# Patient Record
Sex: FEMALE | Race: BLACK OR AFRICAN AMERICAN | NOT HISPANIC OR LATINO | URBAN - METROPOLITAN AREA
[De-identification: names, ages, dates, MRNs, and addresses within clinical notes are randomized per-mention and may not be internally consistent; named-entity substitution may affect disease eponyms.]

---

## 2016-02-08 LAB — EXTERNAL THINPREP: NORMAL

## 2017-02-15 ENCOUNTER — TELEPHONE (OUTPATIENT)
Dept: OBSTETRICS AND GYNECOLOGY | Facility: CLINIC | Age: 32
End: 2017-02-15

## 2017-02-15 NOTE — TELEPHONE ENCOUNTER
----- Message from Jenni Greene sent at 2/15/2017 12:24 PM EST -----  Pt is having cramping no bleeding.  She is early prenatal.  Best number is [phone].      Called pt left message        Pt things are getting worse. Coming in for u/s

## 2017-02-17 ENCOUNTER — TELEPHONE (OUTPATIENT)
Dept: OBSTETRICS AND GYNECOLOGY | Facility: CLINIC | Age: 32
End: 2017-02-17

## 2017-02-17 NOTE — TELEPHONE ENCOUNTER
----- Message from Jenni Greene sent at 2/15/2017 12:24 PM EST -----  Pt is having cramping no bleeding.  She is early prenatal.  Best number is [phone].    Spoke to pt. Said the cramping is getting worse. Transferred her to make an appt

## 2017-02-21 ENCOUNTER — TELEPHONE (OUTPATIENT)
Dept: OBSTETRICS AND GYNECOLOGY | Facility: CLINIC | Age: 32
End: 2017-02-21

## 2017-02-21 RX ORDER — PRENATAL VIT/IRON FUM/FOLIC AC 27MG-0.8MG
1 TABLET ORAL DAILY
Qty: 30 TABLET | Refills: 12 | Status: SHIPPED | OUTPATIENT
Start: 2017-02-21 | End: 2017-06-20

## 2017-03-05 ENCOUNTER — APPOINTMENT (OUTPATIENT)
Dept: ULTRASOUND IMAGING | Facility: HOSPITAL | Age: 32
End: 2017-03-05

## 2017-03-05 ENCOUNTER — HOSPITAL ENCOUNTER (EMERGENCY)
Facility: HOSPITAL | Age: 32
Discharge: HOME | End: 2017-03-05
Attending: EMERGENCY MEDICINE | Admitting: EMERGENCY MEDICINE

## 2017-03-05 VITALS
TEMPERATURE: 98.4 F | WEIGHT: 293 LBS | HEIGHT: 70 IN | DIASTOLIC BLOOD PRESSURE: 68 MMHG | RESPIRATION RATE: 16 BRPM | SYSTOLIC BLOOD PRESSURE: 123 MMHG | HEART RATE: 87 BPM | BODY MASS INDEX: 41.95 KG/M2 | OXYGEN SATURATION: 98 %

## 2017-03-05 DIAGNOSIS — O30.001 TWIN GESTATION IN FIRST TRIMESTER, UNSPECIFIED MULTIPLE GESTATION TYPE: Primary | ICD-10-CM

## 2017-03-05 DIAGNOSIS — N83.202 LEFT OVARIAN CYST: ICD-10-CM

## 2017-03-05 LAB
ALBUMIN SERPL-MCNC: 3.6 G/DL (ref 3.2–4.8)
ALBUMIN/GLOB SERPL: 1.3 G/DL (ref 1.5–2.5)
ALP SERPL-CCNC: 52 U/L (ref 25–100)
ALT SERPL W P-5'-P-CCNC: 10 U/L (ref 7–40)
ANION GAP SERPL CALCULATED.3IONS-SCNC: 3 MMOL/L (ref 3–11)
AST SERPL-CCNC: 15 U/L (ref 0–33)
BASOPHILS # BLD AUTO: 0.01 10*3/MM3 (ref 0–0.2)
BASOPHILS NFR BLD AUTO: 0.1 % (ref 0–1)
BILIRUB SERPL-MCNC: 0.4 MG/DL (ref 0.3–1.2)
BILIRUB UR QL STRIP: ABNORMAL
BUN BLD-MCNC: 8 MG/DL (ref 9–23)
BUN/CREAT SERPL: 13.3 (ref 7–25)
CALCIUM SPEC-SCNC: 8.8 MG/DL (ref 8.7–10.4)
CHLORIDE SERPL-SCNC: 106 MMOL/L (ref 99–109)
CLARITY UR: CLEAR
CO2 SERPL-SCNC: 26 MMOL/L (ref 20–31)
COLOR UR: ABNORMAL
CREAT BLD-MCNC: 0.6 MG/DL (ref 0.6–1.3)
DEPRECATED RDW RBC AUTO: 41.8 FL (ref 37–54)
EOSINOPHIL # BLD AUTO: 0.14 10*3/MM3 (ref 0.1–0.3)
EOSINOPHIL NFR BLD AUTO: 1.7 % (ref 0–3)
ERYTHROCYTE [DISTWIDTH] IN BLOOD BY AUTOMATED COUNT: 12.4 % (ref 11.3–14.5)
FLUAV AG NPH QL: NEGATIVE
FLUBV AG NPH QL IA: NEGATIVE
GFR SERPL CREATININE-BSD FRML MDRD: 142 ML/MIN/1.73
GLOBULIN UR ELPH-MCNC: 2.7 GM/DL
GLUCOSE BLD-MCNC: 84 MG/DL (ref 70–100)
GLUCOSE UR STRIP-MCNC: NEGATIVE MG/DL
HCG INTACT+B SERPL-ACNC: NORMAL MIU/ML
HCT VFR BLD AUTO: 34.1 % (ref 34.5–44)
HGB BLD-MCNC: 11.2 G/DL (ref 11.5–15.5)
HGB UR QL STRIP.AUTO: NEGATIVE
IMM GRANULOCYTES # BLD: 0.01 10*3/MM3 (ref 0–0.03)
IMM GRANULOCYTES NFR BLD: 0.1 % (ref 0–0.6)
KETONES UR QL STRIP: NEGATIVE
LEUKOCYTE ESTERASE UR QL STRIP.AUTO: NEGATIVE
LIPASE SERPL-CCNC: 21 U/L (ref 6–51)
LYMPHOCYTES # BLD AUTO: 2.32 10*3/MM3 (ref 0.6–4.8)
LYMPHOCYTES NFR BLD AUTO: 28.4 % (ref 24–44)
MCH RBC QN AUTO: 29.9 PG (ref 27–31)
MCHC RBC AUTO-ENTMCNC: 32.8 G/DL (ref 32–36)
MCV RBC AUTO: 91.2 FL (ref 80–99)
MONOCYTES # BLD AUTO: 0.34 10*3/MM3 (ref 0–1)
MONOCYTES NFR BLD AUTO: 4.2 % (ref 0–12)
NEUTROPHILS # BLD AUTO: 5.34 10*3/MM3 (ref 1.5–8.3)
NEUTROPHILS NFR BLD AUTO: 65.5 % (ref 41–71)
NITRITE UR QL STRIP: NEGATIVE
PH UR STRIP.AUTO: 5.5 [PH] (ref 5–8)
PLATELET # BLD AUTO: 249 10*3/MM3 (ref 150–450)
PMV BLD AUTO: 11 FL (ref 6–12)
POTASSIUM BLD-SCNC: 3.9 MMOL/L (ref 3.5–5.5)
PROT SERPL-MCNC: 6.3 G/DL (ref 5.7–8.2)
PROT UR QL STRIP: NEGATIVE
RBC # BLD AUTO: 3.74 10*6/MM3 (ref 3.89–5.14)
SODIUM BLD-SCNC: 135 MMOL/L (ref 132–146)
SP GR UR STRIP: >=1.03 (ref 1–1.03)
UROBILINOGEN UR QL STRIP: ABNORMAL
WBC NRBC COR # BLD: 8.16 10*3/MM3 (ref 3.5–10.8)

## 2017-03-05 PROCEDURE — 85025 COMPLETE CBC W/AUTO DIFF WBC: CPT | Performed by: PHYSICIAN ASSISTANT

## 2017-03-05 PROCEDURE — 99284 EMERGENCY DEPT VISIT MOD MDM: CPT

## 2017-03-05 PROCEDURE — 76817 TRANSVAGINAL US OBSTETRIC: CPT

## 2017-03-05 PROCEDURE — 80053 COMPREHEN METABOLIC PANEL: CPT | Performed by: PHYSICIAN ASSISTANT

## 2017-03-05 PROCEDURE — 96360 HYDRATION IV INFUSION INIT: CPT

## 2017-03-05 PROCEDURE — 87804 INFLUENZA ASSAY W/OPTIC: CPT | Performed by: PHYSICIAN ASSISTANT

## 2017-03-05 PROCEDURE — 81003 URINALYSIS AUTO W/O SCOPE: CPT | Performed by: PHYSICIAN ASSISTANT

## 2017-03-05 PROCEDURE — 83690 ASSAY OF LIPASE: CPT | Performed by: PHYSICIAN ASSISTANT

## 2017-03-05 PROCEDURE — 84702 CHORIONIC GONADOTROPIN TEST: CPT | Performed by: PHYSICIAN ASSISTANT

## 2017-03-05 PROCEDURE — 93976 VASCULAR STUDY: CPT

## 2017-03-05 PROCEDURE — 36415 COLL VENOUS BLD VENIPUNCTURE: CPT

## 2017-03-05 RX ORDER — SODIUM CHLORIDE 0.9 % (FLUSH) 0.9 %
10 SYRINGE (ML) INJECTION AS NEEDED
Status: DISCONTINUED | OUTPATIENT
Start: 2017-03-05 | End: 2017-03-05 | Stop reason: HOSPADM

## 2017-03-05 RX ADMIN — SODIUM CHLORIDE 1000 ML: 9 INJECTION, SOLUTION INTRAVENOUS at 13:27

## 2017-03-05 NOTE — ED PROVIDER NOTES
Subjective   Patient is a 31 y.o. female presenting with pregnancy problem.   History provided by:  Patient  Pregnancy Problem   The current episode started 2 days ago. Gestational age is 8 weeks (LMP 1-8-17. Took home urine pregnancy tests.).   Primary symptoms include abdominal pain (LLQ cramping ). Patient reports no vaginal bleeding and no vaginal discharge. Prenatal care: Pt has first appointment with Dr. Baxter 3-17-17. She has been taking prenatals. She has not had an US to confirm the pregnancy.    Associated symptoms include nausea and weakness (generalized ).   Associated symptoms include no dysuria, no fever, no headaches, no shortness of breath and no vomiting.   Prior pregnancy complications include miscarriage (x1). Prior pregnancy complications: HTN and obesity .   Risk factors do not include diabetes or gestational diabetes.   Pt gave birth vaginally 9-16-16 most recently at 38 weeks.     Review of Systems   Constitutional: Negative for chills and fever.   HENT: Negative for congestion, ear pain, sore throat and trouble swallowing.    Eyes: Negative for pain, redness and visual disturbance.   Respiratory: Negative for cough, chest tightness and shortness of breath.    Cardiovascular: Negative for chest pain and leg swelling.   Gastrointestinal: Positive for abdominal pain (LLQ cramping ) and nausea. Negative for constipation, diarrhea and vomiting.   Genitourinary: Negative for difficulty urinating, dysuria, flank pain, hematuria, vaginal bleeding and vaginal discharge.   Musculoskeletal: Negative for arthralgias, back pain and joint swelling.   Skin: Negative for rash and wound.   Neurological: Positive for weakness (generalized ). Negative for dizziness, syncope, speech difficulty, numbness and headaches.   Psychiatric/Behavioral: Negative for confusion.   All other systems reviewed and are negative.      Past Medical History   Diagnosis Date   • Anemia    • Anxiety    • Chronic back pain    •  "Depression    • Dyspnea on exertion    • Environmental allergies    • Glucose intolerance (impaired glucose tolerance)    • Helicobacter pylori (H. pylori) infection      asx and grossly nl EGD. \"abundant\"on path. HBT still + after PrevPak tx. LSG path neg.   • Hypoalbuminemia      3.2   • Joint pain    • Migraine    • Obesity, morbid    • VIRA (obstructive sleep apnea)      negative test   • PCOS (polycystic ovarian syndrome)        No Known Allergies    Past Surgical History   Procedure Laterality Date   • Gastric sleeve laparoscopic     • Tonsillectomy     • Dilatation and curettage     • Gastric sleeve laparoscopic     • Bariatric surgery         Family History   Problem Relation Age of Onset   • Birth defects Other    • Sleep apnea Mother        Social History     Social History   • Marital status: Single     Spouse name: N/A   • Number of children: N/A   • Years of education: N/A     Social History Main Topics   • Smoking status: Never Smoker   • Smokeless tobacco: None   • Alcohol use No   • Drug use: No   • Sexual activity: Yes     Partners: Male     Other Topics Concern   • None     Social History Narrative   • None           Objective   Physical Exam   Constitutional: She is oriented to person, place, and time. Vital signs are normal. She appears well-developed.   HENT:   Head: Atraumatic.   Nose: Nose normal.   Mouth/Throat: Mucous membranes are normal.   Eyes: Conjunctivae, EOM and lids are normal. Pupils are equal, round, and reactive to light.   Neck: Normal range of motion. Neck supple.   Cardiovascular: Normal rate, regular rhythm and normal heart sounds.    Pulmonary/Chest: Effort normal and breath sounds normal. She has no wheezes.   Abdominal: Soft. She exhibits no distension. There is tenderness in the left lower quadrant. There is no rebound and no guarding.   Musculoskeletal: Normal range of motion. She exhibits no edema or tenderness.   Neurological: She is alert and oriented to person, place, " and time. No sensory deficit.   Skin: Skin is warm and dry. No rash noted. No erythema.   Psychiatric: She has a normal mood and affect. Her speech is normal and behavior is normal.   Nursing note and vitals reviewed.      Procedures         ED Course  ED Course   Discussed results with patient and she is agreeable with plan for close f/u with Dr. Baxter. Offered work excuse and she declined.     Reviewed old records. Noted Rh +       Recent Results (from the past 24 hour(s))   Influenza Antigen    Collection Time: 03/05/17  1:18 PM   Result Value Ref Range    Influenza A Ag, EIA Negative Negative    Influenza B Ag, EIA Negative Negative   Urinalysis With / Culture If Indicated    Collection Time: 03/05/17  1:20 PM   Result Value Ref Range    Color, UA Dark Yellow (A) Yellow, Straw    Appearance, UA Clear Clear    pH, UA 5.5 5.0 - 8.0    Specific Gravity, UA >=1.030 1.001 - 1.030    Glucose, UA Negative Negative    Ketones, UA Negative Negative    Bilirubin, UA Small (1+) (A) Negative    Blood, UA Negative Negative    Protein, UA Negative Negative    Leuk Esterase, UA Negative Negative    Nitrite, UA Negative Negative    Urobilinogen, UA 1.0 E.U./dL 0.2 - 1.0 E.U./dL   Comprehensive Metabolic Panel    Collection Time: 03/05/17  1:58 PM   Result Value Ref Range    Glucose 84 70 - 100 mg/dL    BUN 8 (L) 9 - 23 mg/dL    Creatinine 0.60 0.60 - 1.30 mg/dL    Sodium 135 132 - 146 mmol/L    Potassium 3.9 3.5 - 5.5 mmol/L    Chloride 106 99 - 109 mmol/L    CO2 26.0 20.0 - 31.0 mmol/L    Calcium 8.8 8.7 - 10.4 mg/dL    Total Protein 6.3 5.7 - 8.2 g/dL    Albumin 3.60 3.20 - 4.80 g/dL    ALT (SGPT) 10 7 - 40 U/L    AST (SGOT) 15 0 - 33 U/L    Alkaline Phosphatase 52 25 - 100 U/L    Total Bilirubin 0.4 0.3 - 1.2 mg/dL    eGFR  African Amer 142 >60 mL/min/1.73    Globulin 2.7 gm/dL    A/G Ratio 1.3 (L) 1.5 - 2.5 g/dL    BUN/Creatinine Ratio 13.3 7.0 - 25.0    Anion Gap 3.0 3.0 - 11.0 mmol/L   Lipase    Collection Time: 03/05/17   1:58 PM   Result Value Ref Range    Lipase 21 6 - 51 U/L   hCG, Quantitative, Pregnancy    Collection Time: 03/05/17  1:58 PM   Result Value Ref Range    HCG Quantitative 07844.00 mIU/mL   CBC Auto Differential    Collection Time: 03/05/17  1:58 PM   Result Value Ref Range    WBC 8.16 3.50 - 10.80 10*3/mm3    RBC 3.74 (L) 3.89 - 5.14 10*6/mm3    Hemoglobin 11.2 (L) 11.5 - 15.5 g/dL    Hematocrit 34.1 (L) 34.5 - 44.0 %    MCV 91.2 80.0 - 99.0 fL    MCH 29.9 27.0 - 31.0 pg    MCHC 32.8 32.0 - 36.0 g/dL    RDW 12.4 11.3 - 14.5 %    RDW-SD 41.8 37.0 - 54.0 fl    MPV 11.0 6.0 - 12.0 fL    Platelets 249 150 - 450 10*3/mm3    Neutrophil % 65.5 41.0 - 71.0 %    Lymphocyte % 28.4 24.0 - 44.0 %    Monocyte % 4.2 0.0 - 12.0 %    Eosinophil % 1.7 0.0 - 3.0 %    Basophil % 0.1 0.0 - 1.0 %    Immature Grans % 0.1 0.0 - 0.6 %    Neutrophils, Absolute 5.34 1.50 - 8.30 10*3/mm3    Lymphocytes, Absolute 2.32 0.60 - 4.80 10*3/mm3    Monocytes, Absolute 0.34 0.00 - 1.00 10*3/mm3    Eosinophils, Absolute 0.14 0.10 - 0.30 10*3/mm3    Basophils, Absolute 0.01 0.00 - 0.20 10*3/mm3    Immature Grans, Absolute 0.01 0.00 - 0.03 10*3/mm3     Note: In addition to lab results from this visit, the labs listed above may include labs taken at another facility or during a different encounter within the last 24 hours. Please correlate lab times with ED admission and discharge times for further clarification of the services performed during this visit.    US Testicular or Ovarian Vascular Limited   Preliminary Result   Two living intrauterine pregnancies identified. Crown rump   length for fetal pole A is 7 weeks 5 days and crown-rump length for   fetal pole B is  7 weeks 4 days with fetal heart tones present. There is   a large left ovarian cyst with good blood flow seen in the left ovary.   The right ovary is somewhat limited to visualize.       DICTATED:     03/05/2017   EDITED:         03/05/2017               US Ob Transvaginal   Preliminary  Result   Two living intrauterine pregnancies identified. Crown rump   length for fetal pole A is 7 weeks 5 days and crown-rump length for   fetal pole B is  7 weeks 4 days with fetal heart tones present. There is   a large left ovarian cyst with good blood flow seen in the left ovary.   The right ovary is somewhat limited to visualize.       DICTATED:     03/05/2017   EDITED:         03/05/2017                 Vitals:    03/05/17 1400 03/05/17 1509 03/05/17 1621 03/05/17 1632   BP: 119/71  123/68    BP Location:       Patient Position:       Pulse:   87    Resp:   18 16   Temp:    98.4 °F (36.9 °C)   TempSrc:       SpO2: 100% 100% 98%    Weight:       Height:         Medications   sodium chloride 0.9 % bolus 1,000 mL (0 mL Intravenous Stopped 3/5/17 1455)                         MDM    Final diagnoses:   Twin gestation in first trimester, unspecified multiple gestation type   Left ovarian cyst            YAMILET Salvador  03/05/17 2915

## 2017-03-17 ENCOUNTER — INITIAL PRENATAL (OUTPATIENT)
Dept: OBSTETRICS AND GYNECOLOGY | Facility: CLINIC | Age: 32
End: 2017-03-17

## 2017-03-17 VITALS — WEIGHT: 293 LBS | BODY MASS INDEX: 47.21 KG/M2 | DIASTOLIC BLOOD PRESSURE: 80 MMHG | SYSTOLIC BLOOD PRESSURE: 124 MMHG

## 2017-03-17 DIAGNOSIS — IMO0001 TWINS: Primary | ICD-10-CM

## 2017-03-17 PROCEDURE — 99214 OFFICE O/P EST MOD 30 MIN: CPT | Performed by: OBSTETRICS & GYNECOLOGY

## 2017-03-17 NOTE — PROGRESS NOTES
"Chief Complaint   Patient presents with   • Initial Prenatal Visit     TWINS!!!! Desires BTL       HPI: Geneva is a  currently at 9w4d who today reports the following:  Contractions - {yes/NO:39266::\"No\"}; Leaking - {yes/NO:64313::\"No\"}; Vaginal bleeding -  {yes/NO:69973::\"No\"}; Heartburn - {yes/NO:37480::\"No\"}.    ROS:  GI: Nausea - {yes/NO:61972::\"No\"} ; Constipation - {yes/NO:66225::\"No\"}; Diarrhea - {yes/NO:10384::\"No\"}    Neuro: Headache - {yes/NO:02335::\"No\"} ; Visual change - {yes/NO:37371::\"No\"}      EXAM:  Vitals: See prenatal flowsheet   Abdomen: See prenatal flowsheet   Urine glucose/protein: See prenatal flowsheet   Pelvic: See prenatal flowsheet     Lab Results   Component Value Date    ABO A 2016    RH Positive 2016    ABSCRN Negative 2016       MDM:  Impression: 1. {Pregnancy Imp:29249::\"Supervision of pregnancy\"}   Tests done today: 1. {OB follow-up tests:58500::\"none\"}   Topics discussed: 1. Continue with PNV's  2. Prenatal labs reviewed  3. {Prenatal Checklist:48036::\"none - she had no major complaints,questions or concerns\"}   Tests next visit: {OB tests next visit:08571::\"none\"}   Next visit: See prenatal flowsheet       "

## 2017-03-17 NOTE — PROGRESS NOTES
@SUBJECTIVEBEGIN  Chief Complaint   Patient presents with   • Initial Prenatal Visit     TWINS!!!! Desires BTL       Geneva Cordova is a 31 y.o. year old .  No LMP recorded. Patient is pregnant.  She presents to be seen to initiate prenatal care. She has twins on ultrasound.  dates and scan confirms.  She lost ~125 lbs after gastric sleeve and has gained 27 back. 1800/2000 calorie diet.    Smoking status: Never Smoker                                                                 Smokeless status: Not on file                         The following portions of the patient's history were reviewed and updated as appropriate:vital signs, allergies, current medications, past medical history, past social history, past surgical history and problem list.    Objective   Lab Review   No data reviewed    Imaging   Pelvic ultrasound report    Assessment/Plan   ASSESSMENT  1. IUP at    2. Supervision of pregnancy  3. twins  4. morbid obesity    PLAN  1. The problem list for pregnancy was initiated today  2. Tests ordered today:  No orders of the defined types were placed in this encounter.    3. Testing for GC / Chlamydia / trichomonas will need to be done at her next prenatal visit  4. Genetic testing reviewed: NIPT ([place])  5. Information reviewed: exercise in pregnancy, nutrition in pregnancy, weight gain in pregnancy, work and travel restrictions during pregnancy, list of OTC medications acceptable in pregnancy and call coverage groups    Follow up: 2 week(s) to do hARMONY TESTING       This note was electronically signed.      2017

## 2017-03-31 ENCOUNTER — LAB (OUTPATIENT)
Dept: LAB | Facility: HOSPITAL | Age: 32
End: 2017-03-31

## 2017-03-31 ENCOUNTER — ROUTINE PRENATAL (OUTPATIENT)
Dept: OBSTETRICS AND GYNECOLOGY | Facility: CLINIC | Age: 32
End: 2017-03-31

## 2017-03-31 VITALS — BODY MASS INDEX: 46.92 KG/M2 | WEIGHT: 293 LBS | SYSTOLIC BLOOD PRESSURE: 122 MMHG | DIASTOLIC BLOOD PRESSURE: 80 MMHG

## 2017-03-31 DIAGNOSIS — IMO0001 TWINS: Primary | ICD-10-CM

## 2017-03-31 DIAGNOSIS — IMO0001 TWINS: ICD-10-CM

## 2017-03-31 DIAGNOSIS — E66.01 MORBID OBESITY DUE TO EXCESS CALORIES (HCC): ICD-10-CM

## 2017-03-31 LAB
ABO GROUP BLD: NORMAL
AMPHET+METHAMPHET UR QL: NEGATIVE
AMPHETAMINES UR QL: NEGATIVE
BACTERIA UR QL AUTO: ABNORMAL /HPF
BARBITURATES UR QL SCN: NEGATIVE
BASOPHILS # BLD AUTO: 0.01 10*3/MM3 (ref 0–0.2)
BASOPHILS NFR BLD AUTO: 0.1 % (ref 0–1)
BENZODIAZ UR QL SCN: NEGATIVE
BILIRUB UR QL STRIP: ABNORMAL
BLD GP AB SCN SERPL QL: NEGATIVE
BUPRENORPHINE SERPL-MCNC: NEGATIVE NG/ML
CANNABINOIDS SERPL QL: NEGATIVE
CLARITY UR: ABNORMAL
COCAINE UR QL: NEGATIVE
COLOR UR: YELLOW
DEPRECATED RDW RBC AUTO: 40.9 FL (ref 37–54)
EOSINOPHIL # BLD AUTO: 0.15 10*3/MM3 (ref 0.1–0.3)
EOSINOPHIL NFR BLD AUTO: 1.9 % (ref 0–3)
ERYTHROCYTE [DISTWIDTH] IN BLOOD BY AUTOMATED COUNT: 12.7 % (ref 11.3–14.5)
GLUCOSE UR STRIP-MCNC: NEGATIVE MG/DL
HBV SURFACE AG SERPL QL IA: NORMAL
HCT VFR BLD AUTO: 34.9 % (ref 34.5–44)
HGB BLD-MCNC: 11.6 G/DL (ref 11.5–15.5)
HGB UR QL STRIP.AUTO: NEGATIVE
HIV1+2 AB SER QL: NORMAL
HYALINE CASTS UR QL AUTO: ABNORMAL /LPF
IMM GRANULOCYTES # BLD: 0.02 10*3/MM3 (ref 0–0.03)
IMM GRANULOCYTES NFR BLD: 0.3 % (ref 0–0.6)
KETONES UR QL STRIP: ABNORMAL
LEUKOCYTE ESTERASE UR QL STRIP.AUTO: NEGATIVE
LYMPHOCYTES # BLD AUTO: 2.37 10*3/MM3 (ref 0.6–4.8)
LYMPHOCYTES NFR BLD AUTO: 30.1 % (ref 24–44)
MCH RBC QN AUTO: 29.7 PG (ref 27–31)
MCHC RBC AUTO-ENTMCNC: 33.2 G/DL (ref 32–36)
MCV RBC AUTO: 89.5 FL (ref 80–99)
METHADONE UR QL SCN: NEGATIVE
MONOCYTES # BLD AUTO: 0.26 10*3/MM3 (ref 0–1)
MONOCYTES NFR BLD AUTO: 3.3 % (ref 0–12)
NEUTROPHILS # BLD AUTO: 5.06 10*3/MM3 (ref 1.5–8.3)
NEUTROPHILS NFR BLD AUTO: 64.3 % (ref 41–71)
NITRITE UR QL STRIP: NEGATIVE
OPIATES UR QL: NEGATIVE
OXYCODONE UR QL SCN: NEGATIVE
PCP UR QL SCN: NEGATIVE
PH UR STRIP.AUTO: <=5 [PH] (ref 5–8)
PLATELET # BLD AUTO: 274 10*3/MM3 (ref 150–450)
PMV BLD AUTO: 11.2 FL (ref 6–12)
PROPOXYPH UR QL: NEGATIVE
PROT UR QL STRIP: NEGATIVE
RBC # BLD AUTO: 3.9 10*6/MM3 (ref 3.89–5.14)
RBC # UR: ABNORMAL /HPF
REF LAB TEST METHOD: ABNORMAL
RH BLD: POSITIVE
RUBV IGG SER QL: NORMAL
RUBV IGG SER-ACNC: 323.6 IU/ML
SP GR UR STRIP: >=1.03 (ref 1–1.03)
SQUAMOUS #/AREA URNS HPF: ABNORMAL /HPF
TRICYCLICS UR QL SCN: NEGATIVE
UROBILINOGEN UR QL STRIP: ABNORMAL
WBC NRBC COR # BLD: 7.87 10*3/MM3 (ref 3.5–10.8)
WBC UR QL AUTO: ABNORMAL /HPF

## 2017-03-31 PROCEDURE — G0432 EIA HIV-1/HIV-2 SCREEN: HCPCS | Performed by: OBSTETRICS & GYNECOLOGY

## 2017-03-31 PROCEDURE — 80306 DRUG TEST PRSMV INSTRMNT: CPT | Performed by: OBSTETRICS & GYNECOLOGY

## 2017-03-31 PROCEDURE — 86850 RBC ANTIBODY SCREEN: CPT | Performed by: OBSTETRICS & GYNECOLOGY

## 2017-03-31 PROCEDURE — 87340 HEPATITIS B SURFACE AG IA: CPT | Performed by: OBSTETRICS & GYNECOLOGY

## 2017-03-31 PROCEDURE — 36415 COLL VENOUS BLD VENIPUNCTURE: CPT

## 2017-03-31 PROCEDURE — 80081 OBSTETRIC PANEL INC HIV TSTG: CPT | Performed by: OBSTETRICS & GYNECOLOGY

## 2017-03-31 PROCEDURE — 99213 OFFICE O/P EST LOW 20 MIN: CPT | Performed by: OBSTETRICS & GYNECOLOGY

## 2017-03-31 PROCEDURE — 86901 BLOOD TYPING SEROLOGIC RH(D): CPT | Performed by: OBSTETRICS & GYNECOLOGY

## 2017-03-31 PROCEDURE — 87086 URINE CULTURE/COLONY COUNT: CPT | Performed by: OBSTETRICS & GYNECOLOGY

## 2017-03-31 PROCEDURE — 86900 BLOOD TYPING SEROLOGIC ABO: CPT | Performed by: OBSTETRICS & GYNECOLOGY

## 2017-03-31 PROCEDURE — 81001 URINALYSIS AUTO W/SCOPE: CPT | Performed by: OBSTETRICS & GYNECOLOGY

## 2017-03-31 PROCEDURE — 85025 COMPLETE CBC W/AUTO DIFF WBC: CPT | Performed by: OBSTETRICS & GYNECOLOGY

## 2017-04-02 LAB — BACTERIA SPEC AEROBE CULT: NORMAL

## 2017-04-03 LAB — RPR SER QL: NORMAL

## 2017-04-06 ENCOUNTER — TELEPHONE (OUTPATIENT)
Dept: OBSTETRICS AND GYNECOLOGY | Facility: CLINIC | Age: 32
End: 2017-04-06

## 2017-04-06 NOTE — TELEPHONE ENCOUNTER
----- Message from Tom Greenwood sent at 4/6/2017 10:09 AM EDT -----  [phone]     PT IS CALLING TO SEE IF HARMONY TEST RESULTS ARE BACK

## 2017-04-06 NOTE — TELEPHONE ENCOUNTER
Advised pt [place] results are not back yet. Takes 7-10 days and tomorrow will be 7 days but Dr. Baxter is not back until Monday to sign any labs off. Will be atleast Monday before I get any results. Pt verbalized understanding.

## 2017-04-10 ENCOUNTER — TELEPHONE (OUTPATIENT)
Dept: OBSTETRICS AND GYNECOLOGY | Facility: CLINIC | Age: 32
End: 2017-04-10

## 2017-04-10 NOTE — TELEPHONE ENCOUNTER
Pt called back for results. Advised her the lab did not get enough sample to get her the results. She will have to have it re drawn to get a result.     Pt was upset and hung up the phone.

## 2017-04-10 NOTE — TELEPHONE ENCOUNTER
----- Message from Maricruz Gilman sent at 4/10/2017 10:41 AM EDT -----  Please call her back at above #, wants results of john test

## 2017-04-11 ENCOUNTER — APPOINTMENT (OUTPATIENT)
Dept: LAB | Facility: HOSPITAL | Age: 32
End: 2017-04-11

## 2017-04-18 ENCOUNTER — TELEPHONE (OUTPATIENT)
Dept: OBSTETRICS AND GYNECOLOGY | Facility: CLINIC | Age: 32
End: 2017-04-18

## 2017-04-18 NOTE — TELEPHONE ENCOUNTER
----- Message from Katalina Thornton sent at 4/18/2017  2:50 PM EDT -----  VAGINA HURTS TO WALK -LAYS DOWN ITS OK - JUST CALL AND TALK WITH

## 2017-04-18 NOTE — TELEPHONE ENCOUNTER
----- Message from Gloria Phillip sent at 4/18/2017  8:42 AM EDT -----  Need to talk about pain in vag.         Tried calling pt was unable to leave message

## 2017-04-20 ENCOUNTER — TELEPHONE (OUTPATIENT)
Dept: OBSTETRICS AND GYNECOLOGY | Facility: CLINIC | Age: 32
End: 2017-04-20

## 2017-04-20 NOTE — TELEPHONE ENCOUNTER
----- Message from Karrie Page sent at 4/20/2017  2:08 PM EDT -----  14 wk prenatal with a lot of cramping and  Pain in her vagina, should she go to [facility] or ER?

## 2017-04-24 ENCOUNTER — TELEPHONE (OUTPATIENT)
Dept: OBSTETRICS AND GYNECOLOGY | Facility: CLINIC | Age: 32
End: 2017-04-24

## 2017-04-24 NOTE — TELEPHONE ENCOUNTER
i called an d aleksander Bean the re-draw for [place]/ NIPT was inconclusive; that could be associated with chromosomal abnormalities. I suggested follow up at [facility] and [facility] as options. She has an appt this Friday 4/28 already and I will see her later that day.

## 2017-04-25 ENCOUNTER — OFFICE VISIT (OUTPATIENT)
Dept: RETAIL CLINIC | Facility: CLINIC | Age: 32
End: 2017-04-25

## 2017-04-25 VITALS
HEART RATE: 90 BPM | TEMPERATURE: 98.5 F | OXYGEN SATURATION: 99 % | SYSTOLIC BLOOD PRESSURE: 140 MMHG | DIASTOLIC BLOOD PRESSURE: 80 MMHG

## 2017-04-25 DIAGNOSIS — J06.9 UPPER RESPIRATORY INFECTION, VIRAL: ICD-10-CM

## 2017-04-25 DIAGNOSIS — R68.89 FLU-LIKE SYMPTOMS: Primary | ICD-10-CM

## 2017-04-25 DIAGNOSIS — R51.9 HEADACHE ABOVE THE EYE REGION: ICD-10-CM

## 2017-04-25 LAB
EXPIRATION DATE: NORMAL
FLUAV AG NPH QL: NEGATIVE
FLUBV AG NPH QL: NEGATIVE
INTERNAL CONTROL: NORMAL
Lab: NORMAL

## 2017-04-25 PROCEDURE — 99213 OFFICE O/P EST LOW 20 MIN: CPT | Performed by: NURSE PRACTITIONER

## 2017-04-25 PROCEDURE — 87804 INFLUENZA ASSAY W/OPTIC: CPT | Performed by: NURSE PRACTITIONER

## 2017-04-25 RX ORDER — ECHINACEA PURPUREA EXTRACT 125 MG
2 TABLET ORAL AS NEEDED
Refills: 12
Start: 2017-04-25 | End: 2017-04-28

## 2017-04-25 RX ORDER — ACETAMINOPHEN 325 MG/1
650 TABLET ORAL EVERY 6 HOURS PRN
Refills: 0
Start: 2017-04-25 | End: 2017-10-03

## 2017-04-25 NOTE — PROGRESS NOTES
Cooper Cordova is a 31 y.o. female presents to the clinic at the request of her OBGYN. She is currently 15 weeks pregnant. She was shopping in [employer] while becoming dizzy. She also expresses that she has had a headache for 3 days, however notes that she has chronic headaches. She also reports a sore throat.    Headache    This is a chronic problem. The current episode started in the past 7 days (3 days ago). The problem occurs constantly. The problem has been waxing and waning. The pain is located in the frontal region. The pain does not radiate. The pain quality is not similar to prior headaches (not like prior migraines). The quality of the pain is described as aching. The pain is moderate. Associated symptoms include dizziness and a sore throat. Pertinent negatives include no blurred vision, ear pain, photophobia, sinus pressure or visual change. She has tried nothing for the symptoms. Her past medical history is significant for migraine headaches.   Sore Throat    This is a new problem. The current episode started yesterday. The problem has been gradually worsening. Neither side of throat is experiencing more pain than the other. There has been no fever. The pain is mild. Associated symptoms include congestion and headaches. Pertinent negatives include no ear pain. She has tried nothing for the symptoms.       The following portions of the patient's history were reviewed and updated as appropriate: allergies, current medications, past family history, past medical history, past social history, past surgical history and problem list.    Review of Systems   Constitutional: Negative.    HENT: Positive for congestion, postnasal drip and sore throat. Negative for ear pain, sinus pressure and voice change.    Eyes: Negative.  Negative for blurred vision and photophobia.   Respiratory: Negative.    Cardiovascular: Negative.    Gastrointestinal: Negative.    Musculoskeletal: Negative.    Skin: Negative.     Allergic/Immunologic: Negative.    Neurological: Positive for dizziness and headaches.   Psychiatric/Behavioral: Negative.        Objective   Physical Exam   Constitutional: She is oriented to person, place, and time. She appears well-developed and well-nourished.   HENT:   Head: Normocephalic and atraumatic.   Right Ear: Hearing, external ear and ear canal normal. A middle ear effusion is present.   Left Ear: Hearing, external ear and ear canal normal. A middle ear effusion is present.   Nose: Mucosal edema present.   Mouth/Throat: Uvula is midline and mucous membranes are normal. Posterior oropharyngeal erythema present.   Eyes: Pupils are equal, round, and reactive to light.   Neck: Normal range of motion.   Cardiovascular: Normal rate and regular rhythm.    Pulmonary/Chest: Effort normal. She has decreased breath sounds in the right lower field and the left lower field.   Neurological: She is alert and oriented to person, place, and time.   Skin: Skin is warm and dry.   Vitals reviewed.    Vitals:    04/25/17 1208   BP: 140/80   Pulse: 90   Temp: 98.5 °F (36.9 °C)   SpO2: 99%     Lab Results   Component Value Date    RAPFLUA NEGATIVE 04/25/2017    RAPFLUB NEGATIVE 04/25/2017       Assessment/Plan   Geneva was seen today for headache.    Diagnoses and all orders for this visit:    Flu-like symptoms  -     POCT Influenza A/B    Upper respiratory infection, viral  -     acetaminophen (TYLENOL) 325 MG tablet; Take 2 tablets by mouth Every 6 (Six) Hours As Needed for Mild Pain (1-3).  -     sodium chloride (OCEAN NASAL SPRAY) 0.65 % nasal spray; 2 sprays into each nostril As Needed for Congestion.    Headache above the eye region  -     acetaminophen (TYLENOL) 325 MG tablet; Take 2 tablets by mouth Every 6 (Six) Hours As Needed for Mild Pain (1-3).     Plan of care reviewed with patient &/or caregiver during today's visit. Education was provided in regards to diagnosis, symptom management and any prescribed or  recommended OTC medications.  Advisedt to follow up with PCP, Urgent Treatment Center, or Emergency Room if symptoms worsen. Patient and/or caregiver verbalized understanding.    Fernanda Singh, APRN

## 2017-04-28 ENCOUNTER — OFFICE VISIT (OUTPATIENT)
Dept: OBSTETRICS AND GYNECOLOGY | Facility: HOSPITAL | Age: 32
End: 2017-04-28

## 2017-04-28 ENCOUNTER — ROUTINE PRENATAL (OUTPATIENT)
Dept: OBSTETRICS AND GYNECOLOGY | Facility: CLINIC | Age: 32
End: 2017-04-28

## 2017-04-28 ENCOUNTER — HOSPITAL ENCOUNTER (OUTPATIENT)
Dept: WOMENS IMAGING | Facility: HOSPITAL | Age: 32
Discharge: HOME | End: 2017-04-28
Attending: OBSTETRICS & GYNECOLOGY | Admitting: OBSTETRICS & GYNECOLOGY

## 2017-04-28 VITALS — SYSTOLIC BLOOD PRESSURE: 110 MMHG | WEIGHT: 293 LBS | BODY MASS INDEX: 47.87 KG/M2 | DIASTOLIC BLOOD PRESSURE: 55 MMHG

## 2017-04-28 VITALS — SYSTOLIC BLOOD PRESSURE: 124 MMHG | DIASTOLIC BLOOD PRESSURE: 70 MMHG

## 2017-04-28 DIAGNOSIS — E66.01 MORBID OBESITY DUE TO EXCESS CALORIES (HCC): ICD-10-CM

## 2017-04-28 DIAGNOSIS — G43.919 INTRACTABLE MIGRAINE WITHOUT STATUS MIGRAINOSUS, UNSPECIFIED MIGRAINE TYPE: Primary | ICD-10-CM

## 2017-04-28 DIAGNOSIS — O30.049 DICHORIONIC DIAMNIOTIC TWIN PREGNANCY, ANTEPARTUM: Primary | ICD-10-CM

## 2017-04-28 DIAGNOSIS — IMO0001 TWINS: ICD-10-CM

## 2017-04-28 PROCEDURE — 99241 PR OFFICE CONSULTATION NEW/ESTAB PATIENT 15 MIN: CPT | Performed by: OBSTETRICS & GYNECOLOGY

## 2017-04-28 PROCEDURE — 76815 OB US LIMITED FETUS(S): CPT | Performed by: OBSTETRICS & GYNECOLOGY

## 2017-04-28 PROCEDURE — 99213 OFFICE O/P EST LOW 20 MIN: CPT | Performed by: OBSTETRICS & GYNECOLOGY

## 2017-04-28 PROCEDURE — 76815 OB US LIMITED FETUS(S): CPT

## 2017-04-28 RX ORDER — GUAIFENESIN 600 MG/1
1200 TABLET, EXTENDED RELEASE ORAL 2 TIMES DAILY
Qty: 120 TABLET | Refills: 11 | Status: SHIPPED | OUTPATIENT
Start: 2017-04-28 | End: 2018-02-01

## 2017-04-28 RX ORDER — FLUTICASONE PROPIONATE 50 MCG
1 SPRAY, SUSPENSION (ML) NASAL DAILY
Qty: 1 EACH | Refills: 2 | Status: SHIPPED | OUTPATIENT
Start: 2017-04-28 | End: 2018-02-01

## 2017-04-28 RX ORDER — LORATADINE 10 MG/1
10 TABLET ORAL DAILY
Qty: 30 TABLET | Refills: 2 | Status: SHIPPED | OUTPATIENT
Start: 2017-04-28 | End: 2018-02-01

## 2017-04-28 NOTE — PROGRESS NOTES
Chief Complaint   Patient presents with   • Routine Prenatal Visit     u/s done at [facility] today   • Headache       HPI: Geneva is a  currently at 15w4d who today reports the following:  Contractions - No; Leaking - No; Vaginal bleeding -  No; Heartburn - YES -discussed smaller meals;Zantac;avoid lying down    ROS:  GI: Nausea - No ; Constipation - No; Diarrhea - No    Neuro: Headache - YES - migraine will not go away - frontal; tylenol no help - no NSAID yet or Mucinex; Visual change - No      EXAM:  Vitals: See prenatal flowsheet   Abdomen: See prenatal flowsheet   Urine glucose/protein: See prenatal flowsheet   Pelvic: See prenatal flowsheet     Lab Results   Component Value Date    ABO A 2017    RH Positive 2017    ABSCRN Negative 2017       MDM:  Impression: 1. Supervision of pregnancy  2. Twin pregnancy - DC/DA  3. headaches   Tests done today: 1. U/S for EFW - twins   Topics discussed: 1. Continue with PNV's  2. Prenatal labs reviewed  3. she would rather deliver by  than vaginal and  for 2nd twin.   4. Given 1800/2000 calorie diet sheets  5. i will Rx sinus/allergy meds     Tests next visit: U/S for anatomic screening and check on twins   Next visit: See prenatal flowsheet

## 2017-05-22 ENCOUNTER — HOSPITAL ENCOUNTER (EMERGENCY)
Facility: HOSPITAL | Age: 32
Discharge: HOME | End: 2017-05-22
Attending: EMERGENCY MEDICINE | Admitting: EMERGENCY MEDICINE

## 2017-05-22 ENCOUNTER — APPOINTMENT (OUTPATIENT)
Dept: WOMENS IMAGING | Facility: HOSPITAL | Age: 32
End: 2017-05-22
Attending: OBSTETRICS & GYNECOLOGY

## 2017-05-22 VITALS
DIASTOLIC BLOOD PRESSURE: 84 MMHG | BODY MASS INDEX: 41.95 KG/M2 | HEART RATE: 70 BPM | SYSTOLIC BLOOD PRESSURE: 122 MMHG | OXYGEN SATURATION: 100 % | HEIGHT: 70 IN | WEIGHT: 293 LBS | RESPIRATION RATE: 19 BRPM | TEMPERATURE: 98.7 F

## 2017-05-22 DIAGNOSIS — Z3A.20 20 WEEKS GESTATION OF PREGNANCY: ICD-10-CM

## 2017-05-22 DIAGNOSIS — S20.211A CONTUSION, CHEST WALL, RIGHT, INITIAL ENCOUNTER: Primary | ICD-10-CM

## 2017-05-22 LAB
BACTERIA UR QL AUTO: ABNORMAL /HPF
BILIRUB UR QL STRIP: ABNORMAL
CLARITY UR: ABNORMAL
COLOR UR: YELLOW
GLUCOSE UR STRIP-MCNC: NEGATIVE MG/DL
HGB UR QL STRIP.AUTO: NEGATIVE
HYALINE CASTS UR QL AUTO: ABNORMAL /LPF
KETONES UR QL STRIP: ABNORMAL
LEUKOCYTE ESTERASE UR QL STRIP.AUTO: ABNORMAL
NITRITE UR QL STRIP: NEGATIVE
PH UR STRIP.AUTO: 7.5 [PH] (ref 5–8)
PROT UR QL STRIP: NEGATIVE
RBC # UR: ABNORMAL /HPF
REF LAB TEST METHOD: ABNORMAL
SP GR UR STRIP: 1.03 (ref 1–1.03)
SQUAMOUS #/AREA URNS HPF: ABNORMAL /HPF
UROBILINOGEN UR QL STRIP: ABNORMAL
WBC UR QL AUTO: ABNORMAL /HPF

## 2017-05-22 PROCEDURE — 99283 EMERGENCY DEPT VISIT LOW MDM: CPT

## 2017-05-22 PROCEDURE — 93005 ELECTROCARDIOGRAM TRACING: CPT | Performed by: EMERGENCY MEDICINE

## 2017-05-22 PROCEDURE — 81001 URINALYSIS AUTO W/SCOPE: CPT | Performed by: NURSE PRACTITIONER

## 2017-05-22 PROCEDURE — 87086 URINE CULTURE/COLONY COUNT: CPT | Performed by: NURSE PRACTITIONER

## 2017-05-22 RX ORDER — ACETAMINOPHEN 325 MG/1
650 TABLET ORAL EVERY 6 HOURS PRN
Status: DISCONTINUED | OUTPATIENT
Start: 2017-05-22 | End: 2017-05-22 | Stop reason: HOSPADM

## 2017-05-22 RX ADMIN — ACETAMINOPHEN 650 MG: 325 TABLET, FILM COATED ORAL at 15:00

## 2017-05-23 ENCOUNTER — ROUTINE PRENATAL (OUTPATIENT)
Dept: OBSTETRICS AND GYNECOLOGY | Facility: CLINIC | Age: 32
End: 2017-05-23

## 2017-05-23 ENCOUNTER — HOSPITAL ENCOUNTER (OUTPATIENT)
Dept: WOMENS IMAGING | Facility: HOSPITAL | Age: 32
Discharge: HOME | End: 2017-05-23
Attending: OBSTETRICS & GYNECOLOGY | Admitting: OBSTETRICS & GYNECOLOGY

## 2017-05-23 ENCOUNTER — OFFICE VISIT (OUTPATIENT)
Dept: OBSTETRICS AND GYNECOLOGY | Facility: HOSPITAL | Age: 32
End: 2017-05-23

## 2017-05-23 VITALS — WEIGHT: 293 LBS | DIASTOLIC BLOOD PRESSURE: 57 MMHG | BODY MASS INDEX: 49.04 KG/M2 | SYSTOLIC BLOOD PRESSURE: 111 MMHG

## 2017-05-23 VITALS — BODY MASS INDEX: 48.93 KG/M2 | SYSTOLIC BLOOD PRESSURE: 114 MMHG | DIASTOLIC BLOOD PRESSURE: 70 MMHG | WEIGHT: 293 LBS

## 2017-05-23 DIAGNOSIS — E66.01 MORBID OBESITY DUE TO EXCESS CALORIES (HCC): ICD-10-CM

## 2017-05-23 DIAGNOSIS — Z98.84 STATUS POST BARIATRIC SURGERY: ICD-10-CM

## 2017-05-23 DIAGNOSIS — O30.049 DICHORIONIC DIAMNIOTIC TWIN PREGNANCY, ANTEPARTUM: Primary | ICD-10-CM

## 2017-05-23 DIAGNOSIS — O30.049 DICHORIONIC DIAMNIOTIC TWIN PREGNANCY, ANTEPARTUM: ICD-10-CM

## 2017-05-23 DIAGNOSIS — E66.01 MORBID OBESITY DUE TO EXCESS CALORIES (HCC): Primary | ICD-10-CM

## 2017-05-23 PROBLEM — IMO0001 TWINS: Status: RESOLVED | Noted: 2017-03-17 | Resolved: 2017-05-23

## 2017-05-23 PROCEDURE — 99213 OFFICE O/P EST LOW 20 MIN: CPT | Performed by: OBSTETRICS & GYNECOLOGY

## 2017-05-23 PROCEDURE — 76811 OB US DETAILED SNGL FETUS: CPT | Performed by: OBSTETRICS & GYNECOLOGY

## 2017-05-23 PROCEDURE — 76811 OB US DETAILED SNGL FETUS: CPT

## 2017-05-23 PROCEDURE — 76812 OB US DETAILED ADDL FETUS: CPT

## 2017-05-23 PROCEDURE — 76812 OB US DETAILED ADDL FETUS: CPT | Performed by: OBSTETRICS & GYNECOLOGY

## 2017-05-24 LAB — BACTERIA SPEC AEROBE CULT: NORMAL

## 2017-05-31 ENCOUNTER — TELEPHONE (OUTPATIENT)
Dept: OBSTETRICS AND GYNECOLOGY | Facility: CLINIC | Age: 32
End: 2017-05-31

## 2017-06-01 NOTE — TELEPHONE ENCOUNTER
She sxhould get knee high support stockings and epsom salts okay but uterus is cause of edema; try left side rest for hour with feet elevated above her hips and head

## 2017-06-12 ENCOUNTER — HOSPITAL ENCOUNTER (OUTPATIENT)
Facility: HOSPITAL | Age: 32
Setting detail: OBSERVATION
Discharge: HOME | End: 2017-06-12
Attending: OBSTETRICS & GYNECOLOGY | Admitting: OBSTETRICS & GYNECOLOGY

## 2017-06-12 VITALS
HEIGHT: 70 IN | SYSTOLIC BLOOD PRESSURE: 115 MMHG | RESPIRATION RATE: 18 BRPM | DIASTOLIC BLOOD PRESSURE: 64 MMHG | HEART RATE: 81 BPM | TEMPERATURE: 98.5 F

## 2017-06-12 PROBLEM — O26.899 PREGNANCY RELATED ABDOMINAL PAIN OF LOWER QUADRANT, ANTEPARTUM: Status: ACTIVE | Noted: 2017-06-12

## 2017-06-12 PROBLEM — R10.30 PREGNANCY RELATED ABDOMINAL PAIN OF LOWER QUADRANT, ANTEPARTUM: Status: ACTIVE | Noted: 2017-06-12

## 2017-06-12 LAB
BACTERIA UR QL AUTO: ABNORMAL /HPF
BACTERIA UR QL AUTO: ABNORMAL /HPF
BILIRUB UR QL STRIP: NEGATIVE
BILIRUB UR QL STRIP: NEGATIVE
CLARITY UR: ABNORMAL
CLARITY UR: CLEAR
COLOR UR: YELLOW
COLOR UR: YELLOW
GLUCOSE UR STRIP-MCNC: NEGATIVE MG/DL
GLUCOSE UR STRIP-MCNC: NEGATIVE MG/DL
HGB UR QL STRIP.AUTO: ABNORMAL
HGB UR QL STRIP.AUTO: ABNORMAL
HYALINE CASTS UR QL AUTO: ABNORMAL /LPF
HYALINE CASTS UR QL AUTO: ABNORMAL /LPF
KETONES UR QL STRIP: NEGATIVE
KETONES UR QL STRIP: NEGATIVE
LEUKOCYTE ESTERASE UR QL STRIP.AUTO: ABNORMAL
LEUKOCYTE ESTERASE UR QL STRIP.AUTO: ABNORMAL
NITRITE UR QL STRIP: NEGATIVE
NITRITE UR QL STRIP: NEGATIVE
PH UR STRIP.AUTO: 6 [PH] (ref 5–8)
PH UR STRIP.AUTO: 6 [PH] (ref 5–8)
PROT UR QL STRIP: NEGATIVE
PROT UR QL STRIP: NEGATIVE
RBC # UR: ABNORMAL /HPF
RBC # UR: ABNORMAL /HPF
REF LAB TEST METHOD: ABNORMAL
REF LAB TEST METHOD: ABNORMAL
SP GR UR STRIP: 1.02 (ref 1–1.03)
SP GR UR STRIP: 1.03 (ref 1–1.03)
SQUAMOUS #/AREA URNS HPF: ABNORMAL /HPF
SQUAMOUS #/AREA URNS HPF: ABNORMAL /HPF
UROBILINOGEN UR QL STRIP: ABNORMAL
UROBILINOGEN UR QL STRIP: ABNORMAL
WBC UR QL AUTO: ABNORMAL /HPF
WBC UR QL AUTO: ABNORMAL /HPF

## 2017-06-12 PROCEDURE — 99219 PR INITIAL OBSERVATION CARE/DAY 50 MINUTES: CPT | Performed by: OBSTETRICS & GYNECOLOGY

## 2017-06-12 PROCEDURE — G0378 HOSPITAL OBSERVATION PER HR: HCPCS

## 2017-06-12 PROCEDURE — 96374 THER/PROPH/DIAG INJ IV PUSH: CPT

## 2017-06-12 PROCEDURE — 81001 URINALYSIS AUTO W/SCOPE: CPT | Performed by: OBSTETRICS & GYNECOLOGY

## 2017-06-12 PROCEDURE — 25010000003 CEFTRIAXONE PER 250 MG: Performed by: OBSTETRICS & GYNECOLOGY

## 2017-06-12 PROCEDURE — 96361 HYDRATE IV INFUSION ADD-ON: CPT

## 2017-06-12 PROCEDURE — 25010000002 ONDANSETRON PER 1 MG: Performed by: OBSTETRICS & GYNECOLOGY

## 2017-06-12 PROCEDURE — 87086 URINE CULTURE/COLONY COUNT: CPT | Performed by: OBSTETRICS & GYNECOLOGY

## 2017-06-12 RX ORDER — POLYETHYLENE GLYCOL 3350 17 G/17G
17 POWDER, FOR SOLUTION ORAL AS NEEDED
COMMUNITY
End: 2018-06-06

## 2017-06-12 RX ORDER — ACETAMINOPHEN 500 MG
1000 TABLET ORAL ONCE
Status: COMPLETED | OUTPATIENT
Start: 2017-06-12 | End: 2017-06-12

## 2017-06-12 RX ORDER — ONDANSETRON 2 MG/ML
4 INJECTION INTRAMUSCULAR; INTRAVENOUS EVERY 6 HOURS PRN
Status: DISCONTINUED | OUTPATIENT
Start: 2017-06-12 | End: 2017-06-13 | Stop reason: HOSPADM

## 2017-06-12 RX ORDER — CEFTRIAXONE SODIUM 1 G/50ML
1 INJECTION, SOLUTION INTRAVENOUS EVERY 24 HOURS
Status: DISCONTINUED | OUTPATIENT
Start: 2017-06-12 | End: 2017-06-13 | Stop reason: HOSPADM

## 2017-06-12 RX ADMIN — ONDANSETRON 4 MG: 2 INJECTION INTRAMUSCULAR; INTRAVENOUS at 22:13

## 2017-06-12 RX ADMIN — CEFTRIAXONE SODIUM 1 G: 1 INJECTION, SOLUTION INTRAVENOUS at 22:03

## 2017-06-12 RX ADMIN — ACETAMINOPHEN 1000 MG: 500 TABLET ORAL at 20:56

## 2017-06-12 RX ADMIN — SODIUM CHLORIDE 1000 ML: 9 INJECTION, SOLUTION INTRAVENOUS at 22:03

## 2017-06-13 NOTE — H&P
"Geneva Cordova  1985  [id number]  [id number]    CC: cramping  HPI:  Patient is 31 y.o. black female   currently at 22w0d  Presents with c/o lower abdominal/?uterine cramping.  Onset ~noon, init intermittent, now more constant.  Mainly suprapubic.  Denies assoc bleeding or SROM.  No radiation.  Good FM.  PNC comp by di/di twins and maternal obesity.    PMH:   Current meds PNV, miralax  Illnesses obesity (s/p gastric sleeve)  Surgeries T and A,removal ganglion cyst (rt), gastric sleeve (425 lbs to 301 lbs)  Allergies NKDA    Past OB History:       Obstetric History       T2      TAB0   SAB1   E0   M0   L2       # Outcome Date GA Lbr Pérez/2nd Weight Sex Delivery Anes PTL Lv   4 Current            3 Term 16 38w2d 17:24 / 00:13 6 lb 0.5 oz (2.735 kg) M Vag-Spont EPI N Y      Name: SALINA CORDOVA      Apgar1:  7                Apgar5: 9   2 SAB 2015 14w0d          1 Term 02 41w0d  8 lb 5 oz (3.771 kg) M Vag-Spont  N Y      Name: Bi Lechuga      Complications: Status post induction of labor      Obstetric Comments   0MAB with Suction D&C 3/15   9/8/16 current pregnancy 2 vessel cord   2017 - spontaneous conception of twins            SH: tob neg , EtOH neg, drugs neg  FH: heart dz neg , diabetes pos , cancer pos    General ROS: All systems reviewed and negative except for N, edema      Physical Examination: General appearance - alert, well appearing, and in no distress  Vital signs - /64  Pulse 81  Temp 98.5 °F (36.9 °C) (Oral)   Resp 18  Ht 70\" (177.8 cm)  HEENT: normocephalic, atraumatic, pharynx clear   Neck - supple, no significant adenopathy  Lymphatics - no palpable lymphadenopathy, no hepatosplenomegaly  Chest - clear to auscultation, no wheezes, rales or rhonchi, symmetric air entry  Heart - normal rate, regular rhythm, normal S1, S2, no murmurs, rubs, clicks or gallops, no JVD  Abdomen - soft, mild suprapubic tend, nondistended, no masses or organomegaly  no " rebound tenderness noted, bowel sounds normal, obese  Vaginal Exam: closed/thick, no blood in vault  Extremities - trace pedal edema noted, no calf tend  Skin - normal coloration and turgor, no rashes, no suspicious skin lesions noted          Fetal monitoring: FHT's 150 (A), 155 (B)    Radiology     Assessment 1)IUP 22 0/7 weeks       2)lower abd pain-poss due to UTI- init ccUA contam      Plan 1)observe      2)cath UA    Hardeep Blank MD  6/12/2017  8:58 PM

## 2017-06-13 NOTE — NURSING NOTE
RN reviewed discharge instructions with pt. RN reviewed reasons to return to the hospital or follow up with her MD. Pt denied questions and verbalized understanding. Pt to keep her regularly scheduled prenatal appointments and follow up as needed. Pt ambulated to discharge. Discharged undelivered.

## 2017-06-14 LAB — BACTERIA SPEC AEROBE CULT: NORMAL

## 2017-06-15 LAB — BACTERIA SPEC AEROBE CULT: NORMAL

## 2017-06-20 ENCOUNTER — ROUTINE PRENATAL (OUTPATIENT)
Dept: OBSTETRICS AND GYNECOLOGY | Facility: CLINIC | Age: 32
End: 2017-06-20

## 2017-06-20 ENCOUNTER — HOSPITAL ENCOUNTER (OUTPATIENT)
Dept: WOMENS IMAGING | Facility: HOSPITAL | Age: 32
Discharge: HOME | End: 2017-06-20
Attending: OBSTETRICS & GYNECOLOGY | Admitting: OBSTETRICS & GYNECOLOGY

## 2017-06-20 ENCOUNTER — OFFICE VISIT (OUTPATIENT)
Dept: OBSTETRICS AND GYNECOLOGY | Facility: HOSPITAL | Age: 32
End: 2017-06-20

## 2017-06-20 VITALS — SYSTOLIC BLOOD PRESSURE: 116 MMHG | BODY MASS INDEX: 50.65 KG/M2 | WEIGHT: 293 LBS | DIASTOLIC BLOOD PRESSURE: 70 MMHG

## 2017-06-20 VITALS — DIASTOLIC BLOOD PRESSURE: 51 MMHG | BODY MASS INDEX: 50.36 KG/M2 | WEIGHT: 293 LBS | SYSTOLIC BLOOD PRESSURE: 98 MMHG

## 2017-06-20 DIAGNOSIS — O30.049 DICHORIONIC DIAMNIOTIC TWIN PREGNANCY, ANTEPARTUM: ICD-10-CM

## 2017-06-20 DIAGNOSIS — O30.049 DICHORIONIC DIAMNIOTIC TWIN PREGNANCY, ANTEPARTUM: Primary | ICD-10-CM

## 2017-06-20 DIAGNOSIS — O26.842 UTERINE SIZE DATE DISCREPANCY, SECOND TRIMESTER: ICD-10-CM

## 2017-06-20 DIAGNOSIS — Q21.0 VENTRICULAR SEPTAL DEFECT (VSD): ICD-10-CM

## 2017-06-20 DIAGNOSIS — Z98.84 STATUS POST BARIATRIC SURGERY: ICD-10-CM

## 2017-06-20 DIAGNOSIS — E66.01 MORBID OBESITY DUE TO EXCESS CALORIES (HCC): ICD-10-CM

## 2017-06-20 PROBLEM — O26.849 UTERINE SIZE DATE DISCREPANCY: Status: ACTIVE | Noted: 2017-06-20

## 2017-06-20 LAB
GLUCOSE BLD-MCNC: 72 MG/DL (ref 70–100)
HGB BLD-MCNC: 11.4 G/DL (ref 11.5–15.5)

## 2017-06-20 PROCEDURE — 85018 HEMOGLOBIN: CPT | Performed by: OBSTETRICS & GYNECOLOGY

## 2017-06-20 PROCEDURE — 82947 ASSAY GLUCOSE BLOOD QUANT: CPT | Performed by: OBSTETRICS & GYNECOLOGY

## 2017-06-20 PROCEDURE — 99214 OFFICE O/P EST MOD 30 MIN: CPT | Performed by: OBSTETRICS & GYNECOLOGY

## 2017-06-20 PROCEDURE — 36415 COLL VENOUS BLD VENIPUNCTURE: CPT | Performed by: OBSTETRICS & GYNECOLOGY

## 2017-06-20 PROCEDURE — 76816 OB US FOLLOW-UP PER FETUS: CPT | Performed by: OBSTETRICS & GYNECOLOGY

## 2017-06-20 PROCEDURE — 76816 OB US FOLLOW-UP PER FETUS: CPT

## 2017-06-20 RX ORDER — PRENATAL VIT/IRON FUM/FOLIC AC 28MG-0.8MG
1 TABLET ORAL DAILY
COMMUNITY
Start: 2017-05-02 | End: 2018-02-01

## 2017-06-20 NOTE — PROGRESS NOTES
Chief Complaint   Patient presents with   • Routine Prenatal Visit     u/s done at [facility] today       HPI: Geneva is a  currently at 23w1d who today reports the following:  Contractions - YES - but less than 4/hour AND no associated change in vaginal discharge; Leaking - No; Vaginal bleeding -  No; Heartburn - No.    ROS:  GI: Nausea - No ; Constipation - improved as compared to the prior visit; Diarrhea - No    Neuro: Headache - improved as compared to the prior visit ; Visual change - No      EXAM:  Vitals: See prenatal flowsheet   Abdomen: See prenatal flowsheet   Urine glucose/protein: See prenatal flowsheet   Pelvic: See prenatal flowsheet     Lab Results   Component Value Date    ABO A 2017    RH Positive 2017    ABSCRN Negative 2017       MDM:  Impression: 1. Supervision of pregnancy  2. Twin pregnancy - DC/DA  3. breech -breech today   4. Twin a possible cleft lip and twin B has VSD  5. Morbid obesity    Tests done today: 1. HgB  2. glucose ~ 2 hours post prandial   Topics discussed: 1. Continue with PNV's  2. Prenatal labs reviewed  3. see above   Tests next visit: U/S for EFW   Next visit: See prenatal flowsheet

## 2017-07-10 ENCOUNTER — TELEPHONE (OUTPATIENT)
Dept: OBSTETRICS AND GYNECOLOGY | Facility: CLINIC | Age: 32
End: 2017-07-10

## 2017-07-10 NOTE — TELEPHONE ENCOUNTER
Pt notified. Advised pt she will only get 12 weeks total maternity leave whether it is during her pregnancy or after. She will need to get paper work from job and bring to [facility]i to fill out if she wants to pursue this. Pt states she has appt on 07/18/17 and will see how things go until then and then let Dr. Baxter know what she decides.

## 2017-07-10 NOTE — TELEPHONE ENCOUNTER
----- Message from Maricruz Gilman sent at 7/10/2017  9:20 AM EDT -----  Please call her back at above home #, Dr Baxter's patient, 26 weeks pregnant with twins, wanting to see if she can start her maternity leave now, not able to lift and carry boxes at her work

## 2017-07-18 ENCOUNTER — OFFICE VISIT (OUTPATIENT)
Dept: OBSTETRICS AND GYNECOLOGY | Facility: HOSPITAL | Age: 32
End: 2017-07-18

## 2017-07-18 ENCOUNTER — ROUTINE PRENATAL (OUTPATIENT)
Dept: OBSTETRICS AND GYNECOLOGY | Facility: CLINIC | Age: 32
End: 2017-07-18

## 2017-07-18 ENCOUNTER — HOSPITAL ENCOUNTER (OUTPATIENT)
Dept: WOMENS IMAGING | Facility: HOSPITAL | Age: 32
Discharge: HOME | End: 2017-07-18
Attending: OBSTETRICS & GYNECOLOGY | Admitting: OBSTETRICS & GYNECOLOGY

## 2017-07-18 VITALS — SYSTOLIC BLOOD PRESSURE: 113 MMHG | BODY MASS INDEX: 50.94 KG/M2 | WEIGHT: 293 LBS | DIASTOLIC BLOOD PRESSURE: 59 MMHG

## 2017-07-18 VITALS — SYSTOLIC BLOOD PRESSURE: 112 MMHG | DIASTOLIC BLOOD PRESSURE: 60 MMHG | BODY MASS INDEX: 50.79 KG/M2 | WEIGHT: 293 LBS

## 2017-07-18 DIAGNOSIS — Z98.84 STATUS POST BARIATRIC SURGERY: ICD-10-CM

## 2017-07-18 DIAGNOSIS — E66.01 MORBID OBESITY DUE TO EXCESS CALORIES (HCC): ICD-10-CM

## 2017-07-18 DIAGNOSIS — E66.01 MORBID OBESITY DUE TO EXCESS CALORIES (HCC): Primary | ICD-10-CM

## 2017-07-18 DIAGNOSIS — O30.049 DICHORIONIC DIAMNIOTIC TWIN PREGNANCY, ANTEPARTUM: ICD-10-CM

## 2017-07-18 DIAGNOSIS — O26.842 UTERINE SIZE DATE DISCREPANCY, SECOND TRIMESTER: ICD-10-CM

## 2017-07-18 DIAGNOSIS — O30.049 DICHORIONIC DIAMNIOTIC TWIN PREGNANCY, ANTEPARTUM: Primary | ICD-10-CM

## 2017-07-18 PROCEDURE — 76816 OB US FOLLOW-UP PER FETUS: CPT | Performed by: OBSTETRICS & GYNECOLOGY

## 2017-07-18 PROCEDURE — 76816 OB US FOLLOW-UP PER FETUS: CPT

## 2017-07-18 PROCEDURE — 99213 OFFICE O/P EST LOW 20 MIN: CPT | Performed by: OBSTETRICS & GYNECOLOGY

## 2017-07-18 NOTE — PROGRESS NOTES
"Chief Complaint   Patient presents with   • Routine Prenatal Visit     u/s done at [facility] today       HPI: Geneva is a  currently at 27w1d who today reports the following:  Contractions - No; Leaking - No; Vaginal bleeding -  No; Heartburn - No.    ROS:  GI: Nausea - No ; Constipation - improved as compared to the prior visit; Diarrhea - No    Neuro: Headache - No ; Visual change - No      EXAM:  Vitals: See prenatal flowsheet   Abdomen: See prenatal flowsheet   Urine glucose/protein: See prenatal flowsheet   Pelvic: See prenatal flowsheet     Lab Results   Component Value Date    ABO A 2017    RH Positive 2017    ABSCRN Negative 2017       MDM:  Impression: 1. Supervision of pregnancy  2. Twin pregnancy - DC/DA  3. obesity   Tests done today: 1. none   Topics discussed: 1. Continue with PNV's  2. Prenatal labs reviewed  3. she wonders about a scheduled  - depends on position of twin A and B .   4. Easier recovery etc ; she delivered a 6 & 7 lb baby in the past   Tests next visit: U/S for EFW   Next visit: See prenatal flowsheet                                                She wonders about maternity leave -s he is fatigued but \" cant't give up yet\" cant afford to go on leave yet     "

## 2017-07-31 ENCOUNTER — TELEPHONE (OUTPATIENT)
Dept: OBSTETRICS AND GYNECOLOGY | Facility: CLINIC | Age: 32
End: 2017-07-31

## 2017-07-31 NOTE — TELEPHONE ENCOUNTER
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

## 2017-08-03 ENCOUNTER — ROUTINE PRENATAL (OUTPATIENT)
Dept: OBSTETRICS AND GYNECOLOGY | Facility: CLINIC | Age: 32
End: 2017-08-03

## 2017-08-03 VITALS — SYSTOLIC BLOOD PRESSURE: 112 MMHG | BODY MASS INDEX: 51.51 KG/M2 | DIASTOLIC BLOOD PRESSURE: 70 MMHG | WEIGHT: 293 LBS

## 2017-08-03 DIAGNOSIS — O30.049 DICHORIONIC DIAMNIOTIC TWIN PREGNANCY, ANTEPARTUM: Primary | ICD-10-CM

## 2017-08-03 PROBLEM — F32.A DEPRESSED: Status: ACTIVE | Noted: 2017-08-03

## 2017-08-03 PROCEDURE — 99213 OFFICE O/P EST LOW 20 MIN: CPT | Performed by: OBSTETRICS & GYNECOLOGY

## 2017-08-03 NOTE — PROGRESS NOTES
Chief Complaint   Patient presents with   • Routine Prenatal Visit     pelvic pressure   • Headache     for 5 days, causing blurred vision   • Depression       HPI: Geneva is a  currently at 29w3d who today reports the following: she is crying in the office -had tried to deal with issues at home and work ; weight gain - comfort foods  Contractions - No; Leaking - No; Vaginal bleeding -  No; Heartburn - improved as compared to the prior visit.    ROS:  GI: Nausea - No ; Constipation - YES;Miralax helps somewhat; Diarrhea - No    Neuro: Headache - No ; Visual change - No      EXAM:  Vitals: See prenatal flowsheet   Abdomen: See prenatal flowsheet   Urine glucose/protein: See prenatal flowsheet   Pelvic: See prenatal flowsheet     Lab Results   Component Value Date    HGB 11.4 (L) 2017    HCT 34.9 2017    ABO A 2017    RH Positive 2017    ABSCRN Negative 2017       MDM:  Impression: 1. Supervision of pregnancy  2. Twin pregnancy - DC/DA  3. depreesion with history of Zoloft in the past   Tests done today: 1. none   Topics discussed: 1. Continue with PNV's  2. Prenatal labs reviewed  3.  labor signs and symptoms  4. depression Rx    Tests next visit: U/S for EFW of twins on the    Next visit: See prenatal flowsheet

## 2017-08-14 ENCOUNTER — TELEPHONE (OUTPATIENT)
Dept: OBSTETRICS AND GYNECOLOGY | Facility: CLINIC | Age: 32
End: 2017-08-14

## 2017-08-14 NOTE — TELEPHONE ENCOUNTER
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

## 2017-08-14 NOTE — TELEPHONE ENCOUNTER
OB pt 31 weeks with twins. States she is having increase pressure in between her thighs, back and side pain, and nausea. Pt does not think she is having ctx but not sure. Good fetal movement. Denies vaginal bleeding, leaking, change in d/c. Pt is at work right now. She states she is in a bind with her job currently and is unable to go home and rest.     Advised pt to increase fluids and rest when she gets home if pain and pressure do not go away she will need to go to Labor and delivery and be evaluated. Also encouraged pt if her symptoms get worse before she gets off work she needs to go be evaluated. Pt verbalized understanding.

## 2017-08-16 ENCOUNTER — HOSPITAL ENCOUNTER (OUTPATIENT)
Facility: HOSPITAL | Age: 32
Discharge: STILL ADMITTED OR ADMINISTRATIVE | End: 2017-08-16
Attending: OBSTETRICS & GYNECOLOGY | Admitting: OBSTETRICS & GYNECOLOGY

## 2017-08-16 ENCOUNTER — HOSPITAL ENCOUNTER (EMERGENCY)
Facility: HOSPITAL | Age: 32
Discharge: LEFT BEFORE DISCHARGE | End: 2017-08-16

## 2017-08-16 VITALS
TEMPERATURE: 98.4 F | BODY MASS INDEX: 41.95 KG/M2 | HEART RATE: 77 BPM | DIASTOLIC BLOOD PRESSURE: 75 MMHG | HEIGHT: 70 IN | WEIGHT: 293 LBS | RESPIRATION RATE: 20 BRPM | SYSTOLIC BLOOD PRESSURE: 118 MMHG | OXYGEN SATURATION: 98 %

## 2017-08-16 PROCEDURE — 93005 ELECTROCARDIOGRAM TRACING: CPT

## 2017-08-16 PROCEDURE — 99211 OFF/OP EST MAY X REQ PHY/QHP: CPT

## 2017-08-16 NOTE — ED NOTES
PT. STATED SHE WASN'T WAITING HERE ALL DAY AND SHE COULD GO HOME AND LAY DOWN. ASKED PT IF SHE WAS SURE SHE WANTED TO LEAVE AND SHE STATED SHE WAS LEAVING AND WOULD RETURN IF SHE FELT WORSE.      Hannah Kuhn  08/16/17 [id number]

## 2017-08-17 ENCOUNTER — TELEPHONE (OUTPATIENT)
Dept: OBSTETRICS AND GYNECOLOGY | Facility: CLINIC | Age: 32
End: 2017-08-17

## 2017-08-17 ENCOUNTER — HOSPITAL ENCOUNTER (INPATIENT)
Facility: HOSPITAL | Age: 32
LOS: 5 days | Discharge: HOME | End: 2017-08-22
Attending: OBSTETRICS & GYNECOLOGY | Admitting: OBSTETRICS & GYNECOLOGY

## 2017-08-17 ENCOUNTER — APPOINTMENT (OUTPATIENT)
Dept: WOMENS IMAGING | Facility: HOSPITAL | Age: 32
End: 2017-08-17

## 2017-08-17 DIAGNOSIS — O60.03 PRETERM LABOR IN THIRD TRIMESTER WITHOUT DELIVERY: Primary | ICD-10-CM

## 2017-08-17 DIAGNOSIS — O30.049 DICHORIONIC DIAMNIOTIC TWIN PREGNANCY, ANTEPARTUM: ICD-10-CM

## 2017-08-17 PROBLEM — F32.A DEPRESSED: Status: RESOLVED | Noted: 2017-08-03 | Resolved: 2017-08-17

## 2017-08-17 LAB
ABO GROUP BLD: NORMAL
ALP SERPL-CCNC: 112 U/L (ref 25–100)
ALT SERPL W P-5'-P-CCNC: 17 U/L (ref 7–40)
AST SERPL-CCNC: 15 U/L (ref 0–33)
BACTERIA UR QL AUTO: ABNORMAL /HPF
BILIRUB SERPL-MCNC: 0.4 MG/DL (ref 0.3–1.2)
BILIRUB UR QL STRIP: NEGATIVE
BLD GP AB SCN SERPL QL: NEGATIVE
CLARITY UR: ABNORMAL
COLOR UR: ABNORMAL
CREAT BLD-MCNC: 0.5 MG/DL (ref 0.6–1.3)
DEPRECATED RDW RBC AUTO: 42.5 FL (ref 37–54)
ERYTHROCYTE [DISTWIDTH] IN BLOOD BY AUTOMATED COUNT: 13.1 % (ref 11.3–14.5)
GLUCOSE UR STRIP-MCNC: NEGATIVE MG/DL
HCT VFR BLD AUTO: 33.1 % (ref 34.5–44)
HGB BLD-MCNC: 11 G/DL (ref 11.5–15.5)
HGB UR QL STRIP.AUTO: NEGATIVE
HYALINE CASTS UR QL AUTO: ABNORMAL /LPF
KETONES UR QL STRIP: NEGATIVE
LDH SERPL-CCNC: 209 U/L (ref 120–246)
LEUKOCYTE ESTERASE UR QL STRIP.AUTO: ABNORMAL
MAGNESIUM SERPL-MCNC: 5 MG/DL (ref 1.3–2.7)
MCH RBC QN AUTO: 29.7 PG (ref 27–31)
MCHC RBC AUTO-ENTMCNC: 33.2 G/DL (ref 32–36)
MCV RBC AUTO: 89.5 FL (ref 80–99)
NITRITE UR QL STRIP: NEGATIVE
PH UR STRIP.AUTO: 6 [PH] (ref 5–8)
PLATELET # BLD AUTO: 249 10*3/MM3 (ref 150–450)
PMV BLD AUTO: 11.4 FL (ref 6–12)
PROT UR QL STRIP: NEGATIVE
RBC # BLD AUTO: 3.7 10*6/MM3 (ref 3.89–5.14)
RBC # UR: ABNORMAL /HPF
REF LAB TEST METHOD: ABNORMAL
RH BLD: POSITIVE
SP GR UR STRIP: 1.02 (ref 1–1.03)
SPERM URNS QL MICRO: ABNORMAL /HPF
SQUAMOUS #/AREA URNS HPF: ABNORMAL /HPF
URATE SERPL-MCNC: 5.1 MG/DL (ref 3.1–7.8)
UROBILINOGEN UR QL STRIP: ABNORMAL
WBC NRBC COR # BLD: 8.52 10*3/MM3 (ref 3.5–10.8)
WBC UR QL AUTO: ABNORMAL /HPF

## 2017-08-17 PROCEDURE — 85027 COMPLETE CBC AUTOMATED: CPT | Performed by: OBSTETRICS & GYNECOLOGY

## 2017-08-17 PROCEDURE — 83615 LACTATE (LD) (LDH) ENZYME: CPT | Performed by: OBSTETRICS & GYNECOLOGY

## 2017-08-17 PROCEDURE — 83735 ASSAY OF MAGNESIUM: CPT | Performed by: OBSTETRICS & GYNECOLOGY

## 2017-08-17 PROCEDURE — 25010000002 PENICILLIN G POTASSIUM PER 600000 UNITS: Performed by: OBSTETRICS & GYNECOLOGY

## 2017-08-17 PROCEDURE — 76816 OB US FOLLOW-UP PER FETUS: CPT | Performed by: OBSTETRICS & GYNECOLOGY

## 2017-08-17 PROCEDURE — 25010000002 BETAMETHASONE ACET & SOD PHOS PER 4 MG

## 2017-08-17 PROCEDURE — 25010000002 MAGNESIUM SULFATE-LACT RINGERS 40 GM/580ML SOLUTION

## 2017-08-17 PROCEDURE — 82565 ASSAY OF CREATININE: CPT | Performed by: OBSTETRICS & GYNECOLOGY

## 2017-08-17 PROCEDURE — 86900 BLOOD TYPING SEROLOGIC ABO: CPT | Performed by: OBSTETRICS & GYNECOLOGY

## 2017-08-17 PROCEDURE — 25010000002 MAGNESIUM SULFATE-LACT RINGERS 40 GM/580ML SOLUTION: Performed by: OBSTETRICS & GYNECOLOGY

## 2017-08-17 PROCEDURE — 25010000002 TERBUTALINE PER 1 MG: Performed by: OBSTETRICS & GYNECOLOGY

## 2017-08-17 PROCEDURE — 81003 URINALYSIS AUTO W/O SCOPE: CPT | Performed by: OBSTETRICS & GYNECOLOGY

## 2017-08-17 PROCEDURE — 76816 OB US FOLLOW-UP PER FETUS: CPT

## 2017-08-17 PROCEDURE — 84075 ASSAY ALKALINE PHOSPHATASE: CPT | Performed by: OBSTETRICS & GYNECOLOGY

## 2017-08-17 PROCEDURE — 59025 FETAL NON-STRESS TEST: CPT

## 2017-08-17 PROCEDURE — 86850 RBC ANTIBODY SCREEN: CPT | Performed by: OBSTETRICS & GYNECOLOGY

## 2017-08-17 PROCEDURE — 84550 ASSAY OF BLOOD/URIC ACID: CPT | Performed by: OBSTETRICS & GYNECOLOGY

## 2017-08-17 PROCEDURE — 82247 BILIRUBIN TOTAL: CPT | Performed by: OBSTETRICS & GYNECOLOGY

## 2017-08-17 PROCEDURE — 84460 ALANINE AMINO (ALT) (SGPT): CPT | Performed by: OBSTETRICS & GYNECOLOGY

## 2017-08-17 PROCEDURE — 86901 BLOOD TYPING SEROLOGIC RH(D): CPT | Performed by: OBSTETRICS & GYNECOLOGY

## 2017-08-17 PROCEDURE — S0260 H&P FOR SURGERY: HCPCS | Performed by: OBSTETRICS & GYNECOLOGY

## 2017-08-17 PROCEDURE — 84450 TRANSFERASE (AST) (SGOT): CPT | Performed by: OBSTETRICS & GYNECOLOGY

## 2017-08-17 PROCEDURE — 81001 URINALYSIS AUTO W/SCOPE: CPT | Performed by: OBSTETRICS & GYNECOLOGY

## 2017-08-17 RX ORDER — SODIUM CHLORIDE, SODIUM LACTATE, POTASSIUM CHLORIDE, CALCIUM CHLORIDE 600; 310; 30; 20 MG/100ML; MG/100ML; MG/100ML; MG/100ML
125 INJECTION, SOLUTION INTRAVENOUS CONTINUOUS
Status: DISCONTINUED | OUTPATIENT
Start: 2017-08-17 | End: 2017-08-17

## 2017-08-17 RX ORDER — DEXTROSE AND SODIUM CHLORIDE 5; .2 G/100ML; G/100ML
75 INJECTION, SOLUTION INTRAVENOUS CONTINUOUS
Status: DISCONTINUED | OUTPATIENT
Start: 2017-08-17 | End: 2017-08-18

## 2017-08-17 RX ORDER — ONDANSETRON 4 MG/1
4 TABLET, FILM COATED ORAL EVERY 6 HOURS PRN
Status: DISCONTINUED | OUTPATIENT
Start: 2017-08-17 | End: 2017-08-18

## 2017-08-17 RX ORDER — PROMETHAZINE HYDROCHLORIDE 25 MG/ML
12.5 INJECTION, SOLUTION INTRAMUSCULAR; INTRAVENOUS EVERY 6 HOURS PRN
Status: DISCONTINUED | OUTPATIENT
Start: 2017-08-17 | End: 2017-08-18

## 2017-08-17 RX ORDER — BETAMETHASONE SODIUM PHOSPHATE AND BETAMETHASONE ACETATE 3; 3 MG/ML; MG/ML
INJECTION, SUSPENSION INTRA-ARTICULAR; INTRALESIONAL; INTRAMUSCULAR; SOFT TISSUE
Status: COMPLETED
Start: 2017-08-17 | End: 2017-08-17

## 2017-08-17 RX ORDER — ONDANSETRON 2 MG/ML
4 INJECTION INTRAMUSCULAR; INTRAVENOUS EVERY 6 HOURS PRN
Status: DISCONTINUED | OUTPATIENT
Start: 2017-08-17 | End: 2017-08-18

## 2017-08-17 RX ORDER — MAGNESIUM SULF/RINGERS LACTATE 40 G/500ML
3 PLASTIC BAG, INJECTION (ML) INTRAVENOUS CONTINUOUS
Status: DISCONTINUED | OUTPATIENT
Start: 2017-08-17 | End: 2017-08-18

## 2017-08-17 RX ORDER — TERBUTALINE SULFATE 1 MG/ML
0.25 INJECTION, SOLUTION SUBCUTANEOUS AS NEEDED
Status: DISCONTINUED | OUTPATIENT
Start: 2017-08-17 | End: 2017-08-18 | Stop reason: HOSPADM

## 2017-08-17 RX ORDER — METHYLERGONOVINE MALEATE 0.2 MG/ML
200 INJECTION INTRAVENOUS AS NEEDED
Status: CANCELLED | OUTPATIENT
Start: 2017-08-17

## 2017-08-17 RX ORDER — PROMETHAZINE HYDROCHLORIDE 12.5 MG/1
12.5 TABLET ORAL EVERY 6 HOURS PRN
Status: DISCONTINUED | OUTPATIENT
Start: 2017-08-17 | End: 2017-08-18

## 2017-08-17 RX ORDER — LIDOCAINE HYDROCHLORIDE 10 MG/ML
5 INJECTION, SOLUTION INFILTRATION; PERINEURAL AS NEEDED
Status: DISCONTINUED | OUTPATIENT
Start: 2017-08-17 | End: 2017-08-18

## 2017-08-17 RX ORDER — SODIUM CHLORIDE 0.9 % (FLUSH) 0.9 %
1-10 SYRINGE (ML) INJECTION AS NEEDED
Status: DISCONTINUED | OUTPATIENT
Start: 2017-08-17 | End: 2017-08-18 | Stop reason: HOSPADM

## 2017-08-17 RX ORDER — BETAMETHASONE SODIUM PHOSPHATE AND BETAMETHASONE ACETATE 3; 3 MG/ML; MG/ML
12 INJECTION, SUSPENSION INTRA-ARTICULAR; INTRALESIONAL; INTRAMUSCULAR; SOFT TISSUE EVERY 12 HOURS
Status: COMPLETED | OUTPATIENT
Start: 2017-08-17 | End: 2017-08-18

## 2017-08-17 RX ORDER — ACETAMINOPHEN 325 MG/1
650 TABLET ORAL EVERY 4 HOURS PRN
Status: DISCONTINUED | OUTPATIENT
Start: 2017-08-17 | End: 2017-08-18

## 2017-08-17 RX ORDER — MAGNESIUM SULF/RINGERS LACTATE 40 G/500ML
PLASTIC BAG, INJECTION (ML) INTRAVENOUS
Status: DISCONTINUED
Start: 2017-08-17 | End: 2017-08-17

## 2017-08-17 RX ORDER — CARBOPROST TROMETHAMINE 250 UG/ML
250 INJECTION, SOLUTION INTRAMUSCULAR AS NEEDED
Status: CANCELLED | OUTPATIENT
Start: 2017-08-17

## 2017-08-17 RX ORDER — MAGNESIUM SULF/RINGERS LACTATE 40 G/500ML
6 PLASTIC BAG, INJECTION (ML) INTRAVENOUS ONCE
Status: COMPLETED | OUTPATIENT
Start: 2017-08-17 | End: 2017-08-17

## 2017-08-17 RX ORDER — PROMETHAZINE HYDROCHLORIDE 12.5 MG/1
12.5 SUPPOSITORY RECTAL EVERY 6 HOURS PRN
Status: DISCONTINUED | OUTPATIENT
Start: 2017-08-17 | End: 2017-08-18

## 2017-08-17 RX ADMIN — Medication 3 G/HR: at 11:40

## 2017-08-17 RX ADMIN — PENICILLIN G POTASSIUM 5 MILLION UNITS: 5000000 POWDER, FOR SOLUTION INTRAMUSCULAR; INTRAPLEURAL; INTRATHECAL; INTRAVENOUS at 12:21

## 2017-08-17 RX ADMIN — SODIUM CHLORIDE 2.5 MILLION UNITS: 9 INJECTION, SOLUTION INTRAVENOUS at 20:02

## 2017-08-17 RX ADMIN — SODIUM CHLORIDE, POTASSIUM CHLORIDE, SODIUM LACTATE AND CALCIUM CHLORIDE 75 ML/HR: 600; 310; 30; 20 INJECTION, SOLUTION INTRAVENOUS at 13:35

## 2017-08-17 RX ADMIN — Medication 3 G/HR: at 22:25

## 2017-08-17 RX ADMIN — SODIUM CHLORIDE 2.5 MILLION UNITS: 9 INJECTION, SOLUTION INTRAVENOUS at 17:10

## 2017-08-17 RX ADMIN — TERBUTALINE SULFATE 0.25 MG: 1 INJECTION SUBCUTANEOUS at 11:52

## 2017-08-17 RX ADMIN — Medication 6 G: at 11:20

## 2017-08-17 RX ADMIN — BETAMETHASONE SODIUM PHOSPHATE AND BETAMETHASONE ACETATE 12 MG: 3; 3 INJECTION, SUSPENSION INTRA-ARTICULAR; INTRALESIONAL; INTRAMUSCULAR at 11:51

## 2017-08-17 RX ADMIN — SODIUM CHLORIDE, POTASSIUM CHLORIDE, SODIUM LACTATE AND CALCIUM CHLORIDE 500 ML/HR: 600; 310; 30; 20 INJECTION, SOLUTION INTRAVENOUS at 11:05

## 2017-08-17 NOTE — PROGRESS NOTES
She is still feeling contractions and breathing some with them. No change in her cervix. Will give another 500 ml bolus. She received terbutaline and is on 3 gms/hour of Mag sulfate. It is looking like she may not respond to tocolytics

## 2017-08-17 NOTE — TELEPHONE ENCOUNTER
----- Message from Gloria Phillip sent at 8/17/2017  9:20 AM EDT -----  Patient needs a call from a nurse now. She is having terrible pain with a lot of pressure and when she goes to the bathroom just a little come out.

## 2017-08-17 NOTE — H&P
[facility]  Obstetric History and Physical    Chief Complaint   Patient presents with   • Contractions       Subjective     Patient is a 31 y.o. female  currently at 31w3d, who presents with complaining of a few days of increased pelvic pressure.  She had increased contractions today and was asked to come to the labor and delivery unit by my office staff.  She is morbidly obese and also has dichorionic diamniotic twins.  She's been seen at [facility] as well.  I recently seen her on  and again on August 3 because of some pressure.  No change in her cervix so that that time.  She therefore canceled her appointment on the , next appointment was scheduled to be ..on Monday she called reporting increased pain and pressure while at work.  Apparently was in some sort of binder could leave work.  Our office called and told her to go home increase fluids and rest if that did not work she should go to labor and delivery.  Apparently yesterday or the day before she was seen in the emergency room for pressure but she fell asleep prior to being seen in the left.  She had intercourse last night or today.  At increased contractions and called the office and was asked to come to labor and delivery.  She is noted to be 4-5 cm dilated with questionable vertex being high.  I came and evaluated the patient and felt she was also 4-5/50% with a presenting part high but did not feel like the vertex.an ultrasound was obtained and it appeared that the vertex was down but floating rather high.  With the ultrasound it appeared the second twin was transverse.  [facility] came and did an ultrasound so we can locate the fetal heart location as penetration on the older machine was not sufficient.  That scan confirmed that the twins were transverse transverse.  Good growth about 50% on both.    Her prenatal care is complicated by  diabetes  impaired glucose tolerance and multiple gestation  DC/DA twins.  Her previous  obstetric/gynecological history is noted for is remarkable for vaginal delivery of 8 lbs. 5 oz. Baby.    The following portions of the patients history were reviewed and updated as appropriate: current medications, allergies, past medical history and problem list .       Prenatal Information:  Prenatal Results         1st Trimester Ref. Range Date Time   CBC with auto diff ^ 12.5 / 40.2 / PLT - 269   02/08/16    Rubella IgG       Hepatitis B SAg  Non-Reactive  Non-Reactive 03/31/17 1632   RPR  Non-Reactive  Non-Reactive 03/31/17 1632   ABO  A   03/31/17 1632   Rh  Positive   03/31/17 1632   Anibody Screen  Negative   03/31/17 1632   HIV       Varicella IgG       Urinalysis with microscopy       Urine Culture ^ NEGATIVE   02/08/16    GC/Chlamydia/TV ^ negative / negative   02/08/16    ThinPrep/Pap ^ wnl   02/08/16      ^ wnl   02/08/16    2nd and 3rd Trimester Ref. Range Date Time   Hemoglobin / Hematocrit  33.1 % (L) 34.5 - 44.0 % 08/17/17 1106   Hemoglobin  11.0 g/dL (L) 11.5 - 15.5 g/dL 08/17/17 1106   Group B Strep Culture ^ Negative   08/31/16    Glucose Challenge Test 1 Hr       Glucose Fasting       Glucose 1 Hr       Glucose 2 Hr       Glucose 3 Hr       Pre-eclampsia Panel  Abnormal  (A)  08/17/17 1106   Risk Screening Ref. Range Date Time   Fetal Fibronectin       Amnisure       Hepatitis C Antibody       Hemoglobin electrophoresis       Cystic Fibrosis       Hemoglobin A1C  4.3 % 4.00 - 6.00 % 06/08/16 1251   MSAFP - 4       NIPT ^ normal   05/13/16    AFP       Parvovirus IgG       Parvovirus IgM       POCT - glucose       [facility]       24 Hour urine - Total protein       24 Hour urine - Creatinine clearance       Urinalysis with microscopy       Urine Culture ^ NEGATIVE   02/08/16    Drug Screening Ref. Range Date Time   Amphetamine Screen  Negative  Negative 03/31/17 1632   Barbiturate Screen  Negative  Negative 03/31/17 1632   Benzodiazepine Screen  Negative  Negative 03/31/17 1632   Methadone Screen   Negative  Negative 17 1632   Phencyclidine Screen  Negative  Negative 17 1632   Opiates Screen  Negative  Negative 17 1632   THC Screen  Negative  Negative 17 1632   Cocaine Screen  Negative  Negative 17 1632   Propoxyphene Screen  Negative  Negative 17 1632   Buprenorphine Screen  Negative  Negative 17 1632   Methamphetamine Screen  Negative  Negative 17 1632   Oxycodone Screen  Negative  Negative 17 1632   Tryicyclic Antidepressants Screen  Negative  Negative 17 1632          Legend: ^: Historical            View all results for this pregnancy        External Prenatal Results         Pregnancy Outside Results - these were transcribed from office records.  See scanned records for details. Date Time   Hgb      Hct      ABO ^ A  16    Rh ^ Positive  16    Antibody Screen ^ Normal  16    Glucose Fasting GTT      Glucose Tolerance Test 1 hour      Glucose Tolerance Test 3 hour      Gonorrhea (discrete)      Chlamydia (discrete)      RPR ^ Non-Reactive  16    VDRL      Syphillis Antibody      Rubella ^ Non-Immune  16    HBsAg ^ Negative  16    Herpes Simplex Virus PCR      Herpes Simplex VIrus Culture      HIV ^ Negative  16    Hep C RNA Quant PCR      Hep C Antibody      Urine Drug Screen      AFP      Group B Strep ^ Negative  16    GBS Susceptibility to Clindamycin      GBS Susceptibility to Eythromycin      Fetal Fibronectin      Genetic Testing, Maternal Blood             Legend: ^: Historical           Past OB History:     Obstetric History       T2      TAB0   SAB1   E0   M0   L2       # Outcome Date GA Lbr Pérez/2nd Weight Sex Delivery Anes PTL Lv   4 Current            3 Term 16 38w2d 17:24 / 00:13 6 lb 0.5 oz (2.735 kg) M Vag-Spont EPI N Y      Name: SALINA GARAY      Apgar1:  7                Apgar5: 9   2 SAB 2015 14w0d          1 Term 02 41w0d  8 lb 5 oz (3.771 kg) M  "Vag-Spont  N Y      Name: Bi Lechuga      Complications: Status post induction of labor      Obstetric Comments   0MAB with Suction D&C 3/15   9/8/16 current pregnancy 2 vessel cord   2017 - spontaneous conception of twins       Past Medical History: Past Medical History:   Diagnosis Date   • Anemia    • Anxiety    • Chronic back pain    • Depression    • Dyspnea on exertion    • Environmental allergies    • Glucose intolerance (impaired glucose tolerance)    • Helicobacter pylori (H. pylori) infection     asx and grossly nl EGD. \"abundant\"on path. HBT still + after PrevPak tx. LSG path neg.   • Hypoalbuminemia     3.2   • Joint pain    • Migraine    • Obesity, morbid    • VIRA (obstructive sleep apnea)     negative test   • PCOS (polycystic ovarian syndrome)       Past Surgical History Past Surgical History:   Procedure Laterality Date   • BARIATRIC SURGERY     • DILATATION AND CURETTAGE     • GASTRIC SLEEVE LAPAROSCOPIC     • GASTRIC SLEEVE LAPAROSCOPIC     • TONSILLECTOMY        Family History: Family History   Problem Relation Age of Onset   • Birth defects Other    • Sleep apnea Mother       Social History:  reports that she has never smoked. She has never used smokeless tobacco.   reports that she does not drink alcohol.   reports that she does not use illicit drugs.        General ROS: Pertinent items are noted in HPI    Objective       Vital Signs Range for the last 24 hours  Temperature: Temp:  [98.4 °F (36.9 °C)] 98.4 °F (36.9 °C)   Temp Source: Temp src: Oral   BP: BP: (118)/(75) 118/75   Pulse: Heart Rate:  [77] 77   Respirations: Resp:  [20] 20   SPO2: SpO2:  [98 %] 98 %   O2 Amount (l/min):     O2 Devices O2 Device: room air   Weight: Weight:  [354 lb (161 kg)] 354 lb (161 kg)     Physical Examination: General appearance - alert, well appearing, and in no distress, oriented to person, place, and time and overweight  Mental status - alert, oriented to person, place, and time, normal mood, behavior, " speech, dress, motor activity, and thought processes  Chest - clear to auscultation, no wheezes, rales or rhonchi, symmetric air entry  Heart - normal rate and regular rhythm  Abdomen - soft, nontender, Gravid and tender when russell  Pelvic - normal external genitalia, vulva, vagina, cervix, uterus and adnexa  Neurological - alert, oriented, normal speech, no focal findings or movement disorder noted  Musculoskeletal - no joint tenderness, deformity or swelling  Extremities - peripheral pulses normal, no pedal edema, no clubbing or cyanosis, pedal edema trace  Skin - normal coloration and turgor, no rashes, no suspicious skin lesions noted    Presentation: transverse   Cervix: Exam by:  Vee   Dilation:  4-5   Effacement:  50%   Station:  high       Fetal Heart Rate Assessment   Method:     Beats/min:     Baseline:     Varibility:     Accels:     Decels:     Tracing Category:       Uterine Assessment   Method:     Frequency (min):     Ctx Count in 10 min:     Duration:     Intensity:     Intensity by IUPC:     Resting Tone:     Resting Tone by IUPC:     [place] Units:         Assessment:  1.  Intrauterine pregnancy at 31w3d weeks gestation with reassuring fetal status.    2.   labor  without ROM  3.  Obstetrical history significant for is remarkable for  DC?DA twins.    Plan:  1. tocolysis with magnesium sulfate and IV steroids for fetal benefit  2. Plan of care has been reviewed with patient.  3.  Risks, benefits of treatment plan have been discussed.  4.  All questions have been answered.        Kwadwo Baxter MD  2017  12:06 PM

## 2017-08-17 NOTE — TELEPHONE ENCOUNTER
OB pt 31 weeks with twins. States she is having increased pelvic pressure with pain. States when she tries to go to the bathroom only a small amount of urine comes out. Pt also states she has not felt the babies move at all this morning.     Advised pt Dr. Baxter is not in the office this morning so she will need to go to labor [place] to be evaluated for decreased fetal movement, pain, and pressure. Pt verbalized understanding.

## 2017-08-17 NOTE — PROGRESS NOTES
"She is feeling fewer contractions if in proper position; FHT\"s are good for gestational age  Discussed with Dr Terrell - 4-5 cm/505 high; transverse/transverse lie, on 3 gm Mag Sulfate and PcN ; s/p 1st steroid dose.  "

## 2017-08-18 ENCOUNTER — ANESTHESIA (OUTPATIENT)
Dept: LABOR AND DELIVERY | Facility: HOSPITAL | Age: 32
End: 2017-08-18

## 2017-08-18 ENCOUNTER — ANESTHESIA EVENT (OUTPATIENT)
Dept: LABOR AND DELIVERY | Facility: HOSPITAL | Age: 32
End: 2017-08-18

## 2017-08-18 PROBLEM — Z98.891 S/P CESAREAN SECTION: Status: ACTIVE | Noted: 2017-08-18

## 2017-08-18 LAB
ATMOSPHERIC PRESS: ABNORMAL MMHG
BASE EXCESS BLDCOA CALC-SCNC: -6.5 MMOL/L (ref 0–2)
BASE EXCESS BLDCOA CALC-SCNC: -8 MMOL/L (ref 0–2)
BASE EXCESS BLDCOV CALC-SCNC: -5 MMOL/L (ref 0–2)
BASE EXCESS BLDCOV CALC-SCNC: -5.5 MMOL/L (ref 0–2)
BDY SITE: ABNORMAL
BDY SITE: ABNORMAL
CO2 BLDA-SCNC: 23.4 MMOL/L (ref 22–33)
CO2 BLDA-SCNC: 24.7 MMOL/L (ref 22–33)
CO2 BLDA-SCNC: 25.1 MMOL/L (ref 22–33)
CO2 BLDA-SCNC: 25.7 MMOL/L (ref 22–33)
HCO3 BLDCOA-SCNC: 21.6 MMOL/L (ref 16.9–20.5)
HCO3 BLDCOA-SCNC: 22.9 MMOL/L (ref 16.9–20.5)
HCO3 BLDCOV-SCNC: 23.3 MMOL/L (ref 18.6–21.4)
HCO3 BLDCOV-SCNC: 23.8 MMOL/L (ref 18.6–21.4)
HGB BLDA-MCNC: 14.4 G/DL (ref 14–18)
HGB BLDA-MCNC: 14.7 G/DL (ref 14–18)
HGB BLDA-MCNC: 14.8 G/DL (ref 14–18)
HGB BLDA-MCNC: 14.9 G/DL (ref 14–18)
HOROWITZ INDEX BLD+IHG-RTO: 21 %
MODALITY: ABNORMAL
PCO2 BLDCOA: 60.5 MMHG (ref 43.3–54.9)
PCO2 BLDCOA: 60.8 MMHG (ref 43.3–54.9)
PCO2 BLDCOV: 58.4 MM HG (ref 30–60)
PCO2 BLDCOV: 59.2 MM HG (ref 30–60)
PH BLDCOA: 7.16 [PH] (ref 7.2–7.3)
PH BLDCOA: 7.18 [PH] (ref 7.2–7.3)
PH BLDCOV: 7.21 [PH] (ref 7.19–7.46)
PH BLDCOV: 7.21 [PH] (ref 7.19–7.46)
PO2 BLDCOA: 16.5 MMHG (ref 11.5–43.3)
PO2 BLDCOA: 8.3 MMHG (ref 11.5–43.3)
PO2 BLDCOV: 3.4 MM HG
PO2 BLDCOV: 5 MM HG
SAO2 % BLDCOA: 14.8 %
SAO2 % BLDCOA: 22.6 %
SAO2 % BLDCOA: ABNORMAL % (ref 45–75)
SAO2 % BLDCOA: ABNORMAL % (ref 45–75)
SAO2 % BLDCOV: 3.4 %
SAO2 % BLDCOV: 5 %

## 2017-08-18 PROCEDURE — 25010000002 METOCLOPRAMIDE PER 10 MG: Performed by: OBSTETRICS & GYNECOLOGY

## 2017-08-18 PROCEDURE — 25010000002 MIDAZOLAM PER 1 MG: Performed by: ANESTHESIOLOGY

## 2017-08-18 PROCEDURE — 94799 UNLISTED PULMONARY SVC/PX: CPT

## 2017-08-18 PROCEDURE — 82805 BLOOD GASES W/O2 SATURATION: CPT | Performed by: OBSTETRICS & GYNECOLOGY

## 2017-08-18 PROCEDURE — 58925 REMOVAL OF OVARIAN CYST(S): CPT | Performed by: OBSTETRICS & GYNECOLOGY

## 2017-08-18 PROCEDURE — 25010000002 BETAMETHASONE ACET & SOD PHOS PER 4 MG: Performed by: OBSTETRICS & GYNECOLOGY

## 2017-08-18 PROCEDURE — 59514 CESAREAN DELIVERY ONLY: CPT | Performed by: OBSTETRICS & GYNECOLOGY

## 2017-08-18 PROCEDURE — 25010000002 ONDANSETRON PER 1 MG: Performed by: OBSTETRICS & GYNECOLOGY

## 2017-08-18 PROCEDURE — 25010000002 MAGNESIUM SULFATE-LACT RINGERS 40 GM/580ML SOLUTION: Performed by: OBSTETRICS & GYNECOLOGY

## 2017-08-18 PROCEDURE — 59025 FETAL NON-STRESS TEST: CPT

## 2017-08-18 PROCEDURE — 25010000003 PENICILLIN G POTASSIUM PER 600000 UNITS: Performed by: OBSTETRICS & GYNECOLOGY

## 2017-08-18 PROCEDURE — 94660 CPAP INITIATION&MGMT: CPT

## 2017-08-18 PROCEDURE — 88307 TISSUE EXAM BY PATHOLOGIST: CPT | Performed by: OBSTETRICS & GYNECOLOGY

## 2017-08-18 PROCEDURE — 25010000002 METOCLOPRAMIDE PER 10 MG: Performed by: ANESTHESIOLOGY

## 2017-08-18 PROCEDURE — 0UB10ZZ EXCISION OF LEFT OVARY, OPEN APPROACH: ICD-10-PCS | Performed by: OBSTETRICS & GYNECOLOGY

## 2017-08-18 PROCEDURE — 25010000002 FENTANYL CITRATE (PF) 100 MCG/2ML SOLUTION: Performed by: ANESTHESIOLOGY

## 2017-08-18 PROCEDURE — 25010000002 MORPHINE PER 10 MG: Performed by: ANESTHESIOLOGY

## 2017-08-18 RX ORDER — PROMETHAZINE HYDROCHLORIDE 25 MG/ML
12.5 INJECTION, SOLUTION INTRAMUSCULAR; INTRAVENOUS EVERY 6 HOURS PRN
Status: DISCONTINUED | OUTPATIENT
Start: 2017-08-18 | End: 2017-08-18 | Stop reason: HOSPADM

## 2017-08-18 RX ORDER — MISOPROSTOL 200 UG/1
800 TABLET ORAL AS NEEDED
Status: DISCONTINUED | OUTPATIENT
Start: 2017-08-18 | End: 2017-08-18 | Stop reason: HOSPADM

## 2017-08-18 RX ORDER — LANOLIN 100 %
OINTMENT (GRAM) TOPICAL
Status: DISCONTINUED | OUTPATIENT
Start: 2017-08-18 | End: 2017-08-22 | Stop reason: HOSPADM

## 2017-08-18 RX ORDER — KETOROLAC TROMETHAMINE 30 MG/ML
30 INJECTION, SOLUTION INTRAMUSCULAR; INTRAVENOUS EVERY 6 HOURS PRN
Status: DISPENSED | OUTPATIENT
Start: 2017-08-18 | End: 2017-08-20

## 2017-08-18 RX ORDER — CEFAZOLIN SODIUM IN 0.9 % NACL 3 G/100 ML
3 INTRAVENOUS SOLUTION, PIGGYBACK (ML) INTRAVENOUS ONCE
Status: COMPLETED | OUTPATIENT
Start: 2017-08-18 | End: 2017-08-18

## 2017-08-18 RX ORDER — MORPHINE SULFATE 4 MG/ML
2 INJECTION, SOLUTION INTRAMUSCULAR; INTRAVENOUS
Status: DISCONTINUED | OUTPATIENT
Start: 2017-08-18 | End: 2017-08-18 | Stop reason: HOSPADM

## 2017-08-18 RX ORDER — ACETAMINOPHEN 325 MG/1
650 TABLET ORAL EVERY 4 HOURS PRN
Status: DISCONTINUED | OUTPATIENT
Start: 2017-08-18 | End: 2017-08-22 | Stop reason: HOSPADM

## 2017-08-18 RX ORDER — SODIUM CHLORIDE, SODIUM LACTATE, POTASSIUM CHLORIDE, CALCIUM CHLORIDE 600; 310; 30; 20 MG/100ML; MG/100ML; MG/100ML; MG/100ML
125 INJECTION, SOLUTION INTRAVENOUS CONTINUOUS
Status: DISCONTINUED | OUTPATIENT
Start: 2017-08-18 | End: 2017-08-18

## 2017-08-18 RX ORDER — LIDOCAINE HYDROCHLORIDE 10 MG/ML
5 INJECTION, SOLUTION INFILTRATION; PERINEURAL AS NEEDED
Status: DISCONTINUED | OUTPATIENT
Start: 2017-08-18 | End: 2017-08-18 | Stop reason: HOSPADM

## 2017-08-18 RX ORDER — IBUPROFEN 600 MG/1
600 TABLET ORAL EVERY 6 HOURS PRN
Status: DISCONTINUED | OUTPATIENT
Start: 2017-08-18 | End: 2017-08-18 | Stop reason: HOSPADM

## 2017-08-18 RX ORDER — SODIUM CHLORIDE 0.9 % (FLUSH) 0.9 %
1-10 SYRINGE (ML) INJECTION AS NEEDED
Status: DISCONTINUED | OUTPATIENT
Start: 2017-08-18 | End: 2017-08-18 | Stop reason: HOSPADM

## 2017-08-18 RX ORDER — OXYTOCIN 10 [USP'U]/ML
INJECTION, SOLUTION INTRAMUSCULAR; INTRAVENOUS AS NEEDED
Status: DISCONTINUED | OUTPATIENT
Start: 2017-08-18 | End: 2017-08-18 | Stop reason: SURG

## 2017-08-18 RX ORDER — HYDROCODONE BITARTRATE AND ACETAMINOPHEN 5; 325 MG/1; MG/1
1 TABLET ORAL EVERY 4 HOURS PRN
Status: DISCONTINUED | OUTPATIENT
Start: 2017-08-18 | End: 2017-08-19

## 2017-08-18 RX ORDER — TRISODIUM CITRATE DIHYDRATE AND CITRIC ACID MONOHYDRATE 500; 334 MG/5ML; MG/5ML
30 SOLUTION ORAL ONCE
Status: COMPLETED | OUTPATIENT
Start: 2017-08-18 | End: 2017-08-18

## 2017-08-18 RX ORDER — METHYLERGONOVINE MALEATE 0.2 MG/ML
200 INJECTION INTRAVENOUS ONCE
Status: DISCONTINUED | OUTPATIENT
Start: 2017-08-18 | End: 2017-08-22 | Stop reason: HOSPADM

## 2017-08-18 RX ORDER — GUAIFENESIN 600 MG/1
1200 TABLET, EXTENDED RELEASE ORAL EVERY 12 HOURS SCHEDULED
Status: DISCONTINUED | OUTPATIENT
Start: 2017-08-18 | End: 2017-08-22 | Stop reason: HOSPADM

## 2017-08-18 RX ORDER — HYDROMORPHONE HYDROCHLORIDE 1 MG/ML
0.5 INJECTION, SOLUTION INTRAMUSCULAR; INTRAVENOUS; SUBCUTANEOUS
Status: DISCONTINUED | OUTPATIENT
Start: 2017-08-18 | End: 2017-08-18 | Stop reason: HOSPADM

## 2017-08-18 RX ORDER — METOCLOPRAMIDE HYDROCHLORIDE 5 MG/ML
INJECTION INTRAMUSCULAR; INTRAVENOUS AS NEEDED
Status: DISCONTINUED | OUTPATIENT
Start: 2017-08-18 | End: 2017-08-18 | Stop reason: SURG

## 2017-08-18 RX ORDER — CARBOPROST TROMETHAMINE 250 UG/ML
250 INJECTION, SOLUTION INTRAMUSCULAR AS NEEDED
Status: DISCONTINUED | OUTPATIENT
Start: 2017-08-18 | End: 2017-08-18 | Stop reason: HOSPADM

## 2017-08-18 RX ORDER — MORPHINE SULFATE 1 MG/ML
INJECTION, SOLUTION EPIDURAL; INTRATHECAL; INTRAVENOUS AS NEEDED
Status: DISCONTINUED | OUTPATIENT
Start: 2017-08-18 | End: 2017-08-18 | Stop reason: SURG

## 2017-08-18 RX ORDER — PROMETHAZINE HYDROCHLORIDE 12.5 MG/1
12.5 TABLET ORAL EVERY 6 HOURS PRN
Status: DISCONTINUED | OUTPATIENT
Start: 2017-08-18 | End: 2017-08-18 | Stop reason: HOSPADM

## 2017-08-18 RX ORDER — MIDAZOLAM HYDROCHLORIDE 1 MG/ML
INJECTION INTRAMUSCULAR; INTRAVENOUS AS NEEDED
Status: DISCONTINUED | OUTPATIENT
Start: 2017-08-18 | End: 2017-08-18 | Stop reason: SURG

## 2017-08-18 RX ORDER — HYDROCODONE BITARTRATE AND ACETAMINOPHEN 7.5; 325 MG/1; MG/1
1 TABLET ORAL EVERY 4 HOURS PRN
Status: DISCONTINUED | OUTPATIENT
Start: 2017-08-18 | End: 2017-08-19

## 2017-08-18 RX ORDER — FENTANYL CITRATE 50 UG/ML
INJECTION, SOLUTION INTRAMUSCULAR; INTRAVENOUS AS NEEDED
Status: DISCONTINUED | OUTPATIENT
Start: 2017-08-18 | End: 2017-08-18 | Stop reason: SURG

## 2017-08-18 RX ORDER — DIPHENHYDRAMINE HYDROCHLORIDE 50 MG/ML
25 INJECTION INTRAMUSCULAR; INTRAVENOUS NIGHTLY PRN
Status: DISCONTINUED | OUTPATIENT
Start: 2017-08-18 | End: 2017-08-18 | Stop reason: HOSPADM

## 2017-08-18 RX ORDER — PROMETHAZINE HYDROCHLORIDE 12.5 MG/1
12.5 SUPPOSITORY RECTAL EVERY 6 HOURS PRN
Status: DISCONTINUED | OUTPATIENT
Start: 2017-08-18 | End: 2017-08-22 | Stop reason: HOSPADM

## 2017-08-18 RX ORDER — DIPHENHYDRAMINE HCL 25 MG
25 CAPSULE ORAL EVERY 4 HOURS PRN
Status: DISCONTINUED | OUTPATIENT
Start: 2017-08-18 | End: 2017-08-22 | Stop reason: HOSPADM

## 2017-08-18 RX ORDER — ONDANSETRON 2 MG/ML
4 INJECTION INTRAMUSCULAR; INTRAVENOUS EVERY 6 HOURS PRN
Status: DISCONTINUED | OUTPATIENT
Start: 2017-08-18 | End: 2017-08-18 | Stop reason: HOSPADM

## 2017-08-18 RX ORDER — ZOLPIDEM TARTRATE 5 MG/1
5 TABLET ORAL NIGHTLY PRN
Status: DISCONTINUED | OUTPATIENT
Start: 2017-08-18 | End: 2017-08-18 | Stop reason: HOSPADM

## 2017-08-18 RX ORDER — OXYCODONE HYDROCHLORIDE AND ACETAMINOPHEN 5; 325 MG/1; MG/1
2 TABLET ORAL ONCE AS NEEDED
Status: DISCONTINUED | OUTPATIENT
Start: 2017-08-18 | End: 2017-08-18 | Stop reason: HOSPADM

## 2017-08-18 RX ORDER — FAMOTIDINE 10 MG/ML
INJECTION, SOLUTION INTRAVENOUS AS NEEDED
Status: DISCONTINUED | OUTPATIENT
Start: 2017-08-18 | End: 2017-08-18 | Stop reason: SURG

## 2017-08-18 RX ORDER — ONDANSETRON 4 MG/1
4 TABLET, FILM COATED ORAL EVERY 6 HOURS PRN
Status: DISCONTINUED | OUTPATIENT
Start: 2017-08-18 | End: 2017-08-22 | Stop reason: HOSPADM

## 2017-08-18 RX ORDER — DIPHENHYDRAMINE HYDROCHLORIDE 50 MG/ML
25 INJECTION INTRAMUSCULAR; INTRAVENOUS EVERY 4 HOURS PRN
Status: DISCONTINUED | OUTPATIENT
Start: 2017-08-18 | End: 2017-08-22 | Stop reason: HOSPADM

## 2017-08-18 RX ORDER — PROMETHAZINE HYDROCHLORIDE 25 MG/1
25 TABLET ORAL EVERY 6 HOURS PRN
Status: DISCONTINUED | OUTPATIENT
Start: 2017-08-18 | End: 2017-08-22 | Stop reason: HOSPADM

## 2017-08-18 RX ORDER — CARBOPROST TROMETHAMINE 250 UG/ML
250 INJECTION, SOLUTION INTRAMUSCULAR ONCE
Status: DISCONTINUED | OUTPATIENT
Start: 2017-08-18 | End: 2017-08-22 | Stop reason: HOSPADM

## 2017-08-18 RX ORDER — ONDANSETRON 4 MG/1
4 TABLET, FILM COATED ORAL EVERY 6 HOURS PRN
Status: DISCONTINUED | OUTPATIENT
Start: 2017-08-18 | End: 2017-08-18 | Stop reason: HOSPADM

## 2017-08-18 RX ORDER — METOCLOPRAMIDE HYDROCHLORIDE 5 MG/ML
10 INJECTION INTRAMUSCULAR; INTRAVENOUS ONCE AS NEEDED
Status: DISCONTINUED | OUTPATIENT
Start: 2017-08-18 | End: 2017-08-18 | Stop reason: HOSPADM

## 2017-08-18 RX ORDER — ACETAMINOPHEN 650 MG/1
650 SUPPOSITORY RECTAL EVERY 4 HOURS PRN
Status: DISCONTINUED | OUTPATIENT
Start: 2017-08-18 | End: 2017-08-18 | Stop reason: HOSPADM

## 2017-08-18 RX ORDER — ONDANSETRON 2 MG/ML
4 INJECTION INTRAMUSCULAR; INTRAVENOUS EVERY 6 HOURS PRN
Status: DISCONTINUED | OUTPATIENT
Start: 2017-08-18 | End: 2017-08-18 | Stop reason: SDUPTHER

## 2017-08-18 RX ORDER — HYDROXYZINE HYDROCHLORIDE 25 MG/1
50 TABLET, FILM COATED ORAL EVERY 6 HOURS PRN
Status: DISCONTINUED | OUTPATIENT
Start: 2017-08-18 | End: 2017-08-22 | Stop reason: HOSPADM

## 2017-08-18 RX ORDER — DIPHENHYDRAMINE HCL 25 MG
25 CAPSULE ORAL EVERY 4 HOURS PRN
Status: DISCONTINUED | OUTPATIENT
Start: 2017-08-18 | End: 2017-08-18 | Stop reason: SDUPTHER

## 2017-08-18 RX ORDER — PROMETHAZINE HYDROCHLORIDE 25 MG/ML
12.5 INJECTION, SOLUTION INTRAMUSCULAR; INTRAVENOUS EVERY 6 HOURS PRN
Status: DISCONTINUED | OUTPATIENT
Start: 2017-08-18 | End: 2017-08-22 | Stop reason: HOSPADM

## 2017-08-18 RX ORDER — METOCLOPRAMIDE HYDROCHLORIDE 5 MG/ML
10 INJECTION INTRAMUSCULAR; INTRAVENOUS ONCE
Status: COMPLETED | OUTPATIENT
Start: 2017-08-18 | End: 2017-08-18

## 2017-08-18 RX ORDER — BISACODYL 10 MG
10 SUPPOSITORY, RECTAL RECTAL DAILY PRN
Status: DISCONTINUED | OUTPATIENT
Start: 2017-08-18 | End: 2017-08-22 | Stop reason: HOSPADM

## 2017-08-18 RX ORDER — ONDANSETRON 2 MG/ML
4 INJECTION INTRAMUSCULAR; INTRAVENOUS EVERY 6 HOURS PRN
Status: DISCONTINUED | OUTPATIENT
Start: 2017-08-18 | End: 2017-08-22 | Stop reason: HOSPADM

## 2017-08-18 RX ORDER — CETIRIZINE HYDROCHLORIDE 10 MG/1
10 TABLET ORAL DAILY
Status: DISCONTINUED | OUTPATIENT
Start: 2017-08-19 | End: 2017-08-22 | Stop reason: HOSPADM

## 2017-08-18 RX ORDER — DOCUSATE SODIUM 100 MG/1
100 CAPSULE, LIQUID FILLED ORAL 2 TIMES DAILY PRN
Status: DISCONTINUED | OUTPATIENT
Start: 2017-08-18 | End: 2017-08-22 | Stop reason: HOSPADM

## 2017-08-18 RX ORDER — NALOXONE HCL 0.4 MG/ML
0.1 VIAL (ML) INJECTION
Status: DISCONTINUED | OUTPATIENT
Start: 2017-08-18 | End: 2017-08-22 | Stop reason: HOSPADM

## 2017-08-18 RX ORDER — ACETAMINOPHEN 160 MG/5ML
650 SOLUTION ORAL EVERY 4 HOURS PRN
Status: DISCONTINUED | OUTPATIENT
Start: 2017-08-18 | End: 2017-08-22 | Stop reason: HOSPADM

## 2017-08-18 RX ORDER — CEFAZOLIN SODIUM 2 G/100ML
2 INJECTION, SOLUTION INTRAVENOUS EVERY 8 HOURS
Status: DISCONTINUED | OUTPATIENT
Start: 2017-08-19 | End: 2017-08-19

## 2017-08-18 RX ORDER — PRENATAL VIT/IRON FUM/FOLIC AC 27MG-0.8MG
1 TABLET ORAL DAILY
Status: DISCONTINUED | OUTPATIENT
Start: 2017-08-19 | End: 2017-08-22 | Stop reason: HOSPADM

## 2017-08-18 RX ORDER — FLUTICASONE PROPIONATE 50 MCG
1 SPRAY, SUSPENSION (ML) NASAL DAILY
Status: DISCONTINUED | OUTPATIENT
Start: 2017-08-19 | End: 2017-08-22 | Stop reason: HOSPADM

## 2017-08-18 RX ORDER — ZOLPIDEM TARTRATE 5 MG/1
5 TABLET ORAL NIGHTLY PRN
Status: DISCONTINUED | OUTPATIENT
Start: 2017-08-18 | End: 2017-08-22 | Stop reason: HOSPADM

## 2017-08-18 RX ORDER — HYDROCODONE BITARTRATE AND ACETAMINOPHEN 5; 325 MG/1; MG/1
1 TABLET ORAL EVERY 4 HOURS PRN
Status: DISCONTINUED | OUTPATIENT
Start: 2017-08-18 | End: 2017-08-18 | Stop reason: HOSPADM

## 2017-08-18 RX ORDER — ONDANSETRON 4 MG/1
4 TABLET, FILM COATED ORAL EVERY 8 HOURS PRN
Status: DISCONTINUED | OUTPATIENT
Start: 2017-08-18 | End: 2017-08-18 | Stop reason: SDUPTHER

## 2017-08-18 RX ORDER — MISOPROSTOL 200 UG/1
600 TABLET ORAL ONCE
Status: DISCONTINUED | OUTPATIENT
Start: 2017-08-18 | End: 2017-08-22 | Stop reason: HOSPADM

## 2017-08-18 RX ORDER — ACETAMINOPHEN 650 MG/1
650 SUPPOSITORY RECTAL EVERY 4 HOURS PRN
Status: DISCONTINUED | OUTPATIENT
Start: 2017-08-18 | End: 2017-08-22 | Stop reason: HOSPADM

## 2017-08-18 RX ORDER — ACETAMINOPHEN 325 MG/1
650 TABLET ORAL EVERY 4 HOURS PRN
Status: DISCONTINUED | OUTPATIENT
Start: 2017-08-18 | End: 2017-08-18 | Stop reason: HOSPADM

## 2017-08-18 RX ORDER — SODIUM CHLORIDE 0.9 % (FLUSH) 0.9 %
1-10 SYRINGE (ML) INJECTION AS NEEDED
Status: DISCONTINUED | OUTPATIENT
Start: 2017-08-18 | End: 2017-08-22 | Stop reason: HOSPADM

## 2017-08-18 RX ORDER — SIMETHICONE 80 MG
80 TABLET,CHEWABLE ORAL 4 TIMES DAILY PRN
Status: DISCONTINUED | OUTPATIENT
Start: 2017-08-18 | End: 2017-08-22 | Stop reason: HOSPADM

## 2017-08-18 RX ORDER — FAMOTIDINE 10 MG/ML
20 INJECTION, SOLUTION INTRAVENOUS ONCE
Status: COMPLETED | OUTPATIENT
Start: 2017-08-18 | End: 2017-08-18

## 2017-08-18 RX ORDER — HYDROMORPHONE HYDROCHLORIDE 1 MG/ML
0.5 INJECTION, SOLUTION INTRAMUSCULAR; INTRAVENOUS; SUBCUTANEOUS
Status: DISCONTINUED | OUTPATIENT
Start: 2017-08-18 | End: 2017-08-22 | Stop reason: HOSPADM

## 2017-08-18 RX ORDER — DIPHENHYDRAMINE HCL 25 MG
25 CAPSULE ORAL NIGHTLY PRN
Status: DISCONTINUED | OUTPATIENT
Start: 2017-08-18 | End: 2017-08-18 | Stop reason: HOSPADM

## 2017-08-18 RX ORDER — PROMETHAZINE HYDROCHLORIDE 12.5 MG/1
12.5 SUPPOSITORY RECTAL EVERY 6 HOURS PRN
Status: DISCONTINUED | OUTPATIENT
Start: 2017-08-18 | End: 2017-08-18 | Stop reason: HOSPADM

## 2017-08-18 RX ORDER — SODIUM CHLORIDE, SODIUM LACTATE, POTASSIUM CHLORIDE, CALCIUM CHLORIDE 600; 310; 30; 20 MG/100ML; MG/100ML; MG/100ML; MG/100ML
125 INJECTION, SOLUTION INTRAVENOUS CONTINUOUS
Status: DISCONTINUED | OUTPATIENT
Start: 2017-08-18 | End: 2017-08-22 | Stop reason: HOSPADM

## 2017-08-18 RX ORDER — ONDANSETRON 2 MG/ML
4 INJECTION INTRAMUSCULAR; INTRAVENOUS ONCE
Status: DISCONTINUED | OUTPATIENT
Start: 2017-08-18 | End: 2017-08-18 | Stop reason: HOSPADM

## 2017-08-18 RX ORDER — ACETAMINOPHEN 325 MG/1
650 TABLET ORAL ONCE AS NEEDED
Status: DISCONTINUED | OUTPATIENT
Start: 2017-08-18 | End: 2017-08-18 | Stop reason: HOSPADM

## 2017-08-18 RX ORDER — POLYETHYLENE GLYCOL 3350 17 G/17G
17 POWDER, FOR SOLUTION ORAL ONCE
Status: COMPLETED | OUTPATIENT
Start: 2017-08-18 | End: 2017-08-20

## 2017-08-18 RX ORDER — BUPIVACAINE HYDROCHLORIDE 7.5 MG/ML
INJECTION, SOLUTION INTRASPINAL AS NEEDED
Status: DISCONTINUED | OUTPATIENT
Start: 2017-08-18 | End: 2017-08-18 | Stop reason: SURG

## 2017-08-18 RX ORDER — IBUPROFEN 600 MG/1
600 TABLET ORAL ONCE AS NEEDED
Status: DISCONTINUED | OUTPATIENT
Start: 2017-08-18 | End: 2017-08-18 | Stop reason: HOSPADM

## 2017-08-18 RX ORDER — METHYLERGONOVINE MALEATE 0.2 MG/ML
200 INJECTION INTRAVENOUS AS NEEDED
Status: DISCONTINUED | OUTPATIENT
Start: 2017-08-18 | End: 2017-08-18 | Stop reason: HOSPADM

## 2017-08-18 RX ORDER — NALOXONE HCL 0.4 MG/ML
0.4 VIAL (ML) INJECTION
Status: ACTIVE | OUTPATIENT
Start: 2017-08-18 | End: 2017-08-19

## 2017-08-18 RX ADMIN — SODIUM CHLORIDE 2.5 MILLION UNITS: 9 INJECTION, SOLUTION INTRAVENOUS at 08:46

## 2017-08-18 RX ADMIN — FENTANYL CITRATE 15 MCG: 50 INJECTION, SOLUTION INTRAMUSCULAR; INTRAVENOUS at 15:16

## 2017-08-18 RX ADMIN — SODIUM CITRATE AND CITRIC ACID MONOHYDRATE 30 ML: 500; 334 SOLUTION ORAL at 14:46

## 2017-08-18 RX ADMIN — HYDROCODONE BITARTRATE AND ACETAMINOPHEN 1 TABLET: 5; 325 TABLET ORAL at 23:11

## 2017-08-18 RX ADMIN — OXYTOCIN 40 UNITS: 10 INJECTION, SOLUTION INTRAMUSCULAR; INTRAVENOUS at 15:36

## 2017-08-18 RX ADMIN — SODIUM CHLORIDE 2.5 MILLION UNITS: 9 INJECTION, SOLUTION INTRAVENOUS at 12:37

## 2017-08-18 RX ADMIN — BETAMETHASONE SODIUM PHOSPHATE AND BETAMETHASONE ACETATE 12 MG: 3; 3 INJECTION, SUSPENSION INTRA-ARTICULAR; INTRALESIONAL; INTRAMUSCULAR at 00:09

## 2017-08-18 RX ADMIN — OXYTOCIN 40 UNITS: 10 INJECTION, SOLUTION INTRAMUSCULAR; INTRAVENOUS at 16:10

## 2017-08-18 RX ADMIN — EPHEDRINE SULFATE 15 MG: 50 INJECTION INTRAMUSCULAR; INTRAVENOUS; SUBCUTANEOUS at 15:31

## 2017-08-18 RX ADMIN — SODIUM CHLORIDE, POTASSIUM CHLORIDE, SODIUM LACTATE AND CALCIUM CHLORIDE: 600; 310; 30; 20 INJECTION, SOLUTION INTRAVENOUS at 15:27

## 2017-08-18 RX ADMIN — METOCLOPRAMIDE 10 MG: 5 INJECTION, SOLUTION INTRAMUSCULAR; INTRAVENOUS at 15:20

## 2017-08-18 RX ADMIN — ONDANSETRON 4 MG: 2 INJECTION INTRAMUSCULAR; INTRAVENOUS at 15:20

## 2017-08-18 RX ADMIN — SODIUM CHLORIDE, POTASSIUM CHLORIDE, SODIUM LACTATE AND CALCIUM CHLORIDE 1000 ML: 600; 310; 30; 20 INJECTION, SOLUTION INTRAVENOUS at 14:25

## 2017-08-18 RX ADMIN — FAMOTIDINE 20 MG: 10 INJECTION, SOLUTION INTRAVENOUS at 15:20

## 2017-08-18 RX ADMIN — SODIUM CHLORIDE 2.5 MILLION UNITS: 9 INJECTION, SOLUTION INTRAVENOUS at 04:07

## 2017-08-18 RX ADMIN — Medication 3 G: at 14:45

## 2017-08-18 RX ADMIN — SODIUM CHLORIDE 2.5 MILLION UNITS: 9 INJECTION, SOLUTION INTRAVENOUS at 00:08

## 2017-08-18 RX ADMIN — MORPHINE SULFATE 0.15 MG: 1 INJECTION, SOLUTION EPIDURAL; INTRATHECAL; INTRAVENOUS at 15:16

## 2017-08-18 RX ADMIN — Medication 3 G/HR: at 14:39

## 2017-08-18 RX ADMIN — BUPIVACAINE HYDROCHLORIDE IN DEXTROSE 1.8 ML: 7.5 INJECTION, SOLUTION SUBARACHNOID at 15:16

## 2017-08-18 RX ADMIN — MIDAZOLAM 2 MG: 1 INJECTION INTRAMUSCULAR; INTRAVENOUS at 15:06

## 2017-08-18 RX ADMIN — FAMOTIDINE 20 MG: 10 INJECTION, SOLUTION INTRAVENOUS at 10:26

## 2017-08-18 RX ADMIN — SODIUM CHLORIDE, POTASSIUM CHLORIDE, SODIUM LACTATE AND CALCIUM CHLORIDE 1000 ML: 600; 310; 30; 20 INJECTION, SOLUTION INTRAVENOUS at 14:46

## 2017-08-18 RX ADMIN — METOCLOPRAMIDE 10 MG: 5 INJECTION, SOLUTION INTRAMUSCULAR; INTRAVENOUS at 10:25

## 2017-08-18 RX ADMIN — SODIUM CHLORIDE, POTASSIUM CHLORIDE, SODIUM LACTATE AND CALCIUM CHLORIDE: 600; 310; 30; 20 INJECTION, SOLUTION INTRAVENOUS at 14:45

## 2017-08-18 NOTE — PLAN OF CARE
Problem:  Labor (Adult,Obstetrics,Pediatric)  Goal: Signs and Symptoms of Listed Potential Problems Will be Absent or Manageable ( Labor)  Outcome: Outcome(s) achieved Date Met:  17 1749    Labor   Problems Assessed ( Labor) all   Problems Present ( Labor) none

## 2017-08-18 NOTE — OP NOTE
Operative Note    Patient name: Geneva Cordova  YOB: 1985   MRN: [id number]  Admission Date: 2017    ID: 31 y.o.  at 31w4d    Preoperative Diagnosis:   Patient Active Problem List   Diagnosis   • VIRA (obstructive sleep apnea)   • Glucose intolerance (impaired glucose tolerance)   • Migraine   • Depression   • Helicobacter pylori (H. pylori) infection   • Anemia   • Joint pain   • Chronic back pain   • Anxiety   • Morbid obesity due to excess calories   • Status post gastric sleeve 2015   • Vaginal delivery x 2   • Dichorionic diamniotic twin pregnancy, antepartum   • Pregnancy related abdominal pain of lower quadrant, antepartum   • Uterine size date discrepancy   • Ventricular septal defect (VSD) twin B 17   •  labor in third trimester without delivery     Patient desired permanent sterilization but had not signed permits until today    Postoperative Diagnosis: Same as above.                                               Left ovarian cyst    Procedure(s): primarylow transverse  delivery; left ovarian cystectomy/partial salpingectomy    Procedure(s):   SECTION PRIMARY    Surgeons: Surgeon(s) and Role:     * Kwadwo Baxter MD - Primary     * Farshad Foreman DO - Co-surgeon     * Jaqui Castaneda MD - Resident - Assisting    Assistant: Circulator: Amparo Khan RN  L & D Nurse: Ashley Lara RN  NICU Nurse: María George RN  Baby Nurse: Fatoumata Barahona RN  OB TECH: Bryce Faria  Respiratory Therapist: Shaunna Alexis RRT     Anesthesia: Spinal    Estimated Blood Loss: 1000 mL mL    IV Fluids: [unfilled]    Preoperative antibiotic: cefazolin (Ancef)    Blood products:   Blood Administration Record     None          Pathology:   Order Name Source Comment Collection Info Order Time   TISSUE PATHOLOGY EXAM Placenta  Collected By: Fatoumata Barahona RN 2017  3:45 PM   TISSUE PATHOLOGY EXAM Ovary, Left   Ovarian cyst Collected By: Fatoumata Barahona RN  8/18/2017  4:02 PM       Drains: Arellano catheter to gravity clear urine    Complications: None    Condition: Stable to recovery room                                          Infant:              Best Cordova [[id number]]         Ada Cordova [[id number]]          Gender:    Best oCrdova [[id number]]   female     Ada Cordova [[id number]]   male   infant    Weight:    Best Cordova [[id number]]   No birth weight on file.     Ada Cordova [[id number]]   No birth weight on file.      Apgars:    Bset Cordova [[id number]]   1      Ada Cordova [[id number]]   1    @ 1 minute /        Best Cordova [[id number]]   6      Ada Cordova [[id number]]   6    @ 5 minutes    Cord gases: Venous:  @BABYNOHDR(BRIEFLAB, PHCVEN, BECVEN)@     Arterial:  @BABYNOHDR(BRI[facility]FLAB, [facility])@       Operative Summary:   After obtaining informed consent the patient was taken to the operating room where adequate anesthesia was obtained.  Arellano catheter was placed to straight drainage.  IV antibiotics were given preoperatively.       The abdomen was prepped and draped in the usual sterile fashion.  With an  adequate level of anesthesia, a Pfannenstiel skin incision was made with the scalpel and carried through  the adipose layer to the fascia.  The fascia was incised in the midline  extended laterally with pencil cautery and blunt dissection.       The upper aspect of the fascia was  elevated, and dissected off the underlying rectus muscles bluntly and with the Mcgraw scissors.  This was repeated on the inferior aspect of the fascia.  The peritoneum was entered bluntly.  Bladder blade and Pettit retractors were inserted .       The uterus was incised with the scalpel in a low transverse fashion  and extended manually.  Membranes were ruptured.  Clear fluid was noted. The Twin A was delivered atraumatically from transverse presentation by converting her easily  to vertex.The umbilical cord was milked prior to being clamped and cut.  The nose and mouth bulb suctioned.  The infant was handed off to the delivery team.  Twin B was also transverse and was converted easily to breech. The body was supported with a towel while the arms were splinted across the chest, the vertex was gently flexed for delivery. The mouth and nares were suctioned, the cord was milked prior to being clamed and cut. The male infant was handed to the delivery team.     The placentas were manually removed.  The uterus was exteriorized and  moist lap sponges were used to clean the uterine lining. The uterine incision was repaired with #1 chromic in a running locked fashion. A second imbricating layer was placed. Figure of eight stitches were used for hemostasis.Adequate hemostasis was achieved.  The uterus, tubes and ovaries were noted to be normal with the exception of a 5 x 8 cm left ovarian cyst. A clamp was placed across the tube and ovary and suture ligated. The cyst ruptured during removal. Hemostasis was adequate. The uterus was returned to the abdomen.  The gutters were cleared of all clots and debris. The left adnexa was hemostatic. The uterine incision remained hemostatic.   The fascia was closed with a running PDS stratafix..  The subcutaneous space was reapproximated using 0 plain..  The skin was closed using stapler.  The patient was taken to the recovery room in stable condition.    Kwadwo Baxter MD

## 2017-08-18 NOTE — NURSING NOTE
1030- PT LAYING IN BED NOT CHANGED INTO GOWN, PT C/O LOW ABD PRESSURE, AS RN ASSESSES PT ABOUT PRESSURE PT THEN STATES SHE HAS TWINS,  1040- PT BACK TO BED, RN ATTEMPTING TO PLACE EFM ON TWIN A, RN PALPATING CONTRACTION.  PT OBESE SO VERY DIFFICULT TO FIND FHR.  RN DECIDED TO CHECK SVE AFTER ASKING PATIENT A FEW QUESTIONS.  UPON SVE RN FELT BAG OF WATER AND POSSIBLY 5 CM.  1045- DR FELIPE CALLED ON CELL PHONE TOLD TO COME TO APU NOW DUE TO TWINS, DILATED, GRACE, MD STATES HEADING THIS WAY.    1050- DR FELIPE PERFORMED SVE AND THEN ATTEMPTING TO PERFORM US. US SHOWS TRANSVERSE LIE OF TWIN A AND B.  MD UNABLE TO FIND FHR LOCATIONS DUE TO MATERNAL SIZE.   1100- PDC AT BS TO PERFORM US, CONFIRMING TRANSVERSE LIE ON A & B WITH A'S KNEE AT THE CERVIX.  1110- DR FELIPE REMAINS AT BS MAG 6 GRAM BOLUS/3 GRAMS AN HOUR ORDERED  1123- RN ATTEMPTING TO PLACE TWINS ON EFM  1143- RN ATTEMPTING TO ADJUSTED EFMS   1300- DR FELIPE AT BS FOR REEVALUATION OF SVE, MD STATES NO CHANGE, CONTINUE NPO, MAG AND ANTIBIOTICS  1300- PT BEGGING TO SIT UP IN BED, EFMS UNPLUGGED BC RN UNABLE TO ATTEMPT TO TRACE FHRS IN PATIENT IN HIGH FOWLERS POSITION  1336- RN ATTEMPTING TO ADJUST EFMS  1348- PT TO RIGHT LATERAL ADJUSTING EFMS  1408- RN ATTEMPTING TO ADJUST EFMS , PT STATES DOES NOT FEEL CONTRACTIONS NEARLY AS INTENSE WHEN LAYING ON HER SIDE  1435- RN ATTEMPTING TO ADJUST EFMS  1500- PT TO SEMIFOWLERS FOR RN TO ADJUST EFMS AND TRACE FHRS  1515- PT STATES SHE IS HURTING WITH CONTRACTIONS AGAIN, PT TURNED BACK TO RIGHT LATERAL  1540- RN AT BS ATTEMPTING TO ADJUST EFMS, PT REMAINSRIGHT LATERAL  1700- PT STATES SHE HAS TO MOVE POSITIONS, PT DESIRES TO STAND AT SIDE OF BED,   1712- PT SITTING UP IN RECLINER, TOCO PLACED BACK ON PATIENT ABD  1730- DR FELIPE AT BS NO CHANGE IN POC, PT STATES CONTRACTIONS NOT AS INTENSE BUT MORE THAN WHEN SHE IS LAYING ON HER SIDE.

## 2017-08-18 NOTE — ANESTHESIA PREPROCEDURE EVALUATION
Anesthesia Evaluation            Airway   Mallampati: II  TM distance: <3 FB  Neck ROM: full  no difficulty expected  Dental - normal exam     Pulmonary - normal exam   (+) shortness of breath, sleep apnea,   Cardiovascular - normal exam        Neuro/Psych  (+) CVA, psychiatric history,    GI/Hepatic/Renal/Endo    (+) obesity, morbid obesity,     Musculoskeletal     Abdominal  - normal exam  (+) obese,     Bowel sounds: normal.   Substance History      OB/GYN          Other                                        Anesthesia Plan    ASA 3 - emergent     spinal     Anesthetic plan and risks discussed with patient.  Use of blood products discussed with patient .   Plan discussed with attending.

## 2017-08-18 NOTE — ANESTHESIA PROCEDURE NOTES
Spinal Block    Patient location during procedure: OR  Start Time: 8/18/2017 3:10 PM  Stop Time: 8/18/2017 3:16 PM  Performed By  Anesthesiologist: ALIZE AYOUB  Preanesthetic Checklist  Completed: patient identified, site marked, surgical consent, pre-op evaluation, timeout performed, IV checked, risks and benefits discussed and monitors and equipment checked  Spinal Block Prep:  Patient Position:sitting  Sterile Tech:cap, gloves, sterile barriers and mask  Prep:alcohol swabs  Patient Monitoring:EKG, continuous pulse oximetry and blood pressure monitoring  Spinal Block Procedure  Approach:midline  Guidance:palpation technique  Location:L2-L3  Needle Type:Uriel  Needle Gauge:25 G  Placement of Spinal needle event:cerebrospinal fluid aspirated  Paresthesia: right  Fluid Appearance:clear  Post Assessment  Patient Tolerance:patient tolerated the procedure well with no apparent complications  Complications no

## 2017-08-18 NOTE — NURSING NOTE
Left ovarian cyst removed by Dr. Baxter and sent to pathology  1800)  Patient taken to NICU to view babies via stretcher.  Smiling at babies.  Patient taken with belongings to .  Bedside report to Malka Juarez RN, care assumed.  Perineum cleaned, catheter care completed.

## 2017-08-18 NOTE — PROGRESS NOTES
She is ~ 6cm and vertex with fetal hand, B is transverse. She ate sue and apple juice at 07:45. She needs to be delivered today ideally in controlled situation in view of her body habitus; we could not get twin B delivered quickly in the event of fetal intolerance to labor.  She understands and has no questions.

## 2017-08-18 NOTE — PLAN OF CARE
Problem: Patient Care Overview (Adult)  Goal: Adult Individualization and Mutuality  Outcome: Ongoing (interventions implemented as appropriate)    08/18/17 1636 08/18/17 1750   Individualization   Patient Specific Preferences --  Prefers to be called [facility]   Patient Specific Goals --  Healthy Babies   Patient Specific Interventions --  Tocolysis   Mutuality/Individual Preferences   What Anxieties, Fears or Concerns Do You Have About Your Health or Care? None verbalized --    What Questions Do You Have About Your Health or Care? None verbalized --    What Information Would Help Us Give You More Personalized Care? None requested --

## 2017-08-18 NOTE — ANESTHESIA POSTPROCEDURE EVALUATION
Patient: Geneva Cordova    Procedure Summary     Date Anesthesia Start Anesthesia Stop Room / Location    17 1504 1626 [facility] /  [facility]       Procedure Diagnosis Surgeon Provider     SECTION PRIMARY (N/A Abdomen) No diagnosis on file. MD Kevyn Coreas MD          Anesthesia Type: spinal  Last vitals  /55       Temp     97.7   Pulse 66      Resp 16       SpO2     97     Post Anesthesia Care and Evaluation    Patient location during evaluation: bedside  Patient participation: complete - patient participated  Level of consciousness: awake and alert  Pain score: 0  Pain management: adequate  Airway patency: patent  Anesthetic complications: No anesthetic complications    Cardiovascular status: acceptable  Respiratory status: acceptable  Hydration status: acceptable

## 2017-08-18 NOTE — PLAN OF CARE
Problem: Patient Care Overview (Adult)  Goal: Plan of Care Review  Outcome: Ongoing (interventions implemented as appropriate)    08/18/17 8557   Coping/Psychosocial Response Interventions   Plan Of Care Reviewed With patient   Patient Care Overview   Progress progress toward functional goals as expected

## 2017-08-18 NOTE — PLAN OF CARE
Problem: Patient Care Overview (Adult)  Goal: Plan of Care Review  Outcome: Ongoing (interventions implemented as appropriate)    17   Coping/Psychosocial Response Interventions   Plan Of Care Reviewed With patient;partner   Patient Care Overview   Progress improving   Outcome Evaluation   Outcome Summary/Follow up Plan CONTRACTIONS ARE SPACING OUT WITH 2ND BOLUS OF MAG       Goal: Adult Individualization and Mutuality  Outcome: Ongoing (interventions implemented as appropriate)    17   Individualization   Patient Specific Goals KEEP BABIES IN FOR STEROIDS       Goal: Discharge Needs Assessment  Outcome: Ongoing (interventions implemented as appropriate)    17   Discharge Needs Assessment   Concerns To Be Addressed denies needs/concerns at this time         Problem:  Labor (Adult,Obstetrics,Pediatric)  Goal: Signs and Symptoms of Listed Potential Problems Will be Absent or Manageable ( Labor)  Outcome: Ongoing (interventions implemented as appropriate)    17    Labor   Problems Assessed ( Labor) all   Problems Present ( Labor) none

## 2017-08-18 NOTE — PLAN OF CARE
Problem:  Delivery (Adult,Obstetrics,Pediatric)  Goal: Signs and Symptoms of Listed Potential Problems Will be Absent or Manageable ( Delivery)  Outcome: Outcome(s) achieved Date Met:  17 1744    Delivery   Problems Assessed ( Delivery) all   Problems Present ( Delivery) none

## 2017-08-18 NOTE — PLAN OF CARE
Problem: Patient Care Overview (Adult)  Goal: Discharge Needs Assessment  Outcome: Ongoing (interventions implemented as appropriate)    08/18/17 6180   Discharge Needs Assessment   Concerns To Be Addressed no discharge needs identified;denies needs/concerns at this time   Equipment Needed After Discharge none   Discharge Disposition still a patient   Current Health   Anticipated Changes Related to Illness none   Self-Care   Equipment Currently Used at Home none   Living Environment   Transportation Available car;family or friend will provide

## 2017-08-19 LAB
BASOPHILS # BLD AUTO: 0 10*3/MM3 (ref 0–0.2)
BASOPHILS NFR BLD AUTO: 0 % (ref 0–1)
DEPRECATED RDW RBC AUTO: 42.7 FL (ref 37–54)
EOSINOPHIL # BLD AUTO: 0 10*3/MM3 (ref 0–0.3)
EOSINOPHIL NFR BLD AUTO: 0 % (ref 0–3)
ERYTHROCYTE [DISTWIDTH] IN BLOOD BY AUTOMATED COUNT: 13 % (ref 11.3–14.5)
HCT VFR BLD AUTO: 31.7 % (ref 34.5–44)
HGB BLD-MCNC: 10.5 G/DL (ref 11.5–15.5)
IMM GRANULOCYTES # BLD: 0.07 10*3/MM3 (ref 0–0.03)
IMM GRANULOCYTES NFR BLD: 0.6 % (ref 0–0.6)
LYMPHOCYTES # BLD AUTO: 1.39 10*3/MM3 (ref 0.6–4.8)
LYMPHOCYTES NFR BLD AUTO: 11.2 % (ref 24–44)
MCH RBC QN AUTO: 29.7 PG (ref 27–31)
MCHC RBC AUTO-ENTMCNC: 33.1 G/DL (ref 32–36)
MCV RBC AUTO: 89.5 FL (ref 80–99)
MONOCYTES # BLD AUTO: 0.81 10*3/MM3 (ref 0–1)
MONOCYTES NFR BLD AUTO: 6.5 % (ref 0–12)
NEUTROPHILS # BLD AUTO: 10.16 10*3/MM3 (ref 1.5–8.3)
NEUTROPHILS NFR BLD AUTO: 81.7 % (ref 41–71)
PLATELET # BLD AUTO: 276 10*3/MM3 (ref 150–450)
PMV BLD AUTO: 11.5 FL (ref 6–12)
RBC # BLD AUTO: 3.54 10*6/MM3 (ref 3.89–5.14)
WBC NRBC COR # BLD: 12.43 10*3/MM3 (ref 3.5–10.8)

## 2017-08-19 PROCEDURE — 99231 SBSQ HOSP IP/OBS SF/LOW 25: CPT | Performed by: OBSTETRICS & GYNECOLOGY

## 2017-08-19 PROCEDURE — 25010000003 CEFAZOLIN IN DEXTROSE 2-4 GM/100ML-% SOLUTION: Performed by: OBSTETRICS & GYNECOLOGY

## 2017-08-19 PROCEDURE — 25010000002 KETOROLAC TROMETHAMINE PER 15 MG: Performed by: OBSTETRICS & GYNECOLOGY

## 2017-08-19 PROCEDURE — 25010000002 ONDANSETRON PER 1 MG: Performed by: OBSTETRICS & GYNECOLOGY

## 2017-08-19 PROCEDURE — 85025 COMPLETE CBC W/AUTO DIFF WBC: CPT | Performed by: OBSTETRICS & GYNECOLOGY

## 2017-08-19 RX ORDER — OXYCODONE AND ACETAMINOPHEN 10; 325 MG/1; MG/1
1 TABLET ORAL EVERY 4 HOURS PRN
Status: DISCONTINUED | OUTPATIENT
Start: 2017-08-19 | End: 2017-08-22 | Stop reason: HOSPADM

## 2017-08-19 RX ORDER — OXYCODONE HYDROCHLORIDE AND ACETAMINOPHEN 5; 325 MG/1; MG/1
1 TABLET ORAL EVERY 4 HOURS PRN
Status: DISCONTINUED | OUTPATIENT
Start: 2017-08-19 | End: 2017-08-22 | Stop reason: HOSPADM

## 2017-08-19 RX ADMIN — OXYCODONE HYDROCHLORIDE AND ACETAMINOPHEN 1 TABLET: 10; 325 TABLET ORAL at 23:27

## 2017-08-19 RX ADMIN — SERTRALINE HYDROCHLORIDE 50 MG: 50 TABLET ORAL at 10:05

## 2017-08-19 RX ADMIN — SIMETHICONE 80 MG: 80 TABLET, CHEWABLE ORAL at 09:28

## 2017-08-19 RX ADMIN — KETOROLAC TROMETHAMINE 30 MG: 30 INJECTION, SOLUTION INTRAMUSCULAR at 14:29

## 2017-08-19 RX ADMIN — PRENATAL VIT W/ FE FUMARATE-FA TAB 27-0.8 MG 1 TABLET: 27-0.8 TAB at 09:28

## 2017-08-19 RX ADMIN — ONDANSETRON 4 MG: 4 TABLET, FILM COATED ORAL at 17:12

## 2017-08-19 RX ADMIN — OXYCODONE HYDROCHLORIDE AND ACETAMINOPHEN 1 TABLET: 10; 325 TABLET ORAL at 04:28

## 2017-08-19 RX ADMIN — OXYCODONE HYDROCHLORIDE AND ACETAMINOPHEN 1 TABLET: 10; 325 TABLET ORAL at 18:51

## 2017-08-19 RX ADMIN — FLUTICASONE PROPIONATE 1 SPRAY: 50 SPRAY, METERED NASAL at 10:04

## 2017-08-19 RX ADMIN — ONDANSETRON 4 MG: 2 INJECTION INTRAMUSCULAR; INTRAVENOUS at 11:23

## 2017-08-19 RX ADMIN — CEFAZOLIN SODIUM 2 G: 2 INJECTION, SOLUTION INTRAVENOUS at 10:03

## 2017-08-19 RX ADMIN — OXYCODONE HYDROCHLORIDE AND ACETAMINOPHEN 1 TABLET: 10; 325 TABLET ORAL at 09:28

## 2017-08-19 RX ADMIN — KETOROLAC TROMETHAMINE 30 MG: 30 INJECTION, SOLUTION INTRAMUSCULAR at 01:36

## 2017-08-19 RX ADMIN — DOCUSATE SODIUM 100 MG: 100 CAPSULE, LIQUID FILLED ORAL at 09:28

## 2017-08-19 NOTE — PLAN OF CARE
Problem: Patient Care Overview (Adult)  Goal: Plan of Care Review  Outcome: Ongoing (interventions implemented as appropriate)    17 0439   Coping/Psychosocial Response Interventions   Plan Of Care Reviewed With patient   Patient Care Overview   Progress improving       Goal: Adult Individualization and Mutuality  Outcome: Ongoing (interventions implemented as appropriate)  Goal: Discharge Needs Assessment  Outcome: Ongoing (interventions implemented as appropriate)    Problem: Breastfeeding (Adult,NICU,,Obstetrics,Pediatric)  Goal: Signs and Symptoms of Listed Potential Problems Will be Absent or Manageable (Breastfeeding)  Outcome: Ongoing (interventions implemented as appropriate)    Problem: Postpartum ( Delivery) (Adult)  Goal: Signs and Symptoms of Listed Potential Problems Will be Absent or Manageable (Postpartum)  Outcome: Ongoing (interventions implemented as appropriate)

## 2017-08-19 NOTE — PROGRESS NOTES
[facility] [place]    2017    Name:Geneva Cordova    MR#:[id number]     PROGRESS NOTE:  Post-Op Day1 S/P    HD:2    Subjective   31 y.o. yo Female  s/p CS at 31w4d doing well. Pain well controlled. Tolerating regular diet and having flatus. Lochia normal.     Patient Active Problem List   Diagnosis   • VIRA (obstructive sleep apnea)   • Glucose intolerance (impaired glucose tolerance)   • Migraine   • Depression   • Helicobacter pylori (H. pylori) infection   • Anemia   • Joint pain   • Chronic back pain   • Anxiety   • Morbid obesity due to excess calories   • Status post gastric sleeve 2015   • Vaginal delivery x 2   • Dichorionic diamniotic twin pregnancy, antepartum   • Pregnancy related abdominal pain of lower quadrant, antepartum   • Uterine size date discrepancy   • Ventricular septal defect (VSD) twin B 17   •  labor in third trimester with  delivery   • S/P  section;left ovarian cystectomy 17        Objective    Vitals  Temp:  Temp:  [97.7 °F (36.5 °C)-98.6 °F (37 °C)] 98.2 °F (36.8 °C)  Temp src: Oral  BP:  BP: ()/(50-71) 95/51  Pulse:  Heart Rate:  [57-77] 57  RR:   Resp:  [16-18] 16    General Awake, alert, no distress  Abdomen Soft, non-distended, fundus firm, is below umbilicus, appropriately tender  Incision  Intact, no erythema or exudate  Extremities Calves NT bilaterally     I/O last 3 completed shifts:  In: 1600 [I.V.:1500; IV Piggyback:100]  Out: 6800 [Urine:5800; Blood:1000]    LABS:   Lab Results   Component Value Date    WBC 12.43 (H) 2017    HGB 10.5 (L) 2017    HCT 31.7 (L) 2017    MCV 89.5 2017     2017       Infant:      Best Cordova [[id number]]   female     Ada Cordova [[id number]]   male        Assessment   1.  POD 1    Plan: Doing well.  Routine postoperative care      Active Problems:   None      Too CAMEJO  MD Nelson  8/19/2017 10:13 AM

## 2017-08-19 NOTE — LACTATION NOTE
17 193   Maternal Information   Person Making Referral physician   Maternal Infant Assessment   Size Issue, Bilateral Breasts yes  (large)   Nipple Conditions, Bilateral intact   Maternal Infant Feeding   Previous Breastfeeding History no   Equipment Type/Education   Breast Pump Type double electric, hospital grade   Breast Pump Flange Type soft   Breast Pump Flange Size 24 mm   Breast Pumping (0ml with pumped, hand expressed 2ml and took to nicu )    Breastfeeding   Breast Pumping Interventions early pumping promoted;frequent pumping encouraged  (at care times every 3 hours)

## 2017-08-20 PROCEDURE — 99231 SBSQ HOSP IP/OBS SF/LOW 25: CPT | Performed by: OBSTETRICS & GYNECOLOGY

## 2017-08-20 RX ADMIN — SERTRALINE HYDROCHLORIDE 50 MG: 50 TABLET ORAL at 08:05

## 2017-08-20 RX ADMIN — SIMETHICONE 80 MG: 80 TABLET, CHEWABLE ORAL at 08:06

## 2017-08-20 RX ADMIN — Medication: at 08:07

## 2017-08-20 RX ADMIN — PRENATAL VIT W/ FE FUMARATE-FA TAB 27-0.8 MG 1 TABLET: 27-0.8 TAB at 08:06

## 2017-08-20 RX ADMIN — OXYCODONE HYDROCHLORIDE AND ACETAMINOPHEN 1 TABLET: 10; 325 TABLET ORAL at 15:33

## 2017-08-20 RX ADMIN — SIMETHICONE 80 MG: 80 TABLET, CHEWABLE ORAL at 15:33

## 2017-08-20 RX ADMIN — OXYCODONE HYDROCHLORIDE AND ACETAMINOPHEN 1 TABLET: 10; 325 TABLET ORAL at 03:52

## 2017-08-20 RX ADMIN — DOCUSATE SODIUM 100 MG: 100 CAPSULE, LIQUID FILLED ORAL at 15:33

## 2017-08-20 RX ADMIN — POLYETHYLENE GLYCOL 3350 17 G: 17 POWDER, FOR SOLUTION ORAL at 08:05

## 2017-08-20 RX ADMIN — DOCUSATE SODIUM 100 MG: 100 CAPSULE, LIQUID FILLED ORAL at 08:06

## 2017-08-20 RX ADMIN — OXYCODONE HYDROCHLORIDE AND ACETAMINOPHEN 1 TABLET: 10; 325 TABLET ORAL at 19:42

## 2017-08-20 RX ADMIN — OXYCODONE HYDROCHLORIDE AND ACETAMINOPHEN 1 TABLET: 10; 325 TABLET ORAL at 23:46

## 2017-08-20 RX ADMIN — FLUTICASONE PROPIONATE 1 SPRAY: 50 SPRAY, METERED NASAL at 08:05

## 2017-08-20 RX ADMIN — OXYCODONE HYDROCHLORIDE AND ACETAMINOPHEN 1 TABLET: 10; 325 TABLET ORAL at 08:06

## 2017-08-20 NOTE — PROGRESS NOTES
[facility] [place]    2017    Name:Geneva Cordova    MR#:[id number]     PROGRESS NOTE:  Post-Op Day 2 S/P    HD:3    Subjective   31 y.o. yo Female  s/p CS at 31w4d doing well. Pain well controlled. Tolerating regular diet and having flatus. Lochia normal.     Patient Active Problem List   Diagnosis   • VIRA (obstructive sleep apnea)   • Glucose intolerance (impaired glucose tolerance)   • Migraine   • Depression   • Helicobacter pylori (H. pylori) infection   • Anemia   • Joint pain   • Chronic back pain   • Anxiety   • Morbid obesity due to excess calories   • Status post gastric sleeve 2015   • Vaginal delivery x 2   • Dichorionic diamniotic twin pregnancy, antepartum   • Pregnancy related abdominal pain of lower quadrant, antepartum   • Uterine size date discrepancy   • Ventricular septal defect (VSD) twin B 17   •  labor in third trimester with  delivery   • S/P  section;left ovarian cystectomy 17        Objective    Vitals  Temp:  Temp:  [97.7 °F (36.5 °C)-99.2 °F (37.3 °C)] 98.4 °F (36.9 °C)  Temp src: Oral  BP:  BP: ()/(43-68) 96/62  Pulse:  Heart Rate:  [52-68] 68  RR:   Resp:  [16-18] 18    General Awake, alert, no distress  Abdomen Soft, non-distended, fundus firm, is below umbilicus, appropriately tender  Incision  Intact, no erythema or exudate  Extremities Calves NT bilaterally     I/O last 3 completed shifts:  In: -   Out: 2450 [Urine:2450]    LABS:   Lab Results   Component Value Date    WBC 12.43 (H) 2017    HGB 10.5 (L) 2017    HCT 31.7 (L) 2017    MCV 89.5 2017     2017       Infant:      Best Cordova [[id number]]   female     Ada Cordova [[id number]]   male        Assessment   1.  POD 2    Plan: Doing well.  Routine postoperative care      Active Problems:   None      Too Damon MD  2017 8:43 AM

## 2017-08-20 NOTE — PLAN OF CARE
Problem: Patient Care Overview (Adult)  Goal: Plan of Care Review  Outcome: Ongoing (interventions implemented as appropriate)    17 1220   Outcome Evaluation   Outcome Summary/Follow up Plan Patient is pumping breasts every 3 hours for NICU infant. She will continue this routine and ask for assistance, as needed.       Goal: Adult Individualization and Mutuality  Outcome: Ongoing (interventions implemented as appropriate)  Goal: Discharge Needs Assessment  Outcome: Ongoing (interventions implemented as appropriate)    Problem: Breastfeeding (Adult,NICU,,Obstetrics,Pediatric)  Goal: Signs and Symptoms of Listed Potential Problems Will be Absent or Manageable (Breastfeeding)  Outcome: Ongoing (interventions implemented as appropriate)    Problem: Postpartum ( Delivery) (Adult)  Goal: Signs and Symptoms of Listed Potential Problems Will be Absent or Manageable (Postpartum)  Outcome: Ongoing (interventions implemented as appropriate)

## 2017-08-20 NOTE — NURSING NOTE
Patient stated she was having burning when urination.  Dr. Blank was notified and instructed RN to do a cath UA if the patient is in agreement.  RN explained to patient if she is experiencing burning with urination, a cath UA would help determine if she has a urinary tract infection.  Patient denied stating she ever had burning with urination.

## 2017-08-21 ENCOUNTER — APPOINTMENT (OUTPATIENT)
Dept: WOMENS IMAGING | Facility: HOSPITAL | Age: 32
End: 2017-08-21

## 2017-08-21 PROCEDURE — 99231 SBSQ HOSP IP/OBS SF/LOW 25: CPT | Performed by: OBSTETRICS & GYNECOLOGY

## 2017-08-21 RX ORDER — PROMETHAZINE HYDROCHLORIDE 12.5 MG/1
12.5 SUPPOSITORY RECTAL EVERY 6 HOURS PRN
Status: DISCONTINUED | OUTPATIENT
Start: 2017-08-21 | End: 2017-08-22 | Stop reason: HOSPADM

## 2017-08-21 RX ORDER — PROMETHAZINE HYDROCHLORIDE 12.5 MG/1
12.5 TABLET ORAL EVERY 6 HOURS PRN
Status: DISCONTINUED | OUTPATIENT
Start: 2017-08-21 | End: 2017-08-22 | Stop reason: HOSPADM

## 2017-08-21 RX ORDER — MISOPROSTOL 200 UG/1
800 TABLET ORAL AS NEEDED
Status: DISCONTINUED | OUTPATIENT
Start: 2017-08-21 | End: 2017-08-22 | Stop reason: HOSPADM

## 2017-08-21 RX ORDER — HYDROCODONE BITARTRATE AND ACETAMINOPHEN 10; 325 MG/1; MG/1
2 TABLET ORAL EVERY 4 HOURS PRN
Status: DISCONTINUED | OUTPATIENT
Start: 2017-08-21 | End: 2017-08-22 | Stop reason: HOSPADM

## 2017-08-21 RX ORDER — ACETAMINOPHEN 325 MG/1
650 TABLET ORAL EVERY 4 HOURS PRN
Status: DISCONTINUED | OUTPATIENT
Start: 2017-08-21 | End: 2017-08-22 | Stop reason: HOSPADM

## 2017-08-21 RX ORDER — POLYETHYLENE GLYCOL 3350 17 G/17G
17 POWDER, FOR SOLUTION ORAL DAILY
Status: DISCONTINUED | OUTPATIENT
Start: 2017-08-21 | End: 2017-08-22 | Stop reason: HOSPADM

## 2017-08-21 RX ORDER — PROMETHAZINE HYDROCHLORIDE 25 MG/ML
12.5 INJECTION, SOLUTION INTRAMUSCULAR; INTRAVENOUS EVERY 6 HOURS PRN
Status: DISCONTINUED | OUTPATIENT
Start: 2017-08-21 | End: 2017-08-22 | Stop reason: HOSPADM

## 2017-08-21 RX ORDER — ONDANSETRON 4 MG/1
4 TABLET, FILM COATED ORAL EVERY 6 HOURS PRN
Status: DISCONTINUED | OUTPATIENT
Start: 2017-08-21 | End: 2017-08-22 | Stop reason: HOSPADM

## 2017-08-21 RX ORDER — HYDROCODONE BITARTRATE AND ACETAMINOPHEN 5; 325 MG/1; MG/1
1 TABLET ORAL EVERY 4 HOURS PRN
Status: DISCONTINUED | OUTPATIENT
Start: 2017-08-21 | End: 2017-08-22 | Stop reason: HOSPADM

## 2017-08-21 RX ORDER — ONDANSETRON 2 MG/ML
4 INJECTION INTRAMUSCULAR; INTRAVENOUS EVERY 6 HOURS PRN
Status: DISCONTINUED | OUTPATIENT
Start: 2017-08-21 | End: 2017-08-22 | Stop reason: HOSPADM

## 2017-08-21 RX ADMIN — SERTRALINE HYDROCHLORIDE 50 MG: 50 TABLET ORAL at 07:48

## 2017-08-21 RX ADMIN — POLYETHYLENE GLYCOL 3350 17 G: 17 POWDER, FOR SOLUTION ORAL at 17:15

## 2017-08-21 RX ADMIN — OXYCODONE HYDROCHLORIDE AND ACETAMINOPHEN 1 TABLET: 10; 325 TABLET ORAL at 13:06

## 2017-08-21 RX ADMIN — OXYCODONE HYDROCHLORIDE AND ACETAMINOPHEN 1 TABLET: 10; 325 TABLET ORAL at 17:15

## 2017-08-21 RX ADMIN — HYDROXYZINE HYDROCHLORIDE 50 MG: 25 TABLET, FILM COATED ORAL at 07:48

## 2017-08-21 RX ADMIN — OXYCODONE HYDROCHLORIDE AND ACETAMINOPHEN 1 TABLET: 10; 325 TABLET ORAL at 04:40

## 2017-08-21 RX ADMIN — OXYCODONE HYDROCHLORIDE AND ACETAMINOPHEN 1 TABLET: 10; 325 TABLET ORAL at 20:53

## 2017-08-21 RX ADMIN — SIMETHICONE 80 MG: 80 TABLET, CHEWABLE ORAL at 20:53

## 2017-08-21 NOTE — PLAN OF CARE
Problem: Patient Care Overview (Adult)  Goal: Plan of Care Review  Outcome: Ongoing (interventions implemented as appropriate)    17 1220 17 1930   Coping/Psychosocial Response Interventions   Plan Of Care Reviewed With --  patient   Patient Care Overview   Progress no change --    Outcome Evaluation   Outcome Summary/Follow up Plan Patient is pumping breasts every 3 hours for NICU infant. She will continue this routine and ask for assistance, as needed. --        Goal: Adult Individualization and Mutuality  Outcome: Ongoing (interventions implemented as appropriate)  Goal: Discharge Needs Assessment  Outcome: Ongoing (interventions implemented as appropriate)    Problem: Breastfeeding (Adult,NICU,,Obstetrics,Pediatric)  Goal: Signs and Symptoms of Listed Potential Problems Will be Absent or Manageable (Breastfeeding)  Outcome: Ongoing (interventions implemented as appropriate)    Problem: Postpartum ( Delivery) (Adult)  Goal: Signs and Symptoms of Listed Potential Problems Will be Absent or Manageable (Postpartum)  Outcome: Ongoing (interventions implemented as appropriate)

## 2017-08-21 NOTE — PROGRESS NOTES
2017    Name:Geneva Cordova    MR#:[id number]     PROGRESS NOTE:  Post-Op 3 S/P        Subjective   31 y.o. yo Female  s/p CS at 31w4d doing well. Pain well controlled. Tolerating regular diet and having flatus. Lochia normal.     Patient Active Problem List   Diagnosis   • VIRA (obstructive sleep apnea)   • Glucose intolerance (impaired glucose tolerance)   • Migraine   • Depression   • Helicobacter pylori (H. pylori) infection   • Anemia   • Joint pain   • Chronic back pain   • Anxiety   • Morbid obesity due to excess calories   • Status post gastric sleeve 2015   • Vaginal delivery x 2   • Dichorionic diamniotic twin pregnancy, antepartum   • Pregnancy related abdominal pain of lower quadrant, antepartum   • Uterine size date discrepancy   • Ventricular septal defect (VSD) twin B 17   •  labor in third trimester with  delivery   • S/P  section;left ovarian cystectomy 17        Objective    Vitals  Temp:  Temp:  [98.4 °F (36.9 °C)-99 °F (37.2 °C)] 99 °F (37.2 °C)  Temp src: Oral  BP:  BP: (102-129)/(54-66) 124/58  Pulse:  Heart Rate:  [62-63] 63  RR:   Resp:  [16-18] 16    General Awake, alert, no distress  Abdomen Soft, non-distended, fundus firm, below umbilicus, appropriately tender  Incision  Intact, no erythema or exudate  Extremities Calves NT bilaterally     I/O last 3 completed shifts:  In: -   Out: 1100 [Urine:1100]    LABS:   Lab Results   Component Value Date    WBC 12.43 (H) 2017    HGB 10.5 (L) 2017    HCT 31.7 (L) 2017    MCV 89.5 2017     2017       Infant:      Best Cordova [[id number]]   female     Ada Cordova [[id number]]   male        Assessment   1.  POD 3 from c/s    Plan: Doing well.    Twins in NICU for a few more weeks          Kwadwo Baxter MD  2017 8:36 AM

## 2017-08-21 NOTE — PLAN OF CARE
Problem: Patient Care Overview (Adult)  Goal: Plan of Care Review  Outcome: Ongoing (interventions implemented as appropriate)    17 0830   Coping/Psychosocial Response Interventions   Plan Of Care Reviewed With patient   Patient Care Overview   Progress improving         Problem: Breastfeeding (Adult,NICU,[place],Obstetrics,Pediatric)  Goal: Signs and Symptoms of Listed Potential Problems Will be Absent or Manageable (Breastfeeding)  Outcome: Ongoing (interventions implemented as appropriate)    Problem: Postpartum ( Delivery) (Adult)  Goal: Signs and Symptoms of Listed Potential Problems Will be Absent or Manageable (Postpartum)  Outcome: Ongoing (interventions implemented as appropriate)

## 2017-08-22 VITALS
HEART RATE: 82 BPM | SYSTOLIC BLOOD PRESSURE: 141 MMHG | OXYGEN SATURATION: 100 % | BODY MASS INDEX: 41.95 KG/M2 | TEMPERATURE: 99 F | DIASTOLIC BLOOD PRESSURE: 75 MMHG | WEIGHT: 293 LBS | RESPIRATION RATE: 20 BRPM | HEIGHT: 70 IN

## 2017-08-22 PROBLEM — IMO0001 TWINS: Status: ACTIVE | Noted: 2017-08-22

## 2017-08-22 PROCEDURE — 99238 HOSP IP/OBS DSCHRG MGMT 30/<: CPT | Performed by: OBSTETRICS & GYNECOLOGY

## 2017-08-22 RX ORDER — HYDROCODONE BITARTRATE AND ACETAMINOPHEN 5; 325 MG/1; MG/1
1 TABLET ORAL EVERY 4 HOURS PRN
Qty: 30 TABLET | Refills: 0 | Status: SHIPPED | OUTPATIENT
Start: 2017-08-22 | End: 2017-08-31

## 2017-08-22 RX ORDER — IBUPROFEN 400 MG/1
400 TABLET ORAL EVERY 8 HOURS PRN
Qty: 30 TABLET | Refills: 0 | Status: SHIPPED | OUTPATIENT
Start: 2017-08-22 | End: 2018-02-01

## 2017-08-22 RX ADMIN — POLYETHYLENE GLYCOL 3350 17 G: 17 POWDER, FOR SOLUTION ORAL at 08:47

## 2017-08-22 RX ADMIN — SERTRALINE HYDROCHLORIDE 50 MG: 50 TABLET ORAL at 08:47

## 2017-08-22 RX ADMIN — OXYCODONE HYDROCHLORIDE AND ACETAMINOPHEN 1 TABLET: 10; 325 TABLET ORAL at 06:17

## 2017-08-22 RX ADMIN — PRENATAL VIT W/ FE FUMARATE-FA TAB 27-0.8 MG 1 TABLET: 27-0.8 TAB at 08:47

## 2017-08-22 RX ADMIN — DOCUSATE SODIUM 100 MG: 100 CAPSULE, LIQUID FILLED ORAL at 08:47

## 2017-08-22 RX ADMIN — CETIRIZINE HYDROCHLORIDE 10 MG: 10 TABLET, FILM COATED ORAL at 08:47

## 2017-08-22 RX ADMIN — OXYCODONE HYDROCHLORIDE AND ACETAMINOPHEN 1 TABLET: 10; 325 TABLET ORAL at 02:02

## 2017-08-22 NOTE — LACTATION NOTE
Mom has P4P and home pump.  Stressed the importance of pumping q 2-3 hours and supply and demand.

## 2017-08-22 NOTE — DISCHARGE SUMMARY
Discharge Summary    Date of Admission: 2017  Date of Discharge:  2017      Patient: Geneva Cordova      MR#:[id number]    Primary Surgeon/OB: Kwadwo Baxter MD    Discharge Surgeon/OB:Vee  Presenting Problem/History of Present Illness   labor in third trimester without delivery [O60.03]  Twins [Z38.5]     Patient Active Problem List   Diagnosis   • VIRA (obstructive sleep apnea)   • Glucose intolerance (impaired glucose tolerance)   • Migraine   • Depression   • Helicobacter pylori (H. pylori) infection   • Anemia   • Joint pain   • Chronic back pain   • Anxiety   • Morbid obesity due to excess calories   • Status post gastric sleeve 2015   • Vaginal delivery x 2   • Dichorionic diamniotic twin pregnancy, antepartum   • Pregnancy related abdominal pain of lower quadrant, antepartum   • Uterine size date discrepancy   • Ventricular septal defect (VSD) twin B 17   •  labor in third trimester with  delivery   • S/P  section;left ovarian cystectomy 17   • Twins         Discharge Diagnosis:  section at 31w4d    Procedures:     Best Cordova A [[id number]]   , Low Transverse     Ada Cordova B [[id number]]   , Low Transverse         Best Cordova A [[id number]]   2017     Ada Cordova B [[id number]]   2017        Best Cordova [[id number]]   3:33 PM     Ada Cordova [[id number]]   3:35 PM           Discharge Date: 2017; Discharge Time: 8:49 AM    Early Discharge:  YES-I AUTHORIZE ONE MATERNAL- HOME VISIT    Hospital Course  Patient is a 31 y.o. female  at 31w4d status post  section with uneventful postoperative recovery.  Patient was advanced to regular diet on postoperative day#1.  On discharge, ambulating, tolerating a regular diet without any difficulties and her incision is dry, clean and intact.     Infant:        Best Cordova [[id number]]   female      Ada Cordova [[id number]]   male   fetus      Best Cordova [[id number]]   3 lb 10.9 oz (1.67 kg)     Ada Cordova [[id number]]   3 lb 14.1 oz (1.76 kg)   with Apgar scores of      Best Cordova [[id number]]   1      Ada Cordova [[id number]]   1   ,      Best Cordova [[id number]]   6      Ada Cordova [[id number]]   6    at five minutes.    Condition on Discharge:  Stable    Vital Signs  Temp:  [98.6 °F (37 °C)-100.1 °F (37.8 °C)] 99.2 °F (37.3 °C)  Heart Rate:  [73-76] 76  Resp:  [18] 18  BP: (115-133)/(57-73) 115/57    Lab Results   Component Value Date    WBC 12.43 (H) 2017    HGB 10.5 (L) 2017    HCT 31.7 (L) 2017    MCV 89.5 2017     2017       Discharge Disposition  Home or Self Care    Discharge Medications   Geneva Cordova   [place] Medication Instructions NILTON:[id number]    Printed on:17 0848   Medication Information                      acetaminophen (TYLENOL) 325 MG tablet  Take 2 tablets by mouth Every 6 (Six) Hours As Needed for Mild Pain (1-3).             fluticasone (FLONASE) 50 MCG/ACT nasal spray  1 spray into each nostril Daily.             guaiFENesin (MUCINEX) 600 MG 12 hr tablet  Take 2 tablets by mouth 2 (Two) Times a Day.             HYDROcodone-acetaminophen (NORCO) 5-325 MG per tablet  Take 1 tablet by mouth Every 4 (Four) Hours As Needed for Moderate Pain  or Severe Pain  for up to 9 days.             ibuprofen (ADVIL,MOTRIN) 400 MG tablet  Take 1 tablet by mouth Every 8 (Eight) Hours As Needed for Mild Pain  or Moderate Pain .             loratadine (CLARITIN) 10 MG tablet  Take 1 tablet by mouth Daily.             polyethylene glycol (MIRALAX) packet  Take 17 g by mouth As Needed.             Prenatal Vit-Fe Fumarate-FA (RA PRENATAL) 28-0.8 MG tablet  Take 1 tablet by mouth Daily.             sertraline (ZOLOFT) 50 MG tablet  Take 1 tablet by mouth Daily.                 Discharge Diet:  regular    Activity at Discharge:   Activity Instructions     Pelvic Rest                     Follow-up Appointments  No future appointments.  Additional Instructions for the Follow-ups that You Need to Schedule     Discharge Follow-Up With Specified Provider    As directed    To:  Vee [phone]   Follow Up:  2 Weeks   Follow Up Details:  post op                 Kwadwo Baxter MD  08/22/17  8:48 AM  Csd

## 2017-08-23 LAB
CYTO UR: NORMAL
LAB AP CASE REPORT: NORMAL
Lab: NORMAL
PATH REPORT.FINAL DX SPEC: NORMAL
PATH REPORT.GROSS SPEC: NORMAL

## 2017-08-24 PROBLEM — O34.83: Status: ACTIVE | Noted: 2017-08-24

## 2017-08-24 PROBLEM — D27.9: Status: ACTIVE | Noted: 2017-08-24

## 2017-08-24 LAB
CYTO UR: NORMAL
LAB AP CASE REPORT: NORMAL
LAB AP CLINICAL INFORMATION: NORMAL
Lab: NORMAL
PATH REPORT.FINAL DX SPEC: NORMAL
PATH REPORT.GROSS SPEC: NORMAL

## 2017-09-05 ENCOUNTER — POSTPARTUM VISIT (OUTPATIENT)
Dept: OBSTETRICS AND GYNECOLOGY | Facility: CLINIC | Age: 32
End: 2017-09-05

## 2017-09-05 VITALS — SYSTOLIC BLOOD PRESSURE: 138 MMHG | DIASTOLIC BLOOD PRESSURE: 76 MMHG | BODY MASS INDEX: 46.92 KG/M2 | WEIGHT: 293 LBS

## 2017-09-05 DIAGNOSIS — Z09 SURGERY FOLLOW-UP: Primary | ICD-10-CM

## 2017-09-05 PROCEDURE — 99024 POSTOP FOLLOW-UP VISIT: CPT | Performed by: OBSTETRICS & GYNECOLOGY

## 2017-09-05 RX ORDER — ACETAMINOPHEN AND CODEINE PHOSPHATE 120; 12 MG/5ML; MG/5ML
1 SOLUTION ORAL DAILY
Qty: 28 TABLET | Refills: 12 | Status: SHIPPED | OUTPATIENT
Start: 2017-09-05 | End: 2017-10-03 | Stop reason: SDUPTHER

## 2017-09-05 NOTE — PROGRESS NOTES
Subjective   Chief Complaint   Patient presents with   • Postpartum Care     Geneva Cordova is a 31 y.o. year old  presenting to be seen for her post-operative visit.  She had a .  Currently she reports no problems with eating, bowel movements, voiding, or wound drainage and pain is well controlled.  The twins are still in the nursery.  They would be about 33-1/2 gestational weeks.  Her daughter is doing better than her son.  She asked if I would be doing the circumcision prior to him going home.  I told her once they call us with the okay we'll get that taken care of.    The following portions of the patient's history were reviewed and updated as appropriate:problem list, current medications and allergies    Review of Systems normal bladder and bowels.     Objective   /76  Wt (!) 327 lb (148 kg)  BMI 46.92 kg/m2    General:  well developed; well nourished  no acute distress  appears stated age  obese - Body mass index is 46.92 kg/(m^2).   Abdomen: soft, non-tender; no masses  no umbilical or inginual hernias are present  no hepato-splenomegaly  incision is healing   Pelvis: Not performed.          Assessment   1. Doing well approximately 2 weeks postop  section  2. Reviewed pathology from left ovarian cyst which was benign serous cystadenoma a portion of the fallopian tube was removed moved as well  3. Desires permanent surgical sterilization.  Patient signed hospital permits but not medicated permits.  Discussed an Essure on the right fallopian tube.  4. Morbid obesity     Plan   1. Sign Medicaid tubal/Essure permit  2.  see back in 4 weeks for postpartum checkup  3. Schedule Essure      Medications Rx this encounter:  No orders of the defined types were placed in this encounter.         This note was electronically signed.    Kwadwo Baxter MD  2017

## 2017-10-02 ENCOUNTER — TELEPHONE (OUTPATIENT)
Dept: OBSTETRICS AND GYNECOLOGY | Facility: CLINIC | Age: 32
End: 2017-10-02

## 2017-10-02 NOTE — TELEPHONE ENCOUNTER
DR FELIPE'S PATIENT    HEAVY BLEEDING WITH CLOTS, CHANGING PAD EVERY HOUR    6 WEEKS POST PARTUM, HAS APPOINTMENT WITH DR FELIPE TOMORROW    PLEASE CALL HER  [phone]

## 2017-10-02 NOTE — TELEPHONE ENCOUNTER
Started bleeding 2 days ago with cramping. Advised patient most likely her menses and to keep appt.

## 2017-10-03 ENCOUNTER — POSTPARTUM VISIT (OUTPATIENT)
Dept: OBSTETRICS AND GYNECOLOGY | Facility: CLINIC | Age: 32
End: 2017-10-03

## 2017-10-03 VITALS
BODY MASS INDEX: 49.07 KG/M2 | RESPIRATION RATE: 16 BRPM | WEIGHT: 293 LBS | SYSTOLIC BLOOD PRESSURE: 124 MMHG | DIASTOLIC BLOOD PRESSURE: 80 MMHG

## 2017-10-03 DIAGNOSIS — Z30.2 ENCOUNTER FOR STERILIZATION: ICD-10-CM

## 2017-10-03 PROBLEM — IMO0001 TWINS: Status: RESOLVED | Noted: 2017-08-22 | Resolved: 2017-10-03

## 2017-10-03 PROBLEM — O26.849 UTERINE SIZE DATE DISCREPANCY: Status: RESOLVED | Noted: 2017-06-20 | Resolved: 2017-10-03

## 2017-10-03 PROBLEM — O30.049 DICHORIONIC DIAMNIOTIC TWIN PREGNANCY, ANTEPARTUM: Status: RESOLVED | Noted: 2017-04-28 | Resolved: 2017-10-03

## 2017-10-03 PROBLEM — R10.30 PREGNANCY RELATED ABDOMINAL PAIN OF LOWER QUADRANT, ANTEPARTUM: Status: RESOLVED | Noted: 2017-06-12 | Resolved: 2017-10-03

## 2017-10-03 PROBLEM — Q21.0 VENTRICULAR SEPTAL DEFECT (VSD): Status: RESOLVED | Noted: 2017-06-20 | Resolved: 2017-10-03

## 2017-10-03 PROBLEM — O26.899 PREGNANCY RELATED ABDOMINAL PAIN OF LOWER QUADRANT, ANTEPARTUM: Status: RESOLVED | Noted: 2017-06-12 | Resolved: 2017-10-03

## 2017-10-03 RX ORDER — ERGOCALCIFEROL 1.25 MG/1
CAPSULE ORAL
Refills: 0 | COMMUNITY
Start: 2017-09-11 | End: 2018-06-06

## 2017-10-03 RX ORDER — ACETAMINOPHEN AND CODEINE PHOSPHATE 120; 12 MG/5ML; MG/5ML
1 SOLUTION ORAL DAILY
Qty: 28 TABLET | Refills: 5 | Status: SHIPPED | OUTPATIENT
Start: 2017-10-03 | End: 2018-02-01

## 2017-10-03 NOTE — PROGRESS NOTES
Subjective   Chief Complaint   Patient presents with   • Postpartum Care     s/p c/s 17     Geneva Cordova is a 31 y.o. year old  presenting to be seen for her postpartum visit.  She had a Repeat  (LTCS).     Since delivery she has not been sexually active.  She does not have concerns about post-partum blues/depression.  She is breast feeding and plans to continues for 6 month(s).  For ongoing contraception, she is considering Essure for birth control.      The following portions of the patient's history were reviewed and updated as appropriate:problem list, current medications and allergies    Review of Systems   normal bladder and bowels     Objective   /80  Resp 16  Wt (!) 342 lb (155 kg)  LMP 2017  Breastfeeding? Yes  BMI 49.07 kg/m2    General:  well developed; well nourished  no acute distress  appears stated age  appears older than stated age   Breasts:  Not performed.  Patient is lactating.  No areas of erythema or tenderness noted and the nipples are normal   Abdomen: soft, non-tender; no masses  no umbilical or inginual hernias are present  no hepato-splenomegaly  incision is healed   Pelvis: Clinical staff was present for exam  External genitalia:  normal appearance of the external genitalia including Bartholin's and [place]'s glands.  :  urethral meatus normal;  Vaginal:  normal pink mucosa without prolapse or lesions. blood present -  bright red and moderate amount;  Uterus:  normal size, shape and consistency.  Adnexa:  normal bimanual exam of the adnexa.  Rectal:  digital rectal exam not performed; anus visually normal appearing.          Assessment   1. Normal postpartum examination.  She appears to be on her menses.  Currently taking progesterone only birth control pills.  This may be her third bleeding episode since delivery male August by  section.  2. Plans for Essure this , permit signed 2017     Plan   1. Continue mini  pill until Friday and beyond for 3 months.  2. Standard Essure premedications will be called in by Jenni Yoo    Medications Rx this encounter:  New Medications Ordered This Visit   Medications   • vitamin D (ERGOCALCIFEROL) 72739 units capsule capsule     Sig: take 1 capsule by mouth every week     Refill:  0   • norethindrone (MICRONOR) 0.35 MG tablet     Sig: Take 1 tablet by mouth Daily.     Dispense:  28 tablet     Refill:  5          This note was electronically signed.    Kwadwo Baxter MD  October 3, 2017

## 2017-10-05 ENCOUNTER — TELEPHONE (OUTPATIENT)
Dept: OBSTETRICS AND GYNECOLOGY | Facility: CLINIC | Age: 32
End: 2017-10-05

## 2017-10-05 RX ORDER — KETOROLAC TROMETHAMINE 10 MG/1
10 TABLET, FILM COATED ORAL EVERY 6 HOURS PRN
Qty: 10 TABLET | Refills: 0 | OUTPATIENT
Start: 2017-10-05 | End: 2018-02-01

## 2017-10-05 RX ORDER — ALPRAZOLAM 0.5 MG/1
0.5 TABLET ORAL 2 TIMES DAILY PRN
Qty: 1 TABLET | Refills: 0 | Status: SHIPPED | OUTPATIENT
Start: 2017-10-05 | End: 2018-02-01

## 2017-10-05 RX ORDER — PYRAZINAMIDE 500 MG/1
1 TABLET ORAL EVERY 4 HOURS PRN
Qty: 10 TABLET | Refills: 0 | OUTPATIENT
Start: 2017-10-05 | End: 2018-02-01

## 2017-10-05 NOTE — TELEPHONE ENCOUNTER
Called to confirm patient's ESSURE tubal for 10/6/17. Called in pre-op meds. Told patient to be here at 10:30am, to make sure that she eats breakfast and brings a  with her.

## 2017-10-06 ENCOUNTER — PROCEDURE VISIT (OUTPATIENT)
Dept: OBSTETRICS AND GYNECOLOGY | Facility: CLINIC | Age: 32
End: 2017-10-06

## 2017-10-06 VITALS
BODY MASS INDEX: 48.93 KG/M2 | RESPIRATION RATE: 16 BRPM | DIASTOLIC BLOOD PRESSURE: 80 MMHG | WEIGHT: 293 LBS | SYSTOLIC BLOOD PRESSURE: 126 MMHG

## 2017-10-06 DIAGNOSIS — Z30.2 ENCOUNTER FOR STERILIZATION: ICD-10-CM

## 2017-10-06 PROCEDURE — 58565 HYSTEROSCOPY STERILIZATION: CPT | Performed by: OBSTETRICS & GYNECOLOGY

## 2017-10-06 NOTE — PROGRESS NOTES
OPERATIVE REPORT    Date of procedure:  10/6/2017    Risks and benefits discussed? yes  All questions answered? yes  Consents given by the patient  Written consent obtained? yes    Pre-operative diagnosis: Desires Elective Permanent Sterilization                                                         Status post left partial salpingectomy  Post-operative diagnosis: Same                                                        Normal uterine cavity                                                  Procedure Performed: Hysteroscopy with Right Essure Tubal Sterilization    EBL: minimal    Identification: Patient is a 31 y.o. year old  who expressed the desire for permanent sterilization.  The risks, complications, failure rates and alternative methods of contraception have been reviewed with the patient and the appropriate consents were signed and available on her chart.    Description of procedure: The patient was taken to the surgical suite and placed in the semi-lithotomy position.  1% lidocaine was used for a para-cervical block.  The hysterospcope was introduced and saline was used as a distending medium.  Both tubal ostia were clearly visible and identified.    The Essure device was threaded and the right tubal ostium was approached.  The coil device was visualized through the scope and entered atraumatically into the ostium.  It was advanced to the appropriate markings.  The wheel was rolled until the first hard stop.  The device was repositioned to the appropriate markings and the trigger button was depressed.  The wheel was again rolled, releasing the coils.  The cornual region was observed.  There were no coils visible protruding thru the right tubal ostium.  At this point, the procedure was complete.  Instruments were all removed.   Bleeding was negligible.  The patient tolerated the procedure well and left the room and stable, satisfactory condition.    Follow up for annual exam and HSG 3 month    This  note has been electronically signed.    Kwadwo Baxter M.D.

## 2017-11-07 ENCOUNTER — DOCUMENTATION (OUTPATIENT)
Dept: BARIATRICS/WEIGHT MGMT | Facility: CLINIC | Age: 32
End: 2017-11-07

## 2017-11-07 DIAGNOSIS — R06.00 DYSPNEA, UNSPECIFIED TYPE: Primary | ICD-10-CM

## 2017-11-07 DIAGNOSIS — R10.13 DYSPEPSIA: ICD-10-CM

## 2017-11-07 DIAGNOSIS — R53.83 FATIGUE, UNSPECIFIED TYPE: ICD-10-CM

## 2018-01-12 ENCOUNTER — HOSPITAL ENCOUNTER (OUTPATIENT)
Dept: GENERAL RADIOLOGY | Facility: HOSPITAL | Age: 33
Discharge: HOME | End: 2018-01-12
Attending: OBSTETRICS & GYNECOLOGY | Admitting: OBSTETRICS & GYNECOLOGY

## 2018-01-12 ENCOUNTER — APPOINTMENT (OUTPATIENT)
Dept: GENERAL RADIOLOGY | Facility: HOSPITAL | Age: 33
End: 2018-01-12
Attending: OBSTETRICS & GYNECOLOGY

## 2018-01-12 DIAGNOSIS — Z30.2 ENCOUNTER FOR STERILIZATION: ICD-10-CM

## 2018-01-12 PROCEDURE — 74740 X-RAY FEMALE GENITAL TRACT: CPT

## 2018-01-12 PROCEDURE — 0 IOPAMIDOL 61 % SOLUTION: Performed by: OBSTETRICS & GYNECOLOGY

## 2018-01-12 RX ADMIN — IOPAMIDOL 30 ML: 612 INJECTION, SOLUTION INTRAVENOUS at 08:39

## 2018-01-17 ENCOUNTER — TELEPHONE (OUTPATIENT)
Dept: OBSTETRICS AND GYNECOLOGY | Facility: CLINIC | Age: 33
End: 2018-01-17

## 2018-01-17 NOTE — TELEPHONE ENCOUNTER
Dr. Baxter Pt    Pt just missed a call.  She is on her 15 minute break and can talk during that time [phone]

## 2018-01-18 NOTE — TELEPHONE ENCOUNTER
That may be the contrast dye which is a little bit sticky.  Be more of an allergic situation not yeast.  I will have her dish use any over-the-counter douche once.

## 2018-02-01 ENCOUNTER — OFFICE VISIT (OUTPATIENT)
Dept: BARIATRICS/WEIGHT MGMT | Facility: CLINIC | Age: 33
End: 2018-02-01

## 2018-02-01 ENCOUNTER — DOCUMENTATION (OUTPATIENT)
Dept: BARIATRICS/WEIGHT MGMT | Facility: CLINIC | Age: 33
End: 2018-02-01

## 2018-02-01 VITALS
WEIGHT: 293 LBS | HEART RATE: 82 BPM | RESPIRATION RATE: 18 BRPM | OXYGEN SATURATION: 99 % | DIASTOLIC BLOOD PRESSURE: 86 MMHG | BODY MASS INDEX: 41.95 KG/M2 | HEIGHT: 70 IN | TEMPERATURE: 98.1 F | SYSTOLIC BLOOD PRESSURE: 141 MMHG

## 2018-02-01 DIAGNOSIS — D50.8 OTHER IRON DEFICIENCY ANEMIA: ICD-10-CM

## 2018-02-01 DIAGNOSIS — K21.9 GASTROESOPHAGEAL REFLUX DISEASE, ESOPHAGITIS PRESENCE NOT SPECIFIED: ICD-10-CM

## 2018-02-01 DIAGNOSIS — M54.9 CHRONIC BACK PAIN, UNSPECIFIED BACK LOCATION, UNSPECIFIED BACK PAIN LATERALITY: ICD-10-CM

## 2018-02-01 DIAGNOSIS — R60.0 BILATERAL LOWER EXTREMITY EDEMA: ICD-10-CM

## 2018-02-01 DIAGNOSIS — R11.0 NAUSEA: ICD-10-CM

## 2018-02-01 DIAGNOSIS — Z91.09 ENVIRONMENTAL ALLERGIES: ICD-10-CM

## 2018-02-01 DIAGNOSIS — R10.9 ABDOMINAL PAIN, UNSPECIFIED ABDOMINAL LOCATION: ICD-10-CM

## 2018-02-01 DIAGNOSIS — E66.01 OBESITY, MORBID (HCC): ICD-10-CM

## 2018-02-01 DIAGNOSIS — E28.2 PCOS (POLYCYSTIC OVARIAN SYNDROME): ICD-10-CM

## 2018-02-01 DIAGNOSIS — E88.09 HYPOALBUMINEMIA: ICD-10-CM

## 2018-02-01 DIAGNOSIS — G47.33 OSA (OBSTRUCTIVE SLEEP APNEA): Primary | ICD-10-CM

## 2018-02-01 DIAGNOSIS — J30.2 SEASONAL ALLERGIC RHINITIS, UNSPECIFIED CHRONICITY, UNSPECIFIED TRIGGER: ICD-10-CM

## 2018-02-01 DIAGNOSIS — G89.29 CHRONIC BACK PAIN, UNSPECIFIED BACK LOCATION, UNSPECIFIED BACK PAIN LATERALITY: ICD-10-CM

## 2018-02-01 DIAGNOSIS — R73.02 GLUCOSE INTOLERANCE (IMPAIRED GLUCOSE TOLERANCE): ICD-10-CM

## 2018-02-01 PROCEDURE — 99215 OFFICE O/P EST HI 40 MIN: CPT | Performed by: PHYSICIAN ASSISTANT

## 2018-02-01 NOTE — PROGRESS NOTES
"[facility] BARIATRIC SURGERY  [address]  [facility] [id number] [phone]      Patient  Name:  Geneva Cordova  :  1985      Date of Visit: 2018      Chief Complaint:  weight gain; unable to maintain weight loss    History of Present Illness:  Geneva Cordova is a 32 y.o. female  s/p LSG by Dr. Hughes  on 7/17/15 who presents today for evaluation, education and consultation regarding weight loss surgery. The patient is interested in revision to SIPS.     Geneva has been overweight for at least 10 years, has been 35 pounds or more overweight for at least 10 years, has been 100 pounds or more overweight for 10 or more years and started dieting at age 22.      As above, patient has been overweight for many years, with numerous failed dietary/weight loss attempts.   The most weight Geneva lost was 124 pounds with the gastric sleeve, then became pregnant and has gained 25lb back.  Her maximum lifetime weight is 425 pounds. Lowest weight was 301. Goal weight  is 220lb.     She is concerned hiatal hernia has returned, does c/o reflux noted every couple days feels. Takes rolaids PRN, has not tried other meds. She has c/o RUQ pain twice a week wrapping around her back with associated nausea post prandially. Symptoms at least twice a week.  Denies vomiting, dysphagia.     Past Medical History:   Diagnosis Date   • Anemia    • Anxiety    • Bilateral lower extremity edema    • Chronic back pain    • Depression    • Dyspepsia    • Dyspnea on exertion    • Fatigue    • Glucose intolerance (impaired glucose tolerance)     no problems since weight loss   • Helicobacter pylori (H. pylori) infection     asx and grossly nl EGD. \"abundant\"on path. HBT still + after PrevPak tx. LSG path neg.   • Hypoalbuminemia     3.2   • Migraine    • Obesity, morbid    • VIRA (obstructive sleep apnea)      improved since WLS, does not use CPAP   • PCOS (polycystic ovarian syndrome)    • Seasonal " allergies      Past Surgical History:   Procedure Laterality Date   •  SECTION N/A 2017    horizontal. Procedure:  SECTION PRIMARY;  Surgeon: Kwadwo Baxter MD;  Location: [facility] LABOR DELIVERY;  Service:    • DILATATION AND CURETTAGE     • ESSURE TUBAL LIGATION     • GASTRIC SLEEVE LAPAROSCOPIC  2015    With Dr. Hughes   • TONSILLECTOMY     • WISDOM TOOTH EXTRACTION         No Known Allergies    Current Outpatient Prescriptions:   •  polyethylene glycol (MIRALAX) packet, Take 17 g by mouth As Needed., Disp: , Rfl:   •  vitamin D (ERGOCALCIFEROL) 35705 units capsule capsule, take 1 capsule by mouth every week, Disp: , Rfl: 0    Social History     Social History   • Marital status: Single     Spouse name: N/A   • Number of children: 4   • Years of education: College      Occupational History   •        Social History Main Topics   • Smoking status: Never Smoker   • Smokeless tobacco: Never Used   • Alcohol use No   • Drug use: No   • Sexual activity: Yes     Partners: Male      Comment: no OCP     Other Topics Concern   • Not on file     Social History Narrative    Lives in [place]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  Works at [employer].      Family History   Problem Relation Age of Onset   • Sleep apnea Mother        Review of Systems:  Constitutional:  The patient reports fatigue, weight gain and denies fevers and chills.  Cardiovascular:  The patient reports edema and denies HTN, HLD, CP, MI, heart disease and DVT.  Respiratory:  The patient reports apnea and denies asthma and PE.  Gastrointestinal:  The patient reports heartburn, RUQ pain and nausea and denies pancreatitis, liver disease and IBS.  Genitourinary:  The patient denies renal insufficiency.    Musculoskeletal:  The patient reports back pain and denies fibromyalgia, arthritis and autoimmune disease.  Neurological:  The patient reports none and denies seizure and stroke.  Psychiatric:  The patient reports none and denies  anxiety, depression and bipolar disorder.  Endocrine:  The patient reports glucose intolerance and denies thyroid disease and gout.  Hematologic:  The patient reports anemia and denies bleeding disorder.  Skin:  The patient denies MRSA.    Physical Exam:  Vital Signs:  Weight: (!) 148 kg (326 lb)   Body mass index is 46.78 kg/(m^2).  Temp: 98.1 °F (36.7 °C)   Heart Rate: 82   BP: 141/86     Physical Exam   Constitutional: She is oriented to person, place, and time. She appears well-developed and well-nourished.   HENT:   Head: Normocephalic and atraumatic.   Mouth/Throat: Oropharynx is clear and moist.   Tattoo upper left chest   Eyes: EOM are normal.   Neck: Normal range of motion. Neck supple. No thyromegaly present.   Cardiovascular: Normal rate, regular rhythm and normal heart sounds.    Pulmonary/Chest: Effort normal and breath sounds normal. No respiratory distress. She has no wheezes.   Abdominal: Soft. Bowel sounds are normal. She exhibits no distension. There is no tenderness.   Lap scars. Pfannenstiel.    Musculoskeletal: Normal range of motion.   Neurological: She is alert and oriented to person, place, and time.   Skin: Skin is warm and dry.   Psychiatric: She has a normal mood and affect. Her behavior is normal.   Vitals reviewed.      Patient Active Problem List   Diagnosis   • VIRA (obstructive sleep apnea)   • Glucose intolerance (impaired glucose tolerance)   • Migraine   • Depression   • Helicobacter pylori (H. pylori) infection   • Anemia   • Joint pain   • Chronic back pain   • Anxiety   • Status post gastric sleeve 2015   • Vaginal delivery x 2   • S/P  section;left ovarian cystectomy and partial salpingectomy 17   • Left Ovarian serous cystadenoma -removed at  section 2017   • Right Essure implantation 2017   • Dyspnea on exertion   • Environmental allergies   • Obesity, morbid   • PCOS (polycystic ovarian syndrome)   • Dyspepsia   • Fatigue   •  Seasonal allergies   • Bilateral lower extremity edema   • Hypoalbuminemia     Assessment:    Geneva Cordova is a 32 y.o. year old female with medically complicated obesity pursuing revision to SIPS.    Weight loss surgery is deemed medically necessary given the following obesity related comorbidities including sleep apnea, back pain, GERD and edema with current Weight: (!) 148 kg (326 lb) and Body mass index is 46.78 kg/(m^2)..    Plan:  The consultation plan and program requirements were reviewed with the patient.  The patient has been advised that a letter of medical support must be obtained from her primary care physician or referring provider. A psychological evaluation will be arranged.  A nutritional evaluation will be performed.  The patient was advised to start a high protein and low carbohydrate diet.  Necessary lifestyle modifications were discussed.  Instructions on how to access ROSITA was given to the patient.  ROSITA is an internet based educational video that explains the surgical procedure chosen and answers basic questions regarding that procedure.     Preoperative testing will include: CBC, CMP, Fasting Lipids, TSH, HgA1C, H.Pylori, CXR, EKG, UGI  and EGD. Also obtain GB workup.   Additional preop clearances required prior to surgery: None.      Patient understands that bariatric surgery is not cosmetic surgery but rather a tool to help make a lifelong commitment to lifestyle changes including diet, exercise, behavior modifications, and healthy habits.      Zenobia Leahy PA-C      Addendum: GBUS 2/7/18 IMPRESSION: Stones and sludge seen within the gallbladder with no evidence of wall thickening or biliary ductal dilatation.   Will discuss concomitant lap joe with SIPS at MD consult.

## 2018-02-01 NOTE — PROGRESS NOTES
"Weight Loss Surgery  Presurgical Nutrition Assessment     Geneva Cordova  2018  [id number]  [id number]  1985  female    Surgery desired: Sleeve Gastrectomy - pt is s/p sleeve gastrectomy in . Initial wt 425, lost to 259, regained to ~350# /p 2 pregnancies & births of 3 children. Desires revision.     Ht 177.8 cm (70\"); Wt 148 kg (326#); BMI 46.8  Past Medical History:   Diagnosis Date   • Anemia    • Anxiety    • Bilateral lower extremity edema    • Chronic back pain    • Depression    • Dyspepsia    • Dyspnea on exertion    • Fatigue    • Glucose intolerance (impaired glucose tolerance)     no problems since weight loss   • Helicobacter pylori (H. pylori) infection     asx and grossly nl EGD. \"abundant\"on path. HBT still + after PrevPak tx. LSG path neg.   • Hypoalbuminemia     3.2   • Migraine    • Obesity, morbid    • VIRA (obstructive sleep apnea)      improved since WLS, does not use CPAP   • PCOS (polycystic ovarian syndrome)    • Seasonal allergies      Past Surgical History:   Procedure Laterality Date   •  SECTION N/A 2017    horizontal. Procedure:  SECTION PRIMARY;  Surgeon: Kwadwo Baxter MD;  Location: [facility] LABOR DELIVERY;  Service:    • DILATATION AND CURETTAGE     • ESSURE TUBAL LIGATION     • GASTRIC SLEEVE LAPAROSCOPIC  2015    With Dr. Hughes   • TONSILLECTOMY     • WISDOM TOOTH EXTRACTION       No Known Allergies    Current Outpatient Prescriptions:   •  polyethylene glycol (MIRALAX) packet, Take 17 g by mouth As Needed., Disp: , Rfl:   •  vitamin D (ERGOCALCIFEROL) 07665 units capsule capsule, take 1 capsule by mouth every week, Disp: , Rfl: 0      Nutrition Assessment    Estimated energy needs:  2275 kcal    Estimated calories for weight loss:  1600 kcal    IBW (Pounds):  175 #        Excess body weight (Pounds):  150 #       Nutrition Recall  24 Hour recall: (B) (L) (D) -  Reviewed and discussed with patient.  Drinks Premier protein drink " "tid @ meals for total of 90 g prot & 480 kcal. Additionally eats variable portions of meals:  Hashbrowns @ brkfast, portions of 2 [employer] burgers /c \"a few\" fries & water @ lunch, and portions of a steak & mac & cheese for dinner.  No soda. No vegetables or fruit. C/o constipation  (Advised increase in vegetables & fruits /c skins to increase fiber & help relieve constipation).       Exercise  three times a week      Education    Provided information packet re:  Sleeve gastrectomy diet manual /c eating behavior change goals and support information; also provided SIPS  Handbook.  1. Reviewed guidelines for higher protein, limited carbohydrate diet to promote weight loss.  Encouraged patient to incorporate these principles of healthy eating from now until approximately 2 weeks prior to bariatric surgery date, when an even lower carbohydrate “liver-shrinking” regimen will be followed. (Information sheet re pre-op diet given).  Explained that after recovery from surgery this diet will again be followed to ensure further loss and for weight maintenance.    2. Encouraged patient to choose an acceptable protein supplement powder or shake for post-surgery liquid diet.  Provided product guidelines and examples.    3. Explained importance of goal setting to help in changing eating behaviors that are not conducive to weight loss.  Targeted several on a worksheet which also included spaces for patient to work on issues specific to them.  4. Provided follow-up options for support, including contact information for dietitians here, if desired.  Web-based support information and apps for smart phones and computers given.  Noted that monthly support group is offered at this clinic, and that support is associated with successful weight loss.    Recommend that team proceed with surgery and follow per protocol.      Nutrition Goals   Dietary Guidelines per information packet as described above  Protein goal:  grams per day "   Carbohydrate goal:  100-140 grams per day  Eliminate soda, sweet tea, etc.     Exercise Goals  Continue current exercise routine   Add 15-30 minutes of activity per day as tolerated      Rhiannon Fisher RD  02/01/2018  3:49 PM

## 2018-02-02 DIAGNOSIS — R60.0 BILATERAL LOWER EXTREMITY EDEMA: ICD-10-CM

## 2018-02-02 DIAGNOSIS — E66.01 OBESITY, CLASS III, BMI 40-49.9 (MORBID OBESITY) (HCC): ICD-10-CM

## 2018-02-02 DIAGNOSIS — G47.33 OSA (OBSTRUCTIVE SLEEP APNEA): Primary | ICD-10-CM

## 2018-02-02 DIAGNOSIS — R06.09 DYSPNEA ON EXERTION: ICD-10-CM

## 2018-02-07 ENCOUNTER — HOSPITAL ENCOUNTER (OUTPATIENT)
Dept: ULTRASOUND IMAGING | Facility: HOSPITAL | Age: 33
Discharge: HOME | End: 2018-02-07

## 2018-02-07 ENCOUNTER — HOSPITAL ENCOUNTER (OUTPATIENT)
Dept: GENERAL RADIOLOGY | Facility: HOSPITAL | Age: 33
Discharge: HOME | End: 2018-02-07
Admitting: PHYSICIAN ASSISTANT

## 2018-02-07 DIAGNOSIS — K21.9 GASTROESOPHAGEAL REFLUX DISEASE, ESOPHAGITIS PRESENCE NOT SPECIFIED: ICD-10-CM

## 2018-02-07 DIAGNOSIS — R10.9 ABDOMINAL PAIN, UNSPECIFIED ABDOMINAL LOCATION: ICD-10-CM

## 2018-02-07 DIAGNOSIS — R11.0 NAUSEA: ICD-10-CM

## 2018-02-07 PROCEDURE — 74241: CPT

## 2018-02-07 PROCEDURE — 76705 ECHO EXAM OF ABDOMEN: CPT

## 2018-02-07 RX ADMIN — BARIUM SULFATE 183 ML: 960 POWDER, FOR SUSPENSION ORAL at 09:24

## 2018-02-12 ENCOUNTER — TELEPHONE (OUTPATIENT)
Dept: BARIATRICS/WEIGHT MGMT | Facility: CLINIC | Age: 33
End: 2018-02-12

## 2018-02-12 DIAGNOSIS — K80.10 CALCULUS OF GALLBLADDER WITH CHOLECYSTITIS WITHOUT BILIARY OBSTRUCTION, UNSPECIFIED CHOLECYSTITIS ACUITY: Primary | ICD-10-CM

## 2018-02-12 DIAGNOSIS — R10.13 DYSPEPSIA: ICD-10-CM

## 2018-02-12 NOTE — TELEPHONE ENCOUNTER
Pt called in wanting to know the results of her UGI and was wanting to know if we have an update as to when she can have her GB removed.  See result note from her GBUS on 2/7/2018.  Please advise, thank you.

## 2018-02-18 ENCOUNTER — APPOINTMENT (OUTPATIENT)
Dept: PREADMISSION TESTING | Facility: HOSPITAL | Age: 33
End: 2018-02-18

## 2018-02-18 LAB — HCT VFR BLD AUTO: 34.6 % (ref 34.5–44)

## 2018-02-18 PROCEDURE — 36415 COLL VENOUS BLD VENIPUNCTURE: CPT

## 2018-02-18 PROCEDURE — 85014 HEMATOCRIT: CPT | Performed by: SURGERY

## 2018-02-19 ENCOUNTER — PREP FOR SURGERY (OUTPATIENT)
Dept: OTHER | Facility: HOSPITAL | Age: 33
End: 2018-02-19

## 2018-02-19 ENCOUNTER — OUTSIDE FACILITY SERVICE (OUTPATIENT)
Dept: BARIATRICS/WEIGHT MGMT | Facility: CLINIC | Age: 33
End: 2018-02-19

## 2018-02-19 ENCOUNTER — LAB REQUISITION (OUTPATIENT)
Dept: LAB | Facility: HOSPITAL | Age: 33
End: 2018-02-19

## 2018-02-19 DIAGNOSIS — R10.13 EPIGASTRIC PAIN: ICD-10-CM

## 2018-02-19 PROCEDURE — 88305 TISSUE EXAM BY PATHOLOGIST: CPT | Performed by: SURGERY

## 2018-02-19 PROCEDURE — 88304 TISSUE EXAM BY PATHOLOGIST: CPT | Performed by: SURGERY

## 2018-02-19 PROCEDURE — 43239 EGD BIOPSY SINGLE/MULTIPLE: CPT | Performed by: SURGERY

## 2018-02-19 PROCEDURE — 47562 LAPAROSCOPIC CHOLECYSTECTOMY: CPT | Performed by: SURGERY

## 2018-02-19 NOTE — H&P
"[facility] BARIATRIC SURGERY  [address]  [facility] [id number] [phone]        Patient  Name:  Geneva Cordova  :  1985          Chief Complaint:  GERD, nausea    History of Present Illness:  Geneva Cordova is a 32 y.o. female  s/p LSG by Dr. Hughes  on 7/17/15 who presents today for evaluation, education and consultation regarding weight loss surgery. The patient is interested in revision to SIPS.                  Geneva has been overweight for at least 10 years, has been 35 pounds or more overweight for at least 10 years, has been 100 pounds or more overweight for 10 or more years and started dieting at age 22.       As above, patient has been overweight for many years, with numerous failed dietary/weight loss attempts.   The most weight Geneva lost was 124 pounds with the gastric sleeve, then became pregnant and has gained 25lb back.  Her maximum lifetime weight is 425 pounds. Lowest weight was 301. Goal weight  is 220lb.      She is concerned hiatal hernia has returned, does c/o reflux noted every couple days feels. Takes rolaids PRN, has not tried other meds. She has c/o RUQ pain twice a week wrapping around her back with associated nausea post prandially. Symptoms at least twice a week.  Denies vomiting, dysphagia.      Past Medical History:   Diagnosis Date   • Anemia    • Anxiety    • Bilateral lower extremity edema    • Chronic back pain    • Depression    • Dyspepsia    • Dyspnea on exertion    • Fatigue    • Glucose intolerance (impaired glucose tolerance)     no problems since weight loss   • Helicobacter pylori (H. pylori) infection     asx and grossly nl EGD. \"abundant\"on path. HBT still + after PrevPak tx. LSG path neg.   • Hypoalbuminemia     3.2   • Migraine    • Obesity, morbid    • VIRA (obstructive sleep apnea)      improved since WLS, does not use CPAP   • PCOS (polycystic ovarian syndrome)    • Seasonal allergies                 Past Surgical " History:   Procedure Laterality Date   •  SECTION N/A 2017    horizontal. Procedure:  SECTION PRIMARY;  Surgeon: Kwadwo Baxter MD;  Location: [facility] LABOR DELIVERY;  Service:    • DILATATION AND CURETTAGE     • ESSURE TUBAL LIGATION     • GASTRIC SLEEVE LAPAROSCOPIC  2015    With Dr. Hughes   • TONSILLECTOMY     • WISDOM TOOTH EXTRACTION                     No Known Allergies     Current Outpatient Prescriptions:   •  polyethylene glycol (MIRALAX) packet, Take 17 g by mouth As Needed., Disp: , Rfl:   •  vitamin D (ERGOCALCIFEROL) 39138 units capsule capsule, take 1 capsule by mouth every week, Disp: , Rfl: 0  Social History     Social History   • Marital status: Single     Spouse name: N/A   • Number of children: 4   • Years of education: College      Occupational History   •        Social History Main Topics   • Smoking status: Never Smoker   • Smokeless tobacco: Never Used   • Alcohol use No   • Drug use: No   • Sexual activity: Yes     Partners: Male      Comment: no OCP     Other Topics Concern   • Not on file     Social History Narrative    Lives in [place]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  Works at [employer].                 Family History   Problem Relation Age of Onset   • Sleep apnea Mother           Review of Systems:  Constitutional:  The patient reports fatigue, weight gain and denies fevers and chills.  Cardiovascular:  The patient reports edema and denies HTN, HLD, CP, MI, heart disease and DVT.  Respiratory:  The patient reports apnea and denies asthma and PE.  Gastrointestinal:  The patient reports heartburn, RUQ pain and nausea and denies pancreatitis, liver disease and IBS.  Genitourinary:  The patient denies renal insufficiency.    Musculoskeletal:  The patient reports back pain and denies fibromyalgia, arthritis and autoimmune disease.  Neurological:  The patient reports none and denies seizure and stroke.  Psychiatric:  The patient reports none and denies anxiety,  depression and bipolar disorder.  Endocrine:  The patient reports glucose intolerance and denies thyroid disease and gout.  Hematologic:  The patient reports anemia and denies bleeding disorder.  Skin:  The patient denies MRSA.     Physical Exam:  Vital Signs:  Weight: (!) 148 kg (326 lb)   Body mass index is 46.78 kg/(m^2).  Temp: 98.1 °F (36.7 °C)   Heart Rate: 82   BP: 141/86      Physical Exam   Constitutional: She is oriented to person, place, and time. She appears well-developed and well-nourished.   HENT:   Head: Normocephalic and atraumatic.   Mouth/Throat: Oropharynx is clear and moist.   Tattoo upper left chest   Eyes: EOM are normal.   Neck: Normal range of motion. Neck supple. No thyromegaly present.   Cardiovascular: Normal rate, regular rhythm and normal heart sounds.    Pulmonary/Chest: Effort normal and breath sounds normal. No respiratory distress. She has no wheezes.   Abdominal: Soft. Bowel sounds are normal. She exhibits no distension. There is no tenderness.   Lap scars. Pfannenstiel.    Musculoskeletal: Normal range of motion.   Neurological: She is alert and oriented to person, place, and time.   Skin: Skin is warm and dry.   Psychiatric: She has a normal mood and affect. Her behavior is normal.   Vitals reviewed.            Patient Active Problem List   Diagnosis   • VIRA (obstructive sleep apnea)   • Glucose intolerance (impaired glucose tolerance)   • Migraine   • Depression   • Helicobacter pylori (H. pylori) infection   • Anemia   • Joint pain   • Chronic back pain   • Anxiety   • Status post gastric sleeve 2015   • Vaginal delivery x 2   • S/P  section;left ovarian cystectomy and partial salpingectomy 17   • Left Ovarian serous cystadenoma -removed at  section 2017   • Right Essure implantation 2017   • Dyspnea on exertion   • Environmental allergies   • Obesity, morbid   • PCOS (polycystic ovarian syndrome)   • Dyspepsia   • Fatigue   •  Seasonal allergies   • Bilateral lower extremity edema   • Hypoalbuminemia      Assessment:     Geneva Cordova is a 32 y.o. year old female with medically complicated obesity pursuing revision to SIPS.     Weight loss surgery is deemed medically necessary given the following obesity related comorbidities including sleep apnea, back pain, GERD, nausea and edema with current Weight: (!) 148 kg (326 lb) and Body mass index is 46.78 kg/(m^2)..     Plan:  Proceed with lap joe with possible HHR and EGD.

## 2018-03-05 ENCOUNTER — OFFICE VISIT (OUTPATIENT)
Dept: BARIATRICS/WEIGHT MGMT | Facility: CLINIC | Age: 33
End: 2018-03-05

## 2018-03-05 VITALS
SYSTOLIC BLOOD PRESSURE: 136 MMHG | TEMPERATURE: 98.9 F | OXYGEN SATURATION: 99 % | WEIGHT: 293 LBS | HEIGHT: 70 IN | HEART RATE: 91 BPM | RESPIRATION RATE: 18 BRPM | DIASTOLIC BLOOD PRESSURE: 71 MMHG | BODY MASS INDEX: 41.95 KG/M2

## 2018-03-05 DIAGNOSIS — K81.9 ACALCULOUS CHOLECYSTITIS: Primary | ICD-10-CM

## 2018-03-05 PROCEDURE — 99024 POSTOP FOLLOW-UP VISIT: CPT | Performed by: SURGERY

## 2018-03-05 NOTE — PROGRESS NOTES
"[facility] Bariatric Surgery  [address]  [facility] [id number] [phone]        Patient Name: Geneva Cordova.  YOB: 1985      Date of Visit: 3/5/2018      Reason for Visit:      HPI:  Geneva Cordova is a 32 y.o. female s/p LSG by Dr. Hughes  on 7/17/15, in process for possible SIPS revision.  S/p lap joe with Dr. Hughes 18 with EGD/biopsy x 2.  EGD findings, no HH, GE junction at 41 cm.      The abdominal pain she had before cholecystectomy is gone, some persistent nausea.  No fevers. No issues with incisions.  +BM since surgery.  No diarrhea.  Pending cardiology and psych clearance before SIPS.     Path:   Final Diagnosis    1. GALLBLADDER, CHOLECYSTECTOMY:  Acalculous chronic cholecystitis.   2. GASTRIC ANTRUM BIOPSY:  Reactive gastropathic changes and slight chronic gastritis.     3. DISTAL ESOPHAGEAL BIOPSY:  Detached fragments of squamous epithelium without significant histopathologic abnormality.           Past Medical History:   Diagnosis Date   • Anemia    • Anxiety    • Bilateral lower extremity edema    • Chronic back pain    • Depression    • Dyspepsia    • Dyspnea on exertion    • Fatigue    • Glucose intolerance (impaired glucose tolerance)     no problems since weight loss   • Helicobacter pylori (H. pylori) infection     asx and grossly nl EGD. \"abundant\"on path. HBT still + after PrevPak tx. LSG path neg.   • Hypoalbuminemia     3.2   • Migraine    • Obesity, morbid    • VIRA (obstructive sleep apnea)      improved since WLS, does not use CPAP   • PCOS (polycystic ovarian syndrome)    • Seasonal allergies        Past Surgical History:   Procedure Laterality Date   •  SECTION N/A 2017    horizontal. Procedure:  SECTION PRIMARY;  Surgeon: Kwadwo Baxter MD;  Location: [facility] LABOR DELIVERY;  Service:    • DILATATION AND CURETTAGE     • ESSURE TUBAL LIGATION     • GASTRIC SLEEVE LAPAROSCOPIC  2015    With Dr. Hughes "   • TONSILLECTOMY  2014   • WISDOM TOOTH EXTRACTION  2014     Outpatient Prescriptions Marked as Taking for the 3/5/18 encounter (Office Visit) with Natalia Mariscal MD   Medication Sig Dispense Refill   • polyethylene glycol (MIRALAX) packet Take 17 g by mouth As Needed.     • vitamin D (ERGOCALCIFEROL) 04963 units capsule capsule take 1 capsule by mouth every week  0     No Known Allergies    Social History     Social History   • Marital status: Single     Spouse name: N/A   • Number of children: 4   • Years of education: College      Occupational History   •        Social History Main Topics   • Smoking status: Never Smoker   • Smokeless tobacco: Never Used   • Alcohol use No   • Drug use: No   • Sexual activity: Yes     Partners: Male      Comment: no OCP     Other Topics Concern   • Not on file     Social History Narrative    Lives in [place]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  Works at [employer].        Vitals:    03/05/18 1449   BP: 136/71   Pulse: 91   Resp: 18   Temp: 98.9 °F (37.2 °C)   SpO2: 99%     Weight (!) 150 kg (331 lb 0.2 oz)  Body mass index is 47.49 kg/(m^2).    Physical Exam   Constitutional: She is oriented to person, place, and time. She appears well-developed and well-nourished. No distress.   HENT:   Head: Normocephalic and atraumatic.   Mouth/Throat: No oropharyngeal exudate.   Eyes: Conjunctivae and EOM are normal. Pupils are equal, round, and reactive to light.   Pulmonary/Chest: Effort normal. No respiratory distress.   Abdominal: Soft. She exhibits no distension.   Incisions well-healed   Neurological: She is alert and oriented to person, place, and time. No cranial nerve deficit.   Skin: Skin is warm and dry. No rash noted. She is not diaphoretic. No erythema.   Psychiatric: She has a normal mood and affect. Her behavior is normal. Judgment and thought content normal.         Assessment:      ICD-10-CM ICD-9-CM   1. Acalculous cholecystitis K81.9 575.10       Plan:    Recovering well  after cholecystectomy.    Continue w/ good food choices and healthy habits.  Encouraged to focus on high protein, low carb.  Continue routine exercise.  Routine labs ordered.  Continue current vitamin regimen w/ adjustments pending lab results.  Call w/ issues/concerns.  RTC sooner w/ problems.       The patient was instructed to follow up PRN or when needed for visits pertaining to possible SIPS.

## 2018-05-18 DIAGNOSIS — R73.02 GLUCOSE INTOLERANCE (IMPAIRED GLUCOSE TOLERANCE): ICD-10-CM

## 2018-05-18 DIAGNOSIS — R53.83 FATIGUE, UNSPECIFIED TYPE: Primary | ICD-10-CM

## 2018-05-18 DIAGNOSIS — A04.8 HELICOBACTER PYLORI (H. PYLORI) INFECTION: ICD-10-CM

## 2018-05-18 DIAGNOSIS — R10.13 DYSPEPSIA: ICD-10-CM

## 2018-06-06 PROCEDURE — 87591 N.GONORRHOEAE DNA AMP PROB: CPT | Performed by: NURSE PRACTITIONER

## 2018-06-06 PROCEDURE — 87491 CHLMYD TRACH DNA AMP PROBE: CPT | Performed by: NURSE PRACTITIONER

## 2018-06-06 PROCEDURE — 87661 TRICHOMONAS VAGINALIS AMPLIF: CPT | Performed by: NURSE PRACTITIONER

## 2018-06-11 ENCOUNTER — TELEPHONE (OUTPATIENT)
Dept: URGENT CARE | Facility: CLINIC | Age: 33
End: 2018-06-11

## 2018-07-05 PROCEDURE — 93000 ELECTROCARDIOGRAM COMPLETE: CPT | Performed by: INTERNAL MEDICINE

## 2018-07-05 NOTE — PROGRESS NOTES
Cooper Cordova is a 32 y.o. female.  RAGHAV Rand PA-C  [address]  [address]  [place] [address]      Chief Complaint   Patient presents with   • Surgical Clearance       Patient Active Problem List    Diagnosis   • Obesity, morbid (CMS/HCC) [E66.01]     Priority: High   • Bilateral lower extremity edema [R60.0]     Priority: High   • VIRA (obstructive sleep apnea) [G47.33]     Priority: High     Overview Note:     negative test     • Dyspnea on exertion [R06.09]     Priority: Medium   • Glucose intolerance (impaired glucose tolerance) [R73.02]     Priority: Medium   • Environmental allergies [Z91.09]   • PCOS (polycystic ovarian syndrome) [E28.2]   • Dyspepsia [R10.13]   • Fatigue [R53.83]   • Seasonal allergies [J30.2]   • Hypoalbuminemia [E88.09]     Overview Note:     3.2     • Right Essure implantation 2017 [Z30.2]   • Left Ovarian serous cystadenoma -removed at  section 2017 [O34.83]   • S/P  section;left ovarian cystectomy and partial salpingectomy 17 [Z98.891]   • Vaginal delivery x 2 [O80]   • Status post gastric sleeve 2015 [Z98.84]   • Migraine [G43.909]   • Depression [F32.9]   • Helicobacter pylori (H. pylori) infection [A04.8]   • Anemia [D64.9]   • Joint pain [M25.50]   • Chronic back pain [M54.9, G89.29]   • Anxiety [F41.9]        History of Present Illness   This is a 32 year old morbidly obese female who presents for preoperative clearance prior to bariatric surgery.  She has no history of coronary artery disease, she is not diabetic, she is normotensive, does not smoke and has no family history of precocious coronary artery disease.  She exercises regularly with walking, aerobics at the gym, weight lifting an elliptical exercise.  She exercises without limitations.  She has no complaint of exertional chest pain or dyspnea, and orthopnea, no PND no claudication.  She has no awareness of tachycardia  arrhythmias, no dizziness or syncope.  She has a pending lipid panel later today but is been reported to be normal in the past.  She cardiac clearance prior to her initial cardiac surgery 3 years ago which included an EKG and an echocardiogram will which are not available for review but are reported to be normal.    Past Surgical History:   Procedure Laterality Date   •  SECTION N/A 2017    horizontal. Procedure:  SECTION PRIMARY;  Surgeon: Kwadwo Baxter MD;  Location: [facility] LABOR DELIVERY;  Service:    • DILATATION AND CURETTAGE     • ESSURE TUBAL LIGATION     • GASTRIC SLEEVE LAPAROSCOPIC  2015    With Dr. Hughes   • TONSILLECTOMY     • WISDOM TOOTH EXTRACTION         The following portions of the patient's history were reviewed and updated as appropriate: allergies, current medications, past family history, past medical history, past social history, past surgical history and problem list.    No Known Allergies      Current Outpatient Prescriptions:   •  Multiple Vitamins-Minerals (MULTIPLE VITAMINS/WOMENS) tablet, Take 1 tablet by mouth Daily., Disp: , Rfl:   •  phentermine (ADIPEX-P) 37.5 MG tablet, Every Morning Before Breakfast., Disp: , Rfl: 0    Review of Systems   Constitution: Negative.   HENT: Negative.    Eyes: Negative.    Cardiovascular: Positive for leg swelling.   Respiratory: Positive for shortness of breath.    Endocrine: Negative.    Hematologic/Lymphatic: Negative.    Skin: Negative.    Musculoskeletal: Negative.    Gastrointestinal: Negative.    Genitourinary: Negative.    Neurological: Negative.    Psychiatric/Behavioral: Negative.    Allergic/Immunologic: Negative.    All other systems reviewed and are negative.      Social History     Social History   • Marital status: Single     Spouse name: N/A   • Number of children: 4   • Years of education: College      Occupational History   •        Social History Main Topics   • Smoking status: Never  "Smoker   • Smokeless tobacco: Never Used   • Alcohol use No   • Drug use: No   • Sexual activity: Yes     Partners: Male      Comment: no OCP     Other Topics Concern   • Not on file     Social History Narrative    Lives in [place]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  Works at [employer].        Family History   Problem Relation Age of Onset   • Sleep apnea Mother        Objective      Blood pressure 134/70, pulse 70, height 177.8 cm (70\"), weight (!) 143 kg (315 lb),     Physical Exam   Constitutional: She is oriented to person, place, and time. She appears well-developed and well-nourished. No distress.   HENT:   Head: Normocephalic and atraumatic.   Mouth/Throat: Oropharynx is clear and moist.   Eyes: Pupils are equal, round, and reactive to light. No scleral icterus.   Neck: Neck supple. No JVD present. No tracheal deviation present. No thyromegaly present.   Cardiovascular: Normal rate, regular rhythm and normal heart sounds.  Exam reveals no gallop and no friction rub.    No murmur heard.  Pulmonary/Chest: Effort normal and breath sounds normal. No respiratory distress. She has no wheezes. She has no rales.   Abdominal: Soft. Bowel sounds are normal. She exhibits no distension and no mass. There is no tenderness. There is no rebound and no guarding.   Musculoskeletal: Normal range of motion. She exhibits no edema or deformity.   Lymphadenopathy:     She has no cervical adenopathy.   Neurological: She is alert and oriented to person, place, and time. No cranial nerve deficit.   Skin: Skin is warm and dry. No rash noted. She is not diaphoretic.   Psychiatric: She has a normal mood and affect.   Nursing note and vitals reviewed.        ECG 12 Lead  Date/Time: 7/5/2018 6:13 PM  Performed by: BELLA SIFUENTES  Authorized by: BELLA SIFUENTES   Previous ECG: no previous ECG available  Rhythm: sinus rhythm  Rate: normal  Conduction: conduction normal  ST Segments: ST segments normal  T Waves: T waves normal  QRS axis: normal  Clinical " impression: normal ECG  Comments: Rate 68            Lab Review:   Lab Results   Component Value Date    GLUCOSE 72 06/20/2017    BUN 8 (L) 03/05/2017    CREATININE 0.50 (L) 08/17/2017    EGFRIFAFRI 142 03/05/2017    BCR 13.3 03/05/2017    CO2 26.0 03/05/2017    CALCIUM 8.8 03/05/2017    ALBUMIN 3.60 03/05/2017    LABIL2 1.3 (L) 03/05/2017    AST 15 08/17/2017    ALT 17 08/17/2017       Lab Results   Component Value Date    WBC 12.43 (H) 08/19/2017    HGB 10.5 (L) 08/19/2017    HCT 34.6 02/18/2018    MCV 89.5 08/19/2017     08/19/2017       Lab Results   Component Value Date    HGBA1C 4.3 06/08/2016               Assessment:     Diagnosis Plan   1. Encounter for pre-operative cardiovascular clearance  Cleared for surgery from a cardiac standpoint    2. Localized edema  ECG 12 Lead   3. Obesity, morbid (CMS/HCC)     Plan:  1. Young patient with no cardiac symptoms, negative exam, normal EKG and unremarkable previous echocardiogram.  2. She is cleared for surgery from cardiology standpoint, there is no need for further testing.  3. Continue current medications.   4. F/U as needed.  5. Thank you for allowing us to participate in the care of your patient.     Scribed for Faith Hamm MD by Kevyn Rosario PA-C. 7/6/2018  9:19 AM    I, Faith Hamm MD, personally performed the services described in this documentation as scribed by the above named individual in my presence, and it is both accurate and complete.  7/6/2018  9:44 AM

## 2018-07-06 ENCOUNTER — OFFICE VISIT (OUTPATIENT)
Dept: CARDIOLOGY | Facility: CLINIC | Age: 33
End: 2018-07-06

## 2018-07-06 ENCOUNTER — LAB (OUTPATIENT)
Dept: LAB | Facility: HOSPITAL | Age: 33
End: 2018-07-06

## 2018-07-06 VITALS
HEART RATE: 70 BPM | BODY MASS INDEX: 41.95 KG/M2 | HEIGHT: 70 IN | DIASTOLIC BLOOD PRESSURE: 70 MMHG | SYSTOLIC BLOOD PRESSURE: 134 MMHG | WEIGHT: 293 LBS

## 2018-07-06 DIAGNOSIS — E66.01 OBESITY, MORBID (HCC): ICD-10-CM

## 2018-07-06 DIAGNOSIS — R73.02 GLUCOSE INTOLERANCE (IMPAIRED GLUCOSE TOLERANCE): ICD-10-CM

## 2018-07-06 DIAGNOSIS — R10.13 DYSPEPSIA: ICD-10-CM

## 2018-07-06 DIAGNOSIS — A04.8 HELICOBACTER PYLORI (H. PYLORI) INFECTION: ICD-10-CM

## 2018-07-06 DIAGNOSIS — R53.83 FATIGUE, UNSPECIFIED TYPE: ICD-10-CM

## 2018-07-06 DIAGNOSIS — Z01.810 ENCOUNTER FOR PRE-OPERATIVE CARDIOVASCULAR CLEARANCE: Primary | ICD-10-CM

## 2018-07-06 DIAGNOSIS — R60.0 LOCALIZED EDEMA: ICD-10-CM

## 2018-07-06 LAB
ALBUMIN SERPL-MCNC: 4.27 G/DL (ref 3.2–4.8)
ALBUMIN/GLOB SERPL: 1.5 G/DL (ref 1.5–2.5)
ALP SERPL-CCNC: 81 U/L (ref 25–100)
ALT SERPL W P-5'-P-CCNC: 12 U/L (ref 7–40)
ANION GAP SERPL CALCULATED.3IONS-SCNC: 6 MMOL/L (ref 3–11)
ARTICHOKE IGE QN: 86 MG/DL (ref 0–130)
AST SERPL-CCNC: 16 U/L (ref 0–33)
BASOPHILS # BLD AUTO: 0.01 10*3/MM3 (ref 0–0.2)
BASOPHILS NFR BLD AUTO: 0.2 % (ref 0–1)
BILIRUB SERPL-MCNC: 0.6 MG/DL (ref 0.3–1.2)
BUN BLD-MCNC: 10 MG/DL (ref 9–23)
BUN/CREAT SERPL: 11.8 (ref 7–25)
CALCIUM SPEC-SCNC: 9.2 MG/DL (ref 8.7–10.4)
CHLORIDE SERPL-SCNC: 106 MMOL/L (ref 99–109)
CHOLEST SERPL-MCNC: 139 MG/DL (ref 0–200)
CO2 SERPL-SCNC: 27 MMOL/L (ref 20–31)
CREAT BLD-MCNC: 0.85 MG/DL (ref 0.6–1.3)
DEPRECATED RDW RBC AUTO: 44.7 FL (ref 37–54)
EOSINOPHIL # BLD AUTO: 0.11 10*3/MM3 (ref 0–0.3)
EOSINOPHIL NFR BLD AUTO: 2 % (ref 0–3)
ERYTHROCYTE [DISTWIDTH] IN BLOOD BY AUTOMATED COUNT: 13.9 % (ref 11.3–14.5)
GFR SERPL CREATININE-BSD FRML MDRD: 95 ML/MIN/1.73
GLOBULIN UR ELPH-MCNC: 2.8 GM/DL
GLUCOSE BLD-MCNC: 74 MG/DL (ref 70–100)
HBA1C MFR BLD: 5.4 % (ref 4.8–5.6)
HCT VFR BLD AUTO: 38.6 % (ref 34.5–44)
HDLC SERPL-MCNC: 50 MG/DL (ref 40–60)
HGB BLD-MCNC: 12.2 G/DL (ref 11.5–15.5)
IMM GRANULOCYTES # BLD: 0.01 10*3/MM3 (ref 0–0.03)
IMM GRANULOCYTES NFR BLD: 0.2 % (ref 0–0.6)
LYMPHOCYTES # BLD AUTO: 1.76 10*3/MM3 (ref 0.6–4.8)
LYMPHOCYTES NFR BLD AUTO: 31.2 % (ref 24–44)
MCH RBC QN AUTO: 27.9 PG (ref 27–31)
MCHC RBC AUTO-ENTMCNC: 31.6 G/DL (ref 32–36)
MCV RBC AUTO: 88.1 FL (ref 80–99)
MONOCYTES # BLD AUTO: 0.29 10*3/MM3 (ref 0–1)
MONOCYTES NFR BLD AUTO: 5.1 % (ref 0–12)
NEUTROPHILS # BLD AUTO: 3.47 10*3/MM3 (ref 1.5–8.3)
NEUTROPHILS NFR BLD AUTO: 61.5 % (ref 41–71)
PLATELET # BLD AUTO: 337 10*3/MM3 (ref 150–450)
PMV BLD AUTO: 12.3 FL (ref 6–12)
POTASSIUM BLD-SCNC: 4.2 MMOL/L (ref 3.5–5.5)
PROT SERPL-MCNC: 7.1 G/DL (ref 5.7–8.2)
RBC # BLD AUTO: 4.38 10*6/MM3 (ref 3.89–5.14)
SODIUM BLD-SCNC: 139 MMOL/L (ref 132–146)
TRIGL SERPL-MCNC: 47 MG/DL (ref 0–150)
TSH SERPL DL<=0.05 MIU/L-ACNC: 1.19 MIU/ML (ref 0.35–5.35)
WBC NRBC COR # BLD: 5.64 10*3/MM3 (ref 3.5–10.8)

## 2018-07-06 PROCEDURE — 84443 ASSAY THYROID STIM HORMONE: CPT

## 2018-07-06 PROCEDURE — 99243 OFF/OP CNSLTJ NEW/EST LOW 30: CPT | Performed by: INTERNAL MEDICINE

## 2018-07-06 PROCEDURE — 36415 COLL VENOUS BLD VENIPUNCTURE: CPT

## 2018-07-06 PROCEDURE — 85025 COMPLETE CBC W/AUTO DIFF WBC: CPT

## 2018-07-06 PROCEDURE — 83013 H PYLORI (C-13) BREATH: CPT

## 2018-07-06 PROCEDURE — 83036 HEMOGLOBIN GLYCOSYLATED A1C: CPT

## 2018-07-06 PROCEDURE — 80053 COMPREHEN METABOLIC PANEL: CPT

## 2018-07-06 PROCEDURE — 80061 LIPID PANEL: CPT

## 2018-07-06 RX ORDER — PHENTERMINE HYDROCHLORIDE 37.5 MG/1
TABLET ORAL
Refills: 0 | COMMUNITY
Start: 2018-06-27 | End: 2018-08-10

## 2018-07-06 RX ORDER — PHENTERMINE HYDROCHLORIDE 30 MG/1
30 CAPSULE ORAL EVERY MORNING
COMMUNITY
End: 2018-07-06

## 2018-07-06 RX ORDER — MULTIVIT WITH CALCIUM,IRON,MIN
1 TABLET ORAL DAILY
COMMUNITY

## 2018-07-09 LAB — UREA BREATH TEST QL: NEGATIVE

## 2018-07-13 ENCOUNTER — OFFICE VISIT (OUTPATIENT)
Dept: PSYCHIATRY | Facility: CLINIC | Age: 33
End: 2018-07-13

## 2018-07-13 DIAGNOSIS — F33.1 MODERATE EPISODE OF RECURRENT MAJOR DEPRESSIVE DISORDER (HCC): Primary | ICD-10-CM

## 2018-07-13 DIAGNOSIS — R45.81 LOW SELF ESTEEM: ICD-10-CM

## 2018-07-13 DIAGNOSIS — E66.01 MORBID OBESITY (HCC): ICD-10-CM

## 2018-07-13 PROCEDURE — 90791 PSYCH DIAGNOSTIC EVALUATION: CPT | Performed by: PSYCHOLOGIST

## 2018-07-16 NOTE — PROGRESS NOTES
PROGRESS NOTE    Data:  Geneva Cordova is a 32 y.o. female who met with the undersigned for a scheduled individual outpatient therapy session from 2:00 - 2:42pm.      Clinical Maneuvering/Intervention:      Pt talked about struggling with being overweight for many years. She talked about how she had weight loss surgery in the past, losing weight, but gaining weight again after having kids (she has four, two of which are twins). A psychological evaluation was conducted in order to assess past and current level of functioning. Areas assessed included, but were not limited to: perception of social support, perception of ability to face and deal with challenges in life (positive functioning), anxiety symptoms, depressive symptoms, perspective on beliefs/belief system, coping skills for stress, intelligence level, etc. Therapeutic rapport was established. Interventions conducted today were geared towards assessing the pt's readiness for weight loss surgery and identifying and psychological contraindications for undergoing such a major life change. Current stressors included: obesity, losing juan manuel in [employer], and having unresolved emotional issues after losing custody of her children (but getting them back several years ago). Self-esteem was identified as a problem area for her and she was set up with a therapy treatment in order to start addressing these issues in therapy.  Education was also provided as to the nature of counseling and what to expect in subsequent sessions. Issues demonstrating readiness for corrective weight loss surgery were identified: changing her diet, identifying supportive people in her life, and expressions of how she wants/needs to improve her self-care starting with controlling her weight. She expressed frustrations with losing her juan manuel in [employer] but how she recently is starting to work on improving that relationship. The pt was encouraged to return for additional sessions for counseling and agreed to  do so.     Mental Status Exam  Hygiene:  good  Dress: normal  Attitude:  Cooperative and proactive  Motor Activity: normal  Speech: normal  Mood: discouraged  Affect:  congruent  Thought Processes: normal  Thought Content:  normal  Suicidal Thoughts:  not endorsed  Homicidal Thoughts:  not endorsed  Crisis Safety Plan: not needed   Hallucinations:  none      Patient's Support Network Includes:  family, friends      Progress toward goal: there is evidence to suggest that she is taking measures to improve the quality of her life including seeking weight loss surgery, therapy, and improving in habits of self-care.      Functional Status: moderate      Prognosis: good    Assessment      The pt presented as depressed, likely due to perceptions of being less-than because of her weight and engaging in maladaptive thought patterns. She is a good candidate for counseling as therapeutic rapport was established, she responded positively to interventions today, and she seems motivated to continue with counseling.    From a psychological standpoint, the pt presents as a good candidate for corrective bariatric surgery (i.e., dual switch).  She is motivated for the surgery, has showed readiness for the lifestyle change in terms of adjusting her eating habits, and seems to have appropriate expectations of how to prepare and how to live after surgery in order to lose weight successfully.       Plan      In order to diminish symptoms of depression, the pt will: 1. Follow up with her bariatric surgeon in order to proceed with getting weight loss surgery (as soon as feasible/appropriate), 2. Start going back to [employer] as the pt identified this as an area of need (this week), and 3. Continue with regular therapy sessions in order to address core issues described above (ongoing).    Mahsa Garrett, PhD, LP

## 2018-08-10 ENCOUNTER — OFFICE VISIT (OUTPATIENT)
Dept: PULMONOLOGY | Facility: CLINIC | Age: 33
End: 2018-08-10

## 2018-08-10 VITALS
OXYGEN SATURATION: 98 % | WEIGHT: 293 LBS | HEART RATE: 65 BPM | DIASTOLIC BLOOD PRESSURE: 64 MMHG | SYSTOLIC BLOOD PRESSURE: 120 MMHG | HEIGHT: 70 IN | TEMPERATURE: 97.3 F | BODY MASS INDEX: 41.95 KG/M2 | RESPIRATION RATE: 14 BRPM

## 2018-08-10 DIAGNOSIS — E66.01 OBESITY, MORBID (HCC): ICD-10-CM

## 2018-08-10 DIAGNOSIS — G47.33 OSA (OBSTRUCTIVE SLEEP APNEA): ICD-10-CM

## 2018-08-10 DIAGNOSIS — Z01.818 PREOPERATIVE CLEARANCE: Primary | ICD-10-CM

## 2018-08-10 PROCEDURE — 94726 PLETHYSMOGRAPHY LUNG VOLUMES: CPT | Performed by: NURSE PRACTITIONER

## 2018-08-10 PROCEDURE — 99213 OFFICE O/P EST LOW 20 MIN: CPT | Performed by: NURSE PRACTITIONER

## 2018-08-10 PROCEDURE — 94729 DIFFUSING CAPACITY: CPT | Performed by: NURSE PRACTITIONER

## 2018-08-10 PROCEDURE — 94375 RESPIRATORY FLOW VOLUME LOOP: CPT | Performed by: NURSE PRACTITIONER

## 2018-08-10 NOTE — PROGRESS NOTES
[facility] Pulmonary Evaluation    CHIEF COMPLAINT    Preoperative pulmonary evaluation    HISTORY OF PRESENT ILLNESS    Geneva Cordova is a 32 y.o.female here today for preoperative pulmonary evaluation.    She is planning to undergo revision of bariatric surgery.  She underwent sleeve gastrectomy with Dr. Hughes in 2015.  She had pulmonary clearance prior to that surgery and indicates that all of her testing was normal.  She denies any history of asthma, wheezing, or shortness of breath.  She does have a history of VIRA.  She lost over 120 pounds after her gastric sleeve and her sleep apnea improved.  She gained some weight back with pregnancy 2 years ago.  However she denies snoring, witnessed apneas, or excessive daytime fatigue.  She does not use his CPAP at home.      Patient Active Problem List   Diagnosis   • VIRA (obstructive sleep apnea)   • Glucose intolerance (impaired glucose tolerance)   • Migraine   • Depression   • Helicobacter pylori (H. pylori) infection   • Anemia   • Joint pain   • Chronic back pain   • Anxiety   • Status post gastric sleeve 2015   • Vaginal delivery x 2   • S/P  section;left ovarian cystectomy and partial salpingectomy 17   • Left Ovarian serous cystadenoma -removed at  section 2017   • Right Essure implantation 2017   • Environmental allergies   • Obesity, morbid (CMS/HCC)   • PCOS (polycystic ovarian syndrome)   • Dyspepsia   • Fatigue   • Seasonal allergies   • Bilateral lower extremity edema   • Hypoalbuminemia       No Known Allergies    Current Outpatient Prescriptions:   •  Multiple Vitamins-Minerals (MULTIPLE VITAMINS/WOMENS) tablet, Take 1 tablet by mouth Daily., Disp: , Rfl:     MEDICATION LIST AND ALLERGIES REVIEWED.    Social History   Substance Use Topics   • Smoking status: Never Smoker   • Smokeless tobacco: Never Used   • Alcohol use No       FAMILY AND SOCIAL HISTORY REVIEWED AND UPDATED IN EPIC    Review of Systems  "  Constitutional: Negative.    HENT: Negative.    Eyes: Negative.    Respiratory: Negative for cough, chest tightness, shortness of breath and wheezing.    Cardiovascular: Positive for leg swelling. Negative for chest pain.   Gastrointestinal: Negative.    Genitourinary: Negative.    Musculoskeletal: Negative.    Skin: Negative.    Allergic/Immunologic: Negative.    Neurological: Negative.    Psychiatric/Behavioral: Negative.    .    /64   Pulse 65   Temp 97.3 °F (36.3 °C)   Resp 14   Ht 177.8 cm (70\")   Wt (!) 144 kg (317 lb)   LMP 07/11/2018 (Within Days)   SpO2 98% Comment: RA  BMI 45.48 kg/m²   Physical Exam   Constitutional: She is oriented to person, place, and time. She appears well-developed. No distress.   HENT:   Head: Normocephalic and atraumatic.   Neck: Neck supple.   Cardiovascular: Normal rate and regular rhythm.    No murmur heard.  Pulmonary/Chest: Effort normal and breath sounds normal. No stridor. No respiratory distress. She has no wheezes. She has no rales.   Musculoskeletal: Normal range of motion.   Neurological: She is alert and oriented to person, place, and time.   Skin: Skin is warm and dry.   Psychiatric: She has a normal mood and affect. Her behavior is normal.   Vitals reviewed.      RESULTS    Lab Results   Component Value Date    WBC 5.64 07/06/2018    HGB 12.2 07/06/2018    HCT 38.6 07/06/2018    MCV 88.1 07/06/2018     07/06/2018     Lab Results   Component Value Date    GLUCOSE 74 07/06/2018    CALCIUM 9.2 07/06/2018     07/06/2018    K 4.2 07/06/2018    CO2 27.0 07/06/2018     07/06/2018    BUN 10 07/06/2018    CREATININE 0.85 07/06/2018    EGFRIFAFRI 95 07/06/2018    BCR 11.8 07/06/2018    ANIONGAP 6.0 07/06/2018       PFTs done in the office today: No obstruction, no restriction, DLCO mildly reduced but corrects for alveolar volume.      PA/LAT CXR done in the office today: No acute disease.      PROBLEM LIST    Problem List Items Addressed This " Visit        Respiratory    VIRA (obstructive sleep apnea)       Digestive    Obesity, morbid (CMS/HCC)      Other Visit Diagnoses     Preoperative clearance    -  Primary    Relevant Orders    Pulmonary Function Test (Completed)    XR Chest PA & Lateral            DISCUSSION    ARISCAT Preoperative Pulmonary Risk Index.    Age [x]   <= 50 years old (0 points)    []   51-80 years old (3 points)    []   >80 years old (16 points)       Preoperative Oxygen Saturation [x]   >= 96% (0 points)    []   91-95% (8 points)    []   <= 90% (24 points)       Other Clinical Risk Factors []   Respiratory Infection in the last month (17 points)    []   Pre-operative anemia: Hb < 10 g/dL (11 points)    []   Emergency Surgery (8 points)       Surgical Incision [x]   Upper abdominal (15 points)    []   Intrathoracic (24 points)       Duration of Surgery [x]   < 2 hours (0 points)    []   2-3 hours (16 points)    []   >3 hours (23 points)       Total Criteria Point Count: 15     ARISCAT risk index interpretation:      0-25 points: Low risk  1.6 % pulmonary complication rate.   26-44 points: Intermediate risk 13.3% pulmonary complication rate.    points: High risk 42.1 % pulmonary complication rate.     Postoperative Pulmonary Complications include but are not limited to: Respiratory Failure, Pulmonary Infection, Pleural Effusion, Atelectasis, Pneumothorax Bronchospasm, Aspiration Pneumonia.    She represents a low risk for postoperative pulmonary complications.  We discussed possible complications as well as preventative strategies.  We discussed the importance of early mobilization, coughing and deep breathing, and pulmonary toileting with incentive spirometer.  She indicates that her sleep apnea improved after significant weight loss with her first sleep gastrectomy.  However would recommend having CPAP available in the postoperative period.    Return PRN    RAGHAV Costa  08/10/54750:30 AM  Electronically signed      Please note that portions of this note were completed with a voice recognition program. Efforts were made to edit the dictations, but occasionally words are mistranscribed.      CC: Rhiannon Maher, RAGHAV

## 2018-08-24 ENCOUNTER — OFFICE VISIT (OUTPATIENT)
Dept: INTERNAL MEDICINE | Facility: CLINIC | Age: 33
End: 2018-08-24

## 2018-08-24 VITALS
DIASTOLIC BLOOD PRESSURE: 80 MMHG | HEIGHT: 70 IN | SYSTOLIC BLOOD PRESSURE: 136 MMHG | WEIGHT: 293 LBS | OXYGEN SATURATION: 100 % | HEART RATE: 79 BPM | BODY MASS INDEX: 41.95 KG/M2

## 2018-08-24 DIAGNOSIS — R06.83 SNORES: Primary | ICD-10-CM

## 2018-08-24 DIAGNOSIS — IMO0001 CLASS 3 OBESITY WITH BODY MASS INDEX (BMI) OF 45.0 TO 49.9 IN ADULT, UNSPECIFIED OBESITY TYPE, UNSPECIFIED WHETHER SERIOUS COMORBIDITY PRESENT: ICD-10-CM

## 2018-08-24 PROCEDURE — 99203 OFFICE O/P NEW LOW 30 MIN: CPT | Performed by: NURSE PRACTITIONER

## 2018-08-24 RX ORDER — PHENTERMINE HYDROCHLORIDE 30 MG/1
30 CAPSULE ORAL EVERY MORNING
Qty: 30 CAPSULE | Refills: 0 | Status: SHIPPED | OUTPATIENT
Start: 2018-08-24 | End: 2018-09-24 | Stop reason: DRUGHIGH

## 2018-08-24 RX ORDER — PHENTERMINE HYDROCHLORIDE 30 MG/1
30 CAPSULE ORAL EVERY MORNING
COMMUNITY
End: 2018-08-24 | Stop reason: SDUPTHER

## 2018-08-24 NOTE — PATIENT INSTRUCTIONS
KENNETH And CSC on the chart.  Phentermine as discussed.  Check with D Hughes office to see what other tasks need to be completed for you to have your surgery.  Referral to sleep med

## 2018-08-24 NOTE — PROGRESS NOTES
"Subjective   Geneva Cordova is a 32 y.o. female.   Chief Complaint   Patient presents with   • Establish Care     patient done the 6months of the appts to have weight loss surgery but that  has dropped her as a patient.    • Med Refill     phentermine.    • Obesity      History of Present Illness Has had a gastric sleeve (lost 120 ) but then got pregnant with a single birth.  She then a twin birth   in 2017.   She now has tubes tied.  Needs to have duel switch.  She is on phentermine. Patient denies fever chills, headache, ear pain, sore throat, shortness of air, cough, chest pain, abdominal pain, nausea vomiting diarrhea, dysuria, blood in stool or urine. Mood is good.  Eating and drinking as usual. She snores, no recent sleep study.  Not on C-Pap.  Reports she has seen a dietician, exercise therapist, cardiologist in [facility] for surgery.      The following portions of the patient's history were reviewed and updated as appropriate: allergies, current medications, past family history, past medical history, past social history, past surgical history and problem list.    Current Outpatient Prescriptions:   •  Multiple Vitamins-Minerals (MULTIPLE VITAMINS/WOMENS) tablet, Take 1 tablet by mouth Daily., Disp: , Rfl:   •  phentermine 30 MG capsule, Take 1 capsule by mouth Every Morning., Disp: 30 capsule, Rfl: 0    Review of Systems Consitutional, HEENT, Respiratory, CV, GI, , Skin, Musculoskeletal, Neuro-mental, Endocrinological, Hematological were reviewed.  Positives were discussed in the HPI, otherwise ROS was negative   /80   Pulse 79   Ht 177.8 cm (70\")   Wt (!) 146 kg (322 lb)   SpO2 100%   BMI 46.20 kg/m²     Objective   No Known Allergies    Physical Exam   Constitutional: She is oriented to person, place, and time. She appears well-developed and well-nourished. No distress.   Obese    HENT:   Head: Normocephalic.   Right Ear: External ear normal.   Left Ear: External ear normal.   Nose: Nose normal. "   Mouth/Throat: Oropharynx is clear and moist.   Eyes: Pupils are equal, round, and reactive to light. Right eye exhibits no discharge. Left eye exhibits no discharge. No scleral icterus.   Neck: Neck supple. No thyromegaly present.   Cardiovascular: Normal rate, regular rhythm, normal heart sounds and intact distal pulses.  Exam reveals no gallop and no friction rub.    No murmur heard.  Pulmonary/Chest: Effort normal and breath sounds normal. No respiratory distress.   Abdominal: Soft. Bowel sounds are normal. There is no tenderness.   Musculoskeletal:   SANDERS well    Lymphadenopathy:     She has no cervical adenopathy.   Neurological: She is alert and oriented to person, place, and time.   Skin: Skin is warm and dry.   SANDERS well    Psychiatric: She has a normal mood and affect. Her behavior is normal. Judgment and thought content normal.   Nursing note and vitals reviewed.      Procedures    Assessment/Plan   Geneva was seen today for establish care, med refill and obesity.    Diagnoses and all orders for this visit:    Snores  -     Ambulatory Referral to Sleep Medicine    Class 3 obesity with body mass index (BMI) of 45.0 to 49.9 in adult, unspecified obesity type, unspecified whether serious comorbidity present (CMS/[facility])  -     phentermine 30 MG capsule; Take 1 capsule by mouth Every Morning.        Patient Instructions   KENNETH And RICK on the chart.  Phentermine as discussed.  Check with PAMELLA [facility] office to see what other tasks need to be completed for you to have your surgery.  Referral to sleep med         RAGHAV Zavala

## 2018-09-12 ENCOUNTER — OFFICE VISIT (OUTPATIENT)
Dept: INTERNAL MEDICINE | Facility: CLINIC | Age: 33
End: 2018-09-12

## 2018-09-12 VITALS
SYSTOLIC BLOOD PRESSURE: 132 MMHG | HEIGHT: 70 IN | WEIGHT: 293 LBS | BODY MASS INDEX: 41.95 KG/M2 | DIASTOLIC BLOOD PRESSURE: 80 MMHG | OXYGEN SATURATION: 100 % | HEART RATE: 75 BPM

## 2018-09-12 DIAGNOSIS — IMO0001 CLASS 3 OBESITY WITH BODY MASS INDEX (BMI) OF 45.0 TO 49.9 IN ADULT, UNSPECIFIED OBESITY TYPE, UNSPECIFIED WHETHER SERIOUS COMORBIDITY PRESENT: Primary | ICD-10-CM

## 2018-09-12 PROCEDURE — 99213 OFFICE O/P EST LOW 20 MIN: CPT | Performed by: NURSE PRACTITIONER

## 2018-09-12 NOTE — PROGRESS NOTES
"Subjective   Geneva Cordova is a 32 y.o. female.   Chief Complaint   Patient presents with   • Weight Loss   • Med Refill     phentermine needs 37mg      History of Present Illness patient was given phentermine 30 mg at last visit.  She has lost 8 pounds.  I am very pleased with these results.  She is scheduled to have her gastric sleeve corrected pending insurance approval.  Patient denies fever chills, headache, ear pain, sore throat, shortness of air, cough, chest pain, abdominal pain, nausea vomiting diarrhea, dysuria, blood in stool or urine. Mood is good.  Eating and drinking as usual.    The following portions of the patient's history were reviewed and updated as appropriate: allergies, current medications, past family history, past medical history, past social history, past surgical history and problem list.    Current Outpatient Prescriptions:   •  Multiple Vitamins-Minerals (MULTIPLE VITAMINS/WOMENS) tablet, Take 1 tablet by mouth Daily., Disp: , Rfl:   •  phentermine 30 MG capsule, Take 1 capsule by mouth Every Morning., Disp: 30 capsule, Rfl: 0    Review of Systems Consitutional, HEENT, Respiratory, CV, GI, , Skin, Musculoskeletal, Neuro-mental, Endocrinological, Hematological were reviewed.  Positives were discussed in the HPI, otherwise ROS was negative   /80   Pulse 75   Ht 177.8 cm (70\")   Wt (!) 144 kg (317 lb)   SpO2 100%   BMI 45.48 kg/m²     Objective   No Known Allergies    Physical Exam   Constitutional: She is oriented to person, place, and time. She appears well-developed and well-nourished. No distress.   Overweight   Neck: Neck supple. No thyromegaly present.   Cardiovascular: Normal rate, regular rhythm, normal heart sounds and intact distal pulses.  Exam reveals no gallop and no friction rub.    No murmur heard.  Pulmonary/Chest: Effort normal and breath sounds normal. No respiratory distress. She has no wheezes. She has no rales.   Musculoskeletal:   SANDERS well  "   Lymphadenopathy:     She has no cervical adenopathy.   Neurological: She is alert and oriented to person, place, and time.   Skin: Skin is warm and dry.   Is pink, no rash    Nursing note and vitals reviewed.      Procedures    Assessment/Plan   Geneva was seen today for weight loss and med refill.    Diagnoses and all orders for this visit:    Class 3 obesity with body mass index (BMI) of 45.0 to 49.9 in adult, unspecified obesity type, unspecified whether serious comorbidity present (CMS/HCC)          Patient Instructions   Continued to do caloric reduction and daily exercise of at least 30 minutes.  Weight recheck in 1 month      Daphney Guy, APRN

## 2018-09-12 NOTE — PATIENT INSTRUCTIONS
Continued to do caloric reduction and daily exercise of at least 30 minutes.  Weight recheck in 1 month

## 2018-09-24 ENCOUNTER — OFFICE VISIT (OUTPATIENT)
Dept: INTERNAL MEDICINE | Facility: CLINIC | Age: 33
End: 2018-09-24

## 2018-09-24 VITALS
BODY MASS INDEX: 41.95 KG/M2 | HEIGHT: 70 IN | OXYGEN SATURATION: 100 % | HEART RATE: 72 BPM | DIASTOLIC BLOOD PRESSURE: 76 MMHG | WEIGHT: 293 LBS | SYSTOLIC BLOOD PRESSURE: 128 MMHG

## 2018-09-24 DIAGNOSIS — N30.90 CYSTITIS: Primary | ICD-10-CM

## 2018-09-24 DIAGNOSIS — IMO0001 CLASS 3 OBESITY WITHOUT SERIOUS COMORBIDITY WITH BODY MASS INDEX (BMI) OF 40.0 TO 44.9 IN ADULT, UNSPECIFIED OBESITY TYPE: ICD-10-CM

## 2018-09-24 LAB
BILIRUB BLD-MCNC: NEGATIVE MG/DL
CLARITY, POC: ABNORMAL
COLOR UR: YELLOW
GLUCOSE UR STRIP-MCNC: NEGATIVE MG/DL
KETONES UR QL: NEGATIVE
LEUKOCYTE EST, POC: ABNORMAL
NITRITE UR-MCNC: NEGATIVE MG/ML
PH UR: 8 [PH] (ref 5–8)
PROT UR STRIP-MCNC: NEGATIVE MG/DL
RBC # UR STRIP: NEGATIVE /UL
SP GR UR: 1 (ref 1–1.03)
UROBILINOGEN UR QL: ABNORMAL

## 2018-09-24 PROCEDURE — 87086 URINE CULTURE/COLONY COUNT: CPT | Performed by: NURSE PRACTITIONER

## 2018-09-24 PROCEDURE — 99213 OFFICE O/P EST LOW 20 MIN: CPT | Performed by: NURSE PRACTITIONER

## 2018-09-24 RX ORDER — SULFAMETHOXAZOLE AND TRIMETHOPRIM 800; 160 MG/1; MG/1
1 TABLET ORAL 2 TIMES DAILY
Qty: 14 TABLET | Refills: 0 | Status: SHIPPED | OUTPATIENT
Start: 2018-09-24 | End: 2018-11-14

## 2018-09-24 RX ORDER — PHENTERMINE HYDROCHLORIDE 37.5 MG/1
37.5 CAPSULE ORAL EVERY MORNING
Qty: 30 CAPSULE | Refills: 0 | Status: SHIPPED | OUTPATIENT
Start: 2018-09-24 | End: 2018-11-14

## 2018-09-24 NOTE — PROGRESS NOTES
"Cooper Cordova is a 32 y.o. female.   Chief Complaint   Patient presents with   • Follow-up     1 month   • Obesity   • Urinary Tract Infection     itching and burning       History of Present Illness Patient denies fever chills, headache, ear pain, sore throat, shortness of air, cough, chest pain, abdominal pain, nausea vomiting diarrhea, blood in stool or urine.  Itching and burning with urination.  Urine is strong smelling.  Onset 2 days ago.  Mood is stressed  She reports she has not lost weight related to stress.     The following portions of the patient's history were reviewed and updated as appropriate: allergies, current medications, past family history, past medical history, past social history, past surgical history and problem list.    Current Outpatient Prescriptions:   •  Multiple Vitamins-Minerals (MULTIPLE VITAMINS/WOMENS) tablet, Take 1 tablet by mouth Daily., Disp: , Rfl:   •  phentermine 37.5 MG capsule, Take 1 capsule by mouth Every Morning., Disp: 30 capsule, Rfl: 0  •  sulfamethoxazole-trimethoprim (BACTRIM DS) 800-160 MG per tablet, Take 1 tablet by mouth 2 (Two) Times a Day., Disp: 14 tablet, Rfl: 0    Review of Systems Consitutional, HEENT, Respiratory, CV, GI, , Skin, Musculoskeletal, Neuro-mental, Endocrinological, Hematological were reviewed.  Positives were discussed in the HPI, otherwise ROS was negative   /76   Pulse 72   Ht 177.8 cm (70\")   Wt (!) 147 kg (323 lb)   SpO2 100%   Breastfeeding? No   BMI 46.35 kg/m²     Objective   No Known Allergies    Physical Exam   Constitutional: She is oriented to person, place, and time. She appears well-developed and well-nourished. No distress.   Obese   Cardiovascular: Normal rate, regular rhythm, normal heart sounds and intact distal pulses.  Exam reveals no gallop and no friction rub.    No murmur heard.  Pulmonary/Chest: Effort normal and breath sounds normal. No respiratory distress. She has no wheezes. She has no " rales. She exhibits no tenderness.   Abdominal: Soft. Bowel sounds are normal. There is no tenderness.   Tender to lower back.  No flank tenderness   Neurological: She is alert and oriented to person, place, and time.   Skin: Skin is warm and dry. Capillary refill takes less than 2 seconds.   Psychiatric: She has a normal mood and affect. Her behavior is normal. Judgment and thought content normal.   Vitals reviewed.      Procedures    Assessment/Plan   Geneva was seen today for follow-up, obesity and urinary tract infection.    Diagnoses and all orders for this visit:    Cystitis  -     Urine Culture - Urine, Urine, Clean Catch; Future  -     sulfamethoxazole-trimethoprim (BACTRIM DS) 800-160 MG per tablet; Take 1 tablet by mouth 2 (Two) Times a Day.  -     Urine Culture - Urine, Urine, Clean Catch  -     POCT urinalysis dipstick, automated    Class 3 obesity without serious comorbidity with body mass index (BMI) of 40.0 to 44.9 in adult, unspecified obesity type (CMS/HCC)  -     phentermine 37.5 MG capsule; Take 1 capsule by mouth Every Morning.        Patient Instructions   Adam is appropriate and on chart.  Recommend calorie reduction diet.  Consider dietary referral and exercise counseling.  Urine recheck in 1 week..  Please started on Bactrim DS pending urine culture result.  Increased phentermine to 37.5 mg daily.  Recheck weight in 4 weeks.  If no weight loss to equals at least 2 pounds per week we will stop        RAGHAV Zavala

## 2018-09-26 LAB — BACTERIA SPEC AEROBE CULT: NORMAL

## 2018-10-13 PROCEDURE — 87798 DETECT AGENT NOS DNA AMP: CPT | Performed by: FAMILY MEDICINE

## 2018-10-13 PROCEDURE — 87801 DETECT AGNT MULT DNA AMPLI: CPT | Performed by: FAMILY MEDICINE

## 2018-10-13 PROCEDURE — 87661 TRICHOMONAS VAGINALIS AMPLIF: CPT | Performed by: FAMILY MEDICINE

## 2018-10-13 PROCEDURE — 87591 N.GONORRHOEAE DNA AMP PROB: CPT | Performed by: FAMILY MEDICINE

## 2018-10-13 PROCEDURE — 87491 CHLMYD TRACH DNA AMP PROBE: CPT | Performed by: FAMILY MEDICINE

## 2018-10-16 ENCOUNTER — TELEPHONE (OUTPATIENT)
Dept: URGENT CARE | Facility: CLINIC | Age: 33
End: 2018-10-16

## 2018-10-16 NOTE — TELEPHONE ENCOUNTER
Pt called stated she was seen on 10/13 had a NuSwab done wanted to know if the results were back. Informed Pt it is still in process but we will call as soon as we get the final results. Pt verbalized understanding no further questions.

## 2018-10-19 ENCOUNTER — TELEPHONE (OUTPATIENT)
Dept: URGENT CARE | Facility: CLINIC | Age: 33
End: 2018-10-19

## 2018-10-19 NOTE — TELEPHONE ENCOUNTER
Pt called for lab results. Pt was notified positive for BV and Trich, but was treated for it with Flagyl. Pt was told to have no unprotected sex  X 1 week and to notify all partners. F/U with PCP.

## 2018-11-14 ENCOUNTER — OFFICE VISIT (OUTPATIENT)
Dept: INTERNAL MEDICINE | Facility: CLINIC | Age: 33
End: 2018-11-14

## 2018-11-14 VITALS
HEART RATE: 98 BPM | HEIGHT: 70 IN | WEIGHT: 293 LBS | OXYGEN SATURATION: 100 % | DIASTOLIC BLOOD PRESSURE: 82 MMHG | SYSTOLIC BLOOD PRESSURE: 128 MMHG | BODY MASS INDEX: 41.95 KG/M2

## 2018-11-14 DIAGNOSIS — Z00.00 ANNUAL PHYSICAL EXAM: Primary | ICD-10-CM

## 2018-11-14 DIAGNOSIS — E66.01 CLASS 3 SEVERE OBESITY WITH BODY MASS INDEX (BMI) OF 45.0 TO 49.9 IN ADULT, UNSPECIFIED OBESITY TYPE, UNSPECIFIED WHETHER SERIOUS COMORBIDITY PRESENT (HCC): ICD-10-CM

## 2018-11-14 DIAGNOSIS — N89.8 VAGINAL DISCHARGE: ICD-10-CM

## 2018-11-14 PROCEDURE — 90674 CCIIV4 VAC NO PRSV 0.5 ML IM: CPT | Performed by: NURSE PRACTITIONER

## 2018-11-14 PROCEDURE — 99395 PREV VISIT EST AGE 18-39: CPT | Performed by: NURSE PRACTITIONER

## 2018-11-14 PROCEDURE — 90471 IMMUNIZATION ADMIN: CPT | Performed by: NURSE PRACTITIONER

## 2018-11-14 RX ORDER — METRONIDAZOLE 500 MG/1
TABLET ORAL
Refills: 0 | COMMUNITY
Start: 2018-10-26 | End: 2018-11-14

## 2018-11-14 RX ORDER — METRONIDAZOLE 7.5 MG/G
GEL VAGINAL
Qty: 70 G | Refills: 0 | Status: SHIPPED | OUTPATIENT
Start: 2018-11-14 | End: 2019-01-07

## 2018-11-14 RX ORDER — FLUCONAZOLE 150 MG/1
150 TABLET ORAL DAILY
Qty: 2 TABLET | Refills: 0 | Status: SHIPPED | OUTPATIENT
Start: 2018-11-14 | End: 2019-01-07

## 2018-11-14 NOTE — PROGRESS NOTES
"Cooper Cordova is a 33 y.o. female.   Chief Complaint   Patient presents with   • Annual Exam     with pap      History of Present Illness Pt is concerned she could have BV or A yeast infection as she has a thcik, white vaginal discharge.  Her new swab tested positive for bacterial vaginosis and trichomoniasis October 13, 2018.  She says she took the Flagyl as prescribed.  She wants a recheck and she does not believe it has resolved.  She reports she has not been sexually active since she was treated.  Patient denies fever chills, headache, ear pain, sore throat, shortness of air, cough, chest pain, abdominal pain, nausea, vomiting, diarrhea, dysuria, blood in stool or urine. Mood is good.  Eating and drinking as usual.  She is also wanting her phentermine to be refilled even though she has gained weight while taking it.    The following portions of the patient's history were reviewed and updated as appropriate: allergies, current medications, past family history, past medical history, past social history, past surgical history and problem list.    Current Outpatient Medications:   •  fluconazole (DIFLUCAN) 150 MG tablet, Take 1 tablet by mouth Daily., Disp: 2 tablet, Rfl: 0  •  Multiple Vitamins-Minerals (MULTIPLE VITAMINS/WOMENS) tablet, Take 1 tablet by mouth Daily., Disp: , Rfl:   •  metroNIDAZOLE (METROGEL VAGINAL) 0.75 % vaginal gel, Insert  into the vagina every night at bedtime., Disp: 70 g, Rfl: 0    Review of Systems Consitutional, HEENT, Respiratory, CV, GI, , Skin, Musculoskeletal, Neuro-mental, Endocrinological, Hematological were reviewed.  Positives were discussed in the HPI, otherwise ROS was negative   /82   Pulse 98   Ht 177 cm (69.69\")   Wt (!) 145 kg (319 lb)   SpO2 100%   BMI 46.18 kg/m²     Objective   No Known Allergies    Physical Exam   Constitutional: She is oriented to person, place, and time. She appears well-developed and well-nourished. No distress.   HENT: "   Head: Normocephalic.   Eyes: Right eye exhibits no discharge. Left eye exhibits no discharge.   Neck: Neck supple. No thyromegaly present.   Cardiovascular: Normal rate, regular rhythm, normal heart sounds and intact distal pulses. Exam reveals no gallop and no friction rub.   No murmur heard.  Pulmonary/Chest: Effort normal and breath sounds normal. No respiratory distress.   Large pendulous breasts without mass, nodes,  dimpling, nipple discharge   Abdominal: Soft. Bowel sounds are normal. There is no tenderness.   Genitourinary:   Genitourinary Comments: Has white vaginal discharge in vault.  Uterus and adnexa ovaries nontender nonenlarged   Musculoskeletal:   SANDERS well.  Gait upright and steady    Lymphadenopathy:     She has no cervical adenopathy.   Neurological: She is alert and oriented to person, place, and time.   Skin: Skin is warm and dry. Capillary refill takes less than 2 seconds.   Is pink, no rash    Psychiatric: She has a normal mood and affect. Judgment and thought content normal.   Nursing note and vitals reviewed.      Procedures    LABS  Results for orders placed or performed during the hospital encounter of 10/13/18   NuSwab VG+ - Swab, Vagina   Result Value Ref Range    Atopobium Vaginae High - 2 (A) Score    BVAB 2 High - 2 (A) Score    Megasphaera 1 High - 2 (A) Score    Calli Albicans, DOMINGA Negative Negative    Candida Glabrata, DOMINGA Negative Negative    Trichomonas vaginosis Positive (A) Negative    Chlamydia trachomatis, DOMINGA Negative Negative    Neisseria gonorrhoeae, DOMINGA Negative Negative   POCT Pregnancy, urine   Result Value Ref Range    HCG, Urine, QL Negative Negative    Lot Number [id number]     Internal Positive Control Positive     Internal Negative Control Negative        Assessment/Plan   Geneva was seen today for annual exam.    Diagnoses and all orders for this visit:    Annual physical exam    Vaginal discharge  -     fluconazole (DIFLUCAN) 150 MG tablet; Take 1 tablet by  "mouth Daily.  -     metroNIDAZOLE (METROGEL VAGINAL) 0.75 % vaginal gel; Insert  into the vagina every night at bedtime.  -     Liquid-based Pap Smear, Screening; Future    Class 3 severe obesity with body mass index (BMI) of 45.0 to 49.9 in adult, unspecified obesity type, unspecified whether serious comorbidity present (CMS/[facility])    Other orders  -     Flucelvax Quad=>4Years ([id number])        Patient Instructions   We will DC the phentermine as patient has not lost weight.  We will use Diflucan and MetroGel.  GC chlamydia and bacterial vaginosis and trichomoniasis cultures pending.  No sexual intercourse for at least 2 weeks after treatment.  Always use condoms for STD protection .  Health Education:  Heart healthy diet to include fresh fruits and vegetables.  Limit intake of red meats.  Increase chicken and fish in diet.  Avoid fried greasy foods.  Daily activity working up 30 minutes of brisk exercise daily.  Adequate sleep and \"down time\".  Pt verbalizes understanding and agreement with plan of care.       EMR Dragon/transcription disclaimer:  Please note that portions of this note were completed with a voice recognition program.  Electronic transcription of the voice recognition program may permit erroneous words or phrases to be inadvertently transcribed.  Although I have reviewed the note for such errors, some may still exist in this documentation   RAGHAV Zavala   "

## 2018-11-15 NOTE — PATIENT INSTRUCTIONS
"We will DC the phentermine as patient has not lost weight.  We will use Diflucan and MetroGel.  GC chlamydia and bacterial vaginosis and trichomoniasis cultures pending.  No sexual intercourse for at least 2 weeks after treatment.  Always use condoms for STD protection .  Health Education:  Heart healthy diet to include fresh fruits and vegetables.  Limit intake of red meats.  Increase chicken and fish in diet.  Avoid fried greasy foods.  Daily activity working up 30 minutes of brisk exercise daily.  Adequate sleep and \"down time\".  Pt verbalizes understanding and agreement with plan of care.   "

## 2018-11-16 ENCOUNTER — TELEPHONE (OUTPATIENT)
Dept: INTERNAL MEDICINE | Facility: CLINIC | Age: 33
End: 2018-11-16

## 2018-11-16 NOTE — TELEPHONE ENCOUNTER
Tried to call pt twice and states that's shes  not accepting calls and the other home number is a non working number, Received call from lab, need to recollect the [facility] patient will need to stop by office when Daphney Guy is available to recollect. Patient does NOT need to be put on the schedule. She will not be charged for this visit.

## 2018-11-19 ENCOUNTER — TELEPHONE (OUTPATIENT)
Dept: INTERNAL MEDICINE | Facility: CLINIC | Age: 33
End: 2018-11-19

## 2018-11-19 DIAGNOSIS — N76.0 ACUTE VAGINITIS: Primary | ICD-10-CM

## 2018-11-19 RX ORDER — METRONIDAZOLE 500 MG/1
2000 TABLET ORAL ONCE
Qty: 4 TABLET | Refills: 0 | Status: SHIPPED | OUTPATIENT
Start: 2018-11-19 | End: 2018-11-19

## 2018-11-21 NOTE — TELEPHONE ENCOUNTER
Called and informed pt, pt would like to be advised on her STD test results today before the weekend, so that she can get proper treatment if needed. pls advise.

## 2018-11-21 NOTE — TELEPHONE ENCOUNTER
Please call patient your Pap smear shows benign cellular changes associated with inflammation.  I would like to repeat your Pap smear in 3 months to verify resolution.

## 2019-01-07 ENCOUNTER — APPOINTMENT (OUTPATIENT)
Dept: LAB | Facility: HOSPITAL | Age: 34
End: 2019-01-07

## 2019-01-07 ENCOUNTER — OFFICE VISIT (OUTPATIENT)
Dept: FAMILY MEDICINE CLINIC | Facility: CLINIC | Age: 34
End: 2019-01-07

## 2019-01-07 VITALS
HEIGHT: 70 IN | SYSTOLIC BLOOD PRESSURE: 122 MMHG | OXYGEN SATURATION: 100 % | BODY MASS INDEX: 41.95 KG/M2 | WEIGHT: 293 LBS | HEART RATE: 60 BPM | DIASTOLIC BLOOD PRESSURE: 78 MMHG

## 2019-01-07 DIAGNOSIS — Z13.1 SCREENING FOR DIABETES MELLITUS: ICD-10-CM

## 2019-01-07 DIAGNOSIS — F33.1 MODERATE EPISODE OF RECURRENT MAJOR DEPRESSIVE DISORDER (HCC): ICD-10-CM

## 2019-01-07 DIAGNOSIS — M54.6 CHRONIC BILATERAL THORACIC BACK PAIN: ICD-10-CM

## 2019-01-07 DIAGNOSIS — G89.29 CHRONIC BILATERAL THORACIC BACK PAIN: ICD-10-CM

## 2019-01-07 DIAGNOSIS — E55.9 VITAMIN D DEFICIENCY: ICD-10-CM

## 2019-01-07 DIAGNOSIS — R53.82 CHRONIC FATIGUE: ICD-10-CM

## 2019-01-07 DIAGNOSIS — E53.8 B12 DEFICIENCY: ICD-10-CM

## 2019-01-07 LAB
25(OH)D3 SERPL-MCNC: 9.5 NG/ML
ALBUMIN SERPL-MCNC: 4.18 G/DL (ref 3.2–4.8)
ALBUMIN/GLOB SERPL: 1.7 G/DL (ref 1.5–2.5)
ALP SERPL-CCNC: 74 U/L (ref 25–100)
ALT SERPL W P-5'-P-CCNC: 17 U/L (ref 7–40)
ANION GAP SERPL CALCULATED.3IONS-SCNC: 2 MMOL/L (ref 3–11)
AST SERPL-CCNC: 22 U/L (ref 0–33)
BASOPHILS # BLD AUTO: 0.02 10*3/MM3 (ref 0–0.2)
BASOPHILS NFR BLD AUTO: 0.2 % (ref 0–1)
BILIRUB SERPL-MCNC: 0.5 MG/DL (ref 0.3–1.2)
BUN BLD-MCNC: 10 MG/DL (ref 9–23)
BUN/CREAT SERPL: 13.3 (ref 7–25)
CALCIUM SPEC-SCNC: 9 MG/DL (ref 8.7–10.4)
CHLORIDE SERPL-SCNC: 107 MMOL/L (ref 99–109)
CO2 SERPL-SCNC: 29 MMOL/L (ref 20–31)
CREAT BLD-MCNC: 0.75 MG/DL (ref 0.6–1.3)
DEPRECATED RDW RBC AUTO: 43.9 FL (ref 37–54)
EOSINOPHIL # BLD AUTO: 0.15 10*3/MM3 (ref 0–0.3)
EOSINOPHIL NFR BLD AUTO: 1.4 % (ref 0–3)
ERYTHROCYTE [DISTWIDTH] IN BLOOD BY AUTOMATED COUNT: 13.3 % (ref 11.3–14.5)
GFR SERPL CREATININE-BSD FRML MDRD: 109 ML/MIN/1.73
GLOBULIN UR ELPH-MCNC: 2.5 GM/DL
GLUCOSE BLD-MCNC: 77 MG/DL (ref 70–100)
HBA1C MFR BLD: 5.3 % (ref 4.8–5.6)
HCT VFR BLD AUTO: 37.6 % (ref 34.5–44)
HGB BLD-MCNC: 11.8 G/DL (ref 11.5–15.5)
IMM GRANULOCYTES # BLD AUTO: 0.02 10*3/MM3 (ref 0–0.03)
IMM GRANULOCYTES NFR BLD AUTO: 0.2 % (ref 0–0.6)
LYMPHOCYTES # BLD AUTO: 2.9 10*3/MM3 (ref 0.6–4.8)
LYMPHOCYTES NFR BLD AUTO: 27.6 % (ref 24–44)
MCH RBC QN AUTO: 28.5 PG (ref 27–31)
MCHC RBC AUTO-ENTMCNC: 31.4 G/DL (ref 32–36)
MCV RBC AUTO: 90.8 FL (ref 80–99)
MONOCYTES # BLD AUTO: 0.5 10*3/MM3 (ref 0–1)
MONOCYTES NFR BLD AUTO: 4.8 % (ref 0–12)
NEUTROPHILS # BLD AUTO: 6.95 10*3/MM3 (ref 1.5–8.3)
NEUTROPHILS NFR BLD AUTO: 66 % (ref 41–71)
PLATELET # BLD AUTO: 335 10*3/MM3 (ref 150–450)
PMV BLD AUTO: 12.3 FL (ref 6–12)
POTASSIUM BLD-SCNC: 4.1 MMOL/L (ref 3.5–5.5)
PROT SERPL-MCNC: 6.7 G/DL (ref 5.7–8.2)
RBC # BLD AUTO: 4.14 10*6/MM3 (ref 3.89–5.14)
SODIUM BLD-SCNC: 138 MMOL/L (ref 132–146)
TSH SERPL DL<=0.05 MIU/L-ACNC: 1.27 MIU/ML (ref 0.35–5.35)
VIT B12 BLD-MCNC: 583 PG/ML (ref 211–911)
WBC NRBC COR # BLD: 10.52 10*3/MM3 (ref 3.5–10.8)

## 2019-01-07 PROCEDURE — 83036 HEMOGLOBIN GLYCOSYLATED A1C: CPT | Performed by: FAMILY MEDICINE

## 2019-01-07 PROCEDURE — 82306 VITAMIN D 25 HYDROXY: CPT | Performed by: FAMILY MEDICINE

## 2019-01-07 PROCEDURE — 84443 ASSAY THYROID STIM HORMONE: CPT | Performed by: FAMILY MEDICINE

## 2019-01-07 PROCEDURE — 85025 COMPLETE CBC W/AUTO DIFF WBC: CPT | Performed by: FAMILY MEDICINE

## 2019-01-07 PROCEDURE — 80053 COMPREHEN METABOLIC PANEL: CPT | Performed by: FAMILY MEDICINE

## 2019-01-07 PROCEDURE — 99214 OFFICE O/P EST MOD 30 MIN: CPT | Performed by: FAMILY MEDICINE

## 2019-01-07 PROCEDURE — 82607 VITAMIN B-12: CPT | Performed by: FAMILY MEDICINE

## 2019-01-07 RX ORDER — PEN NEEDLE, DIABETIC 30 GX3/16"
1 NEEDLE, DISPOSABLE MISCELLANEOUS DAILY
Qty: 30 EACH | Refills: 11 | Status: SHIPPED | OUTPATIENT
Start: 2019-01-07 | End: 2019-01-10

## 2019-01-07 RX ORDER — SERTRALINE HYDROCHLORIDE 25 MG/1
25 TABLET, FILM COATED ORAL DAILY
Qty: 30 TABLET | Refills: 5 | Status: SHIPPED | OUTPATIENT
Start: 2019-01-07 | End: 2019-05-16

## 2019-01-07 NOTE — PROGRESS NOTES
Geneva Cordova presents today to establish care.    Chief Complaint   Patient presents with   • Obesity     discuss weight loss   • Back Pain   • Fatigue     wondering about vitamin D and B12        Back Pain   This is a new problem. The pain is present in the thoracic spine. The quality of the pain is described as aching. Exacerbated by: large breast. Associated symptoms include weakness.   Fatigue   This is a new problem. Associated symptoms include fatigue and weakness. Treatments tried: multivitamin. The treatment provided no relief.   Obesity   This is a chronic problem. Associated symptoms include fatigue and weakness.        Since she had surgery had problems with vitamin D and B12. Used to be on weekly vit D, last took in Sept 2018. She hasn't been back to bariatric surgeon in some time not approved for SIPS. Used to take phentermine but stopped taking it in November. Maximum weight presurgery 435. Scared to get back to that weight. Stuck at 317 lbs. Started keto-diet today. Joined a gym.     Breasts cause upper back pain. Bra leaves indentation. Tries to lay down. Worse working as . Denies spasms. Not taking any OTC treatments for pain.     Left ear hearing loss. Sounds muffled.     Prior medications for depression include celexa and zoloft, but didn't take it as she was supposed to take it.     Review of Systems   Constitutional: Positive for fatigue and unexpected weight gain.   HENT: Positive for hearing loss.    Eyes: Negative.    Respiratory: Negative.    Cardiovascular: Negative.    Gastrointestinal: Negative.    Endocrine: Negative.    Genitourinary: Negative.    Musculoskeletal: Positive for back pain.   Neurological: Positive for weakness.   Hematological: Negative.    Psychiatric/Behavioral: Positive for depressed mood.        Past Medical History:   Diagnosis Date   • Anemia    • Anxiety    • Bilateral lower extremity edema    • Chronic back pain    • Depression    • Dyspepsia    •  "Dyspnea on exertion    • Fatigue    • Glucose intolerance (impaired glucose tolerance)     no problems since weight loss   • Helicobacter pylori (H. pylori) infection     asx and grossly nl EGD. \"abundant\"on path. HBT still + after PrevPak tx. LSG path neg.   • Hypoalbuminemia     3.2   • Migraine    • Obesity, morbid (CMS/HCC)    • VIRA (obstructive sleep apnea)      improved since WLS, does not use CPAP   • PCOS (polycystic ovarian syndrome)    • Seasonal allergies         Past Surgical History:   Procedure Laterality Date   •  SECTION N/A 2017    horizontal. Procedure:  SECTION PRIMARY;  Surgeon: Kwadwo Baxter MD;  Location: [facility] LABOR DELIVERY;  Service:    • DILATATION AND CURETTAGE     • ESSURE TUBAL LIGATION     • GASTRIC SLEEVE LAPAROSCOPIC  2015    With Dr. Hughes   • TONSILLECTOMY     • WISDOM TOOTH EXTRACTION          Family History   Problem Relation Age of Onset   • Sleep apnea Mother    • Diabetes Mother    • Pancreatic cancer Maternal Grandmother         Social History     Socioeconomic History   • Marital status: Single     Spouse name: Not on file   • Number of children: 4   • Years of education: College    • Highest education level: Not on file   Social Needs   • Financial resource strain: Not on file   • Food insecurity - worry: Not on file   • Food insecurity - inability: Not on file   • Transportation needs - medical: Not on file   • Transportation needs - non-medical: Not on file   Occupational History   • Occupation:    Tobacco Use   • Smoking status: Never Smoker   • Smokeless tobacco: Never Used   Substance and Sexual Activity   • Alcohol use: No   • Drug use: No   • Sexual activity: Yes     Partners: Male     Comment: no OCP   Other Topics Concern   • Not on file   Social History Narrative    Lives in [place]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  Works at [employer].         Current Outpatient Medications on File Prior to Visit   Medication Sig Dispense Refill " "  • Multiple Vitamins-Minerals (MULTIPLE VITAMINS/WOMENS) tablet Take 1 tablet by mouth Daily.     • [DISCONTINUED] fluconazole (DIFLUCAN) 150 MG tablet Take 1 tablet by mouth Daily. 2 tablet 0   • [DISCONTINUED] metroNIDAZOLE (METROGEL VAGINAL) 0.75 % vaginal gel Insert  into the vagina every night at bedtime. 70 g 0     No current facility-administered medications on file prior to visit.        No Known Allergies     Visit Vitals  /78 (BP Location: Left arm, Patient Position: Sitting, Cuff Size: Adult)   Pulse 60   Ht 177 cm (69.69\")   Wt (!) 146 kg (322 lb)   SpO2 100%   BMI 46.62 kg/m²        Physical Exam   Constitutional: She is oriented to person, place, and time. No distress. She is morbidly obese.  HENT:   Nose: Nose normal.   Mouth/Throat: Oropharynx is clear and moist.   Eyes: Conjunctivae are normal. Pupils are equal, round, and reactive to light.   Neck: Neck supple. No thyromegaly present.   Cardiovascular: Normal rate and regular rhythm.   No murmur heard.  Pulses:       Posterior tibial pulses are 2+ on the right side, and 2+ on the left side.   Pulmonary/Chest: Effort normal and breath sounds normal.   Abdominal: Soft. There is no hepatosplenomegaly. There is no tenderness.   Surgical scars   Musculoskeletal: She exhibits no edema.        Thoracic back: She exhibits no bony tenderness and no spasm.        Back:    Lymphadenopathy:     She has no cervical adenopathy.   Neurological: She is alert and oriented to person, place, and time.   Skin: Skin is warm and dry. No rash noted.   Psychiatric: Her speech is normal and behavior is normal. Judgment and thought content normal. Cognition and memory are normal. She exhibits a depressed mood ( Crying).   Vitals reviewed.            Geneva was seen today for obesity, back pain and fatigue.    Diagnoses and all orders for this visit:    BMI 45.0-49.9, adult (CMS/HCC)  -     TSH Rfx On Abnormal To Free T4; Future  -     Comprehensive Metabolic Panel; " Future  -     Hemoglobin A1c; Future  -     Liraglutide -Weight Management (SAXENDA) 18 MG/3ML solution pen-injector; Inject 0.6 mg under the skin into the appropriate area as directed Daily. Increase by 0.6 mg each week to a max dose of 3 mg daily  -     Insulin Pen Needle (PEN NEEDLES) 32G X 4 MM misc; 1 each Daily.  -     TSH Rfx On Abnormal To Free T4  -     Comprehensive Metabolic Panel  -     Hemoglobin A1c  Worse.  Reviewed previous PCP records showing phentermine which was stopped due to lack of efficacy and gaining weight.  Discussed with patient Saxenda and Contrave for long-term weight loss.  Start PA for Saxenda due to morbid obesity and comorbidities including glucose intolerance, PCO S, and sleep apnea. Reviewed bariatric surgery note from 2018 recommending follow-up as needed.  Agree with use of heated diet and increasing physical activity.  Patient's Body mass index is 46.62 kg/m². BMI is above normal parameters. Recommendations include: educational material, exercise counseling, nutrition counseling and pharmacological intervention.  Chronic fatigue  -     TSH Rfx On Abnormal To Free T4; Future  -     Comprehensive Metabolic Panel; Future  -     TSH Rfx On Abnormal To Free T4  -     Comprehensive Metabolic Panel  Check labs today.  She is taking a multivitamin but no other supplements at this time.  Vitamin D deficiency  -     Vitamin D 25 Hydroxy; Future  -     Vitamin D 25 Hydroxy  Check labs today.  She is taking a multivitamin but no other supplements at this time.  B12 deficiency  -     CBC & Differential; Future  -     Vitamin B12; Future  -     CBC & Differential  -     Vitamin B12  -     CBC Auto Differential  Check labs today.  She is taking a multivitamin but no other supplements at this time.  Moderate episode of recurrent major depressive disorder (CMS/HCC)  -     sertraline (ZOLOFT) 25 MG tablet; Take 1 tablet by mouth Daily.  New. Start SSRI.  Advised may take 4-6 weeks to notice an  effect.  Discussed potential side effects including headache, dizziness, GI symptoms, worsening mood or suicidal ideations.  Patient advised to call the office if develops any side effects or has questions. Reassess in one month.  Start Zoloft and reassess in 4 weeks.     Chronic bilateral thoracic back pain  New.  Blood pressure on exam.  Likely worsened by morbid obesity and large breasts.  Screening for diabetes mellitus  -     Hemoglobin A1c; Future  -     Hemoglobin A1c        Return in about 4 weeks (around 2/4/2019) for Follow-up.

## 2019-01-08 RX ORDER — ERGOCALCIFEROL 1.25 MG/1
50000 CAPSULE ORAL WEEKLY
Qty: 12 CAPSULE | Refills: 1 | Status: SHIPPED | OUTPATIENT
Start: 2019-01-08 | End: 2019-11-25

## 2019-01-10 ENCOUNTER — TELEPHONE (OUTPATIENT)
Dept: FAMILY MEDICINE CLINIC | Facility: CLINIC | Age: 34
End: 2019-01-10

## 2019-01-10 NOTE — TELEPHONE ENCOUNTER
Received letter from [employer] stating that drugs used for the treatment of weight management are not covered by [employer].  Unfortunately I do not have other options to help with weight loss aside from diet and exercise.

## 2019-01-11 NOTE — TELEPHONE ENCOUNTER
Called pt and advised that [facility] will not prescribe phentermine. Pt stated that she would pay out of pocket for it, I stated to her that I would seen the message back but as of right now [facility] will not prescribe the phentermine.     Please advise

## 2019-05-16 ENCOUNTER — OFFICE VISIT (OUTPATIENT)
Dept: FAMILY MEDICINE CLINIC | Facility: CLINIC | Age: 34
End: 2019-05-16

## 2019-05-16 VITALS
OXYGEN SATURATION: 99 % | HEIGHT: 70 IN | SYSTOLIC BLOOD PRESSURE: 118 MMHG | WEIGHT: 293 LBS | BODY MASS INDEX: 41.95 KG/M2 | DIASTOLIC BLOOD PRESSURE: 70 MMHG | HEART RATE: 63 BPM

## 2019-05-16 DIAGNOSIS — R53.82 CHRONIC FATIGUE: ICD-10-CM

## 2019-05-16 DIAGNOSIS — Z80.3 FH: BREAST CANCER: ICD-10-CM

## 2019-05-16 DIAGNOSIS — N64.4 BREAST PAIN: Primary | ICD-10-CM

## 2019-05-16 PROCEDURE — 99214 OFFICE O/P EST MOD 30 MIN: CPT | Performed by: FAMILY MEDICINE

## 2019-05-16 NOTE — PROGRESS NOTES
"Chief Complaint   Patient presents with   • Follow-up     weight loss        HPI     Obesity:  H/o bariatric surgery. 2 weeks ago started Jsoe exercise and eating better.     Maternal great Aunt had breast cancer. Breast pain from large breasts. No lumps. She gets rashes under breast, treated with baby powder.     Tired and sleepy. No improvement with vitamin D. Sleeping good at night, 9pm-6am but still exhausted. No snoring. Had 2 sleep studies which were normal.       Review of Systems   Constitutional: Positive for fatigue.   Respiratory: Negative for apnea.    Genitourinary: Positive for breast pain. Negative for breast lump.        Current Outpatient Medications on File Prior to Visit   Medication Sig Dispense Refill   • Multiple Vitamins-Minerals (MULTIPLE VITAMINS/WOMENS) tablet Take 1 tablet by mouth Daily.     • vitamin D (ERGOCALCIFEROL) 26157 units capsule capsule Take 1 capsule by mouth 1 (One) Time Per Week. 12 capsule 1   • [DISCONTINUED] sertraline (ZOLOFT) 25 MG tablet Take 1 tablet by mouth Daily. 30 tablet 5     No current facility-administered medications on file prior to visit.        No Known Allergies    Past Medical History:   Diagnosis Date   • Anemia    • Anxiety    • Bilateral lower extremity edema    • Chronic back pain    • Depression    • Dyspepsia    • Dyspnea on exertion    • Fatigue    • Glucose intolerance (impaired glucose tolerance)     no problems since weight loss   • Helicobacter pylori (H. pylori) infection     asx and grossly nl EGD. \"abundant\"on path. HBT still + after PrevPak tx. LSG path neg.   • Hypoalbuminemia     3.2   • Migraine    • Obesity, morbid (CMS/HCC)    • VIRA (obstructive sleep apnea)      improved since WLS, does not use CPAP   • PCOS (polycystic ovarian syndrome)    • Seasonal allergies         Past Surgical History:   Procedure Laterality Date   •  SECTION N/A 2017    horizontal. Procedure:  SECTION PRIMARY;  Surgeon: Kwadwo Baxter MD;  " "Location: [facility] LABOR DELIVERY;  Service:    • DILATATION AND CURETTAGE  2015   • ESSURE TUBAL LIGATION  2018   • GASTRIC SLEEVE LAPAROSCOPIC  07/2015    With Dr. Hughes   • TONSILLECTOMY  2014   • WISDOM TOOTH EXTRACTION  2014        Family History   Problem Relation Age of Onset   • Sleep apnea Mother    • Diabetes Mother    • Pancreatic cancer Maternal Grandmother    • Breast cancer Other         Social History     Socioeconomic History   • Marital status: Single     Spouse name: Not on file   • Number of children: 4   • Years of education: College    • Highest education level: Not on file   Occupational History   • Occupation:    Tobacco Use   • Smoking status: Never Smoker   • Smokeless tobacco: Never Used   Substance and Sexual Activity   • Alcohol use: No   • Drug use: No   • Sexual activity: Yes     Partners: Male     Comment: no OCP   Social History Narrative    Lives in [place]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  Works at [employer].         Visit Vitals  /70 (BP Location: Left arm, Patient Position: Sitting, Cuff Size: Large Adult)   Pulse 63   Ht 177.8 cm (70\")   Wt (!) 152 kg (335 lb)   SpO2 99%   BMI 48.07 kg/m²        Physical Exam    Results for orders placed or performed in visit on 01/07/19   Vitamin B12   Result Value Ref Range    Vitamin B-12 583 211 - 911 pg/mL   Vitamin D 25 Hydroxy   Result Value Ref Range    25 Hydroxy, Vitamin D 9.5 ng/ml   TSH Rfx On Abnormal To Free T4   Result Value Ref Range    TSH 1.273 0.350 - 5.350 mIU/mL   Comprehensive Metabolic Panel   Result Value Ref Range    Glucose 77 70 - 100 mg/dL    BUN 10 9 - 23 mg/dL    Creatinine 0.75 0.60 - 1.30 mg/dL    Sodium 138 132 - 146 mmol/L    Potassium 4.1 3.5 - 5.5 mmol/L    Chloride 107 99 - 109 mmol/L    CO2 29.0 20.0 - 31.0 mmol/L    Calcium 9.0 8.7 - 10.4 mg/dL    Total Protein 6.7 5.7 - 8.2 g/dL    Albumin 4.18 3.20 - 4.80 g/dL    ALT (SGPT) 17 7 - 40 U/L    AST (SGOT) 22 0 - 33 U/L    Alkaline Phosphatase 74 25 - 100 U/L    " Total Bilirubin 0.5 0.3 - 1.2 mg/dL    eGFR  African Amer 109 >60 mL/min/1.73    Globulin 2.5 gm/dL    A/G Ratio 1.7 1.5 - 2.5 g/dL    BUN/Creatinine Ratio 13.3 7.0 - 25.0    Anion Gap 2.0 (L) 3.0 - 11.0 mmol/L   Hemoglobin A1c   Result Value Ref Range    Hemoglobin A1C 5.30 4.80 - 5.60 %   CBC Auto Differential   Result Value Ref Range    WBC 10.52 3.50 - 10.80 10*3/mm3    RBC 4.14 3.89 - 5.14 10*6/mm3    Hemoglobin 11.8 11.5 - 15.5 g/dL    Hematocrit 37.6 34.5 - 44.0 %    MCV 90.8 80.0 - 99.0 fL    MCH 28.5 27.0 - 31.0 pg    MCHC 31.4 (L) 32.0 - 36.0 g/dL    RDW 13.3 11.3 - 14.5 %    RDW-SD 43.9 37.0 - 54.0 fl    MPV 12.3 (H) 6.0 - 12.0 fL    Platelets 335 150 - 450 10*3/mm3    Neutrophil % 66.0 41.0 - 71.0 %    Lymphocyte % 27.6 24.0 - 44.0 %    Monocyte % 4.8 0.0 - 12.0 %    Eosinophil % 1.4 0.0 - 3.0 %    Basophil % 0.2 0.0 - 1.0 %    Immature Grans % 0.2 0.0 - 0.6 %    Neutrophils, Absolute 6.95 1.50 - 8.30 10*3/mm3    Lymphocytes, Absolute 2.90 0.60 - 4.80 10*3/mm3    Monocytes, Absolute 0.50 0.00 - 1.00 10*3/mm3    Eosinophils, Absolute 0.15 0.00 - 0.30 10*3/mm3    Basophils, Absolute 0.02 0.00 - 0.20 10*3/mm3    Immature Grans, Absolute 0.02 0.00 - 0.03 10*3/mm3        Geneva was seen today for follow-up.    Diagnoses and all orders for this visit:    Breast pain  -     Mammo Diagnostic Digital Tomosynthesis Bilateral With CAD; Future  -     US Breast Bilateral Complete; Future  Patient is concerned and would like mammogram.  Discussed her age would likely need a breast ultrasound as well.  FH: breast cancer  -     Mammo Diagnostic Digital Tomosynthesis Bilateral With CAD; Future  -     US Breast Bilateral Complete; Future  Patient is concerned and would like mammogram.  Discussed her age would likely need a breast ultrasound as well.  Chronic fatigue  Reviewed lab results from January, offered with patient to repeat labs.  She states that she has been taking the vitamin D but no improvement.  Additionally  patient has been noncompliant with her antidepressant medication.  Other factors to consider include sleep apnea which was listed in her chart but patient states that she is not on CPAP and her results were fine.  Requested sleep study results from [facility].  BMI 45.0-49.9, adult (CMS/[facility])  At last visit prescribed Saxenda but insurance plan excludes weight loss medications.  Patient's Body mass index is 48.07 kg/m². BMI is above normal parameters. Recommendations include: exercise counseling.        No Follow-up on file.

## 2019-05-20 ENCOUNTER — TELEPHONE (OUTPATIENT)
Dept: FAMILY MEDICINE CLINIC | Facility: CLINIC | Age: 34
End: 2019-05-20

## 2019-05-20 DIAGNOSIS — G89.29 CHRONIC BILATERAL THORACIC BACK PAIN: Primary | ICD-10-CM

## 2019-05-20 DIAGNOSIS — M54.6 CHRONIC BILATERAL THORACIC BACK PAIN: Primary | ICD-10-CM

## 2019-05-20 NOTE — TELEPHONE ENCOUNTER
Pt needs a referral for physical therapy, for back pain. Pt has an appt with them on thursday.   [facility]  Phone [phone]

## 2019-05-21 NOTE — TELEPHONE ENCOUNTER
PATIENT HAS AN APPT TOMORROW AT [facility] PHYSICAL THERAPY. SHE IS CHECKING ON THE STATUS OF THE REFERRAL.     PLEASE CALL PATIENT WHEN COMPLETED.

## 2019-06-07 ENCOUNTER — APPOINTMENT (OUTPATIENT)
Dept: MAMMOGRAPHY | Facility: HOSPITAL | Age: 34
End: 2019-06-07

## 2019-06-26 ENCOUNTER — HOSPITAL ENCOUNTER (OUTPATIENT)
Dept: MAMMOGRAPHY | Facility: HOSPITAL | Age: 34
Discharge: HOME | End: 2019-06-26
Admitting: FAMILY MEDICINE

## 2019-06-26 DIAGNOSIS — Z80.3 FH: BREAST CANCER: ICD-10-CM

## 2019-06-26 DIAGNOSIS — N64.4 BREAST PAIN: ICD-10-CM

## 2019-06-26 PROCEDURE — 77066 DX MAMMO INCL CAD BI: CPT | Performed by: RADIOLOGY

## 2019-06-26 PROCEDURE — 77062 BREAST TOMOSYNTHESIS BI: CPT | Performed by: RADIOLOGY

## 2019-06-26 PROCEDURE — 77066 DX MAMMO INCL CAD BI: CPT

## 2019-06-26 PROCEDURE — G0279 TOMOSYNTHESIS, MAMMO: HCPCS

## 2019-07-30 ENCOUNTER — OFFICE VISIT (OUTPATIENT)
Dept: FAMILY MEDICINE CLINIC | Facility: CLINIC | Age: 34
End: 2019-07-30

## 2019-07-30 ENCOUNTER — LAB (OUTPATIENT)
Dept: LAB | Facility: HOSPITAL | Age: 34
End: 2019-07-30

## 2019-07-30 VITALS
HEART RATE: 70 BPM | SYSTOLIC BLOOD PRESSURE: 118 MMHG | OXYGEN SATURATION: 98 % | WEIGHT: 293 LBS | BODY MASS INDEX: 41.95 KG/M2 | HEIGHT: 70 IN | DIASTOLIC BLOOD PRESSURE: 70 MMHG

## 2019-07-30 DIAGNOSIS — R53.82 CHRONIC FATIGUE: ICD-10-CM

## 2019-07-30 DIAGNOSIS — N89.8 VAGINAL DISCHARGE: ICD-10-CM

## 2019-07-30 DIAGNOSIS — N30.01 ACUTE CYSTITIS WITH HEMATURIA: ICD-10-CM

## 2019-07-30 DIAGNOSIS — G47.9 SLEEP DISORDER: ICD-10-CM

## 2019-07-30 DIAGNOSIS — R30.0 DYSURIA: ICD-10-CM

## 2019-07-30 DIAGNOSIS — N30.01 ACUTE CYSTITIS WITH HEMATURIA: Primary | ICD-10-CM

## 2019-07-30 LAB
ALBUMIN SERPL-MCNC: 4.1 G/DL (ref 3.5–5.2)
ALBUMIN/GLOB SERPL: 1.1 G/DL
ALP SERPL-CCNC: 79 U/L (ref 39–117)
ALT SERPL W P-5'-P-CCNC: 14 U/L (ref 1–33)
ANION GAP SERPL CALCULATED.3IONS-SCNC: 10.8 MMOL/L (ref 5–15)
AST SERPL-CCNC: 17 U/L (ref 1–32)
BASOPHILS # BLD AUTO: 0.04 10*3/MM3 (ref 0–0.2)
BASOPHILS NFR BLD AUTO: 0.6 % (ref 0–1.5)
BILIRUB BLD-MCNC: NEGATIVE MG/DL
BILIRUB SERPL-MCNC: 0.6 MG/DL (ref 0.2–1.2)
BUN BLD-MCNC: 7 MG/DL (ref 6–20)
BUN/CREAT SERPL: 9 (ref 7–25)
CALCIUM SPEC-SCNC: 9.3 MG/DL (ref 8.6–10.5)
CHLORIDE SERPL-SCNC: 104 MMOL/L (ref 98–107)
CLARITY, POC: ABNORMAL
CO2 SERPL-SCNC: 25.2 MMOL/L (ref 22–29)
COLOR UR: ABNORMAL
CREAT BLD-MCNC: 0.78 MG/DL (ref 0.57–1)
DEPRECATED RDW RBC AUTO: 44.5 FL (ref 37–54)
EOSINOPHIL # BLD AUTO: 0.15 10*3/MM3 (ref 0–0.4)
EOSINOPHIL NFR BLD AUTO: 2.1 % (ref 0.3–6.2)
ERYTHROCYTE [DISTWIDTH] IN BLOOD BY AUTOMATED COUNT: 13.2 % (ref 12.3–15.4)
GFR SERPL CREATININE-BSD FRML MDRD: 104 ML/MIN/1.73
GLOBULIN UR ELPH-MCNC: 3.6 GM/DL
GLUCOSE BLD-MCNC: 91 MG/DL (ref 65–99)
GLUCOSE UR STRIP-MCNC: NEGATIVE MG/DL
HCT VFR BLD AUTO: 41.6 % (ref 34–46.6)
HGB BLD-MCNC: 12.4 G/DL (ref 12–15.9)
IMM GRANULOCYTES # BLD AUTO: 0.02 10*3/MM3 (ref 0–0.05)
IMM GRANULOCYTES NFR BLD AUTO: 0.3 % (ref 0–0.5)
KETONES UR QL: NEGATIVE
LEUKOCYTE EST, POC: ABNORMAL
LYMPHOCYTES # BLD AUTO: 2.11 10*3/MM3 (ref 0.7–3.1)
LYMPHOCYTES NFR BLD AUTO: 29.1 % (ref 19.6–45.3)
MCH RBC QN AUTO: 27.6 PG (ref 26.6–33)
MCHC RBC AUTO-ENTMCNC: 29.8 G/DL (ref 31.5–35.7)
MCV RBC AUTO: 92.7 FL (ref 79–97)
MONOCYTES # BLD AUTO: 0.33 10*3/MM3 (ref 0.1–0.9)
MONOCYTES NFR BLD AUTO: 4.5 % (ref 5–12)
NEUTROPHILS # BLD AUTO: 4.61 10*3/MM3 (ref 1.7–7)
NEUTROPHILS NFR BLD AUTO: 63.4 % (ref 42.7–76)
NITRITE UR-MCNC: POSITIVE MG/ML
NRBC BLD AUTO-RTO: 0 /100 WBC (ref 0–0.2)
PH UR: 5.5 [PH] (ref 5–8)
PLATELET # BLD AUTO: 335 10*3/MM3 (ref 140–450)
PMV BLD AUTO: 12.5 FL (ref 6–12)
POTASSIUM BLD-SCNC: 4 MMOL/L (ref 3.5–5.2)
PROT SERPL-MCNC: 7.7 G/DL (ref 6–8.5)
PROT UR STRIP-MCNC: ABNORMAL MG/DL
RBC # BLD AUTO: 4.49 10*6/MM3 (ref 3.77–5.28)
RBC # UR STRIP: ABNORMAL /UL
SODIUM BLD-SCNC: 140 MMOL/L (ref 136–145)
SP GR UR: 1.03 (ref 1–1.03)
TSH SERPL DL<=0.05 MIU/L-ACNC: 0.93 MIU/ML (ref 0.27–4.2)
UROBILINOGEN UR QL: ABNORMAL
VIT B12 BLD-MCNC: 496 PG/ML (ref 211–946)
WBC NRBC COR # BLD: 7.26 10*3/MM3 (ref 3.4–10.8)

## 2019-07-30 PROCEDURE — 87798 DETECT AGENT NOS DNA AMP: CPT | Performed by: FAMILY MEDICINE

## 2019-07-30 PROCEDURE — 87186 SC STD MICRODIL/AGAR DIL: CPT | Performed by: FAMILY MEDICINE

## 2019-07-30 PROCEDURE — 87077 CULTURE AEROBIC IDENTIFY: CPT | Performed by: FAMILY MEDICINE

## 2019-07-30 PROCEDURE — 87491 CHLMYD TRACH DNA AMP PROBE: CPT | Performed by: FAMILY MEDICINE

## 2019-07-30 PROCEDURE — 87086 URINE CULTURE/COLONY COUNT: CPT | Performed by: FAMILY MEDICINE

## 2019-07-30 PROCEDURE — 87591 N.GONORRHOEAE DNA AMP PROB: CPT | Performed by: FAMILY MEDICINE

## 2019-07-30 PROCEDURE — 87801 DETECT AGNT MULT DNA AMPLI: CPT | Performed by: FAMILY MEDICINE

## 2019-07-30 PROCEDURE — 85025 COMPLETE CBC W/AUTO DIFF WBC: CPT | Performed by: FAMILY MEDICINE

## 2019-07-30 PROCEDURE — 80053 COMPREHEN METABOLIC PANEL: CPT | Performed by: FAMILY MEDICINE

## 2019-07-30 PROCEDURE — 84443 ASSAY THYROID STIM HORMONE: CPT | Performed by: FAMILY MEDICINE

## 2019-07-30 PROCEDURE — 99214 OFFICE O/P EST MOD 30 MIN: CPT | Performed by: FAMILY MEDICINE

## 2019-07-30 PROCEDURE — 87661 TRICHOMONAS VAGINALIS AMPLIF: CPT | Performed by: FAMILY MEDICINE

## 2019-07-30 PROCEDURE — 82607 VITAMIN B-12: CPT | Performed by: FAMILY MEDICINE

## 2019-07-30 RX ORDER — FLUCONAZOLE 150 MG/1
150 TABLET ORAL ONCE
Qty: 2 TABLET | Refills: 0 | Status: SHIPPED | OUTPATIENT
Start: 2019-07-30 | End: 2019-07-30

## 2019-07-30 RX ORDER — SULFAMETHOXAZOLE AND TRIMETHOPRIM 800; 160 MG/1; MG/1
1 TABLET ORAL 2 TIMES DAILY
Qty: 6 TABLET | Refills: 0 | Status: SHIPPED | OUTPATIENT
Start: 2019-07-30 | End: 2019-08-02

## 2019-07-30 NOTE — PROGRESS NOTES
Chief Complaint   Patient presents with   • Vaginal Discharge     itching and burning, symptoms have been going on for 2 days. Just recently had sex, states that her and boyfriend hadn't done anything in a while.        Vaginal Discharge   The patient's primary symptoms include genital itching and vaginal discharge. This is a new problem. The current episode started in the past 7 days (2 days). The problem occurs constantly. The problem has been unchanged. Associated symptoms include abdominal pain and dysuria. The vaginal discharge was thick and white. She is sexually active. It is unknown whether or not her partner has an STD. She uses tubal ligation (Essure ) for contraception. Her past medical history is significant for ovarian cysts.      White, creamy discharge. Last sex 3 days ago, prior to that 2-3 months ago. No condoms used.     Body feels off like something is missing. Episodes she's wide awake and then blacks out, driving or while at work. She pulls over on the street when it happens, wakes up all of a sudden. Always tired.     Review of Systems   Constitutional: Positive for fatigue.   Gastrointestinal: Positive for abdominal pain.   Genitourinary: Positive for dysuria, vaginal discharge and vaginal pain.   Neurological:        Black-outs   Psychiatric/Behavioral: Positive for sleep disturbance.        Current Outpatient Medications on File Prior to Visit   Medication Sig Dispense Refill   • Multiple Vitamins-Minerals (MULTIPLE VITAMINS/WOMENS) tablet Take 1 tablet by mouth Daily.     • vitamin D (ERGOCALCIFEROL) 09771 units capsule capsule Take 1 capsule by mouth 1 (One) Time Per Week. 12 capsule 1     No current facility-administered medications on file prior to visit.        No Known Allergies    Past Medical History:   Diagnosis Date   • Anemia    • Anxiety    • Bilateral lower extremity edema    • Chronic back pain    • Depression    • Dyspepsia    • Dyspnea on exertion    • Fatigue    • Glucose  "intolerance (impaired glucose tolerance)     no problems since weight loss   • Helicobacter pylori (H. pylori) infection     asx and grossly nl EGD. \"abundant\"on path. HBT still + after PrevPak tx. LSG path neg.   • Hypoalbuminemia     3.2   • Migraine    • Obesity, morbid (CMS/HCC)    • VIRA (obstructive sleep apnea)      improved since WLS, does not use CPAP   • PCOS (polycystic ovarian syndrome)    • Seasonal allergies         Past Surgical History:   Procedure Laterality Date   •  SECTION N/A 2017    horizontal. Procedure:  SECTION PRIMARY;  Surgeon: Kwadwo Baxter MD;  Location: [facility] LABOR DELIVERY;  Service:    • DILATATION AND CURETTAGE     • ESSURE TUBAL LIGATION     • GASTRIC SLEEVE LAPAROSCOPIC  2015    With Dr. Hughes   • TONSILLECTOMY     • WISDOM TOOTH EXTRACTION          Family History   Problem Relation Age of Onset   • No Known Problems Other    • Sleep apnea Mother    • Diabetes Mother    • No Known Problems Father    • Pancreatic cancer Maternal Grandmother    • Breast cancer Other    • No Known Problems Sister    • No Known Problems Brother    • No Known Problems Daughter    • No Known Problems Son    • No Known Problems Paternal Grandmother    • No Known Problems Maternal Aunt    • No Known Problems Paternal Aunt    • BRCA 1/2 Neg Hx    • Colon cancer Neg Hx    • Endometrial cancer Neg Hx    • Ovarian cancer Neg Hx         Social History     Socioeconomic History   • Marital status: Single     Spouse name: Not on file   • Number of children: 4   • Years of education: College    • Highest education level: Not on file   Occupational History   • Occupation:    Tobacco Use   • Smoking status: Never Smoker   • Smokeless tobacco: Never Used   Substance and Sexual Activity   • Alcohol use: No   • Drug use: No   • Sexual activity: Yes     Partners: Male     Comment: no OCP   Social History Narrative    Lives in [place]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  Works at [employer].  " "       Visit Vitals  /70 (BP Location: Right arm, Patient Position: Sitting, Cuff Size: Adult)   Pulse 70   Ht 177.8 cm (70\")   Wt (!) 151 kg (332 lb 12.8 oz)   SpO2 98%   BMI 47.75 kg/m²        Physical Exam   Constitutional: No distress. She is obese.  Neck: Neck supple. No thyromegaly present.   Cardiovascular: Normal rate and regular rhythm.   No murmur heard.  Pulmonary/Chest: Effort normal and breath sounds normal.   Genitourinary: Uterus normal and cervix normal. Cervix does not exhibit motion tenderness. Right adnexum displays no tenderness. Left adnexum displays no tenderness. No erythema or bleeding in the vagina. Vaginal discharge ( white) found.   Genitourinary Comments: Lisa Wan MA     Psychiatric: She has a normal mood and affect.   Vitals reviewed.      Results for orders placed or performed in visit on 07/30/19   POCT urinalysis dipstick, automated   Result Value Ref Range    Color Orange (A) Yellow, Straw, Dark Yellow, Fatoumata    Clarity, UA Cloudy (A) Clear    Specific Gravity  1.030 1.005 - 1.030    pH, Urine 5.5 5.0 - 8.0    Leukocytes Large (3+) (A) Negative    Nitrite, UA Positive (A) Negative    Protein, POC Trace (A) Negative mg/dL    Glucose, UA Negative Negative, 1000 mg/dL (3+) mg/dL    Ketones, UA Negative Negative    Urobilinogen, UA 1 E.U./dL  (A) Normal    Bilirubin Negative Negative    Blood, UA Trace (A) Negative        Geneva was seen today for vaginal discharge.    Diagnoses and all orders for this visit:    Acute cystitis with hematuria  -     sulfamethoxazole-trimethoprim (BACTRIM DS,SEPTRA DS) 800-160 MG per tablet; Take 1 tablet by mouth 2 (Two) Times a Day for 3 days.  -     Urine Culture - Urine, Urine, Clean Catch; Future  New.  Bactrim for 3 days.  Vaginal discharge  -     fluconazole (DIFLUCAN) 150 MG tablet; Take 1 tablet by mouth 1 (One) Time for 1 dose. Repeat in 3 days if needed  -     NuSwab VG+ - Swab, Vagina; Future  Concern for yeast infection. "  Will send swab for further testing.  Treat empirically with Diflucan and repeat in 3 days if needed.  Dysuria  -     POCT urinalysis dipstick, automated    Chronic fatigue  -     Ambulatory Referral to Sleep Medicine  -     CBC & Differential; Future  -     Comprehensive Metabolic Panel; Future  -     TSH Rfx On Abnormal To Free T4; Future  -     Vitamin B12; Future  Recheck labs today.  Concern with her morbid obesity if sleep apnea is related.  Patient states it has been many years since she last had a sleep test done.  Sleep disorder  -     Ambulatory Referral to Sleep Medicine  Concern with her morbid obesity if sleep apnea is related.  Patient states it has been many years since she last had a sleep test done.  Additionally it is unclear if the spells that she has been happening is related to daytime sleepiness or other problems.        Return if symptoms worsen or fail to improve.

## 2019-08-01 LAB — BACTERIA SPEC AEROBE CULT: ABNORMAL

## 2019-08-02 LAB
A VAGINAE DNA VAG QL NAA+PROBE: ABNORMAL SCORE
BVAB2 DNA VAG QL NAA+PROBE: ABNORMAL SCORE
C ALBICANS DNA VAG QL NAA+PROBE: NEGATIVE
C GLABRATA DNA VAG QL NAA+PROBE: NEGATIVE
C TRACH RRNA SPEC DONR QL NAA+PROBE: NEGATIVE
MEGASPHAERA 1: ABNORMAL SCORE
N GONORRHOEA DNA SPEC QL NAA+PROBE: NEGATIVE
T VAGINALIS RRNA GENITAL QL PROBE: POSITIVE

## 2019-08-02 RX ORDER — METRONIDAZOLE 500 MG/1
500 TABLET ORAL 2 TIMES DAILY
Qty: 14 TABLET | Refills: 0 | Status: SHIPPED | OUTPATIENT
Start: 2019-08-02 | End: 2019-08-09

## 2019-08-26 ENCOUNTER — OFFICE VISIT (OUTPATIENT)
Dept: BARIATRICS/WEIGHT MGMT | Facility: CLINIC | Age: 34
End: 2019-08-26

## 2019-08-26 VITALS
DIASTOLIC BLOOD PRESSURE: 82 MMHG | HEIGHT: 70 IN | HEART RATE: 75 BPM | SYSTOLIC BLOOD PRESSURE: 132 MMHG | OXYGEN SATURATION: 99 % | RESPIRATION RATE: 18 BRPM | TEMPERATURE: 96.2 F | BODY MASS INDEX: 41.95 KG/M2 | WEIGHT: 293 LBS

## 2019-08-26 DIAGNOSIS — E66.01 MORBID OBESITY (HCC): Primary | ICD-10-CM

## 2019-08-26 PROCEDURE — 99213 OFFICE O/P EST LOW 20 MIN: CPT | Performed by: PHYSICIAN ASSISTANT

## 2019-08-26 NOTE — PROGRESS NOTES
"[facility] Bariatric Surgery  [address]  [facility] [id number] [phone]    Patient Name:  Geneva Cordova.  :  1985      Date of Visit:  2019      Reason for Visit:  Monthly Diet Visit      HPI:  33 y.o. female s/p LSG by Dr. Hughes on 7/17/15.  Pursued SIPS revision 2018 but insurance denied.  Now wishes to pursue potential sleeve revision.      Presurgery weight:  425 lbs.  Lowest weight achieved:  301 lbs.  Current weight:  333 lbs.    Presents for monthly supervised diet visit.  Has more recently been following w/ PCP.  Says just can't seem to get past 330 lbs.  Even tried Phentermine w/out success.  Is so fearful of regaining to 340+lbs.  Has even had moments of binging and purging.  Feels she is really struggling.  Now trying to follow a KETO diet, but still is not seeing the number on the scale change.  Admits she probably needs to be more mindful of her food choices.  Just recently started reading food labels again.  Has not been paying attention to HFCS.  Avoids sodas.  Drinking more water, but also admits to drinking lemonade and sweet teas.  Walking outside for exercise, at least 3x/week, 3 miles at a time.         Past Medical History:   Diagnosis Date   • Anemia    • Anxiety    • Bilateral lower extremity edema    • Chronic back pain    • Depression    • Dyspepsia    • Dyspnea on exertion    • Fatigue    • Glucose intolerance (impaired glucose tolerance)     no problems since weight loss   • Helicobacter pylori (H. pylori) infection     asx and grossly nl EGD. \"abundant\"on path. HBT still + after PrevPak tx. LSG path neg.   • Hypoalbuminemia     3.2   • Migraine    • Obesity, morbid (CMS/HCC)    • VIRA (obstructive sleep apnea)      improved since WLS, does not use CPAP   • PCOS (polycystic ovarian syndrome)    • Seasonal allergies      Past Surgical History:   Procedure Laterality Date   •  SECTION N/A 2017    horizontal. Procedure: " " SECTION PRIMARY;  Surgeon: Kwadwo Baxter MD;  Location: [facility] LABOR DELIVERY;  Service:    • DILATATION AND CURETTAGE     • ESSURE TUBAL LIGATION     • GASTRIC SLEEVE LAPAROSCOPIC  2015    With Dr. Hughes   • TONSILLECTOMY     • WISDOM TOOTH EXTRACTION         No Known Allergies      Current Outpatient Medications:   •  vitamin D (ERGOCALCIFEROL) 85419 units capsule capsule, Take 1 capsule by mouth 1 (One) Time Per Week., Disp: 12 capsule, Rfl: 1  •  Multiple Vitamins-Minerals (MULTIPLE VITAMINS/WOMENS) tablet, Take 1 tablet by mouth Daily., Disp: , Rfl:       /82 (BP Location: Left arm, Patient Position: Sitting, Cuff Size: Large Adult)   Pulse 75   Temp 96.2 °F (35.7 °C) (Temporal)   Resp 18   Ht 177.8 cm (70\")   Wt (!) 151 kg (333 lb 8 oz)   SpO2 99%   BMI 47.85 kg/m²     Physical Exam   Constitutional: She appears well-developed and well-nourished.   Cardiovascular: Normal rate.   Pulmonary/Chest: Effort normal.   Musculoskeletal: Normal range of motion.   Neurological: She is alert.   Psychiatric: She has a normal mood and affect. Judgment and thought content normal.         Assessment:   Pursuing Revision WLS.    ICD-10-CM ICD-9-CM   1. Morbid obesity (CMS/[facility]) E66.01 278.01       Discussion/Plan:  During diet appointments the patient is educated on high quality nutrition and habits to facilitate good health and possibly some weight loss. Necessary lifestyle changes and behavior modifications were discussed. Please note, the patient remains compliant in completing her diet requirements.     Goals:   1. Start reading labels.  2. Make mindful healthy food choices, avoid HFCS.   3. Increase daily exercise/activity as able.      Follow up in 1 month.  Will refer to dietitian for further eval/assistance.  Call w/ issues/concerns.       "

## 2019-10-28 ENCOUNTER — OFFICE VISIT (OUTPATIENT)
Dept: BARIATRICS/WEIGHT MGMT | Facility: CLINIC | Age: 34
End: 2019-10-28

## 2019-10-28 VITALS
TEMPERATURE: 97.8 F | RESPIRATION RATE: 18 BRPM | OXYGEN SATURATION: 100 % | SYSTOLIC BLOOD PRESSURE: 134 MMHG | HEART RATE: 66 BPM | BODY MASS INDEX: 41.95 KG/M2 | HEIGHT: 70 IN | WEIGHT: 293 LBS | DIASTOLIC BLOOD PRESSURE: 82 MMHG

## 2019-10-28 DIAGNOSIS — E66.01 MORBID OBESITY (HCC): Primary | ICD-10-CM

## 2019-10-28 PROCEDURE — 99213 OFFICE O/P EST LOW 20 MIN: CPT | Performed by: PHYSICIAN ASSISTANT

## 2019-10-28 NOTE — PROGRESS NOTES
"[facility] Bariatric Surgery  [address]  [facility] [address] [phone]    Patient Name:  Geneva Cordova.  :  1985      Date of Visit:  10/28/2019      Reason for Visit:  Monthly Diet Visit      HPI:  33 y.o. female s/p LSG by Dr. Hughes on 7/17/15.  Pursued SIPS revision 2018 but insurance denied.  Now wishes to pursue potential sleeve revision.      Presurgery weight:  425 lbs.  Lowest weight achieved:  301 lbs.  Current weight:  342 lbs.    Discouraged b/c she feels that despite her best efforts she just keeps gaining weight.  Reading labels.  Working to avoid HFCS.  Avoiding breads.  Drinking protein shakes.  And drinking mostly water otherwise, but also still getting at least 2 [employer] sweet teas/week.  Suspects she is probably snacking too much in the evenings as well, sometimes skips dinner.  Planning to actually start Jose 2x/week today.  Also plans to start walking daily for exercise as well.      Past Medical History:   Diagnosis Date   • Anemia    • Anxiety    • Bilateral lower extremity edema    • Chronic back pain    • Depression    • Dyspepsia    • Dyspnea on exertion    • Fatigue    • Glucose intolerance (impaired glucose tolerance)     no problems since weight loss   • Helicobacter pylori (H. pylori) infection     asx and grossly nl EGD. \"abundant\"on path. HBT still + after PrevPak tx. LSG path neg.   • Hypoalbuminemia     3.2   • Migraine    • Obesity, morbid (CMS/HCC)    • VIRA (obstructive sleep apnea)      improved since WLS, does not use CPAP   • PCOS (polycystic ovarian syndrome)    • Seasonal allergies      Past Surgical History:   Procedure Laterality Date   •  SECTION N/A 2017    horizontal. Procedure:  SECTION PRIMARY;  Surgeon: Kwadwo Baxter MD;  Location: [facility] LABOR DELIVERY;  Service:    • DILATATION AND CURETTAGE     • ESSURE TUBAL LIGATION     • GASTRIC SLEEVE LAPAROSCOPIC  2015    With Dr. Hughes   • " "TONSILLECTOMY  2014   • WISDOM TOOTH EXTRACTION  2014       No Known Allergies      Current Outpatient Medications:   •  Multiple Vitamins-Minerals (MULTIPLE VITAMINS/WOMENS) tablet, Take 1 tablet by mouth Daily., Disp: , Rfl:   •  vitamin D (ERGOCALCIFEROL) 90614 units capsule capsule, Take 1 capsule by mouth 1 (One) Time Per Week., Disp: 12 capsule, Rfl: 1      /82 (BP Location: Left arm, Patient Position: Sitting, Cuff Size: Large Adult)   Pulse 66   Temp 97.8 °F (36.6 °C) (Temporal)   Resp 18   Ht 177.8 cm (70\")   Wt (!) 155 kg (342 lb)   SpO2 100%   BMI 49.07 kg/m²     Physical Exam   Constitutional: She appears well-developed and well-nourished.   Cardiovascular: Normal rate.   Pulmonary/Chest: Effort normal.   Musculoskeletal: Normal range of motion.   Neurological: She is alert.   Psychiatric: She has a normal mood and affect. Judgment and thought content normal.         Assessment:   Pursuing Revision WLS.    ICD-10-CM ICD-9-CM   1. Morbid obesity (CMS/[facility]) E66.01 278.01       Discussion/Plan:  During diet appointments the patient is educated on high quality nutrition and habits to facilitate good health and possibly some weight loss. Necessary lifestyle changes and behavior modifications were discussed. Please note, the patient remains compliant in completing her diet requirements.     Goals:   1. Continue w/ healthy food choices.  2. Eliminate after dinner snacking.   3. Increase daily exercise/activity as planned.      Follow up in 1 month.  Call w/ issues/concerns.       "

## 2019-10-30 PROCEDURE — 87591 N.GONORRHOEAE DNA AMP PROB: CPT | Performed by: NURSE PRACTITIONER

## 2019-10-30 PROCEDURE — 87661 TRICHOMONAS VAGINALIS AMPLIF: CPT | Performed by: NURSE PRACTITIONER

## 2019-10-30 PROCEDURE — 87491 CHLMYD TRACH DNA AMP PROBE: CPT | Performed by: NURSE PRACTITIONER

## 2019-11-03 ENCOUNTER — TELEPHONE (OUTPATIENT)
Dept: URGENT CARE | Facility: CLINIC | Age: 34
End: 2019-11-03

## 2019-11-19 ENCOUNTER — OFFICE VISIT (OUTPATIENT)
Dept: BARIATRICS/WEIGHT MGMT | Facility: CLINIC | Age: 34
End: 2019-11-19

## 2019-11-19 ENCOUNTER — DOCUMENTATION (OUTPATIENT)
Dept: BARIATRICS/WEIGHT MGMT | Facility: CLINIC | Age: 34
End: 2019-11-19

## 2019-11-19 ENCOUNTER — OFFICE VISIT (OUTPATIENT)
Dept: PSYCHIATRY | Facility: CLINIC | Age: 34
End: 2019-11-19

## 2019-11-19 VITALS
WEIGHT: 293 LBS | HEART RATE: 72 BPM | BODY MASS INDEX: 41.95 KG/M2 | RESPIRATION RATE: 18 BRPM | OXYGEN SATURATION: 98 % | DIASTOLIC BLOOD PRESSURE: 72 MMHG | SYSTOLIC BLOOD PRESSURE: 128 MMHG | HEIGHT: 70 IN | TEMPERATURE: 96.5 F

## 2019-11-19 DIAGNOSIS — G89.29 CHRONIC BACK PAIN, UNSPECIFIED BACK LOCATION, UNSPECIFIED BACK PAIN LATERALITY: ICD-10-CM

## 2019-11-19 DIAGNOSIS — F32.A DEPRESSION, UNSPECIFIED DEPRESSION TYPE: ICD-10-CM

## 2019-11-19 DIAGNOSIS — R60.0 BILATERAL LOWER EXTREMITY EDEMA: ICD-10-CM

## 2019-11-19 DIAGNOSIS — R53.82 CHRONIC FATIGUE: ICD-10-CM

## 2019-11-19 DIAGNOSIS — F41.9 ANXIETY: ICD-10-CM

## 2019-11-19 DIAGNOSIS — E66.01 MORBID OBESITY (HCC): Primary | ICD-10-CM

## 2019-11-19 DIAGNOSIS — E28.2 PCOS (POLYCYSTIC OVARIAN SYNDROME): ICD-10-CM

## 2019-11-19 DIAGNOSIS — F32.A MINIMAL DEPRESSION: ICD-10-CM

## 2019-11-19 DIAGNOSIS — D50.8 OTHER IRON DEFICIENCY ANEMIA: ICD-10-CM

## 2019-11-19 DIAGNOSIS — M54.9 CHRONIC BACK PAIN, UNSPECIFIED BACK LOCATION, UNSPECIFIED BACK PAIN LATERALITY: ICD-10-CM

## 2019-11-19 DIAGNOSIS — J30.2 SEASONAL ALLERGIES: ICD-10-CM

## 2019-11-19 DIAGNOSIS — G47.33 OSA (OBSTRUCTIVE SLEEP APNEA): Primary | ICD-10-CM

## 2019-11-19 PROCEDURE — 99215 OFFICE O/P EST HI 40 MIN: CPT | Performed by: PHYSICIAN ASSISTANT

## 2019-11-19 PROCEDURE — 90834 PSYTX W PT 45 MINUTES: CPT | Performed by: PSYCHOLOGIST

## 2019-11-19 NOTE — PROGRESS NOTES
PROGRESS NOTE    Data:  Geneva Cordova is a 34 y.o. female who met with the undersigned for a scheduled individual outpatient therapy session from 10:00 - 10:40am.      Clinical Maneuvering/Intervention:      The pt talked about struggling with obesity for several years. Despite trying different weight loss plans and diets, the pt reported being unsuccessful in losing weight until having the gastric sleeve surgery a few years ago. She lost over 100 lbs, but then gained about 33 lbs back and started struggling again to lose weight. She is seeking to have a second weight loss surgery and she was seen for this evaluation last year, but then needed to start this process over and so this represents her second evaluation with this provider. A psychological evaluation was conducted in order to assess past and current level of functioning. Areas assessed included, but were not limited to: perception of social support, perception of ability to face and deal with challenges in life (positive functioning), anxiety symptoms, depressive symptoms, perspective on beliefs/belief system, coping skills for stress, intelligence level, addiction issues, etc. Therapeutic rapport was established. Interventions conducted today were geared towards assessing the pt's readiness for weight loss surgery and identifying and psychological contraindications for undergoing such a major life change. Social support was deemed strong (specific to weight loss surgery/weight loss in this manner and in a general sense): many friends (a few who have had weight loss surgery), many family members, and her online support group called [employer] ([employer]). Current psychological struggles were deemed low, but included minimal depression. Coping skills for distress and related to undergoing a major life change such as weight loss surgery/weight loss were deemed strong and included relying on her strong juan manuel in God, relying on her social support  system, seeing her life as purposeful and meaningful, and believing in herself that she will be successful with weight loss surgery (her second surgery). The pt endorsed having characteristics of readiness to improve quality of life through the major life changes inherent in the journey of weight loss surgery as she has already lost weight.     Mental Status Exam  Hygiene:  good  Dress: normal  Attitude:  cooperative and proactive  Motor Activity: normal  Speech: normal  Mood:  level  Affect:  congruent  Thought Processes: normal  Thought Content:  normal  Suicidal Thoughts:  not endorsed  Homicidal Thoughts:  not endorsed  Crisis Safety Plan: not needed   Hallucinations:  none      Patient's Support Network Includes:  family, friends      Progress toward goal: there is evidence to suggest that she is taking measures to improve the quality of her life including seeking weight loss surgery.      Functional Status: moderate to high      Prognosis: good    Assessment      The pt presented to be struggling with minimal depression. She otherwise presents as a highly functioning individual with several strong coping skills for stress. 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     From a psychological standpoint, the pt presents as a good candidate for a second bariatric surgery. She is motivated for the surgery, has showed readiness for the lifestyle change in terms of adjusting her eating habits, and seems to have appropriate expectations of how to prepare and how to live after surgery in order to lose weight successfully.      Plan      In order to diminish symptoms of depression, the pt is to follow up with her bariatric surgeon in order to receive weight loss surgery as soon as feasible/appropriate and based on success with compliance to adhering to the proper diet.      Mahsa Garrett, PhD, LP

## 2019-11-19 NOTE — PROGRESS NOTES
"Weight Loss Surgery  Presurgical Nutrition Assessment     Geneva Cordova  2019  [id number]  [id number]  1985  female    Surgery desired: Sleeve Gastrectomy (Note: LSG on 07/15/2015 after which she lost ~124#; now has regained ~35# & desires further surgery).  Ht 177.8 cm (70\"); Wt 151 kg (333.5 #); BMI 47.9  Past Medical History:   Diagnosis Date   • Anemia    • Anxiety    • Bilateral lower extremity edema     R>L   • Boil, axilla     recurrent   • Chronic back pain     no meds or injections, feels related to breasts   • Depression    • Dyspepsia    • Dyspnea on exertion    • Fatigue    • Glucose intolerance (impaired glucose tolerance)     \"hasn't been tested for that\"   • Helicobacter pylori (H. pylori) infection     asx and grossly nl EGD. \"abundant\"on path. HBT still + after PrevPak tx. LSG path neg. neg testing 2018   • Hypoalbuminemia     3.2 in past, 4.1 most recent   • Migraine     resolved with weightloss   • Obesity, morbid (CMS/HCC)    • VIRA (obstructive sleep apnea)      improved since WLS, pending f/u sleep study, not using CPAP   • PCOS (polycystic ovarian syndrome)    • Seasonal allergies      Past Surgical History:   Procedure Laterality Date   •  SECTION N/A 2017    horizontal. Procedure:  SECTION PRIMARY;  Surgeon: Kwadwo Baxter MD;  Location: [facility] LABOR DELIVERY;  Service:    • DILATATION AND CURETTAGE     • ESSURE TUBAL LIGATION     • GASTRIC SLEEVE LAPAROSCOPIC  2015    With Dr. Hughes   • LAPAROSCOPIC CHOLECYSTECTOMY  2018    Dr. Hughes   • TONSILLECTOMY     • WISDOM TOOTH EXTRACTION       No Known Allergies    Current Outpatient Medications:   •  Multiple Vitamins-Minerals (MULTIPLE VITAMINS/WOMENS) tablet, Take 1 tablet by mouth Daily., Disp: , Rfl:   •  vitamin D (ERGOCALCIFEROL) 74291 units capsule capsule, Take 1 capsule by mouth 1 (One) Time Per Week., Disp: 12 capsule, Rfl: 1      Nutrition Assessment    Estimated energy needs:  2295 " kcal    Estimated calories for weight loss:  1700 kcal    IBW (Pounds):  175 #        Excess body weight (Pounds):  160 #       Nutrition Recall  24 Hour recall: (B) (L) (D) -  Reviewed and discussed with patient.  Has been following the Keto diet.  Eating very little throughout the day with a reasonable amt of protein food in evening.  Never eats breakfast, though she rises @ 7 am.  First eating episode = a mid-morning snack - on food record date was 4 Zesta crackers.  Later she drinks a can of Mt Dew (but states she usually has no soda).  Afternoon snack = Lays plain potato chips.  Dinner in evening = 5 oz chicken breast /c no sides.  Diet deficient in protein & total kcal.  States that education is similar to that from 2015 surgery, & that she knows what to do.  Will focus on ingesting more protein in 3 reg meals & 2 snks qd, & on giving up soda.         Exercise  daily - for the past 1 week has been walking & working out at gym for 30' qd      Education    Provided information packet re:  Sleeve Gastrectomy  1. Reviewed guidelines for higher protein, limited carbohydrate diet to promote weight loss.  Encouraged patient to incorporate these principles of healthy eating from now until approximately 2 weeks prior to bariatric surgery date, when an even lower carbohydrate “liver-shrinking” regimen will be followed. (Information sheet re pre-op diet given).  Explained that after recovery from surgery this diet will again be followed to ensure further loss and for weight maintenance.    2. Encouraged patient to choose an acceptable protein supplement powder or shake for post-surgery liquid diet.  Provided product guidelines and examples.    3. Explained importance of goal setting to help in changing eating behaviors that are not conducive to weight loss.  Targeted several on a worksheet which also included spaces for patient to work on issues specific to them.  4. Provided follow-up options for support, including  contact information for dietitians here, if desired.  Web-based support information and apps for smart phones and computers given.  Noted that monthly support group is offered at this clinic, and that support is associated with successful weight loss.    Recommend that team proceed with surgery and follow per protocol.      Nutrition Goals   Dietary Guidelines per information packet as described above  Protein goal:  grams per day   Carbohydrate goal:  100-140 grams per day  Eliminate soda, sweet tea, etc.     Exercise Goals  Continue current exercise routine   Add 15-30 minutes of activity per day as tolerated      Rhiannon Fisher, FELICE  11/19/2019  11:46 AM

## 2019-11-19 NOTE — PROGRESS NOTES
"[facility] BARIATRIC SURGERY  [address]  [facility] [id number] [phone]      Patient  Name:  Geneva Cordova  :  1985        Chief Complaint:  weight gain; unable to maintain weight loss    History of Present Illness:  Geneva Cordova is a 34 y.o. female s/p LSG by Dr. Hughes on 7/17/15,  s/p lap joe with Dr. Hughes 18 with EGD/biopsy x 2.  EGD findings, no HH, GE junction at 41 cm.    who presents today for evaluation, education and consultation regarding weight loss surgery. The patient is interested in sleeve revision.     Geneva has been overweight for at least 20 years, has been 35 pounds or more overweight for at least 20 years, has been 100 pounds or more overweight for 20 or more years.      Previous diet attempts include: Fasting and keto; None; Amphetamines.  The most weight Geneva lost was 124 pounds on LSG but was unable to maintain that weightloss.   Her maximum lifetime weight is 425 pounds.    As above, patient has been overweight for many years, with numerous failed dietary/weight loss attempts.  She now has obesity related comorbidities and as such has decided to pursue weight loss surgery. Pre-LSG weight 425lb, lowest weight achieved 301lb, current weight 320lb.  Patient is very frustrated with weight regain despite her best efforts. She is currently working with PCP on healthy diet, low carb, high protein for weightloss. [facility].  She was initially pursuing SIPS procedure, however insurance denied due to investigational status despite peer to peer with Dr. Hughes. She is now interested in re-sleeve LSG revision.     Currently struggles with chronic constipation, otherwise no GI issues, denies nausea, vomiting, abd pain, diarrhea, dysphagia.  S/p joe with Dr. Hughes 2018. H/o h pylori- asx and grossly nl EGD. \"abundant\"on path. HBT still + after PrevPak tx. LSG path neg. neg testing " "2018    EGD/ joe 18 findings: normal appearing gallbladder, no adhesions, no palpable or visible stones. Mildly dilated uniformly, o/w normal post sleeve upper endoscopy, no visible hiatal hernia or esophagitis, zline at 41cm.     H/o PCOS, VIRA- improved with weightloss, not using CPAP, due for sleep study followup. Migraines- also resolved with weightloss. Chronic back pain- no meds or injections, feels related to breasts and weight. Gets boils of axilla, no treatment. Dx with iron def anemia and vit D def, not on supplements. Anxiety/ depression- largely r/t weight regain reportedly. BLE edema R>L. Seasonal allergies.     All other past medical, surgical, social and family history have been obtained and discussed as pertinent to bariatric surgery as below.     Past Medical History:   Diagnosis Date   • Anemia    • Anxiety    • Bilateral lower extremity edema     R>L   • Boil, axilla     recurrent   • Chronic back pain     no meds or injections, feels related to breasts   • Depression    • Dyspepsia    • Dyspnea on exertion    • Fatigue    • Glucose intolerance (impaired glucose tolerance)     \"hasn't been tested for that\"   • Helicobacter pylori (H. pylori) infection     asx and grossly nl EGD. \"abundant\"on path. HBT still + after PrevPak tx. LSG path neg. neg testing 2018   • Hypoalbuminemia     3.2 in past, 4.1 most recent   • Migraine     resolved with weightloss   • Obesity, morbid (CMS/HCC)    • VIRA (obstructive sleep apnea)      improved since WLS, pending f/u sleep study, not using CPAP   • PCOS (polycystic ovarian syndrome)    • Seasonal allergies      Past Surgical History:   Procedure Laterality Date   •  SECTION N/A 2017    horizontal. Procedure:  SECTION PRIMARY;  Surgeon: Kwadwo Baxter MD;  Location: [facility] LABOR DELIVERY;  Service:    • DILATATION AND CURETTAGE     • ESSURE TUBAL LIGATION     • GASTRIC SLEEVE LAPAROSCOPIC  2015    With Dr. Hughes   • " LAPAROSCOPIC CHOLECYSTECTOMY  02/2018    Dr. Hughes   • TONSILLECTOMY  2014   • WISDOM TOOTH EXTRACTION  2014       No Known Allergies    Current Outpatient Medications:   •  Multiple Vitamins-Minerals (MULTIPLE VITAMINS/WOMENS) tablet, Take 1 tablet by mouth Daily., Disp: , Rfl:   •  vitamin D (ERGOCALCIFEROL) 89762 units capsule capsule, Take 1 capsule by mouth 1 (One) Time Per Week., Disp: 12 capsule, Rfl: 1    Social History     Socioeconomic History   • Marital status: Single     Spouse name: Not on file   • Number of children: 4   • Years of education: College    • Highest education level: Not on file   Occupational History   • Occupation:    Tobacco Use   • Smoking status: Never Smoker   • Smokeless tobacco: Never Used   Substance and Sexual Activity   • Alcohol use: Yes     Frequency: Monthly or less     Drinks per session: 1 or 2     Binge frequency: Never   • Drug use: No   • Sexual activity: Yes     Partners: Male     Comment: no OCP   Social History Narrative    Lives in [place]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      Family History   Problem Relation Age of Onset   • No Known Problems Other    • Sleep apnea Mother    • Diabetes Mother    • No Known Problems Father    • Pancreatic cancer Maternal Grandmother    • No Known Problems Sister    • No Known Problems Brother    • No Known Problems Daughter    • No Known Problems Son    • No Known Problems Paternal Grandmother    • No Known Problems Maternal Aunt    • No Known Problems Paternal Aunt    • BRCA 1/2 Neg Hx    • Colon cancer Neg Hx    • Endometrial cancer Neg Hx    • Ovarian cancer Neg Hx        Review of Systems:  Constitutional:  Reports fatigue, weight gain and denies fevers, chills.  HEENT:  denies headache, ear pain or loss of hearing, blurred or double vision, nasal discharge or sore throat.  Cardiovascular:  Reports edema and denies HTN, HLD, CAD, Atrial Fib, hx heart disease, heart murmur, hx MI, chest pain, palpitations, hx  DVT.  Respiratory:  Reports sleep apnea and denies dyspnea on exertion, shortness of breath , cough , wheezing, asthma, hx PE.  Gastrointestinal:  Reports constipation and denies dysphagia, heartburn, nausea, vomiting, abdominal pain, IBS, diarrhea, melena, blood in stool, gallbladder issues, liver disease, hx pancreatitis.  Genitourinary:  Reports none and denies history of  frequent UTI, incontinence, hematuria, dysuria, polyuria, polydipsia, renal insufficiency, renal failure.    Musculoskeletal:  Reports back pain and denies fibromyalgia, arthritis and autoimmune disease.  Neurological:  Reports migraines and denies headaches, numbness /tingling, dizziness, confusion, seizure, stroke.  Psychiatric:  Reports anxiety, depression and denies bipolar disorder, hx suicide attempt, hx self injury, eating disorder.  Endocrine:  Reports none and denies glucose intolerance, diabetes, thyroid disease, gout.  Hematologic:  Reports anemia and denies bleeding disorder, hx blood transfusion.  Skin:  Reports axillary boils and denies rashes, hx MRSA.      Physical Exam:  Vital Signs:  Weight: (!) 151 kg (333 lb 8 oz)   Body mass index is 47.85 kg/m².  Temp: 96.5 °F (35.8 °C)   Heart Rate: 72   BP: 128/72     Physical Exam   Constitutional: She is oriented to person, place, and time. She appears well-developed and well-nourished.   HENT:   Head: Normocephalic and atraumatic.   Mouth/Throat: Oropharynx is clear and moist.   Eyes: EOM are normal.   Neck: Normal range of motion. Neck supple. No thyromegaly present.   Cardiovascular: Normal rate, regular rhythm and normal heart sounds.   Pulmonary/Chest: Effort normal and breath sounds normal. No respiratory distress. She has no wheezes.   Abdominal: Soft. Bowel sounds are normal. She exhibits no distension. There is no tenderness.   Lap scars  pfannenstiel  tattoos   Musculoskeletal: Normal range of motion. She exhibits edema.   Neurological: She is alert and oriented to person,  place, and time.   Skin: Skin is warm and dry.   Psychiatric: She has a normal mood and affect. Her behavior is normal. Judgment and thought content normal.   Vitals reviewed.      Patient Active Problem List   Diagnosis   • VIRA (obstructive sleep apnea)   • Glucose intolerance (impaired glucose tolerance)   • Migraine   • Depression   • Helicobacter pylori (H. pylori) infection   • Anemia   • Joint pain   • Chronic back pain   • Anxiety   • Status post gastric sleeve 2015   • Vaginal delivery x 2   • S/P  section;left ovarian cystectomy and partial salpingectomy 17   • Left Ovarian serous cystadenoma -removed at  section 2017   • Right Essure implantation 2017   • Environmental allergies   • Obesity, morbid (CMS/HCC)   • PCOS (polycystic ovarian syndrome)   • Dyspepsia   • Fatigue   • Seasonal allergies   • Bilateral lower extremity edema   • Hypoalbuminemia   • BMI 45.0-49.9, adult (CMS/HCC)   • Moderate episode of recurrent major depressive disorder (CMS/HCC)   • FH: breast cancer   • Dyspnea on exertion   • Boil, axilla       Assessment:    Geneva Cordova is a 34 y.o. year old female with medically complicated obesity pursuing sleeve revision.    Weight loss surgery is deemed medically necessary given the following obesity related comorbidities including sleep apnea, back pain, edema and depression with current Weight: (!) 151 kg (333 lb 8 oz) and Body mass index is 47.85 kg/m²..    Plan:  Discussed plan of re-sleeve, LSG revision. Patient understands there is no gaurentee for significant weightloss. The consultation plan and program requirements were reviewed with the patient.  The patient has been advised that a letter of medical support must be obtained from her primary care physician or referring provider. A psychological evaluation will be arranged.  A nutritional evaluation will be performed.  The patient was advised to start a high protein and low carbohydrate  diet.  Necessary lifestyle modifications were discussed.  Instructions on how to access ROSITA was given to the patient.  ROSITA is an internet based educational video that explains the surgical procedure chosen and answers basic questions regarding that procedure.     Preoperative testing will include: CBC, CMP, Lipids, TSH, HgA1C, H.Pylori, CXR and EKG. EGD completed 2/2018 with Dr. Hughes.     Additional preop clearances required prior to surgery: Cardiac.  Insurance will require pulm clearance.     The patient has been educated on expected postoperative lifestyle changes, including commitment to high protein diet, vitamin regimen, and exercise program.  They are aware that support groups are encouraged for optimal weight loss results. Patient understands that bariatric surgery is not cosmetic surgery but rather a tool to help make a lifelong commitment to lifestyle changes including diet, exercise, behavior modifications, and healthy habits. The procedure was discussed with the patient and all questions were answered. The importance of avoiding ASA/ NSAIDS/ steroids/ tobacco/ hormones/ immunomodulators perioperatively was discussed.       Zenobia Leahy PA-C    Time spent was 40 minutes with greater than 50% spent on counseling regarding expectations surrounding bariatric surgery.

## 2019-11-20 ENCOUNTER — OFFICE VISIT (OUTPATIENT)
Dept: PULMONOLOGY | Facility: CLINIC | Age: 34
End: 2019-11-20

## 2019-11-20 VITALS
TEMPERATURE: 98.1 F | HEART RATE: 65 BPM | BODY MASS INDEX: 41.95 KG/M2 | WEIGHT: 293 LBS | HEIGHT: 70 IN | DIASTOLIC BLOOD PRESSURE: 78 MMHG | OXYGEN SATURATION: 98 % | SYSTOLIC BLOOD PRESSURE: 120 MMHG

## 2019-11-20 DIAGNOSIS — E66.01 OBESITY, MORBID (HCC): ICD-10-CM

## 2019-11-20 DIAGNOSIS — Z01.818 PREOPERATIVE CLEARANCE: Primary | ICD-10-CM

## 2019-11-20 DIAGNOSIS — G47.33 OSA (OBSTRUCTIVE SLEEP APNEA): ICD-10-CM

## 2019-11-20 DIAGNOSIS — R06.09 DYSPNEA ON EXERTION: ICD-10-CM

## 2019-11-20 LAB
ALBUMIN SERPL-MCNC: 4.4 G/DL (ref 3.5–5.2)
ALBUMIN/GLOB SERPL: 1.5 G/DL
ALP SERPL-CCNC: 67 U/L (ref 39–117)
ALT SERPL-CCNC: 16 U/L (ref 1–33)
AST SERPL-CCNC: 16 U/L (ref 1–32)
BASOPHILS # BLD AUTO: 0.02 10*3/MM3 (ref 0–0.2)
BASOPHILS NFR BLD AUTO: 0.3 % (ref 0–1.5)
BILIRUB SERPL-MCNC: 0.6 MG/DL (ref 0.2–1.2)
BUN SERPL-MCNC: 12 MG/DL (ref 6–20)
BUN/CREAT SERPL: 14.6 (ref 7–25)
CALCIUM SERPL-MCNC: 9.5 MG/DL (ref 8.6–10.5)
CHLORIDE SERPL-SCNC: 102 MMOL/L (ref 98–107)
CHOLEST SERPL-MCNC: 146 MG/DL (ref 0–200)
CO2 SERPL-SCNC: 26.6 MMOL/L (ref 22–29)
CREAT SERPL-MCNC: 0.82 MG/DL (ref 0.57–1)
EOSINOPHIL # BLD AUTO: 0.18 10*3/MM3 (ref 0–0.4)
EOSINOPHIL NFR BLD AUTO: 2.8 % (ref 0.3–6.2)
ERYTHROCYTE [DISTWIDTH] IN BLOOD BY AUTOMATED COUNT: 12.4 % (ref 12.3–15.4)
GLOBULIN SER CALC-MCNC: 2.9 GM/DL
GLUCOSE SERPL-MCNC: 84 MG/DL (ref 65–99)
HBA1C MFR BLD: 5.2 % (ref 4.8–5.6)
HCT VFR BLD AUTO: 40.3 % (ref 34–46.6)
HDLC SERPL-MCNC: 53 MG/DL (ref 40–60)
HGB BLD-MCNC: 12.9 G/DL (ref 12–15.9)
IMM GRANULOCYTES # BLD AUTO: 0.01 10*3/MM3 (ref 0–0.05)
IMM GRANULOCYTES NFR BLD AUTO: 0.2 % (ref 0–0.5)
LDLC SERPL CALC-MCNC: 84 MG/DL (ref 0–100)
LYMPHOCYTES # BLD AUTO: 2.33 10*3/MM3 (ref 0.7–3.1)
LYMPHOCYTES NFR BLD AUTO: 35.7 % (ref 19.6–45.3)
MCH RBC QN AUTO: 29 PG (ref 26.6–33)
MCHC RBC AUTO-ENTMCNC: 32 G/DL (ref 31.5–35.7)
MCV RBC AUTO: 90.6 FL (ref 79–97)
MONOCYTES # BLD AUTO: 0.36 10*3/MM3 (ref 0.1–0.9)
MONOCYTES NFR BLD AUTO: 5.5 % (ref 5–12)
NEUTROPHILS # BLD AUTO: 3.63 10*3/MM3 (ref 1.7–7)
NEUTROPHILS NFR BLD AUTO: 55.5 % (ref 42.7–76)
NRBC BLD AUTO-RTO: 0 /100 WBC (ref 0–0.2)
PLATELET # BLD AUTO: 319 10*3/MM3 (ref 140–450)
POTASSIUM SERPL-SCNC: 4.5 MMOL/L (ref 3.5–5.2)
PROT SERPL-MCNC: 7.3 G/DL (ref 6–8.5)
RBC # BLD AUTO: 4.45 10*6/MM3 (ref 3.77–5.28)
SODIUM SERPL-SCNC: 140 MMOL/L (ref 136–145)
TRIGL SERPL-MCNC: 46 MG/DL (ref 0–150)
TSH SERPL DL<=0.005 MIU/L-ACNC: 1.26 UIU/ML (ref 0.27–4.2)
UREA BREATH TEST QL: NEGATIVE
VLDLC SERPL CALC-MCNC: 9.2 MG/DL
WBC # BLD AUTO: 6.53 10*3/MM3 (ref 3.4–10.8)

## 2019-11-20 PROCEDURE — 99214 OFFICE O/P EST MOD 30 MIN: CPT | Performed by: NURSE PRACTITIONER

## 2019-11-20 PROCEDURE — 94729 DIFFUSING CAPACITY: CPT | Performed by: NURSE PRACTITIONER

## 2019-11-20 PROCEDURE — 94375 RESPIRATORY FLOW VOLUME LOOP: CPT | Performed by: NURSE PRACTITIONER

## 2019-11-20 PROCEDURE — 94726 PLETHYSMOGRAPHY LUNG VOLUMES: CPT | Performed by: NURSE PRACTITIONER

## 2019-11-20 NOTE — PROGRESS NOTES
"Pre-operative Clearance    Chief Complaint   Patient presents with   • surgery clearence       HPI     Geneva Cordova is a pleasant 34 y.o. female who has been referred for preoperative exam.  She is planning on having a gastric sleeve revision per Dr. Hughes in the near future.  She has to complete this office visit and have a cardiac clearance before scheduling her surgery.  She was last seen in our office in August 2018 for preoperative clearance however her surgery was not approved by her insurance.  She denies any respiratory illnesses or exacerbations since her last appointment.    She does have some mild shortness of breath with exertion but recovers quickly at rest.  She attributes this to her weight gain.  She does not use any rescue inhalers or inhaler therapy currently.  She denies any breathing difficulties in the past.    She does have a history of obstructive sleep apnea.  She states she has not had a sleep study performed in quite some time.  She does have daytime somnolence and fatigue.  She is hopeful that with the weight loss her sleep apnea will improve.    She denies fever, chills, sputum production, hemoptysis, night sweats, weight loss, chest pain or palpitations.  She denies any lower extremity edema or calf tenderness.  She denies any sinus or allergy symptoms.  She denies any reflux symptoms.    She refuses to receive the influenza vaccination.    She is a lifetime non-smoker.    Past Medical History:   Diagnosis Date   • Anemia    • Anxiety    • Bilateral lower extremity edema     R>L   • Boil, axilla     recurrent   • Chronic back pain     no meds or injections, feels related to breasts   • Depression    • Dyspepsia    • Dyspnea on exertion    • Fatigue    • Glucose intolerance (impaired glucose tolerance)     \"hasn't been tested for that\"   • Helicobacter pylori (H. pylori) infection     asx and grossly nl EGD. \"abundant\"on path. HBT still + after PrevPak tx. LSG path neg. neg testing " 2018   • Hypoalbuminemia     3.2 in past, 4.1 most recent   • Migraine     resolved with weightloss   • Obesity, morbid (CMS/HCC)    • VIRA (obstructive sleep apnea)      improved since WLS, pending f/u sleep study, not using CPAP   • PCOS (polycystic ovarian syndrome)    • Seasonal allergies        Past Surgical History:   Procedure Laterality Date   •  SECTION N/A 2017    horizontal. Procedure:  SECTION PRIMARY;  Surgeon: Kwadwo Baxter MD;  Location: [facility] LABOR DELIVERY;  Service:    • DILATATION AND CURETTAGE     • ESSURE TUBAL LIGATION     • GASTRIC SLEEVE LAPAROSCOPIC  2015    With Dr. Hughes   • LAPAROSCOPIC CHOLECYSTECTOMY  2018    Dr. Hughes   • TONSILLECTOMY     • WISDOM TOOTH EXTRACTION         Family History   Problem Relation Age of Onset   • No Known Problems Other    • Sleep apnea Mother    • Diabetes Mother    • No Known Problems Father    • Pancreatic cancer Maternal Grandmother    • No Known Problems Sister    • No Known Problems Brother    • No Known Problems Daughter    • No Known Problems Son    • No Known Problems Paternal Grandmother    • No Known Problems Maternal Aunt    • No Known Problems Paternal Aunt    • BRCA 1/2 Neg Hx    • Colon cancer Neg Hx    • Endometrial cancer Neg Hx    • Ovarian cancer Neg Hx        Social History     Socioeconomic History   • Marital status: Single     Spouse name: Not on file   • Number of children: 4   • Years of education: College    • Highest education level: Not on file   Occupational History   • Occupation:    Tobacco Use   • Smoking status: Never Smoker   • Smokeless tobacco: Never Used   Substance and Sexual Activity   • Alcohol use: Yes     Frequency: Monthly or less     Drinks per session: 1 or 2     Binge frequency: Never   • Drug use: No   • Sexual activity: Yes     Partners: Male     Comment: no OCP   Social History Narrative    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        No Known  Allergies    ROS    Review of Systems   Constitutional: Negative for activity change, chills, fever and unexpected weight loss.   HENT: Negative for congestion, hearing loss, postnasal drip, rhinorrhea, sinus pressure, sore throat and voice change.    Eyes: Negative for blurred vision and visual disturbance.   Respiratory: Negative for apnea, cough, shortness of breath and wheezing.    Cardiovascular: Negative for chest pain and palpitations.   Gastrointestinal: Negative for abdominal pain, nausea, vomiting and GERD.   Endocrine: Negative for cold intolerance.   Genitourinary: Negative for difficulty urinating and urinary incontinence.   Musculoskeletal: Negative for back pain, joint swelling and myalgias.   Skin: Negative for color change and pallor.   Allergic/Immunologic: Negative for food allergies.   Neurological: Negative for weakness, numbness, headache and confusion.   Hematological: Negative for adenopathy.   Psychiatric/Behavioral: Negative for agitation, behavioral problems and depressed mood.       Vitals:    11/20/19 0859   BP: 120/78   Pulse: 65   Temp: 98.1 °F (36.7 °C)   SpO2: 98%         Current Outpatient Medications:   •  Multiple Vitamins-Minerals (MULTIPLE VITAMINS/WOMENS) tablet, Take 1 tablet by mouth Daily., Disp: , Rfl:   •  vitamin D (ERGOCALCIFEROL) 28333 units capsule capsule, Take 1 capsule by mouth 1 (One) Time Per Week., Disp: 12 capsule, Rfl: 1    PE    Physical Exam   Constitutional: She is oriented to person, place, and time. She appears well-developed and well-nourished.   HENT:   Head: Normocephalic and atraumatic.   Eyes: EOM are normal. Pupils are equal, round, and reactive to light.   Neck: Normal range of motion. Neck supple. No JVD present. No tracheal deviation present. No thyromegaly present.   Cardiovascular: Normal rate, regular rhythm, normal heart sounds and intact distal pulses. Exam reveals no gallop and no friction rub.   No murmur heard.  Pulmonary/Chest: Effort  normal and breath sounds normal. No stridor. No respiratory distress. She has no wheezes. She has no rales. She exhibits no tenderness.   Abdominal: Soft. Bowel sounds are normal.   Musculoskeletal: Normal range of motion. She exhibits no edema.   Lymphadenopathy:     She has no cervical adenopathy.   Neurological: She is alert and oriented to person, place, and time.   Skin: Skin is warm and dry. Capillary refill takes less than 2 seconds.   Psychiatric: She has a normal mood and affect. Her behavior is normal.   Nursing note and vitals reviewed.       ARISCAT Preoperative Pulmonary Risk Index.    Age [x]   <= 50 years old (0 points)    []   51-80 years old (3 points)    []   >80 years old (16 points)       Preoperative Oxygen Saturation [x]   >= 96% (0 points)    []   91-95% (8 points)    []   <= 90% (24 points)       Other Clinical Risk Factors []   Respiratory Infection in the last month (17 points)    []   Pre-operative anemia: Hb < 10 g/dL (11 points)    []   Emergency Surgery (8 points)       Surgical Incision [x]   Upper abdominal (15 points)    []   Intrathoracic (24 points)       Duration of Surgery []   < 2 hours (0 points)    [x]   2-3 hours (16 points)    []   >3 hours (23 points)       Total Criteria Point Count: 15     ARISCAT risk index interpretation:      0-25 points: Low risk  1.6 % pulmonary complication rate.   26-44 points: Intermediate risk 13.3% pulmonary complication rate.    points: High risk 42.1 % pulmonary complication rate.     Postoperative Pulmonary Complications include but are not limited to: Respiratory Failure, Pulmonary Infection, Pleural Effusion, Atelectasis, Pneumothorax Bronchospasm, Aspiration Pneumonia.    No images are attached to the encounter or orders placed in the encounter.    PFTs performed in the office today and reviewed by me: FVC 4.28 110% predicted, FEV1 3.11 96% predicted, FEV1/FVC 73% predicted, TLC 6.02 95% predicted, DLCO 81% predicted, no obstruction,  no restriction and reduced DLCO.    Xr Chest Pa & Lateral    Result Date: 11/25/2019  No acute cardiopulmonary disease.  D:  11/21/2019 E:  11/21/2019  This report was finalized on 11/25/2019 10:37 AM by Dr. Meagan Holliday MD.        A/P    Problem List Items Addressed This Visit        Respiratory    VIRA (obstructive sleep apnea)    Dyspnea on exertion       Digestive    Obesity, morbid (CMS/HCC)      Other Visit Diagnoses     Preoperative clearance    -  Primary    Relevant Orders    XR Chest PA & Lateral    Pulmonary Function Test (Completed)        Ms. Cordova was here for a preoperative clearance for a gastric sleeve revision.  She does not have a set date planned for surgery at this time.  She is hopeful that she will have it completed in the next couple of months.    She denies any respiratory illnesses or exacerbations since her last appointment in 2018.  She seems to be doing well from a pulmonary standpoint.  We reviewed her PFTs in detail today and she has no obstruction, no restriction and reduced DLCO.  We also reviewed her chest x-ray in the office today and she appears to have no acute pulmonary process.    Based on the ARISCAT preoperative pulmonary risk index she is an at an intermediate risk for pulmonary comp occasions related to the length of surgery and location of the surgery.  If her surgery were to last less than 2 hours she would be at a low risk for pulmonary complications.  We discussed possible pulmonary complications in the office today such as pulmonary infection, pleural effusion, atelectasis, pneumothorax bronchospasm, aspiration pneumonia and respiratory failure.  We also discussed good pulmonary toileting techniques such as cough/deep breathing, using an incentive spirometry before and after surgery, and early mobilization.  She denies any difficulties with her previous surgery in 2015.  After discussing the possible pulmonary complications she is willing to proceed with surgery.   I did advise her to avoid sick contacts prior to her scheduled surgery date.    She would like to follow-up in 1 year for reevaluation of her obstructive sleep apnea.  She is going to see if weight loss helps her daytime somnolence and fatigue.  She may need a repeat sleep study at this time as well.     I spent 25 minutes with the patient. I spent > 50% percent of this time counseling and discussing diagnosis, prognosis, diagnostic testing, evaluation, current status, treatment options and management.    RAGHAV Thompson  11/20/20199:06 AM  Electronically signed     Please note that portions of this note were completed with a voice recognition program. Efforts were made to edit the dictations, but occasionally words are mistranscribed.      CC: Geetha Pfeiffer MD Marlana L Keeton, RAGHAV

## 2019-11-25 ENCOUNTER — OFFICE VISIT (OUTPATIENT)
Dept: BARIATRICS/WEIGHT MGMT | Facility: CLINIC | Age: 34
End: 2019-11-25

## 2019-11-25 VITALS
SYSTOLIC BLOOD PRESSURE: 130 MMHG | BODY MASS INDEX: 41.95 KG/M2 | WEIGHT: 293 LBS | TEMPERATURE: 97.9 F | OXYGEN SATURATION: 98 % | DIASTOLIC BLOOD PRESSURE: 76 MMHG | HEART RATE: 74 BPM | RESPIRATION RATE: 18 BRPM | HEIGHT: 70 IN

## 2019-11-25 DIAGNOSIS — E66.01 MORBID OBESITY (HCC): Primary | ICD-10-CM

## 2019-11-25 PROCEDURE — 99213 OFFICE O/P EST LOW 20 MIN: CPT | Performed by: PHYSICIAN ASSISTANT

## 2019-11-25 NOTE — PROGRESS NOTES
"[facility] Bariatric Surgery  [address]  [facility] [id number] [phone]    Patient Name:  Geneva Cordova.  :  1985      Date of Visit:  2019      Reason for Visit:  Monthly Diet Visit       HPI:  34 y.o. female s/p LSG by Dr. Hughes on 7/17/15.  Pursued SIPS revision 2018 but insurance denied.  Now wishes to pursue potential sleeve revision.      Presurgery weight:  425 lbs.  Lowest weight achieved:  301 lbs.  Current weight:  340 lbs    Down 2 lbs in the last month.  Says \"eating better\".  Focusing on eating 3 meals/day.  Not tracking.  Drinking mostly water.  Avoiding sweet tea.  Planning to start Phentermine today, prescribed by OBGYN.  Walking on the treadmill 3-4x/week.  Has not been able to start [employer] classes, needing childcare.   No other issues/concerns.       Past Medical History:   Diagnosis Date   • Anemia    • Anxiety    • Bilateral lower extremity edema     R>L   • Boil, axilla     recurrent   • Chronic back pain     no meds or injections, feels related to breasts   • Depression    • Dyspepsia    • Dyspnea on exertion    • Fatigue    • Glucose intolerance (impaired glucose tolerance)     \"hasn't been tested for that\"   • Helicobacter pylori (H. pylori) infection     asx and grossly nl EGD. \"abundant\"on path. HBT still + after PrevPak tx. LSG path neg. neg testing 2018   • Hypoalbuminemia     3.2 in past, 4.1 most recent   • Migraine     resolved with weightloss   • Obesity, morbid (CMS/HCC)    • VIRA (obstructive sleep apnea)      improved since WLS, pending f/u sleep study, not using CPAP   • PCOS (polycystic ovarian syndrome)    • Seasonal allergies      Past Surgical History:   Procedure Laterality Date   •  SECTION N/A 2017    horizontal. Procedure:  SECTION PRIMARY;  Surgeon: Kwadwo Baxter MD;  Location: [facility] LABOR DELIVERY;  Service:    • DILATATION AND CURETTAGE     • ESSURE TUBAL LIGATION     • GASTRIC SLEEVE " "LAPAROSCOPIC  07/2015    With Dr. Hughes   • LAPAROSCOPIC CHOLECYSTECTOMY  02/2018    Dr. Hughes   • TONSILLECTOMY  2014   • WISDOM TOOTH EXTRACTION  2014       No Known Allergies      Current Outpatient Medications:   •  Multiple Vitamins-Minerals (MULTIPLE VITAMINS/WOMENS) tablet, Take 1 tablet by mouth Daily., Disp: , Rfl:       /76 (BP Location: Left arm, Patient Position: Sitting, Cuff Size: Large Adult)   Pulse 74   Temp 97.9 °F (36.6 °C) (Temporal)   Resp 18   Ht 177.8 cm (70\")   Wt (!) 154 kg (340 lb 8 oz)   SpO2 98%   BMI 48.86 kg/m²     Physical Exam   Constitutional: She appears well-developed and well-nourished.   Cardiovascular: Normal rate.   Pulmonary/Chest: Effort normal.   Musculoskeletal: Normal range of motion.   Neurological: She is alert.   Psychiatric: She has a normal mood and affect. Judgment and thought content normal.         Assessment:   Pursuing Revision WLS.    ICD-10-CM ICD-9-CM   1. Morbid obesity (CMS/[facility]) E66.01 278.01       Discussion/Plan:  During diet appointments the patient is educated on high quality nutrition and habits to facilitate good health and possibly some weight loss. Necessary lifestyle changes and behavior modifications were discussed. Please note, the patient remains compliant in completing her diet requirements.     Goals:   1. Continue w/ healthy food choices.  2. Start tracking intake.  3. Increase daily exercise/activity as planned.      Call w/ issues/concerns.       "

## 2019-12-27 ENCOUNTER — OFFICE VISIT (OUTPATIENT)
Dept: BARIATRICS/WEIGHT MGMT | Facility: CLINIC | Age: 34
End: 2019-12-27

## 2019-12-27 VITALS
RESPIRATION RATE: 18 BRPM | SYSTOLIC BLOOD PRESSURE: 138 MMHG | BODY MASS INDEX: 41.95 KG/M2 | HEART RATE: 69 BPM | HEIGHT: 70 IN | OXYGEN SATURATION: 100 % | TEMPERATURE: 96.9 F | DIASTOLIC BLOOD PRESSURE: 78 MMHG | WEIGHT: 293 LBS

## 2019-12-27 DIAGNOSIS — E66.01 OBESITY, CLASS III, BMI 40-49.9 (MORBID OBESITY) (HCC): Primary | ICD-10-CM

## 2019-12-27 PROCEDURE — 99213 OFFICE O/P EST LOW 20 MIN: CPT | Performed by: PHYSICIAN ASSISTANT

## 2019-12-27 RX ORDER — PHENTERMINE HYDROCHLORIDE 37.5 MG/1
37.5 TABLET ORAL DAILY
Refills: 0 | COMMUNITY
Start: 2019-11-29 | End: 2020-02-11

## 2019-12-27 NOTE — PROGRESS NOTES
"[facility] Bariatric Surgery  [address]  [address]  [facility] [id number] [phone]    Patient Name:  Geneva Cordova.  :  1985      Date of Visit:  2019      Reason for Visit:  Monthly Diet Visit       HPI:  34 y.o. female s/p LSG by Dr. Hughes on 7/17/15.  Pursued SIPS revision 2018 but insurance denied.  Now wishes to pursue potential sleeve revision.      Presurgery weight:  425 lbs.  Lowest weight achieved:  301 lbs.  Current weight:  340 lbs    Down 1 lb since LOV.  Still has not started Phentermine RX.  Not tracking intake.  Continues to focus on making healthier food choices.  Striving for 3 meals/day, but continues to struggle w/ eating breakfast, prefers to just \"get up and go.\"  Drinking only water.  Continues to avoid sweet tea.  Walking x30 minutes daily outside.  No other issues/concerns.       Past Medical History:   Diagnosis Date   • Anemia    • Anxiety    • Bilateral lower extremity edema     R>L   • Boil, axilla     recurrent   • Chronic back pain     no meds or injections, feels related to breasts   • Depression    • Dyspepsia    • Dyspnea on exertion    • Fatigue    • Glucose intolerance (impaired glucose tolerance)     \"hasn't been tested for that\"   • Helicobacter pylori (H. pylori) infection     asx and grossly nl EGD. \"abundant\"on path. HBT still + after PrevPak tx. LSG path neg. neg testing 2018   • Hypoalbuminemia     3.2 in past, 4.1 most recent   • Migraine     resolved with weightloss   • Obesity, morbid (CMS/HCC)    • VIRA (obstructive sleep apnea)      improved since WLS, pending f/u sleep study, not using CPAP   • PCOS (polycystic ovarian syndrome)    • Seasonal allergies      Past Surgical History:   Procedure Laterality Date   •  SECTION N/A 2017    horizontal. Procedure:  SECTION PRIMARY;  Surgeon: Kwadwo Baxter MD;  Location: [facility] LABOR DELIVERY;  Service:    • DILATATION AND CURETTAGE     • ESSURE TUBAL " "LIGATION  2018   • GASTRIC SLEEVE LAPAROSCOPIC  07/2015    With Dr. Hughes   • LAPAROSCOPIC CHOLECYSTECTOMY  02/2018    Dr. Hughes   • TONSILLECTOMY  2014   • WISDOM TOOTH EXTRACTION  2014       No Known Allergies      Current Outpatient Medications:   •  Multiple Vitamins-Minerals (MULTIPLE VITAMINS/WOMENS) tablet, Take 1 tablet by mouth Daily., Disp: , Rfl:       /78 (BP Location: Right arm, Patient Position: Sitting, Cuff Size: Large Adult)   Pulse 69   Temp 96.9 °F (36.1 °C) (Temporal)   Resp 18   Ht 177.8 cm (70\")   Wt (!) 154 kg (339 lb 8 oz)   SpO2 100%   BMI 48.71 kg/m²     Physical Exam   Constitutional: She appears well-developed and well-nourished.   Cardiovascular: Normal rate.   Pulmonary/Chest: Effort normal.   Musculoskeletal: Normal range of motion.   Neurological: She is alert.   Psychiatric: She has a normal mood and affect. Judgment and thought content normal.         Assessment:   Pursuing Revision WLS.    ICD-10-CM ICD-9-CM   1. Obesity, Class III, BMI 40-49.9 (morbid obesity) (CMS/HCC) E66.01 278.01       Discussion/Plan:  During diet appointments the patient is educated on high quality nutrition and habits to facilitate good health and possibly some weight loss. Necessary lifestyle changes and behavior modifications were discussed. Please note, the patient remains compliant in completing her diet requirements.     Goals:   1. Continue w/ healthy food choices.  2. Start tracking intake w/ goal of 100g prot/day.  3. Continue routine exercise.                "

## 2020-01-21 ENCOUNTER — OFFICE VISIT (OUTPATIENT)
Dept: BARIATRICS/WEIGHT MGMT | Facility: CLINIC | Age: 35
End: 2020-01-21

## 2020-01-21 VITALS
TEMPERATURE: 96.2 F | DIASTOLIC BLOOD PRESSURE: 82 MMHG | HEIGHT: 70 IN | OXYGEN SATURATION: 100 % | WEIGHT: 293 LBS | BODY MASS INDEX: 41.95 KG/M2 | SYSTOLIC BLOOD PRESSURE: 130 MMHG | RESPIRATION RATE: 18 BRPM | HEART RATE: 69 BPM

## 2020-01-21 DIAGNOSIS — Z98.84 STATUS POST BARIATRIC SURGERY: Primary | ICD-10-CM

## 2020-01-21 DIAGNOSIS — K21.9 GASTROESOPHAGEAL REFLUX DISEASE, ESOPHAGITIS PRESENCE NOT SPECIFIED: ICD-10-CM

## 2020-01-21 DIAGNOSIS — E66.01 OBESITY, CLASS III, BMI 40-49.9 (MORBID OBESITY) (HCC): ICD-10-CM

## 2020-01-21 PROCEDURE — 99214 OFFICE O/P EST MOD 30 MIN: CPT | Performed by: PHYSICIAN ASSISTANT

## 2020-01-21 RX ORDER — OMEPRAZOLE 40 MG/1
40 CAPSULE, DELAYED RELEASE ORAL DAILY
Qty: 30 CAPSULE | Refills: 3 | Status: SHIPPED | OUTPATIENT
Start: 2020-01-21 | End: 2020-01-24

## 2020-01-21 NOTE — PROGRESS NOTES
"[facility] Bariatric Surgery  [address]  [address]  [facility] [id number] [phone]    Patient Name:  Geneva Cordova.  :  1985        Reason for Visit:  Monthly Diet Visit     HPI:  Presents for monthly supervised diet visit. 34 y.o. female s/p LSG by Dr. Hughes on 7/17/15.  Pursued SIPS revision 2018 but insurance denied.  Now wishes to pursue potential sleeve revision.     Presurgery weight:  425 lbs.  Lowest weight achieved:  301 lbs.  Current weight:  338 lbs, down 2lb from LOV.       Has some burning in chest after eating, heartburn, not taking any antacids. Occ nausea as well. Denies vomiting, dysphagia, abd pain.    Denies any other medical issues since last visit.  Discouraged she isn't noticing a change in her weightloss.  Focusing on high protein diet.  Eating 3 times a day + snacks. Eating eggs, protein shakes for breakfast.  Cheese for lunch. Recently has had boneless chicken from [employer]. Pretzals, cheese, crackers, nava aura for snacks.  Has been drinking a lot of lemonade and cran-apple juice- unsure if this is HFCS,  trying to drink more water.  Walking for exercise, occ goes to gym when she can get . Started phentermine 3 weeks ago, with OB gyn.         Past Medical History:   Diagnosis Date   • Anemia    • Anxiety    • Bilateral lower extremity edema     R>L   • Boil, axilla     recurrent   • Chronic back pain     no meds or injections, feels related to breasts   • Depression    • Dyspepsia    • Dyspnea on exertion    • Fatigue    • Glucose intolerance (impaired glucose tolerance)     \"hasn't been tested for that\"   • Helicobacter pylori (H. pylori) infection     asx and grossly nl EGD. \"abundant\"on path. HBT still + after PrevPak tx. LSG path neg. neg testing 2018   • Hypoalbuminemia     3.2 in past, 4.1 most recent   • Migraine     resolved with weightloss   • Obesity, morbid (CMS/HCC)    • VIRA (obstructive sleep apnea)      improved since WLS, " "pending f/u sleep study, not using CPAP   • PCOS (polycystic ovarian syndrome)    • Seasonal allergies      Past Surgical History:   Procedure Laterality Date   •  SECTION N/A 2017    horizontal. Procedure:  SECTION PRIMARY;  Surgeon: Kwadwo Baxter MD;  Location: [facility]placed [facility] LABOR DELIVERY;  Service:    • DILATATION AND CURETTAGE     • ESSURE TUBAL LIGATION     • GASTRIC SLEEVE LAPAROSCOPIC  2015    With Dr. Hughes   • LAPAROSCOPIC CHOLECYSTECTOMY  2018    Dr. Hughes   • TONSILLECTOMY     • WISDOM TOOTH EXTRACTION         No Known Allergies      Current Outpatient Medications:   •  Multiple Vitamins-Minerals (MULTIPLE VITAMINS/WOMENS) tablet, Take 1 tablet by mouth Daily., Disp: , Rfl:   •  phentermine (ADIPEX-P) 37.5 MG tablet, Take 37.5 mg by mouth Daily., Disp: , Rfl: 0  •  omeprazole (priLOSEC) 40 MG capsule, Take 1 capsule by mouth Daily., Disp: 30 capsule, Rfl: 3      /82 (BP Location: Left arm, Patient Position: Sitting, Cuff Size: Large Adult)   Pulse 69   Temp 96.2 °F (35.7 °C) (Temporal)   Resp 18   Ht 177.8 cm (70\")   Wt (!) 154 kg (338 lb 8 oz)   SpO2 100%   BMI 48.57 kg/m²   Physical Exam   Constitutional: She is oriented to person, place, and time. She appears well-developed and well-nourished.   HENT:   Head: Normocephalic and atraumatic.   Cardiovascular: Normal rate.   Pulmonary/Chest: Effort normal.   Neurological: She is alert and oriented to person, place, and time.   Psychiatric: She has a normal mood and affect. Her behavior is normal. Judgment and thought content normal.         Assessment:   Pursuing Weight Loss Surgery.    ICD-10-CM ICD-9-CM   1. Status post bariatric surgery Z98.84 V45.86   2. Gastroesophageal reflux disease, esophagitis presence not specified K21.9 530.81   3. Obesity, Class III, BMI 40-49.9 (morbid obesity) (CMS/[facility]) E66.01 278.01       Discussion/Plan:  During diet appointments the patient is educated on high quality " nutrition and habits to facilitate good health and possibly some weight loss. Necessary lifestyle changes and behavior modifications were discussed. Please note, the patient remains compliant in completing her diet requirements.     Goals:   1. Continue to be mindful of healthy food choices and portion control.   2. Increase daily protein intake and reduce carbs.  3. Increase daily exercise/activity as able.   4. Avoid HFCS and sugar rich beverages    GERD: will start omeprazole  40mg daily.      Will let referral coordinator know diet visits have been completed.  Call w/ issues/concerns.    Patient's Body mass index is 48.57 kg/m². BMI is above normal parameters. Recommendations include: exercise counseling and nutrition counseling

## 2020-01-22 NOTE — PROGRESS NOTES
[facility] Cardiology    Encounter Date:2020        Patient ID: Geneva Cordova is a 34 y.o. female.  : 1985     PCP: Geetha Pfeiffer MD     Chief Complaint: Encounter for pre-operative cardiovascular clearance      PROBLEM LIST:  1. Morbid besity, BMI > 40  2. Dyspnea on exertion.  3. Glucose intolerance.  4. PCOS.  5. Seasonal allergies.    History of Present Illness  Patient presents today for preoperative cardiac clearance. She is planning on having a repeat gastric sleeve per Dr. Hughes in the near future. She was last seen in our office in 2018 for preoperative clearance for gastric sleeve revision, which was not approved by her insurance at the time as it was considered experimental. Since last visit, she has been feeling well overall from a cardiovascular standpoint. Patient denies chest pain, palpitations, shortness of breath, edema, dizziness, and syncope.  Her blood pressure is usually normal.  She exercises regularly, goes to [employer] where she does aerobics and weightlifting without any limitations.    No Known Allergies      Current Outpatient Medications:   •  Multiple Vitamins-Minerals (MULTIPLE VITAMINS/WOMENS) tablet, Take 1 tablet by mouth Daily., Disp: , Rfl:   •  phentermine (ADIPEX-P) 37.5 MG tablet, Take 37.5 mg by mouth Daily., Disp: , Rfl: 0    The following portions of the patient's history were reviewed and updated as appropriate: allergies, current medications, past family history, past medical history, past social history, past surgical history and problem list.    ROS  Review of Systems   Constitution: Negative for chills, fatigue, fever, generalized weakness.   Cardiovascular: Negative for chest pain, claudication, dyspnea on exertion, leg swelling, orthopnea, palpitations, paroxysmal nocturnal dyspnea and syncope.   Respiratory: Negative for cough, shortness of breath, and wheezing.  HENT: Negative for ear pain,  "nosebleeds, and tinnitus.  Gastrointestinal: Negative for abdominal pain, constipation, diarrhea, nausea and vomiting.   Genitourinary: No urinary symptoms.  Musculoskeletal: Negative for muscle cramps.  Neurological: Negative for dizziness, headaches, loss of balance, numbness, and symptoms of stroke.  Psychiatric: Normal mental status.     All other systems reviewed and are negative.    Objective:     /78 (BP Location: Right arm, Patient Position: Sitting)   Pulse 56   Ht 177.8 cm (70\")   Wt (!) 157 kg (346 lb)   BMI 49.65 kg/m²          Physical Exam  Constitutional: Patient appears well-developed and well-nourished.   HENT: HEENT exam unremarkable.   Neck: Neck supple. No JVD present. No carotid bruits.   Cardiovascular: Normal rate, regular rhythm and normal heart sounds. No murmur heard.   2+ symmetric pulses.   Pulmonary/Chest: Breath sounds normal. Does not exhibit tenderness.   Abdominal: Abdomen benign.   Musculoskeletal: Does not exhibit edema.   Neurological: Neurological exam unremarkable.   Vitals reviewed.    Lab Review:   Lab Results   Component Value Date    GLUCOSE 91 07/30/2019    BUN 12 11/19/2019    CREATININE 0.82 11/19/2019    EGFRIFNONA 80 11/19/2019    EGFRIFAFRI 97 11/19/2019    BCR 14.6 11/19/2019    K 4.5 11/19/2019    CO2 26.6 11/19/2019    CALCIUM 9.5 11/19/2019    PROTENTOTREF 7.3 11/19/2019    ALBUMIN 4.40 11/19/2019    LABIL2 1.5 11/19/2019    AST 16 11/19/2019    ALT 16 11/19/2019     Lab Results   Component Value Date    CHOL 139 07/06/2018    CHLPL 146 11/19/2019    TRIG 46 11/19/2019    HDL 53 11/19/2019    LDL 84 11/19/2019      Lab Results   Component Value Date    WBC 6.53 11/19/2019    HGB 12.9 11/19/2019    HCT 40.3 11/19/2019    MCV 90.6 11/19/2019     11/19/2019     Lab Results   Component Value Date    TSH 1.260 11/19/2019     Lab Results   Component Value Date    HGBA1C 5.20 11/19/2019          ECG 12 Lead  Date/Time: 1/24/2020 9:22 AM  Performed by: " Faith Hamm MD  Authorized by: Faith Hamm MD   Comparison: compared with previous ECG from 7/5/2018  Similar to previous ECG  Rhythm: sinus bradycardia and sinus arrhythmia  BPM: 56    Clinical impression: normal ECG               Assessment:      Diagnosis Plan   1. Encounter for pre-operative cardiovascular clearance  Pt is low risk for gastric sleeve procedure from a cardiac standpoint.  She has no history of CAD, no current cardiac symptoms, good exercise capacity, no significant risk factors and her ECG is unremarkable.   2. Morbid obesity with BMI of 45.0-49.9, adult (CMS/HCC)  Continue with gastric sleeve surgery. routine exercise and intermittent fasting also recommended.     Plan:   Pt is low risk for gastric sleeve procedure from a cardiac standpoint.  Sinew diet and exercise, intermittent fasting recommended.  Stable cardiac status.  Continue current medications.   FU as needed.  Thank you for allowing us to participate in the care of your patient.     Scribed for Faith Hamm MD by Promise Kendall. 1/24/2020  9:23 AM      I, Faith Hamm MD, personally performed the services described in this documentation as scribed by the above named individual in my presence, and it is both accurate and complete.  1/24/2020  9:25 AM        Please note that portions of this note may have been completed with a voice recognition program. Efforts were made to edit the dictations, but occasionally words are mistranscribed.

## 2020-01-24 ENCOUNTER — OFFICE VISIT (OUTPATIENT)
Dept: CARDIOLOGY | Facility: CLINIC | Age: 35
End: 2020-01-24

## 2020-01-24 VITALS
DIASTOLIC BLOOD PRESSURE: 78 MMHG | HEIGHT: 70 IN | BODY MASS INDEX: 41.95 KG/M2 | SYSTOLIC BLOOD PRESSURE: 124 MMHG | WEIGHT: 293 LBS | HEART RATE: 56 BPM

## 2020-01-24 DIAGNOSIS — E66.01 MORBID OBESITY WITH BMI OF 45.0-49.9, ADULT (HCC): ICD-10-CM

## 2020-01-24 DIAGNOSIS — Z01.810 ENCOUNTER FOR PRE-OPERATIVE CARDIOVASCULAR CLEARANCE: Primary | ICD-10-CM

## 2020-01-24 PROCEDURE — 93000 ELECTROCARDIOGRAM COMPLETE: CPT | Performed by: INTERNAL MEDICINE

## 2020-01-24 PROCEDURE — 99214 OFFICE O/P EST MOD 30 MIN: CPT | Performed by: INTERNAL MEDICINE

## 2020-02-03 DIAGNOSIS — R53.83 FATIGUE, UNSPECIFIED TYPE: ICD-10-CM

## 2020-02-03 DIAGNOSIS — R06.00 DYSPNEA, UNSPECIFIED TYPE: Primary | ICD-10-CM

## 2020-02-05 ENCOUNTER — LAB (OUTPATIENT)
Dept: LAB | Facility: HOSPITAL | Age: 35
End: 2020-02-05

## 2020-02-05 DIAGNOSIS — R53.83 FATIGUE, UNSPECIFIED TYPE: ICD-10-CM

## 2020-02-05 DIAGNOSIS — R06.00 DYSPNEA, UNSPECIFIED TYPE: ICD-10-CM

## 2020-02-05 LAB
ALBUMIN SERPL-MCNC: 4.1 G/DL (ref 3.5–5.2)
ALBUMIN/GLOB SERPL: 1.3 G/DL
ALP SERPL-CCNC: 66 U/L (ref 39–117)
ALT SERPL W P-5'-P-CCNC: 11 U/L (ref 1–33)
ANION GAP SERPL CALCULATED.3IONS-SCNC: 11.3 MMOL/L (ref 5–15)
AST SERPL-CCNC: 15 U/L (ref 1–32)
BILIRUB SERPL-MCNC: 0.5 MG/DL (ref 0.2–1.2)
BUN BLD-MCNC: 14 MG/DL (ref 6–20)
BUN/CREAT SERPL: 15.4 (ref 7–25)
CALCIUM SPEC-SCNC: 9.2 MG/DL (ref 8.6–10.5)
CHLORIDE SERPL-SCNC: 105 MMOL/L (ref 98–107)
CO2 SERPL-SCNC: 25.7 MMOL/L (ref 22–29)
CREAT BLD-MCNC: 0.91 MG/DL (ref 0.57–1)
DEPRECATED RDW RBC AUTO: 41.9 FL (ref 37–54)
ERYTHROCYTE [DISTWIDTH] IN BLOOD BY AUTOMATED COUNT: 12.4 % (ref 12.3–15.4)
GFR SERPL CREATININE-BSD FRML MDRD: 86 ML/MIN/1.73
GLOBULIN UR ELPH-MCNC: 3.1 GM/DL
GLUCOSE BLD-MCNC: 81 MG/DL (ref 65–99)
HCT VFR BLD AUTO: 39.5 % (ref 34–46.6)
HGB BLD-MCNC: 12.5 G/DL (ref 12–15.9)
MCH RBC QN AUTO: 29.1 PG (ref 26.6–33)
MCHC RBC AUTO-ENTMCNC: 31.6 G/DL (ref 31.5–35.7)
MCV RBC AUTO: 91.9 FL (ref 79–97)
PLATELET # BLD AUTO: 287 10*3/MM3 (ref 140–450)
PMV BLD AUTO: 11.6 FL (ref 6–12)
POTASSIUM BLD-SCNC: 4.3 MMOL/L (ref 3.5–5.2)
PROT SERPL-MCNC: 7.2 G/DL (ref 6–8.5)
RBC # BLD AUTO: 4.3 10*6/MM3 (ref 3.77–5.28)
SODIUM BLD-SCNC: 142 MMOL/L (ref 136–145)
WBC NRBC COR # BLD: 6.71 10*3/MM3 (ref 3.4–10.8)

## 2020-02-05 PROCEDURE — 85027 COMPLETE CBC AUTOMATED: CPT

## 2020-02-05 PROCEDURE — 36415 COLL VENOUS BLD VENIPUNCTURE: CPT

## 2020-02-05 PROCEDURE — 80053 COMPREHEN METABOLIC PANEL: CPT

## 2020-02-11 ENCOUNTER — CONSULT (OUTPATIENT)
Dept: BARIATRICS/WEIGHT MGMT | Facility: CLINIC | Age: 35
End: 2020-02-11

## 2020-02-11 VITALS
WEIGHT: 293 LBS | TEMPERATURE: 97.7 F | SYSTOLIC BLOOD PRESSURE: 122 MMHG | HEART RATE: 74 BPM | BODY MASS INDEX: 41.95 KG/M2 | OXYGEN SATURATION: 99 % | DIASTOLIC BLOOD PRESSURE: 68 MMHG | RESPIRATION RATE: 18 BRPM | HEIGHT: 70 IN

## 2020-02-11 DIAGNOSIS — E28.2 PCOS (POLYCYSTIC OVARIAN SYNDROME): ICD-10-CM

## 2020-02-11 DIAGNOSIS — E66.01 OBESITY, CLASS III, BMI 40-49.9 (MORBID OBESITY) (HCC): ICD-10-CM

## 2020-02-11 DIAGNOSIS — G47.33 OSA (OBSTRUCTIVE SLEEP APNEA): Primary | ICD-10-CM

## 2020-02-11 DIAGNOSIS — F33.1 MODERATE EPISODE OF RECURRENT MAJOR DEPRESSIVE DISORDER (HCC): ICD-10-CM

## 2020-02-11 PROCEDURE — 99214 OFFICE O/P EST MOD 30 MIN: CPT | Performed by: SURGERY

## 2020-02-11 NOTE — PROGRESS NOTES
"[facility] BARIATRIC SURGERY  [address]  [address]  [facility] [id number] [phone]      Patient  Name:  Geneva Cordova  :  1985      Date of Visit: 20    Chief Complaint:  weight gain; unable to maintain weight loss s/p LSG 7/15.  Evaluate for possible revisional weight loss surgery    History of Present Illness:  Geneva Cordova is a 34 y.o. female who presents today for evaluation, education and consultation regarding revisional weight loss surgery.  Since last seen 2020 she has lost 8-1/2 pounds.   Most recent intake evaluation Zenobia Leahy PA-C dated 20 reviewed.  From her evaluation that day:    \"Presurgery weight:  425 lbs.  Lowest weight achieved:  301 lbs.  Current weight:  338 lbs, down 2lb from LOV.        Has some burning in chest after eating, heartburn, not taking any antacids. Occ nausea as well. Denies vomiting, dysphagia, abd pain.    Denies any other medical issues since last visit.  Discouraged she isn't noticing a change in her weightloss.  Focusing on high protein diet.  Eating 3 times a day + snacks. Eating eggs, protein shakes for breakfast.  Cheese for lunch. Recently has had boneless chicken from [employer]. Pretzals, cheese, crackers, nava aura for snacks.  Has been drinking a lot of lemonade and cran-apple juice- unsure if this is HFCS,  trying to drink more water.  Walking for exercise, occ goes to gym when she can get . Started phentermine 3 weeks ago, with OB gyn.\"    I also reviewed Zenobia Leahy PA-C's note dated 2019.  From her evaluation that day:    \"Geneva Cordova is a 34 y.o. female s/p LSG by Dr. Hughes on 7/17/15,  s/p lap joe with Dr. Hughes 18 with EGD/biopsy x 2.  EGD findings, no HH, GE junction at 41 cm.    who presents today for evaluation, education and consultation regarding weight loss surgery. The patient is interested in sleeve revision.         Geneva has been overweight for at least 20 years, " "has been 35 pounds or more overweight for at least 20 years, has been 100 pounds or more overweight for 20 or more years.       Previous diet attempts include: Fasting and keto; None; Amphetamines.  The most weight Geneva lost was 124 pounds on LSG but was unable to maintain that weightloss.   Her maximum lifetime weight is 425 pounds.     As above, patient has been overweight for many years, with numerous failed dietary/weight loss attempts.  She now has obesity related comorbidities and as such has decided to pursue weight loss surgery. Pre-LSG weight 425lb, lowest weight achieved 301lb, current weight 320lb.  Patient is very frustrated with weight regain despite her best efforts. She is currently working with PCP on healthy diet, low carb, high protein for weightloss. She plans to see [facility] to start phentermine soon.  She was initially pursuing SIPS procedure, however insurance denied due to investigational status despite peer to peer with Dr. Hughes. She is now interested in re-sleeve LSG revision.      Currently struggles with chronic constipation, otherwise no GI issues, denies nausea, vomiting, abd pain, diarrhea, dysphagia.  S/p joe with Dr. Hughes 2/2018. H/o h pylori- asx and grossly nl EGD. \"abundant\"on path. HBT still + after PrevPak tx. LSG path neg. neg testing 7/2018     EGD/ joe 2/19/18 findings: normal appearing gallbladder, no adhesions, no palpable or visible stones. Mildly dilated uniformly, o/w normal post sleeve upper endoscopy, no visible hiatal hernia or esophagitis, zline at 41cm.      H/o PCOS, VIRA- improved with weightloss, not using CPAP, due for sleep study followup. Migraines- also resolved with weightloss. Chronic back pain- no meds or injections, feels related to breasts and weight. Gets boils of axilla, no treatment. Dx with iron def anemia and vit D def, not on supplements. Anxiety/ depression- largely r/t weight regain reportedly. BLE edema R>L. Seasonal " "allergies.\"    54-year-old morbidly obese black female from [place].  She was super morbidly obese at the time of her sleeve gastrectomy and has kept off about 80 to 90 pounds.  Her sister is Nadia Alaniz - a bariatric patient well known to me (actually had recent revisional surgery with me for persistent YUSEF from a partial GOO). Mrs. Alaniz'  is also a LSG pt of mine.  She says she does not recall me discussing the dangers of high fructose corn syrup at the time of her surgery.  She is extremely afraid of gaining her weight back and has avoided eating thinking that would help.  She is also been given phentermine by her OB/GYN.  She says she cannot eat much at all and has excellent portion control.  She says she absolutely cannot overeat and gets full on small portions.  Dietitian evaluation dated 11/19/2019 with Rhiannon Fisher reviewed with the patient.  She notes she eats very little throughout the day.  Never eats breakfast.  Midmorning snack is four crackers.  She later drank a can of Mountain Dew.  The afternoon she had some plain potato chips.  Essentially no protein all day.  That evening she had a chicken breast with no sides.  She did sit through the group today and the principles of long-term weight loss read discussed with emphasis on avoiding high fructose corn syrup and getting an adequate protein a day preferably 100+ grams.  She appeared awake and alert throughout.  Sitting down with her one-to-one she does not seem to grasp any of the concepts.  She still feels that she must be eating too much.  She says the last couple of weeks she has been eating as recommended with adequate protein at meals 4 times a day and she has lost almost 10 pounds.  She describes eating a 6 ounce steak but could not finish all of her broccoli because she was full.  She is having a hard time grasping that although she admittedly has good portion control, that making her sleeve even smaller is unlikely to help.  She " "voiced understanding of my discussion wtih her and the group regarding my reservations and concern with Alejandro-en-Y gastric bypass in general as both a primary and revisional weight loss surgery.  I also discussed how I prefer SIPS/loop DS as a second stage procedure.  I showed her diagrams in group and in in person regarding the difference in the two procedures.  She still thinks that they're the same because the intestines are re-routed.  She is very afraid of RNY bypass because she says everyone she knows who has had it \"is dead\".  She admits to being afraid of SIPS because \"it's the same thing\".  She is in tears because she is afraid of re-gaining the weight. I spent quite a bit of time with her privately trying to get the concepts across but she is very ingrained in the standard way of losing weight by starving, low calories, diet pills, etc.  She denies any YUSEF sx's.  I explained to her that I could make her sleeve smaller but portion control does not seem to be her issue and she most likely would not lose much weight.  She says she appreciates me being \"real\" and being honest with her and not just performing surgery.  I told her that I would not offer her Alejandro-en-Y gastric bypass as a revision but that is something several other surgeons offer and she is certainly welcome to seek second opinion(s).  She said this is unlikely as she is afraid of gastric bypass as above.  Currently her Medicaid provider [facility] is denying SIPS citing that it is investigational.  I told her at some point there may be an official code for the SIPS (no longer investigational) and then her insurance provider might reconsider.  She currently denies any significant GE reflux symptoms.    The patient has had issues with morbid obesity for years and only temporary success with surgical and non-surgical methods of weight loss.  The patient is seeking LSG to help with the morbid obesity related conditions of anemia, anxiety, " "peripheral edema, chronic back pain, depression, dyspnea exertion, fatigue, glucose intolerance, previous history of H. pylori gastritis, migraine headaches, obstructive sleep apnea, PCOS.      Past Medical History:   Diagnosis Date   • Anemia    • Anxiety    • Bilateral lower extremity edema     R>L   • Boil, axilla     recurrent   • Chronic back pain     no meds or injections, feels related to breasts   • Depression    • Dyspepsia    • Dyspnea on exertion    • Fatigue    • Glucose intolerance (impaired glucose tolerance)     \"hasn't been tested for that\"   • Helicobacter pylori (H. pylori) infection     asx and grossly nl EGD. \"abundant\"on path. HBT still + after PrevPak tx. LSG path neg. neg testing 2018   • Hypoalbuminemia     3.2 in past, 4.1 most recent   • Migraine     resolved with weightloss   • Obesity, morbid (CMS/HCC)    • VIRA (obstructive sleep apnea)      improved since WLS, pending f/u sleep study, not using CPAP   • PCOS (polycystic ovarian syndrome)    • Seasonal allergies      Past Surgical History:   Procedure Laterality Date   •  SECTION N/A 2017    horizontal. Procedure:  SECTION PRIMARY;  Surgeon: Kwadwo Baxter MD;  Location: [facility] LABOR DELIVERY;  Service:    • DILATATION AND CURETTAGE     • ESSURE TUBAL LIGATION     • GASTRIC SLEEVE LAPAROSCOPIC  2015    With Dr. Hughes   • LAPAROSCOPIC CHOLECYSTECTOMY  2018    Dr. Hughes   • TONSILLECTOMY     • WISDOM TOOTH EXTRACTION         No Known Allergies    Current Outpatient Medications:   •  Multiple Vitamins-Minerals (MULTIPLE VITAMINS/WOMENS) tablet, Take 1 tablet by mouth Daily., Disp: , Rfl:     Social History     Socioeconomic History   • Marital status: Single     Spouse name: Not on file   • Number of children: 4   • Years of education: College    • Highest education level: Not on file   Occupational History   • Occupation:    Tobacco Use   • Smoking status: Never Smoker   • Smokeless " tobacco: Never Used   Substance and Sexual Activity   • Alcohol use: Yes     Frequency: Monthly or less     Drinks per session: 1 or 2     Binge frequency: Never   • Drug use: No   • Sexual activity: Yes     Partners: Male     Comment: no OCP   Social History Narrative    Lives in [place]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      Family History   Problem Relation Age of Onset   • No Known Problems Other    • Sleep apnea Mother    • Diabetes Mother    • No Known Problems Father    • Pancreatic cancer Maternal Grandmother    • No Known Problems Sister    • No Known Problems Brother    • No Known Problems Daughter    • No Known Problems Son    • No Known Problems Paternal Grandmother    • No Known Problems Maternal Aunt    • No Known Problems Paternal Aunt    • BRCA 1/2 Neg Hx    • Colon cancer Neg Hx    • Endometrial cancer Neg Hx    • Ovarian cancer Neg Hx        Review of Systems   Constitutional: Positive for fatigue. Negative for chills, diaphoresis, fever and unexpected weight loss.   HENT: Negative for congestion and facial swelling.    Eyes: Negative for blurred vision, double vision and discharge.   Respiratory: Negative for chest tightness, shortness of breath and stridor.    Cardiovascular: Negative for chest pain, palpitations and leg swelling.   Gastrointestinal: Negative for blood in stool.   Endocrine: Negative for polydipsia.   Genitourinary: Negative for hematuria.   Musculoskeletal: Positive for arthralgias.   Skin: Negative for color change.   Allergic/Immunologic: Negative for immunocompromised state.   Neurological: Negative for confusion.   Psychiatric/Behavioral: Negative for self-injury.       I have reviewed the ROS and confirm that it's accurate today.    Physical Exam:  Vital Signs:  Weight: (!) 153 kg (337 lb 8 oz)   Body mass index is 48.43 kg/m².  Temp: 97.7 °F (36.5 °C)   Heart Rate: 74   BP: 122/68     Physical Exam   Constitutional: She is oriented to person, place, and time. She appears  well-developed and well-nourished.   HENT:   Head: Normocephalic and atraumatic.   Nose: Nose normal.   Eyes: Pupils are equal, round, and reactive to light. Conjunctivae and EOM are normal.   Neck: Normal range of motion. Neck supple. Carotid bruit is not present. No tracheal deviation present. No thyromegaly present.   Cardiovascular: Normal rate, regular rhythm and normal heart sounds.   Pulmonary/Chest: Effort normal and breath sounds normal. No respiratory distress.   Abdominal: Soft. She exhibits no distension. There is no hepatosplenomegaly. There is no tenderness.   Laparoscopy scars low transverse scar no hernias appreciated   Musculoskeletal: Normal range of motion. She exhibits no edema or deformity.   Neurological: She is alert and oriented to person, place, and time. No cranial nerve deficit. Coordination normal.   Skin: Skin is warm and dry. No rash noted.   Psychiatric: She has a normal mood and affect. Her behavior is normal. Judgment and thought content normal.   Vitals reviewed.      Patient Active Problem List   Diagnosis   • VIRA (obstructive sleep apnea)   • Glucose intolerance (impaired glucose tolerance)   • Migraine   • Depression   • Helicobacter pylori (H. pylori) infection   • Anemia   • Joint pain   • Chronic back pain   • Anxiety   • Status post gastric sleeve 2015   • Vaginal delivery x 2   • S/P  section;left ovarian cystectomy and partial salpingectomy 17   • Left Ovarian serous cystadenoma -removed at  section 2017   • Right Essure implantation 2017   • Environmental allergies   • Obesity, Class III, BMI 40-49.9 (morbid obesity) (CMS/HCC)   • PCOS (polycystic ovarian syndrome)   • Dyspepsia   • Fatigue   • Seasonal allergies   • Bilateral lower extremity edema   • Hypoalbuminemia   • Moderate episode of recurrent major depressive disorder (CMS/HCC)   • FH: breast cancer   • Dyspnea on exertion   • Boil, axilla       Assessment:    Geneva  Tasha is a 34 y.o. year old female with medically complicated obesity.    Weight loss surgery is deemed medically necessary given the following obesity related comorbidities including anemia, anxiety, peripheral edema, chronic back pain, depression, dyspnea exertion, fatigue, glucose intolerance, previous history of H. pylori gastritis, migraine headaches, obstructive sleep apnea, PCOS with current Weight: (!) 153 kg (337 lb 8 oz) and Body mass index is 48.43 kg/m²..      Patient is aware that surgery is a tool, and that weight loss is not guaranteed but only seen in the context of appropriate use, follow up and exercise.    The patient was present for an approximately a 2.5 hour discussion of the purpose of weight loss surgery, how WLS is a tool to assist in achieving weight loss goals, the most common complications and how best to avoid them, and the strategies for short and long term weight loss.  Ample opportunity to discuss questions was available both in group and during the time of individual examination.    I reviewed [facility] report showing phentermine x3 from 2 different physicians Citlaly Cuevas (1)and Chelsey Lees (2).  Chest x-ray dated 11/25/2019 no active process with degenerative changes in the spine noted.  EKG dated 1/24/2020 sinus bradycardia with sinus arrhythmia CBC and CMP dated 2/5/2020 normal psychological evaluation dated 11/19/2019 Mahsa Garrett, PhD good candidate.  Dietitian evaluation dated 11/19/2019 Rhiannon purvis with findings as above.  Negative H. pylori breath test dated 11/19/2019.  Labs dated 11/19/2019 showing a normal CMP normal hemoglobin A1c at 5.2 normal lipid panel normal TSH.  Pulmonary clearance dated 11/20/2019 RAGHAV Harrell low risk with 15 points for upper abdominal incision.  Pulmonary function tests dated of in 2019 unremarkable  FEV1 96.  Cardiology clearance dated 1/24/2020 Dr. Hamm.  I reviewed my sleeve and paraesophageal hiatal hernia repair report  "dated 7/17/2015.  There was not a replaced hepatic vessel repair was done with 2 stitches posteriorly.  The stomach was a larger than average specimen.  She was super morbidly obese at the time of surgery.  Pathology of the stomach showed rare bacterial organisms but was negative for H. pylori.  Laparoscopic cholecystectomy and EGD operative report dated 2/19/2018.  It was noted at that time that she was pursuing the sips procedure.  EGD findings at the time of the sleeve were unremarkable.  Other than mild dilatation uniformly.  Pathology showed chronic acalculous cholecystitis.  Antrum biopsies reactive gastropathy with slight chronic gastritis and distal esophageal biopsies showed no significant histopathologic abnormality.  Upper GI dated 2/7/2018 unremarkable post sleeve gastrectomy.  Please see scanned records that I have reviewed and signed off on today.  All of this in addition to the patient's unique history and exam has been taken into consideration in determining their appropriate candidacy for weight loss surgery.    Complications  of laparoscopic/possible robotic gastric sleeve revision were discussed. The patient is well aware of the potential complications of surgery that include but not limited to bleeding, infections, deep venous thrombosis, pulmonary embolism, pulmonary complications such as pneumonia, cardiac events, hernias, small bowel obstruction, damage to the spleen or other organs, bowel injury, disfiguring scars, failure to lose weight, need for additional surgery, conversion to an open procedure, and death. Patient is also aware of complications which apply in this particular procedure that can include but are not limited to a \"leak\" at the staple line which in some instances may require conversion to gastric bypass.        Plan: After extensive evaluation and discussion today as above we have decided not to proceed with laparoscopic revision sleeve gastrectomy as it likely will provide " little additional weight loss benefit.  She is not interested in Alejandro-en-Y gastric bypass.  She is strongly encouraged to seek second opinions.  She does have excellent portion control and significant misconceptions about weight loss as above.  She has lost 10 pounds eating appropriately in the last couple of weeks.  She voiced understanding to continue this type of diet and avoid high fructose corn syrup in all products.  Once again the most appropriate next step in my opinion would be the SIPS procedure which she has been pursuing for over a year.  Once again currently this procedure is considered investigational and although multiple insurance providers do cover it her Medicaid provider [employer] does not, even after I had a direct peer to peer discussion with them.  The patient is aware they would cover revision to a Alejandro-en-Y gastric bypass.  She is encouraged to seek second opinion(s) if she wants to pursue this route which currently she is not interested in for other reasons as above.  There is some hope that the widely performed and accepted SIPS procedure will be covered by insurance in the future.  Continue post sleeve follow-up.        Erik Hughes MD

## 2020-03-31 ENCOUNTER — HOSPITAL ENCOUNTER (EMERGENCY)
Facility: HOSPITAL | Age: 35
Discharge: HOME | End: 2020-04-01
Attending: EMERGENCY MEDICINE

## 2020-03-31 DIAGNOSIS — T81.41XA INFECTION OF SUPERFICIAL INCISIONAL SURGICAL SITE AFTER PROCEDURE, INITIAL ENCOUNTER: Primary | ICD-10-CM

## 2020-03-31 DIAGNOSIS — L03.311 CELLULITIS OF ABDOMINAL WALL: ICD-10-CM

## 2020-03-31 DIAGNOSIS — L02.211 ABSCESS OF SKIN OF ABDOMEN: ICD-10-CM

## 2020-03-31 PROCEDURE — 81025 URINE PREGNANCY TEST: CPT | Performed by: EMERGENCY MEDICINE

## 2020-03-31 PROCEDURE — 87070 CULTURE OTHR SPECIMN AEROBIC: CPT | Performed by: EMERGENCY MEDICINE

## 2020-03-31 PROCEDURE — 87186 SC STD MICRODIL/AGAR DIL: CPT | Performed by: EMERGENCY MEDICINE

## 2020-03-31 PROCEDURE — 87077 CULTURE AEROBIC IDENTIFY: CPT | Performed by: EMERGENCY MEDICINE

## 2020-03-31 PROCEDURE — 87205 SMEAR GRAM STAIN: CPT | Performed by: EMERGENCY MEDICINE

## 2020-03-31 PROCEDURE — 99284 EMERGENCY DEPT VISIT MOD MDM: CPT

## 2020-03-31 RX ORDER — SODIUM CHLORIDE 0.9 % (FLUSH) 0.9 %
10 SYRINGE (ML) INJECTION AS NEEDED
Status: DISCONTINUED | OUTPATIENT
Start: 2020-03-31 | End: 2020-04-01 | Stop reason: HOSPADM

## 2020-03-31 RX ORDER — ONDANSETRON 2 MG/ML
4 INJECTION INTRAMUSCULAR; INTRAVENOUS
Status: DISCONTINUED | OUTPATIENT
Start: 2020-03-31 | End: 2020-04-01 | Stop reason: HOSPADM

## 2020-03-31 RX ORDER — ACETAMINOPHEN 500 MG
1000 TABLET ORAL EVERY 6 HOURS PRN
COMMUNITY
End: 2020-09-03

## 2020-03-31 RX ORDER — MORPHINE SULFATE 4 MG/ML
4 INJECTION, SOLUTION INTRAMUSCULAR; INTRAVENOUS
Status: DISCONTINUED | OUTPATIENT
Start: 2020-03-31 | End: 2020-04-01 | Stop reason: HOSPADM

## 2020-04-01 ENCOUNTER — APPOINTMENT (OUTPATIENT)
Dept: CT IMAGING | Facility: HOSPITAL | Age: 35
End: 2020-04-01

## 2020-04-01 VITALS
SYSTOLIC BLOOD PRESSURE: 124 MMHG | RESPIRATION RATE: 16 BRPM | BODY MASS INDEX: 41.02 KG/M2 | HEIGHT: 71 IN | OXYGEN SATURATION: 94 % | HEART RATE: 90 BPM | WEIGHT: 293 LBS | TEMPERATURE: 99.9 F | DIASTOLIC BLOOD PRESSURE: 69 MMHG

## 2020-04-01 LAB
ALBUMIN SERPL-MCNC: 3.7 G/DL (ref 3.5–5.2)
ALBUMIN/GLOB SERPL: 0.9 G/DL
ALP SERPL-CCNC: 99 U/L (ref 39–117)
ALT SERPL W P-5'-P-CCNC: 22 U/L (ref 1–33)
ANION GAP SERPL CALCULATED.3IONS-SCNC: 14 MMOL/L (ref 5–15)
AST SERPL-CCNC: 16 U/L (ref 1–32)
B-HCG UR QL: NEGATIVE
BACTERIA UR QL AUTO: ABNORMAL /HPF
BASOPHILS # BLD AUTO: 0.03 10*3/MM3 (ref 0–0.2)
BASOPHILS NFR BLD AUTO: 0.2 % (ref 0–1.5)
BILIRUB SERPL-MCNC: 0.4 MG/DL (ref 0.2–1.2)
BILIRUB UR QL STRIP: NEGATIVE
BUN BLD-MCNC: 8 MG/DL (ref 6–20)
BUN/CREAT SERPL: 9.5 (ref 7–25)
CALCIUM SPEC-SCNC: 8.7 MG/DL (ref 8.6–10.5)
CHLORIDE SERPL-SCNC: 103 MMOL/L (ref 98–107)
CLARITY UR: ABNORMAL
CO2 SERPL-SCNC: 26 MMOL/L (ref 22–29)
COLOR UR: YELLOW
CREAT BLD-MCNC: 0.84 MG/DL (ref 0.57–1)
D-LACTATE SERPL-SCNC: 0.6 MMOL/L (ref 0.5–2)
DEPRECATED RDW RBC AUTO: 41.4 FL (ref 37–54)
EOSINOPHIL # BLD AUTO: 0.26 10*3/MM3 (ref 0–0.4)
EOSINOPHIL NFR BLD AUTO: 1.8 % (ref 0.3–6.2)
ERYTHROCYTE [DISTWIDTH] IN BLOOD BY AUTOMATED COUNT: 12.3 % (ref 12.3–15.4)
GFR SERPL CREATININE-BSD FRML MDRD: 95 ML/MIN/1.73
GLOBULIN UR ELPH-MCNC: 4.2 GM/DL
GLUCOSE BLD-MCNC: 109 MG/DL (ref 65–99)
GLUCOSE UR STRIP-MCNC: NEGATIVE MG/DL
HCT VFR BLD AUTO: 33.4 % (ref 34–46.6)
HGB BLD-MCNC: 10.5 G/DL (ref 12–15.9)
HGB UR QL STRIP.AUTO: NEGATIVE
HYALINE CASTS UR QL AUTO: ABNORMAL /LPF
IMM GRANULOCYTES # BLD AUTO: 0.06 10*3/MM3 (ref 0–0.05)
IMM GRANULOCYTES NFR BLD AUTO: 0.4 % (ref 0–0.5)
INTERNAL NEGATIVE CONTROL: NEGATIVE
INTERNAL POSITIVE CONTROL: POSITIVE
KETONES UR QL STRIP: ABNORMAL
LEUKOCYTE ESTERASE UR QL STRIP.AUTO: ABNORMAL
LYMPHOCYTES # BLD AUTO: 2.54 10*3/MM3 (ref 0.7–3.1)
LYMPHOCYTES NFR BLD AUTO: 18 % (ref 19.6–45.3)
Lab: NORMAL
MCH RBC QN AUTO: 28.7 PG (ref 26.6–33)
MCHC RBC AUTO-ENTMCNC: 31.4 G/DL (ref 31.5–35.7)
MCV RBC AUTO: 91.3 FL (ref 79–97)
MONOCYTES # BLD AUTO: 0.67 10*3/MM3 (ref 0.1–0.9)
MONOCYTES NFR BLD AUTO: 4.7 % (ref 5–12)
NEUTROPHILS # BLD AUTO: 10.56 10*3/MM3 (ref 1.7–7)
NEUTROPHILS NFR BLD AUTO: 74.9 % (ref 42.7–76)
NITRITE UR QL STRIP: NEGATIVE
NRBC BLD AUTO-RTO: 0 /100 WBC (ref 0–0.2)
PH UR STRIP.AUTO: <=5 [PH] (ref 5–8)
PLATELET # BLD AUTO: 444 10*3/MM3 (ref 140–450)
PMV BLD AUTO: 10.4 FL (ref 6–12)
POTASSIUM BLD-SCNC: 4.2 MMOL/L (ref 3.5–5.2)
PROCALCITONIN SERPL-MCNC: 0.09 NG/ML (ref 0.1–0.25)
PROT SERPL-MCNC: 7.9 G/DL (ref 6–8.5)
PROT UR QL STRIP: NEGATIVE
RBC # BLD AUTO: 3.66 10*6/MM3 (ref 3.77–5.28)
RBC # UR: ABNORMAL /HPF
REF LAB TEST METHOD: ABNORMAL
SODIUM BLD-SCNC: 143 MMOL/L (ref 136–145)
SP GR UR STRIP: 1.03 (ref 1–1.03)
SQUAMOUS #/AREA URNS HPF: ABNORMAL /HPF
UROBILINOGEN UR QL STRIP: ABNORMAL
WBC NRBC COR # BLD: 14.12 10*3/MM3 (ref 3.4–10.8)
WBC UR QL AUTO: ABNORMAL /HPF

## 2020-04-01 PROCEDURE — 96365 THER/PROPH/DIAG IV INF INIT: CPT

## 2020-04-01 PROCEDURE — 25010000002 IOPAMIDOL 61 % SOLUTION: Performed by: EMERGENCY MEDICINE

## 2020-04-01 PROCEDURE — 81001 URINALYSIS AUTO W/SCOPE: CPT | Performed by: EMERGENCY MEDICINE

## 2020-04-01 PROCEDURE — 25010000002 ONDANSETRON PER 1 MG: Performed by: EMERGENCY MEDICINE

## 2020-04-01 PROCEDURE — 25010000002 MORPHINE PER 10 MG: Performed by: EMERGENCY MEDICINE

## 2020-04-01 PROCEDURE — 87040 BLOOD CULTURE FOR BACTERIA: CPT | Performed by: EMERGENCY MEDICINE

## 2020-04-01 PROCEDURE — 96375 TX/PRO/DX INJ NEW DRUG ADDON: CPT

## 2020-04-01 PROCEDURE — 80053 COMPREHEN METABOLIC PANEL: CPT | Performed by: EMERGENCY MEDICINE

## 2020-04-01 PROCEDURE — 74177 CT ABD & PELVIS W/CONTRAST: CPT

## 2020-04-01 PROCEDURE — 85025 COMPLETE CBC W/AUTO DIFF WBC: CPT | Performed by: EMERGENCY MEDICINE

## 2020-04-01 PROCEDURE — 25010000002 VANCOMYCIN 10 G RECONSTITUTED SOLUTION: Performed by: EMERGENCY MEDICINE

## 2020-04-01 PROCEDURE — 84145 PROCALCITONIN (PCT): CPT | Performed by: EMERGENCY MEDICINE

## 2020-04-01 PROCEDURE — 96366 THER/PROPH/DIAG IV INF ADDON: CPT

## 2020-04-01 PROCEDURE — 83605 ASSAY OF LACTIC ACID: CPT | Performed by: EMERGENCY MEDICINE

## 2020-04-01 RX ORDER — CEPHALEXIN 500 MG/1
500 CAPSULE ORAL 4 TIMES DAILY
Qty: 40 CAPSULE | Refills: 0 | Status: SHIPPED | OUTPATIENT
Start: 2020-04-01 | End: 2020-04-11

## 2020-04-01 RX ORDER — HYDROCODONE BITARTRATE AND ACETAMINOPHEN 5; 325 MG/1; MG/1
1 TABLET ORAL EVERY 4 HOURS PRN
Qty: 10 TABLET | Refills: 0 | Status: SHIPPED | OUTPATIENT
Start: 2020-04-01 | End: 2020-09-03

## 2020-04-01 RX ORDER — SULFAMETHOXAZOLE AND TRIMETHOPRIM 800; 160 MG/1; MG/1
2 TABLET ORAL 2 TIMES DAILY
Qty: 28 TABLET | Refills: 0 | Status: SHIPPED | OUTPATIENT
Start: 2020-04-01 | End: 2020-04-08

## 2020-04-01 RX ADMIN — MORPHINE SULFATE 4 MG: 4 INJECTION, SOLUTION INTRAMUSCULAR; INTRAVENOUS at 00:24

## 2020-04-01 RX ADMIN — ONDANSETRON 4 MG: 2 INJECTION INTRAMUSCULAR; INTRAVENOUS at 00:22

## 2020-04-01 RX ADMIN — VANCOMYCIN HYDROCHLORIDE 3000 MG: 10 INJECTION, POWDER, LYOPHILIZED, FOR SOLUTION INTRAVENOUS at 00:35

## 2020-04-01 RX ADMIN — SODIUM CHLORIDE 1000 ML: 9 INJECTION, SOLUTION INTRAVENOUS at 00:20

## 2020-04-01 RX ADMIN — IOPAMIDOL 85 ML: 612 INJECTION, SOLUTION INTRAVENOUS at 00:51

## 2020-04-01 NOTE — DISCHARGE INSTRUCTIONS
Please keep your follow-up appointment with your OB/GYN surgeon as planned for wound check, reevaluation, wound packing.

## 2020-04-01 NOTE — ED PROVIDER NOTES
EMERGENCY DEPARTMENT ENCOUNTER      Pt Name: Geneva Cordova  MRN: [id number]  YOB: 1985  Date of evaluation: 3/31/2020  Provider: Nic Hollingsworth DO    CHIEF COMPLAINT       Chief Complaint   Patient presents with   • Post-op Problem         HISTORY OF PRESENT ILLNESS  (Location/Symptom, Timing/Onset, Context/Setting, Quality, Duration, Modifying Factors, Severity.)   Geneva Cordova is a 34 y.o. female who presents to the emergency department with complaints of a post-operation problem. The patient states that she had a hysteroscopy on 02/28/20 by Dr. Citlaly Cuevas at [facility]. She notes that her lower abdominal incision site became painful, warm, and red 2 weeks ago, leading her to call Dr. Cuevas. Dr. Cuevas initially prescribed the patient antibiotics until the patient felt that her symptoms were worsening. She then saw Dr. Cuevas afterward and received an antibiotic injection. She adds that she has difficulty bending over and walking. She denies any fever, chills, or vomiting. She denies any known allergies. Her PCP is Geetha Potts. There are no other acute complaints at this time.      Nursing notes were reviewed.    REVIEW OF SYSTEMS    (2-9 systems for level 4, 10 or more for level 5)   ROS:  General:  No fevers, no chills, no weakness  Cardiovascular:  No chest pain, no palpitations  Respiratory:  No shortness of breath, no cough, no wheezing  Gastrointestinal:  No pain, no nausea, no vomiting, no diarrhea, + lower abdominal pain, warmth, and redness  Musculoskeletal:  No muscle pain, no joint pain  Skin:  No rash, no easy bruising  Neurologic:  No speech problems, no headache, no extremity numbness, no extremity tingling, no extremity weakness  Psychiatric:  No anxiety  Genitourinary:  No dysuria, no hematuria    Except as noted above the remainder of the review of systems was reviewed and negative.       PAST MEDICAL HISTORY     Past Medical History:   Diagnosis  "Date   • Anemia    • Anxiety    • Bilateral lower extremity edema     R>L   • Boil, axilla     recurrent   • Chronic back pain     no meds or injections, feels related to breasts   • Depression    • Dyspepsia    • Dyspnea on exertion    • Fatigue    • Glucose intolerance (impaired glucose tolerance)     \"hasn't been tested for that\"   • Helicobacter pylori (H. pylori) infection     asx and grossly nl EGD. \"abundant\"on path. HBT still + after PrevPak tx. LSG path neg. neg testing 2018   • Hypoalbuminemia     3.2 in past, 4.1 most recent   • Migraine     resolved with weightloss   • Obesity, morbid (CMS/HCC)    • VIRA (obstructive sleep apnea)      improved since WLS, pending f/u sleep study, not using CPAP   • PCOS (polycystic ovarian syndrome)    • Seasonal allergies          SURGICAL HISTORY       Past Surgical History:   Procedure Laterality Date   •  SECTION N/A 2017    horizontal. Procedure:  SECTION PRIMARY;  Surgeon: Kwadwo Baxter MD;  Location: [facility] LABOR DELIVERY;  Service:    • DILATATION AND CURETTAGE     • ESSURE TUBAL LIGATION     • GASTRIC SLEEVE LAPAROSCOPIC  2015    With Dr. Hughes   • LAPAROSCOPIC CHOLECYSTECTOMY  2018    Dr. Hughes   • OTHER SURGICAL HISTORY  2020    had hysteroscopy, ablation, essure removal, tubal    • TONSILLECTOMY     • WISDOM TOOTH EXTRACTION           CURRENT MEDICATIONS       Current Facility-Administered Medications:   •  Morphine sulfate (PF) injection 4 mg, 4 mg, Intravenous, Q30 Min PRN, Nic Hollingsworth DO, 4 mg at 20 0024  •  ondansetron (ZOFRAN) injection 4 mg, 4 mg, Intravenous, Q30 Min PRN, Nic Hollingsworth DO, 4 mg at 20 0022  •  [COMPLETED] Insert peripheral IV, , , Once **AND** sodium chloride 0.9 % flush 10 mL, 10 mL, Intravenous, PRN, Nic Hollingsworth DO  •  vancomycin 3000 mg/500 mL 0.9% NS IVPB ([facility]), 20 mg/kg, Intravenous, Once, Nic Hollingsworth DO    Current Outpatient " Medications:   •  acetaminophen (TYLENOL) 500 MG tablet, Take 1,000 mg by mouth Every 6 (Six) Hours As Needed for Mild Pain ., Disp: , Rfl:   •  Multiple Vitamins-Minerals (MULTIPLE VITAMINS/WOMENS) tablet, Take 1 tablet by mouth Daily., Disp: , Rfl:   •  cephalexin (KEFLEX) 500 MG capsule, Take 1 capsule by mouth 4 (Four) Times a Day for 10 days., Disp: 40 capsule, Rfl: 0  •  HYDROcodone-acetaminophen (NORCO) 5-325 MG per tablet, Take 1 tablet by mouth Every 4 (Four) Hours As Needed for Moderate Pain ., Disp: 10 tablet, Rfl: 0  •  sulfamethoxazole-trimethoprim (BACTRIM DS,SEPTRA DS) 800-160 MG per tablet, Take 2 tablets by mouth 2 (Two) Times a Day for 7 days., Disp: 28 tablet, Rfl: 0    ALLERGIES     Patient has no known allergies.    FAMILY HISTORY       Family History   Problem Relation Age of Onset   • No Known Problems Other    • Sleep apnea Mother    • Diabetes Mother    • No Known Problems Father    • Pancreatic cancer Maternal Grandmother    • No Known Problems Sister    • No Known Problems Brother    • No Known Problems Daughter    • No Known Problems Son    • No Known Problems Paternal Grandmother    • No Known Problems Maternal Aunt    • No Known Problems Paternal Aunt    • BRCA 1/2 Neg Hx    • Colon cancer Neg Hx    • Endometrial cancer Neg Hx    • Ovarian cancer Neg Hx           SOCIAL HISTORY       Social History     Socioeconomic History   • Marital status: Single     Spouse name: Not on file   • Number of children: 4   • Years of education: College    • Highest education level: Not on file   Occupational History   • Occupation:    Tobacco Use   • Smoking status: Never Smoker   • Smokeless tobacco: Never Used   Substance and Sexual Activity   • Alcohol use: Yes     Frequency: Monthly or less     Drinks per session: 1 or 2     Binge frequency: Never   • Drug use: No   • Sexual activity: Yes     Partners: Male     Comment: no OCP   Social History Narrative    Lives in [place] with 4  children.  Hairstylist.          PHYSICAL EXAM    (up to 7 for level 4, 8 or more for level 5)     Vitals:    04/01/20 0000 04/01/20 0130 04/01/20 0200 04/01/20 0230   BP: 127/66 127/73 127/64 119/69   Pulse: 89 87 87 87   Resp:       Temp:       TempSrc:       SpO2: 97% 99% 94% 98%   Weight:       Height:           Physical Exam  General :Patient is awake, alert, oriented, in no acute distress, nontoxic appearing  HEENT: Pupils are equally round and reactive to light, EOMI, conjunctivae clear, sclerae white, there is no injection no icterus.  Oral mucosa is moist, no exudate. Uvula is midline  Neck: Neck is supple, full range of motion, trachea midline  Cardiac: Heart regular rhythm, no murmurs, rubs, or gallops. Tachycardic rate.  Lungs: Lungs are clear to auscultation, there is no wheezing, rhonchi, or rales. There is no use of accessory muscles.  Chest wall: There is no tenderness to palpation over the chest wall or over ribs  Abdomen: Abdomen is soft and nondistended. There is no firm or pulsatile masses, no rebound rigidity or guarding. Umbilical prior incision site draining purulent, pungent puss. There’s extensive cellulitic changes around the entire lower abdomen. Lower abdomen is mildly tender to palpation. Abdomen is otherwise soft.  Musculoskeletal: 5 out of 5 strength in all 4 extremities.  No focal muscle deficits are appreciated  Neuro: Motor intact, sensory intact, level of consciousness is normal  Dermatology: Skin is warm and dry  Psych: Mentation is grossly normal, cognition is grossly normal. Affect is appropriate.      DIAGNOSTIC RESULTS     EKG: All EKG's are interpreted by the Emergency Department Physician who either signs or Co-signs this chart in the absence of a cardiologist.    No orders to display       RADIOLOGY:   Non-plain film images such as CT, Ultrasound and MRI are read by the radiologist. Plain radiographic images are visualized and preliminarily interpreted by the emergency  physician with the below findings:      [] Radiologist's Report Reviewed:  CT Abdomen Pelvis With Contrast   Final Result      1. Skin thickening with adjacent fat stranding around the umbilicus extending into the lower ventral abdominal wall compatible with cellulitis.   2. Fluid and gas with hyperdense material as above at the level of the umbilicus concerning for an abscess. There may be some packing material present as well, possibly even some suture. Correlate clinically. This appears superficial to the abdominal   wall fascia.   3. No acute intra-abdominal process is identified. There is no intra-abdominal abscess or free fluid or free air.   4. Enlarged bilateral inguinal nodes, likely reactive.   5. Cholecystectomy and postoperative change involving the stomach.               Signer Name: Joshua oYo MD    Signed: 4/1/2020 1:36 AM    Workstation Name: [email or web address]     [facility]            ED BEDSIDE ULTRASOUND:   Performed by ED Physician - none    LABS:    I have reviewed and interpreted all of the currently available lab results from this visit (if applicable):  Results for orders placed or performed during the hospital encounter of 03/31/20   Comprehensive Metabolic Panel   Result Value Ref Range    Glucose 109 (H) 65 - 99 mg/dL    BUN 8 6 - 20 mg/dL    Creatinine 0.84 0.57 - 1.00 mg/dL    Sodium 143 136 - 145 mmol/L    Potassium 4.2 3.5 - 5.2 mmol/L    Chloride 103 98 - 107 mmol/L    CO2 26.0 22.0 - 29.0 mmol/L    Calcium 8.7 8.6 - 10.5 mg/dL    Total Protein 7.9 6.0 - 8.5 g/dL    Albumin 3.70 3.50 - 5.20 g/dL    ALT (SGPT) 22 1 - 33 U/L    AST (SGOT) 16 1 - 32 U/L    Alkaline Phosphatase 99 39 - 117 U/L    Total Bilirubin 0.4 0.2 - 1.2 mg/dL    eGFR  African Amer 95 >60 mL/min/1.73    Globulin 4.2 gm/dL    A/G Ratio 0.9 g/dL    BUN/Creatinine Ratio 9.5 7.0 - 25.0    Anion Gap 14.0 5.0 - 15.0 mmol/L   Lactic Acid, Plasma   Result Value Ref Range    Lactate 0.6 0.5 - 2.0 mmol/L    Procalcitonin   Result Value Ref Range    Procalcitonin 0.09 (L) 0.10 - 0.25 ng/mL   Urinalysis With Microscopic If Indicated (No Culture) - Urine, Clean Catch   Result Value Ref Range    Color, UA Yellow Yellow, Straw    Appearance, UA Cloudy (A) Clear    pH, UA <=5.0 5.0 - 8.0    Specific Gravity, UA 1.032 (H) 1.001 - 1.030    Glucose, UA Negative Negative    Ketones, UA Trace (A) Negative    Bilirubin, UA Negative Negative    Blood, UA Negative Negative    Protein, UA Negative Negative    Leuk Esterase, UA Moderate (2+) (A) Negative    Nitrite, UA Negative Negative    Urobilinogen, UA 1.0 E.U./dL 0.2 - 1.0 E.U./dL   CBC Auto Differential   Result Value Ref Range    WBC 14.12 (H) 3.40 - 10.80 10*3/mm3    RBC 3.66 (L) 3.77 - 5.28 10*6/mm3    Hemoglobin 10.5 (L) 12.0 - 15.9 g/dL    Hematocrit 33.4 (L) 34.0 - 46.6 %    MCV 91.3 79.0 - 97.0 fL    MCH 28.7 26.6 - 33.0 pg    MCHC 31.4 (L) 31.5 - 35.7 g/dL    RDW 12.3 12.3 - 15.4 %    RDW-SD 41.4 37.0 - 54.0 fl    MPV 10.4 6.0 - 12.0 fL    Platelets 444 140 - 450 10*3/mm3    Neutrophil % 74.9 42.7 - 76.0 %    Lymphocyte % 18.0 (L) 19.6 - 45.3 %    Monocyte % 4.7 (L) 5.0 - 12.0 %    Eosinophil % 1.8 0.3 - 6.2 %    Basophil % 0.2 0.0 - 1.5 %    Immature Grans % 0.4 0.0 - 0.5 %    Neutrophils, Absolute 10.56 (H) 1.70 - 7.00 10*3/mm3    Lymphocytes, Absolute 2.54 0.70 - 3.10 10*3/mm3    Monocytes, Absolute 0.67 0.10 - 0.90 10*3/mm3    Eosinophils, Absolute 0.26 0.00 - 0.40 10*3/mm3    Basophils, Absolute 0.03 0.00 - 0.20 10*3/mm3    Immature Grans, Absolute 0.06 (H) 0.00 - 0.05 10*3/mm3    nRBC 0.0 0.0 - 0.2 /100 WBC   Urinalysis, Microscopic Only - Urine, Clean Catch   Result Value Ref Range    RBC, UA 3-6 (A) None Seen, 0-2 /HPF    WBC, UA Too Numerous to Count (A) None Seen, 0-2 /HPF    Bacteria, UA None Seen None Seen, Trace /HPF    Squamous Epithelial Cells, UA 3-6 (A) None Seen, 0-2 /HPF    Hyaline Casts, UA 13-20 0 - 6 /LPF    Methodology Automated Microscopy     POCT Pregnancy, Urine   Result Value Ref Range    HCG, Urine, QL Negative Negative    Lot Number [id number]     Internal Positive Control Positive     Internal Negative Control Negative         All other labs were within normal range or not returned as of this dictation.      EMERGENCY DEPARTMENT COURSE and DIFFERENTIAL DIAGNOSIS/MDM:   Vitals:    Vitals:    04/01/20 0000 04/01/20 0130 04/01/20 0200 04/01/20 0230   BP: 127/66 127/73 127/64 119/69   Pulse: 89 87 87 87   Resp:       Temp:       TempSrc:       SpO2: 97% 99% 94% 98%   Weight:       Height:           ED Course as of Apr 01 0256   Tue Mar 31, 2020   2317 Patient is reevaluation as she is over a month status post abdominal hysteroscopy, revision surgery which was [facility] at [facility] with .  Patient has been following up for postoperative complications including wound infection, sounds like she has been on multiple rounds of oral antibiotics, intramuscular antibiotics but her symptoms continue to progress.  She notes a low-grade fever, otherwise no nausea or vomiting, she has tenderness diffusely throughout her lower abdomen.  On exam she is awake and alert, nontoxic appearing, her lower abdomen does have diffuse cellulitic changes, the umbilical incision site was packed, with removing of the packing and a decent amount of purulent pungent pus was drained from the sites.  We did pain IV, labs, cultures including wound culture for further evaluation.    [AP]   Wed Apr 01, 2020   0204 KENNETH query complete. Treatment plan to include limited course of prescribed  controlled substance. Risks including addiction, benefits, and alternatives presented to patient.     #[id number]    [KP]      ED Course User Index  [AP] Nic Hollingsworth,   [KP] Ananya Randall    COVID-19 RISK SCREEN     1. Has the patient had close contact without PPE with a lab confirmed COVID-19 (+) person or a person under investigation (PUI) for COVID-19  infection?  -- No     2. Has the patient provided care or had close contact with COVID-19(+) patient in a healthcare facility? --  No       Results reviewed as above, CT scan with underlying cellulitis, small umbilical abscess is again noted.  White count 14.  I did discuss the case with the patient's OB/GYN surgeon, Dr. Cuevas, who is well aware the patient, has been following her, states she is aware of her umbilical abscess as well as lower abdominal cellulitis, has been on multiple rounds of antibiotics.  She recommends continuing with the IV vancomycin here, we will start her on Bactrim and Keflex, she had a follow-up appointment in 24 hours with her surgeon for reevaluation.  Patient agreeable to continuing with the outpatient work-up as discussed.  Understands return precautions. I had a discussion with the patient/family regarding diagnosis, diagnostic results, treatment plan, and medications.  The patient/family indicated understanding of these instructions.  I spent adequate time at the bedside proceeding discharge necessary to personally discuss the aftercare instructions, giving patient education, providing explanations of the results of our evaluations/findings, and my decision making to assure that the patient/family understand the plan of care.  Time was allotted to answer questions at that time and throughout the ED course.  Emphasis was placed on timely follow-up after discharge.  I also discussed the potential for the development of an acute emergent condition requiring further evaluation, admission, or even surgical intervention. I discussed that we found nothing during the visit today indicating the need for further workup, admission, or the presence of an unstable medical condition.  I encouraged the patient to return to the emergency department immediately for ANY concerns, worsening, new complaints, or if symptoms persist and unable to seek follow-up in a timely fashion.  The patient/family  expressed understanding and agreement with this plan.  The patient will follow-up with their PCP in 1-2 days for reevaluation.       MEDICATIONS ADMINISTERED IN ED:  Medications   sodium chloride 0.9 % flush 10 mL (has no administration in time range)   ondansetron (ZOFRAN) injection 4 mg (4 mg Intravenous Given 4/1/20 0022)   Morphine sulfate (PF) injection 4 mg (4 mg Intravenous Given 4/1/20 0024)   vancomycin 3000 mg/500 mL 0.9% NS IVPB ([facility]) ( Intravenous Currently Infusing 4/1/20 0254)   sodium chloride 0.9 % bolus 1,000 mL ( Intravenous Currently Infusing 4/1/20 0254)   iopamidol (ISOVUE-300) 61 % injection 100 mL (85 mL Intravenous Given 4/1/20 0051)       PROCEDURES:  Procedures    CRITICAL CARE TIME    Total Critical Care time was 0 minutes, excluding separately reportable procedures.   There was a high probability of clinically significant/life threatening deterioration in the patient's condition which required my urgent intervention.      FINAL IMPRESSION      1. Infection of superficial incisional surgical site after procedure, initial encounter    2. Cellulitis of abdominal wall    3. Abscess of skin of abdomen          DISPOSITION/PLAN     ED Disposition     ED Disposition Condition Comment    Discharge Stable           PATIENT REFERRED TO:  Citlaly Cuevas MD  [address]  [place] [address]  [phone]    Schedule an appointment as soon as possible for a visit in 1 day  For wound re-check    Geetha Pfeiffer MD  [address] [address]  [phone]    In 2 days      [facility] Emergency Department  [address] [id number] [phone]    If symptoms worsen      DISCHARGE MEDICATIONS:     Medication List      START taking these medications    cephalexin 500 MG capsule  Commonly known as:  KEFLEX  Take 1 capsule by mouth 4 (Four) Times a Day for 10 days.     sulfamethoxazole-trimethoprim 800-160 MG per  tablet  Commonly known as:  BACTRIM DS,SEPTRA DS  Take 2 tablets by mouth 2 (Two) Times a Day for 7 days.        CHANGE how you take these medications    HYDROcodone-acetaminophen 5-325 MG per tablet  Commonly known as:  NORCO  Take 1 tablet by mouth Every 4 (Four) Hours As Needed for Moderate Pain .  What changed:    medication strength  how much to take  when to take this  reasons to take this        CONTINUE taking these medications    acetaminophen 500 MG tablet  Commonly known as:  TYLENOL     Multiple Vitamins/Womens tablet            Documentation assistance provided by Ananya Randall acting as scribe for Dr. Nic Hollingsworth.     The scribe's documentation has been prepared under my direction and personally reviewed by me in its entirety.  I confirm that the note above accurately reflects all work, treatment, procedures, and medical decision making performed by me.      Comment: Please note this report has been produced using speech recognition software.      iNc Hollingsworth DO  Attending Emergency Physician                 Ananya Randall  03/31/20 [id number]       Nic Hollingsworth DO  04/01/20 025

## 2020-04-05 ENCOUNTER — TELEPHONE (OUTPATIENT)
Dept: EMERGENCY DEPT | Facility: HOSPITAL | Age: 35
End: 2020-04-05

## 2020-04-06 LAB
BACTERIA SPEC AEROBE CULT: ABNORMAL
BACTERIA SPEC AEROBE CULT: ABNORMAL
BACTERIA SPEC AEROBE CULT: NORMAL
BACTERIA SPEC AEROBE CULT: NORMAL
GRAM STN SPEC: ABNORMAL

## 2020-08-31 ENCOUNTER — LAB (OUTPATIENT)
Dept: PULMONOLOGY | Facility: CLINIC | Age: 35
End: 2020-08-31

## 2020-08-31 DIAGNOSIS — Z01.812 BLOOD TESTS PRIOR TO TREATMENT OR PROCEDURE: Primary | ICD-10-CM

## 2020-08-31 PROCEDURE — 99000 SPECIMEN HANDLING OFFICE-LAB: CPT | Performed by: NURSE PRACTITIONER

## 2020-08-31 PROCEDURE — U0004 COV-19 TEST NON-CDC HGH THRU: HCPCS | Performed by: NURSE PRACTITIONER

## 2020-08-31 PROCEDURE — U0002 COVID-19 LAB TEST NON-CDC: HCPCS | Performed by: NURSE PRACTITIONER

## 2020-09-01 LAB
REF LAB TEST METHOD: NORMAL
SARS-COV-2 RNA RESP QL NAA+PROBE: NOT DETECTED

## 2020-09-03 ENCOUNTER — OFFICE VISIT (OUTPATIENT)
Dept: PULMONOLOGY | Facility: CLINIC | Age: 35
End: 2020-09-03

## 2020-09-03 ENCOUNTER — CONSULT (OUTPATIENT)
Dept: BARIATRICS/WEIGHT MGMT | Facility: CLINIC | Age: 35
End: 2020-09-03

## 2020-09-03 VITALS
TEMPERATURE: 97.1 F | DIASTOLIC BLOOD PRESSURE: 80 MMHG | HEIGHT: 68 IN | WEIGHT: 293 LBS | SYSTOLIC BLOOD PRESSURE: 132 MMHG | BODY MASS INDEX: 44.41 KG/M2 | HEART RATE: 68 BPM | OXYGEN SATURATION: 95 %

## 2020-09-03 VITALS
RESPIRATION RATE: 18 BRPM | OXYGEN SATURATION: 96 % | HEIGHT: 71 IN | BODY MASS INDEX: 41.02 KG/M2 | SYSTOLIC BLOOD PRESSURE: 126 MMHG | TEMPERATURE: 97.1 F | WEIGHT: 293 LBS | HEART RATE: 93 BPM | DIASTOLIC BLOOD PRESSURE: 76 MMHG

## 2020-09-03 DIAGNOSIS — E66.01 OBESITY, CLASS III, BMI 40-49.9 (MORBID OBESITY) (HCC): Primary | ICD-10-CM

## 2020-09-03 DIAGNOSIS — J30.2 SEASONAL ALLERGIES: ICD-10-CM

## 2020-09-03 DIAGNOSIS — E28.2 PCOS (POLYCYSTIC OVARIAN SYNDROME): ICD-10-CM

## 2020-09-03 DIAGNOSIS — G47.33 OSA (OBSTRUCTIVE SLEEP APNEA): ICD-10-CM

## 2020-09-03 DIAGNOSIS — M54.9 CHRONIC BACK PAIN, UNSPECIFIED BACK LOCATION, UNSPECIFIED BACK PAIN LATERALITY: ICD-10-CM

## 2020-09-03 DIAGNOSIS — K21.9 GASTROESOPHAGEAL REFLUX DISEASE, ESOPHAGITIS PRESENCE NOT SPECIFIED: ICD-10-CM

## 2020-09-03 DIAGNOSIS — R53.83 FATIGUE, UNSPECIFIED TYPE: ICD-10-CM

## 2020-09-03 DIAGNOSIS — Z01.818 PREOPERATIVE CLEARANCE: Primary | ICD-10-CM

## 2020-09-03 DIAGNOSIS — R60.0 BILATERAL LOWER EXTREMITY EDEMA: ICD-10-CM

## 2020-09-03 DIAGNOSIS — G89.29 CHRONIC BACK PAIN, UNSPECIFIED BACK LOCATION, UNSPECIFIED BACK PAIN LATERALITY: ICD-10-CM

## 2020-09-03 DIAGNOSIS — R06.09 DYSPNEA ON EXERTION: ICD-10-CM

## 2020-09-03 DIAGNOSIS — F33.1 MODERATE EPISODE OF RECURRENT MAJOR DEPRESSIVE DISORDER (HCC): ICD-10-CM

## 2020-09-03 DIAGNOSIS — E66.01 OBESITY, CLASS III, BMI 40-49.9 (MORBID OBESITY) (HCC): ICD-10-CM

## 2020-09-03 PROCEDURE — 99214 OFFICE O/P EST MOD 30 MIN: CPT | Performed by: NURSE PRACTITIONER

## 2020-09-03 PROCEDURE — 94010 BREATHING CAPACITY TEST: CPT | Performed by: NURSE PRACTITIONER

## 2020-09-03 PROCEDURE — 99214 OFFICE O/P EST MOD 30 MIN: CPT | Performed by: SURGERY

## 2020-09-03 PROCEDURE — 94729 DIFFUSING CAPACITY: CPT | Performed by: NURSE PRACTITIONER

## 2020-09-03 PROCEDURE — 94726 PLETHYSMOGRAPHY LUNG VOLUMES: CPT | Performed by: NURSE PRACTITIONER

## 2020-09-03 NOTE — PROGRESS NOTES
"Pre-operative Clearance    Chief Complaint   Patient presents with   • Medical Clearance       HPI     Geneva Cordova is a pleasant 34 y.o. female who has been referred for preoperative exam.  She is hopeful to have a sips procedure in the near future by Dr. Hughes.  She does follow-up with them later today.  She was last seen in the office in November for preoperative clearance for the same procedure however insurance would not approve and she did not have her surgery.    She denies any changes to her medical history since her last appointment.  She denies any respiratory illnesses or exacerbations since her last appointment.    She continues to have some mild shortness of breath with exertion but does recover quickly at rest.  She does not use any type of inhalers currently for her breathing.    She has a history of obstructive sleep apnea.  She has not had a sleep study performed in quite some time.  She is not interested in a sleep study at this time.    She denies fever, chills, sputum production, hemoptysis, night sweats, weight loss, chest pain or palpitations.  She denies any lower extremity edema or calf tenderness.  She denies any sinus or allergy symptoms.  She denies any reflux symptoms.     She refuses to receive the influenza vaccination.     She is a lifetime non-smoker.  Past Medical History:   Diagnosis Date   • Anemia    • Anxiety    • Bilateral lower extremity edema     R>L   • Boil, axilla     recurrent   • Chronic back pain     no meds or injections, feels related to breasts   • Depression    • Dyspepsia    • Dyspnea on exertion    • Fatigue    • Glucose intolerance (impaired glucose tolerance)     \"hasn't been tested for that\"   • Helicobacter pylori (H. pylori) infection     asx and grossly nl EGD. \"abundant\"on path. HBT still + after PrevPak tx. LSG path neg. neg testing 7/2018   • Hypoalbuminemia     3.2 in past, 4.1 most recent   • Migraine     resolved with weightloss   • Obesity, morbid " (CMS/HCC)    • VIRA (obstructive sleep apnea)      improved since WLS, pending f/u sleep study, not using CPAP   • PCOS (polycystic ovarian syndrome)    • Seasonal allergies        Past Surgical History:   Procedure Laterality Date   •  SECTION N/A 2017    horizontal. Procedure:  SECTION PRIMARY;  Surgeon: Kwadwo Baxter MD;  Location: [facility] LABOR DELIVERY;  Service:    • DILATATION AND CURETTAGE     • ESSURE TUBAL LIGATION     • GASTRIC SLEEVE LAPAROSCOPIC  2015    With Dr. Hughes   • LAPAROSCOPIC CHOLECYSTECTOMY  2018    Dr. Hughes   • OTHER SURGICAL HISTORY  2020    had hysteroscopy, ablation, essure removal, tubal    • TONSILLECTOMY     • WISDOM TOOTH EXTRACTION         Family History   Problem Relation Age of Onset   • No Known Problems Other    • Sleep apnea Mother    • Diabetes Mother    • No Known Problems Father    • Pancreatic cancer Maternal Grandmother    • No Known Problems Sister    • No Known Problems Brother    • No Known Problems Daughter    • No Known Problems Son    • No Known Problems Paternal Grandmother    • No Known Problems Maternal Aunt    • No Known Problems Paternal Aunt    • BRCA 1/2 Neg Hx    • Colon cancer Neg Hx    • Endometrial cancer Neg Hx    • Ovarian cancer Neg Hx        Social History     Socioeconomic History   • Marital status: Single     Spouse name: Not on file   • Number of children: 4   • Years of education: College    • Highest education level: Not on file   Occupational History   • Occupation:    Tobacco Use   • Smoking status: Never Smoker   • Smokeless tobacco: Never Used   Substance and Sexual Activity   • Alcohol use: Yes     Frequency: Monthly or less     Drinks per session: 1 or 2     Binge frequency: Never     Comment: Occ   • Drug use: No   • Sexual activity: Yes     Partners: Male     Comment: no OCP   Social History Narrative    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        No Known  Allergies    ROS    Review of Systems   Constitutional: Negative for activity change, chills, fever and unexpected weight loss.   HENT: Negative for congestion, hearing loss, postnasal drip, rhinorrhea, sinus pressure, sore throat and voice change.    Eyes: Negative for blurred vision and visual disturbance.   Respiratory: Positive for shortness of breath. Negative for apnea, cough and wheezing.    Cardiovascular: Negative for chest pain and palpitations.   Gastrointestinal: Negative for abdominal pain, nausea, vomiting and GERD.   Endocrine: Negative for cold intolerance.   Genitourinary: Negative for difficulty urinating and urinary incontinence.   Musculoskeletal: Negative for back pain, joint swelling and myalgias.   Skin: Negative for color change and pallor.   Allergic/Immunologic: Negative for food allergies.   Neurological: Negative for weakness, numbness, headache and confusion.   Hematological: Negative for adenopathy.   Psychiatric/Behavioral: Negative for agitation, behavioral problems and depressed mood.       Vitals:    09/03/20 1126   BP: 132/80   Pulse: 68   Temp: 97.1 °F (36.2 °C)   SpO2: 95%         Current Outpatient Medications:   •  Multiple Vitamins-Minerals (MULTIPLE VITAMINS/WOMENS) tablet, Take 1 tablet by mouth Daily., Disp: , Rfl:     PE    Physical Exam   Constitutional: She is oriented to person, place, and time. She appears well-developed and well-nourished.   HENT:   Head: Normocephalic and atraumatic.   Eyes: Pupils are equal, round, and reactive to light. EOM are normal.   Neck: Normal range of motion. Neck supple.   Cardiovascular: Normal rate, regular rhythm, normal heart sounds and intact distal pulses. Exam reveals no gallop and no friction rub.   No murmur heard.  Pulmonary/Chest: Effort normal and breath sounds normal. No stridor. No respiratory distress. She has no wheezes. She has no rales. She exhibits no tenderness.   Abdominal: Soft.   Musculoskeletal: Normal range of  motion. She exhibits no edema.   Neurological: She is alert and oriented to person, place, and time.   Skin: Skin is warm and dry. Capillary refill takes less than 2 seconds.   Psychiatric: She has a normal mood and affect. Her behavior is normal.   Nursing note and vitals reviewed.       ARISCAT Preoperative Pulmonary Risk Index.    Age [x]   <= 50 years old (0 points)    []   51-80 years old (3 points)    []   >80 years old (16 points)       Preoperative Oxygen Saturation [x]   >= 96% (0 points)    []   91-95% (8 points)    []   <= 90% (24 points)       Other Clinical Risk Factors []   Respiratory Infection in the last month (17 points)    []   Pre-operative anemia: Hb < 10 g/dL (11 points)    []   Emergency Surgery (8 points)       Surgical Incision [x]   Upper abdominal (15 points)    []   Intrathoracic (24 points)       Duration of Surgery []   < 2 hours (0 points)    [x]   2-3 hours (16 points)    []   >3 hours (23 points)       Total Criteria Point Count: 31     ARISCAT risk index interpretation:      0-25 points: Low risk  1.6 % pulmonary complication rate.   26-44 points: Intermediate risk 13.3% pulmonary complication rate.    points: High risk 42.1 % pulmonary complication rate.     Postoperative Pulmonary Complications include but are not limited to: Respiratory Failure, Pulmonary Infection, Pleural Effusion, Atelectasis, Pneumothorax Bronchospasm, Aspiration Pneumonia.    No images are attached to the encounter or orders placed in the encounter.    Chest PA/lateral: Official report pending    PFTs performed in the office today and performed by me: FVC 4.42 113% predicted, FEV1 3.52 108% predicted, FEV1/FVC 80% predicted, TLC 4.62 73% predicted, DLCO 73% predicted, no obstruction, mild restriction and reduced DLCO.    A/P    Problem List Items Addressed This Visit     None      Visit Diagnoses     Preoperative clearance    -  Primary    Relevant Orders    XR Chest PA & Lateral    Pulmonary Function  Test (Completed)        Ms. Potter was here today for preoperative clearance for a sips procedure per Dr. Hughes.    She seems to be doing well from a pulmonary standpoint.  We did review her chest x-ray in the office today and it appears to have no acute pulmonary process however the official report is pending.  I will notify the patient if there is any abnormalities.  We did review her PFTs in detail today she has a mild restrictive airway pattern that could be due to her obesity.  I would like to repeat PFTs in 1 year after her surgery.    Based on the ARISCAT preoperative pulmonary risk index she is an at an intermediate risk for pulmonary comp occasions related to the length of surgery and location of the surgery.  If her surgery were to last less than 2 hours she would be at a low risk for pulmonary complications.  We discussed possible pulmonary complications in the office today such as pulmonary infection, pleural effusion, atelectasis, pneumothorax bronchospasm, aspiration pneumonia and respiratory failure.  We also discussed good pulmonary toileting techniques such as cough/deep breathing, using an incentive spirometry before and after surgery, and early mobilization.  She denies any difficulties with her previous surgery in 2015.  After discussing the possible pulmonary complications she is willing to proceed with surgery.  I did advise her to avoid sick contacts prior to her scheduled surgery date.     She would like to follow-up in 1 year for reevaluation of her obstructive sleep apnea.  She is going to see if weight loss helps her daytime somnolence and fatigue.  She may need a repeat sleep study at this time as well.    She will follow-up in 1 year or sooner if her symptoms worsen.  She will call with any additional concerns or questions.   I spent 25 minutes with the patient. I spent > 50% percent of this time counseling and discussing diagnosis, prognosis, diagnostic testing, evaluation, current  status, treatment options and management.    RAGHAV Thompson  09/03/202011:34 AM  Electronically signed     Please note that portions of this note were completed with a voice recognition program. Efforts were made to edit the dictations, but occasionally words are mistranscribed.      CC: Geetha Pfeiffer MD Marlana L Keeton, APRN

## 2020-09-03 NOTE — PROGRESS NOTES
"[facility] Bariatric Surgery  [address]  [address]  [facility] [id number] [phone]        Patient Name: Geneva Cordova.  YOB: 1985      Date of Visit: 09/03/2020      Reason for Visit:  Discuss options    HPI:  Geneva Cordova is a 34 y.o. female s/p LSG by Dr. Hughes on 7/17/15,  s/p lap joe with Dr. Hughes 2/19/18 with EGD/biopsy x 2.  EGD findings, no HH, GE junction at 41 cm.    She saw Dr. Hughes in 2/2020 to discuss revision. At that time, her insurance would only cover re-sleeve, not SIPS.  She reported at the beginning of our office visit that she was interested in re-sleeve vs. RYGB.      Highest weight 425 pounds.  [address], pregnant with twins, and dx with PCOS.  333 current weight.  Struggling to lose despite , eliminating HFCS.  Protein intake: 3 Premier proteins, eggs, chicken.  She feels like she is less restricted than in the beginning and can eat a whole plate of food.      Reports chronic constipation.  Eats popcorn so it will make her have a BM.  Miralax sometimes.      See Dr. Hughes' discussion of options note 2/2020 for more details.           Past Medical History:   Diagnosis Date   • Anemia    • Anxiety    • Bilateral lower extremity edema     R>L   • Boil, axilla     recurrent   • Chronic back pain     no meds or injections, feels related to breasts   • Depression    • Dyspepsia    • Dyspnea on exertion    • Fatigue    • Glucose intolerance (impaired glucose tolerance)     \"hasn't been tested for that\"   • Helicobacter pylori (H. pylori) infection     asx and grossly nl EGD. \"abundant\"on path. HBT still + after PrevPak tx. LSG path neg. neg testing 7/2018   • Hypoalbuminemia     3.2 in past, 4.1 most recent   • Migraine     resolved with weightloss   • Obesity, morbid (CMS/HCC)    • VIRA (obstructive sleep apnea)      improved since WLS, pending f/u sleep study, not using CPAP   • PCOS (polycystic ovarian syndrome)    • Seasonal " allergies      Past Surgical History:   Procedure Laterality Date   •  SECTION N/A 2017    horizontal. Procedure:  SECTION PRIMARY;  Surgeon: Kwadwo Baxter MD;  Location: [facility] LABOR DELIVERY;  Service:    • DILATATION AND CURETTAGE     • ESSURE TUBAL LIGATION     • GASTRIC SLEEVE LAPAROSCOPIC  2015    With Dr. Hughes   • LAPAROSCOPIC CHOLECYSTECTOMY  2018    Dr. Hughes   • OTHER SURGICAL HISTORY  2020    had hysteroscopy, ablation, essure removal, tubal    • TONSILLECTOMY     • WISDOM TOOTH EXTRACTION       Outpatient Medications Marked as Taking for the 9/3/20 encounter (Consult) with Natalia Mariscal MD   Medication Sig Dispense Refill   • Multiple Vitamins-Minerals (MULTIPLE VITAMINS/WOMENS) tablet Take 1 tablet by mouth Daily.       No Known Allergies    Social History     Socioeconomic History   • Marital status: Single     Spouse name: Not on file   • Number of children: 4   • Years of education: College    • Highest education level: Not on file   Occupational History   • Occupation:    Tobacco Use   • Smoking status: Never Smoker   • Smokeless tobacco: Never Used   Substance and Sexual Activity   • Alcohol use: Yes     Frequency: Monthly or less     Drinks per session: 1 or 2     Binge frequency: Never     Comment: Occ   • Drug use: No   • Sexual activity: Yes     Partners: Male     Comment: no OCP   Social History Narrative    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        Vitals:    20 0835   BP: 126/76   Pulse: 93   Resp: 18   Temp: 97.1 °F (36.2 °C)   SpO2: 96%     Weight (!) 151 kg (333 lb 8 oz)  Body mass index is 46.51 kg/m².    Physical Exam   Constitutional: She is oriented to person, place, and time. She appears well-developed and well-nourished. No distress.   HENT:   Head: Normocephalic and atraumatic.   Mouth/Throat: No oropharyngeal exudate.   Eyes: Pupils are equal, round, and reactive to light. Conjunctivae and  EOM are normal.   Pulmonary/Chest: Effort normal. No respiratory distress.   Abdominal: Soft. She exhibits no distension.   Neurological: She is alert and oriented to person, place, and time. No cranial nerve deficit.   Skin: Skin is warm and dry. She is not diaphoretic. No pallor.   Psychiatric: She has a normal mood and affect. Her behavior is normal. Thought content normal.         Assessment:      ICD-10-CM ICD-9-CM   1. Obesity, Class III, BMI 40-49.9 (morbid obesity) (CMS/[facility]) E66.01 278.01   2. PCOS (polycystic ovarian syndrome) E28.2 256.4   3. IVRA (obstructive sleep apnea) G47.33 327.23   4. Fatigue, unspecified type R53.83 780.79   5. Gastroesophageal reflux disease, esophagitis presence not specified K21.9 530.81   6. Chronic back pain, unspecified back location, unspecified back pain laterality M54.9 724.5    G89.29 338.29   7. Bilateral lower extremity edema R60.0 782.3       Plan:    I discussed at length the different revisional options using diagrams.    Re-sleeve would help with restriction, and weight loss potential is not known, probably less than adding a malabsorption component, and there is no guarantee she would lose more weight.  Same risks as initial sleeve: leak, VTE, etc.  Risk of getting sleeve too narrow at incisura.  Must avoid ulcerogenics 6-8 weeks post op.      I would not recommend Alejandro-en-Y gastric bypass to lose more weight after initial sleeve, and I reserve that operation for patients with intractable reflux or other technical issue after sleeve, which she does not have.  Weight loss has not been impressive in the literature, and she would have to avoid ulcerogenics for life.      SIPS would likely provide better weight loss than re-sleeve due to its malabsorption.  We discussed the need for vitamin compliance after surgery, and the possibility of smelly diarrhea.  It is unknown if her insurance would cover this procedure.  Her starting BMI of >50 suggests that she may not lose  the weight she wants to lose with only a restrictive procedure.      She is aware that I am happy to perform re-sleeve on her, but that I do not perform SIPS.  To my knowledge, Dr. Hughes is the only surgeon in [place] that does SIPS.      I encouraged her to seek second opinions.    She would like to think about her options, and will let us know what she decides.

## 2020-09-22 ENCOUNTER — TELEPHONE (OUTPATIENT)
Dept: OBSTETRICS AND GYNECOLOGY | Facility: CLINIC | Age: 35
End: 2020-09-22

## 2020-09-22 PROCEDURE — 87591 N.GONORRHOEAE DNA AMP PROB: CPT | Performed by: NURSE PRACTITIONER

## 2020-09-22 PROCEDURE — 87798 DETECT AGENT NOS DNA AMP: CPT | Performed by: NURSE PRACTITIONER

## 2020-09-22 PROCEDURE — 87491 CHLMYD TRACH DNA AMP PROBE: CPT | Performed by: NURSE PRACTITIONER

## 2020-09-22 PROCEDURE — 87529 HSV DNA AMP PROBE: CPT | Performed by: NURSE PRACTITIONER

## 2020-09-22 PROCEDURE — 87801 DETECT AGNT MULT DNA AMPLI: CPT | Performed by: NURSE PRACTITIONER

## 2020-09-22 PROCEDURE — 87661 TRICHOMONAS VAGINALIS AMPLIF: CPT | Performed by: NURSE PRACTITIONER

## 2020-09-22 NOTE — TELEPHONE ENCOUNTER
PATIENT CALLED AND NEEDED A FOLLOW UP... I SCHEDULED HER AN ANNUAL FOR ABOUT A WEEK FROM NOW BUT SHE COMPLAINED OF HAVING SOME DISCHARGE AND WANTED TO KNOW IF SHE COULD SPEAK WITH YOU ABOUT HER SYMPTOMS AND TO SEE IF SHE COULD BE CALLED IN SOMETHING TO HELP HER BEFORE SHE HAS HER APPOINTMENT.

## 2020-09-29 DIAGNOSIS — R53.83 FATIGUE, UNSPECIFIED TYPE: ICD-10-CM

## 2020-09-29 DIAGNOSIS — R06.00 DYSPNEA, UNSPECIFIED TYPE: Primary | ICD-10-CM

## 2020-09-30 ENCOUNTER — OFFICE VISIT (OUTPATIENT)
Dept: OBSTETRICS AND GYNECOLOGY | Facility: CLINIC | Age: 35
End: 2020-09-30

## 2020-09-30 VITALS
HEIGHT: 69 IN | WEIGHT: 293 LBS | BODY MASS INDEX: 43.4 KG/M2 | DIASTOLIC BLOOD PRESSURE: 80 MMHG | SYSTOLIC BLOOD PRESSURE: 122 MMHG

## 2020-09-30 DIAGNOSIS — Z20.2 EXPOSURE TO TRICHOMONAS: ICD-10-CM

## 2020-09-30 DIAGNOSIS — Z01.411 ENCOUNTER FOR GYNECOLOGICAL EXAMINATION WITH ABNORMAL FINDING: Primary | ICD-10-CM

## 2020-09-30 DIAGNOSIS — N89.8 VAGINAL DISCHARGE: ICD-10-CM

## 2020-09-30 PROBLEM — O34.83: Status: RESOLVED | Noted: 2017-08-24 | Resolved: 2020-09-30

## 2020-09-30 PROBLEM — Z98.891 S/P CESAREAN SECTION: Status: RESOLVED | Noted: 2017-08-18 | Resolved: 2020-09-30

## 2020-09-30 PROBLEM — Z30.2 ENCOUNTER FOR ESSURE IMPLANTATION: Status: RESOLVED | Noted: 2017-10-06 | Resolved: 2020-09-30

## 2020-09-30 PROBLEM — D27.9: Status: RESOLVED | Noted: 2017-08-24 | Resolved: 2020-09-30

## 2020-09-30 PROCEDURE — 99385 PREV VISIT NEW AGE 18-39: CPT | Performed by: OBSTETRICS & GYNECOLOGY

## 2020-09-30 RX ORDER — METRONIDAZOLE 500 MG/1
500 TABLET ORAL 2 TIMES DAILY
Qty: 14 TABLET | Refills: 1 | Status: SHIPPED | OUTPATIENT
Start: 2020-09-30 | End: 2020-10-07

## 2020-09-30 NOTE — PROGRESS NOTES
Subjective   Chief Complaint   Patient presents with   • Annual Exam     Geneva Cordova is a 34 y.o. year old  presenting to be seen for her annual exam.    Current birth control method: Tubal / salpingectomy.  She feels hr pH balance is off ; no odor after treatment [facility] in February she saw Dr. Dr Godwin Cuevas because she thought I had retired after I  stopped doing obstetrics.  I had seen her in the past and done of Essure on the right side.  She reports that Dr. Robins did an ablation for heavy periods also removed her tubes because of pain.  Said something about 1 of the Essure coils had perforated the bowel and there was feces on it.  She reports that then a few weeks later she had to go back in for surgery again because she had a infection.  I have reviewed the HSG report and only an Essure was placed on the right side because she had previously had a left partial salpingectomy with removal of a large ovarian cyst at repeat  section.  That was done for twins and I think she was interested in sterilization anyway so part of the tubes removed at that time of surgery.  I would like to get the operative notes to see exactly what was going on because apparently Dr. Dr Godwin Cuevas did not request or obtain operative notes or HSG prior to operating on [facility].    No LMP recorded (lmp unknown). Patient has had an ablation.    She is sexually active.   Condoms are not typically used.    [place] is painful or she is having problems :yes given trich so no longer with that partner.  She has concerns about domestic violence: no.    Cycle Frequency: absent                          She exercises regularly: no.  Self breast awareness:yes    Calcium intake: is not adequate.1  Caffeine intake: no or mild caffeine use plus 2  Bang energy !  Social History    Tobacco Use      Smoking status: Never Smoker      Smokeless tobacco: Never Used    Social History     Substance  and Sexual Activity   Alcohol Use Yes   • Frequency: Monthly or less   • Drinks per session: 1 or 2   • Binge frequency: Never    Comment: Occ        The following portions of the patient's history were reviewed and updated as is  appropriate:She  has a past medical history of Anemia, Anxiety, Bilateral lower extremity edema, Boil, axilla, Chronic back pain, Depression, Dyspepsia, Dyspnea on exertion, Fatigue, Glucose intolerance (impaired glucose tolerance), Helicobacter pylori (H. pylori) infection, Hypoalbuminemia, Left Ovarian serous cystadenoma -removed at  section 2017 (2017), Migraine, Obesity, morbid (CMS/HCC), VIRA (obstructive sleep apnea), PCOS (polycystic ovarian syndrome), and Seasonal allergies.  She does not have any pertinent problems on file.  She  has a past surgical history that includes Dilation and curettage of uterus (); Gastric Sleeve (2015); [place] tooth extraction ();  section (N/A, 2017); Tonsillectomy (); Essure tubal ligation (); Laparoscopic cholecystectomy (2018); Endometrial ablation (2020); and Salpingectomy (Bilateral, 2020).  Her family history includes Diabetes in her mother; No Known Problems in her brother, daughter, father, maternal aunt, paternal aunt, paternal grandmother, sister, son, and another family member; Pancreatic cancer in her maternal grandmother; Sleep apnea in her mother.  She  reports that she has never smoked. She has never used smokeless tobacco. She reports current alcohol use. She reports that she does not use drugs.  She has No Known Allergies..    Current Outpatient Medications:   •  metroNIDAZOLE (FLAGYL) 500 MG tablet, Take 1 tablet by mouth 2 (Two) Times a Day for 10 days., Disp: 20 tablet, Rfl: 0  •  Multiple Vitamins-Minerals (MULTIPLE VITAMINS/WOMENS) tablet, Take 1 tablet by mouth Daily., Disp: , Rfl:   •  metroNIDAZOLE (Flagyl) 500 MG tablet, Take 1 tablet by mouth 2 (Two) Times a Day for 7  "days., Disp: 14 tablet, Rfl: 1    Review of systems  Constitutional    POS nothing reported                            NEG anorexia, fatigue, fevers or night sweats  Breast                POS nothing reported                            NEG persistent breast lump, skin dimpling, breast tenderness or nipple discharge  GI                      POS diarrhea and on Metformin                            NEG bloating, change in bowel habits, melena or reflux symptoms                       POS nothing reported                            NEG dysuria or hematuria         Objective   /80   Ht 175.9 cm (69.25\")   Wt (!) 156 kg (343 lb)   LMP  (LMP Unknown) Comment: B&S 2/20  Breastfeeding No   BMI 50.29 kg/m²       General:  well developed; well nourished  no acute distress  appears stated age   Skin:  No suspicious lesions seen   Thyroid:    Breasts:  Examined in supine position  Symmetric without masses or skin dimpling  Nipples normal without inversion, lesions or discharge  Large no masses or tenderness noted   Abdomen: soft, non-tender; no masses  no umbilical or inguinal hernias are present  no hepato-splenomegaly   Pelvis: Clinical staff was present for exam  External genitalia:  normal appearance of the external genitalia including Bartholin's and [place]'s glands.  :  urethral meatus normal;  Vaginal:  normal pink mucosa without prolapse or lesions. discharge present -  white and thick; pH = 6.5 wet prep done: clue cells are present and trichomonads are absent; Difficult to visualize due to body habitus  Cervix:  Difficult to visualize due to body habitus  Uterus:  Nontender but difficult to adequately examine due to body habitus  Adnexa:  non palpable bilaterally.  Rectal:  digital rectal exam not performed; anus visually normal appearing.       Lab Review   CBC, CMP and UA    Imaging  HSG reviewed and images reviewed in front of the patient.       Assessment     1. Normal GYN examination with questionable " surgery done at [facility].  I would like to get those notes to see exactly what happened  2. Bacterial vaginosis which may be recurrent.  Strongly urged her to have her partners wear a condom to prevent increasing the vaginal pH and allowing the BV to become symptomatic.             Plan     1. Annual examination or sooner as needed  2. 1000 mg calcium in divided doses ideally in diet; regular exercise  3. Self breast awareness discussed  4.    Request of information-addendum some of this is reviewed and will be scanned into the media section.  5 weeks after her hysteroscopy salpingectomy she had umbilical skin infection that was debrided by Dr. Robins.  5.  Take Flagyl 1 pill twice daily for 7 days.  Thereafter I would like her to take 1 tablet every Wednesday for 14 weeks to see if we can prevent recurrence she would need to have her partner wear a condom as well.            New Medications Ordered This Visit   Medications   • metroNIDAZOLE (Flagyl) 500 MG tablet     Sig: Take 1 tablet by mouth 2 (Two) Times a Day for 7 days.     Dispense:  14 tablet     Refill:  1     No orders of the defined types were placed in this encounter.          This note was electronically signed.    Kwadwo Baxter MD  9/30/2020

## 2020-10-02 ENCOUNTER — TELEPHONE (OUTPATIENT)
Dept: OBSTETRICS AND GYNECOLOGY | Facility: CLINIC | Age: 35
End: 2020-10-02

## 2020-10-02 ENCOUNTER — TELEPHONE (OUTPATIENT)
Dept: URGENT CARE | Facility: CLINIC | Age: 35
End: 2020-10-02

## 2020-10-02 NOTE — TELEPHONE ENCOUNTER
PT IS CALLING STATES DR FELIPE WAS GOING TO CALL HER IN TWO PRESCRIPTIONS AND SHE ONLY RECEIVED ONE - IS HE SUPPOSE TO HAVE 2 --PLEASE CALL HER

## 2020-11-11 ENCOUNTER — LAB (OUTPATIENT)
Dept: LAB | Facility: HOSPITAL | Age: 35
End: 2020-11-11

## 2020-11-11 DIAGNOSIS — R53.83 FATIGUE, UNSPECIFIED TYPE: ICD-10-CM

## 2020-11-11 DIAGNOSIS — R06.00 DYSPNEA, UNSPECIFIED TYPE: ICD-10-CM

## 2020-11-11 LAB
DEPRECATED RDW RBC AUTO: 38.8 FL (ref 37–54)
ERYTHROCYTE [DISTWIDTH] IN BLOOD BY AUTOMATED COUNT: 11.9 % (ref 12.3–15.4)
HCT VFR BLD AUTO: 36.8 % (ref 34–46.6)
HGB BLD-MCNC: 11.9 G/DL (ref 12–15.9)
MCH RBC QN AUTO: 29 PG (ref 26.6–33)
MCHC RBC AUTO-ENTMCNC: 32.3 G/DL (ref 31.5–35.7)
MCV RBC AUTO: 89.8 FL (ref 79–97)
PLATELET # BLD AUTO: 224 10*3/MM3 (ref 140–450)
PMV BLD AUTO: 12.3 FL (ref 6–12)
QT INTERVAL: 418 MS
QTC INTERVAL: 418 MS
RBC # BLD AUTO: 4.1 10*6/MM3 (ref 3.77–5.28)
WBC # BLD AUTO: 6.85 10*3/MM3 (ref 3.4–10.8)

## 2020-11-11 PROCEDURE — 93005 ELECTROCARDIOGRAM TRACING: CPT

## 2020-11-11 PROCEDURE — 85027 COMPLETE CBC AUTOMATED: CPT

## 2020-11-11 PROCEDURE — 93010 ELECTROCARDIOGRAM REPORT: CPT | Performed by: INTERNAL MEDICINE

## 2020-11-11 PROCEDURE — 36415 COLL VENOUS BLD VENIPUNCTURE: CPT

## 2020-11-11 PROCEDURE — 80053 COMPREHEN METABOLIC PANEL: CPT

## 2020-11-12 LAB
ALBUMIN SERPL-MCNC: 4.1 G/DL (ref 3.5–5.2)
ALBUMIN/GLOB SERPL: 1.5 G/DL
ALP SERPL-CCNC: 63 U/L (ref 39–117)
ALT SERPL W P-5'-P-CCNC: 20 U/L (ref 1–33)
ANION GAP SERPL CALCULATED.3IONS-SCNC: 8.7 MMOL/L (ref 5–15)
AST SERPL-CCNC: 27 U/L (ref 1–32)
BILIRUB SERPL-MCNC: 0.4 MG/DL (ref 0–1.2)
BUN SERPL-MCNC: 8 MG/DL (ref 6–20)
BUN/CREAT SERPL: 11.3 (ref 7–25)
CALCIUM SPEC-SCNC: 8.8 MG/DL (ref 8.6–10.5)
CHLORIDE SERPL-SCNC: 104 MMOL/L (ref 98–107)
CO2 SERPL-SCNC: 27.3 MMOL/L (ref 22–29)
CREAT SERPL-MCNC: 0.71 MG/DL (ref 0.57–1)
GFR SERPL CREATININE-BSD FRML MDRD: 114 ML/MIN/1.73
GLOBULIN UR ELPH-MCNC: 2.8 GM/DL
GLUCOSE SERPL-MCNC: 73 MG/DL (ref 65–99)
POTASSIUM SERPL-SCNC: 4 MMOL/L (ref 3.5–5.2)
PROT SERPL-MCNC: 6.9 G/DL (ref 6–8.5)
SODIUM SERPL-SCNC: 140 MMOL/L (ref 136–145)

## 2020-11-16 ENCOUNTER — PREP FOR SURGERY (OUTPATIENT)
Dept: OTHER | Facility: HOSPITAL | Age: 35
End: 2020-11-16

## 2020-11-16 ENCOUNTER — CONSULT (OUTPATIENT)
Dept: BARIATRICS/WEIGHT MGMT | Facility: CLINIC | Age: 35
End: 2020-11-16

## 2020-11-16 VITALS
OXYGEN SATURATION: 99 % | HEIGHT: 69 IN | TEMPERATURE: 97.3 F | RESPIRATION RATE: 18 BRPM | HEART RATE: 87 BPM | WEIGHT: 293 LBS | SYSTOLIC BLOOD PRESSURE: 126 MMHG | DIASTOLIC BLOOD PRESSURE: 85 MMHG | BODY MASS INDEX: 43.4 KG/M2

## 2020-11-16 DIAGNOSIS — E28.2 PCOS (POLYCYSTIC OVARIAN SYNDROME): ICD-10-CM

## 2020-11-16 DIAGNOSIS — F32.A DEPRESSION, UNSPECIFIED DEPRESSION TYPE: ICD-10-CM

## 2020-11-16 DIAGNOSIS — M54.9 CHRONIC BACK PAIN, UNSPECIFIED BACK LOCATION, UNSPECIFIED BACK PAIN LATERALITY: ICD-10-CM

## 2020-11-16 DIAGNOSIS — G89.29 CHRONIC BACK PAIN, UNSPECIFIED BACK LOCATION, UNSPECIFIED BACK PAIN LATERALITY: ICD-10-CM

## 2020-11-16 DIAGNOSIS — R60.0 BILATERAL LOWER EXTREMITY EDEMA: ICD-10-CM

## 2020-11-16 DIAGNOSIS — F41.9 ANXIETY: ICD-10-CM

## 2020-11-16 DIAGNOSIS — R53.83 FATIGUE, UNSPECIFIED TYPE: ICD-10-CM

## 2020-11-16 DIAGNOSIS — D50.8 OTHER IRON DEFICIENCY ANEMIA: ICD-10-CM

## 2020-11-16 DIAGNOSIS — E66.01 OBESITY, CLASS III, BMI 40-49.9 (MORBID OBESITY) (HCC): Primary | ICD-10-CM

## 2020-11-16 PROCEDURE — 99214 OFFICE O/P EST MOD 30 MIN: CPT | Performed by: SURGERY

## 2020-11-16 RX ORDER — SODIUM CHLORIDE 9 MG/ML
150 INJECTION, SOLUTION INTRAVENOUS CONTINUOUS
Status: CANCELLED | OUTPATIENT
Start: 2020-11-16

## 2020-11-16 RX ORDER — SCOLOPAMINE TRANSDERMAL SYSTEM 1 MG/1
1 PATCH, EXTENDED RELEASE TRANSDERMAL ONCE
Status: CANCELLED | OUTPATIENT
Start: 2020-11-16 | End: 2020-11-16

## 2020-11-16 RX ORDER — CHLORHEXIDINE GLUCONATE 0.12 MG/ML
15 RINSE ORAL
Status: CANCELLED | OUTPATIENT
Start: 2020-11-16 | End: 2020-11-16

## 2020-11-16 RX ORDER — GABAPENTIN 100 MG/1
600 CAPSULE ORAL ONCE
Status: CANCELLED | OUTPATIENT
Start: 2020-11-16 | End: 2020-11-16

## 2020-11-16 RX ORDER — PANTOPRAZOLE SODIUM 40 MG/10ML
40 INJECTION, POWDER, LYOPHILIZED, FOR SOLUTION INTRAVENOUS ONCE
Status: CANCELLED | OUTPATIENT
Start: 2020-11-16 | End: 2020-11-16

## 2020-11-16 RX ORDER — ACETAMINOPHEN 500 MG
1000 TABLET ORAL ONCE
Status: CANCELLED | OUTPATIENT
Start: 2020-11-16 | End: 2020-11-16

## 2020-11-16 NOTE — H&P
"[facility] BARIATRIC SURGERY  [address]  [address]  [facility] [id number] [phone]      Patient  Name:  Geneva Cordova  :  1985      Date of Visit: 2020      Chief Complaint:  weight gain; unable to maintain weight loss    History of Present Illness:  Geneva Cordova is a 35 y.o. female who presents today for evaluation, education and consultation regarding weight loss surgery.     She is s/p LSG by Dr. Hughes on 7/15/15.  Preop weight 430, lowest weight 309, currently weighs 343.  Since sleeve, she has had a twin pregnancy.    She has lost the sense of restriction.  She was scheduled some time ago for re-sleeve vs. bypass, but wasn't ready mentally.  She has prayed and talked with her , and feels ready now.  She had a long discussion of options with Dr. Hughes, and he had offered her SIPS, but she did not have insurance coverage and also did not want any type of bypass procedure at this time.      Patient has been overweight for many years, with numerous failed dietary/weight loss attempts.  She has obesity related comorbidities of migraines, VIRA, PCOS, back pain, OA, depression, fatigue, peripheral edema and as such has decided to pursue weight loss surgery.    No personal or family hx of VTE or clotting d/o.  No liver, lung, heart, or renal disease    She has some GERD, takes Rolaids.  Sometimes a pack per day.  It has been better lately.  Her sister is Nadia Alaniz.    She is s/p lap joe with Dr. Hughes 18 with EGD + biopsy x 2.  No HH, GE jxn at 41 cm.   Sleeve was noted to be \"mildly dilated.\"  20 UGI:  Status post vertical sleeve gastrectomy x2 years. There was  no evidence of extraluminal contrast. There was no delay in gastric  empty. No postoperative strictures were seen.     She saw Dr. Hughes in 2020 to discuss revision. At that time, her insurance would only cover re-sleeve, not SIPS.  She was adamant that she did not want a gastric or intestinal " "bypass.       Review of data:    KENNETH: norco, phentermine, nothing since 2020  CBC: nl  CMP: nl  EGD: 18: done concomitant with lap joe with [facility].  \"mildly dilated uniformly\" sleeve, no HH.  HP negative  Cardiac clearance:Dr. Hamm, low risk    PFTs: ok    EKG: nl  CXR: nl      Last tobacco: never  Last NSAIDs: >1 month  Last ASA: n/a  Last steroids: n/a  Last hormones: n/a      Past Medical History:   Diagnosis Date   • Anemia    • Anxiety    • Bilateral lower extremity edema     R>L   • Boil, axilla     recurrent   • Chronic back pain     no meds or injections, feels related to breasts   • Depression    • Dyspepsia    • Dyspnea on exertion    • Fatigue    • Glucose intolerance (impaired glucose tolerance)     \"hasn't been tested for that\"   • Helicobacter pylori (H. pylori) infection     asx and grossly nl EGD. \"abundant\"on path. HBT still + after PrevPak tx. LSG path neg. neg testing 2018   • Hypoalbuminemia     3.2 in past, 4.1 most recent   • Left Ovarian serous cystadenoma -removed at  section 2017   • Migraine     resolved with weightloss   • Obesity, morbid (CMS/HCC)    • VIRA (obstructive sleep apnea)      improved since WLS, pending f/u sleep study, not using CPAP   • PCOS (polycystic ovarian syndrome)    • Right Essure implantation 2017 10/6/2017   • S/P  section;left ovarian cystectomy and partial salpingectomy 17   • Seasonal allergies      Past Surgical History:   Procedure Laterality Date   •  SECTION N/A 2017      SECTION PRIMARY;  CYSTECTOMY; Kwadwo Baxter MD; : [facility] LABOR DELIVERY;  Service:    • DILATATION AND CURETTAGE     • ENDOMETRIAL ABLATION  2020    Dr Cuevas   • ESSURE TUBAL LIGATION     • GASTRIC SLEEVE LAPAROSCOPIC  2015    With Dr. Hughes   • LAPAROSCOPIC CHOLECYSTECTOMY  2018    Dr. Hughes   • SALPINGECTOMY Bilateral 2020    Dr Cuevas   • TONSILLECTOMY     • TUBAL ABDOMINAL " LIGATION     • WISDOM TOOTH EXTRACTION  2014       No Known Allergies    Current Outpatient Medications:   •  apixaban (Eliquis) 2.5 MG tablet tablet, Take 1 tablet by mouth 2 (Two) Times a Day., Disp: 42 tablet, Rfl: 0  •  Multiple Vitamins-Minerals (MULTIPLE VITAMINS/WOMENS) tablet, Take 1 tablet by mouth Daily., Disp: , Rfl:     Social History     Socioeconomic History   • Marital status: Single     Spouse name: Not on file   • Number of children: 4   • Years of education: College    • Highest education level: Not on file   Occupational History   • Occupation:    Tobacco Use   • Smoking status: Never Smoker   • Smokeless tobacco: Never Used   Substance and Sexual Activity   • Alcohol use: Yes     Frequency: Monthly or less     Drinks per session: 1 or 2     Binge frequency: Never     Comment: Occ   • Drug use: No   • Sexual activity: Yes     Partners: Male     Birth control/protection: Surgical     Comment: no OCP   Social History Narrative    Lives in [place]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  [employer].      Family History   Problem Relation Age of Onset   • No Known Problems Other    • Sleep apnea Mother    • Diabetes Mother    • No Known Problems Father    • Pancreatic cancer Maternal Grandmother    • No Known Problems Sister    • No Known Problems Brother    • No Known Problems Daughter    • No Known Problems Son    • No Known Problems Paternal Grandmother    • No Known Problems Maternal Aunt    • No Known Problems Paternal Aunt    • BRCA 1/2 Neg Hx    • Colon cancer Neg Hx    • Endometrial cancer Neg Hx    • Ovarian cancer Neg Hx        Review of Systems   Constitutional: Positive for fatigue and unexpected weight gain. Negative for chills, diaphoresis, fever and unexpected weight loss.   HENT: Negative for congestion and facial swelling.    Eyes: Negative for blurred vision, double vision and discharge.   Respiratory: Negative for chest tightness, shortness of breath and stridor.    Cardiovascular: Negative  for chest pain, palpitations and leg swelling.   Gastrointestinal: Negative for blood in stool.   Endocrine: Negative for polydipsia.   Genitourinary: Negative for hematuria.   Musculoskeletal: Positive for arthralgias.   Skin: Negative for color change.   Allergic/Immunologic: Negative for immunocompromised state.   Neurological: Negative for confusion.   Psychiatric/Behavioral: Negative for self-injury.       Physical Exam:  Vital Signs:  Weight: (!) 156 kg (343 lb)   Body mass index is 50.21 kg/m².  Temp: 97.3 °F (36.3 °C)   Heart Rate: 87   BP: 126/85     Physical Exam  Constitutional:       General: She is not in acute distress.     Appearance: She is well-developed. She is not diaphoretic.   HENT:      Head: Normocephalic and atraumatic.      Mouth/Throat:      Pharynx: No oropharyngeal exudate.   Eyes:      Conjunctiva/sclera: Conjunctivae normal.      Pupils: Pupils are equal, round, and reactive to light.   Cardiovascular:      Rate and Rhythm: Normal rate and regular rhythm.   Pulmonary:      Effort: Pulmonary effort is normal. No respiratory distress.      Breath sounds: Normal breath sounds.   Abdominal:      General: There is no distension.      Palpations: Abdomen is soft.      Comments: Lap scars, loose skin, large pannus   Skin:     General: Skin is warm and dry.      Coloration: Skin is not pale.   Neurological:      Mental Status: She is alert and oriented to person, place, and time.      Cranial Nerves: No cranial nerve deficit.   Psychiatric:         Behavior: Behavior normal.         Thought Content: Thought content normal.         Patient Active Problem List   Diagnosis   • VIRA (obstructive sleep apnea)   • Glucose intolerance (impaired glucose tolerance)   • Migraine   • Depression   • Helicobacter pylori (H. pylori) infection   • Anemia   • Joint pain   • Chronic back pain   • Anxiety   • Environmental allergies   • Obesity, Class III, BMI 40-49.9 (morbid obesity) (CMS/[facility])   • PCOS  (polycystic ovarian syndrome)   • Dyspepsia   • Fatigue   • Seasonal allergies   • Bilateral lower extremity edema   • Hypoalbuminemia   • Moderate episode of recurrent major depressive disorder (CMS/HCC)   • FH: breast cancer   • Dyspnea on exertion   • Morbid obesity (CMS/HCC)       Assessment:    Geneva Cordova is a 35 y.o. year old female with medically complicated obesity.    Weight loss surgery is deemed medically necessary given the following obesity related comorbidities including migraines, VIRA, PCOS, back pain, OA, depression, fatigue, peripheral edema with current Weight: (!) 156 kg (343 lb) and Body mass index is 50.21 kg/m²..    Patient is aware that surgery is a tool, and that weight loss is not guaranteed but only seen in the context of appropriate use, follow up and exercise.    The patient was present for an approximately a 2.5 hour discussion of the purpose of weight loss surgery, how WLS is a tool to assist in achieving weight loss goals, the most common complications and how best to avoid them, and the strategies for short and long term weight loss.  Ample opportunity to discuss questions was available both in group and during the time of individual examination.    I reviewed all available preop labs, Xrays, tests, clearances, etc and signed off on these in the record.  All of this in addition to the patient's unique history and exam has been taken into consideration in determining their appropriate candidacy for weight loss surgery.    Complications  of laparoscopic/possible robotic gastric sleeve were discussed. The patient is well aware of the potential complications of surgery that include but not limited to bleeding, infections, deep venous thrombosis, pulmonary embolism, pulmonary complications such as pneumonia, cardiac events, hernias, small bowel obstruction, damage to the spleen or other organs, bowel injury, disfiguring scars, failure to lose weight, need for additional surgery,  "conversion to an open procedure, and death. Patient is also aware of complications which apply in this particular procedure that can include but are not limited to a \"leak\" at the staple line which in some instances may require conversion to gastric bypass.    The patient is aware if a hiatal hernia is encountered, it likely will be repaired.  R/B/A Rx to hiatal hernia repair were discussed as outlined in our long consent form.  Briefly risks in addition to those for LSG include recurrent hernia, YUSEF, dysphagia, esophageal injury, pneumothorax, injury to the vagus nerves, injury to the thoracic duct, aorta or vena cava.    Greater than 3 minutes was spent with the patient discussing avoiding all tobacco products and second hand smoke at least 2 weeks pre-operatively and 6 weeks post-operatively to minimize the risk of sleeve leak.  This included discussing the importance of avoiding even secondhand smoke as the risk of leak is increased.  Examples discussed:  I made it very clear that the patient understands they should avoid even riding in a car where someone has previously smoked in the last 2 weeks, living in a house where someone smokes (even if it's in a separate room/patio/attached garage, etc.) we discussed that they should not have a conversation with a group of people who are smoking even if it's outside.  They can be around wood burning fires and barbecue.  I told them I do not know if marijuana has a same effects but my overall recommendation is to avoid it for 2 weeks prior in 6 weeks after surgery.  They also are aware that nicotine may also increase the risk of leak and I strongly encouraged him to avoid that as well for 2 weeks prior in 6 weeks after surgery.    Today, we again discussed the:    Risks, benefits and alternative therapies at great length as outlined in our extensive consent forms, consent videos, and educational teaching process under the direction of the center's program " coordinator.    Re-sleeve would help with restriction, and weight loss potential is not known, probably less than adding a malabsorption component, and there is no guarantee she would lose more weight.  Same risks as initial sleeve: leak, VTE, etc.  Risk of getting sleeve too narrow at incisura.  Must avoid ulcerogenics 6-8 weeks post op.      I would not recommend Alejandro-en-Y gastric bypass to lose more weight after initial sleeve, and I reserve that operation for patients with intractable reflux or other technical issue after sleeve, which she does not have.  Weight loss has not been impressive in the literature, and she would have to avoid ulcerogenics for life.      SIPS would likely provide better weight loss than re-sleeve due to its malabsorption.  We discussed the need for vitamin compliance after surgery, and the possibility of smelly diarrhea.  It is unknown if her insurance would cover this procedure.  Her starting BMI of >50 suggests that she may not lose the weight she wants to lose with only a restrictive procedure.      She is aware that I am happy to perform re-sleeve on her, but that I do not perform SIPS.  To my knowledge, Dr. Hughes is the only surgeon in [place] that does SIPS.      I encouraged her to seek second opinions.      A copy of the patient's signed informed consent is on file.    Plan:  Laparoscopic revision sleeve gastrectomy at [facility].      R/B/A Rx discussed to postop anticoagulation incl but not limited to bleeding, drug reaction, venothromboembolic events, etc. and she is agreeable to taking 3 weeks of Eliquis.    MDM high:  Elective procedure with the following risk factors: morbid obesity, VIRA, revision bariatric surgery.  4+ chronic medical problems reviewed.      Natalia Mariscal MD

## 2020-11-16 NOTE — PROGRESS NOTES
"[facility] BARIATRIC SURGERY  [address]  [address]  [facility] [id number] [phone]      Patient  Name:  Geneva Cordova  :  1985      Date of Visit: 2020      Chief Complaint:  weight gain; unable to maintain weight loss    History of Present Illness:  Geneva Cordova is a 35 y.o. female who presents today for evaluation, education and consultation regarding weight loss surgery.     She is s/p LSG by Dr. Hughes on 7/15/15.  Preop weight 430, lowest weight 309, currently weighs 343.  Since sleeve, she has had a twin pregnancy.    She has lost the sense of restriction.  She was scheduled some time ago for re-sleeve vs. bypass, but wasn't ready mentally.  She has prayed and talked with her , and feels ready now.  She had a long discussion of options with Dr. Hughes, and he had offered her SIPS, but she did not have insurance coverage and also did not want any type of bypass procedure at this time.      Patient has been overweight for many years, with numerous failed dietary/weight loss attempts.  She has obesity related comorbidities of migraines, VIRA, PCOS, back pain, OA, depression, fatigue, peripheral edema and as such has decided to pursue weight loss surgery.    No personal or family hx of VTE or clotting d/o.  No liver, lung, heart, or renal disease    She has some GERD, takes Rolaids.  Sometimes a pack per day.  It has been better lately.  Her sister is Nadia Alaniz.    She is s/p lap joe with Dr. Hughes 18 with EGD + biopsy x 2.  No HH, GE jxn at 41 cm.   Sleeve was noted to be \"mildly dilated.\"  20 UGI:  Status post vertical sleeve gastrectomy x2 years. There was  no evidence of extraluminal contrast. There was no delay in gastric  empty. No postoperative strictures were seen.     She saw Dr. Hughes in 2020 to discuss revision. At that time, her insurance would only cover re-sleeve, not SIPS.  She was adamant that she did not want a gastric or intestinal " "bypass.       Review of data:    KENNETH: norco, phentermine, nothing since 2020  CBC: nl  CMP: nl  EGD: 18: done concomitant with lap joe with GDW.  \"mildly dilated uniformly\" sleeve, no HH.  HP negative  Cardiac clearance:Dr. Hamm, low risk    PFTs: ok    EKG: nl  CXR: nl      Last tobacco: never  Last NSAIDs: >1 month  Last ASA: n/a  Last steroids: n/a  Last hormones: n/a      Past Medical History:   Diagnosis Date   • Anemia    • Anxiety    • Bilateral lower extremity edema     R>L   • Boil, axilla     recurrent   • Chronic back pain     no meds or injections, feels related to breasts   • Depression    • Dyspepsia    • Dyspnea on exertion    • Fatigue    • Glucose intolerance (impaired glucose tolerance)     \"hasn't been tested for that\"   • Helicobacter pylori (H. pylori) infection     asx and grossly nl EGD. \"abundant\"on path. HBT still + after PrevPak tx. LSG path neg. neg testing 2018   • Hypoalbuminemia     3.2 in past, 4.1 most recent   • Left Ovarian serous cystadenoma -removed at  section 2017   • Migraine     resolved with weightloss   • Obesity, morbid (CMS/HCC)    • VIRA (obstructive sleep apnea)      improved since WLS, pending f/u sleep study, not using CPAP   • PCOS (polycystic ovarian syndrome)    • Right Essure implantation 2017 10/6/2017   • S/P  section;left ovarian cystectomy and partial salpingectomy 17   • Seasonal allergies      Past Surgical History:   Procedure Laterality Date   •  SECTION N/A 2017      SECTION PRIMARY;  CYSTECTOMY; Kwadwo Baxter MD; : [facility] LABOR DELIVERY;  Service:    • DILATATION AND CURETTAGE     • ENDOMETRIAL ABLATION  2020    Dr Cuevas   • ESSURE TUBAL LIGATION     • GASTRIC SLEEVE LAPAROSCOPIC  2015    With Dr. Hughes   • LAPAROSCOPIC CHOLECYSTECTOMY  2018    Dr. Hughes   • SALPINGECTOMY Bilateral 2020    Dr Cuevas   • TONSILLECTOMY     • TUBAL ABDOMINAL " LIGATION     • WISDOM TOOTH EXTRACTION  2014       No Known Allergies    Current Outpatient Medications:   •  apixaban (Eliquis) 2.5 MG tablet tablet, Take 1 tablet by mouth 2 (Two) Times a Day., Disp: 42 tablet, Rfl: 0  •  Multiple Vitamins-Minerals (MULTIPLE VITAMINS/WOMENS) tablet, Take 1 tablet by mouth Daily., Disp: , Rfl:     Social History     Socioeconomic History   • Marital status: Single     Spouse name: Not on file   • Number of children: 4   • Years of education: College    • Highest education level: Not on file   Occupational History   • Occupation:    Tobacco Use   • Smoking status: Never Smoker   • Smokeless tobacco: Never Used   Substance and Sexual Activity   • Alcohol use: Yes     Frequency: Monthly or less     Drinks per session: 1 or 2     Binge frequency: Never     Comment: Occ   • Drug use: No   • Sexual activity: Yes     Partners: Male     Birth control/protection: Surgical     Comment: no OCP   Social History Narrative    Lives in [place]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  [employer].      Family History   Problem Relation Age of Onset   • No Known Problems Other    • Sleep apnea Mother    • Diabetes Mother    • No Known Problems Father    • Pancreatic cancer Maternal Grandmother    • No Known Problems Sister    • No Known Problems Brother    • No Known Problems Daughter    • No Known Problems Son    • No Known Problems Paternal Grandmother    • No Known Problems Maternal Aunt    • No Known Problems Paternal Aunt    • BRCA 1/2 Neg Hx    • Colon cancer Neg Hx    • Endometrial cancer Neg Hx    • Ovarian cancer Neg Hx        Review of Systems   Constitutional: Positive for fatigue and unexpected weight gain. Negative for chills, diaphoresis, fever and unexpected weight loss.   HENT: Negative for congestion and facial swelling.    Eyes: Negative for blurred vision, double vision and discharge.   Respiratory: Negative for chest tightness, shortness of breath and stridor.    Cardiovascular: Negative  for chest pain, palpitations and leg swelling.   Gastrointestinal: Negative for blood in stool.   Endocrine: Negative for polydipsia.   Genitourinary: Negative for hematuria.   Musculoskeletal: Positive for arthralgias.   Skin: Negative for color change.   Allergic/Immunologic: Negative for immunocompromised state.   Neurological: Negative for confusion.   Psychiatric/Behavioral: Negative for self-injury.       Physical Exam:  Vital Signs:  Weight: (!) 156 kg (343 lb)   Body mass index is 50.21 kg/m².  Temp: 97.3 °F (36.3 °C)   Heart Rate: 87   BP: 126/85     Physical Exam  Constitutional:       General: She is not in acute distress.     Appearance: She is well-developed. She is not diaphoretic.   HENT:      Head: Normocephalic and atraumatic.      Mouth/Throat:      Pharynx: No oropharyngeal exudate.   Eyes:      Conjunctiva/sclera: Conjunctivae normal.      Pupils: Pupils are equal, round, and reactive to light.   Cardiovascular:      Rate and Rhythm: Normal rate and regular rhythm.   Pulmonary:      Effort: Pulmonary effort is normal. No respiratory distress.      Breath sounds: Normal breath sounds.   Abdominal:      General: There is no distension.      Palpations: Abdomen is soft.      Comments: Lap scars, loose skin, large pannus   Skin:     General: Skin is warm and dry.      Coloration: Skin is not pale.   Neurological:      Mental Status: She is alert and oriented to person, place, and time.      Cranial Nerves: No cranial nerve deficit.   Psychiatric:         Behavior: Behavior normal.         Thought Content: Thought content normal.         Patient Active Problem List   Diagnosis   • VIRA (obstructive sleep apnea)   • Glucose intolerance (impaired glucose tolerance)   • Migraine   • Depression   • Helicobacter pylori (H. pylori) infection   • Anemia   • Joint pain   • Chronic back pain   • Anxiety   • Environmental allergies   • Obesity, Class III, BMI 40-49.9 (morbid obesity) (CMS/[facility])   • PCOS  (polycystic ovarian syndrome)   • Dyspepsia   • Fatigue   • Seasonal allergies   • Bilateral lower extremity edema   • Hypoalbuminemia   • Moderate episode of recurrent major depressive disorder (CMS/HCC)   • FH: breast cancer   • Dyspnea on exertion   • Morbid obesity (CMS/HCC)       Assessment:    Geneva Cordova is a 35 y.o. year old female with medically complicated obesity.    Weight loss surgery is deemed medically necessary given the following obesity related comorbidities including migraines, VIRA, PCOS, back pain, OA, depression, fatigue, peripheral edema with current Weight: (!) 156 kg (343 lb) and Body mass index is 50.21 kg/m²..    Patient is aware that surgery is a tool, and that weight loss is not guaranteed but only seen in the context of appropriate use, follow up and exercise.    The patient was present for an approximately a 2.5 hour discussion of the purpose of weight loss surgery, how WLS is a tool to assist in achieving weight loss goals, the most common complications and how best to avoid them, and the strategies for short and long term weight loss.  Ample opportunity to discuss questions was available both in group and during the time of individual examination.    I reviewed all available preop labs, Xrays, tests, clearances, etc and signed off on these in the record.  All of this in addition to the patient's unique history and exam has been taken into consideration in determining their appropriate candidacy for weight loss surgery.    Complications  of laparoscopic/possible robotic gastric sleeve were discussed. The patient is well aware of the potential complications of surgery that include but not limited to bleeding, infections, deep venous thrombosis, pulmonary embolism, pulmonary complications such as pneumonia, cardiac events, hernias, small bowel obstruction, damage to the spleen or other organs, bowel injury, disfiguring scars, failure to lose weight, need for additional surgery,  "conversion to an open procedure, and death. Patient is also aware of complications which apply in this particular procedure that can include but are not limited to a \"leak\" at the staple line which in some instances may require conversion to gastric bypass.    The patient is aware if a hiatal hernia is encountered, it likely will be repaired.  R/B/A Rx to hiatal hernia repair were discussed as outlined in our long consent form.  Briefly risks in addition to those for LSG include recurrent hernia, YUSEF, dysphagia, esophageal injury, pneumothorax, injury to the vagus nerves, injury to the thoracic duct, aorta or vena cava.    Greater than 3 minutes was spent with the patient discussing avoiding all tobacco products and second hand smoke at least 2 weeks pre-operatively and 6 weeks post-operatively to minimize the risk of sleeve leak.  This included discussing the importance of avoiding even secondhand smoke as the risk of leak is increased.  Examples discussed:  I made it very clear that the patient understands they should avoid even riding in a car where someone has previously smoked in the last 2 weeks, living in a house where someone smokes (even if it's in a separate room/patio/attached garage, etc.) we discussed that they should not have a conversation with a group of people who are smoking even if it's outside.  They can be around wood burning fires and barbecue.  I told them I do not know if marijuana has a same effects but my overall recommendation is to avoid it for 2 weeks prior in 6 weeks after surgery.  They also are aware that nicotine may also increase the risk of leak and I strongly encouraged him to avoid that as well for 2 weeks prior in 6 weeks after surgery.    Today, we again discussed the:    Risks, benefits and alternative therapies at great length as outlined in our extensive consent forms, consent videos, and educational teaching process under the direction of the center's program " coordinator.    Re-sleeve would help with restriction, and weight loss potential is not known, probably less than adding a malabsorption component, and there is no guarantee she would lose more weight.  Same risks as initial sleeve: leak, VTE, etc.  Risk of getting sleeve too narrow at incisura.  Must avoid ulcerogenics 6-8 weeks post op.      I would not recommend Alejandro-en-Y gastric bypass to lose more weight after initial sleeve, and I reserve that operation for patients with intractable reflux or other technical issue after sleeve, which she does not have.  Weight loss has not been impressive in the literature, and she would have to avoid ulcerogenics for life.      SIPS would likely provide better weight loss than re-sleeve due to its malabsorption.  We discussed the need for vitamin compliance after surgery, and the possibility of smelly diarrhea.  It is unknown if her insurance would cover this procedure.  Her starting BMI of >50 suggests that she may not lose the weight she wants to lose with only a restrictive procedure.      She is aware that I am happy to perform re-sleeve on her, but that I do not perform SIPS.  To my knowledge, Dr. Hughes is the only surgeon in [place] that does SIPS.      I encouraged her to seek second opinions.      A copy of the patient's signed informed consent is on file.    Plan:  Laparoscopic revision sleeve gastrectomy at [facility].      R/B/A Rx discussed to postop anticoagulation incl but not limited to bleeding, drug reaction, venothromboembolic events, etc. and she is agreeable to taking 3 weeks of Eliquis.    MDM high:  Elective procedure with the following risk factors: morbid obesity, VIRA, revision bariatric surgery.  4+ chronic medical problems reviewed.      Natalia Mariscal MD

## 2020-11-18 ENCOUNTER — TELEPHONE (OUTPATIENT)
Dept: BARIATRICS/WEIGHT MGMT | Facility: CLINIC | Age: 35
End: 2020-11-18

## 2020-11-20 ENCOUNTER — APPOINTMENT (OUTPATIENT)
Dept: PREADMISSION TESTING | Facility: HOSPITAL | Age: 35
End: 2020-11-20

## 2020-11-25 ENCOUNTER — APPOINTMENT (OUTPATIENT)
Dept: PREADMISSION TESTING | Facility: HOSPITAL | Age: 35
End: 2020-11-25

## 2020-11-25 LAB
DEPRECATED RDW RBC AUTO: 40.5 FL (ref 37–54)
ERYTHROCYTE [DISTWIDTH] IN BLOOD BY AUTOMATED COUNT: 12.1 % (ref 12.3–15.4)
HBA1C MFR BLD: 5.1 % (ref 4.8–5.6)
HCT VFR BLD AUTO: 39.9 % (ref 34–46.6)
HGB BLD-MCNC: 13 G/DL (ref 12–15.9)
MCH RBC QN AUTO: 29.7 PG (ref 26.6–33)
MCHC RBC AUTO-ENTMCNC: 32.6 G/DL (ref 31.5–35.7)
MCV RBC AUTO: 91.3 FL (ref 79–97)
PLATELET # BLD AUTO: 259 10*3/MM3 (ref 140–450)
PMV BLD AUTO: 11.5 FL (ref 6–12)
RBC # BLD AUTO: 4.37 10*6/MM3 (ref 3.77–5.28)
WBC # BLD AUTO: 5.88 10*3/MM3 (ref 3.4–10.8)

## 2020-11-25 PROCEDURE — 85027 COMPLETE CBC AUTOMATED: CPT

## 2020-11-25 PROCEDURE — 36415 COLL VENOUS BLD VENIPUNCTURE: CPT

## 2020-11-25 PROCEDURE — 83036 HEMOGLOBIN GLYCOSYLATED A1C: CPT

## 2020-11-29 ENCOUNTER — APPOINTMENT (OUTPATIENT)
Dept: PREADMISSION TESTING | Facility: HOSPITAL | Age: 35
End: 2020-11-29

## 2020-11-29 LAB — SARS-COV-2 RNA RESP QL NAA+PROBE: NOT DETECTED

## 2020-11-29 PROCEDURE — C9803 HOPD COVID-19 SPEC COLLECT: HCPCS

## 2020-11-29 PROCEDURE — U0004 COV-19 TEST NON-CDC HGH THRU: HCPCS

## 2020-11-30 ENCOUNTER — ANESTHESIA EVENT (OUTPATIENT)
Dept: PERIOP | Facility: HOSPITAL | Age: 35
End: 2020-11-30

## 2020-11-30 RX ORDER — SODIUM CHLORIDE, SODIUM LACTATE, POTASSIUM CHLORIDE, CALCIUM CHLORIDE 600; 310; 30; 20 MG/100ML; MG/100ML; MG/100ML; MG/100ML
9 INJECTION, SOLUTION INTRAVENOUS CONTINUOUS
Status: CANCELLED | OUTPATIENT
Start: 2020-11-30

## 2020-11-30 RX ORDER — FAMOTIDINE 10 MG/ML
20 INJECTION, SOLUTION INTRAVENOUS ONCE
Status: CANCELLED | OUTPATIENT
Start: 2020-11-30 | End: 2020-11-30

## 2020-11-30 RX ORDER — FAMOTIDINE 20 MG/1
20 TABLET, FILM COATED ORAL ONCE
Status: CANCELLED | OUTPATIENT
Start: 2020-11-30 | End: 2020-11-30

## 2020-12-01 ENCOUNTER — HOSPITAL ENCOUNTER (INPATIENT)
Facility: HOSPITAL | Age: 35
LOS: 2 days | Discharge: HOME | End: 2020-12-03
Attending: SURGERY | Admitting: SURGERY

## 2020-12-01 ENCOUNTER — ANESTHESIA (OUTPATIENT)
Dept: PERIOP | Facility: HOSPITAL | Age: 35
End: 2020-12-01

## 2020-12-01 DIAGNOSIS — E66.01 OBESITY, CLASS III, BMI 40-49.9 (MORBID OBESITY) (HCC): ICD-10-CM

## 2020-12-01 LAB
B-HCG UR QL: NEGATIVE
INTERNAL NEGATIVE CONTROL: NEGATIVE
INTERNAL POSITIVE CONTROL: POSITIVE
Lab: NORMAL

## 2020-12-01 PROCEDURE — 0DB64Z3 EXCISION OF STOMACH, PERCUTANEOUS ENDOSCOPIC APPROACH, VERTICAL: ICD-10-PCS | Performed by: SURGERY

## 2020-12-01 PROCEDURE — 25010000002 ONDANSETRON PER 1 MG: Performed by: ANESTHESIOLOGY

## 2020-12-01 PROCEDURE — 88307 TISSUE EXAM BY PATHOLOGIST: CPT | Performed by: SURGERY

## 2020-12-01 PROCEDURE — 25010000002 ENOXAPARIN PER 10 MG: Performed by: SURGERY

## 2020-12-01 PROCEDURE — 25010000002 HYDRALAZINE PER 20 MG: Performed by: ANESTHESIOLOGY

## 2020-12-01 PROCEDURE — 25010000002 DEXAMETHASONE SODIUM PHOSPHATE 10 MG/ML SOLUTION: Performed by: NURSE ANESTHETIST, CERTIFIED REGISTERED

## 2020-12-01 PROCEDURE — 25010000002 FENTANYL CITRATE (PF) 100 MCG/2ML SOLUTION: Performed by: NURSE ANESTHETIST, CERTIFIED REGISTERED

## 2020-12-01 PROCEDURE — 25010000002 PROPOFOL 10 MG/ML EMULSION: Performed by: NURSE ANESTHETIST, CERTIFIED REGISTERED

## 2020-12-01 PROCEDURE — 25010000002 SUCCINYLCHOLINE PER 20 MG: Performed by: NURSE ANESTHETIST, CERTIFIED REGISTERED

## 2020-12-01 PROCEDURE — 25010000002 HYDROMORPHONE PER 4 MG: Performed by: NURSE ANESTHETIST, CERTIFIED REGISTERED

## 2020-12-01 PROCEDURE — 0DJ08ZZ INSPECTION OF UPPER INTESTINAL TRACT, VIA NATURAL OR ARTIFICIAL OPENING ENDOSCOPIC: ICD-10-PCS | Performed by: SURGERY

## 2020-12-01 PROCEDURE — 43775 LAP SLEEVE GASTRECTOMY: CPT | Performed by: SURGERY

## 2020-12-01 PROCEDURE — 25010000002 DEXAMETHASONE PER 1 MG: Performed by: NURSE ANESTHETIST, CERTIFIED REGISTERED

## 2020-12-01 PROCEDURE — 25010000002 ONDANSETRON PER 1 MG: Performed by: SURGERY

## 2020-12-01 PROCEDURE — 25010000002 ONDANSETRON PER 1 MG: Performed by: NURSE ANESTHETIST, CERTIFIED REGISTERED

## 2020-12-01 PROCEDURE — 81025 URINE PREGNANCY TEST: CPT | Performed by: ANESTHESIOLOGY

## 2020-12-01 PROCEDURE — 25010000003 CEFAZOLIN IN DEXTROSE 2-4 GM/100ML-% SOLUTION: Performed by: SURGERY

## 2020-12-01 DEVICE — REINFORCED INTELLIGENT RELOAD, FOR USE WITH SIGNIA STAPLING SYSTEM
Type: IMPLANTABLE DEVICE | Site: ABDOMEN | Status: FUNCTIONAL
Brand: TRI-STAPLE 2.0

## 2020-12-01 DEVICE — BLACK REINFORCED INTELLIGENT RELOAD, FOR USE WITH SIGNIA STAPLING SYSTEM
Type: IMPLANTABLE DEVICE | Site: ABDOMEN | Status: FUNCTIONAL
Brand: TRI-STAPLE 2.0

## 2020-12-01 DEVICE — SEALANT FIBRIN TISSEEL FZ 4ML: Type: IMPLANTABLE DEVICE | Site: ABDOMEN | Status: FUNCTIONAL

## 2020-12-01 RX ORDER — DEXAMETHASONE SODIUM PHOSPHATE 4 MG/ML
INJECTION, SOLUTION INTRA-ARTICULAR; INTRALESIONAL; INTRAMUSCULAR; INTRAVENOUS; SOFT TISSUE AS NEEDED
Status: DISCONTINUED | OUTPATIENT
Start: 2020-12-01 | End: 2020-12-01 | Stop reason: SURG

## 2020-12-01 RX ORDER — SODIUM CHLORIDE AND POTASSIUM CHLORIDE 150; 450 MG/100ML; MG/100ML
100 INJECTION, SOLUTION INTRAVENOUS CONTINUOUS
Status: DISCONTINUED | OUTPATIENT
Start: 2020-12-02 | End: 2020-12-03 | Stop reason: HOSPADM

## 2020-12-01 RX ORDER — CEFAZOLIN SODIUM 2 G/100ML
2 INJECTION, SOLUTION INTRAVENOUS EVERY 8 HOURS
Status: COMPLETED | OUTPATIENT
Start: 2020-12-02 | End: 2020-12-02

## 2020-12-01 RX ORDER — HYDROMORPHONE HYDROCHLORIDE 1 MG/ML
0.5 INJECTION, SOLUTION INTRAMUSCULAR; INTRAVENOUS; SUBCUTANEOUS
Status: DISCONTINUED | OUTPATIENT
Start: 2020-12-01 | End: 2020-12-03 | Stop reason: HOSPADM

## 2020-12-01 RX ORDER — SODIUM CHLORIDE 9 MG/ML
150 INJECTION, SOLUTION INTRAVENOUS CONTINUOUS
Status: DISCONTINUED | OUTPATIENT
Start: 2020-12-01 | End: 2020-12-03 | Stop reason: HOSPADM

## 2020-12-01 RX ORDER — DEXAMETHASONE SODIUM PHOSPHATE 10 MG/ML
INJECTION, SOLUTION INTRAMUSCULAR; INTRAVENOUS
Status: COMPLETED | OUTPATIENT
Start: 2020-12-01 | End: 2020-12-01

## 2020-12-01 RX ORDER — ACETAMINOPHEN 500 MG
1000 TABLET ORAL EVERY 8 HOURS SCHEDULED
Status: DISCONTINUED | OUTPATIENT
Start: 2020-12-01 | End: 2020-12-03 | Stop reason: HOSPADM

## 2020-12-01 RX ORDER — HYDRALAZINE HYDROCHLORIDE 20 MG/ML
10 INJECTION INTRAMUSCULAR; INTRAVENOUS ONCE AS NEEDED
Status: COMPLETED | OUTPATIENT
Start: 2020-12-01 | End: 2020-12-01

## 2020-12-01 RX ORDER — PROMETHAZINE HYDROCHLORIDE 12.5 MG/1
12.5 TABLET ORAL EVERY 6 HOURS PRN
Status: DISCONTINUED | OUTPATIENT
Start: 2020-12-01 | End: 2020-12-03 | Stop reason: HOSPADM

## 2020-12-01 RX ORDER — PROMETHAZINE HYDROCHLORIDE 25 MG/1
25 TABLET ORAL ONCE AS NEEDED
Status: DISCONTINUED | OUTPATIENT
Start: 2020-12-01 | End: 2020-12-01 | Stop reason: HOSPADM

## 2020-12-01 RX ORDER — SODIUM CHLORIDE 0.9 % (FLUSH) 0.9 %
10 SYRINGE (ML) INJECTION AS NEEDED
Status: DISCONTINUED | OUTPATIENT
Start: 2020-12-01 | End: 2020-12-01 | Stop reason: HOSPADM

## 2020-12-01 RX ORDER — ESMOLOL HYDROCHLORIDE 10 MG/ML
INJECTION INTRAVENOUS AS NEEDED
Status: DISCONTINUED | OUTPATIENT
Start: 2020-12-01 | End: 2020-12-01 | Stop reason: SURG

## 2020-12-01 RX ORDER — LABETALOL HYDROCHLORIDE 5 MG/ML
10 INJECTION, SOLUTION INTRAVENOUS
Status: COMPLETED | OUTPATIENT
Start: 2020-12-01 | End: 2020-12-02

## 2020-12-01 RX ORDER — SCOLOPAMINE TRANSDERMAL SYSTEM 1 MG/1
1 PATCH, EXTENDED RELEASE TRANSDERMAL ONCE
Status: DISCONTINUED | OUTPATIENT
Start: 2020-12-01 | End: 2020-12-01 | Stop reason: SDUPTHER

## 2020-12-01 RX ORDER — SODIUM CHLORIDE, SODIUM LACTATE, POTASSIUM CHLORIDE, CALCIUM CHLORIDE 600; 310; 30; 20 MG/100ML; MG/100ML; MG/100ML; MG/100ML
150 INJECTION, SOLUTION INTRAVENOUS CONTINUOUS
Status: ACTIVE | OUTPATIENT
Start: 2020-12-01 | End: 2020-12-02

## 2020-12-01 RX ORDER — LIDOCAINE HYDROCHLORIDE 10 MG/ML
INJECTION, SOLUTION EPIDURAL; INFILTRATION; INTRACAUDAL; PERINEURAL AS NEEDED
Status: DISCONTINUED | OUTPATIENT
Start: 2020-12-01 | End: 2020-12-01 | Stop reason: SURG

## 2020-12-01 RX ORDER — SODIUM CHLORIDE 9 MG/ML
INJECTION, SOLUTION INTRAVENOUS AS NEEDED
Status: DISCONTINUED | OUTPATIENT
Start: 2020-12-01 | End: 2020-12-01 | Stop reason: HOSPADM

## 2020-12-01 RX ORDER — PANTOPRAZOLE SODIUM 40 MG/10ML
40 INJECTION, POWDER, LYOPHILIZED, FOR SOLUTION INTRAVENOUS ONCE
Status: COMPLETED | OUTPATIENT
Start: 2020-12-01 | End: 2020-12-01

## 2020-12-01 RX ORDER — ONDANSETRON 4 MG/1
4 TABLET, FILM COATED ORAL EVERY 6 HOURS PRN
Status: DISCONTINUED | OUTPATIENT
Start: 2020-12-02 | End: 2020-12-03 | Stop reason: HOSPADM

## 2020-12-01 RX ORDER — ACETAMINOPHEN 500 MG
1000 TABLET ORAL ONCE
Status: COMPLETED | OUTPATIENT
Start: 2020-12-01 | End: 2020-12-01

## 2020-12-01 RX ORDER — ESMOLOL HYDROCHLORIDE 10 MG/ML
INJECTION INTRAVENOUS AS NEEDED
Status: DISCONTINUED | OUTPATIENT
Start: 2020-12-01 | End: 2020-12-01

## 2020-12-01 RX ORDER — ROCURONIUM BROMIDE 10 MG/ML
INJECTION, SOLUTION INTRAVENOUS AS NEEDED
Status: DISCONTINUED | OUTPATIENT
Start: 2020-12-01 | End: 2020-12-01 | Stop reason: SURG

## 2020-12-01 RX ORDER — GABAPENTIN 300 MG/1
600 CAPSULE ORAL ONCE
Status: COMPLETED | OUTPATIENT
Start: 2020-12-01 | End: 2020-12-01

## 2020-12-01 RX ORDER — PROMETHAZINE HYDROCHLORIDE 25 MG/1
25 SUPPOSITORY RECTAL ONCE AS NEEDED
Status: DISCONTINUED | OUTPATIENT
Start: 2020-12-01 | End: 2020-12-01 | Stop reason: HOSPADM

## 2020-12-01 RX ORDER — SODIUM CHLORIDE 0.9 % (FLUSH) 0.9 %
10 SYRINGE (ML) INJECTION EVERY 12 HOURS SCHEDULED
Status: DISCONTINUED | OUTPATIENT
Start: 2020-12-01 | End: 2020-12-01 | Stop reason: HOSPADM

## 2020-12-01 RX ORDER — MEPERIDINE HYDROCHLORIDE 25 MG/ML
12.5 INJECTION INTRAMUSCULAR; INTRAVENOUS; SUBCUTANEOUS
Status: DISCONTINUED | OUTPATIENT
Start: 2020-12-01 | End: 2020-12-01 | Stop reason: HOSPADM

## 2020-12-01 RX ORDER — NALOXONE HCL 0.4 MG/ML
0.1 VIAL (ML) INJECTION
Status: DISCONTINUED | OUTPATIENT
Start: 2020-12-01 | End: 2020-12-03 | Stop reason: HOSPADM

## 2020-12-01 RX ORDER — PROCHLORPERAZINE MALEATE 10 MG
10 TABLET ORAL EVERY 6 HOURS PRN
Status: DISCONTINUED | OUTPATIENT
Start: 2020-12-01 | End: 2020-12-03 | Stop reason: HOSPADM

## 2020-12-01 RX ORDER — CHLORHEXIDINE GLUCONATE 0.12 MG/ML
15 RINSE ORAL
Status: COMPLETED | OUTPATIENT
Start: 2020-12-01 | End: 2020-12-01

## 2020-12-01 RX ORDER — ONDANSETRON 2 MG/ML
4 INJECTION INTRAMUSCULAR; INTRAVENOUS ONCE AS NEEDED
Status: COMPLETED | OUTPATIENT
Start: 2020-12-01 | End: 2020-12-01

## 2020-12-01 RX ORDER — MAGNESIUM HYDROXIDE 1200 MG/15ML
LIQUID ORAL AS NEEDED
Status: DISCONTINUED | OUTPATIENT
Start: 2020-12-01 | End: 2020-12-01 | Stop reason: HOSPADM

## 2020-12-01 RX ORDER — LIDOCAINE HYDROCHLORIDE 10 MG/ML
0.5 INJECTION, SOLUTION EPIDURAL; INFILTRATION; INTRACAUDAL; PERINEURAL ONCE AS NEEDED
Status: COMPLETED | OUTPATIENT
Start: 2020-12-01 | End: 2020-12-01

## 2020-12-01 RX ORDER — BUPIVACAINE HYDROCHLORIDE 2.5 MG/ML
INJECTION, SOLUTION EPIDURAL; INFILTRATION; INTRACAUDAL
Status: COMPLETED | OUTPATIENT
Start: 2020-12-01 | End: 2020-12-01

## 2020-12-01 RX ORDER — ALPRAZOLAM 0.25 MG/1
0.25 TABLET ORAL 2 TIMES DAILY PRN
Status: DISCONTINUED | OUTPATIENT
Start: 2020-12-01 | End: 2020-12-03 | Stop reason: HOSPADM

## 2020-12-01 RX ORDER — HYDROMORPHONE HYDROCHLORIDE 1 MG/ML
0.5 INJECTION, SOLUTION INTRAMUSCULAR; INTRAVENOUS; SUBCUTANEOUS
Status: DISCONTINUED | OUTPATIENT
Start: 2020-12-01 | End: 2020-12-01 | Stop reason: HOSPADM

## 2020-12-01 RX ORDER — SIMETHICONE 80 MG
80 TABLET,CHEWABLE ORAL 4 TIMES DAILY PRN
Status: DISCONTINUED | OUTPATIENT
Start: 2020-12-01 | End: 2020-12-03 | Stop reason: HOSPADM

## 2020-12-01 RX ORDER — PANTOPRAZOLE SODIUM 40 MG/1
40 TABLET, DELAYED RELEASE ORAL EVERY MORNING
Status: DISCONTINUED | OUTPATIENT
Start: 2020-12-02 | End: 2020-12-03 | Stop reason: HOSPADM

## 2020-12-01 RX ORDER — FENTANYL CITRATE 50 UG/ML
INJECTION, SOLUTION INTRAMUSCULAR; INTRAVENOUS AS NEEDED
Status: DISCONTINUED | OUTPATIENT
Start: 2020-12-01 | End: 2020-12-01 | Stop reason: SURG

## 2020-12-01 RX ORDER — SODIUM CHLORIDE, SODIUM LACTATE, POTASSIUM CHLORIDE, CALCIUM CHLORIDE 600; 310; 30; 20 MG/100ML; MG/100ML; MG/100ML; MG/100ML
INJECTION, SOLUTION INTRAVENOUS CONTINUOUS PRN
Status: DISCONTINUED | OUTPATIENT
Start: 2020-12-01 | End: 2020-12-01 | Stop reason: SURG

## 2020-12-01 RX ORDER — DIPHENHYDRAMINE HYDROCHLORIDE 50 MG/ML
25 INJECTION INTRAMUSCULAR; INTRAVENOUS EVERY 4 HOURS PRN
Status: DISCONTINUED | OUTPATIENT
Start: 2020-12-01 | End: 2020-12-03 | Stop reason: HOSPADM

## 2020-12-01 RX ORDER — FENTANYL CITRATE 50 UG/ML
50 INJECTION, SOLUTION INTRAMUSCULAR; INTRAVENOUS
Status: DISCONTINUED | OUTPATIENT
Start: 2020-12-01 | End: 2020-12-01 | Stop reason: HOSPADM

## 2020-12-01 RX ORDER — SODIUM CHLORIDE 0.9 % (FLUSH) 0.9 %
1-10 SYRINGE (ML) INJECTION AS NEEDED
Status: DISCONTINUED | OUTPATIENT
Start: 2020-12-01 | End: 2020-12-03 | Stop reason: HOSPADM

## 2020-12-01 RX ORDER — SUCCINYLCHOLINE CHLORIDE 20 MG/ML
INJECTION INTRAMUSCULAR; INTRAVENOUS AS NEEDED
Status: DISCONTINUED | OUTPATIENT
Start: 2020-12-01 | End: 2020-12-01 | Stop reason: SURG

## 2020-12-01 RX ORDER — HYDROMORPHONE HYDROCHLORIDE 2 MG/1
2 TABLET ORAL
Status: DISCONTINUED | OUTPATIENT
Start: 2020-12-01 | End: 2020-12-03 | Stop reason: HOSPADM

## 2020-12-01 RX ORDER — CEFAZOLIN SODIUM 2 G/100ML
2 INJECTION, SOLUTION INTRAVENOUS ONCE
Status: COMPLETED | OUTPATIENT
Start: 2020-12-01 | End: 2020-12-01

## 2020-12-01 RX ORDER — PROPOFOL 10 MG/ML
VIAL (ML) INTRAVENOUS AS NEEDED
Status: DISCONTINUED | OUTPATIENT
Start: 2020-12-01 | End: 2020-12-01 | Stop reason: SURG

## 2020-12-01 RX ORDER — ONDANSETRON 2 MG/ML
INJECTION INTRAMUSCULAR; INTRAVENOUS AS NEEDED
Status: DISCONTINUED | OUTPATIENT
Start: 2020-12-01 | End: 2020-12-01 | Stop reason: SURG

## 2020-12-01 RX ORDER — GABAPENTIN 100 MG/1
100 CAPSULE ORAL 3 TIMES DAILY
Status: DISCONTINUED | OUTPATIENT
Start: 2020-12-01 | End: 2020-12-03 | Stop reason: HOSPADM

## 2020-12-01 RX ORDER — SODIUM CHLORIDE 0.9 % (FLUSH) 0.9 %
3 SYRINGE (ML) INJECTION EVERY 12 HOURS SCHEDULED
Status: DISCONTINUED | OUTPATIENT
Start: 2020-12-01 | End: 2020-12-03 | Stop reason: HOSPADM

## 2020-12-01 RX ORDER — SCOLOPAMINE TRANSDERMAL SYSTEM 1 MG/1
1 PATCH, EXTENDED RELEASE TRANSDERMAL ONCE
Status: DISCONTINUED | OUTPATIENT
Start: 2020-12-01 | End: 2020-12-03 | Stop reason: HOSPADM

## 2020-12-01 RX ORDER — METOCLOPRAMIDE HYDROCHLORIDE 5 MG/ML
10 INJECTION INTRAMUSCULAR; INTRAVENOUS EVERY 6 HOURS PRN
Status: DISCONTINUED | OUTPATIENT
Start: 2020-12-01 | End: 2020-12-03 | Stop reason: HOSPADM

## 2020-12-01 RX ORDER — CYANOCOBALAMIN 1000 UG/ML
1000 INJECTION, SOLUTION INTRAMUSCULAR; SUBCUTANEOUS ONCE
Status: COMPLETED | OUTPATIENT
Start: 2020-12-02 | End: 2020-12-02

## 2020-12-01 RX ORDER — ONDANSETRON 2 MG/ML
4 INJECTION INTRAMUSCULAR; INTRAVENOUS EVERY 6 HOURS
Status: COMPLETED | OUTPATIENT
Start: 2020-12-01 | End: 2020-12-02

## 2020-12-01 RX ORDER — OXYCODONE HYDROCHLORIDE 5 MG/1
5 TABLET ORAL EVERY 6 HOURS PRN
Status: DISCONTINUED | OUTPATIENT
Start: 2020-12-01 | End: 2020-12-03 | Stop reason: HOSPADM

## 2020-12-01 RX ADMIN — SUGAMMADEX 200 MG: 100 INJECTION, SOLUTION INTRAVENOUS at 16:37

## 2020-12-01 RX ADMIN — BUPIVACAINE HYDROCHLORIDE 60 ML: 2.5 INJECTION, SOLUTION EPIDURAL; INFILTRATION; INTRACAUDAL; PERINEURAL at 15:23

## 2020-12-01 RX ADMIN — FENTANYL CITRATE 50 MCG: 50 INJECTION, SOLUTION INTRAMUSCULAR; INTRAVENOUS at 15:41

## 2020-12-01 RX ADMIN — GABAPENTIN 100 MG: 100 CAPSULE ORAL at 21:18

## 2020-12-01 RX ADMIN — FENTANYL CITRATE 50 MCG: 0.05 INJECTION, SOLUTION INTRAMUSCULAR; INTRAVENOUS at 17:15

## 2020-12-01 RX ADMIN — ESMOLOL HYDROCHLORIDE 40 MG: 10 INJECTION, SOLUTION INTRAVENOUS at 16:12

## 2020-12-01 RX ADMIN — LIDOCAINE HYDROCHLORIDE 50 MG: 10 INJECTION, SOLUTION EPIDURAL; INFILTRATION; INTRACAUDAL; PERINEURAL at 15:20

## 2020-12-01 RX ADMIN — LABETALOL 20 MG/4 ML (5 MG/ML) INTRAVENOUS SYRINGE 10 MG: at 17:56

## 2020-12-01 RX ADMIN — SUCCINYLCHOLINE CHLORIDE 200 MG: 20 INJECTION, SOLUTION INTRAMUSCULAR; INTRAVENOUS; PARENTERAL at 15:20

## 2020-12-01 RX ADMIN — PANTOPRAZOLE SODIUM 40 MG: 40 INJECTION, POWDER, FOR SOLUTION INTRAVENOUS at 21:19

## 2020-12-01 RX ADMIN — ESMOLOL HYDROCHLORIDE 20 MG: 10 INJECTION, SOLUTION INTRAVENOUS at 15:54

## 2020-12-01 RX ADMIN — LIDOCAINE HYDROCHLORIDE 0.5 ML: 10 INJECTION, SOLUTION EPIDURAL; INFILTRATION; INTRACAUDAL; PERINEURAL at 13:05

## 2020-12-01 RX ADMIN — ROCURONIUM BROMIDE 30 MG: 10 INJECTION INTRAVENOUS at 15:30

## 2020-12-01 RX ADMIN — GABAPENTIN 600 MG: 300 CAPSULE ORAL at 12:56

## 2020-12-01 RX ADMIN — FENTANYL CITRATE 50 MCG: 0.05 INJECTION, SOLUTION INTRAMUSCULAR; INTRAVENOUS at 17:07

## 2020-12-01 RX ADMIN — CHLORHEXIDINE GLUCONATE 0.12% ORAL RINSE 15 ML: 1.2 LIQUID ORAL at 12:55

## 2020-12-01 RX ADMIN — ONDANSETRON 4 MG: 2 INJECTION INTRAMUSCULAR; INTRAVENOUS at 17:12

## 2020-12-01 RX ADMIN — ONDANSETRON 4 MG: 2 INJECTION INTRAMUSCULAR; INTRAVENOUS at 16:34

## 2020-12-01 RX ADMIN — HYDRALAZINE HYDROCHLORIDE 10 MG: 20 INJECTION, SOLUTION INTRAMUSCULAR; INTRAVENOUS at 18:20

## 2020-12-01 RX ADMIN — ROCURONIUM BROMIDE 10 MG: 10 INJECTION INTRAVENOUS at 15:20

## 2020-12-01 RX ADMIN — ACETAMINOPHEN 1000 MG: 500 TABLET, FILM COATED ORAL at 21:18

## 2020-12-01 RX ADMIN — HYDROMORPHONE HYDROCHLORIDE 0.5 MG: 1 INJECTION, SOLUTION INTRAMUSCULAR; INTRAVENOUS; SUBCUTANEOUS at 17:35

## 2020-12-01 RX ADMIN — DEXAMETHASONE SODIUM PHOSPHATE 4 MG: 4 INJECTION, SOLUTION INTRAMUSCULAR; INTRAVENOUS at 16:08

## 2020-12-01 RX ADMIN — SODIUM CHLORIDE, POTASSIUM CHLORIDE, SODIUM LACTATE AND CALCIUM CHLORIDE: 600; 310; 30; 20 INJECTION, SOLUTION INTRAVENOUS at 15:16

## 2020-12-01 RX ADMIN — SODIUM CHLORIDE, PRESERVATIVE FREE 10 ML: 5 INJECTION INTRAVENOUS at 13:08

## 2020-12-01 RX ADMIN — CHLORHEXIDINE GLUCONATE 0.12% ORAL RINSE 15 ML: 1.2 LIQUID ORAL at 13:03

## 2020-12-01 RX ADMIN — SODIUM CHLORIDE, POTASSIUM CHLORIDE, SODIUM LACTATE AND CALCIUM CHLORIDE 150 ML/HR: 600; 310; 30; 20 INJECTION, SOLUTION INTRAVENOUS at 21:55

## 2020-12-01 RX ADMIN — OXYCODONE HYDROCHLORIDE 5 MG: 5 TABLET ORAL at 20:26

## 2020-12-01 RX ADMIN — SODIUM CHLORIDE 1000 ML: 9 INJECTION, SOLUTION INTRAVENOUS at 13:05

## 2020-12-01 RX ADMIN — ONDANSETRON 4 MG: 2 INJECTION INTRAMUSCULAR; INTRAVENOUS at 21:19

## 2020-12-01 RX ADMIN — PROPOFOL 200 MG: 10 INJECTION, EMULSION INTRAVENOUS at 15:20

## 2020-12-01 RX ADMIN — ROCURONIUM BROMIDE 20 MG: 10 INJECTION INTRAVENOUS at 16:12

## 2020-12-01 RX ADMIN — SCOPALAMINE 1 PATCH: 1 PATCH, EXTENDED RELEASE TRANSDERMAL at 12:56

## 2020-12-01 RX ADMIN — ACETAMINOPHEN 1000 MG: 500 TABLET ORAL at 12:56

## 2020-12-01 RX ADMIN — DEXAMETHASONE SODIUM PHOSPHATE 4 MG: 10 INJECTION INTRAMUSCULAR; INTRAVENOUS at 15:23

## 2020-12-01 RX ADMIN — PANTOPRAZOLE SODIUM 40 MG: 40 INJECTION, POWDER, FOR SOLUTION INTRAVENOUS at 13:08

## 2020-12-01 RX ADMIN — CEFAZOLIN SODIUM 2 G: 2 INJECTION, SOLUTION INTRAVENOUS at 15:25

## 2020-12-01 RX ADMIN — FENTANYL CITRATE 50 MCG: 50 INJECTION, SOLUTION INTRAMUSCULAR; INTRAVENOUS at 15:20

## 2020-12-01 RX ADMIN — HYDROMORPHONE HYDROCHLORIDE 0.5 MG: 1 INJECTION, SOLUTION INTRAMUSCULAR; INTRAVENOUS; SUBCUTANEOUS at 18:06

## 2020-12-01 RX ADMIN — SODIUM CHLORIDE 150 ML/HR: 9 INJECTION, SOLUTION INTRAVENOUS at 14:01

## 2020-12-01 RX ADMIN — HYDROMORPHONE HYDROCHLORIDE 0.5 MG: 1 INJECTION, SOLUTION INTRAMUSCULAR; INTRAVENOUS; SUBCUTANEOUS at 17:30

## 2020-12-01 NOTE — ANESTHESIA PROCEDURE NOTES
B TAP      Patient reassessed immediately prior to procedure    Patient location during procedure: OR  Reason for block: at surgeon's request and post-op pain management  Performed by  Anesthesiologist: Kenji Marcano MD  Preanesthetic Checklist  Completed: patient identified, site marked, surgical consent, pre-op evaluation, timeout performed, IV checked, risks and benefits discussed and monitors and equipment checked  Prep:  Pt Position: supine  Sterile barriers:cap, gloves, sterile barriers and mask  Prep: ChloraPrep  Patient monitoring: blood pressure monitoring, continuous pulse oximetry and EKG  Procedure  Sedation:yes  Performed under: general  Guidance:ultrasound guided  Images:still images obtained, printed/placed on chart    Laterality:Bilateral  Block Type:TAP  Injection Technique:single-shot  Needle Type:short-bevel and echogenic  Needle Gauge:20 G  Resistance on Injection: none    Medications Used: dexamethasone sodium phosphate injection, 4 mg  bupivacaine PF (MARCAINE) 0.25 % injection, 60 mL      Medications  Comment:Block Injection:  LA dose divided between Right and Left block        Post Assessment  Injection Assessment: negative aspiration for heme, incremental injection and no paresthesia on injection  Patient Tolerance:comfortable throughout block  Complications:no  Additional Notes      Under Ultrasound guidance, a BBraun 4inch 360 degree needle was advanced with Normal Saline hydro dissection of tissue.  The Internal Oblique and Transversus Abdominus muscles where visualized.  At or before the aponeurosis of Internal Oblique, local anesthetic spread was visualized in the Transversus Abdominus Plane. Injection was made incrementally with aspiration every 5 mls.  There was no  intravascular injection,  injection pressure was normal, there was no neural injection, and the procedure was completed without difficulty.  Thank You.

## 2020-12-01 NOTE — ANESTHESIA PREPROCEDURE EVALUATION
Anesthesia Evaluation     Patient summary reviewed and Nursing notes reviewed   no history of anesthetic complications:  NPO Solid Status: > 8 hours  NPO Liquid Status: > 2 hours           Airway   Mallampati: II  Neck ROM: full  No difficulty expected  Dental - normal exam     Pulmonary - negative pulmonary ROS and normal exam    breath sounds clear to auscultation  Cardiovascular - negative cardio ROS and normal exam    ECG reviewed  Rhythm: regular  Rate: normal        Neuro/Psych- negative ROS  GI/Hepatic/Renal/Endo    (+) morbid obesity,      Musculoskeletal     Abdominal    Substance History      OB/GYN      Comment: PCOS      Other - negative ROS                       Anesthesia Plan    ASA 3     general with block   (Bilateral subcostal TAP blocks post-induction for post-operative analgesia per request of Dr. Mariscal)  intravenous induction     Anesthetic plan, all risks, benefits, and alternatives have been provided, discussed and informed consent has been obtained with: patient.    Plan discussed with CRNA.

## 2020-12-01 NOTE — BRIEF OP NOTE
GASTRIC SLEEVE LAPAROSCOPIC, ESOPHAGOGASTRODUODENOSCOPY  Progress Note    Geneva Cordova  12/1/2020    Pre-op Diagnosis:   Obesity, Class III, BMI 40-49.9 (morbid obesity) (CMS/[facility]) [E66.01]       Post-Op Diagnosis Codes:     * Obesity, Class III, BMI 40-49.9 (morbid obesity) (CMS/[facility]) [E66.01]    Procedure/CPT® Codes:  UT LAP, JANA RESTRICT PROC, LONGITUDINAL GASTRECTOMY [57775]  UT ESOPHAGOGASTRODUODENOSCOPY TRANSORAL DIAGNOSTIC [82095]      Procedure(s):  GASTRIC SLEEVE LAPAROSCOPIC  ESOPHAGOGASTRODUODENOSCOPY    Surgeon(s):  Natalia Mariscal MD    Anesthesia: General with Block    Staff:   Circulator: Shana Ferguson RN  Scrub Person: Geetha Fam  Nursing Assistant: Ltitle Moreau         Estimated Blood Loss: 25 mL    Urine Voided: * No values recorded between 12/1/2020  3:16 PM and 12/1/2020  4:51 PM *    Specimens:                Specimens     ID Source Type Tests Collected By Collected At Frozen?      A Stomach Tissue · TISSUE PATHOLOGY EXAM   Natalia Mariscal MD 12/1/20 1630 No     Description: SUB-TOTAL GASTRECTOMY    This specimen was not marked as sent.                Drains: * No LDAs found *    Findings: No hiatal hernia, retained fundus    Complications: none immediate          Natalia Mariscal MD     Date: 12/1/2020  Time: 17:12 EST

## 2020-12-01 NOTE — OP NOTE
OPERATIVE REPORT    DATE: 12/01/20    PATIENT: Geneva Cordova    PREOPERATIVE DIAGNOSIS:    1. Morbid obesity with comorbidities    POSTOPERATIVE DIAGNOSIS:    1. Morbid obesity with multiple comorbidities.    PROCEDURES PERFORMED:  1. Revision laparoscopic sleeve gastrectomy  2.  Esophagogastroduodenoscopy    SURGEON:  Natalia Mariscal M.D.    ANESTHESIA:  General endotracheal with MARYURI block    ESTIMATED BLOOD LOSS:   25 mL    FLUIDS:  Crystalloids.    SPECIMENS:  Subtotal gastrectomy.    DRAINS:  None.    COUNTS:  Correct.    COMPLICATIONS:  None.    FINDINGS: No hiatal hernia, retained fundus.    INDICATIONS:   Geneva Cordova is a 35 y.o. year old female with morbid obesity and associated comorbidities who presents for elective revision laparoscopic sleeve gastrectomy.  She is status post laparoscopic sleeve gastrectomy by Dr. Hughes on July 15, 2015.  She had lost a net total of 87 pounds, but BMI remains 50.  After long discussion of options, she has chosen to proceed with revision sleeve gastrectomy, as did not have insurance coverage for SIPS and did not desire a gastric bypass.    The patient has has undergone our extensive preoperative education, teaching, and consent process. Everything is in order and they wish to proceed.         DESCRIPTION OF PROCEDURE:   The patient was brought to the operating room and placed supine upon the operating room table. SCDs were placed. The patient underwent uneventful general endotracheal anesthesia and bilateral MARYURI blocks per the anesthesiology staff.  She received subcutaneous Lovenox and was given Ancef. The abdomen was prepped with ChloraPrep and draped in the usual sterile fashion. An Ioban was used as well.  Timeout was performed.     A small transverse incision was made a few centimeters above and to the left of the umbilicus and the peritoneal cavity entered under direct camera visualization using an 11 mm 0° laparoscope and an Optiview trocar.  The abdomen  was then insufflated to a pressure of 16 mmHg with CO2 gas. Exploratory laparoscopy revealed no evidence of injury from the entrance technique. The liver had a uniform capsule and was normal in size without evidence of gross hepatomegaly or fibrosis.  A 5 mm port was placed in the right mid abdomen laterally, a 12 mm port was placed just right of the midline about 15 cm below the costal margin, and a 15 mm port was placed just to the left of the 12 mm port, with attempt to use previous incisions where possible.  A Slick liver retractor was placed through a small subxiphoid stab incision and the the left lobe of the liver was elevated.     The stomach was decompressed with an 18 Turkmen orogastric tube, which was then positioned in the antrum.  The sleeve was a uniform tube without twist, but there seemed to be more fundus than is usually seen.  I lysed some minor adhesions from the left lobe of the liver with Ligasure, then took down omental adhesions the length of the previous gastric staple line. The left facundo was completely exposed and there was no sign of hiatal hernia here or anteriorly. This was photodocumented. The GE junction fat pad was elevated to make a clear landing zone for the stapler later. The greater curvature vessels were taken down with the Ligasure extending distally within 3 cm of the pylorus. Filmy adhesions to the stomach were taken down posteriorly.      The standard clamp and 18 Turkmen orogastric tube were used to size the gastric sleeve. The stomach was marked in the 3 positions using an ink-stained laparoscopic Kittner.  The 3 markings were at the angle of His, the incisura and antrum using 1cm, 3cm and 5cm respectively.      Using the clamp as template, a redo sleeve gastrectomy was performed with an articulating Signia stapler bolstered by double tissue reinforcement. The first load was a 60mm black on the Signia stapler, the next three loads were purple 60 mm, and the last load was a  45 mm purple.   The specimen was mostly fundus, although I was able to remove a small amount of distal stomach was well.  The staple line was examined and was hemostatic. The small gastrectomy specimen was removed through the 15 mm port site. The specimen was later examined, and the staples were well-formed. Palpation of the specimen revealed no masses. The staple line of the sleeve gastrectomy revealed staples that were well-formed. The upper abdomen was flooded with saline, and endoscopy was performed.     The flexible endoscope was passed transorally down to the pylorus easily. The sleeve extended to within 6 cm proximal to the pylorus and was hemostatic. The sleeve was not narrow or twisted. There was no hiatal hernia or distal esophagitis. The rest of the esophagus was normal. The scope was removed. The leak test was normal.        I rescrubbed and suctioned the irrigation fluid from the upper abdomen, making sure it was dry. The staple line on the stomach was hemostatic.  The staple line was treated with 4 ml of aerosolized Tisseel fibrin glue. The liver retractor was removed easily. The 15 mm port was removed under direct visualization and there was no bleeding. This incision was closed with an 0-vicryl transfascial suture using a suture passer under direct visualization in a horizontal mattress fashion. All other ports were removed and then replaced under direct visualization and all of these were hemostatic. The instruments were removed and the abdomen was desufflated. The ports were removed. 3-0 monocryl plus was used to close skin incisions with interrupted sutures. Skin Affix was placed. The patient was awakened and taken to recovery in good condition, having tolerated the procedure well. All sponge, needle, and instrument counts were correct.

## 2020-12-01 NOTE — ANESTHESIA PROCEDURE NOTES
Airway  Urgency: elective    Date/Time: 12/1/2020 3:22 PM    General Information and Staff    Patient location during procedure: OR  CRNA: Dakota Kim CRNA    Indications and Patient Condition  Indications for airway management: airway protection    Preoxygenated: yes  MILS not maintained throughout  Mask difficulty assessment: 0 - not attempted    Final Airway Details  Final airway type: endotracheal airway      Successful airway: ETT  Cuffed: yes   Successful intubation technique: video laryngoscopy  Endotracheal tube insertion site: oral  Blade: Schmitt  Blade size: 3  ETT size (mm): 7.0  Cormack-Lehane Classification: grade I - full view of glottis  Placement verified by: chest auscultation and capnometry   Measured from: lips  Number of attempts at approach: 1  Assessment: lips, teeth, and gum same as pre-op and atraumatic intubation

## 2020-12-01 NOTE — ANESTHESIA POSTPROCEDURE EVALUATION
Patient: Geneva Codrova    Procedure Summary     Date: 12/01/20 Room / Location:  [facility]    Anesthesia Start: 1516 Anesthesia Stop: 1658    Procedures:       GASTRIC SLEEVE LAPAROSCOPIC (N/A Abdomen)      ESOPHAGOGASTRODUODENOSCOPY (N/A Esophagus) Diagnosis:       Obesity, Class III, BMI 40-49.9 (morbid obesity) (CMS/Formerly [facility])      (Obesity, Class III, BMI 40-49.9 (morbid obesity) (CMS/Formerly [facility]) [E66.01])    Surgeon: Natalia Mariscal MD Provider: Kenji Marcano MD    Anesthesia Type: general with block ASA Status: 3          Anesthesia Type: general with block    Vitals  No vitals data found for the desired time range.          Post Anesthesia Care and Evaluation    Patient location during evaluation: PACU  Patient participation: complete - patient participated  Level of consciousness: awake and alert  Pain score: 3  Pain management: adequate  Airway patency: patent  Anesthetic complications: No anesthetic complications  PONV Status: none  Cardiovascular status: hemodynamically stable and acceptable  Respiratory status: nonlabored ventilation, acceptable and nasal cannula  Hydration status: acceptable

## 2020-12-01 NOTE — PLAN OF CARE
Problem: Adult Inpatient Plan of Care  Goal: Plan of Care Review  Outcome: Ongoing, Progressing  Flowsheets (Taken 12/1/2020 1842)  Progress: no change  Plan of Care Reviewed With: patient  Outcome Summary: Very sleepy, resting at this time comfortably, Bp elevated to 172/99     Problem: Adult Inpatient Plan of Care  Goal: Patient-Specific Goal (Individualized)  Outcome: Ongoing, Progressing  Flowsheets (Taken 12/1/2020 1842)  Patient-Specific Goals (Include Timeframe): monitor pain q2h and prn     Problem: Adult Inpatient Plan of Care  Goal: Absence of Hospital-Acquired Illness or Injury  Outcome: Ongoing, Progressing     Problem: Adult Inpatient Plan of Care  Goal: Absence of Hospital-Acquired Illness or Injury  Intervention: Identify and Manage Fall Risk  Recent Flowsheet Documentation  Taken 12/1/2020 1839 by Jennie Horn RN  Safety Promotion/Fall Prevention:   toileting scheduled   safety round/check completed   room organization consistent     Problem: Adult Inpatient Plan of Care  Goal: Absence of Hospital-Acquired Illness or Injury  Intervention: Prevent Skin Injury  Recent Flowsheet Documentation  Taken 12/1/2020 1839 by Jennie Horn RN  Body Position:   position changed independently   supine     Problem: Adult Inpatient Plan of Care  Goal: Absence of Hospital-Acquired Illness or Injury  Intervention: Prevent and Manage VTE (venous thromboembolism) Risk  Recent Flowsheet Documentation  Taken 12/1/2020 1839 by Jennie Horn RN  VTE Prevention/Management:   bilateral   sequential compression devices on   bleeding risk factor(s) identified     Problem: Adult Inpatient Plan of Care  Goal: Optimal Comfort and Wellbeing  Outcome: Ongoing, Progressing     Problem: Adult Inpatient Plan of Care  Goal: Optimal Comfort and Wellbeing  Intervention: Provide Person-Centered Care  Recent Flowsheet Documentation  Taken 12/1/2020 1839 by Jennie Horn RN  Trust Relationship/Rapport:   care explained   choices  provided     Problem: Adult Inpatient Plan of Care  Goal: Readiness for Transition of Care  Outcome: Ongoing, Progressing     Problem: Bariatric Care Environmental Safety (Bariatric Surgery)  Goal: Safety Maintained with Care  Outcome: Ongoing, Progressing     Problem: Bleeding (Bariatric Surgery)  Goal: Absence of Bleeding  Outcome: Ongoing, Progressing     Problem: Bowel Motility Impaired (Bariatric Surgery)  Goal: Effective Bowel Motility and Elimination  Outcome: Ongoing, Progressing     Problem: Glycemic Control Impaired (Bariatric Surgery)  Goal: Blood Glucose Level Within Desired Range  Outcome: Ongoing, Progressing     Problem: Infection (Bariatric Surgery)  Goal: Absence of Infection Signs and Symptoms  Outcome: Ongoing, Progressing     Problem: Ongoing Anesthesia Effects (Bariatric Surgery)  Goal: Anesthesia/Sedation Recovery  Outcome: Ongoing, Progressing     Problem: Ongoing Anesthesia Effects (Bariatric Surgery)  Goal: Anesthesia/Sedation Recovery  Intervention: Optimize Anesthesia Recovery  Recent Flowsheet Documentation  Taken 12/1/2020 1839 by Jennie Horn RN  Safety Promotion/Fall Prevention:   toileting scheduled   safety round/check completed   room organization consistent     Problem: Pain (Bariatric Surgery)  Goal: Acceptable Pain Control  Outcome: Ongoing, Progressing     Problem: Pain (Bariatric Surgery)  Goal: Acceptable Pain Control  Intervention: Prevent or Manage Pain  Recent Flowsheet Documentation  Taken 12/1/2020 1839 by Jennie Horn RN  Diversional Activities: television     Problem: Postoperative Nausea and Vomiting (Bariatric Surgery)  Goal: Nausea and Vomiting Relief  Outcome: Ongoing, Progressing     Problem: Postoperative Urinary Retention (Bariatric Surgery)  Goal: Effective Urinary Elimination  Outcome: Ongoing, Progressing   Goal Outcome Evaluation:  Plan of Care Reviewed With: patient  Progress: no change  Outcome Summary: Very sleepy, resting at this time comfortably,  Bp elevated to 172/99

## 2020-12-02 ENCOUNTER — APPOINTMENT (OUTPATIENT)
Dept: GENERAL RADIOLOGY | Facility: HOSPITAL | Age: 35
End: 2020-12-02

## 2020-12-02 PROBLEM — R03.0 ELEVATED BLOOD PRESSURE READING: Status: ACTIVE | Noted: 2020-12-02

## 2020-12-02 LAB
ALBUMIN SERPL-MCNC: 3.9 G/DL (ref 3.5–5.2)
ALBUMIN/GLOB SERPL: 1.1 G/DL
ALP SERPL-CCNC: 66 U/L (ref 39–117)
ALT SERPL W P-5'-P-CCNC: 32 U/L (ref 1–33)
ANION GAP SERPL CALCULATED.3IONS-SCNC: 11 MMOL/L (ref 5–15)
AST SERPL-CCNC: 34 U/L (ref 1–32)
BASOPHILS # BLD AUTO: 0.01 10*3/MM3 (ref 0–0.2)
BASOPHILS NFR BLD AUTO: 0.1 % (ref 0–1.5)
BILIRUB SERPL-MCNC: 0.4 MG/DL (ref 0–1.2)
BUN SERPL-MCNC: 7 MG/DL (ref 6–20)
BUN/CREAT SERPL: 9.6 (ref 7–25)
CALCIUM SPEC-SCNC: 9.1 MG/DL (ref 8.6–10.5)
CHLORIDE SERPL-SCNC: 102 MMOL/L (ref 98–107)
CO2 SERPL-SCNC: 23 MMOL/L (ref 22–29)
CREAT SERPL-MCNC: 0.73 MG/DL (ref 0.57–1)
DEPRECATED RDW RBC AUTO: 41.3 FL (ref 37–54)
EOSINOPHIL # BLD AUTO: 0 10*3/MM3 (ref 0–0.4)
EOSINOPHIL NFR BLD AUTO: 0 % (ref 0.3–6.2)
ERYTHROCYTE [DISTWIDTH] IN BLOOD BY AUTOMATED COUNT: 12.4 % (ref 12.3–15.4)
GFR SERPL CREATININE-BSD FRML MDRD: 111 ML/MIN/1.73
GLOBULIN UR ELPH-MCNC: 3.6 GM/DL
GLUCOSE SERPL-MCNC: 110 MG/DL (ref 65–99)
HCT VFR BLD AUTO: 41.5 % (ref 34–46.6)
HGB BLD-MCNC: 12.9 G/DL (ref 12–15.9)
IMM GRANULOCYTES # BLD AUTO: 0.04 10*3/MM3 (ref 0–0.05)
IMM GRANULOCYTES NFR BLD AUTO: 0.5 % (ref 0–0.5)
IRON 24H UR-MRATE: 30 MCG/DL (ref 37–145)
LYMPHOCYTES # BLD AUTO: 0.99 10*3/MM3 (ref 0.7–3.1)
LYMPHOCYTES NFR BLD AUTO: 11.6 % (ref 19.6–45.3)
MAGNESIUM SERPL-MCNC: 1.7 MG/DL (ref 1.6–2.6)
MCH RBC QN AUTO: 28.4 PG (ref 26.6–33)
MCHC RBC AUTO-ENTMCNC: 31.1 G/DL (ref 31.5–35.7)
MCV RBC AUTO: 91.2 FL (ref 79–97)
MONOCYTES # BLD AUTO: 0.37 10*3/MM3 (ref 0.1–0.9)
MONOCYTES NFR BLD AUTO: 4.3 % (ref 5–12)
NEUTROPHILS NFR BLD AUTO: 7.16 10*3/MM3 (ref 1.7–7)
NEUTROPHILS NFR BLD AUTO: 83.5 % (ref 42.7–76)
NRBC BLD AUTO-RTO: 0 /100 WBC (ref 0–0.2)
PLATELET # BLD AUTO: 289 10*3/MM3 (ref 140–450)
PMV BLD AUTO: 12.1 FL (ref 6–12)
POTASSIUM SERPL-SCNC: 4 MMOL/L (ref 3.5–5.2)
PROT SERPL-MCNC: 7.5 G/DL (ref 6–8.5)
RBC # BLD AUTO: 4.55 10*6/MM3 (ref 3.77–5.28)
SODIUM SERPL-SCNC: 136 MMOL/L (ref 136–145)
WBC # BLD AUTO: 8.57 10*3/MM3 (ref 3.4–10.8)

## 2020-12-02 PROCEDURE — 83540 ASSAY OF IRON: CPT | Performed by: SURGERY

## 2020-12-02 PROCEDURE — 0 DIATRIZOATE MEGLUMINE & SODIUM PER 1 ML: Performed by: SURGERY

## 2020-12-02 PROCEDURE — 25010000002 NA FERRIC GLUC CPLX PER 12.5 MG: Performed by: SURGERY

## 2020-12-02 PROCEDURE — 80053 COMPREHEN METABOLIC PANEL: CPT | Performed by: SURGERY

## 2020-12-02 PROCEDURE — 25010000002 CYANOCOBALAMIN PER 1000 MCG: Performed by: SURGERY

## 2020-12-02 PROCEDURE — 83735 ASSAY OF MAGNESIUM: CPT | Performed by: NURSE PRACTITIONER

## 2020-12-02 PROCEDURE — 99252 IP/OBS CONSLTJ NEW/EST SF 35: CPT | Performed by: NURSE PRACTITIONER

## 2020-12-02 PROCEDURE — 63710000001 PROMETHAZINE PER 12.5 MG: Performed by: SURGERY

## 2020-12-02 PROCEDURE — 25010000002 CEFAZOLIN PER 500 MG: Performed by: SURGERY

## 2020-12-02 PROCEDURE — 25010000002 ONDANSETRON PER 1 MG: Performed by: SURGERY

## 2020-12-02 PROCEDURE — 25010000002 METOCLOPRAMIDE PER 10 MG: Performed by: SURGERY

## 2020-12-02 PROCEDURE — 74240 X-RAY XM UPR GI TRC 1CNTRST: CPT

## 2020-12-02 PROCEDURE — 25010000003 POTASSIUM CHLORIDE PER 2 MEQ: Performed by: SURGERY

## 2020-12-02 PROCEDURE — 99024 POSTOP FOLLOW-UP VISIT: CPT | Performed by: SURGERY

## 2020-12-02 PROCEDURE — 25010000002 HYDROMORPHONE PER 4 MG: Performed by: SURGERY

## 2020-12-02 PROCEDURE — 85025 COMPLETE CBC W/AUTO DIFF WBC: CPT | Performed by: SURGERY

## 2020-12-02 PROCEDURE — 25010000002 THIAMINE PER 100 MG: Performed by: SURGERY

## 2020-12-02 PROCEDURE — 63710000001 ONDANSETRON PER 8 MG: Performed by: SURGERY

## 2020-12-02 PROCEDURE — 25010000002 ENOXAPARIN PER 10 MG: Performed by: SURGERY

## 2020-12-02 RX ORDER — AMLODIPINE BESYLATE 5 MG/1
5 TABLET ORAL
Status: DISCONTINUED | OUTPATIENT
Start: 2020-12-02 | End: 2020-12-03 | Stop reason: HOSPADM

## 2020-12-02 RX ADMIN — FOLIC ACID 250 ML/HR: 5 INJECTION, SOLUTION INTRAMUSCULAR; INTRAVENOUS; SUBCUTANEOUS at 07:51

## 2020-12-02 RX ADMIN — CEFAZOLIN SODIUM 2 G: 10 INJECTION, POWDER, FOR SOLUTION INTRAVENOUS at 07:35

## 2020-12-02 RX ADMIN — ONDANSETRON 4 MG: 2 INJECTION INTRAMUSCULAR; INTRAVENOUS at 07:40

## 2020-12-02 RX ADMIN — PANTOPRAZOLE SODIUM 40 MG: 40 TABLET, DELAYED RELEASE ORAL at 07:39

## 2020-12-02 RX ADMIN — SIMETHICONE 80 MG: 80 TABLET, CHEWABLE ORAL at 11:31

## 2020-12-02 RX ADMIN — HYDROMORPHONE HYDROCHLORIDE 2 MG: 2 TABLET ORAL at 11:31

## 2020-12-02 RX ADMIN — OXYCODONE HYDROCHLORIDE 5 MG: 5 TABLET ORAL at 17:58

## 2020-12-02 RX ADMIN — HYDROMORPHONE HYDROCHLORIDE 0.5 MG: 1 INJECTION, SOLUTION INTRAMUSCULAR; INTRAVENOUS; SUBCUTANEOUS at 05:21

## 2020-12-02 RX ADMIN — CEFAZOLIN SODIUM 2 G: 10 INJECTION, POWDER, FOR SOLUTION INTRAVENOUS at 00:10

## 2020-12-02 RX ADMIN — ONDANSETRON HYDROCHLORIDE 4 MG: 4 TABLET, FILM COATED ORAL at 20:38

## 2020-12-02 RX ADMIN — HYDROMORPHONE HYDROCHLORIDE 2 MG: 2 TABLET ORAL at 00:18

## 2020-12-02 RX ADMIN — GABAPENTIN 100 MG: 100 CAPSULE ORAL at 20:38

## 2020-12-02 RX ADMIN — GABAPENTIN 100 MG: 100 CAPSULE ORAL at 15:29

## 2020-12-02 RX ADMIN — METOCLOPRAMIDE 10 MG: 5 INJECTION, SOLUTION INTRAMUSCULAR; INTRAVENOUS at 11:40

## 2020-12-02 RX ADMIN — Medication 30 ML: at 10:44

## 2020-12-02 RX ADMIN — ACETAMINOPHEN 1000 MG: 500 TABLET, FILM COATED ORAL at 13:14

## 2020-12-02 RX ADMIN — CYANOCOBALAMIN 1000 MCG: 1000 INJECTION, SOLUTION INTRAMUSCULAR; SUBCUTANEOUS at 07:40

## 2020-12-02 RX ADMIN — PROMETHAZINE HYDROCHLORIDE 12.5 MG: 12.5 TABLET ORAL at 03:34

## 2020-12-02 RX ADMIN — POTASSIUM CHLORIDE AND SODIUM CHLORIDE 100 ML/HR: 450; 150 INJECTION, SOLUTION INTRAVENOUS at 16:56

## 2020-12-02 RX ADMIN — AMLODIPINE BESYLATE 5 MG: 5 TABLET ORAL at 07:39

## 2020-12-02 RX ADMIN — SODIUM CHLORIDE 125 MG: 9 INJECTION, SOLUTION INTRAVENOUS at 15:29

## 2020-12-02 RX ADMIN — SODIUM CHLORIDE, POTASSIUM CHLORIDE, SODIUM LACTATE AND CALCIUM CHLORIDE 150 ML/HR: 600; 310; 30; 20 INJECTION, SOLUTION INTRAVENOUS at 05:21

## 2020-12-02 RX ADMIN — ENOXAPARIN SODIUM 40 MG: 40 INJECTION SUBCUTANEOUS at 07:40

## 2020-12-02 RX ADMIN — ACETAMINOPHEN 1000 MG: 500 TABLET, FILM COATED ORAL at 05:37

## 2020-12-02 RX ADMIN — ONDANSETRON 4 MG: 2 INJECTION INTRAMUSCULAR; INTRAVENOUS at 00:19

## 2020-12-02 RX ADMIN — LABETALOL 20 MG/4 ML (5 MG/ML) INTRAVENOUS SYRINGE 10 MG: at 03:43

## 2020-12-02 RX ADMIN — ONDANSETRON 4 MG: 2 INJECTION INTRAMUSCULAR; INTRAVENOUS at 13:14

## 2020-12-02 RX ADMIN — ALPRAZOLAM 0.25 MG: 0.25 TABLET ORAL at 07:39

## 2020-12-02 RX ADMIN — GABAPENTIN 100 MG: 100 CAPSULE ORAL at 07:40

## 2020-12-02 RX ADMIN — ACETAMINOPHEN 1000 MG: 500 TABLET, FILM COATED ORAL at 22:17

## 2020-12-02 RX ADMIN — SIMETHICONE 80 MG: 80 TABLET, CHEWABLE ORAL at 00:09

## 2020-12-02 NOTE — PLAN OF CARE
Goal Outcome Evaluation:  Plan of Care Reviewed With: patient  Progress: no change  Outcome Summary: VSS patient remains on room air. She continues to have intermitten pain and nausea PRN medications given. Poor protein intake so far. Will continue to monitor.

## 2020-12-02 NOTE — PLAN OF CARE
Problem: Adult Inpatient Plan of Care  Goal: Plan of Care Review  Outcome: Ongoing, Progressing  Flowsheets (Taken 12/2/2020 0252)  Outcome Summary: BP remains elevated, labetalol PRN given, continuing to monitor per hospitalist recommendation. Room air. Pt has been ambulating and voiding well, pain is controlled with PRNs. Pt using incentive spirometer without diffculty, reaching about 1700. Will continue to monitor     Problem: Adult Inpatient Plan of Care  Goal: Patient-Specific Goal (Individualized)  Outcome: Ongoing, Progressing  Flowsheets (Taken 12/1/2020 1842 by Jennie Horn, RN)  Patient-Specific Goals (Include Timeframe): monitor pain q2h and prn     Problem: Adult Inpatient Plan of Care  Goal: Absence of Hospital-Acquired Illness or Injury  Outcome: Ongoing, Progressing     Problem: Adult Inpatient Plan of Care  Goal: Absence of Hospital-Acquired Illness or Injury  Intervention: Identify and Manage Fall Risk  Recent Flowsheet Documentation  Taken 12/2/2020 0018 by Francine Jung RN  Safety Promotion/Fall Prevention: activity supervised  Taken 12/1/2020 2200 by Francine Jung RN  Safety Promotion/Fall Prevention: activity supervised  Taken 12/1/2020 2000 by Francine Jung RN  Safety Promotion/Fall Prevention: activity supervised     Problem: Adult Inpatient Plan of Care  Goal: Absence of Hospital-Acquired Illness or Injury  Intervention: Prevent Skin Injury  Recent Flowsheet Documentation  Taken 12/2/2020 0018 by Francine Jung RN  Body Position: position changed independently  Taken 12/1/2020 2200 by Francine Jung RN  Body Position: position changed independently  Taken 12/1/2020 2000 by Francine Jung RN  Body Position: position changed independently     Problem: Adult Inpatient Plan of Care  Goal: Absence of Hospital-Acquired Illness or Injury  Intervention: Prevent and Manage VTE (venous thromboembolism) Risk  Recent Flowsheet Documentation  Taken 12/1/2020 2000 by Erich  BRYAN Escamilla  VTE Prevention/Management: sequential compression devices on     Problem: Adult Inpatient Plan of Care  Goal: Optimal Comfort and Wellbeing  Outcome: Ongoing, Progressing     Problem: Adult Inpatient Plan of Care  Goal: Optimal Comfort and Wellbeing  Intervention: Provide Person-Centered Care  Recent Flowsheet Documentation  Taken 12/1/2020 2000 by Francine Jung RN  Trust Relationship/Rapport:  • care explained  • choices provided     Problem: Adult Inpatient Plan of Care  Goal: Readiness for Transition of Care  Outcome: Ongoing, Progressing     Problem: Bariatric Care Environmental Safety (Bariatric Surgery)  Goal: Safety Maintained with Care  Outcome: Ongoing, Progressing     Problem: Bleeding (Bariatric Surgery)  Goal: Absence of Bleeding  Outcome: Ongoing, Progressing     Problem: Bowel Motility Impaired (Bariatric Surgery)  Goal: Effective Bowel Motility and Elimination  Outcome: Ongoing, Progressing     Problem: Glycemic Control Impaired (Bariatric Surgery)  Goal: Blood Glucose Level Within Desired Range  Outcome: Ongoing, Progressing     Problem: Infection (Bariatric Surgery)  Goal: Absence of Infection Signs and Symptoms  Outcome: Ongoing, Progressing     Problem: Ongoing Anesthesia Effects (Bariatric Surgery)  Goal: Anesthesia/Sedation Recovery  Outcome: Ongoing, Progressing     Problem: Ongoing Anesthesia Effects (Bariatric Surgery)  Goal: Anesthesia/Sedation Recovery  Intervention: Optimize Anesthesia Recovery  Recent Flowsheet Documentation  Taken 12/2/2020 0018 by Francine Jung RN  Safety Promotion/Fall Prevention: activity supervised  Taken 12/1/2020 2200 by Francine Jung RN  Safety Promotion/Fall Prevention: activity supervised  Taken 12/1/2020 2039 by Francine Jung RN  Patient Tolerance (IS): good  Administration (IS):  • instruction provided, initial  • self-administered  Level Incentive Spirometer (mL): 1700  Incentive Spirometer Predicted Level (mL): 2000  Number  of Repetitions (IS): 5  Taken 12/1/2020 2000 by Francine Jung, RN  Safety Promotion/Fall Prevention: activity supervised     Problem: Pain (Bariatric Surgery)  Goal: Acceptable Pain Control  Outcome: Ongoing, Progressing     Problem: Pain (Bariatric Surgery)  Goal: Acceptable Pain Control  Intervention: Prevent or Manage Pain  Recent Flowsheet Documentation  Taken 12/2/2020 0018 by Francine Jung, RN  Pain Management Interventions: see MAR  Taken 12/1/2020 2000 by Francine Jung RN  Pain Management Interventions: see MAR  Diversional Activities: smartphone     Problem: Postoperative Nausea and Vomiting (Bariatric Surgery)  Goal: Nausea and Vomiting Relief  Outcome: Ongoing, Progressing     Problem: Postoperative Urinary Retention (Bariatric Surgery)  Goal: Effective Urinary Elimination  Outcome: Ongoing, Progressing   Goal Outcome Evaluation:  Plan of Care Reviewed With: patient  Progress: no change  Outcome Summary: VSS, room air. Pt has been ambulating and voiding well, pain is controlled with PRNs. Pt using incentive spirometer without diffculty, reaching about 1700. Will continue to monitor

## 2020-12-02 NOTE — PROGRESS NOTES
Discharge Planning Assessment  [facility]     Patient Name: Geneva Cordova  MRN: [id number]  Today's Date: 12/2/2020    Admit Date: 12/1/2020    Discharge Needs Assessment     Row Name 12/02/20 1031       Living Environment    Lives With  child(osiel), dependent pt resides in [facility]    Current Living Arrangements  home/apartment/condo    Primary Care Provided by  self    Provides Primary Care For  child(osiel)    Family Caregiver if Needed  sibling(s);friend(s)    Family Caregiver Names  Rena Riley [phone]    Quality of Family Relationships  helpful;involved;supportive    Able to Return to Prior Arrangements  yes       Resource/Environmental Concerns    Resource/Environmental Concerns  none       Transition Planning    Patient/Family Anticipates Transition to  home with family    Patient/Family Anticipated Services at Transition  none    Transportation Anticipated  family or friend will provide       Discharge Needs Assessment    Readmission Within the Last 30 Days  no previous admission in last 30 days    Equipment Currently Used at Home  none    Concerns to be Addressed  denies needs/concerns at this time;discharge planning    Anticipated Changes Related to Illness  none    Equipment Needed After Discharge  none    Provided Post Acute Provider List?  N/A    Provided Post Acute Provider Quality & Resource List?  N/A        Discharge Plan     Row Name 12/02/20 1032       Plan    Plan  home    Patient/Family in Agreement with Plan  yes    Plan Comments  CM spoke with pt at bedside. Pt resides in [facility] with her dependent children. Pt reports she is independent of adls and will have assistance from her sister and friends as needed. Pt denies use of DME and is not current with home health or outpatient medical services. Pt plans to return home and denies needs at this time. Pt reports she will have private transportation home. CM will continue to follow.    Final Discharge Disposition Code   01 - home or self-care        Continued Care and Services - Admitted Since 12/1/2020    Coordination has not been started for this encounter.         Demographic Summary     Row Name 12/02/20 1029       General Information    Referral Source  admission list    Reason for Consult  discharge planning    Preferred Language  English     Used During This Interaction  no    General Information Comments  PCP- Candi Potts       Contact Information    Permission Granted to Share Info With          Functional Status     Row Name 12/02/20 1030       Functional Status    Usual Activity Tolerance  good    Current Activity Tolerance  moderate       Functional Status, IADL    Medications  independent    Meal Preparation  independent    Housekeeping  independent    Laundry  independent    Shopping  independent       Employment/    Employment/ Comments  Pt confirms she has [employer], denies concerns or disruption in coverage. Pt has prescription drug coverage and denies issues obtaining or affording current medications.        Psychosocial    No documentation.       Abuse/Neglect    No documentation.       Legal    No documentation.       Substance Abuse    No documentation.       Patient Forms    No documentation.           Zenobia Cash RN

## 2020-12-02 NOTE — PROGRESS NOTES
"Bariatric Surgery Progress Note:      Chief Complaint:  POD #1    Subjective     Interval History:  Doing ok. Pain controlled.  Complains of nausea and vomiting, none since 10:30 though.  Just starting first protein shake  No fevers.  Ambulating.  Voiding.  IS 2000 reported.    Objective     Vital Signs  Blood pressure 150/83, pulse 61, temperature 98.3 °F (36.8 °C), temperature source Oral, resp. rate 16, height 176 cm (69.3\"), weight (!) 156 kg (343 lb 14.7 oz), SpO2 100 %, not currently breastfeeding.      Intake/Output Summary (Last 24 hours) at 12/2/2020 1443  Last data filed at 12/2/2020 1200  Gross per 24 hour   Intake 1500 ml   Output 1300 ml   Net 200 ml       Physical Exam:  General: Alert, NAD  Lungs: Clear  Heart: RRR  Abdomen: soft, appropriate, incisions okay  Extremities: warm, (+) SCDs       Labs:  Lab Results (last 24 hours)     Procedure Component Value Units Date/Time    Iron [[id number]]  (Abnormal) Collected: 12/02/20 0556    Specimen: Blood Updated: 12/02/20 1142     Iron 30 mcg/dL     Comprehensive Metabolic Panel [[id number]]  (Abnormal) Collected: 12/02/20 0556    Specimen: Blood Updated: 12/02/20 0729     Glucose 110 mg/dL      BUN 7 mg/dL      Creatinine 0.73 mg/dL      Sodium 136 mmol/L      Potassium 4.0 mmol/L      Chloride 102 mmol/L      CO2 23.0 mmol/L      Calcium 9.1 mg/dL      Total Protein 7.5 g/dL      Albumin 3.90 g/dL      ALT (SGPT) 32 U/L      AST (SGOT) 34 U/L      Alkaline Phosphatase 66 U/L      Total Bilirubin 0.4 mg/dL      eGFR  African Amer 111 mL/min/1.73      Globulin 3.6 gm/dL      A/G Ratio 1.1 g/dL      BUN/Creatinine Ratio 9.6     Anion Gap 11.0 mmol/L     Narrative:      GFR Normal >60  Chronic Kidney Disease <60  Kidney Failure <15      Magnesium [[id number]]  (Normal) Collected: 12/02/20 0556    Specimen: Blood Updated: 12/02/20 0729     Magnesium 1.7 mg/dL     Tissue Pathology Exam [[id number]] Collected: 12/01/20 1630    Specimen: Tissue from Stomach Updated: " 12/02/20 0727    CBC & Differential [[id number]]  (Abnormal) Collected: 12/02/20 0556    Specimen: Blood Updated: 12/02/20 0643    Narrative:      The following orders were created for panel order CBC & Differential.  Procedure                               Abnormality         Status                     ---------                               -----------         ------                     CBC Auto Differential[[id number]]        Abnormal            Final result                 Please view results for these tests on the individual orders.    CBC Auto Differential [[id number]]  (Abnormal) Collected: 12/02/20 0556    Specimen: Blood Updated: 12/02/20 0643     WBC 8.57 10*3/mm3      RBC 4.55 10*6/mm3      Hemoglobin 12.9 g/dL      Hematocrit 41.5 %      MCV 91.2 fL      MCH 28.4 pg      MCHC 31.1 g/dL      RDW 12.4 %      RDW-SD 41.3 fl      MPV 12.1 fL      Platelets 289 10*3/mm3      Neutrophil % 83.5 %      Lymphocyte % 11.6 %      Monocyte % 4.3 %      Eosinophil % 0.0 %      Basophil % 0.1 %      Immature Grans % 0.5 %      Neutrophils, Absolute 7.16 10*3/mm3      Lymphocytes, Absolute 0.99 10*3/mm3      Monocytes, Absolute 0.37 10*3/mm3      Eosinophils, Absolute 0.00 10*3/mm3      Basophils, Absolute 0.01 10*3/mm3      Immature Grans, Absolute 0.04 10*3/mm3      nRBC 0.0 /100 WBC             Assessment/Plan     POD # 1 s/p revision LSG.    Doing okay, but poor oral intake.    Continue OOB/ PT/ DVT prophx.

## 2020-12-02 NOTE — PAYOR COMM NOTE
"Geneva Garay (35 y.o. Female)     Date of Birth Social Security Number Address Home Phone MRN    1985  [id number]  [facility] [phone] [id number]    Sabianist Marital Status          None Single       Admission Date Admission Type Admitting Provider Attending Provider Department, Room/Bed    12/1/20 Elective Natalia Mariscal MD Quintero, Paige Nealy, MD [facility] 2F, S213/1    Discharge Date Discharge Disposition Discharge Destination                       Attending Provider: Natalia Mariscal MD    Allergies: No Known Allergies    Isolation: None   Infection: None   Code Status: CPR    Ht: 176 cm (69.3\")   Wt: 156 kg (343 lb 14.7 oz)    Admission Cmt: None   Principal Problem: Obesity, Class III, BMI 40-49.9 (morbid obesity) (CMS/[facility]) [E66.01]                 Active Insurance as of 12/1/2020     Primary Coverage     Payor Plan Insurance Group Employer/Plan Group    [employer] [place] [employer] [employer] [place]      Payor Plan Address Payor Plan Phone Number Payor Plan Fax Number Effective Dates    [address] [address]   11/1/2017 - None Entered    [place] [address]       Subscriber Name Subscriber Birth Date Member ID       GENEVA GARAY 1985 [id number]                 Emergency Contacts      (Rel.) Home Phone Work Phone Mobile Phone    Edilson Riley (Sister) -- -- [phone]    skylar valdovinos (Friend) -- -- [phone]            Insurance Information                [employer] [employer] [place] [place] Phone:     Subscriber: Geneva Garay Subscriber#: [id number]    Group#:  Precert#:              History & Physical      Natalia Mariscal MD at 12/01/20 1438          H&P reviewed. The patient was examined and there are no changes to the H&P.          Electronically signed by Natalia Mariscal MD at 12/01/20 1438   Source Note          [facility] BARIATRIC SURGERY  [address]  [place]  [facility] [address] [phone]      Patient  Name:  Geneva Cordova  :  1985      Date of Visit: 2020      Chief Complaint:  weight gain; unable to maintain weight loss    History of Present Illness:  Geneva Cordova is a 35 y.o. female who presents today for evaluation, education and consultation regarding weight loss surgery.     She is s/p LSG by Dr. Hughes on 7/15/15.  Preop weight 430, lowest weight 309, currently weighs 343.  Since sleeve, she has had a twin pregnancy.    She has lost the sense of restriction.  She was scheduled some time ago for re-sleeve vs. bypass, but wasn't ready mentally.  She has prayed and talked with her , and feels ready now.  She had a long discussion of options with Dr. Hughes, and he had offered her SIPS, but she did not have insurance coverage and also did not want any type of bypass procedure at this time.      Patient has been overweight for many years, with numerous failed dietary/weight loss attempts.  She has obesity related comorbidities of migraines, VIRA, PCOS, back pain, OA, depression, fatigue, peripheral edema and as such has decided to pursue weight loss surgery.    No personal or family hx of VTE or clotting d/o.  No liver, lung, heart, or renal disease    She has some GERD, takes Rolaids.  Sometimes a pack per day.  It has been better lately.  Her sister is Nadia Alaniz.    She is s/p lap joe with Dr. Hughes 18 with EGD + biopsy x 2.  No HH, GE jxn at 41 cm.   Sleeve was noted to be \"mildly dilated.\"  20 UGI:  Status post vertical sleeve gastrectomy x2 years. There was  no evidence of extraluminal contrast. There was no delay in gastric  empty. No postoperative strictures were seen.     She saw Dr. Hughes in 2020 to discuss revision. At that time, her insurance would only cover re-sleeve, not SIPS.  She was adamant that she did not want a gastric or intestinal bypass.       Review of data:    KENNETH: norco, phentermine, nothing " "since 2020  CBC: nl  CMP: nl  EGD: 18: done concomitant with lap joe with GDW.  \"mildly dilated uniformly\" sleeve, no HH.  HP negative  Cardiac clearance:Dr. Hamm, low risk    PFTs: ok    EKG: nl  CXR: nl      Last tobacco: never  Last NSAIDs: >1 month  Last ASA: n/a  Last steroids: n/a  Last hormones: n/a      Past Medical History:   Diagnosis Date   • Anemia    • Anxiety    • Bilateral lower extremity edema     R>L   • Boil, axilla     recurrent   • Chronic back pain     no meds or injections, feels related to breasts   • Depression    • Dyspepsia    • Dyspnea on exertion    • Fatigue    • Glucose intolerance (impaired glucose tolerance)     \"hasn't been tested for that\"   • Helicobacter pylori (H. pylori) infection     asx and grossly nl EGD. \"abundant\"on path. HBT still + after PrevPak tx. LSG path neg. neg testing 2018   • Hypoalbuminemia     3.2 in past, 4.1 most recent   • Left Ovarian serous cystadenoma -removed at  section 2017   • Migraine     resolved with weightloss   • Obesity, morbid (CMS/HCC)    • VIRA (obstructive sleep apnea)      improved since WLS, pending f/u sleep study, not using CPAP   • PCOS (polycystic ovarian syndrome)    • Right Essure implantation 2017 10/6/2017   • S/P  section;left ovarian cystectomy and partial salpingectomy 17   • Seasonal allergies      Past Surgical History:   Procedure Laterality Date   •  SECTION N/A 2017      SECTION PRIMARY;  CYSTECTOMY; Kwadwo Baxter MD; : [facility] LABOR DELIVERY;  Service:    • DILATATION AND CURETTAGE     • ENDOMETRIAL ABLATION  2020    Dr Cuevas   • ESSURE TUBAL LIGATION     • GASTRIC SLEEVE LAPAROSCOPIC  2015    With Dr. Hughes   • LAPAROSCOPIC CHOLECYSTECTOMY  2018    Dr. Hughes   • SALPINGECTOMY Bilateral 2020    Dr Cuevas   • TONSILLECTOMY     • TUBAL ABDOMINAL LIGATION     • WISDOM TOOTH EXTRACTION         No Known " Allergies    Current Outpatient Medications:   •  apixaban (Eliquis) 2.5 MG tablet tablet, Take 1 tablet by mouth 2 (Two) Times a Day., Disp: 42 tablet, Rfl: 0  •  Multiple Vitamins-Minerals (MULTIPLE VITAMINS/WOMENS) tablet, Take 1 tablet by mouth Daily., Disp: , Rfl:     Social History     Socioeconomic History   • Marital status: Single     Spouse name: Not on file   • Number of children: 4   • Years of education: College    • Highest education level: Not on file   Occupational History   • Occupation:    Tobacco Use   • Smoking status: Never Smoker   • Smokeless tobacco: Never Used   Substance and Sexual Activity   • Alcohol use: Yes     Frequency: Monthly or less     Drinks per session: 1 or 2     Binge frequency: Never     Comment: Occ   • Drug use: No   • Sexual activity: Yes     Partners: Male     Birth control/protection: Surgical     Comment: no OCP   Social History Narrative    Lives in [place]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  [employer]      Family History   Problem Relation Age of Onset   • No Known Problems Other    • Sleep apnea Mother    • Diabetes Mother    • No Known Problems Father    • Pancreatic cancer Maternal Grandmother    • No Known Problems Sister    • No Known Problems Brother    • No Known Problems Daughter    • No Known Problems Son    • No Known Problems Paternal Grandmother    • No Known Problems Maternal Aunt    • No Known Problems Paternal Aunt    • BRCA 1/2 Neg Hx    • Colon cancer Neg Hx    • Endometrial cancer Neg Hx    • Ovarian cancer Neg Hx        Review of Systems   Constitutional: Positive for fatigue and unexpected weight gain. Negative for chills, diaphoresis, fever and unexpected weight loss.   HENT: Negative for congestion and facial swelling.    Eyes: Negative for blurred vision, double vision and discharge.   Respiratory: Negative for chest tightness, shortness of breath and stridor.    Cardiovascular: Negative for chest pain, palpitations and leg swelling.    Gastrointestinal: Negative for blood in stool.   Endocrine: Negative for polydipsia.   Genitourinary: Negative for hematuria.   Musculoskeletal: Positive for arthralgias.   Skin: Negative for color change.   Allergic/Immunologic: Negative for immunocompromised state.   Neurological: Negative for confusion.   Psychiatric/Behavioral: Negative for self-injury.       Physical Exam:  Vital Signs:  Weight: (!) 156 kg (343 lb)   Body mass index is 50.21 kg/m².  Temp: 97.3 °F (36.3 °C)   Heart Rate: 87   BP: 126/85     Physical Exam  Constitutional:       General: She is not in acute distress.     Appearance: She is well-developed. She is not diaphoretic.   HENT:      Head: Normocephalic and atraumatic.      Mouth/Throat:      Pharynx: No oropharyngeal exudate.   Eyes:      Conjunctiva/sclera: Conjunctivae normal.      Pupils: Pupils are equal, round, and reactive to light.   Cardiovascular:      Rate and Rhythm: Normal rate and regular rhythm.   Pulmonary:      Effort: Pulmonary effort is normal. No respiratory distress.      Breath sounds: Normal breath sounds.   Abdominal:      General: There is no distension.      Palpations: Abdomen is soft.      Comments: Lap scars, loose skin, large pannus   Skin:     General: Skin is warm and dry.      Coloration: Skin is not pale.   Neurological:      Mental Status: She is alert and oriented to person, place, and time.      Cranial Nerves: No cranial nerve deficit.   Psychiatric:         Behavior: Behavior normal.         Thought Content: Thought content normal.         Patient Active Problem List   Diagnosis   • VIAR (obstructive sleep apnea)   • Glucose intolerance (impaired glucose tolerance)   • Migraine   • Depression   • Helicobacter pylori (H. pylori) infection   • Anemia   • Joint pain   • Chronic back pain   • Anxiety   • Environmental allergies   • Obesity, Class III, BMI 40-49.9 (morbid obesity) (CMS/[facility])   • PCOS (polycystic ovarian syndrome)   • Dyspepsia   • Fatigue    • Seasonal allergies   • Bilateral lower extremity edema   • Hypoalbuminemia   • Moderate episode of recurrent major depressive disorder (CMS/HCC)   • FH: breast cancer   • Dyspnea on exertion   • Morbid obesity (CMS/HCC)       Assessment:    Geneva Cordova is a 35 y.o. year old female with medically complicated obesity.    Weight loss surgery is deemed medically necessary given the following obesity related comorbidities including migraines, VIRA, PCOS, back pain, OA, depression, fatigue, peripheral edema with current Weight: (!) 156 kg (343 lb) and Body mass index is 50.21 kg/m²..    Patient is aware that surgery is a tool, and that weight loss is not guaranteed but only seen in the context of appropriate use, follow up and exercise.    The patient was present for an approximately a 2.5 hour discussion of the purpose of weight loss surgery, how WLS is a tool to assist in achieving weight loss goals, the most common complications and how best to avoid them, and the strategies for short and long term weight loss.  Ample opportunity to discuss questions was available both in group and during the time of individual examination.    I reviewed all available preop labs, Xrays, tests, clearances, etc and signed off on these in the record.  All of this in addition to the patient's unique history and exam has been taken into consideration in determining their appropriate candidacy for weight loss surgery.    Complications  of laparoscopic/possible robotic gastric sleeve were discussed. The patient is well aware of the potential complications of surgery that include but not limited to bleeding, infections, deep venous thrombosis, pulmonary embolism, pulmonary complications such as pneumonia, cardiac events, hernias, small bowel obstruction, damage to the spleen or other organs, bowel injury, disfiguring scars, failure to lose weight, need for additional surgery, conversion to an open procedure, and death. Patient is also  "aware of complications which apply in this particular procedure that can include but are not limited to a \"leak\" at the staple line which in some instances may require conversion to gastric bypass.    The patient is aware if a hiatal hernia is encountered, it likely will be repaired.  R/B/A Rx to hiatal hernia repair were discussed as outlined in our long consent form.  Briefly risks in addition to those for LSG include recurrent hernia, YUSEF, dysphagia, esophageal injury, pneumothorax, injury to the vagus nerves, injury to the thoracic duct, aorta or vena cava.    Greater than 3 minutes was spent with the patient discussing avoiding all tobacco products and second hand smoke at least 2 weeks pre-operatively and 6 weeks post-operatively to minimize the risk of sleeve leak.  This included discussing the importance of avoiding even secondhand smoke as the risk of leak is increased.  Examples discussed:  I made it very clear that the patient understands they should avoid even riding in a car where someone has previously smoked in the last 2 weeks, living in a house where someone smokes (even if it's in a separate room/patio/attached garage, etc.) we discussed that they should not have a conversation with a group of people who are smoking even if it's outside.  They can be around wood burning fires and barbecue.  I told them I do not know if marijuana has a same effects but my overall recommendation is to avoid it for 2 weeks prior in 6 weeks after surgery.  They also are aware that nicotine may also increase the risk of leak and I strongly encouraged him to avoid that as well for 2 weeks prior in 6 weeks after surgery.    Today, we again discussed the:    Risks, benefits and alternative therapies at great length as outlined in our extensive consent forms, consent videos, and educational teaching process under the direction of the center's .    Re-sleeve would help with restriction, and weight loss " potential is not known, probably less than adding a malabsorption component, and there is no guarantee she would lose more weight.  Same risks as initial sleeve: leak, VTE, etc.  Risk of getting sleeve too narrow at incisura.  Must avoid ulcerogenics 6-8 weeks post op.      I would not recommend Alejandro-en-Y gastric bypass to lose more weight after initial sleeve, and I reserve that operation for patients with intractable reflux or other technical issue after sleeve, which she does not have.  Weight loss has not been impressive in the literature, and she would have to avoid ulcerogenics for life.      SIPS would likely provide better weight loss than re-sleeve due to its malabsorption.  We discussed the need for vitamin compliance after surgery, and the possibility of smelly diarrhea.  It is unknown if her insurance would cover this procedure.  Her starting BMI of >50 suggests that she may not lose the weight she wants to lose with only a restrictive procedure.      She is aware that I am happy to perform re-sleeve on her, but that I do not perform SIPS.  To my knowledge, Dr. Hughes is the only surgeon in [place] that does SIPS.      I encouraged her to seek second opinions.      A copy of the patient's signed informed consent is on file.    Plan:  Laparoscopic revision sleeve gastrectomy at [facility].      R/B/A Rx discussed to postop anticoagulation incl but not limited to bleeding, drug reaction, venothromboembolic events, etc. and she is agreeable to taking 3 weeks of Eliquis.    MDM high:  Elective procedure with the following risk factors: morbid obesity, VIRA, revision bariatric surgery.  4+ chronic medical problems reviewed.      Natalia Mariscal MD                                               Electronically signed by Natalia Mariscal MD at 11/16/20 8987             Natalia Mariscal MD at 11/16/20 1200          [facility] BARIATRIC SURGERY  [address]  [address]  [facility] [address] [phone]      Patient  Name:  Geneva Cordova  :  1985      Date of Visit: 2020      Chief Complaint:  weight gain; unable to maintain weight loss    History of Present Illness:  Geneva Cordova is a 35 y.o. female who presents today for evaluation, education and consultation regarding weight loss surgery.     She is s/p LSG by Dr. Hughes on 7/15/15.  Preop weight 430, lowest weight 309, currently weighs 343.  Since sleeve, she has had a twin pregnancy.    She has lost the sense of restriction.  She was scheduled some time ago for re-sleeve vs. bypass, but wasn't ready mentally.  She has prayed and talked with her , and feels ready now.  She had a long discussion of options with Dr. Hughes, and he had offered her SIPS, but she did not have insurance coverage and also did not want any type of bypass procedure at this time.      Patient has been overweight for many years, with numerous failed dietary/weight loss attempts.  She has obesity related comorbidities of migraines, VIRA, PCOS, back pain, OA, depression, fatigue, peripheral edema and as such has decided to pursue weight loss surgery.    No personal or family hx of VTE or clotting d/o.  No liver, lung, heart, or renal disease    She has some GERD, takes Rolaids.  Sometimes a pack per day.  It has been better lately.  Her sister is Nadia Alaniz.    She is s/p lap joe with Dr. Hughes 18 with EGD + biopsy x 2.  No HH, GE jxn at 41 cm.   Sleeve was noted to be \"mildly dilated.\"  20 UGI:  Status post vertical sleeve gastrectomy x2 years. There was  no evidence of extraluminal contrast. There was no delay in gastric  empty. No postoperative strictures were seen.     She saw Dr. Hughes in 2020 to discuss revision. At that time, her insurance would only cover re-sleeve, not SIPS.  She was adamant that she did not want a gastric or intestinal bypass.       Review of data:    KENNETH: norco, phentermine, nothing " "since 2020  CBC: nl  CMP: nl  EGD: 18: done concomitant with lap joe with GDW.  \"mildly dilated uniformly\" sleeve, no HH.  HP negative  Cardiac clearance:Dr. Hamm, low risk    PFTs: ok    EKG: nl  CXR: nl      Last tobacco: never  Last NSAIDs: >1 month  Last ASA: n/a  Last steroids: n/a  Last hormones: n/a      Past Medical History:   Diagnosis Date   • Anemia    • Anxiety    • Bilateral lower extremity edema     R>L   • Boil, axilla     recurrent   • Chronic back pain     no meds or injections, feels related to breasts   • Depression    • Dyspepsia    • Dyspnea on exertion    • Fatigue    • Glucose intolerance (impaired glucose tolerance)     \"hasn't been tested for that\"   • Helicobacter pylori (H. pylori) infection     asx and grossly nl EGD. \"abundant\"on path. HBT still + after PrevPak tx. LSG path neg. neg testing 2018   • Hypoalbuminemia     3.2 in past, 4.1 most recent   • Left Ovarian serous cystadenoma -removed at  section 2017   • Migraine     resolved with weightloss   • Obesity, morbid (CMS/HCC)    • VIRA (obstructive sleep apnea)      improved since WLS, pending f/u sleep study, not using CPAP   • PCOS (polycystic ovarian syndrome)    • Right Essure implantation 2017 10/6/2017   • S/P  section;left ovarian cystectomy and partial salpingectomy 17   • Seasonal allergies      Past Surgical History:   Procedure Laterality Date   •  SECTION N/A 2017      SECTION PRIMARY;  CYSTECTOMY; Kwadwo Baxter MD; : [facility] LABOR DELIVERY;  Service:    • DILATATION AND CURETTAGE     • ENDOMETRIAL ABLATION  2020    Dr Cuevas   • ESSURE TUBAL LIGATION     • GASTRIC SLEEVE LAPAROSCOPIC  2015    With Dr. Hughes   • LAPAROSCOPIC CHOLECYSTECTOMY  2018    Dr. Hughes   • SALPINGECTOMY Bilateral 2020    Dr Cuevas   • TONSILLECTOMY     • TUBAL ABDOMINAL LIGATION     • WISDOM TOOTH EXTRACTION         No Known " Allergies    Current Outpatient Medications:   •  apixaban (Eliquis) 2.5 MG tablet tablet, Take 1 tablet by mouth 2 (Two) Times a Day., Disp: 42 tablet, Rfl: 0  •  Multiple Vitamins-Minerals (MULTIPLE VITAMINS/WOMENS) tablet, Take 1 tablet by mouth Daily., Disp: , Rfl:     Social History     Socioeconomic History   • Marital status: Single     Spouse name: Not on file   • Number of children: 4   • Years of education: College    • Highest education level: Not on file   Occupational History   • Occupation:    Tobacco Use   • Smoking status: Never Smoker   • Smokeless tobacco: Never Used   Substance and Sexual Activity   • Alcohol use: Yes     Frequency: Monthly or less     Drinks per session: 1 or 2     Binge frequency: Never     Comment: Occ   • Drug use: No   • Sexual activity: Yes     Partners: Male     Birth control/protection: Surgical     Comment: no OCP   Social History Narrative    Lives in [place]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  [email or web address]      Family History   Problem Relation Age of Onset   • No Known Problems Other    • Sleep apnea Mother    • Diabetes Mother    • No Known Problems Father    • Pancreatic cancer Maternal Grandmother    • No Known Problems Sister    • No Known Problems Brother    • No Known Problems Daughter    • No Known Problems Son    • No Known Problems Paternal Grandmother    • No Known Problems Maternal Aunt    • No Known Problems Paternal Aunt    • BRCA 1/2 Neg Hx    • Colon cancer Neg Hx    • Endometrial cancer Neg Hx    • Ovarian cancer Neg Hx        Review of Systems   Constitutional: Positive for fatigue and unexpected weight gain. Negative for chills, diaphoresis, fever and unexpected weight loss.   HENT: Negative for congestion and facial swelling.    Eyes: Negative for blurred vision, double vision and discharge.   Respiratory: Negative for chest tightness, shortness of breath and stridor.    Cardiovascular: Negative for chest pain, palpitations and leg swelling.    Gastrointestinal: Negative for blood in stool.   Endocrine: Negative for polydipsia.   Genitourinary: Negative for hematuria.   Musculoskeletal: Positive for arthralgias.   Skin: Negative for color change.   Allergic/Immunologic: Negative for immunocompromised state.   Neurological: Negative for confusion.   Psychiatric/Behavioral: Negative for self-injury.       Physical Exam:  Vital Signs:  Weight: (!) 156 kg (343 lb)   Body mass index is 50.21 kg/m².  Temp: 97.3 °F (36.3 °C)   Heart Rate: 87   BP: 126/85     Physical Exam  Constitutional:       General: She is not in acute distress.     Appearance: She is well-developed. She is not diaphoretic.   HENT:      Head: Normocephalic and atraumatic.      Mouth/Throat:      Pharynx: No oropharyngeal exudate.   Eyes:      Conjunctiva/sclera: Conjunctivae normal.      Pupils: Pupils are equal, round, and reactive to light.   Cardiovascular:      Rate and Rhythm: Normal rate and regular rhythm.   Pulmonary:      Effort: Pulmonary effort is normal. No respiratory distress.      Breath sounds: Normal breath sounds.   Abdominal:      General: There is no distension.      Palpations: Abdomen is soft.      Comments: Lap scars, loose skin, large pannus   Skin:     General: Skin is warm and dry.      Coloration: Skin is not pale.   Neurological:      Mental Status: She is alert and oriented to person, place, and time.      Cranial Nerves: No cranial nerve deficit.   Psychiatric:         Behavior: Behavior normal.         Thought Content: Thought content normal.         Patient Active Problem List   Diagnosis   • VIRA (obstructive sleep apnea)   • Glucose intolerance (impaired glucose tolerance)   • Migraine   • Depression   • Helicobacter pylori (H. pylori) infection   • Anemia   • Joint pain   • Chronic back pain   • Anxiety   • Environmental allergies   • Obesity, Class III, BMI 40-49.9 (morbid obesity) (CMS/[facility])   • PCOS (polycystic ovarian syndrome)   • Dyspepsia   • Fatigue  potential is not known, probably less than adding a malabsorption component, and there is no guarantee she would lose more weight.  Same risks as initial sleeve: leak, VTE, etc.  Risk of getting sleeve too narrow at incisura.  Must avoid ulcerogenics 6-8 weeks post op.      I would not recommend Alejandro-en-Y gastric bypass to lose more weight after initial sleeve, and I reserve that operation for patients with intractable reflux or other technical issue after sleeve, which she does not have.  Weight loss has not been impressive in the literature, and she would have to avoid ulcerogenics for life.      SIPS would likely provide better weight loss than re-sleeve due to its malabsorption.  We discussed the need for vitamin compliance after surgery, and the possibility of smelly diarrhea.  It is unknown if her insurance would cover this procedure.  Her starting BMI of >50 suggests that she may not lose the weight she wants to lose with only a restrictive procedure.      She is aware that I am happy to perform re-sleeve on her, but that I do not perform SIPS.  To my knowledge, Dr. Hughes is the only surgeon in [place] that does SIPS.      I encouraged her to seek second opinions.      A copy of the patient's signed informed consent is on file.    Plan:  Laparoscopic revision sleeve gastrectomy at [facility].      R/B/A Rx discussed to postop anticoagulation incl but not limited to bleeding, drug reaction, venothromboembolic events, etc. and she is agreeable to taking 3 weeks of Eliquis.    MDM high:  Elective procedure with the following risk factors: morbid obesity, VIRA, revision bariatric surgery.  4+ chronic medical problems reviewed.      Natalia Mariscal MD                                               Electronically signed by Natalia Mariscal MD at 11/16/20 0257       Lab Results (most recent)     Procedure Component Value Units Date/Time    Comprehensive Metabolic Panel [[id number]]  (Abnormal)  Collected: 12/02/20 0556    Specimen: Blood Updated: 12/02/20 0729     Glucose 110 mg/dL      BUN 7 mg/dL      Creatinine 0.73 mg/dL      Sodium 136 mmol/L      Potassium 4.0 mmol/L      Chloride 102 mmol/L      CO2 23.0 mmol/L      Calcium 9.1 mg/dL      Total Protein 7.5 g/dL      Albumin 3.90 g/dL      ALT (SGPT) 32 U/L      AST (SGOT) 34 U/L      Alkaline Phosphatase 66 U/L      Total Bilirubin 0.4 mg/dL      eGFR  African Amer 111 mL/min/1.73      Globulin 3.6 gm/dL      A/G Ratio 1.1 g/dL      BUN/Creatinine Ratio 9.6     Anion Gap 11.0 mmol/L     Narrative:      GFR Normal >60  Chronic Kidney Disease <60  Kidney Failure <15      Magnesium [[id number]]  (Normal) Collected: 12/02/20 0556    Specimen: Blood Updated: 12/02/20 0729     Magnesium 1.7 mg/dL     Tissue Pathology Exam [[id number]] Collected: 12/01/20 1630    Specimen: Tissue from Stomach Updated: 12/02/20 0727    CBC & Differential [[id number]]  (Abnormal) Collected: 12/02/20 0556    Specimen: Blood Updated: 12/02/20 0643    Narrative:      The following orders were created for panel order CBC & Differential.  Procedure                               Abnormality         Status                     ---------                               -----------         ------                     CBC Auto Differential[[id number]]        Abnormal            Final result                 Please view results for these tests on the individual orders.    CBC Auto Differential [[id number]]  (Abnormal) Collected: 12/02/20 0556    Specimen: Blood Updated: 12/02/20 0643     WBC 8.57 10*3/mm3      RBC 4.55 10*6/mm3      Hemoglobin 12.9 g/dL      Hematocrit 41.5 %      MCV 91.2 fL      MCH 28.4 pg      MCHC 31.1 g/dL      RDW 12.4 %      RDW-SD 41.3 fl      MPV 12.1 fL      Platelets 289 10*3/mm3      Neutrophil % 83.5 %      Lymphocyte % 11.6 %      Monocyte % 4.3 %      Eosinophil % 0.0 %      Basophil % 0.1 %      Immature Grans % 0.5 %      Neutrophils, Absolute 7.16 10*3/mm3      " Lymphocytes, Absolute 0.99 10*3/mm3      Monocytes, Absolute 0.37 10*3/mm3      Eosinophils, Absolute 0.00 10*3/mm3      Basophils, Absolute 0.01 10*3/mm3      Immature Grans, Absolute 0.04 10*3/mm3      nRBC 0.0 /100 WBC     Iron [[id number]] Collected: 20 0556    Specimen: Blood Updated: 20    POC Urine Pregnancy Test [[id number]] Collected: 20    Specimen: Urine Updated: 20     HCG, Urine, QL Negative     Lot Number [id number]      Internal Positive Control Positive     Internal Negative Control Negative          Imaging Results (Last 24 Hours)     ** No results found for the last 24 hours. **        Physician Progress Notes (last 24 hours) (Notes from 20 through 20)    No notes of this type exist for this encounter.            Consult Notes (last 24 hours) (Notes from 20 through 20)      Rosemary Castro APRN at 20 0503               [facility] Medicine Services  CONSULT NOTE      Patient Name: Geneva Cordova  : 1985  MRN: [id number]    Primary Care Physician: Geetha Pfeiffer MD  Provider requesting consultation: Natalia Mariscal MD    Subjective   Subjective     Reason for Consultation:  Elevated blood pressure     HPI:  Geneva Cordova is a 35 y.o. female w/ a hx of anxiety/depression, anemia, GERD and obesity who underwent a gastric sleeve yesterday by Dr. Mariscal. Post-op pt w/ nausea, vomiting and post-op pain. Overnight, pt w/ elevated blood pressure readings of 168/98 and 151/97 despite 2 separate doses of IV Labetalol 10mg. Hospital medicine consulted for HTN.   Pt denies known hx of HTN or prior elevated blood pressure readings. Pt contributes her elevated blood pressure readings overnight to \"being exhausted and post-op discomfort\". Pt has previously never required antihypertensives.   Pt denies chest pain, palpitations, dyspnea, cough, headache, visual changes. Pt " "did c/o brief lightheadedness and dizziness w/ standing this morning. Noted to have N/V after receiving pain mediation this am.   Pt updated on POC.     Review of Systems   Constitutional: Negative.  Negative for chills and fever.   HENT: Negative.  Negative for congestion, rhinorrhea, sinus pressure, sinus pain, sneezing, sore throat and trouble swallowing.    Eyes: Negative.  Negative for visual disturbance.   Respiratory: Negative.  Negative for cough, shortness of breath, wheezing and stridor.    Cardiovascular: Negative.  Negative for chest pain and palpitations.   Gastrointestinal: Positive for abdominal pain, nausea and vomiting.        Post-op pain. N/V.    Genitourinary: Negative.  Negative for decreased urine volume, difficulty urinating, dysuria, flank pain, frequency, hematuria and urgency.   Musculoskeletal: Negative.  Negative for arthralgias, myalgias, neck pain and neck stiffness.   Allergic/Immunologic: Negative.  Negative for immunocompromised state.   Neurological: Positive for dizziness and light-headedness. Negative for tremors, seizures, syncope, facial asymmetry, speech difficulty, weakness, numbness and headaches.        Intermittent dizziness and lightheadedness w/ standing this morning.    Hematological: Negative.  Does not bruise/bleed easily.   Psychiatric/Behavioral: Negative.  Negative for confusion.   All other systems reviewed and are negative.      Personal History     Past Medical History:   Diagnosis Date   • Anemia    • Anxiety    • Bilateral lower extremity edema     R>L   • Boil, axilla     recurrent   • Chronic back pain     no meds or injections, feels related to breasts   • Depression    • Dyspepsia    • Dyspnea on exertion    • Fatigue    • Glucose intolerance (impaired glucose tolerance)     \"hasn't been tested for that\"   • Helicobacter pylori (H. pylori) infection     asx and grossly nl EGD. \"abundant\"on path. HBT still + after PrevPak tx. LSG path neg. neg testing 7/2018 "   • Hypoalbuminemia     3.2 in past, 4.1 most recent   • Left Ovarian serous cystadenoma -removed at  section 2017   • Migraine     resolved with weightloss   • Obesity, morbid (CMS/HCC)    • VIRA (obstructive sleep apnea)      improved since WLS, pending f/u sleep study, not using CPAP   • PCOS (polycystic ovarian syndrome)    • Right Essure implantation 2017 10/6/2017   • S/P  section;left ovarian cystectomy and partial salpingectomy 17   • Seasonal allergies        Past Surgical History:   Procedure Laterality Date   •  SECTION N/A 2017      SECTION PRIMARY;  CYSTECTOMY; Kwadwo Baxter MD; : [facility] LABOR DELIVERY;  Service:    • DILATATION AND CURETTAGE     • ENDOMETRIAL ABLATION  2020    Dr Cuevas   • ESSURE TUBAL LIGATION     • GASTRIC SLEEVE LAPAROSCOPIC  2015    With Dr. Hughes   • LAPAROSCOPIC CHOLECYSTECTOMY  2018    Dr. Hughes   • SALPINGECTOMY Bilateral 2020    Dr Cuevas   • TONSILLECTOMY     • TUBAL ABDOMINAL LIGATION     • WISDOM TOOTH EXTRACTION         Family History: family history includes Diabetes in her mother; No Known Problems in her brother, daughter, father, maternal aunt, paternal aunt, paternal grandmother, sister, son, and another family member; Pancreatic cancer in her maternal grandmother; Sleep apnea in her mother. Otherwise pertinent FHx was reviewed and unremarkable.     Social History:  reports that she has never smoked. She has never used smokeless tobacco. She reports current alcohol use. She reports that she does not use drugs.    Medications:  Medications Prior to Admission   Medication Sig Dispense Refill Last Dose   • Multiple Vitamins-Minerals (MULTIPLE VITAMINS/WOMENS) tablet Take 1 tablet by mouth Daily.   Past Month at Unknown time   • apixaban (Eliquis) 2.5 MG tablet tablet Take 1 tablet by mouth 2 (Two) Times a Day. 42 tablet 0        Scheduled Meds:acetaminophen, 1,000 mg,  Oral, Q8H  ceFAZolin, 2 g, Intravenous, Q8H  cyanocobalamin, 1,000 mcg, Intramuscular, Once  enoxaparin, 40 mg, Subcutaneous, Daily  gabapentin, 100 mg, Oral, TID  IV Fluids 1000 mL + additives, 250 mL/hr, Intravenous, Once  ondansetron, 4 mg, Intravenous, Q6H  pantoprazole, 40 mg, Oral, QAM  Scopolamine, 1 patch, Transdermal, Once  sodium chloride, 3 mL, Intravenous, Q12H      Continuous Infusions:lactated ringers, 150 mL/hr, Last Rate: 150 mL/hr (12/02/20 0521)  sodium chloride 0.45 % with KCl 20 mEq, 100 mL/hr  sodium chloride, 150 mL/hr, Last Rate: Stopped (12/01/20 1847)      PRN Meds:.ALPRAZolam  •  diphenhydrAMINE  •  ferric gluconate  •  HYDROmorphone **AND** naloxone  •  HYDROmorphone  •  metoclopramide  •  ondansetron **FOLLOWED BY** ondansetron  •  oxyCODONE  •  phenol  •  prochlorperazine  •  promethazine  •  simethicone  •  sodium chloride    No Known Allergies    Objective   Objective     Vital Signs:   Temp:  [97.1 °F (36.2 °C)-99 °F (37.2 °C)] 99 °F (37.2 °C)  Heart Rate:  [] 63  Resp:  [16-18] 16  BP: (121-172)/() 151/97     Physical Exam    Constitutional: Awake, alert; non-toxic appearing   Eyes: PERRLA, sclerae anicteric, no conjunctival injection  HENT: NCAT, mucous membranes moist  Neck: Supple, no thyromegaly, no lymphadenopathy, trachea midline  Respiratory: Clear to auscultation bilaterally, nonlabored respirations   Cardiovascular: RRR, no murmurs, rubs, or gallops, palpable pedal pulses bilaterally  Gastrointestinal: hypoactive BS, soft, generalized TTP, nondistended  Musculoskeletal: No bilateral ankle edema, no clubbing or cyanosis to extremities  Psychiatric: Appropriate affect, cooperative  Neurologic: Oriented x 3, strength symmetric in all extremities, Cranial Nerves grossly intact to confrontation, speech clear  Skin: No rashes, lesions or wounds     Results Reviewed:  I have personally reviewed current lab, radiology, and data and agree.    Results from last 7 days    Lab Units 11/25/20  0916   WBC 10*3/mm3 5.88   HEMOGLOBIN g/dL 13.0   HEMATOCRIT % 39.9   PLATELETS 10*3/mm3 259           Invalid input(s):  ALKPHOS, TROPONININT  Estimated Creatinine Clearance: 181.5 mL/min (by C-G formula based on SCr of 0.71 mg/dL).  Brief Urine Lab Results  (Last result in the past 365 days)      Color   Clarity   Blood   Leuk Est   Nitrite   Protein   CREAT   Urine HCG        12/01/20 1357               Negative         No results found for: BNP    Microbiology Results Abnormal     None          Imaging Results (Last 24 Hours)     ** No results found for the last 24 hours. **             Assessment/Plan   Assessment & Plan     Active Hospital Problems    Diagnosis  POA   • **Obesity, Class III, BMI 40-49.9 (morbid obesity) (CMS/[facility]) [E66.01]  Yes   • Elevated blood pressure reading [R03.0]  Yes   • Morbid obesity (CMS/[facility]) [E66.01]  Yes      Resolved Hospital Problems   No resolved problems to display.     Geneva Cordova is a 35 y.o. female w/ a hx of anxiety/depression, anemia, GERD and obesity who underwent a gastric sleeve yesterday by Dr. Mariscal. Pt w/ elevated blood pressure readings overnight. Hospital medicine service consulted for HTN mgt.     Assessment & Plan     **Elevated blood pressure reading   -pt denies known hx of HTN, elevated reading or being on prior routine antihypertensives  -noted BP prior to surgery 126/85 (126/76 in September, 122/80 on 9/30/2020)  -s/p Labetalol 10mg IV x 2 doses overnight for SBP >160 (brief improvement but remained elevated @ 168/98)  -@ time of exam, /92    -baseline labs pending- cbc, bmp, mag  -if blood pressure remains elevated will consider adding oral medication today       Thank you for allowing [facility] Medicine Service to provide consultative care for your patient, we will continue to follow while clinically appropriate.    RAGHAV Kunz  12/02/20            Electronically signed by Rosemary Castro APRN at 12/02/20  3032

## 2020-12-02 NOTE — CONSULTS
"     [facility] Medicine Services  CONSULT NOTE      Patient Name: Geneva Cordova  : 1985  MRN: [id number]    Primary Care Physician: Geetha Pfeiffer MD  Provider requesting consultation: Natalia Mariscal MD    Subjective   Subjective     Reason for Consultation:  Elevated blood pressure     HPI:  Geneva Cordova is a 35 y.o. female w/ a hx of anxiety/depression, anemia, GERD and obesity who underwent a gastric sleeve yesterday by Dr. Mariscal. Post-op pt w/ nausea, vomiting and post-op pain. Overnight, pt w/ elevated blood pressure readings of 168/98 and 151/97 despite 2 separate doses of IV Labetalol 10mg. Hospital medicine consulted for HTN.   Pt denies known hx of HTN or prior elevated blood pressure readings. Pt contributes her elevated blood pressure readings overnight to \"being exhausted and post-op discomfort\". Pt has previously never required antihypertensives.   Pt denies chest pain, palpitations, dyspnea, cough, headache, visual changes. Pt did c/o brief lightheadedness and dizziness w/ standing this morning. Noted to have N/V after receiving pain mediation this am.   Pt updated on POC.     Review of Systems   Constitutional: Negative.  Negative for chills and fever.   HENT: Negative.  Negative for congestion, rhinorrhea, sinus pressure, sinus pain, sneezing, sore throat and trouble swallowing.    Eyes: Negative.  Negative for visual disturbance.   Respiratory: Negative.  Negative for cough, shortness of breath, wheezing and stridor.    Cardiovascular: Negative.  Negative for chest pain and palpitations.   Gastrointestinal: Positive for abdominal pain, nausea and vomiting.        Post-op pain. N/V.    Genitourinary: Negative.  Negative for decreased urine volume, difficulty urinating, dysuria, flank pain, frequency, hematuria and urgency.   Musculoskeletal: Negative.  Negative for arthralgias, myalgias, neck pain and neck stiffness.   Allergic/Immunologic: " "Negative.  Negative for immunocompromised state.   Neurological: Positive for dizziness and light-headedness. Negative for tremors, seizures, syncope, facial asymmetry, speech difficulty, weakness, numbness and headaches.        Intermittent dizziness and lightheadedness w/ standing this morning.    Hematological: Negative.  Does not bruise/bleed easily.   Psychiatric/Behavioral: Negative.  Negative for confusion.   All other systems reviewed and are negative.      Personal History     Past Medical History:   Diagnosis Date   • Anemia    • Anxiety    • Bilateral lower extremity edema     R>L   • Boil, axilla     recurrent   • Chronic back pain     no meds or injections, feels related to breasts   • Depression    • Dyspepsia    • Dyspnea on exertion    • Fatigue    • Glucose intolerance (impaired glucose tolerance)     \"hasn't been tested for that\"   • Helicobacter pylori (H. pylori) infection     asx and grossly nl EGD. \"abundant\"on path. HBT still + after PrevPak tx. LSG path neg. neg testing 2018   • Hypoalbuminemia     3.2 in past, 4.1 most recent   • Left Ovarian serous cystadenoma -removed at  section 2017   • Migraine     resolved with weightloss   • Obesity, morbid (CMS/HCC)    • VIRA (obstructive sleep apnea)      improved since WLS, pending f/u sleep study, not using CPAP   • PCOS (polycystic ovarian syndrome)    • Right Essure implantation 2017 10/6/2017   • S/P  section;left ovarian cystectomy and partial salpingectomy 17   • Seasonal allergies        Past Surgical History:   Procedure Laterality Date   •  SECTION N/A 2017      SECTION PRIMARY;  CYSTECTOMY; Kwadwo Baxter MD; : [facility] LABOR DELIVERY;  Service:    • DILATATION AND CURETTAGE     • ENDOMETRIAL ABLATION  2020    Dr Cuevas   • ESSURE TUBAL LIGATION     • GASTRIC SLEEVE LAPAROSCOPIC  2015    With Dr. Hughes   • LAPAROSCOPIC CHOLECYSTECTOMY  2018    Dr." Ellen   • SALPINGECTOMY Bilateral 02/2020    Dr Cuevas   • TONSILLECTOMY  2014   • TUBAL ABDOMINAL LIGATION     • WISDOM TOOTH EXTRACTION  2014       Family History: family history includes Diabetes in her mother; No Known Problems in her brother, daughter, father, maternal aunt, paternal aunt, paternal grandmother, sister, son, and another family member; Pancreatic cancer in her maternal grandmother; Sleep apnea in her mother. Otherwise pertinent FHx was reviewed and unremarkable.     Social History:  reports that she has never smoked. She has never used smokeless tobacco. She reports current alcohol use. She reports that she does not use drugs.    Medications:  Medications Prior to Admission   Medication Sig Dispense Refill Last Dose   • Multiple Vitamins-Minerals (MULTIPLE VITAMINS/WOMENS) tablet Take 1 tablet by mouth Daily.   Past Month at Unknown time   • apixaban (Eliquis) 2.5 MG tablet tablet Take 1 tablet by mouth 2 (Two) Times a Day. 42 tablet 0        Scheduled Meds:acetaminophen, 1,000 mg, Oral, Q8H  ceFAZolin, 2 g, Intravenous, Q8H  cyanocobalamin, 1,000 mcg, Intramuscular, Once  enoxaparin, 40 mg, Subcutaneous, Daily  gabapentin, 100 mg, Oral, TID  IV Fluids 1000 mL + additives, 250 mL/hr, Intravenous, Once  ondansetron, 4 mg, Intravenous, Q6H  pantoprazole, 40 mg, Oral, QAM  Scopolamine, 1 patch, Transdermal, Once  sodium chloride, 3 mL, Intravenous, Q12H      Continuous Infusions:lactated ringers, 150 mL/hr, Last Rate: 150 mL/hr (12/02/20 0521)  sodium chloride 0.45 % with KCl 20 mEq, 100 mL/hr  sodium chloride, 150 mL/hr, Last Rate: Stopped (12/01/20 1427)      PRN Meds:.ALPRAZolam  •  diphenhydrAMINE  •  ferric gluconate  •  HYDROmorphone **AND** naloxone  •  HYDROmorphone  •  metoclopramide  •  ondansetron **FOLLOWED BY** ondansetron  •  oxyCODONE  •  phenol  •  prochlorperazine  •  promethazine  •  simethicone  •  sodium chloride    No Known Allergies    Objective   Objective     Vital Signs:    Temp:  [97.1 °F (36.2 °C)-99 °F (37.2 °C)] 99 °F (37.2 °C)  Heart Rate:  [] 63  Resp:  [16-18] 16  BP: (121-172)/() 151/97     Physical Exam    Constitutional: Awake, alert; non-toxic appearing   Eyes: PERRLA, sclerae anicteric, no conjunctival injection  HENT: NCAT, mucous membranes moist  Neck: Supple, no thyromegaly, no lymphadenopathy, trachea midline  Respiratory: Clear to auscultation bilaterally, nonlabored respirations   Cardiovascular: RRR, no murmurs, rubs, or gallops, palpable pedal pulses bilaterally  Gastrointestinal: hypoactive BS, soft, generalized TTP, nondistended  Musculoskeletal: No bilateral ankle edema, no clubbing or cyanosis to extremities  Psychiatric: Appropriate affect, cooperative  Neurologic: Oriented x 3, strength symmetric in all extremities, Cranial Nerves grossly intact to confrontation, speech clear  Skin: No rashes, lesions or wounds     Results Reviewed:  I have personally reviewed current lab, radiology, and data and agree.    Results from last 7 days   Lab Units 11/25/20  0916   WBC 10*3/mm3 5.88   HEMOGLOBIN g/dL 13.0   HEMATOCRIT % 39.9   PLATELETS 10*3/mm3 259           Invalid input(s):  ALKPHOS, TROPONININT  Estimated Creatinine Clearance: 181.5 mL/min (by C-G formula based on SCr of 0.71 mg/dL).  Brief Urine Lab Results  (Last result in the past 365 days)      Color   Clarity   Blood   Leuk Est   Nitrite   Protein   CREAT   Urine HCG        12/01/20 1357               Negative         No results found for: BNP    Microbiology Results Abnormal     None          Imaging Results (Last 24 Hours)     ** No results found for the last 24 hours. **             Assessment/Plan   Assessment & Plan     Active Hospital Problems    Diagnosis  POA   • **Obesity, Class III, BMI 40-49.9 (morbid obesity) (CMS/[facility]) [E66.01]  Yes   • Elevated blood pressure reading [R03.0]  Yes   • Morbid obesity (CMS/[facility]) [E66.01]  Yes      Resolved Hospital Problems   No resolved problems to  display.     Geneva Cordova is a 35 y.o. female w/ a hx of anxiety/depression, anemia, GERD and obesity who underwent a gastric sleeve yesterday by Dr. Mariscal. Pt w/ elevated blood pressure readings overnight. Hospital medicine service consulted for HTN mgt.     Assessment & Plan     **Elevated blood pressure reading   -pt denies known hx of HTN, elevated reading or being on prior routine antihypertensives  -noted BP prior to surgery 126/85 (126/76 in September, 122/80 on 9/30/2020)  -s/p Labetalol 10mg IV x 2 doses overnight for SBP >160 (brief improvement but remained elevated @ 168/98)  -@ time of exam, /92    -baseline labs pending- cbc, bmp, mag  -if blood pressure remains elevated will consider adding oral medication today       Thank you for allowing [facility] Medicine Service to provide consultative care for your patient, we will continue to follow while clinically appropriate.    Rosemary Castro, RAGHAV  12/02/20

## 2020-12-03 ENCOUNTER — READMISSION MANAGEMENT (OUTPATIENT)
Dept: CALL CENTER | Facility: HOSPITAL | Age: 35
End: 2020-12-03

## 2020-12-03 VITALS
SYSTOLIC BLOOD PRESSURE: 131 MMHG | TEMPERATURE: 98.2 F | RESPIRATION RATE: 16 BRPM | BODY MASS INDEX: 43.4 KG/M2 | DIASTOLIC BLOOD PRESSURE: 72 MMHG | HEART RATE: 59 BPM | OXYGEN SATURATION: 96 % | WEIGHT: 293 LBS | HEIGHT: 69 IN

## 2020-12-03 LAB
ALBUMIN SERPL-MCNC: 3.5 G/DL (ref 3.5–5.2)
ALBUMIN/GLOB SERPL: 1 G/DL
ALP SERPL-CCNC: 59 U/L (ref 39–117)
ALT SERPL W P-5'-P-CCNC: 22 U/L (ref 1–33)
ANION GAP SERPL CALCULATED.3IONS-SCNC: 14 MMOL/L (ref 5–15)
AST SERPL-CCNC: 20 U/L (ref 1–32)
BASOPHILS # BLD AUTO: 0.03 10*3/MM3 (ref 0–0.2)
BASOPHILS NFR BLD AUTO: 0.3 % (ref 0–1.5)
BILIRUB SERPL-MCNC: 0.5 MG/DL (ref 0–1.2)
BUN SERPL-MCNC: 6 MG/DL (ref 6–20)
BUN/CREAT SERPL: 9.8 (ref 7–25)
CALCIUM SPEC-SCNC: 9 MG/DL (ref 8.6–10.5)
CHLORIDE SERPL-SCNC: 104 MMOL/L (ref 98–107)
CO2 SERPL-SCNC: 19 MMOL/L (ref 22–29)
CREAT SERPL-MCNC: 0.61 MG/DL (ref 0.57–1)
CYTO UR: NORMAL
DEPRECATED RDW RBC AUTO: 43.2 FL (ref 37–54)
EOSINOPHIL # BLD AUTO: 0.04 10*3/MM3 (ref 0–0.4)
EOSINOPHIL NFR BLD AUTO: 0.4 % (ref 0.3–6.2)
ERYTHROCYTE [DISTWIDTH] IN BLOOD BY AUTOMATED COUNT: 12.6 % (ref 12.3–15.4)
GFR SERPL CREATININE-BSD FRML MDRD: 136 ML/MIN/1.73
GLOBULIN UR ELPH-MCNC: 3.5 GM/DL
GLUCOSE SERPL-MCNC: 85 MG/DL (ref 65–99)
HCT VFR BLD AUTO: 39.4 % (ref 34–46.6)
HGB BLD-MCNC: 12.4 G/DL (ref 12–15.9)
IMM GRANULOCYTES # BLD AUTO: 0.03 10*3/MM3 (ref 0–0.05)
IMM GRANULOCYTES NFR BLD AUTO: 0.3 % (ref 0–0.5)
LAB AP CASE REPORT: NORMAL
LAB AP CLINICAL INFORMATION: NORMAL
LYMPHOCYTES # BLD AUTO: 2.1 10*3/MM3 (ref 0.7–3.1)
LYMPHOCYTES NFR BLD AUTO: 22.7 % (ref 19.6–45.3)
MCH RBC QN AUTO: 29.5 PG (ref 26.6–33)
MCHC RBC AUTO-ENTMCNC: 31.5 G/DL (ref 31.5–35.7)
MCV RBC AUTO: 93.8 FL (ref 79–97)
MONOCYTES # BLD AUTO: 0.52 10*3/MM3 (ref 0.1–0.9)
MONOCYTES NFR BLD AUTO: 5.6 % (ref 5–12)
NEUTROPHILS NFR BLD AUTO: 6.52 10*3/MM3 (ref 1.7–7)
NEUTROPHILS NFR BLD AUTO: 70.7 % (ref 42.7–76)
NRBC BLD AUTO-RTO: 0 /100 WBC (ref 0–0.2)
PATH REPORT.FINAL DX SPEC: NORMAL
PATH REPORT.GROSS SPEC: NORMAL
PLATELET # BLD AUTO: 258 10*3/MM3 (ref 140–450)
PMV BLD AUTO: 12.7 FL (ref 6–12)
POTASSIUM SERPL-SCNC: 4 MMOL/L (ref 3.5–5.2)
PROT SERPL-MCNC: 7 G/DL (ref 6–8.5)
RBC # BLD AUTO: 4.2 10*6/MM3 (ref 3.77–5.28)
SODIUM SERPL-SCNC: 137 MMOL/L (ref 136–145)
WBC # BLD AUTO: 9.24 10*3/MM3 (ref 3.4–10.8)

## 2020-12-03 PROCEDURE — 99232 SBSQ HOSP IP/OBS MODERATE 35: CPT | Performed by: INTERNAL MEDICINE

## 2020-12-03 PROCEDURE — 25010000002 ENOXAPARIN PER 10 MG: Performed by: SURGERY

## 2020-12-03 PROCEDURE — 85025 COMPLETE CBC W/AUTO DIFF WBC: CPT | Performed by: SURGERY

## 2020-12-03 PROCEDURE — 80053 COMPREHEN METABOLIC PANEL: CPT | Performed by: SURGERY

## 2020-12-03 PROCEDURE — 25010000003 POTASSIUM CHLORIDE PER 2 MEQ: Performed by: SURGERY

## 2020-12-03 PROCEDURE — 99024 POSTOP FOLLOW-UP VISIT: CPT | Performed by: SURGERY

## 2020-12-03 RX ORDER — OMEPRAZOLE 40 MG/1
40 CAPSULE, DELAYED RELEASE ORAL DAILY
Qty: 60 CAPSULE | Refills: 0 | Status: SHIPPED | OUTPATIENT
Start: 2020-12-03 | End: 2021-02-01

## 2020-12-03 RX ORDER — ONDANSETRON 4 MG/1
4 TABLET, ORALLY DISINTEGRATING ORAL EVERY 8 HOURS PRN
Qty: 10 TABLET | Refills: 0 | Status: SHIPPED | OUTPATIENT
Start: 2020-12-03 | End: 2021-05-20

## 2020-12-03 RX ORDER — OXYCODONE HYDROCHLORIDE 5 MG/1
5 TABLET ORAL EVERY 6 HOURS PRN
Qty: 10 TABLET | Refills: 0 | Status: SHIPPED | OUTPATIENT
Start: 2020-12-03 | End: 2021-01-06

## 2020-12-03 RX ADMIN — PANTOPRAZOLE SODIUM 40 MG: 40 TABLET, DELAYED RELEASE ORAL at 07:50

## 2020-12-03 RX ADMIN — OXYCODONE HYDROCHLORIDE 5 MG: 5 TABLET ORAL at 09:01

## 2020-12-03 RX ADMIN — POTASSIUM CHLORIDE AND SODIUM CHLORIDE 100 ML/HR: 450; 150 INJECTION, SOLUTION INTRAVENOUS at 04:23

## 2020-12-03 RX ADMIN — ACETAMINOPHEN 1000 MG: 500 TABLET, FILM COATED ORAL at 05:31

## 2020-12-03 RX ADMIN — OXYCODONE HYDROCHLORIDE 5 MG: 5 TABLET ORAL at 00:56

## 2020-12-03 RX ADMIN — SODIUM CHLORIDE, PRESERVATIVE FREE 3 ML: 5 INJECTION INTRAVENOUS at 08:18

## 2020-12-03 RX ADMIN — ENOXAPARIN SODIUM 40 MG: 40 INJECTION SUBCUTANEOUS at 08:17

## 2020-12-03 RX ADMIN — AMLODIPINE BESYLATE 5 MG: 5 TABLET ORAL at 08:16

## 2020-12-03 RX ADMIN — GABAPENTIN 100 MG: 100 CAPSULE ORAL at 08:16

## 2020-12-03 NOTE — PLAN OF CARE
Problem: Adult Inpatient Plan of Care  Goal: Patient-Specific Goal (Individualized)  Outcome: Ongoing, Not Progressing  Flowsheets (Taken 12/1/2020 1842 by Jennie Horn, RN)  Patient-Specific Goals (Include Timeframe): monitor pain q2h and prn     Problem: Adult Inpatient Plan of Care  Goal: Plan of Care Review  Outcome: Ongoing, Progressing  Flowsheets (Taken 12/3/2020 0313)  Outcome Summary: VSS, room air. Pt c/o pain in her left abdomen, controlled with PRNs. Pt continues low intake of protein due to intermittent nausea. Pt continues ambmulating halls and using incentive spirometer, tolerating well. Will continue to monitor     Problem: Adult Inpatient Plan of Care  Goal: Absence of Hospital-Acquired Illness or Injury  Outcome: Ongoing, Progressing     Problem: Adult Inpatient Plan of Care  Goal: Absence of Hospital-Acquired Illness or Injury  Intervention: Identify and Manage Fall Risk  Recent Flowsheet Documentation  Taken 12/3/2020 0000 by Francine Jung RN  Safety Promotion/Fall Prevention: activity supervised  Taken 12/2/2020 2000 by Francine Jung RN  Safety Promotion/Fall Prevention: activity supervised     Problem: Adult Inpatient Plan of Care  Goal: Absence of Hospital-Acquired Illness or Injury  Intervention: Prevent Skin Injury  Recent Flowsheet Documentation  Taken 12/3/2020 0200 by Francine Jung RN  Body Position: supine, legs elevated  Taken 12/3/2020 0000 by Francine Jung RN  Body Position: position changed independently  Taken 12/2/2020 2200 by Francine Jung RN  Body Position: position changed independently  Taken 12/2/2020 2000 by Francine Jung RN  Body Position: position changed independently     Problem: Adult Inpatient Plan of Care  Goal: Absence of Hospital-Acquired Illness or Injury  Intervention: Prevent and Manage VTE (venous thromboembolism) Risk  Recent Flowsheet Documentation  Taken 12/2/2020 2000 by Francine Jung RN  VTE Prevention/Management:    bilateral   sequential compression devices on     Problem: Adult Inpatient Plan of Care  Goal: Optimal Comfort and Wellbeing  Outcome: Ongoing, Progressing     Problem: Adult Inpatient Plan of Care  Goal: Optimal Comfort and Wellbeing  Intervention: Provide Person-Centered Care  Recent Flowsheet Documentation  Taken 12/2/2020 2000 by Francine Jung RN  Trust Relationship/Rapport:   care explained   choices provided     Problem: Adult Inpatient Plan of Care  Goal: Readiness for Transition of Care  Outcome: Ongoing, Progressing     Problem: Bariatric Care Environmental Safety (Bariatric Surgery)  Goal: Safety Maintained with Care  Outcome: Ongoing, Progressing     Problem: Bleeding (Bariatric Surgery)  Goal: Absence of Bleeding  Outcome: Ongoing, Progressing     Problem: Bowel Motility Impaired (Bariatric Surgery)  Goal: Effective Bowel Motility and Elimination  Outcome: Ongoing, Progressing     Problem: Glycemic Control Impaired (Bariatric Surgery)  Goal: Blood Glucose Level Within Desired Range  Outcome: Ongoing, Progressing     Problem: Infection (Bariatric Surgery)  Goal: Absence of Infection Signs and Symptoms  Outcome: Ongoing, Progressing     Problem: Ongoing Anesthesia Effects (Bariatric Surgery)  Goal: Anesthesia/Sedation Recovery  Outcome: Ongoing, Progressing     Problem: Ongoing Anesthesia Effects (Bariatric Surgery)  Goal: Anesthesia/Sedation Recovery  Intervention: Optimize Anesthesia Recovery  Recent Flowsheet Documentation  Taken 12/3/2020 0000 by Francine Jung, RN  Safety Promotion/Fall Prevention: activity supervised  Taken 12/2/2020 2000 by Francine Jung RN  Patient Tolerance (IS): good  Safety Promotion/Fall Prevention: activity supervised  Administration (IS): self-administered  Level Incentive Spirometer (mL): 2000  Incentive Spirometer Predicted Level (mL): 2000  Number of Repetitions (IS): 10     Problem: Pain (Bariatric Surgery)  Goal: Acceptable Pain Control  Outcome: Ongoing,  Progressing     Problem: Pain (Bariatric Surgery)  Goal: Acceptable Pain Control  Intervention: Prevent or Manage Pain  Recent Flowsheet Documentation  Taken 12/2/2020 2000 by Francine Jung RN  Diversional Activities: smartphone     Problem: Postoperative Nausea and Vomiting (Bariatric Surgery)  Goal: Nausea and Vomiting Relief  Outcome: Ongoing, Progressing     Problem: Postoperative Urinary Retention (Bariatric Surgery)  Goal: Effective Urinary Elimination  Outcome: Ongoing, Progressing   Goal Outcome Evaluation:  Plan of Care Reviewed With: patient  Progress: no change  Outcome Summary: VSS, room air. Pt c/o pain in her left abdomen, controlled with PRNs. Pt continues low intake of protein due to intermittent nausea. Pt continues ambmulating halls and using incentive spirometer, tolerating well. Will continue to monitor

## 2020-12-03 NOTE — OUTREACH NOTE
Prep Survey      Responses   [facility] patient discharged from?  [place]   Is LACE score < 7 ?  Yes   Eligibility  [facility]   Date of Admission  12/01/20   Date of Discharge  12/03/20   Discharge Disposition  Home or Self Care   Discharge diagnosis  GASTRIC SLEEVE LAPAROSCOPIC   Does the patient have one of the following disease processes/diagnoses(primary or secondary)?  General Surgery   Does the patient have Home health ordered?  No   Is there a DME ordered?  No   Prep survey completed?  Yes          Jessika Pro RN

## 2020-12-03 NOTE — PLAN OF CARE
Goal Outcome Evaluation:  Plan of Care Reviewed With: patient  Progress: improving    Patient has met hospital discharge criteria. Orders have been placed. Will continue to monitor and reassess.     Problem: Adult Inpatient Plan of Care  Goal: Plan of Care Review  Outcome: Ongoing, Progressing  Flowsheets (Taken 12/3/2020 1004)  Progress: improving  Plan of Care Reviewed With: patient  Goal: Patient-Specific Goal (Individualized)  Outcome: Ongoing, Progressing  Goal: Absence of Hospital-Acquired Illness or Injury  Outcome: Ongoing, Progressing  Intervention: Identify and Manage Fall Risk  Recent Flowsheet Documentation  Taken 12/3/2020 1000 by Evangelina Nix RN  Safety Promotion/Fall Prevention: safety round/check completed  Taken 12/3/2020 0800 by Evangelina Nix RN  Safety Promotion/Fall Prevention:   activity supervised   assistive device/personal items within reach   clutter free environment maintained   fall prevention program maintained   room organization consistent   safety round/check completed  Intervention: Prevent Skin Injury  Recent Flowsheet Documentation  Taken 12/3/2020 0800 by Evangelina Nix RN  Body Position: position changed independently  Intervention: Prevent and Manage VTE (venous thromboembolism) Risk  Recent Flowsheet Documentation  Taken 12/3/2020 0800 by Evangelina Nix RN  VTE Prevention/Management:   bilateral   sequential compression devices on   dorsiflexion/plantar flexion performed   bleeding risk factor(s) identified  Intervention: Prevent Infection  Recent Flowsheet Documentation  Taken 12/3/2020 0800 by Evangelina Nix RN  Infection Prevention: single patient room provided  Goal: Optimal Comfort and Wellbeing  Outcome: Ongoing, Progressing  Intervention: Provide Person-Centered Care  Recent Flowsheet Documentation  Taken 12/3/2020 0800 by Evangelina Nix RN  Trust Relationship/Rapport:   care explained   choices provided   emotional support  provided   empathic listening provided   questions answered   questions encouraged   reassurance provided   thoughts/feelings acknowledged  Goal: Readiness for Transition of Care  Outcome: Ongoing, Progressing     Problem: Bariatric Care Environmental Safety (Bariatric Surgery)  Goal: Safety Maintained with Care  Outcome: Ongoing, Progressing     Problem: Bleeding (Bariatric Surgery)  Goal: Absence of Bleeding  Outcome: Ongoing, Progressing  Intervention: Monitor and Manage Bleeding  Recent Flowsheet Documentation  Taken 12/3/2020 0800 by Evangelina Nix RN  Bleeding Management: dressing monitored     Problem: Bowel Motility Impaired (Bariatric Surgery)  Goal: Effective Bowel Motility and Elimination  Outcome: Ongoing, Progressing  Intervention: Enhance Bowel Motility and Elimination  Recent Flowsheet Documentation  Taken 12/3/2020 0800 by Evangelina Nix RN  Bowel Motility Enhancement:   ambulation promoted   fluid intake encouraged   gum chewing facilitated   oral intake encouraged     Problem: Glycemic Control Impaired (Bariatric Surgery)  Goal: Blood Glucose Level Within Desired Range  Outcome: Ongoing, Progressing     Problem: Infection (Bariatric Surgery)  Goal: Absence of Infection Signs and Symptoms  Outcome: Ongoing, Progressing     Problem: Ongoing Anesthesia Effects (Bariatric Surgery)  Goal: Anesthesia/Sedation Recovery  Outcome: Ongoing, Progressing  Intervention: Optimize Anesthesia Recovery  Recent Flowsheet Documentation  Taken 12/3/2020 1000 by Evangelina Nix RN  Safety Promotion/Fall Prevention: safety round/check completed  Taken 12/3/2020 0800 by Evangelina Nix RN  Patient Tolerance (IS): good  Safety Promotion/Fall Prevention:   activity supervised   assistive device/personal items within reach   clutter free environment maintained   fall prevention program maintained   room organization consistent   safety round/check completed  Administration (IS):  self-administered  Level Incentive Spirometer (mL): 2000  Incentive Spirometer Predicted Level (mL): 2000  Number of Repetitions (IS): 10     Problem: Pain (Bariatric Surgery)  Goal: Acceptable Pain Control  Outcome: Ongoing, Progressing  Intervention: Prevent or Manage Pain  Recent Flowsheet Documentation  Taken 12/3/2020 0901 by Evangelina Nix RN  Pain Management Interventions:   care clustered   position adjusted   see MAR  Taken 12/3/2020 0800 by Evangelina Nix RN  Diversional Activities:   smartphone   television     Problem: Postoperative Nausea and Vomiting (Bariatric Surgery)  Goal: Nausea and Vomiting Relief  Outcome: Ongoing, Progressing  Intervention: Prevent or Manage Postoperative Nausea and Vomiting  Recent Flowsheet Documentation  Taken 12/3/2020 0800 by Evangelina Nix RN  Nausea/Vomiting Interventions: see MAR     Problem: Postoperative Urinary Retention (Bariatric Surgery)  Goal: Effective Urinary Elimination  Outcome: Ongoing, Progressing

## 2020-12-03 NOTE — PROGRESS NOTES
"Bariatric Surgery     LOS: 2 days   Patient Care Team:  Geetha Pfeiffer MD as PCP - General (Family Medicine)  Erik Hughes MD as Consulting Physician (General Surgery)    Chief Complaint:  POD #2    Subjective     Interval History:  Doing well.  No complaints.  Tolerating PO. Denies N/V.  No fevers.  Pain controlled.  Ambulating.  Voiding.  IS 2000.  Feels ready to go home.    Objective     Vital Signs  Blood pressure 131/72, pulse 59, temperature 98.2 °F (36.8 °C), temperature source Oral, resp. rate 16, height 176 cm (69.3\"), weight (!) 156 kg (343 lb 14.7 oz), SpO2 96 %, not currently breastfeeding.    Physical Exam:  General: Alert, NAD  Lungs: Clear  Heart: RRR  Abdomen: soft, appropriate, incisions okay  Extremities: warm, (+) SCDs     Results Review:     I reviewed the patient's new clinical results.    Labs:  Lab Results (last 24 hours)     Procedure Component Value Units Date/Time    Comprehensive Metabolic Panel [[id number]]  (Abnormal) Collected: 12/03/20 0419    Specimen: Blood Updated: 12/03/20 0523     Glucose 85 mg/dL      BUN 6 mg/dL      Creatinine 0.61 mg/dL      Sodium 137 mmol/L      Potassium 4.0 mmol/L      Comment: Slight hemolysis detected by analyzer. Results may be affected.        Chloride 104 mmol/L      CO2 19.0 mmol/L      Calcium 9.0 mg/dL      Total Protein 7.0 g/dL      Albumin 3.50 g/dL      ALT (SGPT) 22 U/L      AST (SGOT) 20 U/L      Alkaline Phosphatase 59 U/L      Total Bilirubin 0.5 mg/dL      eGFR  African Amer 136 mL/min/1.73      Globulin 3.5 gm/dL      A/G Ratio 1.0 g/dL      BUN/Creatinine Ratio 9.8     Anion Gap 14.0 mmol/L     Narrative:      GFR Normal >60  Chronic Kidney Disease <60  Kidney Failure <15      CBC & Differential [[id number]]  (Abnormal) Collected: 12/03/20 0419    Specimen: Blood Updated: 12/03/20 0459    Narrative:      The following orders were created for panel order CBC & Differential.  Procedure                               " Abnormality         Status                     ---------                               -----------         ------                     CBC Auto Differential[[id number]]        Abnormal            Final result                 Please view results for these tests on the individual orders.    CBC Auto Differential [[id number]]  (Abnormal) Collected: 12/03/20 0419    Specimen: Blood Updated: 12/03/20 0459     WBC 9.24 10*3/mm3      RBC 4.20 10*6/mm3      Hemoglobin 12.4 g/dL      Hematocrit 39.4 %      MCV 93.8 fL      MCH 29.5 pg      MCHC 31.5 g/dL      RDW 12.6 %      RDW-SD 43.2 fl      MPV 12.7 fL      Platelets 258 10*3/mm3      Neutrophil % 70.7 %      Lymphocyte % 22.7 %      Monocyte % 5.6 %      Eosinophil % 0.4 %      Basophil % 0.3 %      Immature Grans % 0.3 %      Neutrophils, Absolute 6.52 10*3/mm3      Lymphocytes, Absolute 2.10 10*3/mm3      Monocytes, Absolute 0.52 10*3/mm3      Eosinophils, Absolute 0.04 10*3/mm3      Basophils, Absolute 0.03 10*3/mm3      Immature Grans, Absolute 0.03 10*3/mm3      nRBC 0.0 /100 WBC     Iron [[id number]]  (Abnormal) Collected: 12/02/20 0556    Specimen: Blood Updated: 12/02/20 1142     Iron 30 mcg/dL           Imaging:  Imaging Results (Last 24 Hours)     Procedure Component Value Units Date/Time    FL Upper GI Water Soluble [[id number]] Collected: 12/02/20 1133     Updated: 12/02/20 1137    Narrative:      EXAMINATION: FL UPPER GI WATER SOLUBLE-      INDICATION: post sleeve, rule out leak; E66.01-Morbid (severe) obesity  due to excess calories      COMPARISON: NONE     TECHNIQUE: 84 seconds of fluoroscopic time was used for this exam. 4  associated fluoroscopic series were saved.  imaging reveals a  gaseous abdomen. There is a suture line visible in the left upper  quadrant.     COMPARISON: NONE     FINDINGS: Under fluoroscopic observation, patient ingested water-soluble  contrast. The oral phase of deglutition appeared normal. The esophageal  mucosa appeared grossly  normal. There was no evidence of a focal  esophageal stricture. There is some focal dilatation of the distal  esophagus, with appearance of probable small hiatal hernia. Examination  of the stomach demonstrated postoperative changes that are consistent  with a vertical sleeve gastrectomy. No extravasation of contrast was  seen. The gastric folds and gastric mucosa appeared grossly normal.  There was no evidence of a gastric or duodenal ulcer. There was no delay  in gastric emptying. The duodenal bulb and duodenal C-loop appeared  grossly normal.          Impression:      Status post vertical sleeve gastrectomy. There was no  evidence of extraluminal contrast. There was no delay in gastric  emptying.     Questionable small hiatal hernia.     This report was finalized on 12/2/2020 11:34 AM by Kevyn Anaya.               Assessment/Plan     POD # 2 s/p revision LSG.    Doing well.  Tolerating diet well today.  Patient feels well and has met discharge criteria.  Will discharge home with follow up in 1 week with PA.  Rx given for oxycodone, Zofran, PPI.  Start Eliquis tonight.   Discharge instructions reviewed with patient and all questions answered.            Natalia Mariscal MD  12/03/20  09:52 EST

## 2020-12-03 NOTE — PROGRESS NOTES
[facility] Medicine Services  PROGRESS NOTE    Patient Name: Geneva Cordova  : 1985  MRN: [id number]    Date of Admission: 2020  Primary Care Physician: Geetha Pfeiffer MD    Subjective   Subjective     CC:  F/u revision LSG     HPI:  Patient sitting up in bed. Wants to go home today. Pain better controlled.    Review of Systems  Gen- No fevers, chills  CV- No chest pain, palpitations  Resp- No cough, dyspnea  GI- No N/V/D, +abd pain     Objective   Objective     Vital Signs:   Temp:  [98.2 °F (36.8 °C)-99.1 °F (37.3 °C)] 98.2 °F (36.8 °C)  Heart Rate:  [55-92] 59  Resp:  [16] 16  BP: (128-150)/(71-98) 131/72        Physical Exam:  Constitutional: No acute distress, awake, alert, obese  HENT: NCAT, mucous membranes moist  Respiratory: Clear to auscultation bilaterally, respiratory effort normal   Cardiovascular: RRR, no murmurs, rubs, or gallops, palpable pedal pulses bilaterally  Gastrointestinal: Positive bowel sounds, soft, nontender, nondistended  Musculoskeletal: No bilateral ankle edema  Psychiatric: Appropriate affect, cooperative  Neurologic: Oriented x 3, strength symmetric in all extremities, Cranial Nerves grossly intact to confrontation, speech clear  Skin: No rashes      Results Reviewed:  Results from last 7 days   Lab Units 20  0419 20  0556   WBC 10*3/mm3 9.24 8.57   HEMOGLOBIN g/dL 12.4 12.9   HEMATOCRIT % 39.4 41.5   PLATELETS 10*3/mm3 258 289     Results from last 7 days   Lab Units 20  0419 20  0556   SODIUM mmol/L 137 136   POTASSIUM mmol/L 4.0 4.0   CHLORIDE mmol/L 104 102   CO2 mmol/L 19.0* 23.0   BUN mg/dL 6 7   CREATININE mg/dL 0.61 0.73   GLUCOSE mg/dL 85 110*   CALCIUM mg/dL 9.0 9.1   ALT (SGPT) U/L 22 32   AST (SGOT) U/L 20 34*     Estimated Creatinine Clearance: 211.3 mL/min (by C-G formula based on SCr of 0.61 mg/dL).    Microbiology Results Abnormal     None          Imaging Results (Last 24 Hours)     Procedure  Component Value Units Date/Time    FL Upper GI Water Soluble [[id number]] Collected: 12/02/20 1133     Updated: 12/02/20 1137    Narrative:      EXAMINATION: FL UPPER GI WATER SOLUBLE-      INDICATION: post sleeve, rule out leak; E66.01-Morbid (severe) obesity  due to excess calories      COMPARISON: NONE     TECHNIQUE: 84 seconds of fluoroscopic time was used for this exam. 4  associated fluoroscopic series were saved.  imaging reveals a  gaseous abdomen. There is a suture line visible in the left upper  quadrant.     COMPARISON: NONE     FINDINGS: Under fluoroscopic observation, patient ingested water-soluble  contrast. The oral phase of deglutition appeared normal. The esophageal  mucosa appeared grossly normal. There was no evidence of a focal  esophageal stricture. There is some focal dilatation of the distal  esophagus, with appearance of probable small hiatal hernia. Examination  of the stomach demonstrated postoperative changes that are consistent  with a vertical sleeve gastrectomy. No extravasation of contrast was  seen. The gastric folds and gastric mucosa appeared grossly normal.  There was no evidence of a gastric or duodenal ulcer. There was no delay  in gastric emptying. The duodenal bulb and duodenal C-loop appeared  grossly normal.          Impression:      Status post vertical sleeve gastrectomy. There was no  evidence of extraluminal contrast. There was no delay in gastric  emptying.     Questionable small hiatal hernia.     This report was finalized on 12/2/2020 11:34 AM by Kevyn Anaya.                  I have reviewed the medications:  Scheduled Meds:acetaminophen, 1,000 mg, Oral, Q8H  amLODIPine, 5 mg, Oral, Q24H  enoxaparin, 40 mg, Subcutaneous, Daily  gabapentin, 100 mg, Oral, TID  pantoprazole, 40 mg, Oral, QAM  Scopolamine, 1 patch, Transdermal, Once  sodium chloride, 3 mL, Intravenous, Q12H      Continuous Infusions:sodium chloride 0.45 % with KCl 20 mEq, 100 mL/hr, Last Rate: 100  mL/hr (12/03/20 0423)  sodium chloride, 150 mL/hr, Last Rate: Stopped (12/01/20 1847)      PRN Meds:.ALPRAZolam  •  diphenhydrAMINE  •  HYDROmorphone **AND** naloxone  •  HYDROmorphone  •  metoclopramide  •  [COMPLETED] ondansetron **FOLLOWED BY** ondansetron  •  oxyCODONE  •  phenol  •  prochlorperazine  •  promethazine  •  simethicone  •  sodium chloride    Assessment/Plan   Assessment & Plan     Active Hospital Problems    Diagnosis  POA   • **Obesity, Class III, BMI 40-49.9 (morbid obesity) (CMS/[facility]) [E66.01]  Yes   • Elevated blood pressure reading [R03.0]  Yes   • Morbid obesity (CMS/[facility]) [E66.01]  Yes      Resolved Hospital Problems   No resolved problems to display.        Brief Hospital Course to date:  Geneva Cordova is a 35 y.o. female w/ a hx of anxiety/depression, anemia, GERD and obesity who underwent a gastric sleeve yesterday by Dr. Mariscal. Pt w/ elevated blood pressure readings overnight. Hospital medicine service consulted for HTN mgt.      Assessment & Plan     Elevated blood pressure  -pt denies known hx of HTN, elevated reading or being on prior routine antihypertensives  -s/p Labetalol 10mg IV x 2 doses overnight for SBP >160   -doing better on low dose amlodipine, would continue at discharge, patient can f/u with PCP for further BP monitoring and adjustment of oral medications.        Thank you for allowing [facility] Medicine Service to provide consultative care for your patient, we will continue to follow while clinically appropriate.     CODE STATUS:   Code Status and Medical Interventions:   Ordered at: 12/01/20 4489     Level Of Support Discussed With:    Patient     Code Status:    CPR     Medical Interventions (Level of Support Prior to Arrest):    Full       Jamia Couch, DO  12/03/20

## 2020-12-04 ENCOUNTER — TRANSITIONAL CARE MANAGEMENT TELEPHONE ENCOUNTER (OUTPATIENT)
Dept: CALL CENTER | Facility: HOSPITAL | Age: 35
End: 2020-12-04

## 2020-12-04 NOTE — OUTREACH NOTE
Call Center TCM Note      Responses   [facility] patient discharged from?  [place]   Does the patient have one of the following disease processes/diagnoses(primary or secondary)?  General Surgery   TCM attempt successful?  Yes   Call start time  1134   Call end time  1138   Discharge diagnosis  GASTRIC SLEEVE LAPAROSCOPIC   Meds reviewed with patient/caregiver?  Yes   Is the patient having any side effects they believe may be caused by any medication additions or changes?  No   Does the patient have all medications related to this admission filled (includes all antibiotics, pain medications, etc.)  Yes   Is the patient taking all medications as directed (includes completed medication regime)?  Yes   Does the patient have a follow up appointment scheduled with their surgeon?  Yes [12/8/20 and 1/6/21]   Has the patient kept scheduled appointments due by today?  N/A   Comments  Pt was dismissed from [facility] [address] on 9/16/19 for frequent no shows. RN provided patient with HUB number.    Psychosocial issues?  No   Did the patient receive a copy of their discharge instructions?  Yes   Nursing interventions  Reviewed instructions with patient   What is the patient's perception of their health status since discharge?  Improving   Nursing interventions  Nurse provided patient education   Is the patient /caregiver able to teach back basic post-op care?  Lifting as instructed by MD in discharge instructions, Drive as instructed by MD in discharge instructions   Is the patient/caregiver able to teach back signs and symptoms of incisional infection?  Fever   Is the patient/caregiver able to teach back steps to recovery at home?  Rest and rebuild strength, gradually increase activity, Eat a well-balance diet   If the patient is a current smoker, are they able to teach back resources for cessation?  Not a smoker   Is the patient/caregiver able to teach back the hierarchy of who to call/visit for  symptoms/problems? PCP, Specialist, Home health nurse, Urgent Care, ED, 911  Yes   TCM call completed?  Yes          Marychuy Osei RN    12/4/2020, 11:42 EST

## 2020-12-04 NOTE — OUTREACH NOTE
Call Center TCM Note      Responses   [facility] patient discharged from?  [place]   Does the patient have one of the following disease processes/diagnoses(primary or secondary)?  General Surgery   TCM attempt successful?  No [Pt and sister]   Unsuccessful attempts  Attempt 1          Marychuy Osei RN    12/4/2020, 11:04 EST

## 2020-12-08 ENCOUNTER — OFFICE VISIT (OUTPATIENT)
Dept: BARIATRICS/WEIGHT MGMT | Facility: CLINIC | Age: 35
End: 2020-12-08

## 2020-12-08 VITALS
HEART RATE: 88 BPM | SYSTOLIC BLOOD PRESSURE: 130 MMHG | DIASTOLIC BLOOD PRESSURE: 80 MMHG | HEIGHT: 69 IN | WEIGHT: 293 LBS | BODY MASS INDEX: 43.4 KG/M2 | RESPIRATION RATE: 18 BRPM | TEMPERATURE: 97.9 F | OXYGEN SATURATION: 99 %

## 2020-12-08 DIAGNOSIS — Z98.84 S/P BARIATRIC SURGERY: Primary | ICD-10-CM

## 2020-12-08 PROCEDURE — 99024 POSTOP FOLLOW-UP VISIT: CPT | Performed by: PHYSICIAN ASSISTANT

## 2020-12-08 NOTE — PROGRESS NOTES
"[facility]  [address]  [address]  [facility] [id number] [phone]      Patient Name:  Geneva Cordova  :  1985      Date of Visit: 2020      Reason for Visit:  POD #7    HPI:  Geneva Cordova is a 35 y.o. female s/p LSG revision 20 w/ Dr. Mariscal (hx LSG 2015 [facility]DW)    Discharged on POD#2.  c/o fatigue, but o/w doing fine.  Denies dysphagia, reflux, nausea, vomiting, abdominal pain, pulmonary issues and fevers.  Tolerating diet progression - on stage 1.  Getting only 60g prot/day.  Drinking 64 fluid oz/day.  Voiding often - \"clear\" - says prone to UTIs, but denies dysuria/urgency.  Taking vitamins.  On Omeprazole  and Eliquis .  Ambulating frequently.     Presurgery weight: 343 pounds.  Today's weight is (!) 147 kg (324 lb 8 oz) pounds, today's  Body mass index is 47.92 kg/m²., and weight loss since surgery is 19 pounds.       Past Medical History:   Diagnosis Date   • Anemia    • Anxiety    • Bilateral lower extremity edema     R>L   • Boil, axilla     recurrent   • Chronic back pain     no meds or injections, feels related to breasts   • Depression    • Dyspepsia    • Dyspnea on exertion    • Fatigue    • Glucose intolerance (impaired glucose tolerance)     \"hasn't been tested for that\"   • Helicobacter pylori (H. pylori) infection     asx and grossly nl EGD. \"abundant\"on path. HBT still + after PrevPak tx. LSG path neg. neg testing 2018   • Hypoalbuminemia     3.2 in past, 4.1 most recent   • Left Ovarian serous cystadenoma -removed at  section 2017   • Migraine     resolved with weightloss   • Obesity, morbid (CMS/HCC)    • VIRA (obstructive sleep apnea)      improved since WLS, pending f/u sleep study, not using CPAP   • PCOS (polycystic ovarian syndrome)    • Right Essure implantation 2017 10/6/2017   • S/P  section;left ovarian cystectomy and partial salpingectomy 17   • Seasonal " allergies      Past Surgical History:   Procedure Laterality Date   •  SECTION N/A 2017      SECTION PRIMARY;  CYSTECTOMY; Kwadwo Baxter MD; :  [facility];  Service:    • DILATATION AND CURETTAGE     • ENDOMETRIAL ABLATION  2020    Dr Cuevas   • ENDOSCOPY N/A 2020    Procedure: ESOPHAGOGASTRODUODENOSCOPY;  Surgeon: Natalia Mariscal MD;  Location:  [facility];  Service: Bariatric;  Laterality: N/A;   • ESSURE TUBAL LIGATION     • GASTRIC SLEEVE LAPAROSCOPIC  2015    With Dr. Hughes   • GASTRIC SLEEVE LAPAROSCOPIC N/A 2020    Procedure: GASTRIC SLEEVE LAPAROSCOPIC;  Surgeon: Natalia Mariscal MD;  Location:  [facility];  Service: Bariatric;  Laterality: N/A;   • LAPAROSCOPIC CHOLECYSTECTOMY  2018    Dr. Hughes   • SALPINGECTOMY Bilateral 2020    Dr Cuevas   • TONSILLECTOMY     • TUBAL ABDOMINAL LIGATION     • WISDOM TOOTH EXTRACTION       Outpatient Medications Marked as Taking for the 20 encounter (Office Visit) with Carmelita Bell PA   Medication Sig Dispense Refill   • apixaban (Eliquis) 2.5 MG tablet tablet Take 1 tablet by mouth 2 (Two) Times a Day. 42 tablet 0   • Multiple Vitamins-Minerals (MULTIPLE VITAMINS/WOMENS) tablet Take 1 tablet by mouth Daily.     • omeprazole (PrilOSEC) 40 MG capsule Take 1 capsule by mouth Daily for 60 days. 60 capsule 0   • ondansetron ODT (Zofran ODT) 4 MG disintegrating tablet Place 1 tablet on the tongue Every 8 (Eight) Hours As Needed for Nausea or Vomiting. 10 tablet 0     No Known Allergies    Social History     Socioeconomic History   • Marital status: Single     Spouse name: Not on file   • Number of children: 4   • Years of education: College    • Highest education level: Not on file   Occupational History   • Occupation:    Tobacco Use   • Smoking status: Never Smoker   • Smokeless tobacco: Never Used   Substance and Sexual Activity   • Alcohol use: Yes     Frequency: Monthly or  "less     Drinks per session: 1 or 2     Binge frequency: Never     Comment: Occassionally   • Drug use: No   • Sexual activity: Yes     Partners: Male     Birth control/protection: Surgical     Comment: no OCP   Social History Narrative    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        /80 (BP Location: Left arm, Patient Position: Sitting, Cuff Size: Large Adult)   Pulse 88   Temp 97.9 °F (36.6 °C) (Temporal)   Resp 18   Ht 175.3 cm (69\")   Wt (!) 147 kg (324 lb 8 oz)   SpO2 99%   BMI 47.92 kg/m²   Physical Exam  Constitutional:       Appearance: She is well-developed.      Comments: Wearing a mask   Eyes:      General: No scleral icterus.     Conjunctiva/sclera: Conjunctivae normal.   Cardiovascular:      Rate and Rhythm: Normal rate.   Pulmonary:      Effort: Pulmonary effort is normal.   Abdominal:      General: Bowel sounds are normal.      Palpations: Abdomen is soft.      Tenderness: There is no abdominal tenderness.      Comments: Incisions healing well   Musculoskeletal: Normal range of motion.   Skin:     General: Skin is warm and dry.      Findings: No rash.   Neurological:      Mental Status: She is alert.   Psychiatric:         Behavior: Behavior is cooperative.         Judgment: Judgment normal.           Assessment:   POD #7 s/p LSG revision 12/1/20 w/ Dr. Mariscal (hx LSG 2015 [facility])    Patient's Body mass index is 47.92 kg/m². BMI is above normal parameters. Recommendations include: exercise counseling and nutrition counseling.      Plan:  Doing well.  Continue to advance diet per manual.  Increase protein to 100g/day - very important!  Increase exercise/activity as tolerated.  Reviewed lifting restrictions, nothing >25 lbs x 2 more weeks.  Continue vitamins.  Continue PPI.  Continue Eliquis.  Continue to avoid ASA/NSAIDs/tobacco x 6 weeks postop, steroids x 8 weeks postop.  Call w/ problems/concerns.    The patient was instructed to follow up in 3 weeks, sooner if needed.   "

## 2020-12-15 ENCOUNTER — APPOINTMENT (OUTPATIENT)
Dept: CT IMAGING | Facility: HOSPITAL | Age: 35
End: 2020-12-15

## 2020-12-15 ENCOUNTER — HOSPITAL ENCOUNTER (EMERGENCY)
Facility: HOSPITAL | Age: 35
Discharge: HOME | End: 2020-12-15
Attending: EMERGENCY MEDICINE | Admitting: EMERGENCY MEDICINE

## 2020-12-15 VITALS
BODY MASS INDEX: 43.4 KG/M2 | HEART RATE: 71 BPM | SYSTOLIC BLOOD PRESSURE: 109 MMHG | OXYGEN SATURATION: 95 % | DIASTOLIC BLOOD PRESSURE: 78 MMHG | WEIGHT: 293 LBS | HEIGHT: 69 IN | RESPIRATION RATE: 18 BRPM | TEMPERATURE: 97.3 F

## 2020-12-15 DIAGNOSIS — R10.9 ACUTE ABDOMINAL PAIN: Primary | ICD-10-CM

## 2020-12-15 DIAGNOSIS — Z20.822 SUSPECTED COVID-19 VIRUS INFECTION: ICD-10-CM

## 2020-12-15 LAB
ALBUMIN SERPL-MCNC: 4.1 G/DL (ref 3.5–5.2)
ALBUMIN/GLOB SERPL: 1.1 G/DL
ALP SERPL-CCNC: 67 U/L (ref 39–117)
ALT SERPL W P-5'-P-CCNC: 15 U/L (ref 1–33)
ANION GAP SERPL CALCULATED.3IONS-SCNC: 11 MMOL/L (ref 5–15)
AST SERPL-CCNC: 18 U/L (ref 1–32)
BACTERIA UR QL AUTO: ABNORMAL /HPF
BASOPHILS # BLD AUTO: 0.01 10*3/MM3 (ref 0–0.2)
BASOPHILS NFR BLD AUTO: 0.2 % (ref 0–1.5)
BILIRUB SERPL-MCNC: 0.6 MG/DL (ref 0–1.2)
BILIRUB UR QL STRIP: NEGATIVE
BUN SERPL-MCNC: 8 MG/DL (ref 6–20)
BUN/CREAT SERPL: 11.1 (ref 7–25)
CALCIUM SPEC-SCNC: 9.4 MG/DL (ref 8.6–10.5)
CHLORIDE SERPL-SCNC: 107 MMOL/L (ref 98–107)
CLARITY UR: ABNORMAL
CO2 SERPL-SCNC: 25 MMOL/L (ref 22–29)
COLOR UR: YELLOW
CREAT SERPL-MCNC: 0.72 MG/DL (ref 0.57–1)
D-LACTATE SERPL-SCNC: 0.9 MMOL/L (ref 0.5–2)
DEPRECATED RDW RBC AUTO: 42.2 FL (ref 37–54)
EOSINOPHIL # BLD AUTO: 0.08 10*3/MM3 (ref 0–0.4)
EOSINOPHIL NFR BLD AUTO: 1.7 % (ref 0.3–6.2)
ERYTHROCYTE [DISTWIDTH] IN BLOOD BY AUTOMATED COUNT: 12.7 % (ref 12.3–15.4)
GFR SERPL CREATININE-BSD FRML MDRD: 112 ML/MIN/1.73
GLOBULIN UR ELPH-MCNC: 3.6 GM/DL
GLUCOSE SERPL-MCNC: 75 MG/DL (ref 65–99)
GLUCOSE UR STRIP-MCNC: NEGATIVE MG/DL
HCT VFR BLD AUTO: 41.2 % (ref 34–46.6)
HGB BLD-MCNC: 12.9 G/DL (ref 12–15.9)
HGB UR QL STRIP.AUTO: NEGATIVE
HOLD SPECIMEN: NORMAL
HOLD SPECIMEN: NORMAL
HYALINE CASTS UR QL AUTO: ABNORMAL /LPF
IMM GRANULOCYTES # BLD AUTO: 0.01 10*3/MM3 (ref 0–0.05)
IMM GRANULOCYTES NFR BLD AUTO: 0.2 % (ref 0–0.5)
KETONES UR QL STRIP: ABNORMAL
LEUKOCYTE ESTERASE UR QL STRIP.AUTO: ABNORMAL
LIPASE SERPL-CCNC: 31 U/L (ref 13–60)
LYMPHOCYTES # BLD AUTO: 1.68 10*3/MM3 (ref 0.7–3.1)
LYMPHOCYTES NFR BLD AUTO: 36.3 % (ref 19.6–45.3)
MCH RBC QN AUTO: 28.5 PG (ref 26.6–33)
MCHC RBC AUTO-ENTMCNC: 31.3 G/DL (ref 31.5–35.7)
MCV RBC AUTO: 90.9 FL (ref 79–97)
MONOCYTES # BLD AUTO: 0.26 10*3/MM3 (ref 0.1–0.9)
MONOCYTES NFR BLD AUTO: 5.6 % (ref 5–12)
NEUTROPHILS NFR BLD AUTO: 2.59 10*3/MM3 (ref 1.7–7)
NEUTROPHILS NFR BLD AUTO: 56 % (ref 42.7–76)
NITRITE UR QL STRIP: NEGATIVE
NRBC BLD AUTO-RTO: 0 /100 WBC (ref 0–0.2)
PH UR STRIP.AUTO: 5.5 [PH] (ref 5–8)
PLATELET # BLD AUTO: 236 10*3/MM3 (ref 140–450)
PMV BLD AUTO: 12.1 FL (ref 6–12)
POTASSIUM SERPL-SCNC: 4 MMOL/L (ref 3.5–5.2)
PROT SERPL-MCNC: 7.7 G/DL (ref 6–8.5)
PROT UR QL STRIP: NEGATIVE
RBC # BLD AUTO: 4.53 10*6/MM3 (ref 3.77–5.28)
RBC # UR: ABNORMAL /HPF
REF LAB TEST METHOD: ABNORMAL
SODIUM SERPL-SCNC: 143 MMOL/L (ref 136–145)
SP GR UR STRIP: 1.03 (ref 1–1.03)
SQUAMOUS #/AREA URNS HPF: ABNORMAL /HPF
UROBILINOGEN UR QL STRIP: ABNORMAL
WBC # BLD AUTO: 4.63 10*3/MM3 (ref 3.4–10.8)
WBC UR QL AUTO: ABNORMAL /HPF
WHOLE BLOOD HOLD SPECIMEN: NORMAL
WHOLE BLOOD HOLD SPECIMEN: NORMAL

## 2020-12-15 PROCEDURE — 74176 CT ABD & PELVIS W/O CONTRAST: CPT

## 2020-12-15 PROCEDURE — 99283 EMERGENCY DEPT VISIT LOW MDM: CPT

## 2020-12-15 PROCEDURE — U0004 COV-19 TEST NON-CDC HGH THRU: HCPCS | Performed by: EMERGENCY MEDICINE

## 2020-12-15 PROCEDURE — 85025 COMPLETE CBC W/AUTO DIFF WBC: CPT | Performed by: EMERGENCY MEDICINE

## 2020-12-15 PROCEDURE — 81001 URINALYSIS AUTO W/SCOPE: CPT | Performed by: EMERGENCY MEDICINE

## 2020-12-15 PROCEDURE — 80053 COMPREHEN METABOLIC PANEL: CPT | Performed by: EMERGENCY MEDICINE

## 2020-12-15 PROCEDURE — 83690 ASSAY OF LIPASE: CPT | Performed by: EMERGENCY MEDICINE

## 2020-12-15 PROCEDURE — C9803 HOPD COVID-19 SPEC COLLECT: HCPCS | Performed by: EMERGENCY MEDICINE

## 2020-12-15 PROCEDURE — 83605 ASSAY OF LACTIC ACID: CPT | Performed by: EMERGENCY MEDICINE

## 2020-12-15 RX ORDER — SODIUM CHLORIDE 9 MG/ML
10 INJECTION INTRAVENOUS AS NEEDED
Status: DISCONTINUED | OUTPATIENT
Start: 2020-12-15 | End: 2020-12-15 | Stop reason: HOSPADM

## 2020-12-15 NOTE — ED PROVIDER NOTES
Subjective   35-year-old female presents with a complaint of abdominal pain. She reports pain in the mid to lower portion of the abdomen that is developed within the last day. She had gastric bypass surgery performed by Dr. Mariscal on the first of this month. She has been doing well up until the day. She admits that her overall oral intake may be slightly decreased, but she is trying to drink fluids and consume enough protein. She also admits that she has been prescribed Percocet for pain, but has not been taking it, and does not desire to do so. She denies any current fever, bodies, or chills. She denies any respiratory symptoms including no upper respiratory congestion, sore throat, cough, chest pain, change in sense of taste or smell. She does report the intermittent burning sensation of burning pain from the epigastric region of the abdomen up into the center of the chest. She denies any upper abdominal or chest pain at this given time. No reported dysuria, frequency, urgency. No reported blood in the stool or diarrhea. No other reported aggravating, alleviating, or associated symptoms.          Review of Systems   Constitutional: Negative for chills, fatigue and fever.   HENT: Negative for congestion, ear pain, postnasal drip, sinus pressure and sore throat.    Eyes: Negative for pain, redness and visual disturbance.   Respiratory: Negative for cough, chest tightness and shortness of breath.    Cardiovascular: Negative for chest pain, palpitations and leg swelling.   Gastrointestinal: Positive for abdominal pain. Negative for anal bleeding, blood in stool, diarrhea, nausea and vomiting.   Endocrine: Negative for polydipsia and polyuria.   Genitourinary: Negative for difficulty urinating, dysuria, frequency and urgency.   Musculoskeletal: Negative for arthralgias, back pain and neck pain.   Skin: Negative for pallor and rash.   Allergic/Immunologic: Negative for environmental allergies and immunocompromised  "state.   Neurological: Negative for dizziness, weakness and headaches.   Hematological: Negative for adenopathy.   Psychiatric/Behavioral: Negative for confusion, self-injury and suicidal ideas. The patient is not nervous/anxious.    All other systems reviewed and are negative.      Past Medical History:   Diagnosis Date   • Anemia    • Anxiety    • Bilateral lower extremity edema     R>L   • Boil, axilla     recurrent   • Chronic back pain     no meds or injections, feels related to breasts   • Depression    • Dyspepsia    • Dyspnea on exertion    • Fatigue    • Glucose intolerance (impaired glucose tolerance)     \"hasn't been tested for that\"   • Helicobacter pylori (H. pylori) infection     asx and grossly nl EGD. \"abundant\"on path. HBT still + after PrevPak tx. LSG path neg. neg testing 2018   • Hypoalbuminemia     3.2 in past, 4.1 most recent   • Left Ovarian serous cystadenoma -removed at  section 2017   • Migraine     resolved with weightloss   • Obesity, morbid (CMS/HCC)    • VIRA (obstructive sleep apnea)      improved since WLS, pending f/u sleep study, not using CPAP   • PCOS (polycystic ovarian syndrome)    • Right Essure implantation 2017 10/6/2017   • S/P  section;left ovarian cystectomy and partial salpingectomy 17   • Seasonal allergies        No Known Allergies    Past Surgical History:   Procedure Laterality Date   •  SECTION N/A 2017      SECTION PRIMARY;  CYSTECTOMY; Kwadwo Baxter MD; :  [facility];  Service:    • DILATATION AND CURETTAGE     • ENDOMETRIAL ABLATION  2020    Dr Cuevas   • ENDOSCOPY N/A 2020    Procedure: ESOPHAGOGASTRODUODENOSCOPY;  Surgeon: Natalia Mariscal MD;  Location: [facility];  Service: Bariatric;  Laterality: N/A;   • ESSURE TUBAL LIGATION     • GASTRIC SLEEVE LAPAROSCOPIC  2015    With Dr. Hughes   • GASTRIC SLEEVE LAPAROSCOPIC N/A 2020    Procedure: " GASTRIC SLEEVE LAPAROSCOPIC;  Surgeon: Natalia Mariscal MD;  Location: [facility];  Service: Bariatric;  Laterality: N/A;   • LAPAROSCOPIC CHOLECYSTECTOMY  02/2018    Dr. Hughes   • SALPINGECTOMY Bilateral 02/2020    Dr Cuevas   • TONSILLECTOMY  2014   • TUBAL ABDOMINAL LIGATION     • WISDOM TOOTH EXTRACTION  2014       Family History   Problem Relation Age of Onset   • No Known Problems Other    • Sleep apnea Mother    • Diabetes Mother    • No Known Problems Father    • Pancreatic cancer Maternal Grandmother    • No Known Problems Sister    • No Known Problems Brother    • No Known Problems Daughter    • No Known Problems Son    • No Known Problems Paternal Grandmother    • No Known Problems Maternal Aunt    • No Known Problems Paternal Aunt    • BRCA 1/2 Neg Hx    • Colon cancer Neg Hx    • Endometrial cancer Neg Hx    • Ovarian cancer Neg Hx        Social History     Socioeconomic History   • Marital status: Single     Spouse name: Not on file   • Number of children: 4   • Years of education: College    • Highest education level: Not on file   Occupational History   • Occupation:    Tobacco Use   • Smoking status: Never Smoker   • Smokeless tobacco: Never Used   Substance and Sexual Activity   • Alcohol use: Not Currently     Frequency: Monthly or less     Drinks per session: 1 or 2     Binge frequency: Never   • Drug use: No   • Sexual activity: Yes     Partners: Male     Birth control/protection: Surgical     Comment: no OCP   Social History Narrative    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   Constitutional:       General: She is not in acute distress.     Appearance: Normal appearance. She is well-developed. She is not toxic-appearing or diaphoretic.   HENT:      Head: Normocephalic and atraumatic.      Right Ear: External ear normal.      Left Ear: External ear normal.      Nose: Nose normal.   Eyes:       General: Lids are normal.      Pupils: Pupils are equal, round, and reactive to light.   Neck:      Musculoskeletal: Normal range of motion and neck supple.      Trachea: No tracheal deviation.   Cardiovascular:      Rate and Rhythm: Normal rate and regular rhythm.      Pulses: No decreased pulses.      Heart sounds: Normal heart sounds. No murmur. No friction rub. No gallop.    Pulmonary:      Effort: Pulmonary effort is normal. No respiratory distress.      Breath sounds: Normal breath sounds. No decreased breath sounds, wheezing, rhonchi or rales.   Abdominal:      General: Bowel sounds are normal.      Palpations: Abdomen is soft.      Tenderness: There is abdominal tenderness in the periumbilical area and suprapubic area. There is no right CVA tenderness, left CVA tenderness, guarding or rebound. Negative signs include Fuentes's sign and McBurney's sign.   Musculoskeletal: Normal range of motion.         General: No deformity.   Lymphadenopathy:      Cervical: No cervical adenopathy.   Skin:     General: Skin is warm and dry.      Findings: No rash.   Neurological:      Mental Status: She is alert and oriented to person, place, and time.      Cranial Nerves: No cranial nerve deficit.      Sensory: No sensory deficit.   Psychiatric:         Speech: Speech normal.         Behavior: Behavior normal.         Thought Content: Thought content normal.         Judgment: Judgment normal.         Procedures           ED Course                                           MDM  Number of Diagnoses or Management Options  Acute abdominal pain: new and requires workup  Suspected COVID-19 virus infection: new and requires workup  Diagnosis management comments: CT scan abdomen pelvis reports no acute intra-abdominal abnormality. The patient's vital signs have remained stable.    CT scan of the abdomen pelvis does report possible concern for COVID-19 on imaging of the lower lungs. COVID-19 swab has been drawn and sent for analysis.  The patient will be advised to maintain quarantine until results have been obtained. It should be noted her oxygen saturations have been 98 to 100% on room air. She denies any respiratory symptoms, appears nontoxic and in no acute distress.    I discussed giving IV fluids here in the ER due to concern for possible dehydration. She states she does not desire the IV fluids. Feels like she can drink and desires to go home.    Discharged home appearing well.         Amount and/or Complexity of Data Reviewed  Clinical lab tests: ordered and reviewed  Tests in the radiology section of CPT®: reviewed and ordered  Review and summarize past medical records: yes  Independent visualization of images, tracings, or specimens: yes        Final diagnoses:   Acute abdominal pain   Suspected COVID-19 virus infection            Jahaira Schmidt MD  12/15/20 9524

## 2020-12-15 NOTE — DISCHARGE INSTRUCTIONS
Maintain quarantine until results of COVID-19 testing have been obtained.    Continue to drink plenty of fluids and monitor abdominal pain.    Follow-up with Dr. Mariscla within the next week.    Return to the ER with any further concern or worsening of symptoms.

## 2020-12-15 NOTE — DISCHARGE SUMMARY
Discharge Summary    Patient name: Geneva Cordova    Medical record number: [id number]    Admission date: 12/1/2020  Discharge date:  12/3/2020    Attending physician: Natalia Mariscal MD    Primary care physician: Provider, No Known    Condition on discharge: Stable    Primary Diagnoses:  Morbid obesity with co-morbidities    Operative Procedure:  LSG revision 12/1/20     Hospital Course: The patient is a very pleasant 35 y.o. female that was admitted to the hospital after elective laparoscopic sleeve gastrectomy revision.  She is status post previous laparoscopic sleeve gastrectomy by Dr. Hughes in 2015 and had suffered weight regain.  On POD#1, UGI was without obstruction or leak. The patient had some had some nausea, but by POD#2, was ambulating well, tolerating stage 1 diet, and felt ready to be discharged.  Please see daily progress notes for full details of hospital stay.  Geneva Cordova was discharged home in good condition with instructions to follow up in the office in 1 week.      Discharge medications:      Discharge Medications      New Medications      Instructions Start Date   omeprazole 40 MG capsule  Commonly known as: PrilOSEC   40 mg, Oral, Daily      ondansetron ODT 4 MG disintegrating tablet  Commonly known as: Zofran ODT   4 mg, Translingual, Every 8 Hours PRN      oxyCODONE 5 MG immediate release tablet  Commonly known as: Roxicodone   5 mg, Oral, Every 6 Hours PRN         Continue These Medications      Instructions Start Date   apixaban 2.5 MG tablet tablet  Commonly known as: Eliquis   2.5 mg, Oral, 2 Times Daily      Multiple Vitamins/Womens tablet tablet  Generic drug: multivitamin with minerals   1 tablet, Oral, Daily             Discharge instructions:  Per Bariatric manual      Follow-up appointment: Follow up with Bariatric PA in the office in 1 week.

## 2020-12-16 ENCOUNTER — PATIENT OUTREACH (OUTPATIENT)
Dept: CASE MANAGEMENT | Facility: OTHER | Age: 35
End: 2020-12-16

## 2020-12-16 LAB — SARS-COV-2 RNA RESP QL NAA+PROBE: DETECTED

## 2020-12-16 NOTE — OUTREACH NOTE
"ED Potential Covid Discharge Follow-up    Pt contacted regarding ED visit 12/15/2020 with chief c/o abd pain. Covid 19 testing was done and has resulted as Detected.  Pt has not been notified of results yet.  States \"I'm doing a lot good, a lot better.\"  Is up and about and does have f/u visit with Dr. Mariscal next week. Able to drink to stay hydrated and states was actually drinking her protein drink at time of call.  She is quarantining from her 3 and 4 year olds.  States her mother has the children. Symptoms that would warrant a return to ED discussed. She denies any transportation issues or food insecurities. Role of  explained and contact number given.  [facility] 24/7 Nurse line explained and contact number provided.  Covid 19 hotline number given. \"I am just waiting to hear from my results.\" Voiced her appreciation for the call.       Jordyn Castro RN  Ambulatory     12/16/2020, 14:50 EST      "

## 2020-12-19 ENCOUNTER — TELEPHONE (OUTPATIENT)
Dept: EMERGENCY DEPT | Facility: HOSPITAL | Age: 35
End: 2020-12-19

## 2021-01-06 ENCOUNTER — OFFICE VISIT (OUTPATIENT)
Dept: BARIATRICS/WEIGHT MGMT | Facility: CLINIC | Age: 36
End: 2021-01-06

## 2021-01-06 VITALS
OXYGEN SATURATION: 98 % | DIASTOLIC BLOOD PRESSURE: 76 MMHG | TEMPERATURE: 98 F | RESPIRATION RATE: 18 BRPM | WEIGHT: 293 LBS | BODY MASS INDEX: 43.4 KG/M2 | SYSTOLIC BLOOD PRESSURE: 126 MMHG | HEIGHT: 69 IN | HEART RATE: 89 BPM

## 2021-01-06 DIAGNOSIS — M54.9 CHRONIC BACK PAIN, UNSPECIFIED BACK LOCATION, UNSPECIFIED BACK PAIN LATERALITY: ICD-10-CM

## 2021-01-06 DIAGNOSIS — G89.29 CHRONIC BACK PAIN, UNSPECIFIED BACK LOCATION, UNSPECIFIED BACK PAIN LATERALITY: ICD-10-CM

## 2021-01-06 DIAGNOSIS — K21.9 GASTROESOPHAGEAL REFLUX DISEASE, UNSPECIFIED WHETHER ESOPHAGITIS PRESENT: ICD-10-CM

## 2021-01-06 DIAGNOSIS — R13.10 DYSPHAGIA, UNSPECIFIED TYPE: ICD-10-CM

## 2021-01-06 DIAGNOSIS — E66.01 OBESITY, CLASS III, BMI 40-49.9 (MORBID OBESITY) (HCC): ICD-10-CM

## 2021-01-06 DIAGNOSIS — Z13.21 MALNUTRITION SCREEN: ICD-10-CM

## 2021-01-06 DIAGNOSIS — Z90.3 POSTGASTRECTOMY MALABSORPTION: ICD-10-CM

## 2021-01-06 DIAGNOSIS — F32.A DEPRESSION, UNSPECIFIED DEPRESSION TYPE: ICD-10-CM

## 2021-01-06 DIAGNOSIS — E28.2 PCOS (POLYCYSTIC OVARIAN SYNDROME): ICD-10-CM

## 2021-01-06 DIAGNOSIS — R60.0 BILATERAL LOWER EXTREMITY EDEMA: ICD-10-CM

## 2021-01-06 DIAGNOSIS — Z13.0 SCREENING, IRON DEFICIENCY ANEMIA: ICD-10-CM

## 2021-01-06 DIAGNOSIS — G47.33 OSA (OBSTRUCTIVE SLEEP APNEA): ICD-10-CM

## 2021-01-06 DIAGNOSIS — K91.2 POSTGASTRECTOMY MALABSORPTION: ICD-10-CM

## 2021-01-06 DIAGNOSIS — R53.83 FATIGUE, UNSPECIFIED TYPE: Primary | ICD-10-CM

## 2021-01-06 DIAGNOSIS — E55.9 HYPOVITAMINOSIS D: ICD-10-CM

## 2021-01-06 PROCEDURE — 99024 POSTOP FOLLOW-UP VISIT: CPT | Performed by: SURGERY

## 2021-01-06 NOTE — PROGRESS NOTES
"ADDENDUM:  21 UGI  1. Status post vertical sleeve gastrectomy x1 month. There was no  evidence of extraluminal contrast. No postoperative strictures were  seen.  2. Moderate gastroesophageal reflux to the level of the thoracic inlet  3. Moderate sized sliding-type hiatal hernia    Proceed with EGD.    [facility]  [address]  [address]  [facility] [id number] [phone]      Patient Name:  Geneva Cordova.  :  1985      Date of Visit: 2021      Reason for Visit:  1 month postop    HPI:  Geneva Cordova is a 35 y.o. female s/p LSG revision 20 w/ Dr. Mariscal (hx LSG  [facility])    Struggling with vomiting after cheese, eggs.  Tolerates liquids find.  Some times vomits white foam.  S/p cholecystectomy.    Getting 100 g prot/day.  Drinking 64 fluid oz/day.  Taking MVI, B12, B1, Calcium, Vit D, iron and Vit C.  On Omeprazole .  Still holding ASA , NSAIDs  and Steroids.  Exercising at [email or web address].     Presurgery weight:  343 pounds. Today's weight is (!) 141 kg (310 lb 8 oz) pounds, today's Body mass index is 45.85 kg/m²., and her weight loss since surgery is 33 pounds.       Past Medical History:   Diagnosis Date   • Anemia    • Anxiety    • Bilateral lower extremity edema     R>L   • Boil, axilla     recurrent   • Chronic back pain     no meds or injections, feels related to breasts   • Depression    • Dyspepsia    • Dyspnea on exertion    • Fatigue    • Glucose intolerance (impaired glucose tolerance)     \"hasn't been tested for that\"   • Helicobacter pylori (H. pylori) infection     asx and grossly nl EGD. \"abundant\"on path. HBT still + after PrevPak tx. LSG path neg. neg testing 2018   • Hypoalbuminemia     3.2 in past, 4.1 most recent   • Left Ovarian serous cystadenoma -removed at  section 2017   • Migraine     resolved with weightloss   • Obesity, morbid (CMS/HCC)    • VIRA (obstructive sleep apnea)      improved since WLS, " pending f/u sleep study, not using CPAP   • PCOS (polycystic ovarian syndrome)    • Right Essure implantation 2017 10/6/2017   • S/P  section;left ovarian cystectomy and partial salpingectomy 17   • Seasonal allergies      Past Surgical History:   Procedure Laterality Date   •  SECTION N/A 2017      SECTION PRIMARY;  CYSTECTOMY; Kwadwo Baxter MD; :  [facility];  Service:    • DILATATION AND CURETTAGE     • ENDOMETRIAL ABLATION  2020    Dr Cuevas   • ENDOSCOPY N/A 2020    Procedure: ESOPHAGOGASTRODUODENOSCOPY;  Surgeon: Natalia Mariscal MD;  Location:  [facility];  Service: Bariatric;  Laterality: N/A;   • ESSURE TUBAL LIGATION     • GASTRIC SLEEVE LAPAROSCOPIC  2015    With Dr. Hughes   • GASTRIC SLEEVE LAPAROSCOPIC N/A 2020    Procedure: GASTRIC SLEEVE LAPAROSCOPIC;  Surgeon: Natalia Mariscal MD;  Location:  [facility];  Service: Bariatric;  Laterality: N/A;   • LAPAROSCOPIC CHOLECYSTECTOMY  2018    Dr. Hughes   • SALPINGECTOMY Bilateral 2020    Dr Cuevas   • TONSILLECTOMY     • TUBAL ABDOMINAL LIGATION     • WISDOM TOOTH EXTRACTION       Outpatient Medications Marked as Taking for the 21 encounter (Office Visit) with Natalia Mariscal MD   Medication Sig Dispense Refill   • apixaban (Eliquis) 2.5 MG tablet tablet Take 1 tablet by mouth 2 (Two) Times a Day. 42 tablet 0   • Multiple Vitamins-Minerals (MULTIPLE VITAMINS/WOMENS) tablet Take 1 tablet by mouth Daily.     • omeprazole (PrilOSEC) 40 MG capsule Take 1 capsule by mouth Daily for 60 days. 60 capsule 0   • ondansetron ODT (Zofran ODT) 4 MG disintegrating tablet Place 1 tablet on the tongue Every 8 (Eight) Hours As Needed for Nausea or Vomiting. 10 tablet 0     No Known Allergies    Social History     Socioeconomic History   • Marital status: Single     Spouse name: Not on file   • Number of children: 4   • Years of education: College    • Highest  "education level: Not on file   Occupational History   • Occupation:    Tobacco Use   • Smoking status: Never Smoker   • Smokeless tobacco: Never Used   Substance and Sexual Activity   • Alcohol use: Not Currently     Frequency: Monthly or less     Drinks per session: 1 or 2     Binge frequency: Never   • Drug use: No   • Sexual activity: Yes     Partners: Male     Birth control/protection: Surgical     Comment: no OCP   Social History Narrative    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     Appearance: She is well-developed. She is not diaphoretic.   HENT:      Head: Normocephalic and atraumatic.      Mouth/Throat:      Pharynx: No oropharyngeal exudate.   Eyes:      Conjunctiva/sclera: Conjunctivae normal.      Pupils: Pupils are equal, round, and reactive to light.   Pulmonary:      Effort: Pulmonary effort is normal. No respiratory distress.   Abdominal:      General: There is no distension.      Palpations: Abdomen is soft.   Skin:     General: Skin is warm and dry.      Coloration: Skin is not pale.   Neurological:      Mental Status: She is alert and oriented to person, place, and time.      Cranial Nerves: No cranial nerve deficit.   Psychiatric:         Behavior: Behavior normal.         Thought Content: Thought content normal.           Assessment:  1 month s/p LSG revision 12/1/20 w/ Dr. Mariscal ( LSG 2015 [facility])      Plan:  Doing well.  Continue to advance diet per manual.  Continue protein >70g/day.  Encouraged good food choices - high protein, low carb.  Continue routine exercise.  Routine bariatric labs ordered.  Continue vitamins w/ adjustments pending lab results.  Call w/ " problems/concerns.    UGI, then EGD to workup dysphagia.    The patient was instructed to follow up in 2 months, sooner if needed.     Natalia Mariscal MD

## 2021-01-13 LAB
25(OH)D3+25(OH)D2 SERPL-MCNC: 21.7 NG/ML (ref 30–100)
ALBUMIN SERPL-MCNC: 3.7 G/DL (ref 3.5–5.2)
ALBUMIN/GLOB SERPL: 1.3 G/DL
ALP SERPL-CCNC: 54 U/L (ref 39–117)
ALT SERPL-CCNC: 11 U/L (ref 1–33)
AST SERPL-CCNC: 18 U/L (ref 1–32)
BASOPHILS # BLD AUTO: 0.02 10*3/MM3 (ref 0–0.2)
BASOPHILS NFR BLD AUTO: 0.4 % (ref 0–1.5)
BILIRUB SERPL-MCNC: 0.6 MG/DL (ref 0–1.2)
BUN SERPL-MCNC: 8 MG/DL (ref 6–20)
BUN/CREAT SERPL: 10.5 (ref 7–25)
CALCIUM SERPL-MCNC: 9.5 MG/DL (ref 8.6–10.5)
CHLORIDE SERPL-SCNC: 105 MMOL/L (ref 98–107)
CO2 SERPL-SCNC: 25.7 MMOL/L (ref 22–29)
CREAT SERPL-MCNC: 0.76 MG/DL (ref 0.57–1)
EOSINOPHIL # BLD AUTO: 0.15 10*3/MM3 (ref 0–0.4)
EOSINOPHIL NFR BLD AUTO: 2.7 % (ref 0.3–6.2)
ERYTHROCYTE [DISTWIDTH] IN BLOOD BY AUTOMATED COUNT: 12.5 % (ref 12.3–15.4)
FERRITIN SERPL-MCNC: 88.3 NG/ML (ref 13–150)
FOLATE SERPL-MCNC: 5.21 NG/ML (ref 4.78–24.2)
GLOBULIN SER CALC-MCNC: 2.9 GM/DL
GLUCOSE SERPL-MCNC: 65 MG/DL (ref 65–99)
HCT VFR BLD AUTO: 36.5 % (ref 34–46.6)
HGB BLD-MCNC: 12.1 G/DL (ref 12–15.9)
IMM GRANULOCYTES # BLD AUTO: 0.01 10*3/MM3 (ref 0–0.05)
IMM GRANULOCYTES NFR BLD AUTO: 0.2 % (ref 0–0.5)
IRON SERPL-MCNC: 110 MCG/DL (ref 37–145)
LYMPHOCYTES # BLD AUTO: 1.43 10*3/MM3 (ref 0.7–3.1)
LYMPHOCYTES NFR BLD AUTO: 25.3 % (ref 19.6–45.3)
Lab: NORMAL
MCH RBC QN AUTO: 30.2 PG (ref 26.6–33)
MCHC RBC AUTO-ENTMCNC: 33.2 G/DL (ref 31.5–35.7)
MCV RBC AUTO: 91 FL (ref 79–97)
METHYLMALONATE SERPL-SCNC: 164 NMOL/L (ref 0–378)
MONOCYTES # BLD AUTO: 0.28 10*3/MM3 (ref 0.1–0.9)
MONOCYTES NFR BLD AUTO: 5 % (ref 5–12)
NEUTROPHILS # BLD AUTO: 3.76 10*3/MM3 (ref 1.7–7)
NEUTROPHILS NFR BLD AUTO: 66.4 % (ref 42.7–76)
NRBC BLD AUTO-RTO: 0 /100 WBC (ref 0–0.2)
PLATELET # BLD AUTO: 203 10*3/MM3 (ref 140–450)
POTASSIUM SERPL-SCNC: 4.2 MMOL/L (ref 3.5–5.2)
PREALB SERPL-MCNC: 13 MG/DL (ref 14–35)
PROT SERPL-MCNC: 6.6 G/DL (ref 6–8.5)
RBC # BLD AUTO: 4.01 10*6/MM3 (ref 3.77–5.28)
SODIUM SERPL-SCNC: 138 MMOL/L (ref 136–145)
VIT B1 BLD-SCNC: 124.4 NMOL/L (ref 66.5–200)
WBC # BLD AUTO: 5.65 10*3/MM3 (ref 3.4–10.8)

## 2021-01-18 ENCOUNTER — APPOINTMENT (OUTPATIENT)
Dept: PREADMISSION TESTING | Facility: HOSPITAL | Age: 36
End: 2021-01-18

## 2021-01-18 PROCEDURE — C9803 HOPD COVID-19 SPEC COLLECT: HCPCS

## 2021-01-18 PROCEDURE — U0004 COV-19 TEST NON-CDC HGH THRU: HCPCS

## 2021-01-19 LAB — SARS-COV-2 RNA RESP QL NAA+PROBE: NOT DETECTED

## 2021-01-21 ENCOUNTER — TELEPHONE (OUTPATIENT)
Dept: BARIATRICS/WEIGHT MGMT | Facility: CLINIC | Age: 36
End: 2021-01-21

## 2021-01-21 ENCOUNTER — HOSPITAL ENCOUNTER (OUTPATIENT)
Dept: GENERAL RADIOLOGY | Facility: HOSPITAL | Age: 36
Discharge: HOME | End: 2021-01-21
Admitting: SURGERY

## 2021-01-21 DIAGNOSIS — K21.9 GASTROESOPHAGEAL REFLUX DISEASE, UNSPECIFIED WHETHER ESOPHAGITIS PRESENT: ICD-10-CM

## 2021-01-21 DIAGNOSIS — R13.10 DYSPHAGIA, UNSPECIFIED TYPE: ICD-10-CM

## 2021-01-21 PROCEDURE — 74240 X-RAY XM UPR GI TRC 1CNTRST: CPT

## 2021-01-21 PROCEDURE — 0 DIATRIZOATE MEGLUMINE & SODIUM PER 1 ML: Performed by: SURGERY

## 2021-01-21 RX ADMIN — Medication 30 ML: at 13:04

## 2021-01-21 RX ADMIN — BARIUM SULFATE 183 ML: 960 POWDER, FOR SUSPENSION ORAL at 13:04

## 2021-01-21 NOTE — TELEPHONE ENCOUNTER
Pt called in requesting her results of her UGI. Pt stated that Radiology told her she has a severe HH. Please advise, thank you.

## 2021-01-22 RX ORDER — SODIUM CHLORIDE 0.9 % (FLUSH) 0.9 %
3 SYRINGE (ML) INJECTION EVERY 12 HOURS SCHEDULED
Status: CANCELLED | OUTPATIENT
Start: 2021-01-22

## 2021-01-22 RX ORDER — SODIUM CHLORIDE 0.9 % (FLUSH) 0.9 %
3-10 SYRINGE (ML) INJECTION AS NEEDED
Status: CANCELLED | OUTPATIENT
Start: 2021-01-22

## 2021-01-22 RX ORDER — SODIUM CHLORIDE 9 MG/ML
150 INJECTION, SOLUTION INTRAVENOUS CONTINUOUS
Status: CANCELLED | OUTPATIENT
Start: 2021-01-22

## 2021-01-25 NOTE — TELEPHONE ENCOUNTER
Notified pt that you will proceed with an EGD. I let her know that she will be called to get that scheduled. Pt verbalized understanding.

## 2021-02-10 ENCOUNTER — TELEMEDICINE (OUTPATIENT)
Dept: BARIATRICS/WEIGHT MGMT | Facility: CLINIC | Age: 36
End: 2021-02-10

## 2021-02-10 DIAGNOSIS — K21.9 GASTROESOPHAGEAL REFLUX DISEASE, UNSPECIFIED WHETHER ESOPHAGITIS PRESENT: Primary | ICD-10-CM

## 2021-02-10 PROCEDURE — 99214 OFFICE O/P EST MOD 30 MIN: CPT | Performed by: SURGERY

## 2021-02-10 RX ORDER — ERGOCALCIFEROL 1.25 MG/1
50000 CAPSULE ORAL
Qty: 12 CAPSULE | Refills: 0 | Status: SHIPPED | OUTPATIENT
Start: 2021-02-10 | End: 2021-03-15

## 2021-02-10 NOTE — PROGRESS NOTES
"[facility] Bariatric Surgery  [address]  [address]  [facility] [id number] [phone]        Patient Name: Geneva Cordova.  YOB: 1985      Date of Visit: 02/10/2021      Reason for Visit:  GERD    HPI:  Geneva Cordova is a 35 y.o. female s/p 20 revision LSG by me.  Current weight is 302, was up to 343.    Struggling with severe reflux.  Takes Rolaids but not helpful.  She takes Prilosec 40 mg daily without good control of reflux.      12/15/20 UGI IMPRESSION:  1. Status post vertical sleeve gastrectomy x1 month. There was no  evidence of extraluminal contrast. No postoperative strictures were  seen.  2. Moderate gastroesophageal reflux to the level of the thoracic inlet  3. Moderate sized sliding-type hiatal hernia          Past Medical History:   Diagnosis Date   • Anemia    • Anxiety    • Bilateral lower extremity edema     R>L   • Boil, axilla     recurrent   • Chronic back pain     no meds or injections, feels related to breasts   • Depression    • Dyspepsia    • Dyspnea on exertion    • Fatigue    • Glucose intolerance (impaired glucose tolerance)     \"hasn't been tested for that\"   • Helicobacter pylori (H. pylori) infection     asx and grossly nl EGD. \"abundant\"on path. HBT still + after PrevPak tx. LSG path neg. neg testing 2018   • Hypoalbuminemia     3.2 in past, 4.1 most recent   • Left Ovarian serous cystadenoma -removed at  section 2017   • Migraine     resolved with weightloss   • Obesity, morbid (CMS/HCC)    • VIRA (obstructive sleep apnea)      improved since WLS, pending f/u sleep study, not using CPAP   • PCOS (polycystic ovarian syndrome)    • Right Essure implantation 2017 10/6/2017   • S/P  section;left ovarian cystectomy and partial salpingectomy 17   • Seasonal allergies      Past Surgical History:   Procedure Laterality Date   •  SECTION N/A 2017      SECTION " PRIMARY;  CYSTECTOMY; Kwadwo Baxter MD; :  [facility];  Service:    • DILATATION AND CURETTAGE  2015   • ENDOMETRIAL ABLATION  02/2020    Dr Cuevas   • ENDOSCOPY N/A 12/1/2020    Procedure: ESOPHAGOGASTRODUODENOSCOPY;  Surgeon: Natalia Mariscal MD;  Location:  [facility];  Service: Bariatric;  Laterality: N/A;   • ESSURE TUBAL LIGATION  2018   • GASTRIC SLEEVE LAPAROSCOPIC  07/2015    With Dr. Hughes   • GASTRIC SLEEVE LAPAROSCOPIC N/A 12/1/2020    Procedure: GASTRIC SLEEVE LAPAROSCOPIC;  Surgeon: Natalia Mariscal MD;  Location:  [facility];  Service: Bariatric;  Laterality: N/A;   • LAPAROSCOPIC CHOLECYSTECTOMY  02/2018    Dr. Hughes   • SALPINGECTOMY Bilateral 02/2020    Dr Cuevas   • TONSILLECTOMY  2014   • TUBAL ABDOMINAL LIGATION     • WISDOM TOOTH EXTRACTION  2014     No outpatient medications have been marked as taking for the 2/10/21 encounter (Telemedicine) with Natalia Mariscal MD.     No Known Allergies    Social History     Socioeconomic History   • Marital status: Single     Spouse name: Not on file   • Number of children: 4   • Years of education: College    • Highest education level: Not on file   Occupational History   • Occupation:    Tobacco Use   • Smoking status: Never Smoker   • Smokeless tobacco: Never Used   Substance and Sexual Activity   • Alcohol use: Not Currently     Frequency: Monthly or less     Drinks per session: 1 or 2     Binge frequency: Never   • Drug use: No   • Sexual activity: Yes     Partners: Male     Birth control/protection: Surgical     Comment: no OCP   Social History Narrative    Lives in [place]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        There were no vitals filed for this visit.  Weight    There is no height or weight on file to calculate BMI.    Physical Exam  Constitutional:       General: She is not in acute distress.     Appearance: Normal appearance. She is well-developed. She is not ill-appearing or diaphoretic.   HENT:      Head:  Normocephalic and atraumatic.      Nose: Nose normal.      Mouth/Throat:      Pharynx: No oropharyngeal exudate.   Eyes:      General: No scleral icterus.     Extraocular Movements: Extraocular movements intact.      Conjunctiva/sclera: Conjunctivae normal.      Pupils: Pupils are equal, round, and reactive to light.   Pulmonary:      Effort: Pulmonary effort is normal. No respiratory distress.   Abdominal:      General: There is distension.   Skin:     General: Skin is warm and dry.      Coloration: Skin is not pale.   Neurological:      Mental Status: She is alert and oriented to person, place, and time.      Cranial Nerves: No cranial nerve deficit.   Psychiatric:         Mood and Affect: Mood normal.         Behavior: Behavior normal.         Thought Content: Thought content normal.           Assessment:      ICD-10-CM ICD-9-CM   1. Gastroesophageal reflux disease, unspecified whether esophagitis present  K21.9 530.81       Plan:     EGD with biopsy, possible dilation.  Pt instructed to adhere to NPO after midnight, full liquids for 24 hours before procedure.      The risks and benefits of the upper endoscopy were discussed with the patient in detail and all questions were answered.  Possibility of perforation, bleeding, aspiration, and anesthesia reaction were reviewed.  Patient agrees to proceed.      Rx Vit D for deficiency.    Prealbumin 13, encouraged protein intake.    This visit was conducted as a video visit, in an effort to limit spread of the novel coronavirus during the 5722-6041 pandemic.  The patient gave consent.

## 2021-02-25 ENCOUNTER — LAB REQUISITION (OUTPATIENT)
Dept: LAB | Facility: HOSPITAL | Age: 36
End: 2021-02-25

## 2021-02-25 DIAGNOSIS — R11.10 VOMITING, UNSPECIFIED: ICD-10-CM

## 2021-02-25 PROCEDURE — 88305 TISSUE EXAM BY PATHOLOGIST: CPT | Performed by: SURGERY

## 2021-02-26 ENCOUNTER — OFFICE VISIT (OUTPATIENT)
Dept: INTERNAL MEDICINE | Facility: CLINIC | Age: 36
End: 2021-02-26

## 2021-02-26 VITALS
OXYGEN SATURATION: 97 % | TEMPERATURE: 97.3 F | HEIGHT: 68 IN | WEIGHT: 293 LBS | BODY MASS INDEX: 44.41 KG/M2 | SYSTOLIC BLOOD PRESSURE: 119 MMHG | DIASTOLIC BLOOD PRESSURE: 73 MMHG | HEART RATE: 72 BPM

## 2021-02-26 DIAGNOSIS — F41.9 ANXIETY: ICD-10-CM

## 2021-02-26 DIAGNOSIS — K21.9 GERD WITHOUT ESOPHAGITIS: Primary | ICD-10-CM

## 2021-02-26 DIAGNOSIS — G47.33 OSA (OBSTRUCTIVE SLEEP APNEA): ICD-10-CM

## 2021-02-26 DIAGNOSIS — F33.1 MODERATE EPISODE OF RECURRENT MAJOR DEPRESSIVE DISORDER (HCC): ICD-10-CM

## 2021-02-26 DIAGNOSIS — N76.0 ACUTE VAGINITIS: ICD-10-CM

## 2021-02-26 PROCEDURE — 99214 OFFICE O/P EST MOD 30 MIN: CPT | Performed by: PHYSICIAN ASSISTANT

## 2021-02-26 RX ORDER — OMEPRAZOLE 40 MG/1
40 CAPSULE, DELAYED RELEASE ORAL DAILY
Qty: 30 CAPSULE | Refills: 2 | Status: SHIPPED | OUTPATIENT
Start: 2021-02-26 | End: 2021-03-08

## 2021-02-26 RX ORDER — ACETAMINOPHEN AND CODEINE PHOSPHATE 300; 30 MG/1; MG/1
TABLET ORAL
COMMUNITY
Start: 2021-02-16 | End: 2021-05-20

## 2021-02-26 RX ORDER — ESCITALOPRAM OXALATE 5 MG/1
5 TABLET ORAL DAILY
Qty: 30 TABLET | Refills: 0 | Status: SHIPPED | OUTPATIENT
Start: 2021-02-26 | End: 2021-05-20

## 2021-02-26 RX ORDER — FLUCONAZOLE 150 MG/1
150 TABLET ORAL ONCE
Qty: 1 TABLET | Refills: 0 | Status: SHIPPED | OUTPATIENT
Start: 2021-02-26 | End: 2021-02-26

## 2021-02-26 RX ORDER — OMEPRAZOLE 40 MG/1
40 CAPSULE, DELAYED RELEASE ORAL DAILY
COMMUNITY
End: 2021-02-26 | Stop reason: SDUPTHER

## 2021-02-26 RX ORDER — AMOXICILLIN 500 MG/1
CAPSULE ORAL
COMMUNITY
Start: 2021-02-16 | End: 2021-03-08

## 2021-02-26 NOTE — PROGRESS NOTES
Chief Complaint  Establish Care    Subjective          History of Present Illness  Geneva Cordova presents to [facility] to establish care.  Recent Bariatric Surgery:  Has fu again in a week, she has been keeping f/u with them as dir. They did a swallow study due to heart burn and started her on omeprazole but she will be out before her next f/u with them. She is following diet guidelines. No vomiting, does have nausea at times with the heartburn.    VIRA:  Has not been using cpap but had abnormal sleep study in the past and is supposed to. She is hoping that weight loss will help VIRA. Is not sure if she still needs CPAP.    Yeast inf:  Is taking abx for dental infection but had to stop due to yeast. She took a diflucan a few days ago but still having the vaginal itching. Her tooth is feeling better.    Anxiety/Depression:  Had been on Celexa and Zoloft in the past but was not taking them regularly and didn't feel like the Zoloft ever helped much. Is interested in trying another medication for this. Has not tried Lexapro before. No hi/si.   Anxiety AUGUSTINE-7    Feeling nervous, anxious or on edge: Nearly every day  Not being able to stop or control worrying: More than half the days  Worrying too much about different things: Nearly every day  Trouble Relaxing: Nearly every day  Being so restless that it is hard to sit still: Nearly every day  Becoming easily annoyed or irritable: Nearly every day  Feeling afraid as if something awful might happen: Nearly every day  AUGUSTINE 7 Total Score: 20     PHQ-9 Depression Screening  Little interest or pleasure in doing things? 2   Feeling down, depressed, or hopeless? 2   Trouble falling or staying asleep, or sleeping too much? 2   Feeling tired or having little energy? 2   Poor appetite or overeating? 2   Feeling bad about yourself - or that you are a failure or have let yourself or your family down? 2   Trouble concentrating on things, such as reading the  newspaper or watching television? 0   Moving or speaking so slowly that other people could have noticed? Or the opposite - being so fidgety or restless that you have been moving around a lot more than usual? 0   Thoughts that you would be better off dead, or of hurting yourself in some way? 1   PHQ-9 Total Score 13   If you checked off any problems, how difficult have these problems made it for you to do your work, take care of things at home, or get along with other people?             Review of Systems   Constitutional: Negative for fever and unexpected weight loss.   Respiratory: Negative for cough, shortness of breath and wheezing.    Cardiovascular: Negative for chest pain and palpitations.   Gastrointestinal: Positive for nausea, GERD and indigestion. Negative for abdominal pain, blood in stool, constipation, diarrhea and vomiting.   Genitourinary: Negative for dysuria, frequency, hematuria, vaginal discharge and vaginal pain.   Neurological: Negative for dizziness, seizures, syncope and headache.   Psychiatric/Behavioral: Positive for sleep disturbance and depressed mood. Negative for self-injury and suicidal ideas. The patient is nervous/anxious.        The following portions of the patient's history were reviewed and updated as appropriate: allergies, current medications, past family history, past medical history, past social history, past surgical history and problem list.    No Known Allergies  Current Outpatient Medications on File Prior to Visit   Medication Sig Dispense Refill   • acetaminophen-codeine (TYLENOL #3) 300-30 MG per tablet TAKE 1 TABLET BY MOUTH EVERY 3 TO 4 HOURS AS NEEDED FOR PAIN     • amoxicillin (AMOXIL) 500 MG capsule TAKE ONE CAPSULE BY MOUTH THREE TIMES DAILY UNTIL ALL TAKEN     • apixaban (Eliquis) 2.5 MG tablet tablet Take 1 tablet by mouth 2 (Two) Times a Day. 42 tablet 0   • Cholecalciferol 1.25 MG (79281 UT) tablet Take 1.25 mg by mouth 1 (One) Time Per Week. 12 tablet 0   •  "Multiple Vitamins-Minerals (MULTIPLE VITAMINS/WOMENS) tablet Take 1 tablet by mouth Daily.     • ondansetron ODT (Zofran ODT) 4 MG disintegrating tablet Place 1 tablet on the tongue Every 8 (Eight) Hours As Needed for Nausea or Vomiting. 10 tablet 0   • vitamin D (ERGOCALCIFEROL) 1.25 MG (31605 UT) capsule capsule Take 1 capsule by mouth Every 7 (Seven) Days for 12 doses. 12 capsule 0     No current facility-administered medications on file prior to visit.      Past Medical History:   Diagnosis Date   • Anemia    • Anxiety    • Bilateral lower extremity edema     R>L   • Boil, axilla     recurrent   • Chronic back pain     no meds or injections, feels related to breasts   • Depression    • Dyspepsia    • Dyspnea on exertion    • Fatigue    • Glucose intolerance (impaired glucose tolerance)     \"hasn't been tested for that\"   • Helicobacter pylori (H. pylori) infection     asx and grossly nl EGD. \"abundant\"on path. HBT still + after PrevPak tx. LSG path neg. neg testing 2018   • Hypoalbuminemia     3.2 in past, 4.1 most recent   • Left Ovarian serous cystadenoma -removed at  section 2017   • Migraine     resolved with weightloss   • Obesity, morbid (CMS/HCC)    • VIRA (obstructive sleep apnea)      improved since WLS, pending f/u sleep study, not using CPAP   • PCOS (polycystic ovarian syndrome)    • Right Essure implantation 2017 10/6/2017   • S/P  section;left ovarian cystectomy and partial salpingectomy 17   • Seasonal allergies       Past Surgical History:   Procedure Laterality Date   •  SECTION N/A 2017      SECTION PRIMARY;  CYSTECTOMY; Kwadwo Baxter MD; :  [facility];  Service:    • DILATATION AND CURETTAGE     • ENDOMETRIAL ABLATION  2020    Dr Cuevas   • ENDOSCOPY N/A 2020    Procedure: ESOPHAGOGASTRODUODENOSCOPY;  Surgeon: Natalia Mariscal MD;  Location: [facility];  Service: Bariatric;  Laterality: " N/A;   • ESSURE TUBAL LIGATION  2018   • GALLBLADDER SURGERY     • GASTRIC SLEEVE LAPAROSCOPIC  07/2015    With Dr. Hughes   • GASTRIC SLEEVE LAPAROSCOPIC N/A 12/1/2020    Procedure: GASTRIC SLEEVE LAPAROSCOPIC;  Surgeon: Natalia Mariscal MD;  [facility];  Service: Bariatric;  Laterality: N/A;   • HYSTEROSCOPY ENDOMETRIAL ABLATION  02/2020   • LAPAROSCOPIC CHOLECYSTECTOMY  02/2018    Dr. Hughes   • SALPINGECTOMY Bilateral 02/2020    Dr Cuevas   • TONSILLECTOMY  2014   • TUBAL ABDOMINAL LIGATION     • TUBAL ABDOMINAL LIGATION  02/2020   • WISDOM TOOTH EXTRACTION  2014      Family History   Problem Relation Age of Onset   • No Known Problems Other    • Sleep apnea Mother    • Diabetes Mother    • No Known Problems Father    • Pancreatic cancer Maternal Grandmother    • No Known Problems Sister    • No Known Problems Brother    • No Known Problems Daughter    • No Known Problems Son    • No Known Problems Paternal Grandmother    • No Known Problems Maternal Aunt    • No Known Problems Paternal Aunt    • BRCA 1/2 Neg Hx    • Colon cancer Neg Hx    • Endometrial cancer Neg Hx    • Ovarian cancer Neg Hx       Social History     Socioeconomic History   • Marital status: Single     Spouse name: Not on file   • Number of children: 4   • Years of education: College    • Highest education level: Not on file   Occupational History   • Occupation:    Tobacco Use   • Smoking status: Never Smoker   • Smokeless tobacco: Never Used   Substance and Sexual Activity   • Alcohol use: Not Currently     Frequency: Monthly or less     Drinks per session: 1 or 2     Binge frequency: Never   • Drug use: No   • Sexual activity: Yes     Partners: Male     Birth control/protection: Surgical     Comment: no OCP   Social History Narrative    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         Objective   Vital Signs:   Vitals:    02/26/21 0915   BP: 119/73   Pulse: 72   Temp: 97.3 °F (36.3 °C)   TempSrc: Temporal  "  SpO2: 97%   Weight: (!) 138 kg (305 lb 3.2 oz)   Height: 172.7 cm (68\")      Body mass index is 46.41 kg/m².  Physical Exam  Vitals signs reviewed.   Constitutional:       General: She is not in acute distress.     Appearance: Normal appearance.   HENT:      Head: Normocephalic and atraumatic.   Eyes:      General: No scleral icterus.     Extraocular Movements: Extraocular movements intact.      Conjunctiva/sclera: Conjunctivae normal.      Pupils: Pupils are equal, round, and reactive to light.   Neck:      Musculoskeletal: Normal range of motion and neck supple.   Cardiovascular:      Rate and Rhythm: Normal rate and regular rhythm.      Heart sounds: Normal heart sounds. No murmur.   Pulmonary:      Effort: Pulmonary effort is normal. No respiratory distress.      Breath sounds: Normal breath sounds. No stridor. No wheezing or rhonchi.   Skin:     General: Skin is warm and dry.      Coloration: Skin is not jaundiced.   Neurological:      General: No focal deficit present.      Mental Status: She is alert and oriented to person, place, and time.      Gait: Gait normal.   Psychiatric:         Mood and Affect: Mood normal.         Behavior: Behavior normal.          Result Review :                   Assessment and Plan    Diagnoses and all orders for this visit:    1. GERD without esophagitis (Primary)  Assessment & Plan:  Refill omeprazole    Orders:  -     omeprazole (priLOSEC) 40 MG capsule; Take 1 capsule by mouth Daily.  Dispense: 30 capsule; Refill: 2    2. Anxiety    3. VIRA (obstructive sleep apnea)  Assessment & Plan:  Refer for sleep study    Orders:  -     Ambulatory Referral to Sleep Medicine    4. Acute vaginitis  -     fluconazole (Diflucan) 150 MG tablet; Take 1 tablet by mouth 1 (One) Time for 1 dose.  Dispense: 1 tablet; Refill: 0    5. Moderate episode of recurrent major depressive disorder (CMS/HCC)  Assessment & Plan:  Patient's depression is recurrent and is moderate without psychosis. Their " depression is currently active and the condition is unchanged. This will be reassessed in 2 weeks. F/U as described:patient was prescribed an antidepressant medicine.  Start Lexapro. Monitor for worsening sx or side effects. Er for hi/si.     Orders:  -     escitalopram (Lexapro) 5 MG tablet; Take 1 tablet by mouth Daily.  Dispense: 30 tablet; Refill: 0  -     Ambulatory Referral to Psychiatry        Follow Up   Return in about 2 weeks (around 3/12/2021).    Follow up if symptoms worsen or persist or has new or concerning symptoms, go to ER for severe symptoms.   Reviewed common medication effects and side effects and to report side effects immediately, the patient expressed good understanding.  Encouraged medication compliance and the importance of keeping scheduled follow up appointments with me and any other providers  If labs or images are ordered we will contact you with the results within the next week.  If you have not heard from us after a week please call our office to inquire about the results.   Patient was given instructions and counseling regarding her condition or for health maintenance advice. Please see specific information pulled into the AVS if appropriate.     Alice Fuentes PA-C    * Please note that portions of this note may have been completed with a voice recognition program. Efforts were made to edit the dictation but occasionally words are erroneously transcribed.

## 2021-02-28 PROBLEM — K21.9 GERD WITHOUT ESOPHAGITIS: Status: ACTIVE | Noted: 2021-02-28

## 2021-02-28 NOTE — ASSESSMENT & PLAN NOTE
Patient's depression is recurrent and is moderate without psychosis. Their depression is currently active and the condition is unchanged. This will be reassessed in 2 weeks. F/U as described:patient was prescribed an antidepressant medicine.  Start Lexapro. Monitor for worsening sx or side effects. Er for hi/si.

## 2021-03-01 LAB
CYTO UR: NORMAL
LAB AP CASE REPORT: NORMAL
LAB AP CLINICAL INFORMATION: NORMAL
PATH REPORT.FINAL DX SPEC: NORMAL
PATH REPORT.GROSS SPEC: NORMAL

## 2021-03-08 ENCOUNTER — OFFICE VISIT (OUTPATIENT)
Dept: BARIATRICS/WEIGHT MGMT | Facility: CLINIC | Age: 36
End: 2021-03-08

## 2021-03-08 VITALS
SYSTOLIC BLOOD PRESSURE: 120 MMHG | BODY MASS INDEX: 43.4 KG/M2 | DIASTOLIC BLOOD PRESSURE: 70 MMHG | RESPIRATION RATE: 18 BRPM | HEIGHT: 69 IN | HEART RATE: 66 BPM | OXYGEN SATURATION: 98 % | TEMPERATURE: 97.8 F | WEIGHT: 293 LBS

## 2021-03-08 DIAGNOSIS — R20.2 PARESTHESIA: ICD-10-CM

## 2021-03-08 DIAGNOSIS — K91.2 POSTGASTRECTOMY MALABSORPTION: ICD-10-CM

## 2021-03-08 DIAGNOSIS — Z13.0 SCREENING, IRON DEFICIENCY ANEMIA: ICD-10-CM

## 2021-03-08 DIAGNOSIS — Z98.84 STATUS POST BARIATRIC SURGERY: Primary | ICD-10-CM

## 2021-03-08 DIAGNOSIS — Z90.3 POSTGASTRECTOMY MALABSORPTION: ICD-10-CM

## 2021-03-08 DIAGNOSIS — Z13.21 MALNUTRITION SCREEN: ICD-10-CM

## 2021-03-08 DIAGNOSIS — E66.01 OBESITY, CLASS III, BMI 40-49.9 (MORBID OBESITY) (HCC): ICD-10-CM

## 2021-03-08 DIAGNOSIS — R53.82 CHRONIC FATIGUE: ICD-10-CM

## 2021-03-08 DIAGNOSIS — E55.9 HYPOVITAMINOSIS D: ICD-10-CM

## 2021-03-08 DIAGNOSIS — K21.9 GASTROESOPHAGEAL REFLUX DISEASE, UNSPECIFIED WHETHER ESOPHAGITIS PRESENT: ICD-10-CM

## 2021-03-08 PROCEDURE — 99214 OFFICE O/P EST MOD 30 MIN: CPT | Performed by: PHYSICIAN ASSISTANT

## 2021-03-08 RX ORDER — THIAMINE HYDROCHLORIDE 100 MG/ML
100 INJECTION, SOLUTION INTRAMUSCULAR; INTRAVENOUS DAILY
Qty: 21 ML | Refills: 0 | Status: SHIPPED | OUTPATIENT
Start: 2021-03-08 | End: 2021-03-29

## 2021-03-08 RX ORDER — OMEPRAZOLE 40 MG/1
40 CAPSULE, DELAYED RELEASE ORAL 2 TIMES DAILY
Qty: 60 CAPSULE | Refills: 2 | Status: SHIPPED | OUTPATIENT
Start: 2021-03-08 | End: 2021-05-20

## 2021-03-08 NOTE — PROGRESS NOTES
"[facility] Bariatric Surgery  [address]  [address]  [facility] [id number] [phone]        Patient Name:  Geneva Cordova.  :  1985      Reason for Visit:   3 months postop      HPI: Geneva Cordova is a 35 y.o. female   s/p LSG revision 20 w/ Dr. Mariscal (hx LSG  [facility])    Presents for 3 month eval and f/u from recent EGD.  Continues with heartburn, dysphagia, states she has noticed no improvement from EGD with dilatation.  Dysphagia noted with heavy foods. Did great with grits yesterday. Reflux daily, worse at night, wakes her twice a night, \"fearful\" during these episodes.  Taking omeprazole 40mg daily every day.    Constipation has improved. Denies nausea and vomiting.  Notes numbness of hands/ feet, discussed with PCP and Suspected B12 related. Denies memory/ confusion issues. Getting 90+g prot/day, premier protein. Eating several times a day, high protein options.    Drinking 64+ fluid oz/day.  1 month labs revealed prealbumin 13 advised 100g protein.  Taking MVI and Vit D, weekly D, stopped iron.  On Omeprazole .  Exercising- walking and was going to gym- now doing elliptical at home, jump roping.    Previous imagin/21/21 UGI  1. Status post vertical sleeve gastrectomy x1 month. There was no  evidence of extraluminal contrast. No postoperative strictures were  seen.  2. Moderate gastroesophageal reflux to the level of the thoracic inlet  3. Moderate sized sliding-type hiatal hernia    EGD with Dr. Mason 21 findings: the GE junction was present at 39cm and there was a sliding hiatal hernia. She had a slight stricture at the incisura and also pyloric spasm. Bother were dilated to 20mm.     Final Diagnosis   1.  ANTRUM, BIOPSY:               Reactive (chemical) gastropathy               Negative for intestinal metaplasia, significant dysplasia/malignancy               No Helicobacter-like organisms identified on H&E  2.  DISTAL ESOPHAGUS AT 38 CM, " "BIOPSY:                Benign squamous mucosa demonstrating changes of reflux esophagitis               Negative for intestinal metaplasia, significant dysplasia, malignancy                         Presurgery weight: 343 pounds.  Today's weight is 136 kg (300 lb) pounds, today's  Body mass index is 44.95 kg/m²., and@ weight loss since surgery is 43 pounds.      Past Medical History:   Diagnosis Date   • Anemia    • Anxiety    • Bilateral lower extremity edema     R>L   • Boil, axilla     recurrent   • Chronic back pain     no meds or injections, feels related to breasts   • Depression    • Dyspepsia    • Dyspnea on exertion    • Fatigue    • Glucose intolerance (impaired glucose tolerance)     \"hasn't been tested for that\"   • Helicobacter pylori (H. pylori) infection     asx and grossly nl EGD. \"abundant\"on path. HBT still + after PrevPak tx. LSG path neg. neg testing 2018   • Hypoalbuminemia     3.2 in past, 4.1 most recent   • Left Ovarian serous cystadenoma -removed at  section 2017   • Migraine     resolved with weightloss   • Obesity, morbid (CMS/HCC)    • VIRA (obstructive sleep apnea)      improved since WLS, pending f/u sleep study, not using CPAP   • PCOS (polycystic ovarian syndrome)    • Right Essure implantation 2017 10/6/2017   • S/P  section;left ovarian cystectomy and partial salpingectomy 17   • Seasonal allergies      Past Surgical History:   Procedure Laterality Date   •  SECTION N/A 2017      SECTION PRIMARY;  CYSTECTOMY; Kwadwo Baxter MD; :  [facility];  Service:    • DILATATION AND CURETTAGE     • ENDOMETRIAL ABLATION  2020    Dr Cuevas   • ENDOSCOPY N/A 2020    Procedure: ESOPHAGOGASTRODUODENOSCOPY;  Surgeon: Natalia Mariscal MD;  Location: [facility] OR;  Service: Bariatric;  Laterality: N/A;   • ESSURE TUBAL LIGATION     • GALLBLADDER SURGERY     • GASTRIC SLEEVE LAPAROSCOPIC  2015    " With Dr. Hughes   • GASTRIC SLEEVE LAPAROSCOPIC N/A 12/1/2020    Procedure: GASTRIC SLEEVE LAPAROSCOPIC;  Surgeon: Natalia Mariscal MD;  Location: [facility];  Service: Bariatric;  Laterality: N/A;   • HYSTEROSCOPY ENDOMETRIAL ABLATION  02/2020   • LAPAROSCOPIC CHOLECYSTECTOMY  02/2018    Dr. Hughes   • SALPINGECTOMY Bilateral 02/2020    Dr Cuevas   • TONSILLECTOMY  2014   • TUBAL ABDOMINAL LIGATION     • TUBAL ABDOMINAL LIGATION  02/2020   • WISDOM TOOTH EXTRACTION  2014     Outpatient Medications Marked as Taking for the 3/8/21 encounter (Office Visit) with Zenobia Leahy PA-C   Medication Sig Dispense Refill   • acetaminophen-codeine (TYLENOL #3) 300-30 MG per tablet TAKE 1 TABLET BY MOUTH EVERY 3 TO 4 HOURS AS NEEDED FOR PAIN     • Cholecalciferol 1.25 MG (28464 UT) tablet Take 1.25 mg by mouth 1 (One) Time Per Week. 12 tablet 0   • escitalopram (Lexapro) 5 MG tablet Take 1 tablet by mouth Daily. 30 tablet 0   • Multiple Vitamins-Minerals (MULTIPLE VITAMINS/WOMENS) tablet Take 1 tablet by mouth Daily.     • ondansetron ODT (Zofran ODT) 4 MG disintegrating tablet Place 1 tablet on the tongue Every 8 (Eight) Hours As Needed for Nausea or Vomiting. 10 tablet 0   • vitamin D (ERGOCALCIFEROL) 1.25 MG (32426 UT) capsule capsule Take 1 capsule by mouth Every 7 (Seven) Days for 12 doses. 12 capsule 0   • [DISCONTINUED] omeprazole (priLOSEC) 40 MG capsule Take 1 capsule by mouth Daily. 30 capsule 2       No Known Allergies    Social History     Socioeconomic History   • Marital status: Single     Spouse name: Not on file   • Number of children: 4   • Years of education: College    • Highest education level: Not on file   Tobacco Use   • Smoking status: Never Smoker   • Smokeless tobacco: Never Used   Vaping Use   • Vaping Use: Never used   Substance and Sexual Activity   • Alcohol use: Not Currently   • Drug use: No   • Sexual activity: Yes     Partners: Male     Birth control/protection: Surgical     Comment: no  "OCP       /70 (BP Location: Left arm, Patient Position: Sitting, Cuff Size: Adult)   Pulse 66   Temp 97.8 °F (36.6 °C) (Temporal)   Resp 18   Ht 174 cm (68.5\")   Wt 136 kg (300 lb)   SpO2 98%   BMI 44.95 kg/m²     Physical Exam  Constitutional:       Appearance: She is well-developed. She is obese.   HENT:      Head: Normocephalic and atraumatic.      Comments: mask  Cardiovascular:      Rate and Rhythm: Normal rate and regular rhythm.   Pulmonary:      Effort: Pulmonary effort is normal.      Breath sounds: Normal breath sounds.   Abdominal:      General: Bowel sounds are normal.      Palpations: Abdomen is soft.      Comments: Lap scars   Skin:     General: Skin is warm and dry.   Neurological:      Mental Status: She is alert.   Psychiatric:         Mood and Affect: Mood normal.         Behavior: Behavior normal.         Thought Content: Thought content normal.         Judgment: Judgment normal.           Assessment:  3 months s/p LSG revision 12/1/20 w/ Dr. Mariscal ( LSG 2015 [facility]    ICD-10-CM ICD-9-CM   1. Status post bariatric surgery  Z98.84 V45.86   2. Chronic fatigue  R53.82 780.79   3. Obesity, Class III, BMI 40-49.9 (morbid obesity) (CMS/HCC)  E66.01 278.01   4. Hypovitaminosis D  E55.9 268.9   5. Screening, iron deficiency anemia  Z13.0 V78.0   6. Malnutrition screen  Z13.21 V77.2   7. Postgastrectomy malabsorption  K91.2 579.3    Z90.3    8. Paresthesia  R20.2 782.0   9. Gastroesophageal reflux disease, unspecified whether esophagitis present  K21.9 530.81         Plan: advised increasing omeprazole to 40mg BID, Rx sent. Will discuss further plan with Dr. Mariscal given no symptom improvement with dilatation.  Will treat with empiric thiamine x 21 days and check labs.  Continue w/ good food choices and healthy habits.  Continue protein 70-100g/day.  Continue fluids 64oz daily. Continue routine exercise.  Routine bariatric labs ordered.  Continue vitamins w/ adjustments pending lab " results, add in B1 100mg and B12 1000mcg with any further changes pending lab results.  Call w/ problems/concerns.     Patient's Body mass index is 44.95 kg/m². BMI is above normal parameters. Recommendations include: exercise counseling and nutrition counseling.

## 2021-03-13 LAB
25(OH)D3+25(OH)D2 SERPL-MCNC: 23.6 NG/ML (ref 30–100)
ALBUMIN SERPL-MCNC: 3.9 G/DL (ref 3.5–5.2)
ALBUMIN/GLOB SERPL: 1.4 G/DL
ALP SERPL-CCNC: 76 U/L (ref 39–117)
ALT SERPL-CCNC: 13 U/L (ref 1–33)
AST SERPL-CCNC: 17 U/L (ref 1–32)
BASOPHILS # BLD AUTO: 0.03 10*3/MM3 (ref 0–0.2)
BASOPHILS NFR BLD AUTO: 0.6 % (ref 0–1.5)
BILIRUB SERPL-MCNC: 0.5 MG/DL (ref 0–1.2)
BUN SERPL-MCNC: 6 MG/DL (ref 6–20)
BUN/CREAT SERPL: 7.8 (ref 7–25)
CALCIUM SERPL-MCNC: 9.4 MG/DL (ref 8.6–10.5)
CHLORIDE SERPL-SCNC: 106 MMOL/L (ref 98–107)
CO2 SERPL-SCNC: 28 MMOL/L (ref 22–29)
CREAT SERPL-MCNC: 0.77 MG/DL (ref 0.57–1)
EOSINOPHIL # BLD AUTO: 0.17 10*3/MM3 (ref 0–0.4)
EOSINOPHIL NFR BLD AUTO: 3.5 % (ref 0.3–6.2)
ERYTHROCYTE [DISTWIDTH] IN BLOOD BY AUTOMATED COUNT: 11.9 % (ref 12.3–15.4)
FERRITIN SERPL-MCNC: 37 NG/ML (ref 13–150)
FOLATE SERPL-MCNC: 4.68 NG/ML (ref 4.78–24.2)
GLOBULIN SER CALC-MCNC: 2.7 GM/DL
GLUCOSE SERPL-MCNC: 79 MG/DL (ref 65–99)
HCT VFR BLD AUTO: 38.5 % (ref 34–46.6)
HGB BLD-MCNC: 12.2 G/DL (ref 12–15.9)
IMM GRANULOCYTES # BLD AUTO: 0 10*3/MM3 (ref 0–0.05)
IMM GRANULOCYTES NFR BLD AUTO: 0 % (ref 0–0.5)
IRON SERPL-MCNC: 107 MCG/DL (ref 37–145)
LYMPHOCYTES # BLD AUTO: 1.9 10*3/MM3 (ref 0.7–3.1)
LYMPHOCYTES NFR BLD AUTO: 39.5 % (ref 19.6–45.3)
Lab: NORMAL
MCH RBC QN AUTO: 29.8 PG (ref 26.6–33)
MCHC RBC AUTO-ENTMCNC: 31.7 G/DL (ref 31.5–35.7)
MCV RBC AUTO: 94.1 FL (ref 79–97)
METHYLMALONATE SERPL-SCNC: 234 NMOL/L (ref 0–378)
MONOCYTES # BLD AUTO: 0.19 10*3/MM3 (ref 0.1–0.9)
MONOCYTES NFR BLD AUTO: 4 % (ref 5–12)
NEUTROPHILS # BLD AUTO: 2.52 10*3/MM3 (ref 1.7–7)
NEUTROPHILS NFR BLD AUTO: 52.4 % (ref 42.7–76)
NRBC BLD AUTO-RTO: 0 /100 WBC (ref 0–0.2)
PLATELET # BLD AUTO: 250 10*3/MM3 (ref 140–450)
POTASSIUM SERPL-SCNC: 4.3 MMOL/L (ref 3.5–5.2)
PREALB SERPL-MCNC: 14 MG/DL (ref 14–35)
PROT SERPL-MCNC: 6.6 G/DL (ref 6–8.5)
RBC # BLD AUTO: 4.09 10*6/MM3 (ref 3.77–5.28)
SODIUM SERPL-SCNC: 141 MMOL/L (ref 136–145)
VIT B1 BLD-SCNC: 98.5 NMOL/L (ref 66.5–200)
WBC # BLD AUTO: 4.81 10*3/MM3 (ref 3.4–10.8)

## 2021-03-14 PROCEDURE — 83605 ASSAY OF LACTIC ACID: CPT | Performed by: EMERGENCY MEDICINE

## 2021-03-14 PROCEDURE — 80053 COMPREHEN METABOLIC PANEL: CPT | Performed by: EMERGENCY MEDICINE

## 2021-03-14 PROCEDURE — 96374 THER/PROPH/DIAG INJ IV PUSH: CPT

## 2021-03-14 PROCEDURE — 85025 COMPLETE CBC W/AUTO DIFF WBC: CPT | Performed by: EMERGENCY MEDICINE

## 2021-03-14 PROCEDURE — 99283 EMERGENCY DEPT VISIT LOW MDM: CPT

## 2021-03-14 PROCEDURE — 83690 ASSAY OF LIPASE: CPT | Performed by: EMERGENCY MEDICINE

## 2021-03-14 RX ORDER — SODIUM CHLORIDE 9 MG/ML
10 INJECTION INTRAVENOUS AS NEEDED
Status: DISCONTINUED | OUTPATIENT
Start: 2021-03-14 | End: 2021-03-15 | Stop reason: HOSPADM

## 2021-03-15 ENCOUNTER — APPOINTMENT (OUTPATIENT)
Dept: CT IMAGING | Facility: HOSPITAL | Age: 36
End: 2021-03-15

## 2021-03-15 ENCOUNTER — HOSPITAL ENCOUNTER (EMERGENCY)
Facility: HOSPITAL | Age: 36
Discharge: HOME | End: 2021-03-15
Attending: EMERGENCY MEDICINE | Admitting: EMERGENCY MEDICINE

## 2021-03-15 VITALS
HEART RATE: 77 BPM | TEMPERATURE: 97.8 F | RESPIRATION RATE: 16 BRPM | HEIGHT: 68 IN | DIASTOLIC BLOOD PRESSURE: 94 MMHG | BODY MASS INDEX: 44.41 KG/M2 | SYSTOLIC BLOOD PRESSURE: 122 MMHG | OXYGEN SATURATION: 95 % | WEIGHT: 293 LBS

## 2021-03-15 DIAGNOSIS — Z98.84 STATUS POST BARIATRIC SURGERY: ICD-10-CM

## 2021-03-15 DIAGNOSIS — R10.9 RIGHT SIDED ABDOMINAL PAIN: Primary | ICD-10-CM

## 2021-03-15 LAB
ALBUMIN SERPL-MCNC: 4.1 G/DL (ref 3.5–5.2)
ALBUMIN/GLOB SERPL: 1.4 G/DL
ALP SERPL-CCNC: 92 U/L (ref 39–117)
ALT SERPL W P-5'-P-CCNC: 33 U/L (ref 1–33)
ANION GAP SERPL CALCULATED.3IONS-SCNC: 8 MMOL/L (ref 5–15)
AST SERPL-CCNC: 95 U/L (ref 1–32)
B-HCG UR QL: NEGATIVE
BASOPHILS # BLD AUTO: 0.03 10*3/MM3 (ref 0–0.2)
BASOPHILS NFR BLD AUTO: 0.4 % (ref 0–1.5)
BILIRUB SERPL-MCNC: 0.3 MG/DL (ref 0–1.2)
BILIRUB UR QL STRIP: NEGATIVE
BUN SERPL-MCNC: 13 MG/DL (ref 6–20)
BUN/CREAT SERPL: 19.1 (ref 7–25)
CALCIUM SPEC-SCNC: 9.3 MG/DL (ref 8.6–10.5)
CHLORIDE SERPL-SCNC: 105 MMOL/L (ref 98–107)
CLARITY UR: CLEAR
CO2 SERPL-SCNC: 25 MMOL/L (ref 22–29)
COLOR UR: YELLOW
CREAT SERPL-MCNC: 0.68 MG/DL (ref 0.57–1)
D-LACTATE SERPL-SCNC: 1 MMOL/L (ref 0.5–2)
DEPRECATED RDW RBC AUTO: 43.5 FL (ref 37–54)
EOSINOPHIL # BLD AUTO: 0.15 10*3/MM3 (ref 0–0.4)
EOSINOPHIL NFR BLD AUTO: 2.2 % (ref 0.3–6.2)
ERYTHROCYTE [DISTWIDTH] IN BLOOD BY AUTOMATED COUNT: 12.5 % (ref 12.3–15.4)
GFR SERPL CREATININE-BSD FRML MDRD: 120 ML/MIN/1.73
GLOBULIN UR ELPH-MCNC: 2.9 GM/DL
GLUCOSE SERPL-MCNC: 94 MG/DL (ref 65–99)
GLUCOSE UR STRIP-MCNC: NEGATIVE MG/DL
HCT VFR BLD AUTO: 38.2 % (ref 34–46.6)
HGB BLD-MCNC: 11.9 G/DL (ref 12–15.9)
HGB UR QL STRIP.AUTO: NEGATIVE
HOLD SPECIMEN: NORMAL
IMM GRANULOCYTES # BLD AUTO: 0.01 10*3/MM3 (ref 0–0.05)
IMM GRANULOCYTES NFR BLD AUTO: 0.1 % (ref 0–0.5)
KETONES UR QL STRIP: NEGATIVE
LEUKOCYTE ESTERASE UR QL STRIP.AUTO: NEGATIVE
LIPASE SERPL-CCNC: 23 U/L (ref 13–60)
LYMPHOCYTES # BLD AUTO: 3.03 10*3/MM3 (ref 0.7–3.1)
LYMPHOCYTES NFR BLD AUTO: 45 % (ref 19.6–45.3)
MCH RBC QN AUTO: 29.4 PG (ref 26.6–33)
MCHC RBC AUTO-ENTMCNC: 31.2 G/DL (ref 31.5–35.7)
MCV RBC AUTO: 94.3 FL (ref 79–97)
MONOCYTES # BLD AUTO: 0.38 10*3/MM3 (ref 0.1–0.9)
MONOCYTES NFR BLD AUTO: 5.6 % (ref 5–12)
NEUTROPHILS NFR BLD AUTO: 3.14 10*3/MM3 (ref 1.7–7)
NEUTROPHILS NFR BLD AUTO: 46.7 % (ref 42.7–76)
NITRITE UR QL STRIP: NEGATIVE
NRBC BLD AUTO-RTO: 0 /100 WBC (ref 0–0.2)
PH UR STRIP.AUTO: 5.5 [PH] (ref 5–8)
PLATELET # BLD AUTO: 242 10*3/MM3 (ref 140–450)
PMV BLD AUTO: 12 FL (ref 6–12)
POTASSIUM SERPL-SCNC: 4.2 MMOL/L (ref 3.5–5.2)
PROT SERPL-MCNC: 7 G/DL (ref 6–8.5)
PROT UR QL STRIP: NEGATIVE
RBC # BLD AUTO: 4.05 10*6/MM3 (ref 3.77–5.28)
SODIUM SERPL-SCNC: 138 MMOL/L (ref 136–145)
SP GR UR STRIP: 1.09 (ref 1–1.03)
UROBILINOGEN UR QL STRIP: ABNORMAL
WBC # BLD AUTO: 6.74 10*3/MM3 (ref 3.4–10.8)
WHOLE BLOOD HOLD SPECIMEN: NORMAL
WHOLE BLOOD HOLD SPECIMEN: NORMAL

## 2021-03-15 PROCEDURE — 96374 THER/PROPH/DIAG INJ IV PUSH: CPT

## 2021-03-15 PROCEDURE — 25010000002 ONDANSETRON PER 1 MG: Performed by: EMERGENCY MEDICINE

## 2021-03-15 PROCEDURE — 81025 URINE PREGNANCY TEST: CPT | Performed by: EMERGENCY MEDICINE

## 2021-03-15 PROCEDURE — 25010000002 IOPAMIDOL 61 % SOLUTION: Performed by: EMERGENCY MEDICINE

## 2021-03-15 PROCEDURE — 74177 CT ABD & PELVIS W/CONTRAST: CPT

## 2021-03-15 PROCEDURE — 81003 URINALYSIS AUTO W/O SCOPE: CPT | Performed by: EMERGENCY MEDICINE

## 2021-03-15 RX ORDER — MORPHINE SULFATE 4 MG/ML
4 INJECTION, SOLUTION INTRAMUSCULAR; INTRAVENOUS
Status: DISCONTINUED | OUTPATIENT
Start: 2021-03-15 | End: 2021-03-15 | Stop reason: HOSPADM

## 2021-03-15 RX ORDER — ONDANSETRON 2 MG/ML
4 INJECTION INTRAMUSCULAR; INTRAVENOUS
Status: DISCONTINUED | OUTPATIENT
Start: 2021-03-15 | End: 2021-03-15 | Stop reason: HOSPADM

## 2021-03-15 RX ORDER — ONDANSETRON 4 MG/1
4 TABLET, ORALLY DISINTEGRATING ORAL 4 TIMES DAILY PRN
Qty: 15 TABLET | Refills: 0 | Status: SHIPPED | OUTPATIENT
Start: 2021-03-15 | End: 2021-05-20

## 2021-03-15 RX ADMIN — ONDANSETRON 4 MG: 2 INJECTION INTRAMUSCULAR; INTRAVENOUS at 02:01

## 2021-03-15 RX ADMIN — IOPAMIDOL 100 ML: 612 INJECTION, SOLUTION INTRAVENOUS at 02:14

## 2021-03-15 NOTE — ED PROVIDER NOTES
EMERGENCY DEPARTMENT ENCOUNTER      Pt Name: Geneva Cordova  MRN: [id number]  YOB: 1985  Date of evaluation: 3/14/2021  Provider: Nic Hollingsworth DO    CHIEF COMPLAINT       Chief Complaint   Patient presents with   • Abdominal Pain         HISTORY OF PRESENT ILLNESS  (Location/Symptom, Timing/Onset, Context/Setting, Quality, Duration, Modifying Factors, Severity.)   Geneva Cordova is a 35 y.o. female who presents to the emergency department for evaluation of right-sided abdominal discomfort which been progressively worsening since this afternoon.  She denies any nausea, vomiting or diarrhea, she does have a twisting discomfort which does radiate up towards the bilateral shoulders.  She does endorse a history of a gastric procedure with Dr. Mariscal, this past December.  She has also had a prior endoscopy with some acid reflux status post procedure.  Has had a prior cholecystectomy.  Also with bilateral tubal ligations.  No fever, chills, recent illness.  She has been doing everything as instructed posterior gastric surgery per the patient.  Denies any fever, chills, no chest pain difficulty breathing, no recent illness, no known sick exposures.  She denies any other acute symptom complaints this time.      Nursing notes were reviewed.    REVIEW OF SYSTEMS    (2-9 systems for level 4, 10 or more for level 5)   ROS:  General:  No fevers, no chills, no weakness  Cardiovascular:  No chest pain, no palpitations  Respiratory:  No shortness of breath, no cough, no wheezing  Gastrointestinal:  + Right-sided flank abdominal pain, no nausea, no vomiting, no diarrhea  Musculoskeletal:  No muscle pain, no joint pain  Skin:  No rash, no easy bruising  Neurologic:  No headache  Psychiatric:  + anxiety  Genitourinary:  No dysuria, no hematuria    Except as noted above the remainder of the review of systems was reviewed and negative.       PAST MEDICAL HISTORY     Past Medical History:   Diagnosis Date   •  "Anemia    • Anxiety    • Bilateral lower extremity edema     R>L   • Boil, axilla     recurrent   • Chronic back pain     no meds or injections, feels related to breasts   • COVID-19 2020   • Depression    • Dyspepsia    • Dyspnea on exertion    • Fatigue    • Glucose intolerance (impaired glucose tolerance)     \"hasn't been tested for that\"   • Helicobacter pylori (H. pylori) infection     asx and grossly nl EGD. \"abundant\"on path. HBT still + after PrevPak tx. LSG path neg. neg testing 2018   • Hypoalbuminemia     3.2 in past, 4.1 most recent   • Left Ovarian serous cystadenoma -removed at  section 2017   • Migraine     resolved with weightloss   • Obesity, morbid (CMS/HCC)    • VIRA (obstructive sleep apnea)      improved since WLS, pending f/u sleep study, not using CPAP   • PCOS (polycystic ovarian syndrome)    • Right Essure implantation 2017 10/6/2017   • S/P  section;left ovarian cystectomy and partial salpingectomy 17   • Seasonal allergies          SURGICAL HISTORY       Past Surgical History:   Procedure Laterality Date   •  SECTION N/A 2017      SECTION PRIMARY;  CYSTECTOMY; Kwadwo Baxter MD; :  [facility];  Service:    • DILATATION AND CURETTAGE     • ENDOMETRIAL ABLATION  2020    Dr Cuevas   • ENDOSCOPY N/A 2020    Procedure: ESOPHAGOGASTRODUODENOSCOPY;  Surgeon: Natalia Mariscal MD;  Location:  [facility];  Service: Bariatric;  Laterality: N/A;   • ESSURE TUBAL LIGATION     • GALLBLADDER SURGERY     • GASTRIC SLEEVE LAPAROSCOPIC  2015    With Dr. Hughes   • GASTRIC SLEEVE LAPAROSCOPIC N/A 2020    Procedure: GASTRIC SLEEVE LAPAROSCOPIC;  Surgeon: Natalia Mariscal MD;  Location:  [facility];  Service: Bariatric;  Laterality: N/A;   • HYSTEROSCOPY ENDOMETRIAL ABLATION  2020   • LAPAROSCOPIC CHOLECYSTECTOMY  2018    Dr. Hughes   • SALPINGECTOMY Bilateral 2020    Dr Cuevas   • " TONSILLECTOMY  2014   • TUBAL ABDOMINAL LIGATION     • TUBAL ABDOMINAL LIGATION  02/2020   • WISDOM TOOTH EXTRACTION  2014         CURRENT MEDICATIONS       Current Facility-Administered Medications:   •  Morphine sulfate (PF) injection 4 mg, 4 mg, Intravenous, Q30 Min PRN, Nic Hollingsworth DO  •  ondansetron (ZOFRAN) injection 4 mg, 4 mg, Intravenous, Q30 Min PRN, Nic Hollingsworth DO, 4 mg at 03/15/21 0201  •  Sodium Chloride (PF) 0.9 % 10 mL, 10 mL, Intravenous, PRN, Nic Hollingsworth DO    Current Outpatient Medications:   •  acetaminophen-codeine (TYLENOL #3) 300-30 MG per tablet, TAKE 1 TABLET BY MOUTH EVERY 3 TO 4 HOURS AS NEEDED FOR PAIN, Disp: , Rfl:   •  apixaban (Eliquis) 2.5 MG tablet tablet, Take 1 tablet by mouth 2 (Two) Times a Day., Disp: 42 tablet, Rfl: 0  •  Cholecalciferol 1.25 MG (53427 UT) tablet, Take 1.25 mg by mouth 1 (One) Time Per Week., Disp: 12 tablet, Rfl: 0  •  escitalopram (Lexapro) 5 MG tablet, Take 1 tablet by mouth Daily., Disp: 30 tablet, Rfl: 0  •  Multiple Vitamins-Minerals (MULTIPLE VITAMINS/WOMENS) tablet, Take 1 tablet by mouth Daily., Disp: , Rfl:   •  omeprazole (priLOSEC) 40 MG capsule, Take 1 capsule by mouth 2 (Two) Times a Day., Disp: 60 capsule, Rfl: 2  •  ondansetron ODT (Zofran ODT) 4 MG disintegrating tablet, Place 1 tablet on the tongue Every 8 (Eight) Hours As Needed for Nausea or Vomiting., Disp: 10 tablet, Rfl: 0  •  ondansetron ODT (ZOFRAN-ODT) 4 MG disintegrating tablet, Place 1 tablet on the tongue 4 (Four) Times a Day As Needed for Nausea or Vomiting., Disp: 15 tablet, Rfl: 0  •  thiamine (B-1) 100 MG/ML injection, Inject 1 mL into the appropriate muscle as directed by prescriber Daily for 21 days., Disp: 21 mL, Rfl: 0  •  vitamin D (ERGOCALCIFEROL) 1.25 MG (27563 UT) capsule capsule, Take 1 capsule by mouth Every 7 (Seven) Days for 12 doses., Disp: 12 capsule, Rfl: 0    ALLERGIES     Patient has no known allergies.    FAMILY HISTORY        Family History   Problem Relation Age of Onset   • No Known Problems Other    • Sleep apnea Mother    • Diabetes Mother    • No Known Problems Father    • Pancreatic cancer Maternal Grandmother    • No Known Problems Sister    • No Known Problems Brother    • No Known Problems Daughter    • No Known Problems Son    • No Known Problems Paternal Grandmother    • No Known Problems Maternal Aunt    • No Known Problems Paternal Aunt    • BRCA 1/2 Neg Hx    • Colon cancer Neg Hx    • Endometrial cancer Neg Hx    • Ovarian cancer Neg Hx           SOCIAL HISTORY       Social History     Socioeconomic History   • Marital status: Single     Spouse name: Not on file   • Number of children: 4   • Years of education: College    • Highest education level: Not on file   Tobacco Use   • Smoking status: Never Smoker   • Smokeless tobacco: Never Used   Vaping Use   • Vaping Use: Never used   Substance and Sexual Activity   • Alcohol use: Not Currently   • Drug use: No   • Sexual activity: Yes     Partners: Male     Birth control/protection: Surgical     Comment: no OCP         PHYSICAL EXAM    (up to 7 for level 4, 8 or more for level 5)     Vitals:    03/15/21 0200 03/15/21 0230 03/15/21 0245 03/15/21 0300   BP: 115/54 125/78  122/94   Pulse:       Resp:       Temp:       TempSrc:       SpO2:  99% 99% 95%   Weight:       Height:           Physical Exam  General : Patient is awake, alert, oriented, in no acute distress, appears in a mild to moderate discomfort, sitting forward  HEENT: Pupils are equally round and reactive to light, EOMI, conjunctivae clear, sclerae white, there is no injection no icterus.  Oral mucosa is moist, no exudate. Uvula is midline  Neck: Neck is supple, full range of motion, trachea midline  Cardiac: Heart regular rate, rhythm, no murmurs, rubs, or gallops  Lungs: Lungs are clear to auscultation, there is no wheezing, rhonchi, or rales. There is no use of accessory muscles  Abdomen: Abdomen is obese,  soft, nondistended.  There is discomfort along the right mid and lateral abdominal region without any peritoneal signs, bowel sounds are present throughout. There are no firm or pulsatile masses, no rebound rigidity or guarding.   Musculoskeletal: 5 out of 5 strength in all 4 extremities.  No focal muscle deficits are appreciated  Neuro: Motor intact, sensory intact, level of consciousness is normal  Dermatology: Skin is warm and dry  Psych: Mentation is grossly normal, cognition is grossly normal. Affect is appropriate.      DIAGNOSTIC RESULTS     EKG: All EKG's are interpreted by the Emergency Department Physician who either signs or Co-signs this chart in the absence of a cardiologist.    No orders to display       RADIOLOGY:   Non-plain film images such as CT, Ultrasound and MRI are read by the radiologist. Plain radiographic images are visualized and preliminarily interpreted by the emergency physician with the below findings:      [] Radiologist's Report Reviewed:  CT Abdomen Pelvis With Contrast   Final Result   Previous gastric surgery      Previous cholecystectomy      Otherwise normal               Signer Name: Joao Agrawal MD    Signed: 3/15/2021 2:32 AM    Workstation Name: [email or web address]     [facility]            ED BEDSIDE ULTRASOUND:   Performed by ED Physician - none    LABS:    I have reviewed and interpreted all of the currently available lab results from this visit (if applicable):  Results for orders placed or performed during the hospital encounter of 03/15/21   Comprehensive Metabolic Panel    Specimen: Blood   Result Value Ref Range    Glucose 94 65 - 99 mg/dL    BUN 13 6 - 20 mg/dL    Creatinine 0.68 0.57 - 1.00 mg/dL    Sodium 138 136 - 145 mmol/L    Potassium 4.2 3.5 - 5.2 mmol/L    Chloride 105 98 - 107 mmol/L    CO2 25.0 22.0 - 29.0 mmol/L    Calcium 9.3 8.6 - 10.5 mg/dL    Total Protein 7.0 6.0 - 8.5 g/dL    Albumin 4.10 3.50 - 5.20 g/dL    ALT (SGPT) 33 1 - 33 U/L     AST (SGOT) 95 (H) 1 - 32 U/L    Alkaline Phosphatase 92 39 - 117 U/L    Total Bilirubin 0.3 0.0 - 1.2 mg/dL    eGFR  African Amer 120 >60 mL/min/1.73    Globulin 2.9 gm/dL    A/G Ratio 1.4 g/dL    BUN/Creatinine Ratio 19.1 7.0 - 25.0    Anion Gap 8.0 5.0 - 15.0 mmol/L   Lipase    Specimen: Blood   Result Value Ref Range    Lipase 23 13 - 60 U/L   Urinalysis With Microscopic If Indicated (No Culture) - Urine, Clean Catch    Specimen: Urine, Clean Catch   Result Value Ref Range    Color, UA Yellow Yellow, Straw    Appearance, UA Clear Clear    pH, UA 5.5 5.0 - 8.0    Specific Gravity, UA 1.093 (H) 1.001 - 1.030    Glucose, UA Negative Negative    Ketones, UA Negative Negative    Bilirubin, UA Negative Negative    Blood, UA Negative Negative    Protein, UA Negative Negative    Leuk Esterase, UA Negative Negative    Nitrite, UA Negative Negative    Urobilinogen, UA 4.0 E.U./dL (A) 0.2 - 1.0 E.U./dL   Gray Top - Ice   Result Value Ref Range    Extra Tube Hold for add-ons.    CBC Auto Differential    Specimen: Blood   Result Value Ref Range    WBC 6.74 3.40 - 10.80 10*3/mm3    RBC 4.05 3.77 - 5.28 10*6/mm3    Hemoglobin 11.9 (L) 12.0 - 15.9 g/dL    Hematocrit 38.2 34.0 - 46.6 %    MCV 94.3 79.0 - 97.0 fL    MCH 29.4 26.6 - 33.0 pg    MCHC 31.2 (L) 31.5 - 35.7 g/dL    RDW 12.5 12.3 - 15.4 %    RDW-SD 43.5 37.0 - 54.0 fl    MPV 12.0 6.0 - 12.0 fL    Platelets 242 140 - 450 10*3/mm3    Neutrophil % 46.7 42.7 - 76.0 %    Lymphocyte % 45.0 19.6 - 45.3 %    Monocyte % 5.6 5.0 - 12.0 %    Eosinophil % 2.2 0.3 - 6.2 %    Basophil % 0.4 0.0 - 1.5 %    Immature Grans % 0.1 0.0 - 0.5 %    Neutrophils, Absolute 3.14 1.70 - 7.00 10*3/mm3    Lymphocytes, Absolute 3.03 0.70 - 3.10 10*3/mm3    Monocytes, Absolute 0.38 0.10 - 0.90 10*3/mm3    Eosinophils, Absolute 0.15 0.00 - 0.40 10*3/mm3    Basophils, Absolute 0.03 0.00 - 0.20 10*3/mm3    Immature Grans, Absolute 0.01 0.00 - 0.05 10*3/mm3    nRBC 0.0 0.0 - 0.2 /100 WBC   Lactic Acid,  Plasma    Specimen: Blood   Result Value Ref Range    Lactate 1.0 0.5 - 2.0 mmol/L   Pregnancy, Urine - Urine, Clean Catch    Specimen: Urine, Clean Catch   Result Value Ref Range    HCG, Urine QL Negative Negative   Light Blue Top   Result Value Ref Range    Extra Tube hold for add-on    Green Top (Gel)   Result Value Ref Range    Extra Tube Hold for add-ons.    Lavender Top   Result Value Ref Range    Extra Tube hold for add-on    Gold Top - SST   Result Value Ref Range    Extra Tube Hold for add-ons.         All other labs were within normal range or not returned as of this dictation.      EMERGENCY DEPARTMENT COURSE and DIFFERENTIAL DIAGNOSIS/MDM:   Vitals:    Vitals:    03/15/21 0200 03/15/21 0230 03/15/21 0245 03/15/21 0300   BP: 115/54 125/78  122/94   Pulse:       Resp:       Temp:       TempSrc:       SpO2:  99% 99% 95%   Weight:       Height:                Patient with right-sided abdominal pain worsening throughout the day today.  She is post a gastric bypass procedure few months back.  She is nontoxic, afebrile, is a obese individual, but with deep palpation has discomfort in the right anterior mid abdomen.  There are no peritoneal signs, abdomen is very soft.  Her vital signs are stable.  With her recent procedure we did discuss obtaining IV, labs as well as CT scan imaging.  Labs imaging reviewed, no acute normalities appreciated, CT scans unremarkable.  No acute postsurgical changes are noted.  On reevaluation the patient resting comfortably, she was updated on his results, we will plan on moving forward with continued gentle diet, good fluid hydration the next few days, calling her general surgeon for reevaluation next few days. I had a discussion with the patient/family regarding diagnosis, diagnostic results, treatment plan, and medications.  The patient/family indicated understanding of these instructions.  I spent adequate time at the bedside preceding discharge necessary to personally discuss  the aftercare instructions, giving patient education, providing explanations of the results of our evaluations/findings, and my decision making to assure that the patient/family understand the plan of care.  Time was allotted to answer questions at that time and throughout the ED course.  Emphasis was placed on timely follow-up after discharge.  I also discussed the potential for the development of an acute emergent condition requiring further evaluation, admission, or even surgical intervention. I discussed that we found nothing during the visit today indicating the need for further workup, admission, or the presence of an unstable medical condition.  I encouraged the patient to return to the emergency department immediately for ANY concerns, worsening, new complaints, or if symptoms persist and unable to seek follow-up in a timely fashion.  The patient/family expressed understanding and agreement with this plan.  The patient will follow-up with their PCP in 1-2 days for reevaluation.       MEDICATIONS ADMINISTERED IN ED:  Medications   Sodium Chloride (PF) 0.9 % 10 mL (has no administration in time range)   Morphine sulfate (PF) injection 4 mg (4 mg Intravenous Not Given 3/15/21 0250)   ondansetron (ZOFRAN) injection 4 mg (4 mg Intravenous Given 3/15/21 0201)   iopamidol (ISOVUE-300) 61 % injection 100 mL (100 mL Intravenous Given 3/15/21 0214)       PROCEDURES:  Procedures    CRITICAL CARE TIME    Total Critical Care time was 0 minutes, excluding separately reportable procedures.   There was a high probability of clinically significant/life threatening deterioration in the patient's condition which required my urgent intervention.      FINAL IMPRESSION      1. Right sided abdominal pain    2. Status post bariatric surgery          DISPOSITION/PLAN     ED Disposition     ED Disposition Condition Comment    Discharge Stable           PATIENT REFERRED TO:  Alice Fuentes PA-C  [address]  [place] [id number]  [phone]    In 2 days      [facility] Emergency Department  [address]  [facility] [address] [phone]    If symptoms worsen    Natalia Mariscal MD  [address]  [address]  [facility] [address] [phone]    Schedule an appointment as soon as possible for a visit         DISCHARGE MEDICATIONS:     Medication List      CHANGE how you take these medications    * ondansetron ODT 4 MG disintegrating tablet  Commonly known as: Zofran ODT  Place 1 tablet on the tongue Every 8 (Eight) Hours As Needed for Nausea or Vomiting.  What changed: Another medication with the same name was added. Make sure you understand how and when to take each.     * ondansetron ODT 4 MG disintegrating tablet  Commonly known as: ZOFRAN-ODT  Place 1 tablet on the tongue 4 (Four) Times a Day As Needed for Nausea or Vomiting.  What changed: You were already taking a medication with the same name, and this prescription was added. Make sure you understand how and when to take each.         * This list has 2 medication(s) that are the same as other medications prescribed for you. Read the directions carefully, and ask your doctor or other care provider to review them with you.            CONTINUE taking these medications    acetaminophen-codeine 300-30 MG per tablet  Commonly known as: TYLENOL #3     apixaban 2.5 MG tablet tablet  Commonly known as: Eliquis  Take 1 tablet by mouth 2 (Two) Times a Day.     Cholecalciferol 1.25 MG (73539 UT) tablet  Take 1.25 mg by mouth 1 (One) Time Per Week.     escitalopram 5 MG tablet  Commonly known as: Lexapro  Take 1 tablet by mouth Daily.     Multiple Vitamins/Womens tablet tablet  Generic drug: multivitamin with minerals     omeprazole 40 MG capsule  Commonly known as: priLOSEC  Take 1 capsule by mouth 2 (Two) Times a Day.     thiamine 100 MG/ML injection  Commonly known as: B-1  Inject 1 mL into the appropriate muscle as  directed by prescriber Daily for 21 days.     vitamin D 1.25 MG (54761 UT) capsule capsule  Commonly known as: ERGOCALCIFEROL  Take 1 capsule by mouth Every 7 (Seven) Days for 12 doses.           Where to Get Your Medications      [id number] - [place], [place] - [address] AT [facility] - [phone]  - [phone]   [address] [place] [address]    Phone: [phone]   · ondansetron ODT 4 MG disintegrating tablet             Comment: Please note this report has been produced using speech recognition software.      Nic Hollingsworth DO  Attending Emergency Physician               Nic Hollingsworth DO  03/15/21 6802

## 2021-03-19 ENCOUNTER — PREP FOR SURGERY (OUTPATIENT)
Dept: OTHER | Facility: HOSPITAL | Age: 36
End: 2021-03-19

## 2021-03-19 DIAGNOSIS — K31.2 GASTRIC HOURGLASS STRICTURE OR STENOSIS: Primary | ICD-10-CM

## 2021-03-22 ENCOUNTER — HOSPITAL ENCOUNTER (OUTPATIENT)
Facility: HOSPITAL | Age: 36
Setting detail: HOSPITAL OUTPATIENT SURGERY
End: 2021-03-22
Attending: INTERNAL MEDICINE | Admitting: INTERNAL MEDICINE

## 2021-03-22 PROBLEM — K31.2 GASTRIC HOURGLASS STRICTURE OR STENOSIS: Status: ACTIVE | Noted: 2021-03-22

## 2021-03-23 ENCOUNTER — TELEPHONE (OUTPATIENT)
Dept: BARIATRICS/WEIGHT MGMT | Facility: CLINIC | Age: 36
End: 2021-03-23

## 2021-03-28 ENCOUNTER — APPOINTMENT (OUTPATIENT)
Dept: PREADMISSION TESTING | Facility: HOSPITAL | Age: 36
End: 2021-03-28

## 2021-03-30 ENCOUNTER — ANESTHESIA EVENT (OUTPATIENT)
Dept: GASTROENTEROLOGY | Facility: HOSPITAL | Age: 36
End: 2021-03-30

## 2021-03-30 RX ORDER — SODIUM CHLORIDE, SODIUM LACTATE, POTASSIUM CHLORIDE, CALCIUM CHLORIDE 600; 310; 30; 20 MG/100ML; MG/100ML; MG/100ML; MG/100ML
9 INJECTION, SOLUTION INTRAVENOUS CONTINUOUS
Status: CANCELLED | OUTPATIENT
Start: 2021-03-30

## 2021-03-30 RX ORDER — LIDOCAINE HYDROCHLORIDE 10 MG/ML
0.5 INJECTION, SOLUTION EPIDURAL; INFILTRATION; INTRACAUDAL; PERINEURAL ONCE AS NEEDED
Status: CANCELLED | OUTPATIENT
Start: 2021-03-30

## 2021-03-30 RX ORDER — SODIUM CHLORIDE 0.9 % (FLUSH) 0.9 %
10 SYRINGE (ML) INJECTION AS NEEDED
Status: CANCELLED | OUTPATIENT
Start: 2021-03-30

## 2021-03-30 RX ORDER — FAMOTIDINE 20 MG/1
20 TABLET, FILM COATED ORAL ONCE
Status: CANCELLED | OUTPATIENT
Start: 2021-03-30 | End: 2021-03-30

## 2021-03-30 RX ORDER — SODIUM CHLORIDE 0.9 % (FLUSH) 0.9 %
10 SYRINGE (ML) INJECTION EVERY 12 HOURS SCHEDULED
Status: CANCELLED | OUTPATIENT
Start: 2021-03-30

## 2021-03-30 RX ORDER — MIDAZOLAM HYDROCHLORIDE 1 MG/ML
2 INJECTION INTRAMUSCULAR; INTRAVENOUS
Status: CANCELLED | OUTPATIENT
Start: 2021-03-30

## 2021-03-30 RX ORDER — FAMOTIDINE 10 MG/ML
20 INJECTION, SOLUTION INTRAVENOUS ONCE
Status: CANCELLED | OUTPATIENT
Start: 2021-03-30 | End: 2021-03-30

## 2021-03-30 RX ORDER — MIDAZOLAM HYDROCHLORIDE 1 MG/ML
1 INJECTION INTRAMUSCULAR; INTRAVENOUS
Status: CANCELLED | OUTPATIENT
Start: 2021-03-30

## 2021-03-31 ENCOUNTER — ANESTHESIA (OUTPATIENT)
Dept: GASTROENTEROLOGY | Facility: HOSPITAL | Age: 36
End: 2021-03-31

## 2021-03-31 ENCOUNTER — PREP FOR SURGERY (OUTPATIENT)
Dept: OTHER | Facility: HOSPITAL | Age: 36
End: 2021-03-31

## 2021-03-31 DIAGNOSIS — R13.10 DYSPHAGIA, UNSPECIFIED TYPE: Primary | ICD-10-CM

## 2021-04-01 PROBLEM — R13.10 DYSPHAGIA: Status: ACTIVE | Noted: 2021-04-01

## 2021-04-05 ENCOUNTER — TELEMEDICINE (OUTPATIENT)
Dept: PSYCHIATRY | Facility: CLINIC | Age: 36
End: 2021-04-05

## 2021-04-05 DIAGNOSIS — F33.1 MODERATE EPISODE OF RECURRENT MAJOR DEPRESSIVE DISORDER (HCC): Primary | ICD-10-CM

## 2021-04-05 PROCEDURE — 90791 PSYCH DIAGNOSTIC EVALUATION: CPT | Performed by: COUNSELOR
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
## 2021-04-25 ENCOUNTER — APPOINTMENT (OUTPATIENT)
Dept: PREADMISSION TESTING | Facility: HOSPITAL | Age: 36
End: 2021-04-25

## 2021-04-26 ENCOUNTER — TELEPHONE (OUTPATIENT)
Dept: GASTROENTEROLOGY | Facility: CLINIC | Age: 36
End: 2021-04-26

## 2021-04-26 NOTE — TELEPHONE ENCOUNTER
RECEIVED A CALL FROM COVID TESTING SITE LETTING US KNOW THAT PATIENT DID NOT SHOW UP FOR HER COVID TEST. I CALLED AND LVM FOR HER TO CALL BACK, SO WE COULD EITHER RESCHEDULE EGD OR GET HER COVID TESTED TODAY SO THAT WE COULD PROCEED WITH EGD TOMORROW. WILL WAIT TO HEAR BACK FROM HER BEFORE CANCELLING.

## 2021-04-28 ENCOUNTER — TELEMEDICINE (OUTPATIENT)
Dept: PSYCHIATRY | Facility: CLINIC | Age: 36
End: 2021-04-28

## 2021-04-28 DIAGNOSIS — F33.1 MODERATE EPISODE OF RECURRENT MAJOR DEPRESSIVE DISORDER (HCC): Primary | ICD-10-CM

## 2021-04-28 PROCEDURE — 90832 PSYTX W PT 30 MINUTES: CPT | Performed by: COUNSELOR

## 2021-05-20 ENCOUNTER — OFFICE VISIT (OUTPATIENT)
Dept: INTERNAL MEDICINE | Facility: CLINIC | Age: 36
End: 2021-05-20

## 2021-05-20 ENCOUNTER — LAB (OUTPATIENT)
Dept: LAB | Facility: HOSPITAL | Age: 36
End: 2021-05-20

## 2021-05-20 VITALS
SYSTOLIC BLOOD PRESSURE: 124 MMHG | BODY MASS INDEX: 44.41 KG/M2 | OXYGEN SATURATION: 99 % | HEART RATE: 55 BPM | HEIGHT: 68 IN | WEIGHT: 293 LBS | RESPIRATION RATE: 20 BRPM | DIASTOLIC BLOOD PRESSURE: 72 MMHG | TEMPERATURE: 96.7 F

## 2021-05-20 DIAGNOSIS — K21.9 GERD WITHOUT ESOPHAGITIS: ICD-10-CM

## 2021-05-20 DIAGNOSIS — N62 LARGE BREASTS: ICD-10-CM

## 2021-05-20 DIAGNOSIS — N76.0 ACUTE VAGINITIS: ICD-10-CM

## 2021-05-20 DIAGNOSIS — R13.10 DYSPHAGIA, UNSPECIFIED TYPE: ICD-10-CM

## 2021-05-20 DIAGNOSIS — F33.1 MODERATE EPISODE OF RECURRENT MAJOR DEPRESSIVE DISORDER (HCC): ICD-10-CM

## 2021-05-20 DIAGNOSIS — N76.0 ACUTE VAGINITIS: Primary | ICD-10-CM

## 2021-05-20 DIAGNOSIS — F41.1 GENERALIZED ANXIETY DISORDER: ICD-10-CM

## 2021-05-20 PROCEDURE — 87661 TRICHOMONAS VAGINALIS AMPLIF: CPT | Performed by: PHYSICIAN ASSISTANT

## 2021-05-20 PROCEDURE — 87798 DETECT AGENT NOS DNA AMP: CPT | Performed by: PHYSICIAN ASSISTANT

## 2021-05-20 PROCEDURE — 87491 CHLMYD TRACH DNA AMP PROBE: CPT | Performed by: PHYSICIAN ASSISTANT

## 2021-05-20 PROCEDURE — 87801 DETECT AGNT MULT DNA AMPLI: CPT | Performed by: PHYSICIAN ASSISTANT

## 2021-05-20 PROCEDURE — 87591 N.GONORRHOEAE DNA AMP PROB: CPT | Performed by: PHYSICIAN ASSISTANT

## 2021-05-20 PROCEDURE — 99214 OFFICE O/P EST MOD 30 MIN: CPT | Performed by: PHYSICIAN ASSISTANT

## 2021-05-20 RX ORDER — BUPROPION HYDROCHLORIDE 150 MG/1
150 TABLET ORAL DAILY
Qty: 30 TABLET | Refills: 1 | Status: SHIPPED | OUTPATIENT
Start: 2021-05-20 | End: 2021-08-13

## 2021-05-20 RX ORDER — SYRINGE WITH NEEDLE, 1 ML 25GX5/8"
SYRINGE, EMPTY DISPOSABLE MISCELLANEOUS
COMMUNITY
Start: 2021-03-23

## 2021-05-20 RX ORDER — FLUCONAZOLE 150 MG/1
150 TABLET ORAL ONCE
Qty: 1 TABLET | Refills: 0 | Status: SHIPPED | OUTPATIENT
Start: 2021-05-20 | End: 2021-05-20

## 2021-05-20 RX ORDER — PANTOPRAZOLE SODIUM 40 MG/1
40 TABLET, DELAYED RELEASE ORAL 2 TIMES DAILY
Qty: 60 TABLET | Refills: 1 | Status: SHIPPED | OUTPATIENT
Start: 2021-05-20 | End: 2021-08-11

## 2021-05-20 NOTE — ASSESSMENT & PLAN NOTE
Advised to stop the omeprazole, will Rx Protonix twice daily until she can get in with surgery/GI for esophageal dilation

## 2021-05-20 NOTE — PROGRESS NOTES
Chief Complaint  Vaginal Discharge, Heartburn, and Anxiety    Subjective          History of Present Illness  Geneva Cordova presents to [facility] for   Recent Bariatric Surgery:  She is following diet guidelines. She had weight loss sx Dec 2020. Is at a stand still on her weight loss. She has not been doing her B12 shots but plans on bringing them into this office and having nurse do them.  Despite having bariatric surgery and losing some weight, her breast size is still large and she would like a referral to discuss breast reduction surgery. She has back pain from large breasts and her bras are cutting into her skin on her shoulders.    GERD/dysphagia:  She is having worsening GERD, taking 2 omeprazole per day but it is not helping much. Was told she would need her esophagus dilated, and is anxious about surgery so she has canceled a few times.      Anxiety/Depression:  Is seeing a therapist and feels like this is helping. Has tried and failed celexa, zoloft, and lexapro in the past (side effects or didn't work). She rates her depression a 5/6 out of 10, which is much better than it had been. No hi/si. Sleeping well. Needs to get anx under control so she will reschedule her surgery.     Vaginitis:  She used a harsh soap in her vaginal area and has had discharge ever since, she has not had unusual smell, the discharge is not thick.  She has a lot of vaginal irritation in the area feels raw.  She has pain when she pees but it is just due to the irritation.  She does get yeast infections at times especially when she uses soap/products that she knows she should not.      Review of Systems   Constitutional: Negative for fever and unexpected weight loss.   Respiratory: Negative for cough, shortness of breath and wheezing.    Cardiovascular: Negative for chest pain and palpitations.   Gastrointestinal: Positive for nausea, GERD and indigestion. Negative for abdominal pain, constipation,  "diarrhea and vomiting.   Genitourinary: Positive for vaginal discharge. Negative for flank pain, frequency, genital sores, hematuria, menstrual problem, pelvic pain, pelvic pressure and urgency.       The following portions of the patient's history were reviewed and updated as appropriate: allergies, current medications, past family history, past medical history, past social history, past surgical history and problem list.  No Known Allergies  Current Outpatient Medications on File Prior to Visit   Medication Sig Dispense Refill   • B-D 3CC LUER-LYLY SYR 79SU7-9/2 23G X 1-1/2\" 3 ML misc AS DIRECTED DAILY WITH THIAMINE     • Cholecalciferol 1.25 MG (74278 UT) tablet Take 1.25 mg by mouth 1 (One) Time Per Week. 12 tablet 0   • Multiple Vitamins-Minerals (MULTIPLE VITAMINS/WOMENS) tablet Take 1 tablet by mouth Daily.     • [DISCONTINUED] omeprazole (priLOSEC) 40 MG capsule Take 1 capsule by mouth 2 (Two) Times a Day. 60 capsule 2   • [DISCONTINUED] acetaminophen-codeine (TYLENOL #3) 300-30 MG per tablet TAKE 1 TABLET BY MOUTH EVERY 3 TO 4 HOURS AS NEEDED FOR PAIN     • [DISCONTINUED] apixaban (Eliquis) 2.5 MG tablet tablet Take 1 tablet by mouth 2 (Two) Times a Day. 42 tablet 0   • [DISCONTINUED] escitalopram (Lexapro) 5 MG tablet Take 1 tablet by mouth Daily. 30 tablet 0   • [DISCONTINUED] ondansetron ODT (Zofran ODT) 4 MG disintegrating tablet Place 1 tablet on the tongue Every 8 (Eight) Hours As Needed for Nausea or Vomiting. 10 tablet 0   • [DISCONTINUED] ondansetron ODT (ZOFRAN-ODT) 4 MG disintegrating tablet Place 1 tablet on the tongue 4 (Four) Times a Day As Needed for Nausea or Vomiting. 15 tablet 0     No current facility-administered medications on file prior to visit.     New Medications Ordered This Visit   Medications   • buPROPion XL (Wellbutrin XL) 150 MG 24 hr tablet     Sig: Take 1 tablet by mouth Daily.     Dispense:  30 tablet     Refill:  1   • pantoprazole (Protonix) 40 MG EC tablet     Sig: Take 1 " "tablet by mouth 2 (two) times a day.     Dispense:  60 tablet     Refill:  1   • fluconazole (Diflucan) 150 MG tablet     Sig: Take 1 tablet by mouth 1 (One) Time for 1 dose.     Dispense:  1 tablet     Refill:  0       Social History     Tobacco Use   Smoking Status Never Smoker   Smokeless Tobacco Never Used        Objective   Vital Signs:   Vitals:    05/20/21 0917   BP: 124/72   Pulse: 55   Resp: 20   Temp: 96.7 °F (35.9 °C)   TempSrc: Temporal   SpO2: 99%   Weight: 136 kg (300 lb)   Height: 172.7 cm (68\")   PainSc: 0-No pain      Physical Exam  Vitals reviewed.   Constitutional:       General: She is not in acute distress.     Appearance: Normal appearance. She is obese.   HENT:      Head: Normocephalic and atraumatic.   Eyes:      General: No scleral icterus.     Extraocular Movements: Extraocular movements intact.      Conjunctiva/sclera: Conjunctivae normal.   Cardiovascular:      Rate and Rhythm: Normal rate and regular rhythm.      Heart sounds: Normal heart sounds. No murmur heard.     Pulmonary:      Effort: Pulmonary effort is normal. No respiratory distress.      Breath sounds: Normal breath sounds. No stridor. No wheezing or rhonchi.   Musculoskeletal:      Cervical back: Normal range of motion and neck supple.   Skin:     General: Skin is warm and dry.      Coloration: Skin is not jaundiced.   Neurological:      General: No focal deficit present.      Mental Status: She is alert and oriented to person, place, and time.      Gait: Gait normal.   Psychiatric:         Mood and Affect: Mood normal.         Behavior: Behavior normal.          Result Review :                   Assessment and Plan {CC Problem List  Visit Diagnosis  ROS  Review (Popup)  [facility] Maintenance  Quality  BestPractice  Medications  SmartSets  SnapShot Encounters  Media :23}   Diagnoses and all orders for this visit:    1. Acute vaginitis (Primary)  Assessment & Plan:  Sent for [facility], will treat with " Diflucan    Orders:  -     NuSwab VG+ - Swab, Vagina; Future  -     fluconazole (Diflucan) 150 MG tablet; Take 1 tablet by mouth 1 (One) Time for 1 dose.  Dispense: 1 tablet; Refill: 0    2. Large breasts  -     Ambulatory Referral to Plastic Surgery    3. GERD without esophagitis  Assessment & Plan:  Advised to stop the omeprazole, will Rx Protonix twice daily until she can get in with surgery/GI for esophageal dilation    Orders:  -     pantoprazole (Protonix) 40 MG EC tablet; Take 1 tablet by mouth 2 (two) times a day.  Dispense: 60 tablet; Refill: 1    4. Moderate episode of recurrent major depressive disorder (CMS/HCC)  Assessment & Plan:  Patient's depression is recurrent and is moderate without psychosis. Their depression is currently in partial remission and the condition is improving with treatment. This will be reassessed in 4 weeks. F/U as described:patient was prescribed an antidepressant medicine and patient referred to Mental Health Specialist will start Wellbutrin, follow-up in months, continue therapy, monitor for side effects and follow-up sooner if needed.  Go to ER if has HI/SI    Orders:  -     buPROPion XL (Wellbutrin XL) 150 MG 24 hr tablet; Take 1 tablet by mouth Daily.  Dispense: 30 tablet; Refill: 1    5. Generalized anxiety disorder  Assessment & Plan:  Psychological condition is improving with treatment.  Continue current treatment regimen.  Psychological condition  will be reassessed in 4 weeks.  Will start Wellbutrin, continue seeing psych for therapy    Orders:  -     buPROPion XL (Wellbutrin XL) 150 MG 24 hr tablet; Take 1 tablet by mouth Daily.  Dispense: 30 tablet; Refill: 1    6. Dysphagia, unspecified type  Assessment & Plan:  Advised to follow-up with GI for this, we will work on controlling anxiety           Follow Up   Return in about 4 weeks (around 6/17/2021).    Follow up if symptoms worsen or persist or has new or concerning symptoms, go to ER for severe symptoms.   Reviewed  common medication effects and side effects and advised to report side effects immediately, the patient expressed good understanding.  Encouraged medication compliance and the importance of keeping scheduled follow up appointments with me and any other providers  If labs or images are ordered we will contact you with the results within the next week.  If you have not heard from us after a week please call our office to inquire about the results.   Patient was given instructions and counseling regarding her condition or for health maintenance advice. Please see specific information pulled into the AVS if appropriate.     Alice Fuentes PA-C    * Please note that portions of this note may have been completed with a voice recognition program. Efforts were made to edit the dictation but occasionally words are erroneously transcribed.

## 2021-05-20 NOTE — ASSESSMENT & PLAN NOTE
Psychological condition is improving with treatment.  Continue current treatment regimen.  Psychological condition  will be reassessed in 4 weeks.  Will start Wellbutrin, continue seeing psych for therapy

## 2021-05-24 LAB
A VAGINAE DNA VAG QL NAA+PROBE: ABNORMAL SCORE
BVAB2 DNA VAG QL NAA+PROBE: ABNORMAL SCORE
C ALBICANS DNA VAG QL NAA+PROBE: POSITIVE
C GLABRATA DNA VAG QL NAA+PROBE: NEGATIVE
C TRACH DNA VAG QL NAA+PROBE: NEGATIVE
MEGA1 DNA VAG QL NAA+PROBE: ABNORMAL SCORE
N GONORRHOEA DNA VAG QL NAA+PROBE: NEGATIVE
T VAGINALIS DNA VAG QL NAA+PROBE: NEGATIVE

## 2021-05-26 ENCOUNTER — TELEPHONE (OUTPATIENT)
Dept: INTERNAL MEDICINE | Facility: CLINIC | Age: 36
End: 2021-05-26

## 2021-05-26 RX ORDER — FLUCONAZOLE 150 MG/1
150 TABLET ORAL ONCE
Qty: 1 TABLET | Refills: 0 | Status: SHIPPED | OUTPATIENT
Start: 2021-05-26 | End: 2021-05-26

## 2021-05-26 NOTE — TELEPHONE ENCOUNTER
----- Message from Alice Fuentes PA-C sent at 5/25/2021  2:19 PM EDT -----  Pls call pt, Vaginal swab pos for yeast, neg for BV and STDs, diflucan  that was rxed should cover, fu if sx persist

## 2021-05-26 NOTE — TELEPHONE ENCOUNTER
PN of results. She stated understanding.  She says that it usually takes 2 doses of the diflucan to work for her.  Can we send in another dose.

## 2021-06-11 ENCOUNTER — OFFICE VISIT (OUTPATIENT)
Dept: BARIATRICS/WEIGHT MGMT | Facility: CLINIC | Age: 36
End: 2021-06-11

## 2021-06-11 VITALS
WEIGHT: 293 LBS | SYSTOLIC BLOOD PRESSURE: 126 MMHG | HEIGHT: 68 IN | BODY MASS INDEX: 44.41 KG/M2 | DIASTOLIC BLOOD PRESSURE: 72 MMHG | OXYGEN SATURATION: 99 % | TEMPERATURE: 98.6 F | RESPIRATION RATE: 18 BRPM | HEART RATE: 65 BPM

## 2021-06-11 DIAGNOSIS — K21.9 GASTROESOPHAGEAL REFLUX DISEASE, UNSPECIFIED WHETHER ESOPHAGITIS PRESENT: ICD-10-CM

## 2021-06-11 DIAGNOSIS — E53.8 FOLATE DEFICIENCY: ICD-10-CM

## 2021-06-11 DIAGNOSIS — R79.0 ABNORMAL BLOOD LEVEL OF IRON: ICD-10-CM

## 2021-06-11 DIAGNOSIS — E55.9 VITAMIN D DEFICIENCY: ICD-10-CM

## 2021-06-11 DIAGNOSIS — E66.01 OBESITY, CLASS III, BMI 40-49.9 (MORBID OBESITY) (HCC): Primary | ICD-10-CM

## 2021-06-11 PROCEDURE — 99214 OFFICE O/P EST MOD 30 MIN: CPT | Performed by: PHYSICIAN ASSISTANT

## 2021-06-11 RX ORDER — CHOLECALCIFEROL (VITAMIN D3) 1250 MCG
50000 CAPSULE ORAL WEEKLY
COMMUNITY
Start: 2021-05-13 | End: 2021-06-11

## 2021-06-11 NOTE — PROGRESS NOTES
[facility]  [address]  [address]  [facility] [address]  [phone]      Patient Name:  Geneva Cordova  :  1985      Date of Visit: 2021      Reason for Visit:  6 months postop    HPI:  Geneva Cordova is a 35 y.o. female s/p LSG revision 20 w/ Dr. Mariscal (hx LSG  [facility])    LOV 3/8/21 - c/o ongoing issues w/ heartburn and dysphagia.  Dr. Mariscal advised EGD w/ dilatation w/ Dr. Galvan - patient was scheduled, but DNKA - says has started having night terrors about having anesthesia - PCP thinks it may be anxiety related as well.      Prior eval includes:  UGI 21 @[facility] - moderate reflux, moderate sliding HH.  EGD 21 w/ Dr. Mariscal noted sliding HH w/ slight stricture at incisura w/ pylori spasm, dilated to 20mm.    Labs 3/8/21 - low folate, low Vit D.  Taking MVI + wkly Vit D and wkly B1 injections.      Has gained 7 lbs in the last 3 months - not sure why.  PCP has started her on Wellbutrin, hoping that would help w/ weight loss and anxiety, but so far she is not sure that it is actually helping.  Says reflux has gotten worse, despite increasing Protonix to BID dosing.  Foods still get stuck often, but says does better if she takes small bites and chews well.  The reflux bothers her more than the dysphagia at this point.  Still drinking 3 premiere protein shakes/day.  Exercising 2x/week - walking and going to the gym.       Presurgery weight: 343 pounds.  Today's weight is (!) 139 kg (307 lb) pounds, today's  Body mass index is 46.68 kg/m²., and weight loss since surgery is 36 pounds.       Past Medical History:   Diagnosis Date   • Anemia    • Anxiety    • Bilateral lower extremity edema     R>L   • Boil, axilla     recurrent   • Chronic back pain     no meds or injections, feels related to breasts   • COVID-19 2020   • Depression    • Dyspepsia    • Dyspnea on exertion    • Fatigue    • Glucose intolerance (impaired glucose  "tolerance)     \"hasn't been tested for that\"   • Helicobacter pylori (H. pylori) infection     asx and grossly nl EGD. \"abundant\"on path. HBT still + after PrevPak tx. LSG path neg. neg testing 2018   • Hypoalbuminemia     3.2 in past, 4.1 most recent   • Left Ovarian serous cystadenoma -removed at  section 2017   • Migraine     resolved with weightloss   • Obesity, morbid (CMS/HCC)    • VIRA (obstructive sleep apnea)      improved since WLS, pending f/u sleep study, not using CPAP   • PCOS (polycystic ovarian syndrome)    • Right Essure implantation 2017 10/6/2017   • S/P  section;left ovarian cystectomy and partial salpingectomy 17   • Seasonal allergies      Past Surgical History:   Procedure Laterality Date   •  SECTION N/A 2017      SECTION PRIMARY;  CYSTECTOMY; Kwadwo Baxter MD; :  [facility];  Service:    • DILATATION AND CURETTAGE     • ENDOMETRIAL ABLATION  2020    Dr Cuevas   • ENDOSCOPY N/A 2020    Procedure: ESOPHAGOGASTRODUODENOSCOPY;  Surgeon: Natalia Mariscal MD;  Location:  [facility];  Service: Bariatric;  Laterality: N/A;   • ESSURE TUBAL LIGATION     • GALLBLADDER SURGERY     • GASTRIC SLEEVE LAPAROSCOPIC  2015    With Dr. Hughes   • GASTRIC SLEEVE LAPAROSCOPIC N/A 2020    Procedure: GASTRIC SLEEVE LAPAROSCOPIC;  Surgeon: Natalia Mariscal MD;  Location:  [facility];  Service: Bariatric;  Laterality: N/A;   • HYSTEROSCOPY ENDOMETRIAL ABLATION  2020   • LAPAROSCOPIC CHOLECYSTECTOMY  2018    Dr. Hughes   • SALPINGECTOMY Bilateral 2020    Dr Cuevas   • TONSILLECTOMY     • TUBAL ABDOMINAL LIGATION     • TUBAL ABDOMINAL LIGATION  2020   • WISDOM TOOTH EXTRACTION       Outpatient Medications Marked as Taking for the 21 encounter (Office Visit) with Carmelita Bell PA   Medication Sig Dispense Refill   • B-D 3CC LUER-LYLY SYR 44UU0-3/2 23G X 1-1/2\" 3 ML misc AS " "DIRECTED DAILY WITH THIAMINE     • buPROPion XL (Wellbutrin XL) 150 MG 24 hr tablet Take 1 tablet by mouth Daily. 30 tablet 1   • Cholecalciferol 1.25 MG (02416 UT) tablet Take 1.25 mg by mouth 1 (One) Time Per Week. 12 tablet 0   • Multiple Vitamins-Minerals (MULTIPLE VITAMINS/WOMENS) tablet Take 1 tablet by mouth Daily.     • pantoprazole (Protonix) 40 MG EC tablet Take 1 tablet by mouth 2 (two) times a day. 60 tablet 1     No Known Allergies    Social History     Socioeconomic History   • Marital status: Single     Spouse name: Not on file   • Number of children: 4   • Years of education: College    • Highest education level: Not on file   Tobacco Use   • Smoking status: Never Smoker   • Smokeless tobacco: Never Used   Vaping Use   • Vaping Use: Never used   Substance and Sexual Activity   • Alcohol use: Not Currently   • Drug use: No   • Sexual activity: Yes     Partners: Male     Birth control/protection: Surgical     Comment: no OCP       /72 (BP Location: Left arm, Patient Position: Sitting, Cuff Size: Adult)   Pulse 65   Temp 98.6 °F (37 °C) (Temporal)   Resp 18   Ht 172.7 cm (68\")   Wt (!) 139 kg (307 lb)   SpO2 99%   BMI 46.68 kg/m²   Physical Exam  Constitutional:       Appearance: She is well-developed.      Comments: Wearing a mask   Eyes:      General: No scleral icterus.  Cardiovascular:      Rate and Rhythm: Normal rate.   Pulmonary:      Effort: Pulmonary effort is normal.   Musculoskeletal:         General: Normal range of motion.   Neurological:      Mental Status: She is alert.   Psychiatric:         Behavior: Behavior is cooperative.         Judgment: Judgment normal.           Assessment:   6 months s/p LSG revision 12/1/20 w/ Dr. Mariscal ( LS 2015 [facility])    ICD-10-CM ICD-9-CM   1. Obesity, Class III, BMI 40-49.9 (morbid obesity) (CMS/HCC)  E66.01 278.01   2. Gastroesophageal reflux disease, unspecified whether esophagitis present  K21.9 530.81   3. Vitamin D deficiency  E55.9 " 268.9   4. Folate deficiency  E53.8 266.2   5. Abnormal blood level of iron  R79.0 790.6         Patient's Body mass index is 46.68 kg/m². BMI is above normal parameters. Recommendations include: exercise counseling and nutrition counseling.      Plan:  Continue to focus on healthy habits.  Routine labs ordered.  Will discuss further w/ Dr. Mariscal and additional input to follow.          The patient was instructed to follow up in 3 months, sooner if needed.

## 2021-06-14 ENCOUNTER — TELEPHONE (OUTPATIENT)
Dept: BARIATRICS/WEIGHT MGMT | Facility: CLINIC | Age: 36
End: 2021-06-14

## 2021-06-14 RX ORDER — FAMOTIDINE 20 MG/1
20 TABLET, FILM COATED ORAL 2 TIMES DAILY
Qty: 60 TABLET | Refills: 3 | Status: SHIPPED | OUTPATIENT
Start: 2021-06-14 | End: 2021-08-11

## 2021-06-14 NOTE — TELEPHONE ENCOUNTER
Please let patient know Dr. Mariscal would like to add pepcid BID to her BID protonix and recommends EGD with dilatation with GI to help her symptoms. She was scheduled for this once with Dr. Galvan, will need to get rescheduled. I'll send Rx for pepcid    Patient notified and verbalized understanding.      Will Dr. Mariscal do her EGD w/dilation?

## 2021-06-14 NOTE — TELEPHONE ENCOUNTER
----- Message from Natalia Mariscal MD sent at 6/13/2021  3:55 PM EDT -----  Regarding: RE: please advise  I know she has a hiatal hernia.    I would optimize with bid PPI and bid famotidine.    I think he needs another EGD but with GI so they can dilate her to 30 mm.  That  stricture is probably causing her reflux more than anything.    ----- Message -----  From: Carmelita Bell PA  Sent: 6/11/2021   1:29 PM EDT  To: Natalia Mariscal MD  Subject: please advise                                    Please review my office note from today on this patient.  She wishes to further evaluate her uncontrolled reflux.  What would you advise?   Thanks!

## 2021-06-14 NOTE — TELEPHONE ENCOUNTER
I left patient a detailed message to please call and reschedule EGD with Dr. Galvan.  I left my phone number and ext for further questions or concerns.

## 2021-06-15 ENCOUNTER — HOSPITAL ENCOUNTER (EMERGENCY)
Facility: HOSPITAL | Age: 36
Discharge: HOME | End: 2021-06-15
Attending: EMERGENCY MEDICINE | Admitting: EMERGENCY MEDICINE

## 2021-06-15 ENCOUNTER — APPOINTMENT (OUTPATIENT)
Dept: ULTRASOUND IMAGING | Facility: HOSPITAL | Age: 36
End: 2021-06-15

## 2021-06-15 ENCOUNTER — APPOINTMENT (OUTPATIENT)
Dept: CT IMAGING | Facility: HOSPITAL | Age: 36
End: 2021-06-15

## 2021-06-15 VITALS
HEIGHT: 71 IN | BODY MASS INDEX: 40.6 KG/M2 | RESPIRATION RATE: 22 BRPM | SYSTOLIC BLOOD PRESSURE: 138 MMHG | DIASTOLIC BLOOD PRESSURE: 106 MMHG | OXYGEN SATURATION: 100 % | TEMPERATURE: 98.3 F | WEIGHT: 290 LBS | HEART RATE: 70 BPM

## 2021-06-15 DIAGNOSIS — R10.30 LOWER ABDOMINAL PAIN: Primary | ICD-10-CM

## 2021-06-15 DIAGNOSIS — N39.0 URINARY TRACT INFECTION WITHOUT HEMATURIA, SITE UNSPECIFIED: ICD-10-CM

## 2021-06-15 LAB
ALBUMIN SERPL-MCNC: 4.1 G/DL (ref 3.5–5.2)
ALBUMIN/GLOB SERPL: 1.4 G/DL
ALP SERPL-CCNC: 79 U/L (ref 39–117)
ALT SERPL W P-5'-P-CCNC: 11 U/L (ref 1–33)
ANION GAP SERPL CALCULATED.3IONS-SCNC: 9 MMOL/L (ref 5–15)
AST SERPL-CCNC: 17 U/L (ref 1–32)
B-HCG UR QL: NEGATIVE
BACTERIA UR QL AUTO: ABNORMAL /HPF
BASOPHILS # BLD AUTO: 0.02 10*3/MM3 (ref 0–0.2)
BASOPHILS NFR BLD AUTO: 0.3 % (ref 0–1.5)
BILIRUB SERPL-MCNC: 0.6 MG/DL (ref 0–1.2)
BILIRUB UR QL STRIP: NEGATIVE
BUN SERPL-MCNC: 9 MG/DL (ref 6–20)
BUN/CREAT SERPL: 11.5 (ref 7–25)
CALCIUM SPEC-SCNC: 9.5 MG/DL (ref 8.6–10.5)
CHLORIDE SERPL-SCNC: 105 MMOL/L (ref 98–107)
CLARITY UR: CLEAR
CO2 SERPL-SCNC: 26 MMOL/L (ref 22–29)
COLOR UR: YELLOW
CREAT SERPL-MCNC: 0.78 MG/DL (ref 0.57–1)
DEPRECATED RDW RBC AUTO: 43.4 FL (ref 37–54)
EOSINOPHIL # BLD AUTO: 0.19 10*3/MM3 (ref 0–0.4)
EOSINOPHIL NFR BLD AUTO: 2.4 % (ref 0.3–6.2)
ERYTHROCYTE [DISTWIDTH] IN BLOOD BY AUTOMATED COUNT: 12.7 % (ref 12.3–15.4)
GFR SERPL CREATININE-BSD FRML MDRD: 102 ML/MIN/1.73
GLOBULIN UR ELPH-MCNC: 3 GM/DL
GLUCOSE SERPL-MCNC: 102 MG/DL (ref 65–99)
GLUCOSE UR STRIP-MCNC: NEGATIVE MG/DL
HCT VFR BLD AUTO: 38.3 % (ref 34–46.6)
HGB BLD-MCNC: 12.5 G/DL (ref 12–15.9)
HGB UR QL STRIP.AUTO: NEGATIVE
HOLD SPECIMEN: NORMAL
HYALINE CASTS UR QL AUTO: ABNORMAL /LPF
IMM GRANULOCYTES # BLD AUTO: 0.02 10*3/MM3 (ref 0–0.05)
IMM GRANULOCYTES NFR BLD AUTO: 0.3 % (ref 0–0.5)
INTERNAL NEGATIVE CONTROL: NORMAL
INTERNAL POSITIVE CONTROL: NORMAL
KETONES UR QL STRIP: ABNORMAL
LEUKOCYTE ESTERASE UR QL STRIP.AUTO: NEGATIVE
LIPASE SERPL-CCNC: 12 U/L (ref 13–60)
LYMPHOCYTES # BLD AUTO: 1.63 10*3/MM3 (ref 0.7–3.1)
LYMPHOCYTES NFR BLD AUTO: 20.8 % (ref 19.6–45.3)
Lab: NORMAL
MCH RBC QN AUTO: 30.4 PG (ref 26.6–33)
MCHC RBC AUTO-ENTMCNC: 32.6 G/DL (ref 31.5–35.7)
MCV RBC AUTO: 93.2 FL (ref 79–97)
MONOCYTES # BLD AUTO: 0.32 10*3/MM3 (ref 0.1–0.9)
MONOCYTES NFR BLD AUTO: 4.1 % (ref 5–12)
NEUTROPHILS NFR BLD AUTO: 5.65 10*3/MM3 (ref 1.7–7)
NEUTROPHILS NFR BLD AUTO: 72.1 % (ref 42.7–76)
NITRITE UR QL STRIP: POSITIVE
NRBC BLD AUTO-RTO: 0 /100 WBC (ref 0–0.2)
PH UR STRIP.AUTO: 5.5 [PH] (ref 5–8)
PLATELET # BLD AUTO: 269 10*3/MM3 (ref 140–450)
PMV BLD AUTO: 10.8 FL (ref 6–12)
POTASSIUM SERPL-SCNC: 3.8 MMOL/L (ref 3.5–5.2)
PROT SERPL-MCNC: 7.1 G/DL (ref 6–8.5)
PROT UR QL STRIP: NEGATIVE
RBC # BLD AUTO: 4.11 10*6/MM3 (ref 3.77–5.28)
RBC # UR: ABNORMAL /HPF
REF LAB TEST METHOD: ABNORMAL
SODIUM SERPL-SCNC: 140 MMOL/L (ref 136–145)
SP GR UR STRIP: 1.03 (ref 1–1.03)
SQUAMOUS #/AREA URNS HPF: ABNORMAL /HPF
UROBILINOGEN UR QL STRIP: ABNORMAL
WBC # BLD AUTO: 7.83 10*3/MM3 (ref 3.4–10.8)
WBC UR QL AUTO: ABNORMAL /HPF
WHOLE BLOOD HOLD SPECIMEN: NORMAL

## 2021-06-15 PROCEDURE — 93976 VASCULAR STUDY: CPT

## 2021-06-15 PROCEDURE — 25010000002 MORPHINE PER 10 MG: Performed by: EMERGENCY MEDICINE

## 2021-06-15 PROCEDURE — 80053 COMPREHEN METABOLIC PANEL: CPT

## 2021-06-15 PROCEDURE — 81001 URINALYSIS AUTO W/SCOPE: CPT | Performed by: EMERGENCY MEDICINE

## 2021-06-15 PROCEDURE — 85025 COMPLETE CBC W/AUTO DIFF WBC: CPT

## 2021-06-15 PROCEDURE — 74177 CT ABD & PELVIS W/CONTRAST: CPT

## 2021-06-15 PROCEDURE — 83690 ASSAY OF LIPASE: CPT

## 2021-06-15 PROCEDURE — 25010000002 ONDANSETRON PER 1 MG: Performed by: EMERGENCY MEDICINE

## 2021-06-15 PROCEDURE — 81025 URINE PREGNANCY TEST: CPT | Performed by: EMERGENCY MEDICINE

## 2021-06-15 PROCEDURE — 96365 THER/PROPH/DIAG IV INF INIT: CPT

## 2021-06-15 PROCEDURE — 25010000002 IOPAMIDOL 61 % SOLUTION: Performed by: EMERGENCY MEDICINE

## 2021-06-15 PROCEDURE — 25010000002 CEFTRIAXONE PER 250 MG: Performed by: EMERGENCY MEDICINE

## 2021-06-15 PROCEDURE — 99283 EMERGENCY DEPT VISIT LOW MDM: CPT

## 2021-06-15 PROCEDURE — 76830 TRANSVAGINAL US NON-OB: CPT

## 2021-06-15 PROCEDURE — 81025 URINE PREGNANCY TEST: CPT

## 2021-06-15 PROCEDURE — 96376 TX/PRO/DX INJ SAME DRUG ADON: CPT

## 2021-06-15 PROCEDURE — 96375 TX/PRO/DX INJ NEW DRUG ADDON: CPT

## 2021-06-15 RX ORDER — CEPHALEXIN 500 MG/1
500 CAPSULE ORAL 4 TIMES DAILY
Qty: 40 CAPSULE | Refills: 0 | Status: SHIPPED | OUTPATIENT
Start: 2021-06-15 | End: 2021-06-25

## 2021-06-15 RX ORDER — MORPHINE SULFATE 4 MG/ML
4 INJECTION, SOLUTION INTRAMUSCULAR; INTRAVENOUS
Status: DISCONTINUED | OUTPATIENT
Start: 2021-06-15 | End: 2021-06-15 | Stop reason: HOSPADM

## 2021-06-15 RX ORDER — ONDANSETRON 2 MG/ML
4 INJECTION INTRAMUSCULAR; INTRAVENOUS
Status: DISCONTINUED | OUTPATIENT
Start: 2021-06-15 | End: 2021-06-15 | Stop reason: HOSPADM

## 2021-06-15 RX ORDER — SODIUM CHLORIDE 9 MG/ML
10 INJECTION INTRAVENOUS AS NEEDED
Status: DISCONTINUED | OUTPATIENT
Start: 2021-06-15 | End: 2021-06-15 | Stop reason: HOSPADM

## 2021-06-15 RX ORDER — ONDANSETRON 4 MG/1
4 TABLET, ORALLY DISINTEGRATING ORAL 4 TIMES DAILY PRN
Qty: 15 TABLET | Refills: 0 | OUTPATIENT
Start: 2021-06-15 | End: 2022-07-17

## 2021-06-15 RX ORDER — FLUCONAZOLE 150 MG/1
150 TABLET ORAL DAILY
Qty: 2 TABLET | Refills: 0 | Status: SHIPPED | OUTPATIENT
Start: 2021-06-15 | End: 2021-06-17

## 2021-06-15 RX ORDER — OMEPRAZOLE 40 MG/1
40 CAPSULE, DELAYED RELEASE ORAL DAILY
COMMUNITY
End: 2021-08-13

## 2021-06-15 RX ADMIN — ONDANSETRON 4 MG: 2 INJECTION INTRAMUSCULAR; INTRAVENOUS at 18:38

## 2021-06-15 RX ADMIN — SODIUM CHLORIDE 1 G: 900 INJECTION INTRAVENOUS at 19:26

## 2021-06-15 RX ADMIN — MORPHINE SULFATE 4 MG: 4 INJECTION, SOLUTION INTRAMUSCULAR; INTRAVENOUS at 19:41

## 2021-06-15 RX ADMIN — IOPAMIDOL 100 ML: 612 INJECTION, SOLUTION INTRAVENOUS at 20:08

## 2021-06-15 RX ADMIN — SODIUM CHLORIDE 1000 ML: 9 INJECTION, SOLUTION INTRAVENOUS at 18:39

## 2021-06-15 RX ADMIN — MORPHINE SULFATE 4 MG: 4 INJECTION, SOLUTION INTRAMUSCULAR; INTRAVENOUS at 18:40

## 2021-06-15 NOTE — ED PROVIDER NOTES
[place]    EMERGENCY DEPARTMENT ENCOUNTER      Pt Name: Geneva Cordova  MRN: [id number]  YOB: 1985  Date of evaluation: 6/15/2021  Provider: Nic Hollingsworth DO    CHIEF COMPLAINT       Chief Complaint   Patient presents with   • Abdominal Pain         HISTORY OF PRESENT ILLNESS  (Location/Symptom, Timing/Onset, Context/Setting, Quality, Duration, Modifying Factors, Severity.)   Geneva Cordova is a 35 y.o. female who presents to the emergency department for evaluation of lower abdominal pain which has been waxing and waning over the last few days.  Worsening throughout the day today she notes the pain is suprapubic and on the right lower quadrant of the abdomen.  She notes an episode of nausea and vomiting earlier today.  No fever or chills.  Mild dysuria but no vaginal bleeding or discharge.  No concern for underlying pregnancy.  She notes she has had multiple abdominal surgeries in the past with her stomach, gallbladder removal, C-sections she believes bilateral fallopian tube removal but still has both of her ovaries.  Note she is not concerned for urinary infection or STI at this time.  Denies any chest pain cough congestion fever chills or recent illness.  Believe she still has her appendix.  She does not have any other acute systemic complaints at this time.  Bowel movements have been normal.  Has a history of PCOS with prior cystectomies.  She has the patient pain is waxing and waning consistency cramping twisting sensation along the middle abdomen and right lower abdominal region.      Nursing notes were reviewed.    REVIEW OF SYSTEMS    (2-9 systems for level 4, 10 or more for level 5)   ROS:  General:  No fevers, no chills, no weakness  Cardiovascular:  No chest pain, no palpitations  Respiratory:  No shortness of breath, no cough, no wheezing  Gastrointestinal: Positive lower abdominal, right lower quadrant pain, + nausea, no vomiting, no diarrhea  Musculoskeletal:  No muscle pain, no  "joint pain  Skin:  No rash  Neurologic:  No headache  Psychiatric:  No anxiety  Genitourinary:  No dysuria, no hematuria    Except as noted above the remainder of the review of systems was reviewed and negative.       PAST MEDICAL HISTORY     Past Medical History:   Diagnosis Date   • Anemia    • Anxiety    • Bilateral lower extremity edema     R>L   • Boil, axilla     recurrent   • Chronic back pain     no meds or injections, feels related to breasts   • COVID-19 2020   • Depression    • Dyspepsia    • Dyspnea on exertion    • Fatigue    • Glucose intolerance (impaired glucose tolerance)     \"hasn't been tested for that\"   • Helicobacter pylori (H. pylori) infection     asx and grossly nl EGD. \"abundant\"on path. HBT still + after PrevPak tx. LSG path neg. neg testing 2018   • Hypoalbuminemia     3.2 in past, 4.1 most recent   • Left Ovarian serous cystadenoma -removed at  section 2017   • Migraine     resolved with weightloss   • Obesity, morbid (CMS/HCC)    • VIRA (obstructive sleep apnea)      improved since WLS, pending f/u sleep study, not using CPAP   • PCOS (polycystic ovarian syndrome)    • Right Essure implantation 2017 10/6/2017   • S/P  section;left ovarian cystectomy and partial salpingectomy 17   • Seasonal allergies          SURGICAL HISTORY       Past Surgical History:   Procedure Laterality Date   •  SECTION N/A 2017      SECTION PRIMARY;  CYSTECTOMY; Kwadwo Baxter MD; :  [facility];  Service:    • DILATATION AND CURETTAGE     • ENDOMETRIAL ABLATION  2020    Dr Cuevas   • ENDOSCOPY N/A 2020    Procedure: ESOPHAGOGASTRODUODENOSCOPY;  Surgeon: Natalia Mariscal MD;  Location: [facility] OR;  Service: Bariatric;  Laterality: N/A;   • ESSURE TUBAL LIGATION     • GALLBLADDER SURGERY     • GASTRIC SLEEVE LAPAROSCOPIC  2015    With Dr. Hughes   • GASTRIC SLEEVE LAPAROSCOPIC N/A 2020    " "Procedure: GASTRIC SLEEVE LAPAROSCOPIC;  Surgeon: Natalia Mariscal MD;  Location: [facility];  Service: Bariatric;  Laterality: N/A;   • HYSTEROSCOPY ENDOMETRIAL ABLATION  02/2020   • LAPAROSCOPIC CHOLECYSTECTOMY  02/2018    Dr. Hughes   • SALPINGECTOMY Bilateral 02/2020    Dr Cuevas   • TONSILLECTOMY  2014   • TUBAL ABDOMINAL LIGATION     • TUBAL ABDOMINAL LIGATION  02/2020   • WISDOM TOOTH EXTRACTION  2014         CURRENT MEDICATIONS       Current Facility-Administered Medications:   •  Morphine sulfate (PF) injection 4 mg, 4 mg, Intravenous, Q30 Min PRN, Nic Hollingsworth DO, 4 mg at 06/15/21 1941  •  ondansetron (ZOFRAN) injection 4 mg, 4 mg, Intravenous, Q30 Min PRN, Nic Hollingsworth DO, 4 mg at 06/15/21 1838  •  ondansetron (ZOFRAN) injection 4 mg, 4 mg, Intravenous, Q30 Min PRN, Nic Hollingsworth DO  •  Sodium Chloride (PF) 0.9 % 10 mL, 10 mL, Intravenous, PRN, Nic Hollingsworth DO    Current Outpatient Medications:   •  buPROPion XL (Wellbutrin XL) 150 MG 24 hr tablet, Take 1 tablet by mouth Daily., Disp: 30 tablet, Rfl: 1  •  Cholecalciferol 1.25 MG (54253 UT) tablet, Take 1.25 mg by mouth 1 (One) Time Per Week., Disp: 12 tablet, Rfl: 0  •  famotidine (Pepcid) 20 MG tablet, Take 1 tablet by mouth 2 (Two) Times a Day., Disp: 60 tablet, Rfl: 3  •  Multiple Vitamins-Minerals (MULTIPLE VITAMINS/WOMENS) tablet, Take 1 tablet by mouth Daily., Disp: , Rfl:   •  omeprazole (priLOSEC) 40 MG capsule, Take 40 mg by mouth Daily., Disp: , Rfl:   •  pantoprazole (Protonix) 40 MG EC tablet, Take 1 tablet by mouth 2 (two) times a day., Disp: 60 tablet, Rfl: 1  •  B-D 3CC LUER-LYLY SYR 94OW5-6/2 23G X 1-1/2\" 3 ML misc, AS DIRECTED DAILY WITH THIAMINE, Disp: , Rfl:   •  cephalexin (KEFLEX) 500 MG capsule, Take 1 capsule by mouth 4 (Four) Times a Day for 10 days., Disp: 40 capsule, Rfl: 0  •  ondansetron ODT (ZOFRAN-ODT) 4 MG disintegrating tablet, Place 1 tablet on the tongue 4 (Four) Times a Day As " "Needed for Nausea or Vomiting., Disp: 15 tablet, Rfl: 0    ALLERGIES     Nsaids    FAMILY HISTORY       Family History   Problem Relation Age of Onset   • No Known Problems Other    • Sleep apnea Mother    • Diabetes Mother    • No Known Problems Father    • Pancreatic cancer Maternal Grandmother    • No Known Problems Sister    • No Known Problems Brother    • No Known Problems Daughter    • No Known Problems Son    • No Known Problems Paternal Grandmother    • No Known Problems Maternal Aunt    • No Known Problems Paternal Aunt    • BRCA 1/2 Neg Hx    • Colon cancer Neg Hx    • Endometrial cancer Neg Hx    • Ovarian cancer Neg Hx           SOCIAL HISTORY       Social History     Socioeconomic History   • Marital status: Single     Spouse name: Not on file   • Number of children: 4   • Years of education: College    • Highest education level: Not on file   Tobacco Use   • Smoking status: Never Smoker   • Smokeless tobacco: Never Used   Vaping Use   • Vaping Use: Never used   Substance and Sexual Activity   • Alcohol use: Not Currently   • Drug use: No   • Sexual activity: Yes     Partners: Male     Birth control/protection: Surgical     Comment: no OCP         PHYSICAL EXAM    (up to 7 for level 4, 8 or more for level 5)     Vitals:    06/15/21 1508 06/15/21 1753 06/15/21 1844   BP: 144/93 (!) 138/106    BP Location: Right arm     Patient Position: Sitting     Pulse: 70     Resp: 22     Temp: 98.3 °F (36.8 °C)     TempSrc: Oral     SpO2: 98% 95% 100%   Weight: 132 kg (290 lb)     Height: 180.3 cm (71\")         Physical Exam  General : Patient is awake, alert, oriented, in no acute distress, mildly uncomfortable  HEENT: Pupils are equally round and reactive to light, EOMI, conjunctivae clear  Neck: Neck is supple, full range of motion, trachea midline  Cardiac: Heart regular rate, rhythm  Lungs: Lungs with no acute respiratory distress  Abdomen: Abdomen is soft, nondistended.  There is tenderness along the " suprapubic and right lower quadrant of the abdomen with subjective guarding, no peritoneal signs, abdomen remains soft, bowel sounds are present diffusely throughout.  Musculoskeletal: 5 out of 5 strength in all 4 extremities.  No focal muscle deficits are appreciated  Neuro: Motor intact, sensory intact, level of consciousness is normal  Dermatology: Skin is warm and dry  Psych: Mentation is grossly normal, cognition is grossly normal. Affect is appropriate.      DIAGNOSTIC RESULTS     EKG: All EKG's are interpreted by the Emergency Department Physician who either signs or Co-signs this chart in the absence of a cardiologist.    No orders to display       RADIOLOGY:   Non-plain film images such as CT, Ultrasound and MRI are read by the radiologist. Plain radiographic images are visualized and preliminarily interpreted by the emergency physician with the below findings:      [] Radiologist's Report Reviewed:  CT Abdomen Pelvis With Contrast   Final Result      1. No acute abdominal or pelvic findings.   2. Normal appendix.   3. Small hiatal hernia with evidence of prior gastric surgery.   4. Cholecystectomy.               Signer Name: George Cardozo MD    Signed: 6/15/2021 8:25 PM    Workstation [facility]: [facility]     Radiology [facility] Non-ob Transvaginal   Final Result   No significant abnormalities demonstrated of the pelvic structures by ultrasound.      Signer Name: Josep Cardozo MD    Signed: 6/15/2021 7:57 PM    Workstation Name: [facility]     Radiology [facility] Testicular or Ovarian Vascular Limited   Final Result   No significant abnormalities demonstrated of the pelvic structures by ultrasound.      Signer Name: Josep Cardozo MD    Signed: 6/15/2021 7:57 PM    Workstation Name: [facility]     Radiology [facility]            ED BEDSIDE ULTRASOUND:   Performed by ED Physician - none    LABS:    I have reviewed and interpreted all of the  currently available lab results from this visit (if applicable):  Results for orders placed or performed during the hospital encounter of 06/15/21   Comprehensive Metabolic Panel    Specimen: Blood   Result Value Ref Range    Glucose 102 (H) 65 - 99 mg/dL    BUN 9 6 - 20 mg/dL    Creatinine 0.78 0.57 - 1.00 mg/dL    Sodium 140 136 - 145 mmol/L    Potassium 3.8 3.5 - 5.2 mmol/L    Chloride 105 98 - 107 mmol/L    CO2 26.0 22.0 - 29.0 mmol/L    Calcium 9.5 8.6 - 10.5 mg/dL    Total Protein 7.1 6.0 - 8.5 g/dL    Albumin 4.10 3.50 - 5.20 g/dL    ALT (SGPT) 11 1 - 33 U/L    AST (SGOT) 17 1 - 32 U/L    Alkaline Phosphatase 79 39 - 117 U/L    Total Bilirubin 0.6 0.0 - 1.2 mg/dL    eGFR  African Amer 102 >60 mL/min/1.73    Globulin 3.0 gm/dL    A/G Ratio 1.4 g/dL    BUN/Creatinine Ratio 11.5 7.0 - 25.0    Anion Gap 9.0 5.0 - 15.0 mmol/L   Lipase    Specimen: Blood   Result Value Ref Range    Lipase 12 (L) 13 - 60 U/L   Urinalysis With Microscopic If Indicated (No Culture) - Urine, Clean Catch    Specimen: Urine, Clean Catch   Result Value Ref Range    Color, UA Yellow Yellow, Straw    Appearance, UA Clear Clear    pH, UA 5.5 5.0 - 8.0    Specific Gravity, UA 1.029 1.001 - 1.030    Glucose, UA Negative Negative    Ketones, UA Trace (A) Negative    Bilirubin, UA Negative Negative    Blood, UA Negative Negative    Protein, UA Negative Negative    Leuk Esterase, UA Negative Negative    Nitrite, UA Positive (A) Negative    Urobilinogen, UA 2.0 E.U./dL (A) 0.2 - 1.0 E.U./dL   CBC Auto Differential    Specimen: Blood   Result Value Ref Range    WBC 7.83 3.40 - 10.80 10*3/mm3    RBC 4.11 3.77 - 5.28 10*6/mm3    Hemoglobin 12.5 12.0 - 15.9 g/dL    Hematocrit 38.3 34.0 - 46.6 %    MCV 93.2 79.0 - 97.0 fL    MCH 30.4 26.6 - 33.0 pg    MCHC 32.6 31.5 - 35.7 g/dL    RDW 12.7 12.3 - 15.4 %    RDW-SD 43.4 37.0 - 54.0 fl    MPV 10.8 6.0 - 12.0 fL    Platelets 269 140 - 450 10*3/mm3    Neutrophil % 72.1 42.7 - 76.0 %    Lymphocyte % 20.8  "19.6 - 45.3 %    Monocyte % 4.1 (L) 5.0 - 12.0 %    Eosinophil % 2.4 0.3 - 6.2 %    Basophil % 0.3 0.0 - 1.5 %    Immature Grans % 0.3 0.0 - 0.5 %    Neutrophils, Absolute 5.65 1.70 - 7.00 10*3/mm3    Lymphocytes, Absolute 1.63 0.70 - 3.10 10*3/mm3    Monocytes, Absolute 0.32 0.10 - 0.90 10*3/mm3    Eosinophils, Absolute 0.19 0.00 - 0.40 10*3/mm3    Basophils, Absolute 0.02 0.00 - 0.20 10*3/mm3    Immature Grans, Absolute 0.02 0.00 - 0.05 10*3/mm3    nRBC 0.0 0.0 - 0.2 /100 WBC   Urinalysis, Microscopic Only - Urine, Clean Catch    Specimen: Urine, Clean Catch   Result Value Ref Range    RBC, UA 0-2 None Seen, 0-2 /HPF    WBC, UA 3-5 (A) None Seen, 0-2 /HPF    Bacteria, UA 4+ (A) None Seen, Trace /HPF    Squamous Epithelial Cells, UA 7-12 (A) None Seen, 0-2 /HPF    Hyaline Casts, UA 0-6 0 - 6 /LPF    Methodology Automated Microscopy    POC Pregnancy, Urine    Specimen: Urine   Result Value Ref Range    HCG, Urine, QL Negative Negative    Lot Number [id number]     Internal Positive Control Passed Passed    Internal Negative Control Passed Passed   Green Top (Gel)   Result Value Ref Range    Extra Tube Hold for add-ons.    Lavender Top   Result Value Ref Range    Extra Tube hold for add-on    Gold Top - SST   Result Value Ref Range    Extra Tube Hold for add-ons.    Gray Top   Result Value Ref Range    Extra Tube Hold for add-ons.         All other labs were within normal range or not returned as of this dictation.      EMERGENCY DEPARTMENT COURSE and DIFFERENTIAL DIAGNOSIS/MDM:   Vitals:    Vitals:    06/15/21 1508 06/15/21 1753 06/15/21 1844   BP: 144/93 (!) 138/106    BP Location: Right arm     Patient Position: Sitting     Pulse: 70     Resp: 22     Temp: 98.3 °F (36.8 °C)     TempSrc: Oral     SpO2: 98% 95% 100%   Weight: 132 kg (290 lb)     Height: 180.3 cm (71\")              Patient with few days of lower abdominal pain, worsening throughout the day today mainly in the suprapubic and right lower quadrant of the " abdomen.  Waxing waning in consistency cramping and sharp stabbing sensation.  Her vital signs are stable on arrival, we did obtain IV, labs and urinalysis.  Pregnancy is negative, white count is 7.8, urinalysis with 3-5 WBCs with 4+ bacteria and positive nitrites.  She has had multiple abdominal surgeries, concerned she still has her appendix and ovaries so we will move forward with a CT and ultrasound imaging, patient treated for possible urinary infection.  Transvaginal ultrasound is unremarkable, CT the abdomen/pelvis also with no acute abnormalities, appendix is normal.  On reevaluation the patient is resting comfortably, she was updated on the results, we discussed treatment for underlying urinary infection, good fluid hydration, nausea medication for few days.  Follow close with her PCP for reevaluation.  Patient agreeable to this, understands the return precautions discussed. I had a discussion with the patient/family regarding diagnosis, diagnostic results, treatment plan, and medications.  The patient/family indicated understanding of these instructions.  I spent adequate time at the bedside preceding discharge necessary to personally discuss the aftercare instructions, giving patient education, providing explanations of the results of our evaluations/findings, and my decision making to assure that the patient/family understand the plan of care.  Time was allotted to answer questions at that time and throughout the ED course.  Emphasis was placed on timely follow-up after discharge.  I also discussed the potential for the development of an acute emergent condition requiring further evaluation, admission, or even surgical intervention. I discussed that we found nothing during the visit today indicating the need for further workup, admission, or the presence of an unstable medical condition.  I encouraged the patient to return to the emergency department immediately for ANY concerns, worsening, new  complaints, or if symptoms persist and unable to seek follow-up in a timely fashion.  The patient/family expressed understanding and agreement with this plan.  The patient will follow-up with their PCP in 1-2 days for reevaluation.       MEDICATIONS ADMINISTERED IN ED:  Medications   Sodium Chloride (PF) 0.9 % 10 mL (has no administration in time range)   ondansetron (ZOFRAN) injection 4 mg (4 mg Intravenous Given 6/15/21 1838)   Morphine sulfate (PF) injection 4 mg (4 mg Intravenous Given 6/15/21 1941)   ondansetron (ZOFRAN) injection 4 mg (has no administration in time range)   sodium chloride 0.9 % bolus 1,000 mL (1,000 mL Intravenous New Bag 6/15/21 1839)   cefTRIAXone (ROCEPHIN) 1 g/100 mL 0.9% NS (MBP) (1 g Intravenous New Bag 6/15/21 1926)   iopamidol (ISOVUE-300) 61 % injection 100 mL (100 mL Intravenous Given 6/15/21 2008)       PROCEDURES:  Procedures    CRITICAL CARE TIME    Total Critical Care time was 0 minutes, excluding separately reportable procedures.   There was a high probability of clinically significant/life threatening deterioration in the patient's condition which required my urgent intervention.      FINAL IMPRESSION      1. Lower abdominal pain    2. Urinary tract infection without hematuria, site unspecified          DISPOSITION/PLAN     ED Disposition     ED Disposition Condition Comment    Discharge Stable           PATIENT REFERRED TO:  Alice Fuentes, VANESSA  [address] [address]  [phone]    In 2 days      [facility] Emergency Department  [address] [id number] [phone]    If symptoms worsen      DISCHARGE MEDICATIONS:     Medication List      START taking these medications    cephalexin 500 MG capsule  Commonly known as: KEFLEX  Take 1 capsule by mouth 4 (Four) Times a Day for 10 days.     ondansetron ODT 4 MG disintegrating tablet  Commonly known as: ZOFRAN-ODT  Place 1 tablet on the tongue 4 (Four)  "Times a Day As Needed for Nausea or Vomiting.        CONTINUE taking these medications    B-D 3CC LUER-LYLY SYR 51NG5-7/2 23G X 1-1/2\" 3 ML misc  Generic drug: Syringe/Needle (Disp)     buPROPion  MG 24 hr tablet  Commonly known as: Wellbutrin XL  Take 1 tablet by mouth Daily.     Cholecalciferol 1.25 MG (01135 UT) tablet  Take 1.25 mg by mouth 1 (One) Time Per Week.     famotidine 20 MG tablet  Commonly known as: Pepcid  Take 1 tablet by mouth 2 (Two) Times a Day.     Multiple Vitamins/Womens tablet tablet  Generic drug: multivitamin with minerals     omeprazole 40 MG capsule  Commonly known as: priLOSEC     pantoprazole 40 MG EC tablet  Commonly known as: Protonix  Take 1 tablet by mouth 2 (two) times a day.           Where to Get Your Medications      These medications were sent to [id number] [address] AT [facility] [phone] [facility] [phone] [address] [address]    Phone: [phone]   · cephalexin 500 MG capsule  · ondansetron ODT 4 MG disintegrating tablet             Comment: Please note this report has been produced using speech recognition software.      Nic Hollingsworth DO  Attending Emergency Physician               Nic Hollingsworth,   06/15/21 2033    "

## 2021-06-18 LAB
25(OH)D3+25(OH)D2 SERPL-MCNC: 24.2 NG/ML (ref 30–100)
ALBUMIN SERPL-MCNC: 4 G/DL (ref 3.8–4.8)
ALBUMIN/GLOB SERPL: 1.4 {RATIO} (ref 1.2–2.2)
ALP SERPL-CCNC: 72 IU/L (ref 48–121)
ALT SERPL-CCNC: 13 IU/L (ref 0–32)
AST SERPL-CCNC: 15 IU/L (ref 0–40)
BASOPHILS # BLD AUTO: 0 X10E3/UL (ref 0–0.2)
BASOPHILS NFR BLD AUTO: 0 %
BILIRUB SERPL-MCNC: 0.5 MG/DL (ref 0–1.2)
BUN SERPL-MCNC: 9 MG/DL (ref 6–20)
BUN/CREAT SERPL: 11 (ref 9–23)
CALCIUM SERPL-MCNC: 9.3 MG/DL (ref 8.7–10.2)
CHLORIDE SERPL-SCNC: 106 MMOL/L (ref 96–106)
CO2 SERPL-SCNC: 20 MMOL/L (ref 20–29)
CREAT SERPL-MCNC: 0.8 MG/DL (ref 0.57–1)
EOSINOPHIL # BLD AUTO: 0.1 X10E3/UL (ref 0–0.4)
EOSINOPHIL NFR BLD AUTO: 2 %
ERYTHROCYTE [DISTWIDTH] IN BLOOD BY AUTOMATED COUNT: 12.3 % (ref 11.7–15.4)
FERRITIN SERPL-MCNC: 29 NG/ML (ref 15–150)
FOLATE SERPL-MCNC: 3.9 NG/ML
GLOBULIN SER CALC-MCNC: 2.8 G/DL (ref 1.5–4.5)
GLUCOSE SERPL-MCNC: 103 MG/DL (ref 65–99)
HCT VFR BLD AUTO: 36.3 % (ref 34–46.6)
HGB BLD-MCNC: 12 G/DL (ref 11.1–15.9)
IMM GRANULOCYTES # BLD AUTO: 0 X10E3/UL (ref 0–0.1)
IMM GRANULOCYTES NFR BLD AUTO: 0 %
IRON SERPL-MCNC: 113 UG/DL (ref 27–159)
LYMPHOCYTES # BLD AUTO: 2 X10E3/UL (ref 0.7–3.1)
LYMPHOCYTES NFR BLD AUTO: 31 %
Lab: NORMAL
MCH RBC QN AUTO: 30.4 PG (ref 26.6–33)
MCHC RBC AUTO-ENTMCNC: 33.1 G/DL (ref 31.5–35.7)
MCV RBC AUTO: 92 FL (ref 79–97)
METHYLMALONATE SERPL-SCNC: 325 NMOL/L (ref 0–378)
MONOCYTES # BLD AUTO: 0.2 X10E3/UL (ref 0.1–0.9)
MONOCYTES NFR BLD AUTO: 3 %
NEUTROPHILS # BLD AUTO: 4 X10E3/UL (ref 1.4–7)
NEUTROPHILS NFR BLD AUTO: 64 %
PLATELET # BLD AUTO: 258 X10E3/UL (ref 150–450)
POTASSIUM SERPL-SCNC: 4.1 MMOL/L (ref 3.5–5.2)
PREALB SERPL-MCNC: 16 MG/DL (ref 14–35)
PROT SERPL-MCNC: 6.8 G/DL (ref 6–8.5)
RBC # BLD AUTO: 3.95 X10E6/UL (ref 3.77–5.28)
SODIUM SERPL-SCNC: 140 MMOL/L (ref 134–144)
VIT B1 BLD-SCNC: 122.3 NMOL/L (ref 66.5–200)
WBC # BLD AUTO: 6.3 X10E3/UL (ref 3.4–10.8)

## 2021-08-11 ENCOUNTER — OFFICE VISIT (OUTPATIENT)
Dept: INTERNAL MEDICINE | Facility: CLINIC | Age: 36
End: 2021-08-11

## 2021-08-11 ENCOUNTER — LAB (OUTPATIENT)
Dept: LAB | Facility: HOSPITAL | Age: 36
End: 2021-08-11

## 2021-08-11 VITALS
HEIGHT: 68 IN | OXYGEN SATURATION: 99 % | BODY MASS INDEX: 43.95 KG/M2 | TEMPERATURE: 97.3 F | SYSTOLIC BLOOD PRESSURE: 116 MMHG | RESPIRATION RATE: 20 BRPM | DIASTOLIC BLOOD PRESSURE: 82 MMHG | HEART RATE: 66 BPM | WEIGHT: 290 LBS

## 2021-08-11 DIAGNOSIS — R10.2 PELVIC PRESSURE IN FEMALE: ICD-10-CM

## 2021-08-11 DIAGNOSIS — R10.31 RLQ ABDOMINAL PAIN: ICD-10-CM

## 2021-08-11 DIAGNOSIS — R10.31 RLQ ABDOMINAL PAIN: Primary | ICD-10-CM

## 2021-08-11 LAB
B-HCG UR QL: NEGATIVE
BILIRUB BLD-MCNC: NEGATIVE MG/DL
CLARITY, POC: CLEAR
COLOR UR: YELLOW
GLUCOSE UR STRIP-MCNC: NEGATIVE MG/DL
INTERNAL NEGATIVE CONTROL: NORMAL
INTERNAL POSITIVE CONTROL: NORMAL
KETONES UR QL: NEGATIVE
LEUKOCYTE EST, POC: NEGATIVE
Lab: NORMAL
NITRITE UR-MCNC: NEGATIVE MG/ML
PH UR: 6.5 [PH] (ref 5–8)
PROT UR STRIP-MCNC: NEGATIVE MG/DL
RBC # UR STRIP: NEGATIVE /UL
SP GR UR: 1.02 (ref 1–1.03)
UROBILINOGEN UR QL: ABNORMAL

## 2021-08-11 PROCEDURE — 83690 ASSAY OF LIPASE: CPT | Performed by: PHYSICIAN ASSISTANT

## 2021-08-11 PROCEDURE — 99214 OFFICE O/P EST MOD 30 MIN: CPT | Performed by: PHYSICIAN ASSISTANT

## 2021-08-11 PROCEDURE — 80053 COMPREHEN METABOLIC PANEL: CPT | Performed by: PHYSICIAN ASSISTANT

## 2021-08-11 PROCEDURE — 87086 URINE CULTURE/COLONY COUNT: CPT | Performed by: PHYSICIAN ASSISTANT

## 2021-08-11 PROCEDURE — 85025 COMPLETE CBC W/AUTO DIFF WBC: CPT | Performed by: PHYSICIAN ASSISTANT

## 2021-08-11 PROCEDURE — 81025 URINE PREGNANCY TEST: CPT | Performed by: PHYSICIAN ASSISTANT

## 2021-08-11 RX ORDER — PHENTERMINE HYDROCHLORIDE 37.5 MG/1
37.5 TABLET ORAL EVERY MORNING
COMMUNITY
Start: 2021-06-17 | End: 2021-08-13

## 2021-08-11 NOTE — ASSESSMENT & PLAN NOTE
Will order stat CT abd/pelvis to be done first thing in the morning, unable to order today as it is too late in the day. Adv low threshold for ER visit due to RLQ pain, if pain is worsening or she develops new sx such as n/v, fevers she needs to go to the ER this evening.

## 2021-08-11 NOTE — PROGRESS NOTES
Chief Complaint  Urinary Tract Infection    Subjective          History of Present Illness  Geneva Cordova presents to [facility] for   Abd Pain, concern UTI:  Went to ER 2 months ago with similar sx and they dx with UTI. Has lower abd pain/pelvic pressure x the last 2 days. Did have CT due the right sided abd pain 2 mo ago and her appendix was normal. No vaginal discharge. No burning with urination or urinary frequency/urgency. No back pain. No vomiting/nausea or appetite changes, is eating like normal and it does not affect the pain. No diarrhea or constipation. No fevers, h/a, dizziness. When she had this last time she had n/v with it but it is not that bad this time, the pain is not as bad as it was last time. Did not have urinary symptoms last time either but was dx with UTI. She feels like her sx are worsening and does not want it to get as bad as it was last time as she does not want to go back to ER.  The pain comes and goes and there are times through the day she has no pain. It is worse if she moves a certain way or stands for a while.       Review of Systems   Constitutional: Negative for appetite change, fever and unexpected weight loss.   Respiratory: Negative for cough, shortness of breath and wheezing.    Cardiovascular: Negative for chest pain and palpitations.   Gastrointestinal: Positive for abdominal pain. Negative for constipation, diarrhea, nausea, vomiting, GERD and indigestion.   Genitourinary: Positive for pelvic pressure. Negative for difficulty urinating, dysuria, flank pain, frequency, hematuria, urgency, vaginal bleeding, vaginal discharge and vaginal pain.   Musculoskeletal: Negative for back pain.   Neurological: Negative for dizziness, light-headedness and headache.       The following portions of the patient's history were reviewed and updated as appropriate: allergies, current medications, past family history, past medical history, past social history,  "past surgical history and problem list.  Allergies   Allergen Reactions   • Nsaids Other (See Comments)     HISTORY OF GASTRIC SLEEVE     Current Outpatient Medications on File Prior to Visit   Medication Sig Dispense Refill   • B-D 3CC LUER-LYLY SYR 57UW7-5/2 23G X 1-1/2\" 3 ML misc AS DIRECTED DAILY WITH THIAMINE     • buPROPion XL (Wellbutrin XL) 150 MG 24 hr tablet Take 1 tablet by mouth Daily. 30 tablet 1   • Cholecalciferol 1.25 MG (92595 UT) tablet Take 1 tablet by mouth 2 (Two) Times a Week for 24 doses. 24 tablet 0   • Multiple Vitamins-Minerals (MULTIPLE VITAMINS/WOMENS) tablet Take 1 tablet by mouth Daily.     • omeprazole (priLOSEC) 40 MG capsule Take 40 mg by mouth Daily.     • ondansetron ODT (ZOFRAN-ODT) 4 MG disintegrating tablet Place 1 tablet on the tongue 4 (Four) Times a Day As Needed for Nausea or Vomiting. 15 tablet 0   • phentermine (ADIPEX-P) 37.5 MG tablet Take 37.5 mg by mouth Every Morning.     • famotidine (Pepcid) 20 MG tablet Take 1 tablet by mouth 2 (Two) Times a Day. 60 tablet 3   • [DISCONTINUED] pantoprazole (Protonix) 40 MG EC tablet Take 1 tablet by mouth 2 (two) times a day. 60 tablet 1     No current facility-administered medications on file prior to visit.     No orders of the defined types were placed in this encounter.      Social History     Tobacco Use   Smoking Status Never Smoker   Smokeless Tobacco Never Used        Objective   Vital Signs:   Vitals:    08/11/21 1518 08/11/21 1555   BP: 116/82    Pulse: 66    Resp: 20    Temp: 97.3 °F (36.3 °C)    TempSrc: Temporal    SpO2: 99%    Weight: 132 kg (290 lb)    Height: 172.7 cm (68\")    PainSc: 0-No pain 7  Comment: comes and goes   PainLoc:  Abdomen      Physical Exam  Vitals reviewed.   Constitutional:       General: She is not in acute distress.     Appearance: Normal appearance.   HENT:      Head: Normocephalic and atraumatic.   Eyes:      General: No scleral icterus.     Extraocular Movements: Extraocular movements " intact.      Conjunctiva/sclera: Conjunctivae normal.   Cardiovascular:      Rate and Rhythm: Normal rate and regular rhythm.      Heart sounds: Normal heart sounds. No murmur heard.     Pulmonary:      Effort: Pulmonary effort is normal. No respiratory distress.      Breath sounds: Normal breath sounds. No stridor. No wheezing or rhonchi.   Abdominal:      General: Bowel sounds are normal. There is no distension.      Palpations: Abdomen is soft. There is no mass.      Tenderness: There is abdominal tenderness. There is no right CVA tenderness, left CVA tenderness or rebound.      Comments: RLQ tenderness to palp   Musculoskeletal:      Cervical back: Normal range of motion and neck supple.   Skin:     General: Skin is warm and dry.      Coloration: Skin is not jaundiced.   Neurological:      General: No focal deficit present.      Mental Status: She is alert and oriented to person, place, and time.      Gait: Gait normal.   Psychiatric:         Mood and Affect: Mood normal.         Behavior: Behavior normal.          Result Review :              Results for orders placed or performed in visit on 08/11/21   POC Urinalysis Dipstick, Automated    Specimen: Urine   Result Value Ref Range    Color Yellow Yellow, Straw, Dark Yellow, Fatoumata    Clarity, UA Clear Clear    Specific Gravity  1.025 1.005 - 1.030    pH, Urine 6.5 5.0 - 8.0    Leukocytes Negative Negative    Nitrite, UA Negative Negative    Protein, POC Negative Negative mg/dL    Glucose, UA Negative Negative, 1000 mg/dL (3+) mg/dL    Ketones, UA Negative Negative    Urobilinogen, UA 4 E.U./dL  (A) Normal    Bilirubin Negative Negative    Blood, UA Negative Negative   POC Pregnancy, Urine    Specimen: Urine   Result Value Ref Range    HCG, Urine, QL Negative Negative    Lot Number [id number]     Internal Positive Control Passed Positive, Passed    Internal Negative Control Passed Negative, Passed        Assessment and Plan    Diagnoses and all orders for this  visit:    1. RLQ abdominal pain (Primary)  Assessment & Plan:  Will order stat CT abd/pelvis to be done first thing in the morning, unable to order today as it is too late in the day. Adv low threshold for ER visit due to RLQ pain, if pain is worsening or she develops new sx such as n/v, fevers she needs to go to the ER this evening.    Orders:  -     Comprehensive Metabolic Panel; Future  -     Lipase; Future  -     CBC & Differential; Future  -     CT Abdomen Pelvis With & Without Contrast; Future    2. Pelvic pressure in female  Assessment & Plan:  Urinalysis is not suggestive of UTI, will send for culture d/t hx of UTI 2 mo ago with similar sx.  Treatment based on culture results.     Orders:  -     POC Urinalysis Dipstick, Automated  -     POC Pregnancy, Urine  -     Urine Culture - Urine, Urine, Clean Catch; Future  -     CT Abdomen Pelvis With & Without Contrast; Future        Follow Up   Return if symptoms worsen or fail to improve.      Follow up if symptoms worsen or persist or has new or concerning symptoms, go to ER for severe symptoms.   Reviewed common medication effects and side effects and advised to report side effects immediately, the patient expressed good understanding.  Encouraged medication compliance and the importance of keeping scheduled follow up appointments with me and any other providers  If labs or images are ordered we will contact you with the results within the next week.  If you have not heard from us after a week please call our office to inquire about the results.   Patient was given instructions and counseling regarding her condition or for health maintenance advice. Please see specific information pulled into the AVS if appropriate.     Alice Fuentes PA-C    * Please note that portions of this note may have been completed with a voice recognition program. Efforts were made to edit the dictation but occasionally words are erroneously transcribed.

## 2021-08-12 LAB
ALBUMIN SERPL-MCNC: 4.1 G/DL (ref 3.5–5.2)
ALBUMIN/GLOB SERPL: 1.4 G/DL
ALP SERPL-CCNC: 65 U/L (ref 39–117)
ALT SERPL W P-5'-P-CCNC: 11 U/L (ref 1–33)
ANION GAP SERPL CALCULATED.3IONS-SCNC: 10 MMOL/L (ref 5–15)
AST SERPL-CCNC: 16 U/L (ref 1–32)
BACTERIA SPEC AEROBE CULT: NORMAL
BASOPHILS # BLD AUTO: 0.03 10*3/MM3 (ref 0–0.2)
BASOPHILS NFR BLD AUTO: 0.5 % (ref 0–1.5)
BILIRUB SERPL-MCNC: 0.4 MG/DL (ref 0–1.2)
BUN SERPL-MCNC: 9 MG/DL (ref 6–20)
BUN/CREAT SERPL: 11.5 (ref 7–25)
CALCIUM SPEC-SCNC: 9.1 MG/DL (ref 8.6–10.5)
CHLORIDE SERPL-SCNC: 104 MMOL/L (ref 98–107)
CO2 SERPL-SCNC: 25 MMOL/L (ref 22–29)
CREAT SERPL-MCNC: 0.78 MG/DL (ref 0.57–1)
DEPRECATED RDW RBC AUTO: 40.9 FL (ref 37–54)
EOSINOPHIL # BLD AUTO: 0.15 10*3/MM3 (ref 0–0.4)
EOSINOPHIL NFR BLD AUTO: 2.7 % (ref 0.3–6.2)
ERYTHROCYTE [DISTWIDTH] IN BLOOD BY AUTOMATED COUNT: 11.8 % (ref 12.3–15.4)
GFR SERPL CREATININE-BSD FRML MDRD: 102 ML/MIN/1.73
GLOBULIN UR ELPH-MCNC: 3 GM/DL
GLUCOSE SERPL-MCNC: 79 MG/DL (ref 65–99)
HCT VFR BLD AUTO: 39.5 % (ref 34–46.6)
HGB BLD-MCNC: 12.5 G/DL (ref 12–15.9)
LIPASE SERPL-CCNC: 18 U/L (ref 13–60)
LYMPHOCYTES # BLD AUTO: 2.02 10*3/MM3 (ref 0.7–3.1)
LYMPHOCYTES NFR BLD AUTO: 35.8 % (ref 19.6–45.3)
MCH RBC QN AUTO: 29.8 PG (ref 26.6–33)
MCHC RBC AUTO-ENTMCNC: 31.6 G/DL (ref 31.5–35.7)
MCV RBC AUTO: 94.3 FL (ref 79–97)
MONOCYTES # BLD AUTO: 0.22 10*3/MM3 (ref 0.1–0.9)
MONOCYTES NFR BLD AUTO: 3.9 % (ref 5–12)
NEUTROPHILS NFR BLD AUTO: 3.21 10*3/MM3 (ref 1.7–7)
NEUTROPHILS NFR BLD AUTO: 56.9 % (ref 42.7–76)
PLATELET # BLD AUTO: 263 10*3/MM3 (ref 140–450)
PMV BLD AUTO: 11.5 FL (ref 6–12)
POTASSIUM SERPL-SCNC: 4 MMOL/L (ref 3.5–5.2)
PROT SERPL-MCNC: 7.1 G/DL (ref 6–8.5)
RBC # BLD AUTO: 4.19 10*6/MM3 (ref 3.77–5.28)
SODIUM SERPL-SCNC: 139 MMOL/L (ref 136–145)
WBC # BLD AUTO: 5.64 10*3/MM3 (ref 3.4–10.8)

## 2021-08-13 ENCOUNTER — OFFICE VISIT (OUTPATIENT)
Dept: BARIATRICS/WEIGHT MGMT | Facility: CLINIC | Age: 36
End: 2021-08-13

## 2021-08-13 ENCOUNTER — TELEPHONE (OUTPATIENT)
Dept: INTERNAL MEDICINE | Facility: CLINIC | Age: 36
End: 2021-08-13

## 2021-08-13 VITALS
RESPIRATION RATE: 18 BRPM | OXYGEN SATURATION: 99 % | HEART RATE: 56 BPM | SYSTOLIC BLOOD PRESSURE: 116 MMHG | HEIGHT: 68 IN | DIASTOLIC BLOOD PRESSURE: 68 MMHG | WEIGHT: 291.5 LBS | BODY MASS INDEX: 44.18 KG/M2 | TEMPERATURE: 97.7 F

## 2021-08-13 DIAGNOSIS — K91.2 POSTGASTRECTOMY MALABSORPTION: ICD-10-CM

## 2021-08-13 DIAGNOSIS — Z90.3 POSTGASTRECTOMY MALABSORPTION: ICD-10-CM

## 2021-08-13 DIAGNOSIS — R13.10 DYSPHAGIA, UNSPECIFIED TYPE: ICD-10-CM

## 2021-08-13 DIAGNOSIS — K21.9 GASTROESOPHAGEAL REFLUX DISEASE, UNSPECIFIED WHETHER ESOPHAGITIS PRESENT: ICD-10-CM

## 2021-08-13 DIAGNOSIS — E55.9 HYPOVITAMINOSIS D: ICD-10-CM

## 2021-08-13 DIAGNOSIS — Z13.0 SCREENING, IRON DEFICIENCY ANEMIA: ICD-10-CM

## 2021-08-13 DIAGNOSIS — R53.83 FATIGUE, UNSPECIFIED TYPE: Primary | ICD-10-CM

## 2021-08-13 DIAGNOSIS — Z13.21 MALNUTRITION SCREEN: ICD-10-CM

## 2021-08-13 PROCEDURE — 99213 OFFICE O/P EST LOW 20 MIN: CPT | Performed by: SURGERY

## 2021-08-13 RX ORDER — OMEPRAZOLE 40 MG/1
40 CAPSULE, DELAYED RELEASE ORAL 2 TIMES DAILY
Qty: 180 CAPSULE | Refills: 3 | Status: SHIPPED | OUTPATIENT
Start: 2021-08-13 | End: 2021-11-11

## 2021-08-13 NOTE — TELEPHONE ENCOUNTER
----- Message from Alice Fuentes PA-C sent at 8/12/2021  2:06 PM EDT -----  Labs are reassuring and within acceptable ranges, we are still waiting on getting her CT scheduled. If her symptoms worsen go to ER.

## 2021-08-13 NOTE — PROGRESS NOTES
"[facility]  [address]  [address]  [facility] [id number] [phone]        Patient Name:  Geneva Cordova.  :  1985      Date of Visit: 2021      Reason for Visit:   9 months postop     HPI: Geneva Cordova is a 35 y.o. female s/p  LSG revision 20 w/ Dr. Mariscal (hx LSG  [facility]), complicated by post op strictured.    I had recommended that Dr. Galvan dilate her several months ago, but she had a night terror so cancelled appointment.  She had called him to set this up.  Recently contact by her insurance said she was approved for SIPS.  Denied in 2018.    C/o dysphagia and also GERD poorly controlled by famotidine.    Getting 90-100g prot/day.  Drinking 64 fluid oz/day.  Last labs revealed prealbumin 16, low B12, low vit D, low folate. Taking MVI, B12, B1, Calcium, Vit D, iron and Vit C.   Exercise: .     Presurgery weight: 343 pounds.  Today's weight is 132 kg (291 lb 8 oz) pounds, today's  Body mass index is 44.32 kg/m²., and@ weight loss since surgery is 52 pounds.      Past Medical History:   Diagnosis Date   • Anemia    • Anxiety    • Bilateral lower extremity edema     R>L   • Boil, axilla     recurrent   • Chronic back pain     no meds or injections, feels related to breasts   • COVID-19 2020   • Depression    • Dyspepsia    • Dyspnea on exertion    • Fatigue    • Glucose intolerance (impaired glucose tolerance)     \"hasn't been tested for that\"   • Helicobacter pylori (H. pylori) infection     asx and grossly nl EGD. \"abundant\"on path. HBT still + after PrevPak tx. LSG path neg. neg testing 2018   • Hypoalbuminemia     3.2 in past, 4.1 most recent   • Left Ovarian serous cystadenoma -removed at  section 2017   • Migraine     resolved with weightloss   • Obesity, morbid (CMS/HCC)    • VIRA (obstructive sleep apnea)      improved since WLS, pending f/u sleep study, not using CPAP   • PCOS (polycystic " ovarian syndrome)    • Right Essure implantation 2017 10/6/2017   • S/P  section;left ovarian cystectomy and partial salpingectomy 17   • Seasonal allergies      Past Surgical History:   Procedure Laterality Date   •  SECTION N/A 2017      SECTION PRIMARY;  CYSTECTOMY; Kwadwo Baxter MD; :  [facility];  Service:    • DILATATION AND CURETTAGE     • ENDOMETRIAL ABLATION  2020    Dr Cuevas   • ENDOSCOPY N/A 2020    Procedure: ESOPHAGOGASTRODUODENOSCOPY;  Surgeon: Natalia Mariscal MD;  Location:  [facility];  Service: Bariatric;  Laterality: N/A;   • ESSURE TUBAL LIGATION     • GALLBLADDER SURGERY     • GASTRIC SLEEVE LAPAROSCOPIC  2015    With Dr. Hughes   • GASTRIC SLEEVE LAPAROSCOPIC N/A 2020    Procedure: GASTRIC SLEEVE LAPAROSCOPIC;  Surgeon: Natalia Mariscal MD;  Location:  [facility];  Service: Bariatric;  Laterality: N/A;   • HYSTEROSCOPY ENDOMETRIAL ABLATION  2020   • LAPAROSCOPIC CHOLECYSTECTOMY  2018    Dr. Hughes   • SALPINGECTOMY Bilateral 2020    Dr Cuevas   • TONSILLECTOMY     • TUBAL ABDOMINAL LIGATION     • TUBAL ABDOMINAL LIGATION  2020   • WISDOM TOOTH EXTRACTION       Outpatient Medications Marked as Taking for the 21 encounter (Office Visit) with Natalia Mariscal MD   Medication Sig Dispense Refill   • Cholecalciferol 1.25 MG (10303 UT) tablet Take 1 tablet by mouth 2 (Two) Times a Week for 24 doses. 24 tablet 0   • Multiple Vitamins-Minerals (MULTIPLE VITAMINS/WOMENS) tablet Take 1 tablet by mouth Daily.     • ondansetron ODT (ZOFRAN-ODT) 4 MG disintegrating tablet Place 1 tablet on the tongue 4 (Four) Times a Day As Needed for Nausea or Vomiting. 15 tablet 0   • [DISCONTINUED] omeprazole (priLOSEC) 40 MG capsule Take 40 mg by mouth Daily.         Allergies   Allergen Reactions   • Nsaids Other (See Comments)     HISTORY OF GASTRIC SLEEVE       Social History     Socioeconomic  "History   • Marital status: Single     Spouse name: Not on file   • Number of children: 4   • Years of education: College    • Highest education level: Not on file   Tobacco Use   • Smoking status: Never Smoker   • Smokeless tobacco: Never Used   Vaping Use   • Vaping Use: Never used   Substance and Sexual Activity   • Alcohol use: Not Currently   • Drug use: No   • Sexual activity: Yes     Partners: Male     Birth control/protection: Surgical     Comment: no OCP       /68 (BP Location: Left arm, Patient Position: Sitting, Cuff Size: Large Adult)   Pulse 56   Temp 97.7 °F (36.5 °C) (Temporal)   Resp 18   Ht 172.7 cm (68\")   Wt 132 kg (291 lb 8 oz)   SpO2 99%   BMI 44.32 kg/m²     Physical Exam  Constitutional:       General: She is not in acute distress.     Appearance: She is well-developed. She is not diaphoretic.   HENT:      Head: Normocephalic and atraumatic.      Mouth/Throat:      Pharynx: No oropharyngeal exudate.   Eyes:      Conjunctiva/sclera: Conjunctivae normal.      Pupils: Pupils are equal, round, and reactive to light.   Pulmonary:      Effort: Pulmonary effort is normal. No respiratory distress.   Abdominal:      General: There is no distension.      Palpations: Abdomen is soft.   Skin:     General: Skin is warm and dry.      Coloration: Skin is not pale.   Neurological:      Mental Status: She is alert and oriented to person, place, and time.      Cranial Nerves: No cranial nerve deficit.   Psychiatric:         Behavior: Behavior normal.         Thought Content: Thought content normal.           Assessment:  9 months s/p  LSG revision 12/1/20 w/ Dr. Mariscal ([facility]ospitals 2015 [facility])    ICD-10-CM ICD-9-CM   1. Fatigue, unspecified type  R53.83 780.79   2. Hypovitaminosis D  E55.9 268.9   3. Malnutrition screen  Z13.21 V77.2   4. Screening, iron deficiency anemia  Z13.0 V78.0   5. Postgastrectomy malabsorption  K91.2 579.3    Z90.3    6. Gastroesophageal reflux disease, unspecified whether " esophagitis present  K21.9 530.81   7. Dysphagia, unspecified type  R13.10 787.20         Plan:  Doing well. Continue w/ good food choices and healthy habits.  Continue protein >70g/day.  Continue routine exercise.  Routine bariatric labs ordered.  Continue vitamins w/ adjustments pending lab results.  Call w/ problems/concerns.     Has only had 1 dilation for stricture.  She is now willing to see Dr. Galvan for dilation with achalasia balloon.  Her options would also include gastric bypass, but she is not interested in this.  Rx omeprazole 40 mg bid.    The patient was instructed to follow up in 3 months, sooner if needed.    note: approx 15 of the 25 minute visit was spent counseling on nutrition and necessary dietary/lifestyle modifications face to face.    Natalia Mariscal MD

## 2021-08-16 LAB
25(OH)D3+25(OH)D2 SERPL-MCNC: 38.7 NG/ML (ref 30–100)
ALBUMIN SERPL-MCNC: 4.1 G/DL (ref 3.5–5.2)
ALBUMIN/GLOB SERPL: 1.6 G/DL
ALP SERPL-CCNC: 73 U/L (ref 39–117)
ALT SERPL-CCNC: 8 U/L (ref 1–33)
AST SERPL-CCNC: 15 U/L (ref 1–32)
BASOPHILS # BLD AUTO: 0.01 10*3/MM3 (ref 0–0.2)
BASOPHILS NFR BLD AUTO: 0.2 % (ref 0–1.5)
BILIRUB SERPL-MCNC: 0.5 MG/DL (ref 0–1.2)
BUN SERPL-MCNC: 8 MG/DL (ref 6–20)
BUN/CREAT SERPL: 9.3 (ref 7–25)
CALCIUM SERPL-MCNC: 9.4 MG/DL (ref 8.6–10.5)
CHLORIDE SERPL-SCNC: 102 MMOL/L (ref 98–107)
CO2 SERPL-SCNC: 26.2 MMOL/L (ref 22–29)
CREAT SERPL-MCNC: 0.86 MG/DL (ref 0.57–1)
EOSINOPHIL # BLD AUTO: 0.11 10*3/MM3 (ref 0–0.4)
EOSINOPHIL NFR BLD AUTO: 2.5 % (ref 0.3–6.2)
ERYTHROCYTE [DISTWIDTH] IN BLOOD BY AUTOMATED COUNT: 11.9 % (ref 12.3–15.4)
FERRITIN SERPL-MCNC: 38.9 NG/ML (ref 13–150)
FOLATE SERPL-MCNC: 4.38 NG/ML (ref 4.78–24.2)
GLOBULIN SER CALC-MCNC: 2.6 GM/DL
GLUCOSE SERPL-MCNC: 68 MG/DL (ref 65–99)
HCT VFR BLD AUTO: 40.1 % (ref 34–46.6)
HGB BLD-MCNC: 13 G/DL (ref 12–15.9)
IMM GRANULOCYTES # BLD AUTO: 0 10*3/MM3 (ref 0–0.05)
IMM GRANULOCYTES NFR BLD AUTO: 0 % (ref 0–0.5)
IRON SERPL-MCNC: 75 MCG/DL (ref 37–145)
LYMPHOCYTES # BLD AUTO: 1.81 10*3/MM3 (ref 0.7–3.1)
LYMPHOCYTES NFR BLD AUTO: 41 % (ref 19.6–45.3)
Lab: NORMAL
MCH RBC QN AUTO: 30.5 PG (ref 26.6–33)
MCHC RBC AUTO-ENTMCNC: 32.4 G/DL (ref 31.5–35.7)
MCV RBC AUTO: 94.1 FL (ref 79–97)
METHYLMALONATE SERPL-SCNC: 310 NMOL/L (ref 0–378)
MONOCYTES # BLD AUTO: 0.14 10*3/MM3 (ref 0.1–0.9)
MONOCYTES NFR BLD AUTO: 3.2 % (ref 5–12)
NEUTROPHILS # BLD AUTO: 2.35 10*3/MM3 (ref 1.7–7)
NEUTROPHILS NFR BLD AUTO: 53.1 % (ref 42.7–76)
NRBC BLD AUTO-RTO: 0 /100 WBC (ref 0–0.2)
PLATELET # BLD AUTO: 272 10*3/MM3 (ref 140–450)
POTASSIUM SERPL-SCNC: 4 MMOL/L (ref 3.5–5.2)
PREALB SERPL-MCNC: 14 MG/DL (ref 14–35)
PROT SERPL-MCNC: 6.7 G/DL (ref 6–8.5)
RBC # BLD AUTO: 4.26 10*6/MM3 (ref 3.77–5.28)
SODIUM SERPL-SCNC: 139 MMOL/L (ref 136–145)
VIT B1 BLD-SCNC: 111.5 NMOL/L (ref 66.5–200)
WBC # BLD AUTO: 4.42 10*3/MM3 (ref 3.4–10.8)

## 2021-08-19 ENCOUNTER — TELEPHONE (OUTPATIENT)
Dept: INTERNAL MEDICINE | Facility: CLINIC | Age: 36
End: 2021-08-19

## 2021-08-20 NOTE — TELEPHONE ENCOUNTER
Called and spoke to patient, informed her that her CT scan is not going to be approved through insurance. She states that she is feeling better now and will not need to have a further work-up at this time. Advised her to contact us back if her symptoms get worse.

## 2021-08-20 NOTE — TELEPHONE ENCOUNTER
It does not look like your insurance will cover a CT scan at this time. If you are still having stomach pains we can try to order an ultrasound, or if they are worsening I would advise you get checked out in an ER. Please let me know how you are doing.

## 2021-08-26 NOTE — ASSESSMENT & PLAN NOTE
Patient's depression is recurrent and is moderate without psychosis. Their depression is currently in partial remission and the condition is improving with treatment. This will be reassessed in 4 weeks. F/U as described:patient was prescribed an antidepressant medicine and patient referred to Mental Health Specialist will start Wellbutrin, follow-up in months, continue therapy, monitor for side effects and follow-up sooner if needed.  Go to ER if has HI/SI    used

## 2021-11-27 ENCOUNTER — HOSPITAL ENCOUNTER (EMERGENCY)
Facility: HOSPITAL | Age: 36
Discharge: HOME | End: 2021-11-27
Attending: EMERGENCY MEDICINE | Admitting: EMERGENCY MEDICINE

## 2021-11-27 VITALS
BODY MASS INDEX: 41.52 KG/M2 | TEMPERATURE: 97.7 F | WEIGHT: 290 LBS | HEIGHT: 70 IN | HEART RATE: 54 BPM | DIASTOLIC BLOOD PRESSURE: 92 MMHG | SYSTOLIC BLOOD PRESSURE: 139 MMHG | OXYGEN SATURATION: 100 % | RESPIRATION RATE: 20 BRPM

## 2021-11-27 DIAGNOSIS — H72.91 PERFORATED EARDRUM, RIGHT: Primary | ICD-10-CM

## 2021-11-27 PROCEDURE — 99283 EMERGENCY DEPT VISIT LOW MDM: CPT

## 2021-11-27 PROCEDURE — 63710000001 ONDANSETRON ODT 4 MG TABLET DISPERSIBLE: Performed by: EMERGENCY MEDICINE

## 2021-11-27 RX ORDER — HYDROCODONE BITARTRATE AND ACETAMINOPHEN 5; 325 MG/1; MG/1
1-2 TABLET ORAL EVERY 6 HOURS PRN
Qty: 12 TABLET | Refills: 0 | Status: SHIPPED | OUTPATIENT
Start: 2021-11-27 | End: 2022-05-10

## 2021-11-27 RX ORDER — HYDROCODONE BITARTRATE AND ACETAMINOPHEN 7.5; 325 MG/1; MG/1
1 TABLET ORAL ONCE
Status: COMPLETED | OUTPATIENT
Start: 2021-11-27 | End: 2021-11-27

## 2021-11-27 RX ORDER — ONDANSETRON 4 MG/1
4 TABLET, ORALLY DISINTEGRATING ORAL ONCE
Status: COMPLETED | OUTPATIENT
Start: 2021-11-27 | End: 2021-11-27

## 2021-11-27 RX ORDER — AMOXICILLIN 500 MG/1
500 CAPSULE ORAL 3 TIMES DAILY
Qty: 30 CAPSULE | Refills: 0 | Status: SHIPPED | OUTPATIENT
Start: 2021-11-27 | End: 2022-02-22

## 2021-11-27 RX ORDER — AMOXICILLIN 250 MG/1
500 CAPSULE ORAL ONCE
Status: COMPLETED | OUTPATIENT
Start: 2021-11-27 | End: 2021-11-27

## 2021-11-27 RX ADMIN — HYDROCODONE BITARTRATE AND ACETAMINOPHEN 1 TABLET: 7.5; 325 TABLET ORAL at 15:06

## 2021-11-27 RX ADMIN — ONDANSETRON 4 MG: 4 TABLET, ORALLY DISINTEGRATING ORAL at 15:06

## 2021-11-27 RX ADMIN — AMOXICILLIN 500 MG: 250 CAPSULE ORAL at 15:06

## 2022-02-20 ENCOUNTER — APPOINTMENT (OUTPATIENT)
Dept: CT IMAGING | Facility: HOSPITAL | Age: 37
End: 2022-02-20

## 2022-02-20 ENCOUNTER — HOSPITAL ENCOUNTER (EMERGENCY)
Facility: HOSPITAL | Age: 37
Discharge: HOME | End: 2022-02-20
Attending: EMERGENCY MEDICINE | Admitting: EMERGENCY MEDICINE

## 2022-02-20 VITALS
WEIGHT: 293 LBS | SYSTOLIC BLOOD PRESSURE: 117 MMHG | HEIGHT: 70 IN | HEART RATE: 73 BPM | DIASTOLIC BLOOD PRESSURE: 73 MMHG | BODY MASS INDEX: 41.95 KG/M2 | OXYGEN SATURATION: 99 % | RESPIRATION RATE: 20 BRPM | TEMPERATURE: 99.1 F

## 2022-02-20 DIAGNOSIS — N64.89 SEROMA OF BREAST: Primary | ICD-10-CM

## 2022-02-20 DIAGNOSIS — E55.9 VITAMIN D DEFICIENCY: ICD-10-CM

## 2022-02-20 DIAGNOSIS — L76.32 POSTOPERATIVE HEMATOMA OF SUBCUTANEOUS TISSUE FOLLOWING NON-DERMATOLOGIC PROCEDURE: ICD-10-CM

## 2022-02-20 DIAGNOSIS — L24.A9 WOUND DRAINAGE: ICD-10-CM

## 2022-02-20 DIAGNOSIS — D64.9 ANEMIA, UNSPECIFIED TYPE: ICD-10-CM

## 2022-02-20 LAB
ALBUMIN SERPL-MCNC: 3.8 G/DL (ref 3.5–5.2)
ALBUMIN/GLOB SERPL: 1.3 G/DL
ALP SERPL-CCNC: 75 U/L (ref 39–117)
ALT SERPL W P-5'-P-CCNC: 9 U/L (ref 1–33)
ANION GAP SERPL CALCULATED.3IONS-SCNC: 6 MMOL/L (ref 5–15)
AST SERPL-CCNC: 16 U/L (ref 1–32)
BASOPHILS # BLD AUTO: 0.03 10*3/MM3 (ref 0–0.2)
BASOPHILS NFR BLD AUTO: 0.4 % (ref 0–1.5)
BILIRUB SERPL-MCNC: 0.7 MG/DL (ref 0–1.2)
BUN SERPL-MCNC: 8 MG/DL (ref 6–20)
BUN/CREAT SERPL: 9.9 (ref 7–25)
CALCIUM SPEC-SCNC: 9.2 MG/DL (ref 8.6–10.5)
CHLORIDE SERPL-SCNC: 104 MMOL/L (ref 98–107)
CO2 SERPL-SCNC: 28 MMOL/L (ref 22–29)
CREAT SERPL-MCNC: 0.81 MG/DL (ref 0.57–1)
DEPRECATED RDW RBC AUTO: 43.9 FL (ref 37–54)
EOSINOPHIL # BLD AUTO: 0.58 10*3/MM3 (ref 0–0.4)
EOSINOPHIL NFR BLD AUTO: 8 % (ref 0.3–6.2)
ERYTHROCYTE [DISTWIDTH] IN BLOOD BY AUTOMATED COUNT: 13 % (ref 12.3–15.4)
GFR SERPL CREATININE-BSD FRML MDRD: 98 ML/MIN/1.73
GLOBULIN UR ELPH-MCNC: 2.9 GM/DL
GLUCOSE SERPL-MCNC: 85 MG/DL (ref 65–99)
HCT VFR BLD AUTO: 28.3 % (ref 34–46.6)
HGB BLD-MCNC: 9.3 G/DL (ref 12–15.9)
HOLD SPECIMEN: NORMAL
HOLD SPECIMEN: NORMAL
IMM GRANULOCYTES # BLD AUTO: 0.02 10*3/MM3 (ref 0–0.05)
IMM GRANULOCYTES NFR BLD AUTO: 0.3 % (ref 0–0.5)
INR PPP: 1.03 (ref 0.85–1.16)
LYMPHOCYTES # BLD AUTO: 2.33 10*3/MM3 (ref 0.7–3.1)
LYMPHOCYTES NFR BLD AUTO: 32.1 % (ref 19.6–45.3)
MCH RBC QN AUTO: 30.9 PG (ref 26.6–33)
MCHC RBC AUTO-ENTMCNC: 32.9 G/DL (ref 31.5–35.7)
MCV RBC AUTO: 94 FL (ref 79–97)
MONOCYTES # BLD AUTO: 0.42 10*3/MM3 (ref 0.1–0.9)
MONOCYTES NFR BLD AUTO: 5.8 % (ref 5–12)
NEUTROPHILS NFR BLD AUTO: 3.87 10*3/MM3 (ref 1.7–7)
NEUTROPHILS NFR BLD AUTO: 53.4 % (ref 42.7–76)
NRBC BLD AUTO-RTO: 0 /100 WBC (ref 0–0.2)
PLATELET # BLD AUTO: 406 10*3/MM3 (ref 140–450)
PMV BLD AUTO: 9.9 FL (ref 6–12)
POTASSIUM SERPL-SCNC: 3.7 MMOL/L (ref 3.5–5.2)
PROT SERPL-MCNC: 6.7 G/DL (ref 6–8.5)
PROTHROMBIN TIME: 13.2 SECONDS (ref 11.4–14.4)
RBC # BLD AUTO: 3.01 10*6/MM3 (ref 3.77–5.28)
SODIUM SERPL-SCNC: 138 MMOL/L (ref 136–145)
WBC NRBC COR # BLD: 7.25 10*3/MM3 (ref 3.4–10.8)
WHOLE BLOOD HOLD SPECIMEN: NORMAL
WHOLE BLOOD HOLD SPECIMEN: NORMAL

## 2022-02-20 PROCEDURE — 71250 CT THORAX DX C-: CPT

## 2022-02-20 PROCEDURE — 82306 VITAMIN D 25 HYDROXY: CPT | Performed by: PHYSICIAN ASSISTANT

## 2022-02-20 PROCEDURE — 85610 PROTHROMBIN TIME: CPT | Performed by: EMERGENCY MEDICINE

## 2022-02-20 PROCEDURE — 82728 ASSAY OF FERRITIN: CPT | Performed by: PHYSICIAN ASSISTANT

## 2022-02-20 PROCEDURE — 85025 COMPLETE CBC W/AUTO DIFF WBC: CPT | Performed by: EMERGENCY MEDICINE

## 2022-02-20 PROCEDURE — 83540 ASSAY OF IRON: CPT | Performed by: PHYSICIAN ASSISTANT

## 2022-02-20 PROCEDURE — 80053 COMPREHEN METABOLIC PANEL: CPT | Performed by: EMERGENCY MEDICINE

## 2022-02-20 PROCEDURE — 84466 ASSAY OF TRANSFERRIN: CPT | Performed by: PHYSICIAN ASSISTANT

## 2022-02-20 PROCEDURE — 82607 VITAMIN B-12: CPT | Performed by: PHYSICIAN ASSISTANT

## 2022-02-20 PROCEDURE — 99283 EMERGENCY DEPT VISIT LOW MDM: CPT

## 2022-02-21 LAB — HOLD SPECIMEN: NORMAL

## 2022-02-21 NOTE — DISCHARGE INSTRUCTIONS
Leave the packing on the draining site under the breast.  You may change this packing if it becomes soaked    Follow-up with your plastic surgeon tomorrow as arranged today.  Call at 9:00 for your appointment, and you will be seen tomorrow as discussed today    Rest with no vigorous physical activity    Return to the ED at once if you have any acute urgent emergent significant or progressive symptoms such as fever chills redness over the breast or any other acute urgent or emergent symptoms as discussed

## 2022-02-21 NOTE — ED PROVIDER NOTES
Subjective   Ms. Geneva Cordova is a 36 y.o. female who presents to the ED with c/o post-op problem. She had a breast reduction on 2/10/2022 by Dr. Horn in [place]. She reports she had been doing well overall although she has had pain in her right breast since the surgery with marked improvement of the pain of her left breast. She has had bleeding from her right breast incision site since around 1800 today. She has a bandage placed on the area. She denies fever, chills, cough, congestion, vomiting, or diarrhea.  The discharge is not foul-smelling.  She did have mild nausea. She denies any trauma or injury. She denies bleeding from any other site. There are no other acute symptoms at this time.      History provided by:  Patient  Wound Check  Location:  Right breast  Quality:  Bleeding from incision site  Severity:  Moderate  Onset quality:  Sudden  Duration:  90 minutes  Timing:  Constant  Progression:  Unchanged  Chronicity:  New  Relieved by:  Nothing  Worsened by:  Nothing  Associated symptoms: nausea    Associated symptoms: no congestion, no cough, no diarrhea, no fever, no shortness of breath and no vomiting        Review of Systems   Constitutional: Negative.  Negative for chills and fever.   HENT: Negative.  Negative for congestion.    Eyes: Negative.    Respiratory: Negative.  Negative for cough and shortness of breath.    Cardiovascular: Negative.    Gastrointestinal: Positive for nausea. Negative for diarrhea and vomiting.   Genitourinary: Negative.    Skin: Positive for wound.   Neurological: Negative.    All other systems reviewed and are negative.      Past Medical History:   Diagnosis Date   • Anemia    • Anxiety    • Bilateral lower extremity edema     R>L   • Boil, axilla     recurrent   • Chronic back pain     no meds or injections, feels related to breasts   • COVID-19 12/2020   • Depression    • Dyspepsia    • Dyspnea on exertion    • Fatigue    • Glucose intolerance (impaired glucose  "tolerance)     \"hasn't been tested for that\"   • Helicobacter pylori (H. pylori) infection     asx and grossly nl EGD. \"abundant\"on path. HBT still + after PrevPak tx. LSG path neg. neg testing 2018   • Hypoalbuminemia     3.2 in past, 4.1 most recent   • Left Ovarian serous cystadenoma -removed at  section 2017   • Migraine     resolved with weightloss   • Obesity, morbid (HCC)    • VIRA (obstructive sleep apnea)      improved since WLS, pending f/u sleep study, not using CPAP   • PCOS (polycystic ovarian syndrome)    • Right Essure implantation 2017 10/6/2017   • S/P  section;left ovarian cystectomy and partial salpingectomy 17   • Seasonal allergies        Allergies   Allergen Reactions   • Nsaids Other (See Comments)     HISTORY OF GASTRIC SLEEVE       Past Surgical History:   Procedure Laterality Date   •  SECTION N/A 2017      SECTION PRIMARY;  CYSTECTOMY; Kwadwo Baxter MD; :  [facility];  Service:    • DILATATION AND CURETTAGE     • ENDOMETRIAL ABLATION  2020    Dr Cuevas   • ENDOSCOPY N/A 2020    Procedure: ESOPHAGOGASTRODUODENOSCOPY;  Surgeon: Natalia Mariscal MD;  Location:  [facility];  Service: Bariatric;  Laterality: N/A;   • ESSURE TUBAL LIGATION     • GALLBLADDER SURGERY     • GASTRIC SLEEVE LAPAROSCOPIC  2015    With Dr. Hughes   • GASTRIC SLEEVE LAPAROSCOPIC N/A 2020    Procedure: GASTRIC SLEEVE LAPAROSCOPIC;  Surgeon: Natalia Mariscal MD;  Location:  [facility];  Service: Bariatric;  Laterality: N/A;   • HYSTEROSCOPY ENDOMETRIAL ABLATION  2020   • LAPAROSCOPIC CHOLECYSTECTOMY  2018    Dr. Hughes   • SALPINGECTOMY Bilateral 2020    Dr Cuevas   • TONSILLECTOMY     • TUBAL ABDOMINAL LIGATION     • TUBAL ABDOMINAL LIGATION  2020   • WISDOM TOOTH EXTRACTION         Family History   Problem Relation Age of Onset   • No Known Problems Other    • Sleep apnea Mother    • " Diabetes Mother    • No Known Problems Father    • Pancreatic cancer Maternal Grandmother    • No Known Problems Sister    • No Known Problems Brother    • No Known Problems Daughter    • No Known Problems Son    • No Known Problems Paternal Grandmother    • No Known Problems Maternal Aunt    • No Known Problems Paternal Aunt    • BRCA 1/2 Neg Hx    • Colon cancer Neg Hx    • Endometrial cancer Neg Hx    • Ovarian cancer Neg Hx        Social History     Socioeconomic History   • Marital status: Single   • Number of children: 4   • Years of education: College    Tobacco Use   • Smoking status: Never Smoker   • Smokeless tobacco: Never Used   Vaping Use   • Vaping Use: Never used   Substance and Sexual Activity   • Alcohol use: Not Currently   • Drug use: No   • Sexual activity: Yes     Partners: Male     Birth control/protection: Surgical     Comment: no OCP         Objective   Physical Exam  Vitals and nursing note reviewed.   Constitutional:       General: She is not in acute distress.     Appearance: She is well-developed.   HENT:      Head: Normocephalic and atraumatic.      Nose: Nose normal.   Eyes:      General: No scleral icterus.     Conjunctiva/sclera: Conjunctivae normal.      Comments: No conjunctival pallor.   Cardiovascular:      Rate and Rhythm: Normal rate and regular rhythm.      Heart sounds: Normal heart sounds. No murmur heard.      Pulmonary:      Effort: Pulmonary effort is normal. No respiratory distress.      Breath sounds: Normal breath sounds.   Abdominal:      Palpations: Abdomen is soft.      Tenderness: There is no abdominal tenderness.   Musculoskeletal:         General: Normal range of motion.      Cervical back: Normal range of motion and neck supple.   Skin:     General: Skin is warm and dry.      Comments: Active drainage from her incision site on her right breast from previous breast reduction in the inframammary area with a small opening of the incision inferior to the lateral  border of the areola and the incisional line.  Right breast is mildly more full than the left.  There is no erythema.  The drainage is dark consistent with draining hematoma and nonpurulent there is mild tenderness over the inferior aspect of the breast only again without further signs of infection   Neurological:      Mental Status: She is alert and oriented to person, place, and time.   Psychiatric:         Behavior: Behavior normal.         Procedures         ED Course  ED Course as of 02/20/22 2325   Sun Feb 20, 2022 2102 Personally visualize a CT with hematoma/seroma visualized.  I utilized gentle compression at the inferior aspect of the breast with a large volume of dark seroma/hematoma fluid drained.  Patient had some relief of discomfort with this.  There is no purulent fluid.  There is no erythema over the breast.  This is done in coordination with SN RN at the bedside who subsequently took over blotting the draining seroma fluid [HH]   2123 I discussed the case with Dr. George Mclain, the plastic surgeon that is caring for Ms. Cordova.  We discussed the CT findings, including hematoma and the small gas bubble and the drainage in the ED.  He will see the patient tomorrow and is asked her to call the office at 9:00 to give her an appointment to be seen tomorrow.  The call was on speaker phone with the patient's phone at the bedside and the patient is in agreement with the plan of careI discussed again the findings and plan of care at discharge with the patient and significant other.  We discussed mandatory follow-up tomorrow as well as indications for immediate returnVery pleasant responsible patient verbalizes understanding agreement plan of care, need for follow-up tomorrow, indications for return [HH]      ED Course User Index  [HH] Catalino Ty MD     Recent Results (from the past 24 hour(s))   Comprehensive Metabolic Panel    Collection Time: 02/20/22  7:49 PM    Specimen: Blood   Result  Value Ref Range    Glucose 85 65 - 99 mg/dL    BUN 8 6 - 20 mg/dL    Creatinine 0.81 0.57 - 1.00 mg/dL    Sodium 138 136 - 145 mmol/L    Potassium 3.7 3.5 - 5.2 mmol/L    Chloride 104 98 - 107 mmol/L    CO2 28.0 22.0 - 29.0 mmol/L    Calcium 9.2 8.6 - 10.5 mg/dL    Total Protein 6.7 6.0 - 8.5 g/dL    Albumin 3.80 3.50 - 5.20 g/dL    ALT (SGPT) 9 1 - 33 U/L    AST (SGOT) 16 1 - 32 U/L    Alkaline Phosphatase 75 39 - 117 U/L    Total Bilirubin 0.7 0.0 - 1.2 mg/dL    eGFR  African Amer 98 >60 mL/min/1.73    Globulin 2.9 gm/dL    A/G Ratio 1.3 g/dL    BUN/Creatinine Ratio 9.9 7.0 - 25.0    Anion Gap 6.0 5.0 - 15.0 mmol/L   Protime-INR    Collection Time: 02/20/22  7:49 PM    Specimen: Blood   Result Value Ref Range    Protime 13.2 11.4 - 14.4 Seconds    INR 1.03 0.85 - 1.16   CBC Auto Differential    Collection Time: 02/20/22  7:49 PM    Specimen: Blood   Result Value Ref Range    WBC 7.25 3.40 - 10.80 10*3/mm3    RBC 3.01 (L) 3.77 - 5.28 10*6/mm3    Hemoglobin 9.3 (L) 12.0 - 15.9 g/dL    Hematocrit 28.3 (L) 34.0 - 46.6 %    MCV 94.0 79.0 - 97.0 fL    MCH 30.9 26.6 - 33.0 pg    MCHC 32.9 31.5 - 35.7 g/dL    RDW 13.0 12.3 - 15.4 %    RDW-SD 43.9 37.0 - 54.0 fl    MPV 9.9 6.0 - 12.0 fL    Platelets 406 140 - 450 10*3/mm3    Neutrophil % 53.4 42.7 - 76.0 %    Lymphocyte % 32.1 19.6 - 45.3 %    Monocyte % 5.8 5.0 - 12.0 %    Eosinophil % 8.0 (H) 0.3 - 6.2 %    Basophil % 0.4 0.0 - 1.5 %    Immature Grans % 0.3 0.0 - 0.5 %    Neutrophils, Absolute 3.87 1.70 - 7.00 10*3/mm3    Lymphocytes, Absolute 2.33 0.70 - 3.10 10*3/mm3    Monocytes, Absolute 0.42 0.10 - 0.90 10*3/mm3    Eosinophils, Absolute 0.58 (H) 0.00 - 0.40 10*3/mm3    Basophils, Absolute 0.03 0.00 - 0.20 10*3/mm3    Immature Grans, Absolute 0.02 0.00 - 0.05 10*3/mm3    nRBC 0.0 0.0 - 0.2 /100 WBC   Green Top (Gel)    Collection Time: 02/20/22  7:49 PM   Result Value Ref Range    Extra Tube Hold for add-ons.    Lavender Top    Collection Time: 02/20/22  7:49 PM  "  Result Value Ref Range    Extra Tube hold for add-on    Gold Top - SST    Collection Time: 02/20/22  7:49 PM   Result Value Ref Range    Extra Tube Hold for add-ons.    Light Blue Top    Collection Time: 02/20/22  7:49 PM   Result Value Ref Range    Extra Tube hold for add-on      Note: In addition to lab results from this visit, the labs listed above may include labs taken at another facility or during a different encounter within the last 24 hours. Please correlate lab times with ED admission and discharge times for further clarification of the services performed during this visit.    CT Chest Without Contrast Diagnostic   Final Result      Multiple collections of fluid (some continued small foci of gas) in the subcutaneous fat of the breasts and right lateral chest wall as described above. Some of these collections in the right breast are higher in attenuation than simple fluid and could    contain blood or infectious/inflammatory material. The more low-attenuation collections could potentially be seromas. Please see above for additional details.      Small paraseptal hiatal hernia.                Electronically signed by:  Dileep Pimentel M.D.     2/20/2022 7:36 PM Mountain Time        Vitals:    02/20/22 1909 02/20/22 1944 02/20/22 2020   BP:   117/73   BP Location:   Left arm   Patient Position:   Sitting   Pulse:   73   Resp:  20    Temp:   99.1 °F (37.3 °C)   TempSrc:   Oral   SpO2:  99%    Weight: 133 kg (293 lb)     Height: 177.8 cm (70\")       Medications - No data to display  ECG/EMG Results (last 24 hours)     ** No results found for the last 24 hours. **        No orders to display                                              MDM    Final diagnoses:   Seroma of breast   Postoperative hematoma of subcutaneous tissue following non-dermatologic procedure   Wound drainage       Documentation assistance provided by jw Heller.  Information recorded by the jw was done at my direction and " has been verified and validated by me.     Sandeep Heller  02/20/22 3778       Catalino Ty MD  02/20/22 2056

## 2022-02-22 ENCOUNTER — OFFICE VISIT (OUTPATIENT)
Dept: INTERNAL MEDICINE | Facility: CLINIC | Age: 37
End: 2022-02-22

## 2022-02-22 VITALS
RESPIRATION RATE: 20 BRPM | TEMPERATURE: 97.1 F | SYSTOLIC BLOOD PRESSURE: 124 MMHG | BODY MASS INDEX: 41.95 KG/M2 | WEIGHT: 293 LBS | HEIGHT: 70 IN | DIASTOLIC BLOOD PRESSURE: 72 MMHG | HEART RATE: 73 BPM | OXYGEN SATURATION: 97 %

## 2022-02-22 DIAGNOSIS — D64.9 ANEMIA, UNSPECIFIED TYPE: Primary | ICD-10-CM

## 2022-02-22 DIAGNOSIS — E55.9 VITAMIN D DEFICIENCY: ICD-10-CM

## 2022-02-22 LAB
25(OH)D3 SERPL-MCNC: 22.2 NG/ML
FERRITIN SERPL-MCNC: 34.51 NG/ML (ref 13–150)
IRON 24H UR-MRATE: 48 MCG/DL (ref 37–145)
IRON SATN MFR SERPL: 13 % (ref 20–50)
TIBC SERPL-MCNC: 380 MCG/DL (ref 298–536)
TRANSFERRIN SERPL-MCNC: 255 MG/DL (ref 200–360)
VIT B12 BLD-MCNC: 289 PG/ML (ref 211–946)

## 2022-02-22 PROCEDURE — 99214 OFFICE O/P EST MOD 30 MIN: CPT | Performed by: PHYSICIAN ASSISTANT

## 2022-02-22 RX ORDER — ONDANSETRON 4 MG/1
TABLET, FILM COATED ORAL
COMMUNITY
Start: 2022-02-10 | End: 2022-02-22

## 2022-02-22 RX ORDER — DOXYCYCLINE HYCLATE 50 MG/1
324 CAPSULE, GELATIN COATED ORAL
Qty: 60 TABLET | Refills: 1 | Status: SHIPPED | OUTPATIENT
Start: 2022-02-22 | End: 2022-05-04 | Stop reason: SDUPTHER

## 2022-02-22 RX ORDER — PANTOPRAZOLE SODIUM 40 MG/1
40 TABLET, DELAYED RELEASE ORAL DAILY
COMMUNITY
Start: 2022-01-17 | End: 2022-08-17 | Stop reason: SDUPTHER

## 2022-02-22 RX ORDER — ERGOCALCIFEROL 1.25 MG/1
50000 CAPSULE ORAL WEEKLY
COMMUNITY
End: 2022-03-07

## 2022-02-22 RX ORDER — PHENTERMINE HYDROCHLORIDE 37.5 MG/1
37.5 CAPSULE ORAL EVERY MORNING
COMMUNITY
Start: 2021-12-30 | End: 2022-08-17

## 2022-02-22 NOTE — ASSESSMENT & PLAN NOTE
May be d/t blood loss from recent surgery and complication. Will check cbc again in a month. Adv against getting tummy tuck surgery at this time d/t anemia, adv to reschedule for a few months out. Will check some additional labs today

## 2022-02-22 NOTE — PROGRESS NOTES
"Chief Complaint  Fatigue and hematoma in breast    Subjective          History of Present Illness  Geneva Cordova presents to [facility] for   Hematoma:  Went to ER for post op problem, had breast reduction on 2.10.22 by Dr Horn in [place]. She started having bleeding from right breast incision site so went to ER. Was dx with seroma/hematoma and adv to f/u with surgery which she did as directed yesterday. She is doing better. They said it would heal up in time, no further surgery needed.     Anemia:  At the ER was found to be anemic. She is supposed to have a tummy tuck in [place] in a week and wanted to make sure she was still able to get this done. No hx of anemia in the past. She has felt more fatigued recently. She has had b12 def in the past she thinks. Hx of gastric sleeve surgery. Is not taking vitamins at this time.       Review of Systems   Constitutional: Positive for fatigue. Negative for fever and unexpected weight loss.   Respiratory: Negative for cough, shortness of breath and wheezing.    Cardiovascular: Negative for chest pain and palpitations.       The following portions of the patient's history were reviewed and updated as appropriate: allergies, current medications, past family history, past medical history, past social history, past surgical history and problem list.  Allergies   Allergen Reactions   • Nsaids Other (See Comments)     HISTORY OF GASTRIC SLEEVE     Current Outpatient Medications on File Prior to Visit   Medication Sig Dispense Refill   • B-D 3CC LUER-LYLY SYR 85EU8-0/2 23G X 1-1/2\" 3 ML misc AS DIRECTED DAILY WITH THIAMINE     • HYDROcodone-acetaminophen (NORCO) 5-325 MG per tablet Take 1-2 tablets by mouth Every 6 (Six) Hours As Needed for Severe Pain . 12 tablet 0   • Multiple Vitamins-Minerals (MULTIPLE VITAMINS/WOMENS) tablet Take 1 tablet by mouth Daily.     • ondansetron ODT (ZOFRAN-ODT) 4 MG disintegrating tablet Place 1 tablet on the tongue " "4 (Four) Times a Day As Needed for Nausea or Vomiting. 15 tablet 0   • pantoprazole (PROTONIX) 40 MG EC tablet 40 mg.     • phentermine 37.5 MG capsule Take 37.5 mg by mouth Every Morning.     • vitamin D (ERGOCALCIFEROL) 1.25 MG (50384 UT) capsule capsule Take 50,000 Units by mouth 1 (One) Time Per Week.     • [DISCONTINUED] amoxicillin (AMOXIL) 500 MG capsule Take 1 capsule by mouth 3 (Three) Times a Day. 30 capsule 0   • [DISCONTINUED] ondansetron (ZOFRAN) 4 MG tablet TAKE 1 TABLET BY MOUTH EVERY 6 TO 8 HOURS       No current facility-administered medications on file prior to visit.     New Medications Ordered This Visit   Medications   • ferrous gluconate (FERGON) 324 MG tablet     Sig: Take 1 tablet by mouth Daily With Breakfast.     Dispense:  60 tablet     Refill:  1     Social History     Tobacco Use   Smoking Status Never Smoker   Smokeless Tobacco Never Used        Objective   Vital Signs:   Vitals:    02/22/22 0905   BP: 124/72   Pulse: 73   Resp: 20   Temp: 97.1 °F (36.2 °C)   TempSrc: Temporal   SpO2: 97%   Weight: 135 kg (297 lb)   Height: 177.8 cm (70\")   PainSc: 0-No pain      Physical Exam  Vitals reviewed.   Constitutional:       General: She is not in acute distress.     Appearance: Normal appearance.   HENT:      Head: Normocephalic and atraumatic.   Eyes:      General: No scleral icterus.     Extraocular Movements: Extraocular movements intact.      Conjunctiva/sclera: Conjunctivae normal.   Cardiovascular:      Rate and Rhythm: Normal rate and regular rhythm.      Heart sounds: Normal heart sounds. No murmur heard.      Pulmonary:      Effort: Pulmonary effort is normal. No respiratory distress.      Breath sounds: Normal breath sounds. No stridor. No wheezing or rhonchi.   Musculoskeletal:      Cervical back: Normal range of motion and neck supple.   Skin:     General: Skin is warm and dry.      Coloration: Skin is not jaundiced.   Neurological:      General: No focal deficit present.      " Mental Status: She is alert and oriented to person, place, and time.      Gait: Gait normal.   Psychiatric:         Mood and Affect: Mood normal.         Behavior: Behavior normal.        No LMP recorded. Patient has had an ablation.    Result Review :                   Assessment and Plan    Diagnoses and all orders for this visit:    1. Anemia, unspecified type (Primary)  Assessment & Plan:  May be d/t blood loss from recent surgery and complication. Will check cbc again in a month. Adv against getting tummy tuck surgery at this time d/t anemia, adv to reschedule for a few months out. Will check some additional labs today     Orders:  -     Ferritin; Future  -     Iron Profile; Future  -     Vitamin B12; Future  -     ferrous gluconate (FERGON) 324 MG tablet; Take 1 tablet by mouth Daily With Breakfast.  Dispense: 60 tablet; Refill: 1    2. Vitamin D deficiency  -     Vitamin D 25 Hydroxy; Future      Follow Up   No follow-ups on file.    Follow up if symptoms worsen or persist or has new or concerning symptoms, go to ER for severe symptoms.   Reviewed common medication effects and side effects and advised to report side effects immediately.  Encouraged medication compliance and the importance of keeping scheduled follow up appointments with me and any other providers.  If a referral was made please contact our office if you have not heard about an appointment in the next 2 weeks.   If labs or images are ordered we will contact you with the results within the next week.  If you have not heard from us after a week please call our office to inquire about the results.   Patient was given instructions and counseling regarding her condition or for health maintenance advice. Please see specific information pulled into the AVS if appropriate.     Alice Fuentes PA-C    * Please note that portions of this note were completed with a voice recognition program.

## 2022-02-23 ENCOUNTER — TELEPHONE (OUTPATIENT)
Dept: INTERNAL MEDICINE | Facility: CLINIC | Age: 37
End: 2022-02-23

## 2022-02-23 RX ORDER — CHOLECALCIFEROL (VITAMIN D3) 50 MCG
2000 TABLET ORAL DAILY
Qty: 90 TABLET | Refills: 1 | Status: SHIPPED | OUTPATIENT
Start: 2022-02-23 | End: 2022-05-04 | Stop reason: SDUPTHER

## 2022-02-23 RX ORDER — LANOLIN ALCOHOL/MO/W.PET/CERES
1000 CREAM (GRAM) TOPICAL DAILY
Qty: 90 TABLET | Refills: 1 | Status: SHIPPED | OUTPATIENT
Start: 2022-02-23 | End: 2022-05-04 | Stop reason: SDUPTHER

## 2022-02-23 NOTE — TELEPHONE ENCOUNTER
----- Message from Alice Fuentes PA-C sent at 2/23/2022  8:38 AM EST -----  Your labs showed low vitamin D, low B12, and low iron. I will send over D and B12 supplements for you to take daily and we can recheck your labs in a few months. Continue taking the iron supplement daily as well.

## 2022-03-07 ENCOUNTER — OFFICE VISIT (OUTPATIENT)
Dept: INTERNAL MEDICINE | Facility: CLINIC | Age: 37
End: 2022-03-07

## 2022-03-07 ENCOUNTER — LAB (OUTPATIENT)
Dept: LAB | Facility: HOSPITAL | Age: 37
End: 2022-03-07

## 2022-03-07 VITALS
RESPIRATION RATE: 16 BRPM | BODY MASS INDEX: 42.64 KG/M2 | HEART RATE: 83 BPM | OXYGEN SATURATION: 99 % | TEMPERATURE: 97.3 F | SYSTOLIC BLOOD PRESSURE: 122 MMHG | DIASTOLIC BLOOD PRESSURE: 66 MMHG | WEIGHT: 293 LBS

## 2022-03-07 DIAGNOSIS — D64.9 ANEMIA, UNSPECIFIED TYPE: ICD-10-CM

## 2022-03-07 DIAGNOSIS — D64.9 ANEMIA, UNSPECIFIED TYPE: Primary | ICD-10-CM

## 2022-03-07 LAB
BASOPHILS # BLD AUTO: 0.02 10*3/MM3 (ref 0–0.2)
BASOPHILS NFR BLD AUTO: 0.4 % (ref 0–1.5)
DEPRECATED RDW RBC AUTO: 41 FL (ref 37–54)
EOSINOPHIL # BLD AUTO: 0.2 10*3/MM3 (ref 0–0.4)
EOSINOPHIL NFR BLD AUTO: 4.5 % (ref 0.3–6.2)
ERYTHROCYTE [DISTWIDTH] IN BLOOD BY AUTOMATED COUNT: 11.9 % (ref 12.3–15.4)
FERRITIN SERPL-MCNC: 45.1 NG/ML (ref 13–150)
HCT VFR BLD AUTO: 33.3 % (ref 34–46.6)
HGB BLD-MCNC: 10.5 G/DL (ref 12–15.9)
IMM GRANULOCYTES # BLD AUTO: 0.01 10*3/MM3 (ref 0–0.05)
IMM GRANULOCYTES NFR BLD AUTO: 0.2 % (ref 0–0.5)
IRON 24H UR-MRATE: 47 MCG/DL (ref 37–145)
IRON SATN MFR SERPL: 13 % (ref 20–50)
LYMPHOCYTES # BLD AUTO: 1.79 10*3/MM3 (ref 0.7–3.1)
LYMPHOCYTES NFR BLD AUTO: 39.9 % (ref 19.6–45.3)
MCH RBC QN AUTO: 29.7 PG (ref 26.6–33)
MCHC RBC AUTO-ENTMCNC: 31.5 G/DL (ref 31.5–35.7)
MCV RBC AUTO: 94.1 FL (ref 79–97)
MONOCYTES # BLD AUTO: 0.25 10*3/MM3 (ref 0.1–0.9)
MONOCYTES NFR BLD AUTO: 5.6 % (ref 5–12)
NEUTROPHILS NFR BLD AUTO: 2.22 10*3/MM3 (ref 1.7–7)
NEUTROPHILS NFR BLD AUTO: 49.4 % (ref 42.7–76)
NRBC BLD AUTO-RTO: 0 /100 WBC (ref 0–0.2)
PLATELET # BLD AUTO: 365 10*3/MM3 (ref 140–450)
PMV BLD AUTO: 10.7 FL (ref 6–12)
RBC # BLD AUTO: 3.54 10*6/MM3 (ref 3.77–5.28)
TIBC SERPL-MCNC: 373 MCG/DL (ref 298–536)
TRANSFERRIN SERPL-MCNC: 250 MG/DL (ref 200–360)
VIT B12 BLD-MCNC: 438 PG/ML (ref 211–946)
WBC NRBC COR # BLD: 4.49 10*3/MM3 (ref 3.4–10.8)

## 2022-03-07 PROCEDURE — 84466 ASSAY OF TRANSFERRIN: CPT | Performed by: PHYSICIAN ASSISTANT

## 2022-03-07 PROCEDURE — 99214 OFFICE O/P EST MOD 30 MIN: CPT | Performed by: PHYSICIAN ASSISTANT

## 2022-03-07 PROCEDURE — 85025 COMPLETE CBC W/AUTO DIFF WBC: CPT | Performed by: PHYSICIAN ASSISTANT

## 2022-03-07 PROCEDURE — 82607 VITAMIN B-12: CPT | Performed by: PHYSICIAN ASSISTANT

## 2022-03-07 PROCEDURE — 83540 ASSAY OF IRON: CPT | Performed by: PHYSICIAN ASSISTANT

## 2022-03-07 PROCEDURE — 82728 ASSAY OF FERRITIN: CPT | Performed by: PHYSICIAN ASSISTANT

## 2022-03-07 NOTE — PROGRESS NOTES
"Chief Complaint  Anemia (2 wk f/u-overall not as tired as before)    Subjective          History of Present Illness  Geneva Cordova presents to [facility] for   Anemia:  Was found to be anemic after her recent breast surgery and we started on iron and b12. She has a hx of b12 def she thinks. Hx of gastric sleeve and had not been taking vitamins regularly. She has not had anemia in the past that she recalls. She was supposed to have a tummy tuck in [place] this week but rescheduled due to the anemia. She has been on vitamins for a week now. She is feeling less tired, overall improved.       Review of Systems   Constitutional: Negative for fever and unexpected weight loss.   Respiratory: Negative for cough, shortness of breath and wheezing.    Cardiovascular: Negative for chest pain and palpitations.       The following portions of the patient's history were reviewed and updated as appropriate: allergies, current medications, past family history, past medical history, past social history, past surgical history and problem list.  Allergies   Allergen Reactions   • Nsaids Other (See Comments)     HISTORY OF GASTRIC SLEEVE     Current Outpatient Medications on File Prior to Visit   Medication Sig Dispense Refill   • B-D 3CC LUER-LYLY SYR 34RW5-6/2 23G X 1-1/2\" 3 ML misc AS DIRECTED DAILY WITH THIAMINE     • Cholecalciferol (Vitamin D) 50 MCG (2000 UT) tablet Take 2,000 Units by mouth Daily. 90 tablet 1   • ferrous gluconate (FERGON) 324 MG tablet Take 1 tablet by mouth Daily With Breakfast. 60 tablet 1   • HYDROcodone-acetaminophen (NORCO) 5-325 MG per tablet Take 1-2 tablets by mouth Every 6 (Six) Hours As Needed for Severe Pain . 12 tablet 0   • Multiple Vitamins-Minerals (MULTIPLE VITAMINS/WOMENS) tablet Take 1 tablet by mouth Daily.     • ondansetron ODT (ZOFRAN-ODT) 4 MG disintegrating tablet Place 1 tablet on the tongue 4 (Four) Times a Day As Needed for Nausea or Vomiting. 15 tablet 0 "   • pantoprazole (PROTONIX) 40 MG EC tablet Take 40 mg by mouth Daily.     • phentermine 37.5 MG capsule Take 37.5 mg by mouth Every Morning.     • vitamin B-12 (CYANOCOBALAMIN) 1000 MCG tablet Take 1 tablet by mouth Daily. 90 tablet 1   • vitamin D (ERGOCALCIFEROL) 1.25 MG (86754 UT) capsule capsule Take 50,000 Units by mouth 1 (One) Time Per Week.       No current facility-administered medications on file prior to visit.     No orders of the defined types were placed in this encounter.    Social History     Tobacco Use   Smoking Status Never Smoker   Smokeless Tobacco Never Used        Objective   Vital Signs:   Vitals:    03/07/22 0818   BP: 122/66   Pulse: 83   Resp: 16   Temp: 97.3 °F (36.3 °C)   TempSrc: Infrared   SpO2: 99%   Weight: 135 kg (297 lb 3.2 oz)      Physical Exam  Vitals reviewed.   Constitutional:       General: She is not in acute distress.     Appearance: Normal appearance.   HENT:      Head: Normocephalic and atraumatic.   Eyes:      General: No scleral icterus.     Extraocular Movements: Extraocular movements intact.      Conjunctiva/sclera: Conjunctivae normal.   Cardiovascular:      Rate and Rhythm: Normal rate and regular rhythm.      Heart sounds: Normal heart sounds. No murmur heard.  Pulmonary:      Effort: Pulmonary effort is normal. No respiratory distress.      Breath sounds: Normal breath sounds. No stridor. No wheezing or rhonchi.   Musculoskeletal:      Cervical back: Normal range of motion and neck supple.   Skin:     General: Skin is warm and dry.      Coloration: Skin is not jaundiced.   Neurological:      General: No focal deficit present.      Mental Status: She is alert and oriented to person, place, and time.      Gait: Gait normal.   Psychiatric:         Mood and Affect: Mood normal.         Behavior: Behavior normal.        No LMP recorded. Patient has had an ablation.    Result Review :                   Assessment and Plan    Diagnoses and all orders for this  visit:    1. Anemia, unspecified type (Primary)  Assessment & Plan:  Recheck labs today, cont vitamins, unknown cause of anemia but may be related to recent surgery.    Orders:  -     Vitamin B12; Future  -     CBC w AUTO Differential; Future  -     Iron Profile; Future  -     Ferritin; Future      Follow Up   Return in about 2 months (around 5/7/2022).    Follow up if symptoms worsen or persist or has new or concerning symptoms, go to ER for severe symptoms.   Reviewed common medication effects and side effects and advised to report side effects immediately.  Encouraged medication compliance and the importance of keeping scheduled follow up appointments with me and any other providers.  If a referral was made please contact our office if you have not heard about an appointment in the next 2 weeks.   If labs or images are ordered we will contact you with the results within the next week.  If you have not heard from us after a week please call our office to inquire about the results.   Patient was given instructions and counseling regarding her condition or for health maintenance advice. Please see specific information pulled into the AVS if appropriate.     Alice Fuentes PA-C    * Please note that portions of this note were completed with a voice recognition program.

## 2022-05-02 ENCOUNTER — LAB (OUTPATIENT)
Dept: LAB | Facility: HOSPITAL | Age: 37
End: 2022-05-02

## 2022-05-02 ENCOUNTER — OFFICE VISIT (OUTPATIENT)
Dept: INTERNAL MEDICINE | Facility: CLINIC | Age: 37
End: 2022-05-02

## 2022-05-02 VITALS
HEART RATE: 67 BPM | DIASTOLIC BLOOD PRESSURE: 76 MMHG | OXYGEN SATURATION: 99 % | WEIGHT: 289 LBS | BODY MASS INDEX: 41.47 KG/M2 | TEMPERATURE: 97.3 F | SYSTOLIC BLOOD PRESSURE: 112 MMHG

## 2022-05-02 DIAGNOSIS — E55.9 VITAMIN D DEFICIENCY: ICD-10-CM

## 2022-05-02 DIAGNOSIS — E53.8 VITAMIN B 12 DEFICIENCY: ICD-10-CM

## 2022-05-02 DIAGNOSIS — Z11.3 SCREEN FOR STD (SEXUALLY TRANSMITTED DISEASE): ICD-10-CM

## 2022-05-02 DIAGNOSIS — D64.9 ANEMIA, UNSPECIFIED TYPE: ICD-10-CM

## 2022-05-02 DIAGNOSIS — D64.9 ANEMIA, UNSPECIFIED TYPE: Primary | ICD-10-CM

## 2022-05-02 LAB
BASOPHILS # BLD AUTO: 0.02 10*3/MM3 (ref 0–0.2)
BASOPHILS NFR BLD AUTO: 0.4 % (ref 0–1.5)
DEPRECATED RDW RBC AUTO: 40.2 FL (ref 37–54)
EOSINOPHIL # BLD AUTO: 0.17 10*3/MM3 (ref 0–0.4)
EOSINOPHIL NFR BLD AUTO: 3 % (ref 0.3–6.2)
ERYTHROCYTE [DISTWIDTH] IN BLOOD BY AUTOMATED COUNT: 12.6 % (ref 12.3–15.4)
HCT VFR BLD AUTO: 37.5 % (ref 34–46.6)
HGB BLD-MCNC: 12.3 G/DL (ref 12–15.9)
IMM GRANULOCYTES # BLD AUTO: 0.03 10*3/MM3 (ref 0–0.05)
IMM GRANULOCYTES NFR BLD AUTO: 0.5 % (ref 0–0.5)
LYMPHOCYTES # BLD AUTO: 1.75 10*3/MM3 (ref 0.7–3.1)
LYMPHOCYTES NFR BLD AUTO: 31.4 % (ref 19.6–45.3)
MCH RBC QN AUTO: 29.1 PG (ref 26.6–33)
MCHC RBC AUTO-ENTMCNC: 32.8 G/DL (ref 31.5–35.7)
MCV RBC AUTO: 88.7 FL (ref 79–97)
MONOCYTES # BLD AUTO: 0.29 10*3/MM3 (ref 0.1–0.9)
MONOCYTES NFR BLD AUTO: 5.2 % (ref 5–12)
NEUTROPHILS NFR BLD AUTO: 3.32 10*3/MM3 (ref 1.7–7)
NEUTROPHILS NFR BLD AUTO: 59.5 % (ref 42.7–76)
NRBC BLD AUTO-RTO: 0 /100 WBC (ref 0–0.2)
PLATELET # BLD AUTO: 308 10*3/MM3 (ref 140–450)
PMV BLD AUTO: 12.2 FL (ref 6–12)
RBC # BLD AUTO: 4.23 10*6/MM3 (ref 3.77–5.28)
WBC NRBC COR # BLD: 5.58 10*3/MM3 (ref 3.4–10.8)

## 2022-05-02 PROCEDURE — 83540 ASSAY OF IRON: CPT | Performed by: PHYSICIAN ASSISTANT

## 2022-05-02 PROCEDURE — 82306 VITAMIN D 25 HYDROXY: CPT | Performed by: PHYSICIAN ASSISTANT

## 2022-05-02 PROCEDURE — 80074 ACUTE HEPATITIS PANEL: CPT | Performed by: PHYSICIAN ASSISTANT

## 2022-05-02 PROCEDURE — 82728 ASSAY OF FERRITIN: CPT | Performed by: PHYSICIAN ASSISTANT

## 2022-05-02 PROCEDURE — G0432 EIA HIV-1/HIV-2 SCREEN: HCPCS | Performed by: PHYSICIAN ASSISTANT

## 2022-05-02 PROCEDURE — 82607 VITAMIN B-12: CPT | Performed by: PHYSICIAN ASSISTANT

## 2022-05-02 PROCEDURE — 85025 COMPLETE CBC W/AUTO DIFF WBC: CPT | Performed by: PHYSICIAN ASSISTANT

## 2022-05-02 PROCEDURE — 99214 OFFICE O/P EST MOD 30 MIN: CPT | Performed by: PHYSICIAN ASSISTANT

## 2022-05-02 PROCEDURE — 87591 N.GONORRHOEAE DNA AMP PROB: CPT | Performed by: PHYSICIAN ASSISTANT

## 2022-05-02 PROCEDURE — 84466 ASSAY OF TRANSFERRIN: CPT | Performed by: PHYSICIAN ASSISTANT

## 2022-05-02 PROCEDURE — 87661 TRICHOMONAS VAGINALIS AMPLIF: CPT | Performed by: PHYSICIAN ASSISTANT

## 2022-05-02 PROCEDURE — 86592 SYPHILIS TEST NON-TREP QUAL: CPT | Performed by: PHYSICIAN ASSISTANT

## 2022-05-02 PROCEDURE — 87491 CHLMYD TRACH DNA AMP PROBE: CPT | Performed by: PHYSICIAN ASSISTANT

## 2022-05-02 NOTE — PROGRESS NOTES
"Chief Complaint  Anemia and std testing     Subjective          History of Present Illness  Geneva Cordova presents to [facility] for   Req STD screening, has hx of gonorrhea years ago, completed treatment.     Anemia:  Taking B12, iron, and vitamin D daily. Feels good, does not feel fatigue or SOA. Has surgery scheduled for middle of June.  Was found to be anemic after her recent breast surgery and we started on iron and b12. She has a hx of b12 def she thinks. Hx of gastric sleeve and had not been taking vitamins regularly. She has not had anemia in the past that she recalls.      Review of Systems   Constitutional: Negative for fever and unexpected weight loss.   Respiratory: Negative for cough, shortness of breath and wheezing.    Cardiovascular: Negative for chest pain and palpitations.       The following portions of the patient's history were reviewed and updated as appropriate: allergies, current medications, past family history, past medical history, past social history, past surgical history and problem list.  Allergies   Allergen Reactions   • Nsaids Other (See Comments)     HISTORY OF GASTRIC SLEEVE     Current Outpatient Medications on File Prior to Visit   Medication Sig Dispense Refill   • B-D 3CC LUER-LYLY SYR 01FE3-0/2 23G X 1-1/2\" 3 ML misc AS DIRECTED DAILY WITH THIAMINE     • Cholecalciferol (Vitamin D) 50 MCG (2000 UT) tablet Take 2,000 Units by mouth Daily. 90 tablet 1   • ferrous gluconate (FERGON) 324 MG tablet Take 1 tablet by mouth Daily With Breakfast. 60 tablet 1   • HYDROcodone-acetaminophen (NORCO) 5-325 MG per tablet Take 1-2 tablets by mouth Every 6 (Six) Hours As Needed for Severe Pain . 12 tablet 0   • Multiple Vitamins-Minerals (MULTIPLE VITAMINS/WOMENS) tablet Take 1 tablet by mouth Daily.     • ondansetron ODT (ZOFRAN-ODT) 4 MG disintegrating tablet Place 1 tablet on the tongue 4 (Four) Times a Day As Needed for Nausea or Vomiting. 15 tablet 0   • " pantoprazole (PROTONIX) 40 MG EC tablet Take 40 mg by mouth Daily.     • phentermine 37.5 MG capsule Take 37.5 mg by mouth Every Morning.     • vitamin B-12 (CYANOCOBALAMIN) 1000 MCG tablet Take 1 tablet by mouth Daily. 90 tablet 1     No current facility-administered medications on file prior to visit.     No orders of the defined types were placed in this encounter.    Social History     Tobacco Use   Smoking Status Never Smoker   Smokeless Tobacco Never Used        Objective   Vital Signs:   Vitals:    05/02/22 1238   BP: 112/76   Pulse: 67   Temp: 97.3 °F (36.3 °C)   TempSrc: Temporal   SpO2: 99%   Weight: 131 kg (289 lb)   PainSc: 0-No pain      Physical Exam  Vitals reviewed.   Constitutional:       General: She is not in acute distress.     Appearance: Normal appearance.   HENT:      Head: Normocephalic and atraumatic.   Eyes:      General: No scleral icterus.     Extraocular Movements: Extraocular movements intact.      Conjunctiva/sclera: Conjunctivae normal.   Cardiovascular:      Rate and Rhythm: Normal rate and regular rhythm.      Heart sounds: Normal heart sounds. No murmur heard.  Pulmonary:      Effort: Pulmonary effort is normal. No respiratory distress.      Breath sounds: Normal breath sounds. No stridor. No wheezing or rhonchi.   Musculoskeletal:      Cervical back: Normal range of motion and neck supple.   Skin:     General: Skin is warm and dry.      Coloration: Skin is not jaundiced.   Neurological:      General: No focal deficit present.      Mental Status: She is alert and oriented to person, place, and time.      Gait: Gait normal.   Psychiatric:         Mood and Affect: Mood normal.         Behavior: Behavior normal.        No LMP recorded. Patient has had an ablation.    Result Review :                   Assessment and Plan    Diagnoses and all orders for this visit:    1. Anemia, unspecified type (Primary)  Assessment & Plan:  Recheck labs, cont B12, Vit D, iron.     Orders:  -     Iron  Profile; Future  -     Vitamin B12; Future  -     Ferritin; Future  -     CBC & Differential; Future    2. Vitamin D deficiency  -     Vitamin D 25 Hydroxy; Future    3. Vitamin B 12 deficiency  -     Vitamin B12; Future    4. Screen for STD (sexually transmitted disease)  -     RPR; Future  -     HIV-1/O/2 ANTIGEN/ANTIBODY, 4TH GENERATION; Future  -     Hepatitis panel, acute; Future  -     Chlamydia trachomatis, Neisseria gonorrhoeae, Trichomonas vaginalis, PCR - Urine, Urine, Clean Catch; Future      Follow Up   Return in about 3 months (around 8/2/2022).    Follow up if symptoms worsen or persist or has new or concerning symptoms, go to ER for severe symptoms.   Reviewed common medication effects and side effects and advised to report side effects immediately.  Encouraged medication compliance and the importance of keeping scheduled follow up appointments with me and any other providers.  If a referral was made please contact our office if you have not heard about an appointment in the next 2 weeks.   If labs or images are ordered we will contact you with the results within the next week.  If you have not heard from us after a week please call our office to inquire about the results.   Patient was given instructions and counseling regarding her condition or for health maintenance advice. Please see specific information pulled into the AVS if appropriate.     Alice Fuentes PA-C    * Please note that portions of this note were completed with a voice recognition program.

## 2022-05-03 LAB
25(OH)D3 SERPL-MCNC: 28.2 NG/ML (ref 30–100)
FERRITIN SERPL-MCNC: 19.7 NG/ML (ref 13–150)
HAV IGM SERPL QL IA: NORMAL
HBV CORE IGM SERPL QL IA: NORMAL
HBV SURFACE AG SERPL QL IA: NORMAL
HCV AB SER DONR QL: NORMAL
HIV1+2 AB SER QL: NORMAL
IRON 24H UR-MRATE: 52 MCG/DL (ref 37–145)
IRON SATN MFR SERPL: 12 % (ref 20–50)
RPR SER QL: NORMAL
TIBC SERPL-MCNC: 434 MCG/DL (ref 298–536)
TRANSFERRIN SERPL-MCNC: 291 MG/DL (ref 200–360)
VIT B12 BLD-MCNC: 292 PG/ML (ref 211–946)

## 2022-05-04 ENCOUNTER — TELEPHONE (OUTPATIENT)
Dept: INTERNAL MEDICINE | Facility: CLINIC | Age: 37
End: 2022-05-04

## 2022-05-04 DIAGNOSIS — D64.9 ANEMIA, UNSPECIFIED TYPE: ICD-10-CM

## 2022-05-04 LAB
C TRACH RRNA SPEC QL NAA+PROBE: NEGATIVE
N GONORRHOEA RRNA SPEC QL NAA+PROBE: NEGATIVE
T VAGINALIS RRNA SPEC QL NAA+PROBE: POSITIVE

## 2022-05-04 RX ORDER — CHOLECALCIFEROL (VITAMIN D3) 50 MCG
2000 TABLET ORAL DAILY
Qty: 90 TABLET | Refills: 1 | Status: SHIPPED | OUTPATIENT
Start: 2022-05-04 | End: 2022-08-17 | Stop reason: SDUPTHER

## 2022-05-04 RX ORDER — DOXYCYCLINE HYCLATE 50 MG/1
324 CAPSULE, GELATIN COATED ORAL
Qty: 90 TABLET | Refills: 1 | Status: SHIPPED | OUTPATIENT
Start: 2022-05-04 | End: 2022-08-17 | Stop reason: SDUPTHER

## 2022-05-04 RX ORDER — LANOLIN ALCOHOL/MO/W.PET/CERES
1000 CREAM (GRAM) TOPICAL DAILY
Qty: 90 TABLET | Refills: 1 | Status: SHIPPED | OUTPATIENT
Start: 2022-05-04 | End: 2022-08-17 | Stop reason: SDUPTHER

## 2022-05-04 NOTE — TELEPHONE ENCOUNTER
Caller: Geneva Cordova    Relationship: Self    Best call back number: [phone]    Caller requesting test results: YES    What test was performed: LABS    When was the test performed: 05/02/22    Where was the test performed: AT OFFICE    Additional notes:

## 2022-05-04 NOTE — TELEPHONE ENCOUNTER
----- Message from Alice Fuentes PA-C sent at 5/4/2022 10:28 AM EDT -----  Labs showed slightly low iron, B12, and vit D still but the anemia has resolved. Cont taking daily B12, Vitamin D, and iron tablets, I sent in refills. You are ok for surgery. You were negative for HIV, Syphilis, and Hepatitis but the rest of your STD screening tests are not back yet.

## 2022-05-05 ENCOUNTER — TELEPHONE (OUTPATIENT)
Dept: INTERNAL MEDICINE | Facility: CLINIC | Age: 37
End: 2022-05-05

## 2022-05-05 RX ORDER — METRONIDAZOLE 500 MG/1
500 TABLET ORAL 2 TIMES DAILY
Qty: 14 TABLET | Refills: 0 | Status: SHIPPED | OUTPATIENT
Start: 2022-05-05 | End: 2022-05-12

## 2022-05-05 NOTE — TELEPHONE ENCOUNTER
----- Message from Alice Fuentes PA-C sent at 5/5/2022  4:28 PM EDT -----  Your STD screen is negative for Chlamydia and Gonorrhea but positive for Trichomonas. I will send in an antibiotic for you to take for 7 days, we should retest you in a month to make sure infection has resolved. Abstain from intercourse until treatment is complete. Please advise your partner to get tested and treated as well.

## 2022-05-10 ENCOUNTER — HOSPITAL ENCOUNTER (EMERGENCY)
Facility: HOSPITAL | Age: 37
Discharge: HOME | End: 2022-05-10
Attending: EMERGENCY MEDICINE | Admitting: EMERGENCY MEDICINE

## 2022-05-10 ENCOUNTER — APPOINTMENT (OUTPATIENT)
Dept: GENERAL RADIOLOGY | Facility: HOSPITAL | Age: 37
End: 2022-05-10

## 2022-05-10 ENCOUNTER — APPOINTMENT (OUTPATIENT)
Dept: CT IMAGING | Facility: HOSPITAL | Age: 37
End: 2022-05-10

## 2022-05-10 VITALS
WEIGHT: 290 LBS | BODY MASS INDEX: 41.52 KG/M2 | SYSTOLIC BLOOD PRESSURE: 129 MMHG | TEMPERATURE: 97.5 F | DIASTOLIC BLOOD PRESSURE: 84 MMHG | HEART RATE: 78 BPM | RESPIRATION RATE: 18 BRPM | HEIGHT: 70 IN | OXYGEN SATURATION: 100 %

## 2022-05-10 DIAGNOSIS — R11.2 NAUSEA VOMITING AND DIARRHEA: ICD-10-CM

## 2022-05-10 DIAGNOSIS — N30.00 ACUTE CYSTITIS WITHOUT HEMATURIA: ICD-10-CM

## 2022-05-10 DIAGNOSIS — R19.7 NAUSEA VOMITING AND DIARRHEA: ICD-10-CM

## 2022-05-10 DIAGNOSIS — R79.89 ELEVATED LIVER FUNCTION TESTS: ICD-10-CM

## 2022-05-10 DIAGNOSIS — R10.84 GENERALIZED ABDOMINAL PAIN: Primary | ICD-10-CM

## 2022-05-10 LAB
ALBUMIN SERPL-MCNC: 4.3 G/DL (ref 3.5–5.2)
ALBUMIN/GLOB SERPL: 1.4 G/DL
ALP SERPL-CCNC: 213 U/L (ref 39–117)
ALT SERPL W P-5'-P-CCNC: 273 U/L (ref 1–33)
ANION GAP SERPL CALCULATED.3IONS-SCNC: 10 MMOL/L (ref 5–15)
AST SERPL-CCNC: 606 U/L (ref 1–32)
BACTERIA UR QL AUTO: ABNORMAL /HPF
BASOPHILS # BLD AUTO: 0.01 10*3/MM3 (ref 0–0.2)
BASOPHILS NFR BLD AUTO: 0.3 % (ref 0–1.5)
BILIRUB SERPL-MCNC: 0.8 MG/DL (ref 0–1.2)
BILIRUB UR QL STRIP: ABNORMAL
BUN SERPL-MCNC: 8 MG/DL (ref 6–20)
BUN/CREAT SERPL: 10.3 (ref 7–25)
CALCIUM SPEC-SCNC: 8.9 MG/DL (ref 8.6–10.5)
CHLORIDE SERPL-SCNC: 104 MMOL/L (ref 98–107)
CLARITY UR: ABNORMAL
CO2 SERPL-SCNC: 25 MMOL/L (ref 22–29)
COLOR UR: ABNORMAL
CREAT SERPL-MCNC: 0.78 MG/DL (ref 0.57–1)
DEPRECATED RDW RBC AUTO: 41.6 FL (ref 37–54)
EGFRCR SERPLBLD CKD-EPI 2021: 101.7 ML/MIN/1.73
EOSINOPHIL # BLD AUTO: 0.02 10*3/MM3 (ref 0–0.4)
EOSINOPHIL NFR BLD AUTO: 0.6 % (ref 0.3–6.2)
ERYTHROCYTE [DISTWIDTH] IN BLOOD BY AUTOMATED COUNT: 12.8 % (ref 12.3–15.4)
FLUAV RNA RESP QL NAA+PROBE: NOT DETECTED
FLUBV RNA RESP QL NAA+PROBE: NOT DETECTED
GLOBULIN UR ELPH-MCNC: 3 GM/DL
GLUCOSE SERPL-MCNC: 98 MG/DL (ref 65–99)
GLUCOSE UR STRIP-MCNC: NEGATIVE MG/DL
HCT VFR BLD AUTO: 38.2 % (ref 34–46.6)
HGB BLD-MCNC: 12.4 G/DL (ref 12–15.9)
HGB UR QL STRIP.AUTO: ABNORMAL
HOLD SPECIMEN: NORMAL
HYALINE CASTS UR QL AUTO: ABNORMAL /LPF
IMM GRANULOCYTES # BLD AUTO: 0.01 10*3/MM3 (ref 0–0.05)
IMM GRANULOCYTES NFR BLD AUTO: 0.3 % (ref 0–0.5)
KETONES UR QL STRIP: ABNORMAL
LEUKOCYTE ESTERASE UR QL STRIP.AUTO: ABNORMAL
LIPASE SERPL-CCNC: 13 U/L (ref 13–60)
LYMPHOCYTES # BLD AUTO: 0.61 10*3/MM3 (ref 0.7–3.1)
LYMPHOCYTES NFR BLD AUTO: 17.1 % (ref 19.6–45.3)
MCH RBC QN AUTO: 28.8 PG (ref 26.6–33)
MCHC RBC AUTO-ENTMCNC: 32.5 G/DL (ref 31.5–35.7)
MCV RBC AUTO: 88.6 FL (ref 79–97)
MONOCYTES # BLD AUTO: 0.18 10*3/MM3 (ref 0.1–0.9)
MONOCYTES NFR BLD AUTO: 5.1 % (ref 5–12)
NEUTROPHILS NFR BLD AUTO: 2.73 10*3/MM3 (ref 1.7–7)
NEUTROPHILS NFR BLD AUTO: 76.6 % (ref 42.7–76)
NITRITE UR QL STRIP: NEGATIVE
NRBC BLD AUTO-RTO: 0 /100 WBC (ref 0–0.2)
PH UR STRIP.AUTO: 5.5 [PH] (ref 5–8)
PLATELET # BLD AUTO: 255 10*3/MM3 (ref 140–450)
PMV BLD AUTO: 10.9 FL (ref 6–12)
POTASSIUM SERPL-SCNC: 3.2 MMOL/L (ref 3.5–5.2)
PROT SERPL-MCNC: 7.3 G/DL (ref 6–8.5)
PROT UR QL STRIP: ABNORMAL
RBC # BLD AUTO: 4.31 10*6/MM3 (ref 3.77–5.28)
RBC # UR STRIP: ABNORMAL /HPF
REF LAB TEST METHOD: ABNORMAL
SARS-COV-2 RNA RESP QL NAA+PROBE: NOT DETECTED
SODIUM SERPL-SCNC: 139 MMOL/L (ref 136–145)
SP GR UR STRIP: >=1.03 (ref 1–1.03)
SQUAMOUS #/AREA URNS HPF: ABNORMAL /HPF
TROPONIN T SERPL-MCNC: <0.01 NG/ML (ref 0–0.03)
UROBILINOGEN UR QL STRIP: ABNORMAL
WBC # UR STRIP: ABNORMAL /HPF
WBC NRBC COR # BLD: 3.56 10*3/MM3 (ref 3.4–10.8)
WHOLE BLOOD HOLD COAG: NORMAL
WHOLE BLOOD HOLD SPECIMEN: NORMAL

## 2022-05-10 PROCEDURE — 25010000002 ONDANSETRON PER 1 MG: Performed by: PHYSICIAN ASSISTANT

## 2022-05-10 PROCEDURE — 87636 SARSCOV2 & INF A&B AMP PRB: CPT | Performed by: PHYSICIAN ASSISTANT

## 2022-05-10 PROCEDURE — 96375 TX/PRO/DX INJ NEW DRUG ADDON: CPT

## 2022-05-10 PROCEDURE — 87086 URINE CULTURE/COLONY COUNT: CPT | Performed by: PHYSICIAN ASSISTANT

## 2022-05-10 PROCEDURE — 80053 COMPREHEN METABOLIC PANEL: CPT | Performed by: EMERGENCY MEDICINE

## 2022-05-10 PROCEDURE — 87186 SC STD MICRODIL/AGAR DIL: CPT | Performed by: PHYSICIAN ASSISTANT

## 2022-05-10 PROCEDURE — 87077 CULTURE AEROBIC IDENTIFY: CPT | Performed by: PHYSICIAN ASSISTANT

## 2022-05-10 PROCEDURE — 85025 COMPLETE CBC W/AUTO DIFF WBC: CPT | Performed by: EMERGENCY MEDICINE

## 2022-05-10 PROCEDURE — 74177 CT ABD & PELVIS W/CONTRAST: CPT

## 2022-05-10 PROCEDURE — 71045 X-RAY EXAM CHEST 1 VIEW: CPT

## 2022-05-10 PROCEDURE — 36415 COLL VENOUS BLD VENIPUNCTURE: CPT

## 2022-05-10 PROCEDURE — 84484 ASSAY OF TROPONIN QUANT: CPT | Performed by: EMERGENCY MEDICINE

## 2022-05-10 PROCEDURE — 83690 ASSAY OF LIPASE: CPT | Performed by: EMERGENCY MEDICINE

## 2022-05-10 PROCEDURE — 99283 EMERGENCY DEPT VISIT LOW MDM: CPT

## 2022-05-10 PROCEDURE — 96374 THER/PROPH/DIAG INJ IV PUSH: CPT

## 2022-05-10 PROCEDURE — 81001 URINALYSIS AUTO W/SCOPE: CPT | Performed by: EMERGENCY MEDICINE

## 2022-05-10 PROCEDURE — 93005 ELECTROCARDIOGRAM TRACING: CPT | Performed by: EMERGENCY MEDICINE

## 2022-05-10 PROCEDURE — 25010000002 IOPAMIDOL 61 % SOLUTION: Performed by: EMERGENCY MEDICINE

## 2022-05-10 RX ORDER — ONDANSETRON 4 MG/1
4 TABLET, ORALLY DISINTEGRATING ORAL EVERY 6 HOURS PRN
Qty: 15 TABLET | Refills: 0 | OUTPATIENT
Start: 2022-05-10 | End: 2022-07-17

## 2022-05-10 RX ORDER — CEFDINIR 300 MG/1
300 CAPSULE ORAL 2 TIMES DAILY
Qty: 20 CAPSULE | Refills: 0 | Status: SHIPPED | OUTPATIENT
Start: 2022-05-10 | End: 2022-05-20

## 2022-05-10 RX ORDER — FAMOTIDINE 10 MG/ML
20 INJECTION, SOLUTION INTRAVENOUS ONCE
Status: COMPLETED | OUTPATIENT
Start: 2022-05-10 | End: 2022-05-10

## 2022-05-10 RX ORDER — ONDANSETRON 2 MG/ML
4 INJECTION INTRAMUSCULAR; INTRAVENOUS ONCE
Status: COMPLETED | OUTPATIENT
Start: 2022-05-10 | End: 2022-05-10

## 2022-05-10 RX ORDER — SODIUM CHLORIDE 0.9 % (FLUSH) 0.9 %
10 SYRINGE (ML) INJECTION AS NEEDED
Status: DISCONTINUED | OUTPATIENT
Start: 2022-05-10 | End: 2022-05-10 | Stop reason: HOSPADM

## 2022-05-10 RX ADMIN — IOPAMIDOL 95 ML: 612 INJECTION, SOLUTION INTRAVENOUS at 19:06

## 2022-05-10 RX ADMIN — ONDANSETRON 4 MG: 2 INJECTION INTRAMUSCULAR; INTRAVENOUS at 18:48

## 2022-05-10 RX ADMIN — FAMOTIDINE 20 MG: 10 INJECTION INTRAVENOUS at 18:48

## 2022-05-10 RX ADMIN — SODIUM CHLORIDE 1000 ML: 9 INJECTION, SOLUTION INTRAVENOUS at 18:46

## 2022-05-10 NOTE — ED PROVIDER NOTES
Subjective   Pt is a 35 yo female presenting to ED with complaints of abdominal pain. PMHx significnat for PCOS, VIRA, CBP, Anxiety, Depression, Migraines and HPylori. Pt reports generalized abdominal pain for the past 2 days with N/V/D. She reports feeling lightheaded and fatigued with standing up. She denies urinary sx or blood in vomit/stool. She denies syncope, headache, fever, CP, SOB or cough. Pt also reports being checked for STDs by PCP recently and has taken several days of Flagyl for Trichomonas infection but Gonorrhea and Chlamydia were negative. Pt denies currently vaginal discharge. Past abdominal surgical hx includes gastric sleeve, Csection, hernia, ablation, cholecystectomy and tubal ligation. She denies tobacco, drug or ETOH use. She denies known sick contacts.       History provided by:  Patient and medical records      Review of Systems   Constitutional: Positive for fatigue. Negative for chills and fever.   HENT: Negative for congestion, sore throat and trouble swallowing.    Eyes: Negative for visual disturbance.   Respiratory: Negative for cough and shortness of breath.    Cardiovascular: Negative for chest pain and leg swelling.   Gastrointestinal: Positive for abdominal pain, diarrhea, nausea and vomiting. Negative for constipation.   Genitourinary: Negative for difficulty urinating, dysuria, flank pain, hematuria and vaginal discharge.   Musculoskeletal: Negative for arthralgias and back pain.   Skin: Negative for rash and wound.   Neurological: Positive for light-headedness. Negative for dizziness, syncope, weakness, numbness and headaches.   Psychiatric/Behavioral: Negative for confusion.   All other systems reviewed and are negative.      Past Medical History:   Diagnosis Date   • Anemia    • Anxiety    • Bilateral lower extremity edema     R>L   • Boil, axilla     recurrent   • Chronic back pain     no meds or injections, feels related to breasts   • COVID-19 12/2020   • Depression    •  "Dyspepsia    • Dyspnea on exertion    • Fatigue    • Glucose intolerance (impaired glucose tolerance)     \"hasn't been tested for that\"   • Helicobacter pylori (H. pylori) infection     asx and grossly nl EGD. \"abundant\"on path. HBT still + after PrevPak tx. LSG path neg. neg testing 2018   • Hypoalbuminemia     3.2 in past, 4.1 most recent   • Left Ovarian serous cystadenoma -removed at  section 2017   • Migraine     resolved with weightloss   • Obesity, morbid (HCC)    • VIRA (obstructive sleep apnea)      improved since WLS, pending f/u sleep study, not using CPAP   • PCOS (polycystic ovarian syndrome)    • Right Essure implantation 2017 10/6/2017   • S/P  section;left ovarian cystectomy and partial salpingectomy 17   • Seasonal allergies        Allergies   Allergen Reactions   • Codeine Other (See Comments)     UNKNOWN   • Nsaids Other (See Comments)     HISTORY OF GASTRIC SLEEVE       Past Surgical History:   Procedure Laterality Date   • BILATERAL BREAST REDUCTION     •  SECTION N/A 2017      SECTION PRIMARY;  CYSTECTOMY; Kwadwo Baxter MD; :  [facility];  Service:    • DILATATION AND CURETTAGE     • ENDOMETRIAL ABLATION  2020    Dr Cuevas   • ENDOSCOPY N/A 2020    Procedure: ESOPHAGOGASTRODUODENOSCOPY;  Surgeon: Natalia Mariscal MD;  Location:  [facility];  Service: Bariatric;  Laterality: N/A;   • ESSURE TUBAL LIGATION     • GALLBLADDER SURGERY     • GASTRIC SLEEVE LAPAROSCOPIC  2015    With Dr. Hughes   • GASTRIC SLEEVE LAPAROSCOPIC N/A 2020    Procedure: GASTRIC SLEEVE LAPAROSCOPIC;  Surgeon: Natalia Mariscal MD;  Location:  [facility];  Service: Bariatric;  Laterality: N/A;   • HYSTEROSCOPY ENDOMETRIAL ABLATION  2020   • LAPAROSCOPIC CHOLECYSTECTOMY  2018    Dr. Hughes   • SALPINGECTOMY Bilateral 2020    Dr Cuevas   • TONSILLECTOMY     • TUBAL ABDOMINAL LIGATION     • TUBAL " ABDOMINAL LIGATION  02/2020   • WISDOM TOOTH EXTRACTION  2014       Family History   Problem Relation Age of Onset   • No Known Problems Other    • Sleep apnea Mother    • Diabetes Mother    • No Known Problems Father    • Pancreatic cancer Maternal Grandmother    • No Known Problems Sister    • No Known Problems Brother    • No Known Problems Daughter    • No Known Problems Son    • No Known Problems Paternal Grandmother    • No Known Problems Maternal Aunt    • No Known Problems Paternal Aunt    • BRCA 1/2 Neg Hx    • Colon cancer Neg Hx    • Endometrial cancer Neg Hx    • Ovarian cancer Neg Hx        Social History     Socioeconomic History   • Marital status: Single   • Number of children: 4   • Years of education: College    Tobacco Use   • Smoking status: Never Smoker   • Smokeless tobacco: Never Used   Vaping Use   • Vaping Use: Never used   Substance and Sexual Activity   • Alcohol use: Not Currently   • Drug use: No   • Sexual activity: Yes     Partners: Male     Birth control/protection: Surgical     Comment: no OCP           Objective   Physical Exam  Vitals and nursing note reviewed.   Constitutional:       General: She is not in acute distress.     Appearance: She is well-developed. She is obese.   HENT:      Head: Atraumatic.      Nose: Nose normal.   Eyes:      General: Lids are normal.      Conjunctiva/sclera: Conjunctivae normal.      Pupils: Pupils are equal, round, and reactive to light.   Cardiovascular:      Rate and Rhythm: Normal rate and regular rhythm.      Heart sounds: Normal heart sounds.   Pulmonary:      Effort: Pulmonary effort is normal.      Breath sounds: Normal breath sounds. No wheezing.   Abdominal:      General: There is no distension.      Palpations: Abdomen is soft.      Tenderness: There is generalized abdominal tenderness. There is no right CVA tenderness, left CVA tenderness, guarding or rebound.   Musculoskeletal:         General: No tenderness. Normal range of motion.       Cervical back: Normal range of motion and neck supple.   Skin:     General: Skin is warm and dry.      Findings: No erythema or rash.   Neurological:      Mental Status: She is alert and oriented to person, place, and time.      Sensory: No sensory deficit.   Psychiatric:         Speech: Speech normal.         Behavior: Behavior normal.         Procedures           ED Course  ED Course as of 05/10/22 2143   Tue May 10, 2022   1837 Leukocytes, UA(!): Large (3+) [RT]   1837 WBC, UA(!): Too Numerous to Count [RT]   1837 Bacteria, UA(!): 4+ [RT]   1838 Potassium(!): 3.2 [RT]   1838 ALT (SGPT)(!): 273 [RT]   1838 AST (SGOT)(!): 606 [RT]   1838 Alkaline Phosphatase(!): 213 [RT]      ED Course User Index  [RT] Mechelle Rodriguez PA      Re-examined patient several times in ED. Pt resting and feeling better. No V/D in ED. Discussed results and tx plan. Discussed tx for UTI with Omnicef. Discussed elevated LFTs and need for close f/u with PCP. Went over new / worse sx to return to ED.     Reviewed old records.     Recent Results (from the past 24 hour(s))   Comprehensive Metabolic Panel    Collection Time: 05/10/22  5:48 PM    Specimen: Blood   Result Value Ref Range    Glucose 98 65 - 99 mg/dL    BUN 8 6 - 20 mg/dL    Creatinine 0.78 0.57 - 1.00 mg/dL    Sodium 139 136 - 145 mmol/L    Potassium 3.2 (L) 3.5 - 5.2 mmol/L    Chloride 104 98 - 107 mmol/L    CO2 25.0 22.0 - 29.0 mmol/L    Calcium 8.9 8.6 - 10.5 mg/dL    Total Protein 7.3 6.0 - 8.5 g/dL    Albumin 4.30 3.50 - 5.20 g/dL    ALT (SGPT) 273 (H) 1 - 33 U/L    AST (SGOT) 606 (H) 1 - 32 U/L    Alkaline Phosphatase 213 (H) 39 - 117 U/L    Total Bilirubin 0.8 0.0 - 1.2 mg/dL    Globulin 3.0 gm/dL    A/G Ratio 1.4 g/dL    BUN/Creatinine Ratio 10.3 7.0 - 25.0    Anion Gap 10.0 5.0 - 15.0 mmol/L    eGFR 101.7 >60.0 mL/min/1.73   Lipase    Collection Time: 05/10/22  5:48 PM    Specimen: Blood   Result Value Ref Range    Lipase 13 13 - 60 U/L   Troponin    Collection Time:  05/10/22  5:48 PM    Specimen: Blood   Result Value Ref Range    Troponin T <0.010 0.000 - 0.030 ng/mL   Green Top (Gel)    Collection Time: 05/10/22  5:48 PM   Result Value Ref Range    Extra Tube Hold for add-ons.    Lavender Top    Collection Time: 05/10/22  5:48 PM   Result Value Ref Range    Extra Tube hold for add-on    Gold Top - SST    Collection Time: 05/10/22  5:48 PM   Result Value Ref Range    Extra Tube Hold for add-ons.    Light Blue Top    Collection Time: 05/10/22  5:48 PM   Result Value Ref Range    Extra Tube Hold for add-ons.    CBC Auto Differential    Collection Time: 05/10/22  5:48 PM    Specimen: Blood   Result Value Ref Range    WBC 3.56 3.40 - 10.80 10*3/mm3    RBC 4.31 3.77 - 5.28 10*6/mm3    Hemoglobin 12.4 12.0 - 15.9 g/dL    Hematocrit 38.2 34.0 - 46.6 %    MCV 88.6 79.0 - 97.0 fL    MCH 28.8 26.6 - 33.0 pg    MCHC 32.5 31.5 - 35.7 g/dL    RDW 12.8 12.3 - 15.4 %    RDW-SD 41.6 37.0 - 54.0 fl    MPV 10.9 6.0 - 12.0 fL    Platelets 255 140 - 450 10*3/mm3    Neutrophil % 76.6 (H) 42.7 - 76.0 %    Lymphocyte % 17.1 (L) 19.6 - 45.3 %    Monocyte % 5.1 5.0 - 12.0 %    Eosinophil % 0.6 0.3 - 6.2 %    Basophil % 0.3 0.0 - 1.5 %    Immature Grans % 0.3 0.0 - 0.5 %    Neutrophils, Absolute 2.73 1.70 - 7.00 10*3/mm3    Lymphocytes, Absolute 0.61 (L) 0.70 - 3.10 10*3/mm3    Monocytes, Absolute 0.18 0.10 - 0.90 10*3/mm3    Eosinophils, Absolute 0.02 0.00 - 0.40 10*3/mm3    Basophils, Absolute 0.01 0.00 - 0.20 10*3/mm3    Immature Grans, Absolute 0.01 0.00 - 0.05 10*3/mm3    nRBC 0.0 0.0 - 0.2 /100 WBC   Urinalysis With Microscopic If Indicated (No Culture) - Urine, Clean Catch    Collection Time: 05/10/22  5:54 PM    Specimen: Urine, Clean Catch   Result Value Ref Range    Color, UA Dark Yellow (A) Yellow, Straw    Appearance, UA Turbid (A) Clear    pH, UA 5.5 5.0 - 8.0    Specific Gravity, UA >=1.030 1.001 - 1.030    Glucose, UA Negative Negative    Ketones, UA Trace (A) Negative    Bilirubin, UA  "Small (1+) (A) Negative    Blood, UA Trace (A) Negative    Protein, UA 30 mg/dL (1+) (A) Negative    Leuk Esterase, UA Large (3+) (A) Negative    Nitrite, UA Negative Negative    Urobilinogen, UA 4.0 E.U./dL (A) 0.2 - 1.0 E.U./dL   Urinalysis, Microscopic Only - Urine, Clean Catch    Collection Time: 05/10/22  5:54 PM    Specimen: Urine, Clean Catch   Result Value Ref Range    RBC, UA 0-2 None Seen, 0-2 /HPF    WBC, UA Too Numerous to Count (A) None Seen, 0-2 /HPF    Bacteria, UA 4+ (A) None Seen, Trace /HPF    Squamous Epithelial Cells, UA 21-30 (A) None Seen, 0-2 /HPF    Hyaline Casts, UA 0-6 0 - 6 /LPF    Methodology Manual Light Microscopy    COVID-19 and FLU A/B PCR - Swab, Nasopharynx    Collection Time: 05/10/22  6:52 PM    Specimen: Nasopharynx; Swab   Result Value Ref Range    COVID19 Not Detected Not Detected - Ref. Range    Influenza A PCR Not Detected Not Detected    Influenza B PCR Not Detected Not Detected     Note: In addition to lab results from this visit, the labs listed above may include labs taken at another facility or during a different encounter within the last 24 hours. Please correlate lab times with ED admission and discharge times for further clarification of the services performed during this visit.    CT Abdomen Pelvis With Contrast   Final Result       1. Post surgical changes from prior bariatric surgery and prior   cholecystectomy.   2. No acute intra-abdominal or intrapelvic abnormality.               This report was finalized on 5/10/2022 9:01 PM by Van Shelton.          XR Chest 1 View   Final Result   IMPRESSION :    No acute process.[       This report was finalized on 5/10/2022 7:12 PM by Van Shelton.            Vitals:    05/10/22 1708 05/10/22 1755   BP: 139/79 129/84   BP Location: Left arm Left arm   Patient Position:  Lying   Pulse: 86 78   Resp: 14 18   Temp: 97.5 °F (36.4 °C)    SpO2: 100% 100%   Weight: 132 kg (290 lb)    Height: 177.8 cm (70\")      Medications "   sodium chloride 0.9 % flush 10 mL (has no administration in time range)   sodium chloride 0.9 % bolus 1,000 mL (0 mL Intravenous Stopped 5/10/22 2046)   ondansetron (ZOFRAN) injection 4 mg (4 mg Intravenous Given 5/10/22 1848)   famotidine (PEPCID) injection 20 mg (20 mg Intravenous Given 5/10/22 1848)   iopamidol (ISOVUE-300) 61 % injection 100 mL (95 mL Intravenous Given 5/10/22 1906)     ECG/EMG Results (last 24 hours)     Procedure Component Value Units Date/Time    ECG 12 Lead [[id number]] Collected: 05/10/22 1803     Updated: 05/10/22 1802        ECG 12 Lead   Preliminary Result             DISCHARGE    Patient discharged in stable condition.    Reviewed implications of results, diagnosis, meds, responsibility to follow up, warning signs and symptoms of possible worsening, potential complications and reasons to return to ER.    Patient/Family voiced understanding of above instructions.    Discussed plan for discharge, as there is no emergent indication for admission.  Pt/family is agreeable and understands need for follow up and possible repeat testing.  Pt/family is aware that discharge does not mean that nothing is wrong but that it indicates no emergency is currently present that requires admission and they must continue care with follow-up as given below or with a physician of their choice.     FOLLOW-UP  Alice Fuentes, VANESSA  [address] [address]  [phone]    Schedule an appointment as soon as possible for a visit       [facility] Emergency Department  [address] [facility] [address] [phone]    If symptoms worsen         Medication List      New Prescriptions    cefdinir 300 MG capsule  Commonly known as: OMNICEF  Take 1 capsule by mouth 2 (Two) Times a Day for 10 days.        Changed    * ondansetron ODT 4 MG disintegrating tablet  Commonly known as: ZOFRAN-ODT  Place 1 tablet on the tongue 4 (Four) Times a Day As Needed for  Nausea or Vomiting.  What changed: Another medication with the same name was added. Make sure you understand how and when to take each.     * ondansetron ODT 4 MG disintegrating tablet  Commonly known as: ZOFRAN-ODT  Place 1 tablet on the tongue Every 6 (Six) Hours As Needed for Nausea or Vomiting for up to 15 doses.  What changed: You were already taking a medication with the same name, and this prescription was added. Make sure you understand how and when to take each.         * This list has 2 medication(s) that are the same as other medications prescribed for you. Read the directions carefully, and ask your doctor or other care provider to review them with you.               Where to Get Your Medications      These medications were sent to [id number] - [place], [place] - [address] AT [facility] [address] - [phone]  - [phone]   [address], [facility] [address]    Phone: [phone]   · cefdinir 300 MG capsule  · ondansetron ODT 4 MG disintegrating tablet                                                  MDM    Final diagnoses:   Generalized abdominal pain   Acute cystitis without hematuria   Nausea vomiting and diarrhea   Elevated liver function tests       ED Disposition  ED Disposition     ED Disposition   Discharge    Condition   Stable    Comment   --             Alice Fuentes, VANESSA  [address]  [facility] [address]  [facility] [id number] [phone]    Schedule an appointment as soon as possible for a visit       [facility] Emergency Department  [address] [facility] [address] [phone]    If symptoms worsen         Medication List      New Prescriptions    cefdinir 300 MG capsule  Commonly known as: OMNICEF  Take 1 capsule by mouth 2 (Two) Times a Day for 10 days.        Changed    * ondansetron ODT 4 MG disintegrating tablet  Commonly known as: ZOFRAN-ODT  Place 1 tablet on the tongue 4 (Four) Times a Day As  Needed for Nausea or Vomiting.  What changed: Another medication with the same name was added. Make sure you understand how and when to take each.     * ondansetron ODT 4 MG disintegrating tablet  Commonly known as: ZOFRAN-ODT  Place 1 tablet on the tongue Every 6 (Six) Hours As Needed for Nausea or Vomiting for up to 15 doses.  What changed: You were already taking a medication with the same name, and this prescription was added. Make sure you understand how and when to take each.         * This list has 2 medication(s) that are the same as other medications prescribed for you. Read the directions carefully, and ask your doctor or other care provider to review them with you.               Where to Get Your Medications      These medications were sent to [id number] - [place], [place] - [address] AT [facility] [address] - [phone]  - [phone] FX  [address], [facility] [address]    Phone: [phone]   · cefdinir 300 MG capsule  · ondansetron ODT 4 MG disintegrating tablet          Mechelle Rodriguez PA  05/10/22 1711

## 2022-05-12 LAB
BACTERIA SPEC AEROBE CULT: ABNORMAL
BACTERIA SPEC AEROBE CULT: ABNORMAL
QT INTERVAL: 372 MS
QTC INTERVAL: 426 MS

## 2022-07-17 ENCOUNTER — APPOINTMENT (OUTPATIENT)
Dept: CT IMAGING | Facility: HOSPITAL | Age: 37
End: 2022-07-17

## 2022-07-17 ENCOUNTER — HOSPITAL ENCOUNTER (EMERGENCY)
Facility: HOSPITAL | Age: 37
Discharge: HOME | End: 2022-07-17
Attending: EMERGENCY MEDICINE | Admitting: EMERGENCY MEDICINE

## 2022-07-17 VITALS
DIASTOLIC BLOOD PRESSURE: 90 MMHG | OXYGEN SATURATION: 100 % | SYSTOLIC BLOOD PRESSURE: 151 MMHG | BODY MASS INDEX: 38.65 KG/M2 | TEMPERATURE: 97.3 F | HEART RATE: 61 BPM | WEIGHT: 270 LBS | RESPIRATION RATE: 18 BRPM | HEIGHT: 70 IN

## 2022-07-17 DIAGNOSIS — S70.01XA CONTUSION OF RIGHT HIP, INITIAL ENCOUNTER: ICD-10-CM

## 2022-07-17 DIAGNOSIS — S16.1XXA STRAIN OF NECK MUSCLE, INITIAL ENCOUNTER: ICD-10-CM

## 2022-07-17 DIAGNOSIS — S39.012A BACK STRAIN, INITIAL ENCOUNTER: ICD-10-CM
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
## 2022-07-17 DIAGNOSIS — R10.2 PELVIC PRESSURE IN FEMALE: ICD-10-CM

## 2022-07-17 DIAGNOSIS — S09.90XA INJURY OF HEAD, INITIAL ENCOUNTER: ICD-10-CM

## 2022-07-17 DIAGNOSIS — R10.31 RLQ ABDOMINAL PAIN: ICD-10-CM

## 2022-07-17 LAB
ALBUMIN SERPL-MCNC: 3.9 G/DL (ref 3.5–5.2)
ALBUMIN/GLOB SERPL: 1.1 G/DL
ALP SERPL-CCNC: 71 U/L (ref 39–117)
ALT SERPL W P-5'-P-CCNC: 13 U/L (ref 1–33)
ANION GAP SERPL CALCULATED.3IONS-SCNC: 9 MMOL/L (ref 5–15)
AST SERPL-CCNC: 24 U/L (ref 1–32)
BACTERIA UR QL AUTO: ABNORMAL /HPF
BASOPHILS # BLD AUTO: 0.03 10*3/MM3 (ref 0–0.2)
BASOPHILS NFR BLD AUTO: 0.4 % (ref 0–1.5)
BILIRUB SERPL-MCNC: 0.5 MG/DL (ref 0–1.2)
BILIRUB UR QL STRIP: NEGATIVE
BUN SERPL-MCNC: 7 MG/DL (ref 6–20)
BUN/CREAT SERPL: 8.9 (ref 7–25)
CALCIUM SPEC-SCNC: 8.9 MG/DL (ref 8.6–10.5)
CHLORIDE SERPL-SCNC: 106 MMOL/L (ref 98–107)
CLARITY UR: ABNORMAL
CO2 SERPL-SCNC: 23 MMOL/L (ref 22–29)
COLOR UR: YELLOW
CREAT SERPL-MCNC: 0.79 MG/DL (ref 0.57–1)
DEPRECATED RDW RBC AUTO: 44.8 FL (ref 37–54)
EGFRCR SERPLBLD CKD-EPI 2021: 100.2 ML/MIN/1.73
EOSINOPHIL # BLD AUTO: 0.07 10*3/MM3 (ref 0–0.4)
EOSINOPHIL NFR BLD AUTO: 0.9 % (ref 0.3–6.2)
ERYTHROCYTE [DISTWIDTH] IN BLOOD BY AUTOMATED COUNT: 13.1 % (ref 12.3–15.4)
GLOBULIN UR ELPH-MCNC: 3.5 GM/DL
GLUCOSE SERPL-MCNC: 98 MG/DL (ref 65–99)
GLUCOSE UR STRIP-MCNC: NEGATIVE MG/DL
HCT VFR BLD AUTO: 37.9 % (ref 34–46.6)
HGB BLD-MCNC: 12.1 G/DL (ref 12–15.9)
HGB UR QL STRIP.AUTO: NEGATIVE
HYALINE CASTS UR QL AUTO: ABNORMAL /LPF
IMM GRANULOCYTES # BLD AUTO: 0.02 10*3/MM3 (ref 0–0.05)
IMM GRANULOCYTES NFR BLD AUTO: 0.2 % (ref 0–0.5)
KETONES UR QL STRIP: NEGATIVE
LEUKOCYTE ESTERASE UR QL STRIP.AUTO: NEGATIVE
LYMPHOCYTES # BLD AUTO: 1.93 10*3/MM3 (ref 0.7–3.1)
LYMPHOCYTES NFR BLD AUTO: 24.1 % (ref 19.6–45.3)
MCH RBC QN AUTO: 29.9 PG (ref 26.6–33)
MCHC RBC AUTO-ENTMCNC: 31.9 G/DL (ref 31.5–35.7)
MCV RBC AUTO: 93.6 FL (ref 79–97)
MONOCYTES # BLD AUTO: 0.37 10*3/MM3 (ref 0.1–0.9)
MONOCYTES NFR BLD AUTO: 4.6 % (ref 5–12)
MUCOUS THREADS URNS QL MICRO: ABNORMAL /HPF
NEUTROPHILS NFR BLD AUTO: 5.59 10*3/MM3 (ref 1.7–7)
NEUTROPHILS NFR BLD AUTO: 69.8 % (ref 42.7–76)
NITRITE UR QL STRIP: NEGATIVE
NRBC BLD AUTO-RTO: 0 /100 WBC (ref 0–0.2)
PH UR STRIP.AUTO: <=5 [PH] (ref 5–8)
PLATELET # BLD AUTO: 248 10*3/MM3 (ref 140–450)
PMV BLD AUTO: 11.2 FL (ref 6–12)
POTASSIUM SERPL-SCNC: 4.9 MMOL/L (ref 3.5–5.2)
PROT SERPL-MCNC: 7.4 G/DL (ref 6–8.5)
PROT UR QL STRIP: NEGATIVE
RBC # BLD AUTO: 4.05 10*6/MM3 (ref 3.77–5.28)
RBC # UR STRIP: ABNORMAL /HPF
REF LAB TEST METHOD: ABNORMAL
SODIUM SERPL-SCNC: 138 MMOL/L (ref 136–145)
SP GR UR STRIP: 1.03 (ref 1–1.03)
SQUAMOUS #/AREA URNS HPF: ABNORMAL /HPF
UROBILINOGEN UR QL STRIP: ABNORMAL
WBC # UR STRIP: ABNORMAL /HPF
WBC NRBC COR # BLD: 8.01 10*3/MM3 (ref 3.4–10.8)

## 2022-07-17 PROCEDURE — 70450 CT HEAD/BRAIN W/O DYE: CPT

## 2022-07-17 PROCEDURE — 99283 EMERGENCY DEPT VISIT LOW MDM: CPT

## 2022-07-17 PROCEDURE — 25010000002 HYDROMORPHONE PER 4 MG: Performed by: EMERGENCY MEDICINE

## 2022-07-17 PROCEDURE — 85025 COMPLETE CBC W/AUTO DIFF WBC: CPT | Performed by: EMERGENCY MEDICINE

## 2022-07-17 PROCEDURE — 87491 CHLMYD TRACH DNA AMP PROBE: CPT | Performed by: NURSE PRACTITIONER

## 2022-07-17 PROCEDURE — 96374 THER/PROPH/DIAG INJ IV PUSH: CPT

## 2022-07-17 PROCEDURE — 72192 CT PELVIS W/O DYE: CPT

## 2022-07-17 PROCEDURE — 81001 URINALYSIS AUTO W/SCOPE: CPT | Performed by: EMERGENCY MEDICINE

## 2022-07-17 PROCEDURE — 72125 CT NECK SPINE W/O DYE: CPT

## 2022-07-17 PROCEDURE — 87591 N.GONORRHOEAE DNA AMP PROB: CPT | Performed by: NURSE PRACTITIONER

## 2022-07-17 PROCEDURE — 25010000002 ONDANSETRON PER 1 MG: Performed by: EMERGENCY MEDICINE

## 2022-07-17 PROCEDURE — 80053 COMPREHEN METABOLIC PANEL: CPT | Performed by: EMERGENCY MEDICINE

## 2022-07-17 PROCEDURE — 72131 CT LUMBAR SPINE W/O DYE: CPT

## 2022-07-17 PROCEDURE — 96375 TX/PRO/DX INJ NEW DRUG ADDON: CPT

## 2022-07-17 RX ORDER — SODIUM CHLORIDE 0.9 % (FLUSH) 0.9 %
10 SYRINGE (ML) INJECTION AS NEEDED
Status: DISCONTINUED | OUTPATIENT
Start: 2022-07-17 | End: 2022-07-17 | Stop reason: HOSPADM

## 2022-07-17 RX ORDER — ONDANSETRON 2 MG/ML
4 INJECTION INTRAMUSCULAR; INTRAVENOUS ONCE
Status: COMPLETED | OUTPATIENT
Start: 2022-07-17 | End: 2022-07-17

## 2022-07-17 RX ORDER — ONDANSETRON 4 MG/1
4 TABLET, ORALLY DISINTEGRATING ORAL EVERY 6 HOURS PRN
Qty: 12 TABLET | Refills: 0 | Status: SHIPPED | OUTPATIENT
Start: 2022-07-17 | End: 2022-08-17 | Stop reason: SDUPTHER

## 2022-07-17 RX ORDER — CYCLOBENZAPRINE HCL 10 MG
10 TABLET ORAL 3 TIMES DAILY PRN
Qty: 15 TABLET | Refills: 0 | Status: SHIPPED | OUTPATIENT
Start: 2022-07-17 | End: 2022-08-17 | Stop reason: SDUPTHER

## 2022-07-17 RX ORDER — HYDROMORPHONE HYDROCHLORIDE 1 MG/ML
0.5 INJECTION, SOLUTION INTRAMUSCULAR; INTRAVENOUS; SUBCUTANEOUS ONCE
Status: COMPLETED | OUTPATIENT
Start: 2022-07-17 | End: 2022-07-17

## 2022-07-17 RX ADMIN — HYDROMORPHONE HYDROCHLORIDE 0.5 MG: 1 INJECTION, SOLUTION INTRAMUSCULAR; INTRAVENOUS; SUBCUTANEOUS at 19:36

## 2022-07-17 RX ADMIN — SODIUM CHLORIDE 1000 ML: 9 INJECTION, SOLUTION INTRAVENOUS at 19:36

## 2022-07-17 RX ADMIN — ONDANSETRON 4 MG: 2 INJECTION INTRAMUSCULAR; INTRAVENOUS at 19:36
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
## 2022-07-19 LAB
C TRACH RRNA SPEC QL NAA+PROBE: NEGATIVE
N GONORRHOEA RRNA SPEC QL NAA+PROBE: NEGATIVE

## 2022-08-01 NOTE — PAYOR COMM NOTE
"Christelle Hussein, RN Utilization Review [phone]  Fax # [phone]  Ref # [id number]    Please note discharge date.    Geneva Garay (35 y.o. Female)     Date of Birth Social Security Number Address Home Phone MRN    1985  [address]  [facility] [phone] [id number]    Yazidi Marital Status          None Single       Admission Date Admission Type Admitting Provider Attending Provider Department, Room/Bed    12/1/20 Elective Natalia Mariscal MD Quintero, Paige Nealy, MD [address], [address]    Discharge Date Discharge Disposition Discharge Destination         Home or Self Care              Attending Provider: Natalia Mariscal MD    Allergies: No Known Allergies    Isolation: None   Infection: None   Code Status: CPR    Ht: 176 cm (69.3\")   Wt: 156 kg (343 lb 14.7 oz)    Admission Cmt: None   Principal Problem: Obesity, Class III, BMI 40-49.9 (morbid obesity) (CMS/HCC) [E66.01]                 Active Insurance as of 12/1/2020     Primary Coverage     Payor Plan Insurance Group Employer/Plan Group    [employer]      Payor Plan Address Payor Plan Phone Number Payor Plan Fax Number Effective Dates    [address] [address]   11/1/2017 - None Entered    [place] [address]       Subscriber Name Subscriber Birth Date Member ID       GENEVA GARAY 1985 [id number]                 Emergency Contacts      (Rel.) Home Phone Work Phone Mobile Phone    Edilson Riley (Sister) -- -- [phone]    skylar valdovinos (Friend) -- -- [phone]               Physician Progress Notes (most recent note)      Natalia Mariscla MD at 12/03/20 0952          Bariatric Surgery     LOS: 2 days   Patient Care Team:  Geetha Pfeiffer MD as PCP - General (Family Medicine)  Erik Hughes MD as Consulting Physician (General Surgery)    Chief Complaint:  POD #2    Subjective     Interval History:  Doing " "well.  No complaints.  Tolerating PO. Denies N/V.  No fevers.  Pain controlled.  Ambulating.  Voiding.  IS 2000.  Feels ready to go home.    Objective     Vital Signs  Blood pressure 131/72, pulse 59, temperature 98.2 °F (36.8 °C), temperature source Oral, resp. rate 16, height 176 cm (69.3\"), weight (!) 156 kg (343 lb 14.7 oz), SpO2 96 %, not currently breastfeeding.    Physical Exam:  General: Alert, NAD  Lungs: Clear  Heart: RRR  Abdomen: soft, appropriate, incisions okay  Extremities: warm, (+) SCDs     Results Review:     I reviewed the patient's new clinical results.    Labs:  Lab Results (last 24 hours)     Procedure Component Value Units Date/Time    Comprehensive Metabolic Panel [[id number]]  (Abnormal) Collected: 12/03/20 0419    Specimen: Blood Updated: 12/03/20 0523     Glucose 85 mg/dL      BUN 6 mg/dL      Creatinine 0.61 mg/dL      Sodium 137 mmol/L      Potassium 4.0 mmol/L      Comment: Slight hemolysis detected by analyzer. Results may be affected.        Chloride 104 mmol/L      CO2 19.0 mmol/L      Calcium 9.0 mg/dL      Total Protein 7.0 g/dL      Albumin 3.50 g/dL      ALT (SGPT) 22 U/L      AST (SGOT) 20 U/L      Alkaline Phosphatase 59 U/L      Total Bilirubin 0.5 mg/dL      eGFR  African Amer 136 mL/min/1.73      Globulin 3.5 gm/dL      A/G Ratio 1.0 g/dL      BUN/Creatinine Ratio 9.8     Anion Gap 14.0 mmol/L     Narrative:      GFR Normal >60  Chronic Kidney Disease <60  Kidney Failure <15      CBC & Differential [[id number]]  (Abnormal) Collected: 12/03/20 0419    Specimen: Blood Updated: 12/03/20 0459    Narrative:      The following orders were created for panel order CBC & Differential.  Procedure                               Abnormality         Status                     ---------                               -----------         ------                     CBC Auto Differential[[id number]]        Abnormal            Final result                 Please view results for these tests on the " individual orders.    CBC Auto Differential [[id number]]  (Abnormal) Collected: 12/03/20 0419    Specimen: Blood Updated: 12/03/20 0459     WBC 9.24 10*3/mm3      RBC 4.20 10*6/mm3      Hemoglobin 12.4 g/dL      Hematocrit 39.4 %      MCV 93.8 fL      MCH 29.5 pg      MCHC 31.5 g/dL      RDW 12.6 %      RDW-SD 43.2 fl      MPV 12.7 fL      Platelets 258 10*3/mm3      Neutrophil % 70.7 %      Lymphocyte % 22.7 %      Monocyte % 5.6 %      Eosinophil % 0.4 %      Basophil % 0.3 %      Immature Grans % 0.3 %      Neutrophils, Absolute 6.52 10*3/mm3      Lymphocytes, Absolute 2.10 10*3/mm3      Monocytes, Absolute 0.52 10*3/mm3      Eosinophils, Absolute 0.04 10*3/mm3      Basophils, Absolute 0.03 10*3/mm3      Immature Grans, Absolute 0.03 10*3/mm3      nRBC 0.0 /100 WBC     Iron [[id number]]  (Abnormal) Collected: 12/02/20 0556    Specimen: Blood Updated: 12/02/20 1142     Iron 30 mcg/dL           Imaging:  Imaging Results (Last 24 Hours)     Procedure Component Value Units Date/Time    FL Upper GI Water Soluble [[id number]] Collected: 12/02/20 1133     Updated: 12/02/20 1137    Narrative:      EXAMINATION: FL UPPER GI WATER SOLUBLE-      INDICATION: post sleeve, rule out leak; E66.01-Morbid (severe) obesity  due to excess calories      COMPARISON: NONE     TECHNIQUE: 84 seconds of fluoroscopic time was used for this exam. 4  associated fluoroscopic series were saved.  imaging reveals a  gaseous abdomen. There is a suture line visible in the left upper  quadrant.     COMPARISON: NONE     FINDINGS: Under fluoroscopic observation, patient ingested water-soluble  contrast. The oral phase of deglutition appeared normal. The esophageal  mucosa appeared grossly normal. There was no evidence of a focal  esophageal stricture. There is some focal dilatation of the distal  esophagus, with appearance of probable small hiatal hernia. Examination  of the stomach demonstrated postoperative changes that are consistent  with a  vertical sleeve gastrectomy. No extravasation of contrast was  seen. The gastric folds and gastric mucosa appeared grossly normal.  There was no evidence of a gastric or duodenal ulcer. There was no delay  in gastric emptying. The duodenal bulb and duodenal C-loop appeared  grossly normal.          Impression:      Status post vertical sleeve gastrectomy. There was no  evidence of extraluminal contrast. There was no delay in gastric  emptying.     Questionable small hiatal hernia.     This report was finalized on 12/2/2020 11:34 AM by Kevyn Anaya.               Assessment/Plan     POD # 2 s/p revision LSG.    Doing well.  Tolerating diet well today.  Patient feels well and has met discharge criteria.  Will discharge home with follow up in 1 week with PA.  Rx given for oxycodone, Zofran, PPI.  Start Eliquis tonight.   Discharge instructions reviewed with patient and all questions answered.            Carlton Mariscal MD  12/03/20  09:52 EST              Electronically signed by Carlton Mariscal MD at 12/03/20 0953       Discharge Summary    No notes of this type exist for this encounter.         Discharge Order (From admission, onward)     Start     Ordered    12/03/20 0957  Discharge patient  Once     Expected Discharge Date: 12/03/20    Discharge Disposition: Home or Self Care    Physician of Record for Attribution - Please select from Treatment Team: CARLTON MARISCAL [[id number]]    Review needed by CMO to determine Physician of Record: No       Question Answer Comment   Physician of Record for Attribution - Please select from Treatment Team CARLTON MARISCAL    Review needed by CMO to determine Physician of Record No        12/03/20 0957                 [Mother] : mother [Yes] : Patient goes to dentist yearly [Tap water] : Primary Fluoride Source: Tap water [Up to date] : Up to date [LMP: _____] : LMP: [unfilled] [Days of Bleeding: _____] : Days of bleeding: [unfilled] [Age of Menarche: ____] : Age of Menarche: [unfilled] [Irregular menses] : irregular menses [Painful Cramps] : painful cramps [Acne] : acne [Eats meals with family] : eats meals with family [Has family members/adults to turn to for help] : has family members/adults to turn to for help [Is permitted and is able to make independent decisions] : Is permitted and is able to make independent decisions [Grade: ____] : Grade: [unfilled] [Normal Performance] : normal performance [Normal Behavior/Attention] : normal behavior/attention [Normal Homework] : normal homework [Eats regular meals including adequate fruits and vegetables] : eats regular meals including adequate fruits and vegetables [Drinks non-sweetened liquids] : drinks non-sweetened liquids  [Calcium source] : calcium source [Has friends] : has friends [At least 1 hour of physical activity a day] : at least 1 hour of physical activity a day [Has interests/participates in community activities/volunteers] : has interests/participates in community activities/volunteers. [Uses safety belts/safety equipment] : uses safety belts/safety equipment  [Has peer relationships free of violence] : has peer relationships free of violence [No] : Patient has not had sexual intercourse [Has ways to cope with stress] : has ways to cope with stress [Displays self-confidence] : displays self-confidence [With Teen] : teen [Heavy Bleeding] : no heavy bleeding [Hirsutism] : no hirsutism [Tampon Use] : no tampon use [Sleep Concerns] : no sleep concerns [Has concerns about body or appearance] : does not have concerns about body or appearance [Screen time (except homework) less than 2 hours a day] : no screen time (except homework) less than 2 hours a day [Uses electronic nicotine delivery system] : does not use electronic nicotine delivery system [Exposure to electronic nicotine delivery system] : no exposure to electronic nicotine delivery system [Uses tobacco] : does not use tobacco [Exposure to tobacco] : no exposure to tobacco [Uses drugs] : does not use drugs  [Exposure to drugs] : no exposure to drugs [Drinks alcohol] : does not drink alcohol [Exposure to alcohol] : no exposure to alcohol [Impaired/distracted driving] : no impaired/distracted driving [Has problems with sleep] : does not have problems with sleep [Gets depressed, anxious, or irritable/has mood swings] : does not get depressed, anxious, or irritable/has mood swings [de-identified] : basket ball

## 2022-08-17 ENCOUNTER — OFFICE VISIT (OUTPATIENT)
Dept: INTERNAL MEDICINE | Facility: CLINIC | Age: 37
End: 2022-08-17

## 2022-08-17 VITALS
SYSTOLIC BLOOD PRESSURE: 110 MMHG | HEART RATE: 66 BPM | OXYGEN SATURATION: 98 % | RESPIRATION RATE: 18 BRPM | WEIGHT: 289.8 LBS | DIASTOLIC BLOOD PRESSURE: 76 MMHG | BODY MASS INDEX: 41.58 KG/M2 | TEMPERATURE: 97.7 F

## 2022-08-17 DIAGNOSIS — E55.9 VITAMIN D DEFICIENCY: ICD-10-CM

## 2022-08-17 DIAGNOSIS — Z79.899 HIGH RISK MEDICATION USE: ICD-10-CM

## 2022-08-17 DIAGNOSIS — K21.9 GERD WITHOUT ESOPHAGITIS: ICD-10-CM

## 2022-08-17 DIAGNOSIS — R73.09 ABNORMAL GLUCOSE: ICD-10-CM

## 2022-08-17 DIAGNOSIS — S06.0X0D CONCUSSION WITHOUT LOSS OF CONSCIOUSNESS, SUBSEQUENT ENCOUNTER: ICD-10-CM

## 2022-08-17 DIAGNOSIS — R13.10 DYSPHAGIA, UNSPECIFIED TYPE: ICD-10-CM

## 2022-08-17 DIAGNOSIS — E66.01 CLASS 3 SEVERE OBESITY DUE TO EXCESS CALORIES WITHOUT SERIOUS COMORBIDITY WITH BODY MASS INDEX (BMI) OF 40.0 TO 44.9 IN ADULT: Primary | ICD-10-CM

## 2022-08-17 DIAGNOSIS — D64.9 ANEMIA, UNSPECIFIED TYPE: ICD-10-CM

## 2022-08-17 PROBLEM — E66.813 CLASS 3 SEVERE OBESITY DUE TO EXCESS CALORIES WITHOUT SERIOUS COMORBIDITY WITH BODY MASS INDEX (BMI) OF 40.0 TO 44.9 IN ADULT: Status: ACTIVE | Noted: 2020-11-16

## 2022-08-17 PROBLEM — S06.0X0A CONCUSSION WITH NO LOSS OF CONSCIOUSNESS: Status: ACTIVE | Noted: 2022-08-17

## 2022-08-17 LAB
EXPIRATION DATE: NORMAL
HBA1C MFR BLD: 5 %
Lab: NORMAL

## 2022-08-17 PROCEDURE — 99214 OFFICE O/P EST MOD 30 MIN: CPT | Performed by: PHYSICIAN ASSISTANT

## 2022-08-17 PROCEDURE — 80307 DRUG TEST PRSMV CHEM ANLYZR: CPT | Performed by: PHYSICIAN ASSISTANT

## 2022-08-17 PROCEDURE — 83036 HEMOGLOBIN GLYCOSYLATED A1C: CPT | Performed by: PHYSICIAN ASSISTANT

## 2022-08-17 RX ORDER — CYCLOBENZAPRINE HCL 10 MG
10 TABLET ORAL 3 TIMES DAILY PRN
Qty: 30 TABLET | Refills: 0 | Status: SHIPPED | OUTPATIENT
Start: 2022-08-17 | End: 2022-09-07

## 2022-08-17 RX ORDER — PANTOPRAZOLE SODIUM 40 MG/1
40 TABLET, DELAYED RELEASE ORAL DAILY
Qty: 90 TABLET | Refills: 1 | Status: SHIPPED | OUTPATIENT
Start: 2022-08-17 | End: 2022-10-17

## 2022-08-17 RX ORDER — PHENTERMINE HYDROCHLORIDE 37.5 MG/1
37.5 TABLET ORAL
Qty: 30 TABLET | Refills: 0 | Status: SHIPPED | OUTPATIENT
Start: 2022-08-17 | End: 2022-10-17 | Stop reason: SDUPTHER

## 2022-08-17 RX ORDER — ONDANSETRON 4 MG/1
4 TABLET, ORALLY DISINTEGRATING ORAL EVERY 6 HOURS PRN
Qty: 12 TABLET | Refills: 0 | Status: SHIPPED | OUTPATIENT
Start: 2022-08-17 | End: 2023-01-24 | Stop reason: ALTCHOICE

## 2022-08-17 RX ORDER — LANOLIN ALCOHOL/MO/W.PET/CERES
1000 CREAM (GRAM) TOPICAL DAILY
Qty: 90 TABLET | Refills: 1 | Status: SHIPPED | OUTPATIENT
Start: 2022-08-17 | End: 2022-11-21

## 2022-08-17 RX ORDER — CHOLECALCIFEROL (VITAMIN D3) 50 MCG
2000 TABLET ORAL DAILY
Qty: 90 TABLET | Refills: 1 | Status: SHIPPED | OUTPATIENT
Start: 2022-08-17 | End: 2023-02-10

## 2022-08-17 RX ORDER — FAMOTIDINE 20 MG/1
20 TABLET, FILM COATED ORAL NIGHTLY PRN
Qty: 90 TABLET | Refills: 1 | Status: SHIPPED | OUTPATIENT
Start: 2022-08-17 | End: 2022-10-17

## 2022-08-17 RX ORDER — DOXYCYCLINE HYCLATE 50 MG/1
324 CAPSULE, GELATIN COATED ORAL
Qty: 90 TABLET | Refills: 1 | Status: SHIPPED | OUTPATIENT
Start: 2022-08-17 | End: 2022-11-21 | Stop reason: SDUPTHER
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
## 2022-08-17 NOTE — ASSESSMENT & PLAN NOTE
Patient's (Body mass index is 41.58 kg/m².) indicates that they are morbidly obese (BMI > 40 or > 35 with obesity - related health condition) with health conditions that include GERD . Weight is improving with lifestyle modifications. BMI is is above average; BMI management plan is completed. We discussed low calorie, low carb based diet program, portion control, increasing exercise and pharmacologic options including Adipex.   JENSEN/BERTHA/Adam 8.17.22.

## 2022-08-22 LAB
AMPHETAMINES SAL QL SCN: NEGATIVE NG/ML
BARBITURATES SAL QL SCN: NEGATIVE NG/ML
BENZODIAZ SAL QL SCN: NEGATIVE NG/ML
CANNABINOIDS SAL QL SCN: NEGATIVE NG/ML
COCAINE SAL QL SCN: NEGATIVE NG/ML
ETHANOL SAL QL SCN: NEGATIVE GM/DL
METHADONE SAL QL SCN: NEGATIVE NG/ML
OPIATES SAL QL SCN: NEGATIVE NG/ML
OXYCODONE+OXYMORPHONE SAL QL SCN: NEGATIVE NG/ML
PCP SAL QL SCN: NEGATIVE NG/ML
PROPOXYPH SAL QL SCN: NEGATIVE NG/ML

## 2022-09-02 ENCOUNTER — TELEPHONE (OUTPATIENT)
Dept: BARIATRICS/WEIGHT MGMT | Facility: CLINIC | Age: 37
End: 2022-09-02

## 2022-09-02 NOTE — TELEPHONE ENCOUNTER
"Pt called stating that her throat was \"on fire\" and that it is making her nauseous and have migraines from lack of sleep. I asked pt if it was from acid or a strep throat kind of feeling and she stated that it was from a build up of acid, and that she would like a scope ordered if possible. Please advise, thanks!    "

## 2022-09-06 ENCOUNTER — OFFICE VISIT (OUTPATIENT)
Dept: BARIATRICS/WEIGHT MGMT | Facility: CLINIC | Age: 37
End: 2022-09-06

## 2022-09-06 VITALS
RESPIRATION RATE: 18 BRPM | HEIGHT: 70 IN | WEIGHT: 278.5 LBS | DIASTOLIC BLOOD PRESSURE: 76 MMHG | TEMPERATURE: 98.4 F | OXYGEN SATURATION: 99 % | SYSTOLIC BLOOD PRESSURE: 122 MMHG | BODY MASS INDEX: 39.87 KG/M2 | HEART RATE: 75 BPM

## 2022-09-06 DIAGNOSIS — K21.9 GASTROESOPHAGEAL REFLUX DISEASE, UNSPECIFIED WHETHER ESOPHAGITIS PRESENT: Primary | ICD-10-CM

## 2022-09-06 DIAGNOSIS — R10.13 DYSPEPSIA: ICD-10-CM

## 2022-09-06 PROCEDURE — 99214 OFFICE O/P EST MOD 30 MIN: CPT | Performed by: PHYSICIAN ASSISTANT

## 2022-09-06 RX ORDER — OMEPRAZOLE 40 MG/1
40 CAPSULE, DELAYED RELEASE ORAL DAILY
Qty: 30 CAPSULE | Refills: 0 | Status: SHIPPED | OUTPATIENT
Start: 2022-09-06 | End: 2022-10-17

## 2022-09-06 NOTE — PROGRESS NOTES
"[facility]  [address]  [address]  [facility] [id number] [phone]        Patient Name:  Geneva Cordova.  :  1985      Date of Visit: 2022      Reason for Visit:   reflux    HPI: Geneva Cordova is a 36 y.o. female w/ hx LSG/paraHHR  GDW, s/p LSG revision 20 w/ Dr. Mariscal (complicated by post op stricture).    LOV 2021 w/ Dr. Mariscal - c/o dysphagia and poorly controlled reflux.        Returns today w/ worsening issues - \"really bad acid reflux\" for the last year, worsening the past couple months, despite taking Protonix qAM w/ Pepcid qPM.  Says waking almost nightly w/ burning reflux issues - not getting enough sleep.  Having headaches, fatigue and nausea.  Associated globus sensation but denies abd pain.  No vomiting.  No fevers/chills.  Stays constipated since having LSG - bowels move q 7-10 days, usually takes a laxative.      Last EGD 21 w/ Dr. Mariscal revealed sliding HH and slight stricture at incisura w/ pyloric spasm, both dilated to 20mm.  Advised repeat endoscopy/dilatation w/ Dr. Galvan, but patient cancelled appt.      Last UGI 21 @[facility] - moderate reflux, moderate sliding hiatal hernia.        Presurgery weight: 343 pounds.  Today's weight is 126 kg (278 lb 8 oz) pounds, today's  Body mass index is 39.96 kg/m²., and weight loss since surgery is 65 pounds.      Past Medical History:   Diagnosis Date   • Anemia    • Anxiety    • Bilateral lower extremity edema     R>L   • Boil, axilla     recurrent   • Chronic back pain     no meds or injections, feels related to breasts   • COVID-19 2020   • Depression    • Dyspepsia    • Dyspnea on exertion    • Fatigue    • Glucose intolerance (impaired glucose tolerance)    • Helicobacter pylori (H. pylori) infection     asx and grossly nl EGD. \"abundant\"on path. HBT still + after PrevPak tx. LSG path neg. neg testing 2018   • Hypoalbuminemia    • Left Ovarian serous cystadenoma " -removed at  section 2017   • Migraine     resolved with weightloss   • Obesity, morbid (HCC)    • VIRA (obstructive sleep apnea)      improved since WLS, pending f/u sleep study, not using CPAP   • PCOS (polycystic ovarian syndrome)    • Right Essure implantation 2017 10/06/2017   • S/P  section;left ovarian cystectomy and partial salpingectomy 2017   • Seasonal allergies      Past Surgical History:   Procedure Laterality Date   • BILATERAL BREAST REDUCTION     •  SECTION N/A 2017      SECTION PRIMARY;  CYSTECTOMY; Kwadwo Baxter MD; :  [facility];  Service:    • DILATATION AND CURETTAGE     • ENDOMETRIAL ABLATION  2020    Dr Cuevas   • ENDOSCOPY N/A 2020    Procedure: ESOPHAGOGASTRODUODENOSCOPY;  Surgeon: Natalia Mariscal MD;  Location:  [facility];  Service: Bariatric;  Laterality: N/A;   • ESSURE TUBAL LIGATION     • GASTRIC SLEEVE LAPAROSCOPIC  2015    With Dr. Hughes   • GASTRIC SLEEVE LAPAROSCOPIC N/A 2020    Procedure: GASTRIC SLEEVE LAPAROSCOPIC;  Surgeon: Natalia Mariscal MD;  Location:  [facility];  Service: Bariatric;  Laterality: N/A;   • HYSTEROSCOPY ENDOMETRIAL ABLATION  2020   • LAPAROSCOPIC CHOLECYSTECTOMY  2018    Dr. Hughes   • SALPINGECTOMY Bilateral 2020    Dr Cuevas   • TONSILLECTOMY     • WISDOM TOOTH EXTRACTION       Outpatient Medications Marked as Taking for the 22 encounter (Office Visit) with Carmelita Bell PA   Medication Sig Dispense Refill   • Cholecalciferol (Vitamin D) 50 MCG (2000 UT) tablet Take 2,000 Units by mouth Daily. 90 tablet 1   • famotidine (Pepcid) 20 MG tablet Take 1 tablet by mouth At Night As Needed for Heartburn. 90 tablet 1   • ferrous gluconate (FERGON) 324 MG tablet Take 1 tablet by mouth Daily With Breakfast. 90 tablet 1   • Multiple Vitamins-Minerals (MULTIPLE VITAMINS/WOMENS) tablet Take 1 tablet by mouth Daily.     •  "ondansetron ODT (ZOFRAN-ODT) 4 MG disintegrating tablet Place 1 tablet on the tongue Every 6 (Six) Hours As Needed for Nausea or Vomiting. 12 tablet 0   • pantoprazole (PROTONIX) 40 MG EC tablet Take 1 tablet by mouth Daily. 90 tablet 1   • phentermine (Adipex-P) 37.5 MG tablet Take 1 tablet by mouth Every Morning Before Breakfast. 30 tablet 0   • vitamin B-12 (CYANOCOBALAMIN) 1000 MCG tablet Take 1 tablet by mouth Daily. 90 tablet 1       Allergies   Allergen Reactions   • Nsaids Other (See Comments)     HISTORY OF GASTRIC SLEEVE   • Codeine Other (See Comments)     UNKNOWN       Social History     Socioeconomic History   • Marital status: Single   • Number of children: 4   • Years of education: College    Tobacco Use   • Smoking status: Never Smoker   • Smokeless tobacco: Never Used   Vaping Use   • Vaping Use: Never used   Substance and Sexual Activity   • Alcohol use: Not Currently   • Drug use: No   • Sexual activity: Yes     Partners: Male     Birth control/protection: Surgical     Comment: no OCP       /76 (BP Location: Left arm, Patient Position: Sitting)   Pulse 75   Temp 98.4 °F (36.9 °C)   Resp 18   Ht 177.8 cm (70\")   Wt 126 kg (278 lb 8 oz)   SpO2 99%   BMI 39.96 kg/m²     Physical Exam  Constitutional:       Appearance: She is well-developed.      Comments: wearing a mask   Cardiovascular:      Rate and Rhythm: Normal rate.   Pulmonary:      Effort: Pulmonary effort is normal.   Musculoskeletal:         General: Normal range of motion.   Neurological:      Mental Status: She is alert.   Psychiatric:         Thought Content: Thought content normal.         Judgment: Judgment normal.           Assessment:  hx LSG/paraHHR 2015 GDW, s/p LSG revision 12/1/20 PNQ (w/ postop stricture).      ICD-10-CM ICD-9-CM   1. Gastroesophageal reflux disease, unspecified whether esophagitis present  K21.9 530.81   2. Dyspepsia  R10.13 536.8         Plan:  UGI ordered for further eval.  RX Omeprazole 40mg " qPM.  Additional input to follow.  Call w/ problems/concerns.         YAMILET Elmore

## 2022-09-07 DIAGNOSIS — S06.0X0D CONCUSSION WITHOUT LOSS OF CONSCIOUSNESS, SUBSEQUENT ENCOUNTER: ICD-10-CM

## 2022-09-07 RX ORDER — CYCLOBENZAPRINE HCL 10 MG
TABLET ORAL
Qty: 30 TABLET | Refills: 0 | Status: SHIPPED | OUTPATIENT
Start: 2022-09-07 | End: 2023-02-10

## 2022-09-19 ENCOUNTER — OUTSIDE FACILITY SERVICE (OUTPATIENT)
Dept: GASTROENTEROLOGY | Facility: CLINIC | Age: 37
End: 2022-09-19

## 2022-09-19 PROCEDURE — 43235 EGD DIAGNOSTIC BRUSH WASH: CPT | Performed by: INTERNAL MEDICINE

## 2022-09-26 ENCOUNTER — OFFICE VISIT (OUTPATIENT)
Dept: BEHAVIORAL HEALTH | Facility: CLINIC | Age: 37
End: 2022-09-26

## 2022-09-26 DIAGNOSIS — F32.A ANXIETY AND DEPRESSION: Primary | ICD-10-CM

## 2022-09-26 DIAGNOSIS — F41.9 ANXIETY AND DEPRESSION: Primary | ICD-10-CM

## 2022-09-26 DIAGNOSIS — R45.89 FEELING SAD: ICD-10-CM

## 2022-09-26 PROCEDURE — 90834 PSYTX W PT 45 MINUTES: CPT | Performed by: PSYCHOLOGIST
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
## 2022-09-29 ENCOUNTER — APPOINTMENT (OUTPATIENT)
Dept: GENERAL RADIOLOGY | Facility: HOSPITAL | Age: 37
End: 2022-09-29

## 2022-10-17 ENCOUNTER — LAB (OUTPATIENT)
Dept: LAB | Facility: HOSPITAL | Age: 37
End: 2022-10-17

## 2022-10-17 ENCOUNTER — OFFICE VISIT (OUTPATIENT)
Dept: INTERNAL MEDICINE | Facility: CLINIC | Age: 37
End: 2022-10-17

## 2022-10-17 ENCOUNTER — TELEPHONE (OUTPATIENT)
Dept: INTERNAL MEDICINE | Facility: CLINIC | Age: 37
End: 2022-10-17

## 2022-10-17 VITALS
BODY MASS INDEX: 40.6 KG/M2 | WEIGHT: 283.6 LBS | DIASTOLIC BLOOD PRESSURE: 68 MMHG | HEIGHT: 70 IN | TEMPERATURE: 97.5 F | RESPIRATION RATE: 16 BRPM | HEART RATE: 88 BPM | SYSTOLIC BLOOD PRESSURE: 112 MMHG | OXYGEN SATURATION: 98 %

## 2022-10-17 DIAGNOSIS — D50.8 OTHER IRON DEFICIENCY ANEMIA: ICD-10-CM

## 2022-10-17 DIAGNOSIS — E55.9 VITAMIN D DEFICIENCY: ICD-10-CM

## 2022-10-17 DIAGNOSIS — Z79.899 HIGH RISK MEDICATION USE: ICD-10-CM

## 2022-10-17 DIAGNOSIS — D64.9 ANEMIA, UNSPECIFIED TYPE: ICD-10-CM

## 2022-10-17 DIAGNOSIS — Z23 INFLUENZA VACCINE ADMINISTERED: ICD-10-CM

## 2022-10-17 DIAGNOSIS — E66.01 CLASS 3 SEVERE OBESITY DUE TO EXCESS CALORIES WITHOUT SERIOUS COMORBIDITY WITH BODY MASS INDEX (BMI) OF 40.0 TO 44.9 IN ADULT: ICD-10-CM

## 2022-10-17 DIAGNOSIS — K21.9 GERD WITHOUT ESOPHAGITIS: ICD-10-CM

## 2022-10-17 DIAGNOSIS — E53.8 B12 DEFICIENCY: ICD-10-CM

## 2022-10-17 DIAGNOSIS — E66.01 CLASS 3 SEVERE OBESITY DUE TO EXCESS CALORIES WITHOUT SERIOUS COMORBIDITY WITH BODY MASS INDEX (BMI) OF 40.0 TO 44.9 IN ADULT: Primary | ICD-10-CM

## 2022-10-17 PROBLEM — S06.0X0A CONCUSSION WITH NO LOSS OF CONSCIOUSNESS: Status: RESOLVED | Noted: 2022-08-17 | Resolved: 2022-10-17

## 2022-10-17 LAB
25(OH)D3 SERPL-MCNC: 22.7 NG/ML (ref 30–100)
FERRITIN SERPL-MCNC: 11.7 NG/ML (ref 13–150)
IRON 24H UR-MRATE: 30 MCG/DL (ref 37–145)
IRON SATN MFR SERPL: 7 % (ref 20–50)
TIBC SERPL-MCNC: 432 MCG/DL (ref 298–536)
TRANSFERRIN SERPL-MCNC: 290 MG/DL (ref 200–360)
TSH SERPL DL<=0.05 MIU/L-ACNC: 1.04 UIU/ML (ref 0.27–4.2)
VIT B12 BLD-MCNC: 300 PG/ML (ref 211–946)

## 2022-10-17 PROCEDURE — 90686 IIV4 VACC NO PRSV 0.5 ML IM: CPT | Performed by: PHYSICIAN ASSISTANT

## 2022-10-17 PROCEDURE — 84443 ASSAY THYROID STIM HORMONE: CPT | Performed by: PHYSICIAN ASSISTANT

## 2022-10-17 PROCEDURE — 82728 ASSAY OF FERRITIN: CPT | Performed by: PHYSICIAN ASSISTANT

## 2022-10-17 PROCEDURE — 83540 ASSAY OF IRON: CPT | Performed by: PHYSICIAN ASSISTANT

## 2022-10-17 PROCEDURE — 82306 VITAMIN D 25 HYDROXY: CPT | Performed by: PHYSICIAN ASSISTANT

## 2022-10-17 PROCEDURE — 99214 OFFICE O/P EST MOD 30 MIN: CPT | Performed by: PHYSICIAN ASSISTANT

## 2022-10-17 PROCEDURE — 84466 ASSAY OF TRANSFERRIN: CPT | Performed by: PHYSICIAN ASSISTANT

## 2022-10-17 PROCEDURE — 90471 IMMUNIZATION ADMIN: CPT | Performed by: PHYSICIAN ASSISTANT

## 2022-10-17 PROCEDURE — 82607 VITAMIN B-12: CPT | Performed by: PHYSICIAN ASSISTANT

## 2022-10-17 RX ORDER — PANTOPRAZOLE SODIUM 40 MG/1
40 TABLET, DELAYED RELEASE ORAL 2 TIMES DAILY
Qty: 60 TABLET | Refills: 1 | Status: SHIPPED | OUTPATIENT
Start: 2022-10-17 | End: 2022-12-22 | Stop reason: SDUPTHER

## 2022-10-17 RX ORDER — ERGOCALCIFEROL 1.25 MG/1
50000 CAPSULE ORAL WEEKLY
Qty: 12 CAPSULE | Refills: 0 | Status: SHIPPED | OUTPATIENT
Start: 2022-10-17 | End: 2023-01-04

## 2022-10-17 RX ORDER — PHENTERMINE HYDROCHLORIDE 37.5 MG/1
37.5 TABLET ORAL
Qty: 30 TABLET | Refills: 0 | Status: SHIPPED | OUTPATIENT
Start: 2022-10-17 | End: 2022-11-21 | Stop reason: SDUPTHER

## 2022-10-17 RX ORDER — BUPROPION HYDROCHLORIDE 150 MG/1
150 TABLET, EXTENDED RELEASE ORAL 2 TIMES DAILY
Qty: 60 TABLET | Refills: 2 | Status: SHIPPED | OUTPATIENT
Start: 2022-10-17 | End: 2022-11-21 | Stop reason: SDUPTHER

## 2022-10-17 NOTE — ASSESSMENT & PLAN NOTE
Patient's (Body mass index is 40.69 kg/m².) indicates that they are morbidly obese (BMI > 40 or > 35 with obesity - related health condition) with health conditions that include GERD . Weight is worsening. BMI is is above average; BMI management plan is completed. We discussed portion control, increasing exercise and pharmacologic options including Adipex and Wellbutrin.   Will cont Adipex, month 2/3, start wellbutrin

## 2022-10-17 NOTE — TELEPHONE ENCOUNTER
Called and lvm for patient to return call, office number given.     HUB: if patient returns call please relay providers message:   ----- Message from Alice Fuentes PA-C sent at 10/17/2022  3:35 PM EDT -----  Your iron is still low, please make sure to take the iron pill daily and increase iron rich foods in your diet. Your thyroid is normal. Your Vit D is still low, I will send in the high dose vitamin D pills that you take once a week for 12 weeks instead of the daily one, after 12 weeks you can restart the daily vitamin D pill. Your B12 is just slightly improved but still on the low end. If you would like to start B12 injections let me know or we can continue with the pills for now.

## 2022-10-17 NOTE — TELEPHONE ENCOUNTER
----- Message from Alice Fuentes PA-C sent at 10/17/2022  3:35 PM EDT -----  Your iron is still low, please make sure to take the iron pill daily and increase iron rich foods in your diet. Your thyroid is normal. Your Vit D is still low, I will send in the high dose vitamin D pills that you take once a week for 12 weeks instead of the daily one, after 12 weeks you can restart the daily vitamin D pill. Your B12 is just slightly improved but still on the low end. If you would like to start B12 injections let me know or we can continue with the pills for now.

## 2022-10-17 NOTE — PROGRESS NOTES
Chief Complaint  Weight Check (Monthly weight check) and GI Problem (Had EGD completed with Dr. La needs new PPI therapy)    Subjective          History of Present Illness  Geneva Cordova presents to [facility] for   History of Present Illness  GERD:  She has hx of hiatal hernia. She is taking protonix 40mg BID but still having GERD sx and sometimes it feels like her food is getting stuck when she swallows. She did have EGD and they said she had bad GERD so she was to cont her PPI.    Anemia:  Taking B12, iron, and vitamin D daily. Feels good, does not feel fatigue or SOA. Was found to be anemic after her recent breast surgery and we started on iron and b12. She has a hx of b12 def she thinks. Hx of gastric sleeve and had not been taking vitamins regularly but is now.  Lab Results       Component                Value               Date                       WBC                      8.01                07/17/2022                 HGB                      12.1                07/17/2022                 HCT                      37.9                07/17/2022                 MCV                      93.6                07/17/2022                 PLT                      248                 07/17/2022              Overweight:  Recently had a tummy tuck and is happy with the results. We restarted Adipex last visit but she left all of the pills on an airplane so she did not start any. She has a  that she sees 3 d a week, also does exercise on the other days. She has also started intermittent fasting. Her goal weight is 250. She used to be 500lbs prior to bariatric surgery. Would like refill on adipex and also like to try wellbutrin, we rx it previously but she does not think she started it.      Review of Systems   Constitutional: Negative for fever and unexpected weight loss.   Respiratory: Negative for cough, shortness of breath and wheezing.    Cardiovascular: Negative for  "chest pain and palpitations.   Gastrointestinal: Positive for GERD.       The following portions of the patient's history were reviewed and updated as appropriate: allergies, current medications, past family history, past medical history, past social history, past surgical history and problem list.  Allergies   Allergen Reactions   • Nsaids Other (See Comments)     HISTORY OF GASTRIC SLEEVE   • Codeine Other (See Comments)     UNKNOWN       Current Outpatient Medications:   •  B-D 3CC LUER-LYLY SYR 26MD5-0/2 23G X 1-1/2\" 3 ML misc, AS DIRECTED DAILY WITH THIAMINE, Disp: , Rfl:   •  Cholecalciferol (Vitamin D) 50 MCG (2000 UT) tablet, Take 2,000 Units by mouth Daily., Disp: 90 tablet, Rfl: 1  •  cyclobenzaprine (FLEXERIL) 10 MG tablet, TAKE 1 TABLET BY MOUTH THREE TIMES DAILY AS NEEDED FOR MUSCLE SPASMS, Disp: 30 tablet, Rfl: 0  •  ferrous gluconate (FERGON) 324 MG tablet, Take 1 tablet by mouth Daily With Breakfast., Disp: 90 tablet, Rfl: 1  •  Multiple Vitamins-Minerals (MULTIPLE VITAMINS/WOMENS) tablet, Take 1 tablet by mouth Daily., Disp: , Rfl:   •  ondansetron ODT (ZOFRAN-ODT) 4 MG disintegrating tablet, Place 1 tablet on the tongue Every 6 (Six) Hours As Needed for Nausea or Vomiting., Disp: 12 tablet, Rfl: 0  •  pantoprazole (PROTONIX) 40 MG EC tablet, Take 1 tablet by mouth 2 (Two) Times a Day., Disp: 60 tablet, Rfl: 1  •  phentermine (Adipex-P) 37.5 MG tablet, Take 1 tablet by mouth Every Morning Before Breakfast., Disp: 30 tablet, Rfl: 0  •  vitamin B-12 (CYANOCOBALAMIN) 1000 MCG tablet, Take 1 tablet by mouth Daily., Disp: 90 tablet, Rfl: 1  •  buPROPion SR (Wellbutrin SR) 150 MG 12 hr tablet, Take 1 tablet by mouth 2 (Two) Times a Day., Disp: 60 tablet, Rfl: 2  New Medications Ordered This Visit   Medications   • phentermine (Adipex-P) 37.5 MG tablet     Sig: Take 1 tablet by mouth Every Morning Before Breakfast.     Dispense:  30 tablet     Refill:  0   • buPROPion SR (Wellbutrin SR) 150 MG 12 hr tablet " "    Sig: Take 1 tablet by mouth 2 (Two) Times a Day.     Dispense:  60 tablet     Refill:  2   • pantoprazole (PROTONIX) 40 MG EC tablet     Sig: Take 1 tablet by mouth 2 (Two) Times a Day.     Dispense:  60 tablet     Refill:  1     Social History     Tobacco Use   Smoking Status Never   Smokeless Tobacco Never        Objective   Vital Signs:   Vitals:    10/17/22 0924   BP: 112/68   Pulse: 88   Resp: 16   Temp: 97.5 °F (36.4 °C)   TempSrc: Infrared   SpO2: 98%   Weight: 129 kg (283 lb 9.6 oz)   Height: 177.8 cm (70\")  Comment: pt reported      Physical Exam  Vitals reviewed.   Constitutional:       General: She is not in acute distress.     Appearance: Normal appearance. She is obese.   HENT:      Head: Normocephalic and atraumatic.   Eyes:      General: No scleral icterus.     Extraocular Movements: Extraocular movements intact.      Conjunctiva/sclera: Conjunctivae normal.   Cardiovascular:      Rate and Rhythm: Normal rate and regular rhythm.      Heart sounds: Normal heart sounds. No murmur heard.  Pulmonary:      Effort: Pulmonary effort is normal. No respiratory distress.      Breath sounds: Normal breath sounds. No stridor. No wheezing or rhonchi.   Musculoskeletal:      Cervical back: Normal range of motion and neck supple.   Skin:     General: Skin is warm and dry.      Coloration: Skin is not jaundiced.   Neurological:      General: No focal deficit present.      Mental Status: She is alert and oriented to person, place, and time.      Gait: Gait normal.   Psychiatric:         Mood and Affect: Mood normal.         Behavior: Behavior normal.        No LMP recorded. Patient has had an ablation.    Result Review :                   Assessment and Plan    Diagnoses and all orders for this visit:    1. Class 3 severe obesity due to excess calories without serious comorbidity with body mass index (BMI) of 40.0 to 44.9 in adult (HCC) (Primary)  Assessment & Plan:  Patient's (Body mass index is 40.69 kg/m².) " indicates that they are morbidly obese (BMI > 40 or > 35 with obesity - related health condition) with health conditions that include GERD . Weight is worsening. BMI is is above average; BMI management plan is completed. We discussed portion control, increasing exercise and pharmacologic options including Adipex and Wellbutrin.   Will cont Adipex, month 2/3, start wellbutrin     Orders:  -     phentermine (Adipex-P) 37.5 MG tablet; Take 1 tablet by mouth Every Morning Before Breakfast.  Dispense: 30 tablet; Refill: 0  -     TSH Rfx On Abnormal To Free T4; Future  -     buPROPion SR (Wellbutrin SR) 150 MG 12 hr tablet; Take 1 tablet by mouth 2 (Two) Times a Day.  Dispense: 60 tablet; Refill: 2    2. GERD without esophagitis  Assessment & Plan:  Cont Protonix BID at this time, plan to dec to QD in 1-2 months, f/u with GI as dir.    Orders:  -     pantoprazole (PROTONIX) 40 MG EC tablet; Take 1 tablet by mouth 2 (Two) Times a Day.  Dispense: 60 tablet; Refill: 1    3. B12 deficiency  -     Vitamin B12; Future    4. Vitamin D deficiency  -     Vitamin D 25 Hydroxy; Future    5. Other iron deficiency anemia  Assessment & Plan:  Cont vitamin supplements, recheck labs.    Orders:  -     Iron Profile; Future  -     Ferritin; Future    6. Influenza vaccine administered  -     FluLaval/Fluzone >6 mos ([id number])    7. High risk medication use  Assessment & Plan:  UDS/CSA 8.17.22  Adam 10/17/22 compliant        Follow Up   Return in about 4 weeks (around 11/14/2022) for Weight, adipex.    Follow up if symptoms worsen or persist or has new or concerning symptoms, go to ER for severe symptoms.   Reviewed common medication effects and side effects and advised to report side effects immediately.  Encouraged medication compliance and the importance of keeping scheduled follow up appointments with me and any other providers.  If a referral was made please contact our office if you have not heard about an appointment in the next 2  weeks.   If labs or images are ordered we will contact you with the results within the next week.  If you have not heard from us after a week please call our office to inquire about the results.   Patient was given instructions and counseling regarding her condition or for health maintenance advice. Please see specific information pulled into the AVS if appropriate.     Alice Fuentes PA-C    * Please note that portions of this note were completed with a voice recognition program.

## 2022-10-19 NOTE — TELEPHONE ENCOUNTER
TERRAM for pt to return call with office #.     HUB: if patient returns call please relay providers message:   ----- Message from Alice Fuentes PA-C sent at 10/17/2022  3:35 PM EDT -----  Your iron is still low, please make sure to take the iron pill daily and increase iron rich foods in your diet. Your thyroid is normal. Your Vit D is still low, I will send in the high dose vitamin D pills that you take once a week for 12 weeks instead of the daily one, after 12 weeks you can restart the daily vitamin D pill. Your B12 is just slightly improved but still on the low end. If you would like to start B12 injections let me know or we can continue with the pills for now.

## 2022-11-01 ENCOUNTER — OFFICE VISIT (OUTPATIENT)
Dept: PULMONOLOGY | Facility: CLINIC | Age: 37
End: 2022-11-01

## 2022-11-01 VITALS
WEIGHT: 274 LBS | HEIGHT: 70 IN | SYSTOLIC BLOOD PRESSURE: 110 MMHG | TEMPERATURE: 98 F | OXYGEN SATURATION: 98 % | HEART RATE: 68 BPM | BODY MASS INDEX: 39.22 KG/M2 | DIASTOLIC BLOOD PRESSURE: 76 MMHG

## 2022-11-01 DIAGNOSIS — K21.9 GERD WITHOUT ESOPHAGITIS: ICD-10-CM

## 2022-11-01 DIAGNOSIS — R06.09 DYSPNEA ON EXERTION: Primary | ICD-10-CM

## 2022-11-01 DIAGNOSIS — E66.01 CLASS 3 SEVERE OBESITY DUE TO EXCESS CALORIES WITHOUT SERIOUS COMORBIDITY WITH BODY MASS INDEX (BMI) OF 40.0 TO 44.9 IN ADULT: ICD-10-CM

## 2022-11-01 PROCEDURE — 94729 DIFFUSING CAPACITY: CPT | Performed by: NURSE PRACTITIONER

## 2022-11-01 PROCEDURE — 94010 BREATHING CAPACITY TEST: CPT | Performed by: NURSE PRACTITIONER

## 2022-11-01 PROCEDURE — 94726 PLETHYSMOGRAPHY LUNG VOLUMES: CPT | Performed by: NURSE PRACTITIONER

## 2022-11-01 PROCEDURE — 99214 OFFICE O/P EST MOD 30 MIN: CPT | Performed by: NURSE PRACTITIONER

## 2022-11-01 NOTE — PROGRESS NOTES
"Pre-operative Clearance    Chief Complaint   Patient presents with   • Pre-op Exam     Preoperative clearance for bariatrics surgery       AURY Cordova is a pleasant 36 y.o. female who has been referred for preoperative exam.  She was last seen in the office by me in 2020 and was planning on having a sips procedure but unfortunately her insurance would not cover it per Dr. Hughes.  She states that her insurance is now going to cover it and she needs preoperative clearance for this procedure.    She denies any complications when she had a gastric sleeve performed in December 2020.  She states she did lose weight initially.  She states that her only complaint currently is that she is having a lot of reflux symptoms.  She does take medication on regular basis and does try to follow reflux precautions closely.    She denies any respiratory illnesses in the last 6 months.    She denies any current breathing difficulties.  She does have some mild shortness of breath but does recover quickly at rest.    She has a history of VIRA but has not had a repeat sleep study since her weight loss.  She denies any sleeping difficulties currently.    She denies any sputum production or hemoptysis.  She denies any chest pain or palpitations.  She denies any lower extremity edema or calf tenderness.    She is a lifetime non-smoker.    She refuses to receive the influenza vaccination.    Past Medical History:   Diagnosis Date   • Anemia    • Anxiety    • Bilateral lower extremity edema     R>L   • Boil, axilla     recurrent   • Chronic back pain     no meds or injections, feels related to breasts   • Concussion with no loss of consciousness 8/17/2022   • COVID-19 12/2020   • Depression    • Dyspepsia    • Dyspnea on exertion    • Fatigue    • Glucose intolerance (impaired glucose tolerance)    • Helicobacter pylori (H. pylori) infection     asx and grossly nl EGD. \"abundant\"on path. HBT still + after PrevPak tx. LSG path neg. neg " testing 2018   • Hypoalbuminemia    • Left Ovarian serous cystadenoma -removed at  section 2017   • Migraine     resolved with weightloss   • Obesity, morbid (HCC)    • VIRA (obstructive sleep apnea)      improved since WLS, pending f/u sleep study, not using CPAP   • PCOS (polycystic ovarian syndrome)    • Right Essure implantation 2017 10/06/2017   • S/P  section;left ovarian cystectomy and partial salpingectomy 2017   • Seasonal allergies        Past Surgical History:   Procedure Laterality Date   • BILATERAL BREAST REDUCTION     •  SECTION N/A 2017      SECTION PRIMARY;  CYSTECTOMY; Kwadwo Baxter MD; :  [facility];  Service:    • DILATATION AND CURETTAGE     • ENDOMETRIAL ABLATION  2020    Dr Cuevas   • ENDOSCOPY N/A 2020    Procedure: ESOPHAGOGASTRODUODENOSCOPY;  Surgeon: Natalia Mariscal MD;  Location:  [facility];  Service: Bariatric;  Laterality: N/A;   • ESSURE TUBAL LIGATION     • GASTRIC SLEEVE LAPAROSCOPIC  2015    With Dr. Hughes   • GASTRIC SLEEVE LAPAROSCOPIC N/A 2020    Procedure: GASTRIC SLEEVE LAPAROSCOPIC;  Surgeon: Natalia Mariscal MD;  Location:  [facility];  Service: Bariatric;  Laterality: N/A;   • HYSTEROSCOPY ENDOMETRIAL ABLATION  2020   • LAPAROSCOPIC CHOLECYSTECTOMY  2018    Dr. Hughes   • SALPINGECTOMY Bilateral 2020    Dr Cuevas   • TONSILLECTOMY     • WISDOM TOOTH EXTRACTION         Family History   Problem Relation Age of Onset   • No Known Problems Other    • Sleep apnea Mother    • Diabetes Mother    • No Known Problems Father    • Pancreatic cancer Maternal Grandmother    • No Known Problems Sister    • No Known Problems Brother    • No Known Problems Daughter    • No Known Problems Son    • No Known Problems Paternal Grandmother    • No Known Problems Maternal Aunt    • No Known Problems Paternal Aunt    • BRCA 1/2 Neg Hx    • Colon cancer Neg  "Hx    • Endometrial cancer Neg Hx    • Ovarian cancer Neg Hx        Social History     Socioeconomic History   • Marital status: Single   • Number of children: 4   • Years of education: College    Tobacco Use   • Smoking status: Never   • Smokeless tobacco: Never   Vaping Use   • Vaping Use: Never used   Substance and Sexual Activity   • Alcohol use: Not Currently   • Drug use: No   • Sexual activity: Yes     Partners: Male     Birth control/protection: Surgical     Comment: no OCP       Allergies   Allergen Reactions   • Nsaids Other (See Comments)     HISTORY OF GASTRIC SLEEVE   • Codeine Other (See Comments)     UNKNOWN       ROS    Review of Systems   Constitutional: Negative for activity change, chills, fever and unexpected weight loss.   HENT: Negative for congestion, hearing loss, postnasal drip, rhinorrhea, sinus pressure, sore throat and voice change.    Eyes: Negative for blurred vision and visual disturbance.   Respiratory: Positive for shortness of breath. Negative for apnea, cough and wheezing.    Cardiovascular: Negative for chest pain and palpitations.   Gastrointestinal: Negative for abdominal pain, nausea, vomiting and GERD.   Endocrine: Negative for cold intolerance.   Genitourinary: Negative for difficulty urinating and urinary incontinence.   Musculoskeletal: Negative for back pain, joint swelling and myalgias.   Skin: Negative for color change and pallor.   Allergic/Immunologic: Negative for food allergies.   Neurological: Negative for weakness, numbness, headache and confusion.   Hematological: Negative for adenopathy.   Psychiatric/Behavioral: Negative for agitation, behavioral problems and depressed mood.       Vitals:    11/01/22 0911   BP: 110/76   Pulse: 68   Temp: 98 °F (36.7 °C)   SpO2: 98%         Current Outpatient Medications:   •  B-D 3CC LUER-LYLY SYR 32WW7-0/2 23G X 1-1/2\" 3 ML misc, AS DIRECTED DAILY WITH THIAMINE, Disp: , Rfl:   •  buPROPion SR (Wellbutrin SR) 150 MG 12 hr tablet, " Take 1 tablet by mouth 2 (Two) Times a Day., Disp: 60 tablet, Rfl: 2  •  Cholecalciferol (Vitamin D) 50 MCG (2000 UT) tablet, Take 2,000 Units by mouth Daily., Disp: 90 tablet, Rfl: 1  •  cyclobenzaprine (FLEXERIL) 10 MG tablet, TAKE 1 TABLET BY MOUTH THREE TIMES DAILY AS NEEDED FOR MUSCLE SPASMS, Disp: 30 tablet, Rfl: 0  •  ferrous gluconate (FERGON) 324 MG tablet, Take 1 tablet by mouth Daily With Breakfast., Disp: 90 tablet, Rfl: 1  •  Multiple Vitamins-Minerals (MULTIPLE VITAMINS/WOMENS) tablet, Take 1 tablet by mouth Daily., Disp: , Rfl:   •  ondansetron ODT (ZOFRAN-ODT) 4 MG disintegrating tablet, Place 1 tablet on the tongue Every 6 (Six) Hours As Needed for Nausea or Vomiting., Disp: 12 tablet, Rfl: 0  •  pantoprazole (PROTONIX) 40 MG EC tablet, Take 1 tablet by mouth 2 (Two) Times a Day., Disp: 60 tablet, Rfl: 1  •  phentermine (Adipex-P) 37.5 MG tablet, Take 1 tablet by mouth Every Morning Before Breakfast., Disp: 30 tablet, Rfl: 0  •  vitamin B-12 (CYANOCOBALAMIN) 1000 MCG tablet, Take 1 tablet by mouth Daily., Disp: 90 tablet, Rfl: 1  •  vitamin D (ERGOCALCIFEROL) 1.25 MG (62687 UT) capsule capsule, Take 1 capsule by mouth 1 (One) Time Per Week., Disp: 12 capsule, Rfl: 0    PE    Physical Exam  Vitals and nursing note reviewed.   Constitutional:       Appearance: She is well-developed. She is not diaphoretic.   HENT:      Head: Normocephalic and atraumatic.   Eyes:      Pupils: Pupils are equal, round, and reactive to light.   Neck:      Thyroid: No thyromegaly.   Cardiovascular:      Rate and Rhythm: Normal rate and regular rhythm.      Heart sounds: Normal heart sounds. No murmur heard.    No friction rub. No gallop.   Pulmonary:      Effort: Pulmonary effort is normal. No respiratory distress.      Breath sounds: Normal breath sounds. No wheezing or rales.   Chest:      Chest wall: No tenderness.   Abdominal:      General: Bowel sounds are normal.      Palpations: Abdomen is soft.      Tenderness:  There is no abdominal tenderness.   Musculoskeletal:         General: No swelling. Normal range of motion.      Cervical back: Normal range of motion and neck supple.   Lymphadenopathy:      Cervical: No cervical adenopathy.   Skin:     General: Skin is warm and dry.      Capillary Refill: Capillary refill takes less than 2 seconds.   Neurological:      Mental Status: She is alert and oriented to person, place, and time.   Psychiatric:         Mood and Affect: Mood normal.         Behavior: Behavior normal.          ARISCAT Preoperative Pulmonary Risk Index.    Age [x]   <= 50 years old (0 points)    []   51-80 years old (3 points)    []   >80 years old (16 points)       Preoperative Oxygen Saturation [x]   >= 96% (0 points)    []   91-95% (8 points)    []   <= 90% (24 points)       Other Clinical Risk Factors []   Respiratory Infection in the last month (17 points)    []   Pre-operative anemia: Hb < 10 g/dL (11 points)    []   Emergency Surgery (8 points)       Surgical Incision [x]   Upper abdominal (15 points)    []   Intrathoracic (24 points)       Duration of Surgery []   < 2 hours (0 points)    [x]   2-3 hours (16 points)    []   >3 hours (23 points)       Total Criteria Point Count: 31     ARISCAT risk index interpretation:      0-25 points: Low risk  1.6 % pulmonary complication rate.   26-44 points: Intermediate risk 13.3% pulmonary complication rate.    points: High risk 42.1 % pulmonary complication rate.     Postoperative Pulmonary Complications include but are not limited to: Respiratory Failure, Pulmonary Infection, Pleural Effusion, Atelectasis, Pneumothorax Bronchospasm, Aspiration Pneumonia.    No images are attached to the encounter or orders placed in the encounter.    Full PFTs performed in the office today: FVC 4.35 113% predicted, FEV1 3.44 107% predicted, FEV1/FVC 79% predicted, TLC 5.62 89% predicted, DLCO 76% predicted, no obstruction, no restriction and reduced DLCO.    A/P    Problem  List Items Addressed This Visit        Cardiac and Vasculature    Dyspnea on exertion - Primary    Relevant Orders    Pulmonary Function Test (Completed)       Endocrine and Metabolic    Class 3 severe obesity due to excess calories without serious comorbidity with body mass index (BMI) of 40.0 to 44.9 in adult (HCC)    Relevant Medications    phentermine (Adipex-P) 37.5 MG tablet    buPROPion SR (Wellbutrin SR) 150 MG 12 hr tablet       Gastrointestinal Abdominal     GERD without esophagitis    Relevant Medications    ondansetron ODT (ZOFRAN-ODT) 4 MG disintegrating tablet    pantoprazole (PROTONIX) 40 MG EC tablet     Mrs. Cordova was here for follow-up after she is planning on having bariatric procedure performed by Dr. Hughes in the near future.  We did review her full PFTs in the office today and she has no obstruction or restriction.  She does not meet qualifications for inhaler therapy at this time.    We did discuss regular exercise to help with her dyspnea.    She had a stable chest x-ray earlier this year that showed no pulmonary process.  She has not had any respiratory illnesses since this time.    Based on the ARISCAT preoperative coronary risk index she is considered an intermediate risk for pulmonary complications related to location and duration of surgery.  We did discuss the possible pulmonary complications such as respiratory failure, pulmonary infection, pleural effusion, atelectasis, pneumothorax bronchospasm and aspiration pneumonia.  We also discussed good pulmonary toileting such as cough/deep breathing, using incentive parameter before and after surgery and early mobilization.  After discussing the possible pulmonary complication she is agreeable to proceed with surgery.    She will continue Protonix daily.  We did discuss strict reflux precautions as well as elevating the head of the bed at night, not eating to 3 hours before bed and avoiding foods to trigger reflux symptoms.    She will  follow-up as needed in our office.  Advised her to call with any additional concerns or questions.    Level of service justified based on 32 minutes spent in patient care on this date of service including, but not limited to: preparing to see the patient, obtaining and/or reviewing history, performing medically appropriate examination, ordering tests/medicine/procedures, independently interpreting results, documenting clinical information in EHR, and counseling/education of patient/family/caregiver. (Level 4 30-39 minutes; Level 5 40-54 minutes)      RAGHAV Thompson  11/01/202209:18 EDT  Electronically signed     Please note that portions of this note were completed with a voice recognition program.        CC: Alice Fuentes, VANESSA Trujillo, APRN

## 2022-11-08 ENCOUNTER — APPOINTMENT (OUTPATIENT)
Dept: GENERAL RADIOLOGY | Facility: HOSPITAL | Age: 37
End: 2022-11-08

## 2022-11-12 PROCEDURE — 87661 TRICHOMONAS VAGINALIS AMPLIF: CPT | Performed by: NURSE PRACTITIONER

## 2022-11-12 PROCEDURE — 87801 DETECT AGNT MULT DNA AMPLI: CPT | Performed by: NURSE PRACTITIONER

## 2022-11-12 PROCEDURE — 87798 DETECT AGENT NOS DNA AMP: CPT | Performed by: NURSE PRACTITIONER

## 2022-11-12 PROCEDURE — 87529 HSV DNA AMP PROBE: CPT | Performed by: NURSE PRACTITIONER

## 2022-11-12 PROCEDURE — 87591 N.GONORRHOEAE DNA AMP PROB: CPT | Performed by: NURSE PRACTITIONER

## 2022-11-12 PROCEDURE — 87491 CHLMYD TRACH DNA AMP PROBE: CPT | Performed by: NURSE PRACTITIONER

## 2022-11-15 ENCOUNTER — OFFICE VISIT (OUTPATIENT)
Dept: BARIATRICS/WEIGHT MGMT | Facility: CLINIC | Age: 37
End: 2022-11-15

## 2022-11-15 VITALS
SYSTOLIC BLOOD PRESSURE: 126 MMHG | OXYGEN SATURATION: 98 % | TEMPERATURE: 98.5 F | HEIGHT: 70 IN | WEIGHT: 276.5 LBS | HEART RATE: 76 BPM | DIASTOLIC BLOOD PRESSURE: 74 MMHG | BODY MASS INDEX: 39.58 KG/M2

## 2022-11-15 DIAGNOSIS — K21.9 GERD WITHOUT ESOPHAGITIS: ICD-10-CM

## 2022-11-15 DIAGNOSIS — E66.9 OBESITY, CLASS II, BMI 35-39.9: Primary | ICD-10-CM

## 2022-11-15 PROCEDURE — 99215 OFFICE O/P EST HI 40 MIN: CPT | Performed by: PHYSICIAN ASSISTANT

## 2022-11-15 NOTE — PROGRESS NOTES
[facility]IATR[facility] SURGERY  [address]  [address]  [facility] [id number] [phone]      Patient  Name:  Geneva Cordova  :  1985        Chief Complaint:  weight gain; unable to maintain weight loss    History of Present Illness:  Geneva Cordova is a 37 y.o. female  s/p LSG/paraHHR 2015 GDW, s/p LSG revision 20 w/ Dr. Mariscal (complicated by post op stricture) who presents today for evaluation, education and consultation regarding bariatric and metabolic surgery. The patient is interested in Revision.     Geneva has been overweight for many years. Patient has been overweight for many years, with numerous failed dietary/weight loss attempts.  She now has obesity related comorbidities and as such has decided to pursue weight loss surgery.      Continues with daily reflux even with BID PPI. Sleeps elevated, wakes up 2-3 times a week with reflux. Has dysphagia every couple days, worse with heavier foods. Nausea is occasional, treated with antiemetics prn. No abd pain. Has constipation.       Recent imaging-      EGD 21 w/ Dr. Mariscal revealed sliding HH and slight stricture at incisura w/ pyloric spasm, both dilated to 20mm.  Advised repeat endoscopy/dilatation w/ Dr. Galvan, but patient cancelled appt.       Last UGI 21 @[facility] - moderate reflux, moderate sliding hiatal hernia.    Last office visit 2022.  Upper GI was ordered for continued uncontrolled reflux but had not been completed.    She had EGD with Dr. La 2022 with findings of LA grade a esophagitis with no bleeding in the lower third of the esophagus.  Erosions noted.  Diffuse mild inflammation characterized by erythema in the gastric antrum and the gastric body.  Second part of the duodenum was normal.    Presurgery weight 343 pounds.  Currently 276 pounds.  Weight loss since surgery 67 pounds.    Other past medical history includes sleep apnea diagnosed prior to initial gastric sleeve not  "currently using CPAP, prediabetes, migraines, chronic back pain, anxiety, depression, PCOS, anemia.    All other past medical, surgical, social and family history have been obtained and discussed as pertinent to bariatric surgery as below.     Pre-Procedure COVID-19 Questionnaire:    1.  Have you previously been tested for COVID-19?    []  No  [x]  Yes    2.  Were you ever positive for COVID-19?    []  No  [x]  Yes    3.  Are you employed in a healthcare setting?    [x]  No  []  Yes    4.  Are you symptomatic for COVID-19 as defined by the CDC (fever, cough)?  If so, when did symptoms begin?    [x]  No    []  Yes, symptoms began **    5.  Have you been hospitalized for COVID-19?  If so, were you in the ICU?  [x]  No    []  Yes, but not in the ICU    []  Yes, and I was in the ICU    6.  Are you a resident in a congregate (group care setting?)    [x]  No  []  Yes    7.  Are you pregnant?  [x]  No  []  Yes    8.  Are you vaccinated?    []  No  []  Yes, but only partially   [x]  Yes, fully         Past Medical History:   Diagnosis Date   • Anemia    • Anxiety    • Bilateral lower extremity edema     R>L   • Boil, axilla     recurrent   • Chronic back pain     no meds or injections, feels related to breasts   • Concussion with no loss of consciousness 2022   • COVID-19 2020   • Depression    • Dyspepsia    • Dyspnea on exertion    • Fatigue    • Glucose intolerance (impaired glucose tolerance)    • Helicobacter pylori (H. pylori) infection     asx and grossly nl EGD. \"abundant\"on path. HBT still + after PrevPak tx. LSG path neg. neg testing 2018   • Hypoalbuminemia    • Left Ovarian serous cystadenoma -removed at  section 2017   • Migraine     resolved with weightloss   • Obesity, morbid (HCC)    • VIRA (obstructive sleep apnea)     improved since WLS   • PCOS (polycystic ovarian syndrome)    • Right Essure implantation 2017 10/06/2017   • S/P  section;left ovarian " "cystectomy and partial salpingectomy 2017   • Seasonal allergies      Past Surgical History:   Procedure Laterality Date   • ABDOMINOPLASTY  2022   • BILATERAL BREAST REDUCTION     •  SECTION N/A 2017      SECTION PRIMARY;  CYSTECTOMY; Kwadwo Baxter MD; :  [facility];  Service:    • DILATATION AND CURETTAGE     • ENDOMETRIAL ABLATION  2020    Dr Cuevas   • ENDOSCOPY N/A 2020    Procedure: ESOPHAGOGASTRODUODENOSCOPY;  Surgeon: Natalia Mariscal MD;  Location:  [facility];  Service: Bariatric;  Laterality: N/A;   • ENDOSCOPY  2022    Dr. La   • ESSURE TUBAL LIGATION     • GASTRIC SLEEVE LAPAROSCOPIC  2015    With Dr. Hughes   • GASTRIC SLEEVE LAPAROSCOPIC N/A 2020    Procedure: GASTRIC SLEEVE LAPAROSCOPIC;  Surgeon: Natalia Mariscal MD;  Location:  [facility];  Service: Bariatric;  Laterality: N/A;   • LAPAROSCOPIC CHOLECYSTECTOMY  2018    Dr. Hughes   • SALPINGECTOMY Bilateral 2020    Dr Cuevas   • TONSILLECTOMY     • WISDOM TOOTH EXTRACTION         Allergies   Allergen Reactions   • Nsaids Other (See Comments)     HISTORY OF GASTRIC SLEEVE   • Codeine Other (See Comments)     UNKNOWN       Current Outpatient Medications:   •  B-D 3CC LUER-LYLY SYR 55BD3-8/2 23G X 1-1/2\" 3 ML misc, AS DIRECTED DAILY WITH THIAMINE, Disp: , Rfl:   •  buPROPion SR (Wellbutrin SR) 150 MG 12 hr tablet, Take 1 tablet by mouth 2 (Two) Times a Day., Disp: 60 tablet, Rfl: 2  •  Cholecalciferol (Vitamin D) 50 MCG ( UT) tablet, Take 2,000 Units by mouth Daily., Disp: 90 tablet, Rfl: 1  •  cyclobenzaprine (FLEXERIL) 10 MG tablet, TAKE 1 TABLET BY MOUTH THREE TIMES DAILY AS NEEDED FOR MUSCLE SPASMS, Disp: 30 tablet, Rfl: 0  •  ferrous gluconate (FERGON) 324 MG tablet, Take 1 tablet by mouth Daily With Breakfast., Disp: 90 tablet, Rfl: 1  •  Multiple Vitamins-Minerals (MULTIPLE VITAMINS/WOMENS) tablet, Take 1 tablet by mouth Daily., Disp: , Rfl: "   •  ondansetron ODT (ZOFRAN-ODT) 4 MG disintegrating tablet, Place 1 tablet on the tongue Every 6 (Six) Hours As Needed for Nausea or Vomiting., Disp: 12 tablet, Rfl: 0  •  pantoprazole (PROTONIX) 40 MG EC tablet, Take 1 tablet by mouth 2 (Two) Times a Day., Disp: 60 tablet, Rfl: 1  •  phentermine (Adipex-P) 37.5 MG tablet, Take 1 tablet by mouth Every Morning Before Breakfast., Disp: 30 tablet, Rfl: 0  •  vitamin B-12 (CYANOCOBALAMIN) 1000 MCG tablet, Take 1 tablet by mouth Daily., Disp: 90 tablet, Rfl: 1  •  vitamin D (ERGOCALCIFEROL) 1.25 MG (18739 UT) capsule capsule, Take 1 capsule by mouth 1 (One) Time Per Week., Disp: 12 capsule, Rfl: 0    Social History     Socioeconomic History   • Marital status: Single   • Number of children: 4   • Years of education: College    Tobacco Use   • Smoking status: Never   • Smokeless tobacco: Never   Vaping Use   • Vaping Use: Never used   Substance and Sexual Activity   • Alcohol use: Not Currently   • Drug use: No   • Sexual activity: Yes     Partners: Male     Birth control/protection: Surgical     Comment: no OCP     Family History   Problem Relation Age of Onset   • No Known Problems Other    • Sleep apnea Mother    • Diabetes Mother    • No Known Problems Father    • Pancreatic cancer Maternal Grandmother    • No Known Problems Sister    • No Known Problems Brother    • No Known Problems Daughter    • No Known Problems Son    • No Known Problems Paternal Grandmother    • No Known Problems Maternal Aunt    • No Known Problems Paternal Aunt    • BRCA 1/2 Neg Hx    • Colon cancer Neg Hx    • Endometrial cancer Neg Hx    • Ovarian cancer Neg Hx        Review of Systems:  Constitutional:  Reports fatigue, weight gain and denies fevers, chills.  HEENT:  denies headache, ear pain or loss of hearing, blurred or double vision, nasal discharge or sore throat.  Cardiovascular:  Reports none and denies HTN, HLD, CAD, Atrial Fib, hx heart disease, heart murmur, hx MI, chest pain,  palpitations, edema, hx DVT.  Respiratory:  Reports none and denies dyspnea on exertion, shortness of breath , cough , wheezing, sleep apnea, asthma, COPD.  Gastrointestinal:  Reports dysphagia, heartburn, nausea, gallbladder issues and denies diarrhea, melena, blood in stool, liver disease, hx pancreatitis.  Genitourinary:  Reports none and denies history of  frequent UTI, incontinence, hematuria, dysuria, polyuria, polydipsia, renal insufficiency, renal failure.    Musculoskeletal:  Reports none and denies joint pain, fibromyalgia, arthritis and autoimmune disease.  Neurological:  Reports none and denies headaches, migraines, numbness /tingling, dizziness, confusion, seizure, stroke.  Psychiatric:  Reports none and denies depressed mood, hx depression, feeling anxious, hx anxiety, bipolar disorder, suicidal ideation, hx suicide attempt, hx self injury, hx substance abuse, eating disorder.  Endocrine:  Reports PCOS and denies endometriosis, glucose intolerance, diabetes, thyroid disease, gout.  Hematologic:  Reports hx anemia and denies bruising, bleeding disorder, hx blood transfusion.  Skin:  Reports none and denies rashes, hx MRSA.      Physical Exam:  Vital Signs:  Weight: 125 kg (276 lb 8 oz)   Body mass index is 39.67 kg/m².  Temp: 98.5 °F (36.9 °C)   Heart Rate: 76   BP: 126/74     Physical Exam  Vitals reviewed.   Constitutional:       Appearance: She is well-developed.   HENT:      Head: Normocephalic.   Neck:      Thyroid: No thyromegaly.   Cardiovascular:      Rate and Rhythm: Normal rate and regular rhythm.      Heart sounds: Normal heart sounds.   Pulmonary:      Effort: Pulmonary effort is normal. No respiratory distress.      Breath sounds: Normal breath sounds. No wheezing.   Abdominal:      General: Bowel sounds are normal. There is no distension.      Palpations: Abdomen is soft.      Tenderness: There is no abdominal tenderness.      Comments: neoumbo- low transverse abdominoplasty scars    Musculoskeletal:         General: Normal range of motion.      Cervical back: Normal range of motion and neck supple.   Skin:     General: Skin is warm and dry.   Neurological:      Mental Status: She is alert and oriented to person, place, and time.   Psychiatric:         Behavior: Behavior normal.         Patient Active Problem List   Diagnosis   • VIRA (obstructive sleep apnea)   • Migraine   • Anemia   • Joint pain   • Chronic back pain   • Anxiety   • PCOS (polycystic ovarian syndrome)   • Dyspepsia   • Seasonal allergies   • Moderate episode of recurrent major depressive disorder (HCC)   • FH: breast cancer   • Dyspnea on exertion   • Class 3 severe obesity due to excess calories without serious comorbidity with body mass index (BMI) of 40.0 to 44.9 in adult (HCC)   • Elevated blood pressure reading   • GERD without esophagitis   • Gastric hourglass stricture or stenosis   • Dysphagia   • Generalized anxiety disorder   • Acute vaginitis   • Pelvic pressure in female   • Vitamin D deficiency   • Screen for STD (sexually transmitted disease)   • High risk medication use   • Abnormal glucose   • B12 deficiency       Assessment:    Geneva Cordova is a 37 y.o. year old female with medically complicated obesity pursuing Revision.    Weight loss surgery is deemed medically necessary given the following obesity related comorbidities including sleep apnea with current Weight: 125 kg (276 lb 8 oz) and Body mass index is 39.67 kg/m²..    Plan:  The consultation plan and program requirements were reviewed with the patient.  The patient has been advised that a letter of medical support must be obtained from her primary care physician or referring provider. A psychological evaluation will be arranged.  A nutritional evaluation will be performed.  The patient was advised to start a high protein and low carbohydrate diet.  Necessary lifestyle modifications were discussed.  Instructions on how to access ROSITA was given to the  patient.  ROSITA is an internet based educational video that explains the surgical procedure chosen and answers basic questions regarding that procedure.     Class 2 Severe Obesity (BMI >=35 and <=39.9). Obesity-related health conditions include the following: obstructive sleep apnea. Obesity is worsening. BMI is is above average; BMI management plan is completed. We discussed consulting a Bariatric surgeon.    We will proceed with upper GI as previously ordered followed by EGD with Dr. Hughes for further evaluation from a bariatric perspective. The risks and benefits of the upper endoscopy were discussed with the patient in detail and all questions were answered.  Possibility of perforation, bleeding, aspiration, and anesthesia reaction were reviewed.  Patient agrees to proceed.      Should patient be a revision candidate, further preoperative testing will include: CBC, CMP, Lipids, TSH, HgA1C, H.Pylori UBT, EKG, CXR    Additional preop clearances required prior to surgery: Cardiology and Pulmonary.      The patient has been educated on expected postoperative lifestyle changes, including commitment to high protein diet, vitamin regimen, and exercise program.  They are aware that support groups are encouraged for optimal weight loss results. Patient understands that bariatric surgery is not cosmetic surgery but rather a tool to help make a lifelong commitment to lifestyle changes including diet, exercise, behavior modifications, and healthy habits. The procedure was discussed with the patient and all questions were answered. The importance of avoiding ASA/ NSAIDS/ steroids/ tobacco/ hormones/ immunomodulators perioperatively was discussed.         Zenobia Leahy PA-C           I spent 45 minutes caring for Geneva on this date of service. This time includes time spent by me in the following activities: preparing for the visit, reviewing tests, obtaining and/or reviewing a separately obtained history, counseling and  educating the patient/family/caregiver, ordering medications, tests, or procedures and documenting information in the medical record.

## 2022-11-21 ENCOUNTER — OFFICE VISIT (OUTPATIENT)
Dept: INTERNAL MEDICINE | Facility: CLINIC | Age: 37
End: 2022-11-21

## 2022-11-21 VITALS
HEIGHT: 70 IN | HEART RATE: 68 BPM | DIASTOLIC BLOOD PRESSURE: 70 MMHG | SYSTOLIC BLOOD PRESSURE: 104 MMHG | RESPIRATION RATE: 18 BRPM | BODY MASS INDEX: 39.31 KG/M2 | TEMPERATURE: 97.5 F | OXYGEN SATURATION: 98 % | WEIGHT: 274.6 LBS

## 2022-11-21 DIAGNOSIS — E61.1 IRON DEFICIENCY: ICD-10-CM

## 2022-11-21 DIAGNOSIS — E53.8 B12 DEFICIENCY: ICD-10-CM

## 2022-11-21 DIAGNOSIS — E66.09 CLASS 2 OBESITY DUE TO EXCESS CALORIES WITHOUT SERIOUS COMORBIDITY WITH BODY MASS INDEX (BMI) OF 39.0 TO 39.9 IN ADULT: Primary | ICD-10-CM

## 2022-11-21 DIAGNOSIS — E55.9 VITAMIN D DEFICIENCY: ICD-10-CM

## 2022-11-21 DIAGNOSIS — K21.9 GERD WITHOUT ESOPHAGITIS: ICD-10-CM

## 2022-11-21 PROBLEM — E66.812 CLASS 2 OBESITY DUE TO EXCESS CALORIES WITHOUT SERIOUS COMORBIDITY WITH BODY MASS INDEX (BMI) OF 39.0 TO 39.9 IN ADULT: Status: ACTIVE | Noted: 2020-11-16

## 2022-11-21 PROCEDURE — 99214 OFFICE O/P EST MOD 30 MIN: CPT | Performed by: PHYSICIAN ASSISTANT

## 2022-11-21 PROCEDURE — 96372 THER/PROPH/DIAG INJ SC/IM: CPT | Performed by: PHYSICIAN ASSISTANT

## 2022-11-21 RX ORDER — CYANOCOBALAMIN 1000 UG/ML
1000 INJECTION, SOLUTION INTRAMUSCULAR; SUBCUTANEOUS
Status: SHIPPED | OUTPATIENT
Start: 2022-11-21

## 2022-11-21 RX ORDER — BUPROPION HYDROCHLORIDE 150 MG/1
150 TABLET, EXTENDED RELEASE ORAL 2 TIMES DAILY
Qty: 180 TABLET | Refills: 1 | Status: SHIPPED | OUTPATIENT
Start: 2022-11-21 | End: 2023-02-08

## 2022-11-21 RX ORDER — DOXYCYCLINE HYCLATE 50 MG/1
324 CAPSULE, GELATIN COATED ORAL 2 TIMES DAILY
Qty: 180 TABLET | Refills: 1 | Status: SHIPPED | OUTPATIENT
Start: 2022-11-21

## 2022-11-21 RX ORDER — PHENTERMINE HYDROCHLORIDE 37.5 MG/1
37.5 TABLET ORAL
Qty: 30 TABLET | Refills: 0 | Status: SHIPPED | OUTPATIENT
Start: 2022-11-21 | End: 2022-12-22 | Stop reason: SDUPTHER

## 2022-11-21 RX ADMIN — CYANOCOBALAMIN 1000 MCG: 1000 INJECTION, SOLUTION INTRAMUSCULAR; SUBCUTANEOUS at 08:37

## 2022-11-21 NOTE — ASSESSMENT & PLAN NOTE
Patient's (Body mass index is 39.4 kg/m².) indicates that they are morbidly obese (BMI > 40 or > 35 with obesity - related health condition) with health conditions that include GERD . Weight is improving with treatment. BMI is is above average; BMI management plan is completed. We discussed portion control and increasing exercise. Cont Adipex month 3/3 and Wellbutrin

## 2022-11-21 NOTE — PROGRESS NOTES
Chief Complaint  Weight Check (Down 9 pounds since last visit )    Subjective          History of Present Illness  Geneva Cordova presents to [facility] for   History of Present Illness  GERD:  She has hx of hiatal hernia. She is taking protonix 40mg BID but still having GERD sx and sometimes it feels like her food is getting stuck when she swallows. She did have EGD and they said she had bad GERD so she was to cont her PPI. Has swallow study on Dec 7. Is getting a scope soon with Bariatrics.     Anemia/vitamin def:  Taking B12, iron, and vitamin D as dir. Was found to be anemic after her recent breast surgery and we started on iron and b12. She has a hx of b12 def she thinks. Hx of gastric sleeve and had not been taking vitamins regularly but is now. Most recent labs showed low iron, b12, and vit D. We restarted Vit D weekly high dose.     Overweight:  Lost 9lb in the last month with starting Adipex and Wellbutrin. She feels like these meds have helped a lot with her weight loss.   She has a  that she sees 3 d a week, also does exercise on the other days. She has also started intermittent fasting. Her goal weight is 250. She used to be 500lbs prior to bariatric surgery.      Review of Systems   Constitutional: Negative for fever and unexpected weight loss.   Respiratory: Negative for cough, shortness of breath and wheezing.    Cardiovascular: Negative for chest pain and palpitations.   Gastrointestinal: Positive for GERD.       The following portions of the patient's history were reviewed and updated as appropriate: allergies, current medications, past family history, past medical history, past social history, past surgical history and problem list.  Allergies   Allergen Reactions   • Nsaids Other (See Comments)     HISTORY OF GASTRIC SLEEVE   • Codeine Other (See Comments)     UNKNOWN       Current Outpatient Medications:   •  B-D 3CC LUER-LYLY SYR 26OV3-9/2 23G X  "1-1/2\" 3 ML misc, AS DIRECTED DAILY WITH THIAMINE, Disp: , Rfl:   •  buPROPion SR (Wellbutrin SR) 150 MG 12 hr tablet, Take 1 tablet by mouth 2 (Two) Times a Day., Disp: 180 tablet, Rfl: 1  •  Cholecalciferol (Vitamin D) 50 MCG (2000 UT) tablet, Take 2,000 Units by mouth Daily., Disp: 90 tablet, Rfl: 1  •  cyclobenzaprine (FLEXERIL) 10 MG tablet, TAKE 1 TABLET BY MOUTH THREE TIMES DAILY AS NEEDED FOR MUSCLE SPASMS, Disp: 30 tablet, Rfl: 0  •  ferrous gluconate (FERGON) 324 MG tablet, Take 1 tablet by mouth 2 (Two) Times a Day., Disp: 180 tablet, Rfl: 1  •  metroNIDAZOLE (METROGEL) 0.75 % vaginal gel, Insert  into the vagina Every Night for 5 days., Disp: 70 g, Rfl: 0  •  Multiple Vitamins-Minerals (MULTIPLE VITAMINS/WOMENS) tablet, Take 1 tablet by mouth Daily., Disp: , Rfl:   •  ondansetron ODT (ZOFRAN-ODT) 4 MG disintegrating tablet, Place 1 tablet on the tongue Every 6 (Six) Hours As Needed for Nausea or Vomiting., Disp: 12 tablet, Rfl: 0  •  pantoprazole (PROTONIX) 40 MG EC tablet, Take 1 tablet by mouth 2 (Two) Times a Day., Disp: 60 tablet, Rfl: 1  •  phentermine (Adipex-P) 37.5 MG tablet, Take 1 tablet by mouth Every Morning Before Breakfast., Disp: 30 tablet, Rfl: 0  •  vitamin D (ERGOCALCIFEROL) 1.25 MG (47781 UT) capsule capsule, Take 1 capsule by mouth 1 (One) Time Per Week., Disp: 12 capsule, Rfl: 0    Current Facility-Administered Medications:   •  cyanocobalamin injection 1,000 mcg, 1,000 mcg, Intramuscular, Q28 Days, Alice Fuentes PA-C, 1,000 mcg at 11/21/22 0837  New Medications Ordered This Visit   Medications   • cyanocobalamin injection 1,000 mcg   • buPROPion SR (Wellbutrin SR) 150 MG 12 hr tablet     Sig: Take 1 tablet by mouth 2 (Two) Times a Day.     Dispense:  180 tablet     Refill:  1   • phentermine (Adipex-P) 37.5 MG tablet     Sig: Take 1 tablet by mouth Every Morning Before Breakfast.     Dispense:  30 tablet     Refill:  0   • ferrous gluconate (FERGON) 324 MG tablet     Sig: Take 1 " "tablet by mouth 2 (Two) Times a Day.     Dispense:  180 tablet     Refill:  1     Social History     Tobacco Use   Smoking Status Never   Smokeless Tobacco Never        Objective   Vital Signs:   Vitals:    11/21/22 0810   BP: 104/70   Pulse: 68   Resp: 18   Temp: 97.5 °F (36.4 °C)   TempSrc: Infrared   SpO2: 98%   Weight: 125 kg (274 lb 9.6 oz)   Height: 177.8 cm (70\")  Comment: pt reported      Physical Exam  Vitals reviewed.   Constitutional:       General: She is not in acute distress.     Appearance: Normal appearance. She is obese.   HENT:      Head: Normocephalic and atraumatic.   Eyes:      General: No scleral icterus.     Extraocular Movements: Extraocular movements intact.      Conjunctiva/sclera: Conjunctivae normal.   Cardiovascular:      Rate and Rhythm: Normal rate and regular rhythm.      Heart sounds: Normal heart sounds. No murmur heard.  Pulmonary:      Effort: Pulmonary effort is normal. No respiratory distress.      Breath sounds: Normal breath sounds. No stridor. No wheezing or rhonchi.   Musculoskeletal:      Cervical back: Normal range of motion and neck supple.   Skin:     General: Skin is warm and dry.      Coloration: Skin is not jaundiced.   Neurological:      General: No focal deficit present.      Mental Status: She is alert and oriented to person, place, and time.      Gait: Gait normal.   Psychiatric:         Mood and Affect: Mood normal.         Behavior: Behavior normal.        No LMP recorded. Patient has had an ablation.    Result Review :                   Assessment and Plan    Diagnoses and all orders for this visit:    1. Class 2 obesity due to excess calories without serious comorbidity with body mass index (BMI) of 39.0 to 39.9 in adult (Primary)  Assessment & Plan:  Patient's (Body mass index is 39.4 kg/m².) indicates that they are morbidly obese (BMI > 40 or > 35 with obesity - related health condition) with health conditions that include GERD . Weight is improving with " treatment. BMI is is above average; BMI management plan is completed. We discussed portion control and increasing exercise. Cont Adipex month 3/3 and Wellbutrin    Orders:  -     buPROPion SR (Wellbutrin SR) 150 MG 12 hr tablet; Take 1 tablet by mouth 2 (Two) Times a Day.  Dispense: 180 tablet; Refill: 1  -     phentermine (Adipex-P) 37.5 MG tablet; Take 1 tablet by mouth Every Morning Before Breakfast.  Dispense: 30 tablet; Refill: 0    2. B12 deficiency  Assessment & Plan:  Daily b12 has not increased levels significantly, will start B12 shots monthly    Orders:  -     cyanocobalamin injection 1,000 mcg    3. Vitamin D deficiency  Assessment & Plan:  Finish 12 wk of weekly vit D high dose then switch to daily 2000u      4. GERD without esophagitis  Assessment & Plan:  Cont Protonix BID at this time. F/u with GI      5. Iron deficiency  Assessment & Plan:  Suggested to increase iron to BID dosing, take with vit C.     Orders:  -     ferrous gluconate (FERGON) 324 MG tablet; Take 1 tablet by mouth 2 (Two) Times a Day.  Dispense: 180 tablet; Refill: 1      Follow Up   Return in about 4 weeks (around 12/19/2022) for obesity.    Follow up if symptoms worsen or persist or has new or concerning symptoms, go to ER for severe symptoms.   Reviewed common medication effects and side effects and advised to report side effects immediately.  Encouraged medication compliance and the importance of keeping scheduled follow up appointments with me and any other providers.  If a referral was made please contact our office if you have not heard about an appointment in the next 2 weeks.   If labs or images are ordered we will contact you with the results within the next week.  If you have not heard from us after a week please call our office to inquire about the results.   Patient was given instructions and counseling regarding her condition or for health maintenance advice. Please see specific information pulled into the AVS if  appropriate.     Alice Fuentes PA-C    * Please note that portions of this note were completed with a voice recognition program.

## 2022-12-07 ENCOUNTER — APPOINTMENT (OUTPATIENT)
Dept: GENERAL RADIOLOGY | Facility: HOSPITAL | Age: 37
End: 2022-12-07

## 2022-12-14 ENCOUNTER — HOSPITAL ENCOUNTER (OUTPATIENT)
Dept: GENERAL RADIOLOGY | Facility: HOSPITAL | Age: 37
Discharge: HOME | End: 2022-12-14
Admitting: PHYSICIAN ASSISTANT

## 2022-12-14 DIAGNOSIS — R10.13 DYSPEPSIA: ICD-10-CM

## 2022-12-14 PROCEDURE — 74240 X-RAY XM UPR GI TRC 1CNTRST: CPT

## 2022-12-14 RX ADMIN — BARIUM SULFATE 100 ML: 960 POWDER, FOR SUSPENSION ORAL at 09:50

## 2022-12-22 ENCOUNTER — OFFICE VISIT (OUTPATIENT)
Dept: INTERNAL MEDICINE | Facility: CLINIC | Age: 37
End: 2022-12-22

## 2022-12-22 VITALS
SYSTOLIC BLOOD PRESSURE: 118 MMHG | OXYGEN SATURATION: 97 % | WEIGHT: 274.4 LBS | HEIGHT: 70 IN | HEART RATE: 70 BPM | TEMPERATURE: 97.7 F | BODY MASS INDEX: 39.28 KG/M2 | DIASTOLIC BLOOD PRESSURE: 72 MMHG | RESPIRATION RATE: 18 BRPM

## 2022-12-22 DIAGNOSIS — Z79.899 HIGH RISK MEDICATION USE: ICD-10-CM

## 2022-12-22 DIAGNOSIS — K21.9 GERD WITHOUT ESOPHAGITIS: Primary | ICD-10-CM

## 2022-12-22 DIAGNOSIS — E66.09 CLASS 2 OBESITY DUE TO EXCESS CALORIES WITHOUT SERIOUS COMORBIDITY WITH BODY MASS INDEX (BMI) OF 39.0 TO 39.9 IN ADULT: ICD-10-CM

## 2022-12-22 DIAGNOSIS — E53.8 B12 DEFICIENCY: ICD-10-CM

## 2022-12-22 PROCEDURE — 99214 OFFICE O/P EST MOD 30 MIN: CPT | Performed by: PHYSICIAN ASSISTANT

## 2022-12-22 PROCEDURE — 96372 THER/PROPH/DIAG INJ SC/IM: CPT | Performed by: PHYSICIAN ASSISTANT

## 2022-12-22 RX ORDER — PHENTERMINE HYDROCHLORIDE 37.5 MG/1
37.5 TABLET ORAL
Qty: 30 TABLET | Refills: 0 | Status: SHIPPED | OUTPATIENT
Start: 2022-12-22 | End: 2023-03-28

## 2022-12-22 RX ORDER — PANTOPRAZOLE SODIUM 40 MG/1
40 TABLET, DELAYED RELEASE ORAL 2 TIMES DAILY
Qty: 60 TABLET | Refills: 1 | Status: SHIPPED | OUTPATIENT
Start: 2022-12-22 | End: 2023-02-08

## 2022-12-22 RX ADMIN — CYANOCOBALAMIN 1000 MCG: 1000 INJECTION, SOLUTION INTRAMUSCULAR; SUBCUTANEOUS at 08:35

## 2022-12-22 NOTE — PROGRESS NOTES
Chief Complaint  Weight Check    Subjective          History of Present Illness  Geneva Cordova presents to [facility] for   History of Present Illness  GERD:  She has small hiatal hernia on recent scope, hopeful that bariatrics will be able to fix this if they do revision. She is taking protonix 40mg BID but still having GERD sx and sometimes it feels like her food is getting stuck when she swallows. She did have EGD and they said she had bad GERD so she was to cont her PPI.     Anemia/vitamin def:  Taking B12, iron, and vitamin D as dir. Was found to be anemic after her recent breast surgery and we started on iron and b12. She has a hx of b12 def she thinks. Hx of gastric sleeve and had not been taking vitamins regularly but is now. Most recent labs showed low iron, b12, and vit D. We restarted Vit D weekly high dose and she is getting B12 injections.    Overweight:  Lost 9lb in the last few months with starting Adipex and wellbutrin. She feels like these meds have helped a lot with her weight loss.   She has a  that she sees 3 d a week, also does exercise on the other days. She has also started intermittent fasting. Her goal weight is 250. She used to be 500lbs prior to bariatric surgery.  Struggles with calorie counting and reading the labels at the store. Hard to eat healthy all the time, not sure what options are healthiest. Hard to find things w/o high fructose corn syrup.   If she sticks with the diet she is seeing good results. Doing well with not eating out and avoiding pop. Hard to stop her orange hi-C.         The following portions of the patient's history were reviewed and updated as appropriate: allergies, current medications, past family history, past medical history, past social history, past surgical history and problem list.  Allergies   Allergen Reactions   • Nsaids Other (See Comments)     HISTORY OF GASTRIC SLEEVE   • Codeine Other (See Comments)     " UNKNOWN       Current Outpatient Medications:   •  buPROPion SR (Wellbutrin SR) 150 MG 12 hr tablet, Take 1 tablet by mouth 2 (Two) Times a Day., Disp: 180 tablet, Rfl: 1  •  Cholecalciferol (Vitamin D) 50 MCG (2000 UT) tablet, Take 2,000 Units by mouth Daily., Disp: 90 tablet, Rfl: 1  •  cyclobenzaprine (FLEXERIL) 10 MG tablet, TAKE 1 TABLET BY MOUTH THREE TIMES DAILY AS NEEDED FOR MUSCLE SPASMS, Disp: 30 tablet, Rfl: 0  •  ferrous gluconate (FERGON) 324 MG tablet, Take 1 tablet by mouth 2 (Two) Times a Day., Disp: 180 tablet, Rfl: 1  •  Multiple Vitamins-Minerals (MULTIPLE VITAMINS/WOMENS) tablet, Take 1 tablet by mouth Daily., Disp: , Rfl:   •  ondansetron ODT (ZOFRAN-ODT) 4 MG disintegrating tablet, Place 1 tablet on the tongue Every 6 (Six) Hours As Needed for Nausea or Vomiting., Disp: 12 tablet, Rfl: 0  •  pantoprazole (PROTONIX) 40 MG EC tablet, Take 1 tablet by mouth 2 (Two) Times a Day., Disp: 60 tablet, Rfl: 1  •  phentermine (Adipex-P) 37.5 MG tablet, Take 1 tablet by mouth Every Morning Before Breakfast., Disp: 30 tablet, Rfl: 0  •  vitamin D (ERGOCALCIFEROL) 1.25 MG (60542 UT) capsule capsule, Take 1 capsule by mouth 1 (One) Time Per Week., Disp: 12 capsule, Rfl: 0  •  B-D 3CC LUER-LYLY SYR 30KC8-8/2 23G X 1-1/2\" 3 ML misc, AS DIRECTED DAILY WITH THIAMINE, Disp: , Rfl:     Current Facility-Administered Medications:   •  cyanocobalamin injection 1,000 mcg, 1,000 mcg, Intramuscular, Q28 Days, Alice Fuentes PA-C, 1,000 mcg at 12/22/22 0835  No orders of the defined types were placed in this encounter.    Social History     Tobacco Use   Smoking Status Never   Smokeless Tobacco Never        Objective   Vital Signs:   Vitals:    12/22/22 0818   BP: 118/72   BP Location: Left arm   Patient Position: Sitting   Cuff Size: Large Adult   Pulse: 70   Resp: 18   Temp: 97.7 °F (36.5 °C)   TempSrc: Temporal   SpO2: 97%   Weight: 124 kg (274 lb 6.4 oz)   Height: 177.8 cm (70\")      Physical Exam  Vitals reviewed. "   Constitutional:       General: She is not in acute distress.     Appearance: Normal appearance. She is obese.   HENT:      Head: Normocephalic and atraumatic.   Eyes:      General: No scleral icterus.     Extraocular Movements: Extraocular movements intact.      Conjunctiva/sclera: Conjunctivae normal.   Cardiovascular:      Rate and Rhythm: Normal rate and regular rhythm.      Heart sounds: Normal heart sounds. No murmur heard.  Pulmonary:      Effort: Pulmonary effort is normal. No respiratory distress.      Breath sounds: Normal breath sounds. No stridor. No wheezing or rhonchi.   Musculoskeletal:      Cervical back: Normal range of motion and neck supple.   Skin:     General: Skin is warm and dry.      Coloration: Skin is not jaundiced.   Neurological:      General: No focal deficit present.      Mental Status: She is alert and oriented to person, place, and time.      Gait: Gait normal.   Psychiatric:         Mood and Affect: Mood normal.         Behavior: Behavior normal.        No LMP recorded. Patient has had an ablation.    Result Review :                   Assessment and Plan    Diagnoses and all orders for this visit:    1. GERD without esophagitis (Primary)  Assessment & Plan:  Chronic, improving with treatment, cont protonix bid.       2. B12 deficiency  Assessment & Plan:  B12 shot today, cont b12 inj monthly      3. Class 2 obesity due to excess calories without serious comorbidity with body mass index (BMI) of 39.0 to 39.9 in adult  Assessment & Plan:  Patient's (Body mass index is 39.37 kg/m².) indicates that they are morbidly obese (BMI > 40 or > 35 with obesity - related health condition) with health conditions that include GERD . Weight is improving with treatment. BMI is is above average; BMI management plan is completed. We discussed portion control and increasing exercise. Cont Adipex and Wellbutrin, will rx Adipex one more month due to pt reporting her first rx was lost so has only taken 2  months of the med.      4. High risk medication use  Assessment & Plan:  UDS/CSA: 8/17/22  Adam: 10/17/22        Follow Up   Return in about 4 weeks (around 1/19/2023) for weight visit.    Follow up if symptoms worsen or persist or has new or concerning symptoms, go to ER for severe symptoms.   Reviewed common medication effects and side effects and advised to report side effects immediately.  Encouraged medication compliance and the importance of keeping scheduled follow up appointments with me and any other providers.  If a referral was made please contact our office if you have not heard about an appointment in the next 2 weeks.   If labs or images are ordered we will contact you with the results within the next week.  If you have not heard from us after a week please call our office to inquire about the results.   Patient was given instructions and counseling regarding her condition or for health maintenance advice. Please see specific information pulled into the AVS if appropriate.     Alice Fuentes PA-C    * Please note that portions of this note were completed with a voice recognition program.

## 2022-12-22 NOTE — ASSESSMENT & PLAN NOTE
Patient's (Body mass index is 39.37 kg/m².) indicates that they are morbidly obese (BMI > 40 or > 35 with obesity - related health condition) with health conditions that include GERD . Weight is improving with treatment. BMI is is above average; BMI management plan is completed. We discussed portion control and increasing exercise. Cont Adipex and Wellbutrin, will rx Adipex one more month due to pt reporting her first rx was lost so has only taken 2 months of the med.

## 2023-01-04 ENCOUNTER — TELEMEDICINE (OUTPATIENT)
Dept: BARIATRICS/WEIGHT MGMT | Facility: CLINIC | Age: 38
End: 2023-01-04
Payer: COMMERCIAL

## 2023-01-04 VITALS — BODY MASS INDEX: 39.08 KG/M2 | WEIGHT: 273 LBS | HEIGHT: 70 IN

## 2023-01-04 DIAGNOSIS — K21.9 GERD WITHOUT ESOPHAGITIS: Primary | ICD-10-CM

## 2023-01-04 PROCEDURE — 1160F RVW MEDS BY RX/DR IN RCRD: CPT | Performed by: PHYSICIAN ASSISTANT

## 2023-01-04 PROCEDURE — 99214 OFFICE O/P EST MOD 30 MIN: CPT | Performed by: PHYSICIAN ASSISTANT

## 2023-01-04 PROCEDURE — 1159F MED LIST DOCD IN RCRD: CPT | Performed by: PHYSICIAN ASSISTANT

## 2023-01-04 NOTE — PROGRESS NOTES
[facility]  [address]  [address]  [facility] [id number] [phone]        Patient Name:  Geneva Cordova.  :  1985      Date of Visit: 2023      Reason for Visit:   uncontrolled reflux      HPI: Geneva Cordova is a 37 y.o. female w/ hx LSG/paraHHR  GDW, s/p LSG revision 20 w/ Dr. Mariscal (complicated by post op stricture), pursuing SIPS.      c/o \"really bad acid reflux\" for the last year, worsening the past couple months, despite taking BID PPI.  Says waking almost nightly w/ burning reflux issues - not getting enough sleep.  Having headaches, fatigue and nausea.  Associated globus sensation but denies abd pain.  No vomiting.  No fevers/chills.  Stays constipated since having LSG - bowels move q 7-10 days, usually takes a laxative.        EGD 21 w/ Dr. Mariscal revealed sliding HH and slight stricture at incisura w/ pyloric spasm, both dilated to 20mm.  Advised repeat endoscopy/dilatation w/ Dr. Galvan, but patient cancelled appt (was having night terrors r/t anesthesia).       EGD w/ Dr. La 22 with findings of LA grade a esophagitis with no bleeding in the lower third of the esophagus.  Erosions noted.  Diffuse mild inflammation characterized by erythema in the gastric antrum and the gastric body.  Second part of the duodenum was normal.    UGI 22 @[facility] - no postop stricture seen, moderate reflux, small type III hiatal hernia.      Presurgery weight: 343 pounds.  Today's weight is 124 kg (273 lb) pounds, today's  Body mass index is 39.17 kg/m²., and weight loss since surgery is 70 pounds.      Past Medical History:   Diagnosis Date   • Anemia    • Anxiety    • Bilateral lower extremity edema     R>L   • Boil, axilla     recurrent   • Chronic back pain     no meds or injections, feels related to breasts   • Concussion with no loss of consciousness 2022   • COVID-19 2020   • Depression    • Dyspepsia    • Dyspnea on  exertion    • Fatigue    • Glucose intolerance (impaired glucose tolerance)    • Helicobacter pylori (H. pylori) infection     asx and grossly nl EGD. \"abundant\"on path. HBT still + after PrevPak tx. LSG path neg. neg testing 2018   • Hypoalbuminemia    • Left Ovarian serous cystadenoma -removed at  section 2017   • Migraine     resolved with weightloss   • Obesity, morbid (HCC)    • VIRA (obstructive sleep apnea)     improved since WLS   • PCOS (polycystic ovarian syndrome)    • Right Essure implantation 2017 10/06/2017   • S/P  section;left ovarian cystectomy and partial salpingectomy 2017   • Seasonal allergies      Past Surgical History:   Procedure Laterality Date   • ABDOMINOPLASTY  2022   • BILATERAL BREAST REDUCTION     •  SECTION N/A 2017      SECTION PRIMARY;  CYSTECTOMY; Kwadwo Baxter MD; :  [facility];  Service:    • DILATATION AND CURETTAGE     • ENDOMETRIAL ABLATION  2020    Dr Cuevas   • ENDOSCOPY N/A 2020    Procedure: ESOPHAGOGASTRODUODENOSCOPY;  Surgeon: Natalia Mariscal MD;  Location:  [facility];  Service: Bariatric;  Laterality: N/A;   • ENDOSCOPY  2022    Dr. La   • ESSURE TUBAL LIGATION     • GASTRIC SLEEVE LAPAROSCOPIC  2015    With Dr. Hughes   • GASTRIC SLEEVE LAPAROSCOPIC N/A 2020    Procedure: GASTRIC SLEEVE LAPAROSCOPIC;  Surgeon: Natalia Mariscal MD;  Location:  [facility];  Service: Bariatric;  Laterality: N/A;   • LAPAROSCOPIC CHOLECYSTECTOMY  2018    Dr. Hughes   • SALPINGECTOMY Bilateral 2020    Dr Cuevas   • TONSILLECTOMY     • WISDOM TOOTH EXTRACTION       Outpatient Medications Marked as Taking for the 23 encounter (Telemedicine) with Carmelita Bell PA   Medication Sig Dispense Refill   • buPROPion SR (Wellbutrin SR) 150 MG 12 hr tablet Take 1 tablet by mouth 2 (Two) Times a Day. 180 tablet 1   • Cholecalciferol (Vitamin D) 50  MCG (2000 UT) tablet Take 2,000 Units by mouth Daily. 90 tablet 1   • cyclobenzaprine (FLEXERIL) 10 MG tablet TAKE 1 TABLET BY MOUTH THREE TIMES DAILY AS NEEDED FOR MUSCLE SPASMS 30 tablet 0   • ferrous gluconate (FERGON) 324 MG tablet Take 1 tablet by mouth 2 (Two) Times a Day. 180 tablet 1   • Multiple Vitamins-Minerals (MULTIPLE VITAMINS/WOMENS) tablet Take 1 tablet by mouth Daily.     • pantoprazole (PROTONIX) 40 MG EC tablet Take 1 tablet by mouth 2 (Two) Times a Day. 60 tablet 1   • phentermine (Adipex-P) 37.5 MG tablet Take 1 tablet by mouth Every Morning Before Breakfast. 30 tablet 0     Current Facility-Administered Medications for the 1/4/23 encounter (Telemedicine) with Carmelita Bell PA   Medication Dose Route Frequency Provider Last Rate Last Admin   • cyanocobalamin injection 1,000 mcg  1,000 mcg Intramuscular Q28 Days Alice Fuentes PA-C   1,000 mcg at 12/22/22 0835       Allergies   Allergen Reactions   • Codeine Other (See Comments)     UNKNOWN       Social History     Socioeconomic History   • Marital status: Single   • Number of children: 4   • Years of education: College    Tobacco Use   • Smoking status: Never   • Smokeless tobacco: Never   Vaping Use   • Vaping Use: Never used   Substance and Sexual Activity   • Alcohol use: Not Currently   • Drug use: No   • Sexual activity: Yes     Partners: Male     Birth control/protection: Surgical     Comment: no OCP       Ht 177.8 cm (70\")   Wt 124 kg (273 lb)   BMI 39.17 kg/m²     Physical Exam  Constitutional:       General: She is not in acute distress.     Appearance: She is well-developed.   Eyes:      General: No scleral icterus.  Pulmonary:      Effort: Pulmonary effort is normal.   Neurological:      Mental Status: She is alert and oriented to person, place, and time.           Assessment:  hx LSG/paraHHR 2015 GDW, s/p LSG revision 12/1/20 PNQ (w/ postop stricture, last dilated 2/2021), now w/ uncontrolled reflux, pursuing SIPS.       ICD-10-CM ICD-9-CM   1. GERD without esophagitis  K21.9 530.81         Plan:  EGD @[facility] w/ Dr. Hughes for further eval.  Avoid phentermine x 2 wks prior.  Additional input to follow.  Call w/ problems/concerns.           YAMILET Elmore      Note: This was an audio and video enabled telemedicine encounter conducted via Zoom.  Patient is located in [place].  Provider is at her office.  Consent was obtained prior to the visit.

## 2023-01-16 ENCOUNTER — OUTSIDE FACILITY SERVICE (OUTPATIENT)
Dept: BARIATRICS/WEIGHT MGMT | Facility: CLINIC | Age: 38
End: 2023-01-16
Payer: COMMERCIAL

## 2023-01-16 PROCEDURE — 43239 EGD BIOPSY SINGLE/MULTIPLE: CPT | Performed by: SURGERY

## 2023-01-17 ENCOUNTER — TELEPHONE (OUTPATIENT)
Dept: BARIATRICS/WEIGHT MGMT | Facility: CLINIC | Age: 38
End: 2023-01-17
Payer: COMMERCIAL

## 2023-01-17 NOTE — TELEPHONE ENCOUNTER
----- Message from Erik Hughes MD sent at 1/16/2023  9:58 AM EST -----    Please have her see me in the office to discuss options. Thanks!

## 2023-01-19 ENCOUNTER — OFFICE VISIT (OUTPATIENT)
Dept: INTERNAL MEDICINE | Facility: CLINIC | Age: 38
End: 2023-01-19
Payer: COMMERCIAL

## 2023-01-19 VITALS
HEART RATE: 72 BPM | DIASTOLIC BLOOD PRESSURE: 80 MMHG | TEMPERATURE: 96.8 F | WEIGHT: 275.6 LBS | HEIGHT: 70 IN | OXYGEN SATURATION: 98 % | SYSTOLIC BLOOD PRESSURE: 126 MMHG | BODY MASS INDEX: 39.46 KG/M2

## 2023-01-19 DIAGNOSIS — E88.81 INSULIN RESISTANCE: ICD-10-CM

## 2023-01-19 DIAGNOSIS — E66.09 CLASS 2 OBESITY DUE TO EXCESS CALORIES WITHOUT SERIOUS COMORBIDITY WITH BODY MASS INDEX (BMI) OF 39.0 TO 39.9 IN ADULT: Primary | ICD-10-CM

## 2023-01-19 DIAGNOSIS — K21.9 GERD WITHOUT ESOPHAGITIS: ICD-10-CM

## 2023-01-19 DIAGNOSIS — E53.8 B12 DEFICIENCY: ICD-10-CM

## 2023-01-19 DIAGNOSIS — G43.919 INTRACTABLE MIGRAINE WITHOUT STATUS MIGRAINOSUS, UNSPECIFIED MIGRAINE TYPE: ICD-10-CM

## 2023-01-19 PROCEDURE — 96372 THER/PROPH/DIAG INJ SC/IM: CPT | Performed by: PHYSICIAN ASSISTANT

## 2023-01-19 PROCEDURE — 99214 OFFICE O/P EST MOD 30 MIN: CPT | Performed by: PHYSICIAN ASSISTANT

## 2023-01-19 RX ORDER — FLUCONAZOLE 150 MG/1
150 TABLET ORAL DAILY
COMMUNITY
Start: 2023-01-09 | End: 2023-02-10

## 2023-01-19 RX ORDER — TOPIRAMATE 25 MG/1
25 TABLET ORAL 2 TIMES DAILY
Qty: 60 TABLET | Refills: 0 | Status: SHIPPED | OUTPATIENT
Start: 2023-01-19 | End: 2023-03-28

## 2023-01-19 RX ORDER — IBUPROFEN 800 MG/1
800 TABLET ORAL EVERY 6 HOURS PRN
COMMUNITY
Start: 2023-01-09 | End: 2023-02-08

## 2023-01-19 RX ADMIN — CYANOCOBALAMIN 1000 MCG: 1000 INJECTION, SOLUTION INTRAMUSCULAR; SUBCUTANEOUS at 09:51

## 2023-01-19 NOTE — PROGRESS NOTES
Chief Complaint  Weight Check and Heartburn    Subjective        History of Present Illness  Geneva Cordova presents to [facility] for   History of Present Illness  Anemia/vitamin def:  Taking B12, iron, and vitamin D as dir. Was found to be anemic after her recent breast surgery and we started on iron and b12. She has a hx of b12 def she thinks. Hx of gastric sleeve and had not been taking vitamins regularly but is now. Most recent labs showed low iron, b12, and vit D. We restarted Vit D weekly high dose and she is getting B12 injections.    Overweight:  Lost wt initially with adipex but has not been taking it the last few weeks. She has been on it now for 3 months and would like to try another weight loss med. Does not think Wellbutrin is doing anything on its own and would like tos top it. Tried and failed Metformin previously. She wants to try a GLP1 for weight loss. Hx of insulin resistance but no hx of DMII or predm.   She is doing weight loss visits for bariatrics to get a revision. This is visit 5/6.  She has a  that she sees 3 d a week, also does exercise on the other days. She has also started intermittent fasting. Her goal weight is 250. She used to be 500lbs prior to previous bariatric surgery.  Struggles with calorie counting and reading the labels at the store. Hard to eat healthy all the time, not sure what options are healthiest. Hard to find things w/o high fructose corn syrup.   If she sticks with the diet she is seeing good results. Doing well with not eating out and avoiding pop. Hard to stop her orange hi-C.  Lately has not found as much time to exercise. And has not been taking the Adipex regularly. Has also not been following her diet as well as she used to.  Tried and failed metformin previously.   Is wanting to stop wellbutrin           The following portions of the patient's history were reviewed and updated as appropriate: allergies, current  "medications, past family history, past medical history, past social history, past surgical history and problem list.  Allergies   Allergen Reactions   • Codeine Other (See Comments)     UNKNOWN       Current Outpatient Medications:   •  B-D 3CC LUER-LYLY SYR 56GM7-5/2 23G X 1-1/2\" 3 ML misc, AS DIRECTED DAILY WITH THIAMINE, Disp: , Rfl:   •  buPROPion SR (Wellbutrin SR) 150 MG 12 hr tablet, Take 1 tablet by mouth 2 (Two) Times a Day., Disp: 180 tablet, Rfl: 1  •  Cholecalciferol (Vitamin D) 50 MCG (2000 UT) tablet, Take 2,000 Units by mouth Daily., Disp: 90 tablet, Rfl: 1  •  cyclobenzaprine (FLEXERIL) 10 MG tablet, TAKE 1 TABLET BY MOUTH THREE TIMES DAILY AS NEEDED FOR MUSCLE SPASMS, Disp: 30 tablet, Rfl: 0  •  ferrous gluconate (FERGON) 324 MG tablet, Take 1 tablet by mouth 2 (Two) Times a Day., Disp: 180 tablet, Rfl: 1  •  ibuprofen (ADVIL,MOTRIN) 800 MG tablet, Take 800 mg by mouth Every 6 (Six) Hours As Needed. for pain, Disp: , Rfl:   •  Multiple Vitamins-Minerals (MULTIPLE VITAMINS/WOMENS) tablet, Take 1 tablet by mouth Daily., Disp: , Rfl:   •  ondansetron ODT (ZOFRAN-ODT) 4 MG disintegrating tablet, Place 1 tablet on the tongue Every 6 (Six) Hours As Needed for Nausea or Vomiting., Disp: 12 tablet, Rfl: 0  •  pantoprazole (PROTONIX) 40 MG EC tablet, Take 1 tablet by mouth 2 (Two) Times a Day., Disp: 60 tablet, Rfl: 1  •  phentermine (Adipex-P) 37.5 MG tablet, Take 1 tablet by mouth Every Morning Before Breakfast., Disp: 30 tablet, Rfl: 0  •  fluconazole (DIFLUCAN) 150 MG tablet, Take 150 mg by mouth Daily., Disp: , Rfl:   •  Semaglutide-Weight Management 0.25 MG/0.5ML solution auto-injector, Inject 0.25 mg under the skin into the appropriate area as directed Every 7 (Seven) Days., Disp: 2 mL, Rfl: 0  •  topiramate (Topamax) 25 MG tablet, Take 1 tablet by mouth 2 (Two) Times a Day., Disp: 60 tablet, Rfl: 0    Current Facility-Administered Medications:   •  cyanocobalamin injection 1,000 mcg, 1,000 mcg, " "Intramuscular, Q28 Days, Alice Fuentes PA-C, 1,000 mcg at 01/19/23 0951  New Medications Ordered This Visit   Medications   • topiramate (Topamax) 25 MG tablet     Sig: Take 1 tablet by mouth 2 (Two) Times a Day.     Dispense:  60 tablet     Refill:  0   • Semaglutide-Weight Management 0.25 MG/0.5ML solution auto-injector     Sig: Inject 0.25 mg under the skin into the appropriate area as directed Every 7 (Seven) Days.     Dispense:  2 mL     Refill:  0     Social History     Tobacco Use   Smoking Status Never   Smokeless Tobacco Never        Objective   Vital Signs:   Vitals:    01/19/23 0822   BP: 126/80   BP Location: Left arm   Patient Position: Sitting   Cuff Size: Adult   Pulse: 72   Temp: 96.8 °F (36 °C)   TempSrc: Infrared   SpO2: 98%   Weight: 125 kg (275 lb 9.6 oz)   Height: 177.8 cm (70\")   PainSc: 0-No pain      Physical Exam  Vitals reviewed.   Constitutional:       General: She is not in acute distress.     Appearance: Normal appearance. She is obese.   HENT:      Head: Normocephalic and atraumatic.   Eyes:      General: No scleral icterus.     Extraocular Movements: Extraocular movements intact.      Conjunctiva/sclera: Conjunctivae normal.   Cardiovascular:      Rate and Rhythm: Normal rate and regular rhythm.      Heart sounds: Normal heart sounds. No murmur heard.  Pulmonary:      Effort: Pulmonary effort is normal. No respiratory distress.      Breath sounds: Normal breath sounds. No stridor. No wheezing or rhonchi.   Musculoskeletal:      Cervical back: Normal range of motion and neck supple.   Skin:     General: Skin is warm and dry.      Coloration: Skin is not jaundiced.   Neurological:      General: No focal deficit present.      Mental Status: She is alert and oriented to person, place, and time.      Gait: Gait normal.   Psychiatric:         Mood and Affect: Mood normal.         Behavior: Behavior normal.        No LMP recorded. Patient has had an ablation.    Result Review :            "        Assessment and Plan    Diagnoses and all orders for this visit:    1. Class 2 obesity due to excess calories without serious comorbidity with body mass index (BMI) of 39.0 to 39.9 in adult (Primary)  Assessment & Plan:  Patient's (Body mass index is 39.54 kg/m².) indicates that they are morbidly/severely obese (BMI > 40 or > 35 with obesity - related health condition) with health conditions that include GERD . Weight is unchanged. BMI is is above average; BMI management plan is completed. We discussed portion control and increasing exercise. F/u with Bariatrics as dir. Will d/c wellbutrin. Start topamax, will rx Wegovy/Ozempic but I do not think this will be covered on ins without DMII dx (will try hx of insulin resistance)    Orders:  -     topiramate (Topamax) 25 MG tablet; Take 1 tablet by mouth 2 (Two) Times a Day.  Dispense: 60 tablet; Refill: 0  -     Semaglutide-Weight Management 0.25 MG/0.5ML solution auto-injector; Inject 0.25 mg under the skin into the appropriate area as directed Every 7 (Seven) Days.  Dispense: 2 mL; Refill: 0    2. Insulin resistance  -     Semaglutide-Weight Management 0.25 MG/0.5ML solution auto-injector; Inject 0.25 mg under the skin into the appropriate area as directed Every 7 (Seven) Days.  Dispense: 2 mL; Refill: 0    3. B12 deficiency  Assessment & Plan:  B12 shot today, cont monthly B12 inj      4. Intractable migraine without status migrainosus, unspecified migraine type  Assessment & Plan:  Will start Topamax         5. GERD without esophagitis  Assessment & Plan:  Chronic, improving with treatment, cont Protonix, adv to take BID as prescribed.        Follow Up   Return in about 4 weeks (around 2/16/2023) for weight check for bariatrics.    Follow up if symptoms worsen or persist or has new or concerning symptoms, go to ER for severe symptoms.   Reviewed common medication effects and side effects and advised to report side effects immediately.  Encouraged medication  compliance and the importance of keeping scheduled follow up appointments with me and any other providers.  If a referral was made please contact our office if you have not heard about an appointment in the next 2 weeks.   If labs or images are ordered we will contact you with the results within the next week.  If you have not heard from us after a week please call our office to inquire about the results.   Patient was given instructions and counseling regarding her condition or for health maintenance advice. Please see specific information pulled into the AVS if appropriate.     Alice Fuentes PA-C    * Please note that portions of this note were completed with a voice recognition program.

## 2023-01-20 NOTE — ASSESSMENT & PLAN NOTE
Patient's (Body mass index is 39.54 kg/m².) indicates that they are morbidly/severely obese (BMI > 40 or > 35 with obesity - related health condition) with health conditions that include GERD . Weight is unchanged. BMI is is above average; BMI management plan is completed. We discussed portion control and increasing exercise. F/u with Bariatrics as dir. Will d/c wellbutrin. Start topamax, will rx Wegovy/Ozempic but I do not think this will be covered on ins without DMII dx (will try hx of insulin resistance)

## 2023-01-24 ENCOUNTER — CONSULT (OUTPATIENT)
Dept: BARIATRICS/WEIGHT MGMT | Facility: CLINIC | Age: 38
End: 2023-01-24
Payer: COMMERCIAL

## 2023-01-24 VITALS
WEIGHT: 276.5 LBS | RESPIRATION RATE: 16 BRPM | HEIGHT: 70 IN | HEART RATE: 82 BPM | SYSTOLIC BLOOD PRESSURE: 120 MMHG | OXYGEN SATURATION: 99 % | DIASTOLIC BLOOD PRESSURE: 78 MMHG | TEMPERATURE: 97.8 F | BODY MASS INDEX: 39.58 KG/M2

## 2023-01-24 DIAGNOSIS — E28.2 PCOS (POLYCYSTIC OVARIAN SYNDROME): ICD-10-CM

## 2023-01-24 DIAGNOSIS — E55.9 HYPOVITAMINOSIS D: ICD-10-CM

## 2023-01-24 DIAGNOSIS — G47.33 OSA (OBSTRUCTIVE SLEEP APNEA): ICD-10-CM

## 2023-01-24 DIAGNOSIS — F41.9 ANXIETY: ICD-10-CM

## 2023-01-24 DIAGNOSIS — K44.9 GASTROESOPHAGEAL REFLUX DISEASE CONCURRENT WITH AND DUE TO PARAESOPHAGEAL HERNIA: Primary | ICD-10-CM

## 2023-01-24 DIAGNOSIS — K21.9 GASTROESOPHAGEAL REFLUX DISEASE CONCURRENT WITH AND DUE TO PARAESOPHAGEAL HERNIA: Primary | ICD-10-CM

## 2023-01-24 PROCEDURE — 99214 OFFICE O/P EST MOD 30 MIN: CPT | Performed by: SURGERY

## 2023-01-24 RX ORDER — ERGOCALCIFEROL 1.25 MG/1
50000 CAPSULE ORAL WEEKLY
COMMUNITY
Start: 2023-01-19 | End: 2023-03-28

## 2023-01-24 NOTE — LETTER
"2023     Alice Fuentes PA-C   [address]  [address]  [facility] [address]    Patient: Geneva Cordova   YOB: 1985   Date of Visit: 2023       Dear Dr. Alfredo PA-C:    Thank you for referring Geneva Cordova to me for evaluation. Below are the relevant portions of my assessment and plan of care.    If you have questions, please do not hesitate to call me. I look forward to following Geneva along with you.         Sincerely,        Erik Hughes MD        CC: No Recipients  Erik Hughes MD  23 1317  Signed  [facility] BARIATRIC SURGERY  [address]  [address]  [facility] [address] [phone]      Patient  Name:  Geneva Cordova  :  1985      Date of Visit: 23    Chief Complaint:  weight gain; unable to maintain weight loss.   Evaluate for possible revisional metabolic and bariatric surgery    History of Present Illness:  Geneva Cordova is a 37 y.o. female who presents today for evaluation, education and consultation regarding revisional metabolic and bariatric surgery (MBS).  Since last seen 2022 she has gained roughly 1 pound.  Most rate evaluation Carmelita CANALES PA-C reviewed.    She notes the patient has history of sleeve gastrectomy with paraesophageal hernia repair with Dr. Hughes in  and underwent a sleeve revision in 2020 with Dr. Mariscal complicated by a postoperative stricture.  She notes the patient has had \"really bad acid reflux\" for the last year worsening the last couple months despite twice daily PPI and is waking almost nightly with burning reflux issues not getting enough sleep having headaches fatigue and nausea associated globus sensation but denies abdominal pain no vomiting no fevers or chills and has been constipated since her sleeve gastrectomy with her bowels moving every 7 to 10 days and usually takes a laxative and that upper endoscopy in 2021 with Dr. Mariscal revealed a sliding " "hiatal hernia and a slight stricture at the incisura with pyloric spasm both dilated to 20 mm advising repeat endoscopy/dilatation with Dr. Galvan but the patient canceled appointment because she was having night terrors related to anesthesia repeat upper endoscopy by Dr. aL in September 2022 revealed LA grade a esophagitis with no bleeding in the lower third of the esophagus erosions noted diffuse mild inflammation characterized by erythema in the antrum and gastric body normal second portion of the duodenum.  Upper GI in December 2022 at [facility] showed no stricture moderate reflux in the small size type III paraesophageal hernia.  She notes the patient's presurgery weight was 343 pounds and her weight that day was 273 pounds with a BMI of 39.2 with a weight loss of 70 pounds since surgery.    The patient has had issues with morbid obesity for years and only temporary success with surgical and non-surgical methods of weight loss.  The patient is seeking revisional metabolic and bariatric surgery to help with the morbid obesity related conditions of anemia, anxiety and depression, peripheral edema, recurrent axillary boils, chronic back pain, dyspnea exertion, fatigue, impaired glucose tolerance, history of H. pylori gastritis, migraine headaches, obstructive sleep apnea, PCOS, history of removal left ovarian serous cystadenoma, recurrent paraesophageal hernia and reflux.    37-year-old morbidly obese female from [place] with past bariatric surgical history as above.  She says she does avoid high fructose corn syrup.  She denies sleep apnea.  She complains of chronic constipation.  Reflux symptoms and dysphagia not controlled with twice daily PPI.  I encouraged her to seek second opinion(s) and she says \"I trust you\".  On EGD 1/16/2023 I noted a recurrent paraesophageal hernia with some narrowing at the hiatal outlet and narrowing without twist at the angularis Z-line roughly 37 cm " "diaphragmatic pinch at 40 cm.  I noted that I initially performed her sleeve and paraesophageal hernia in  and she presented seeking revision options and plans were for a modified duodenal switch however her insurance would not cover it and I removed her gallbladder in  and EGD and upper GI were both unremarkable at that time as was her immediate postoperative upper GI.  Dr. Mariscal ended up taking her back for revision sleeve gastrectomy in 2020, no hiatal hernia noted at the time of revision.  Upper GI the next day showed a hiatal hernia which was confirmed again on upper GI a year later.  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  Pathology of the antrum showed reactive changes negative for H. pylori distal esophageal biopsies showed reactive changes otherwise unremarkable.      Past Medical History:   Diagnosis Date   • Anemia    • Anxiety    • Bilateral lower extremity edema     R>L   • Boil, axilla     recurrent   • Chronic back pain     no meds or injections, feels related to breasts   • Concussion with no loss of consciousness 2022   • COVID-19 2020   • Depression    • Dyspepsia    • Dyspnea on exertion    • Fatigue    • Gastroesophageal reflux disease concurrent with and due to paraesophageal hernia     recurrent after LSG revision, EGD Dr. Hughes 23   • Glucose intolerance (impaired glucose tolerance)    • Helicobacter pylori (H. pylori) infection     asx and grossly nl EGD. \"abundant\"on path. HBT still + after PrevPak tx. LSG path neg. neg testing 2018   • Hypoalbuminemia    • Left Ovarian serous cystadenoma -removed at  section 2017   • Migraine  " "   resolved with weightloss   • Obesity, morbid (HCC)    • VIRA (obstructive sleep apnea)     improved since WLS   • PCOS (polycystic ovarian syndrome)    • Right Essure implantation 2017 10/06/2017   • S/P  section;left ovarian cystectomy and partial salpingectomy 2017   • Seasonal allergies      Past Surgical History:   Procedure Laterality Date   • ABDOMINOPLASTY  2022    [facility]   • BILATERAL BREAST REDUCTION      2022   •  SECTION N/A 2017      SECTION PRIMARY;  CYSTECTOMY; Kwadwo Baxter MD; :  [facility];  Service:    • DILATATION AND CURETTAGE     • ENDOMETRIAL ABLATION  2020    Dr Cuevas   • ENDOSCOPY N/A 2020    Procedure: ESOPHAGOGASTRODUODENOSCOPY;  Surgeon: Natalia Mariscal MD;  Location:  [facility];  Service: Bariatric;  Laterality: N/A;   • ENDOSCOPY  2022    Dr. La   • ESSURE TUBAL LIGATION     • GASTRIC SLEEVE LAPAROSCOPIC  2015    With Dr. Hughes   • GASTRIC SLEEVE LAPAROSCOPIC N/A 2020    Procedure: GASTRIC SLEEVE LAPAROSCOPIC;  Surgeon: Natalia Mariscal MD;  Location:  [facility];  Service: Bariatric;  Laterality: N/A;   • LAPAROSCOPIC CHOLECYSTECTOMY  2018    Dr. Hughes   • SALPINGECTOMY Bilateral 2020    Dr Cuevas   • TONSILLECTOMY     • WISDOM TOOTH EXTRACTION         Allergies   Allergen Reactions   • Codeine Other (See Comments)     UNKNOWN       Current Outpatient Medications:   •  B-D 3CC LUER-LYLY SYR [id number] 23G X 1-2\" 3 ML misc, AS DIRECTED DAILY WITH THIAMINE, Disp: , Rfl:   •  Cholecalciferol (Vitamin D) 50 MCG (2000 UT) tablet, Take 2,000 Units by mouth Daily., Disp: 90 tablet, Rfl: 1  •  cyclobenzaprine (FLEXERIL) 10 MG tablet, TAKE 1 TABLET BY MOUTH THREE TIMES DAILY AS NEEDED FOR MUSCLE SPASMS, Disp: 30 tablet, Rfl: 0  •  ferrous gluconate (FERGON) 324 MG tablet, Take 1 tablet by mouth 2 (Two) Times a Day., Disp: 180 tablet, Rfl: " 1  •  Multiple Vitamins-Minerals (MULTIPLE VITAMINS/WOMENS) tablet, Take 1 tablet by mouth Daily., Disp: , Rfl:   •  pantoprazole (PROTONIX) 40 MG EC tablet, Take 1 tablet by mouth 2 (Two) Times a Day., Disp: 60 tablet, Rfl: 1  •  phentermine (Adipex-P) 37.5 MG tablet, Take 1 tablet by mouth Every Morning Before Breakfast., Disp: 30 tablet, Rfl: 0  •  vitamin D (ERGOCALCIFEROL) 1.25 MG (27176 UT) capsule capsule, Take 50,000 Units by mouth 1 (One) Time Per Week., Disp: , Rfl:   •  buPROPion SR (Wellbutrin SR) 150 MG 12 hr tablet, Take 1 tablet by mouth 2 (Two) Times a Day., Disp: 180 tablet, Rfl: 1  •  fluconazole (DIFLUCAN) 150 MG tablet, Take 150 mg by mouth Daily., Disp: , Rfl:   •  ibuprofen (ADVIL,MOTRIN) 800 MG tablet, Take 800 mg by mouth Every 6 (Six) Hours As Needed. for pain, Disp: , Rfl:   •  topiramate (Topamax) 25 MG tablet, Take 1 tablet by mouth 2 (Two) Times a Day., Disp: 60 tablet, Rfl: 0    Current Facility-Administered Medications:   •  cyanocobalamin injection 1,000 mcg, 1,000 mcg, Intramuscular, Q28 Days, Alice Fuentes PA-C, 1,000 mcg at 01/19/23 0951    Social History     Socioeconomic History   • Marital status: Single   • Number of children: 4   • Years of education: College    Tobacco Use   • Smoking status: Never   • Smokeless tobacco: Never   Vaping Use   • Vaping Use: Never used   Substance and Sexual Activity   • Alcohol use: Not Currently   • Drug use: No   • Sexual activity: Yes     Partners: Male     Birth control/protection: Surgical     Comment: no OCP     Family History   Problem Relation Age of Onset   • No Known Problems Other    • Sleep apnea Mother    • Diabetes Mother    • No Known Problems Father    • Pancreatic cancer Maternal Grandmother    • No Known Problems Sister    • No Known Problems Brother    • No Known Problems Daughter    • No Known Problems Son    • No Known Problems Paternal Grandmother    • No Known Problems Maternal Aunt    • No Known Problems Paternal Aunt     • BRCA 1/2 Neg Hx    • Colon cancer Neg Hx    • Endometrial cancer Neg Hx    • Ovarian cancer Neg Hx        Review of Systems   Constitutional: Positive for fatigue and unexpected weight gain. Negative for chills, diaphoresis, fever and unexpected weight loss.   HENT: Negative for congestion and facial swelling.    Eyes: Negative for blurred vision, double vision and discharge.   Respiratory: Negative for chest tightness, shortness of breath and stridor.    Cardiovascular: Positive for leg swelling. Negative for chest pain and palpitations.   Gastrointestinal: Positive for GERD. Negative for blood in stool.   Endocrine: Negative for polydipsia.   Genitourinary: Negative for hematuria.   Musculoskeletal: Positive for arthralgias and back pain.   Skin: Negative for color change.   Allergic/Immunologic: Negative for immunocompromised state.   Neurological: Negative for confusion.   Psychiatric/Behavioral: Positive for sleep disturbance. Negative for self-injury. The patient is nervous/anxious.        I have reviewed the ROS and confirm that it's accurate today.    Physical Exam:  Vital Signs:  Weight: 125 kg (276 lb 8 oz)   Body mass index is 39.67 kg/m².  Temp: 97.8 °F (36.6 °C)   Heart Rate: 82   BP: 120/78     Physical Exam  Vitals reviewed.   Constitutional:       Appearance: She is well-developed.   HENT:      Head: Normocephalic and atraumatic.      Nose:      Comments: mask  Eyes:      Conjunctiva/sclera: Conjunctivae normal.      Pupils: Pupils are equal, round, and reactive to light.   Neck:      Thyroid: No thyromegaly.      Vascular: No carotid bruit.      Trachea: No tracheal deviation.   Cardiovascular:      Rate and Rhythm: Normal rate and regular rhythm.      Heart sounds: Normal heart sounds.   Pulmonary:      Effort: Pulmonary effort is normal. No respiratory distress.      Breath sounds: Normal breath sounds.   Abdominal:      General: There is no distension.      Palpations: Abdomen is soft.       Tenderness: There is no abdominal tenderness.      Comments: Laparoscopy scars, abdominoplasty scars   Musculoskeletal:         General: No deformity. Normal range of motion.      Cervical back: Normal range of motion and neck supple.   Skin:     General: Skin is warm and dry.      Findings: No rash.      Comments: Tattoos   Neurological:      Mental Status: She is alert and oriented to person, place, and time.      Cranial Nerves: No cranial nerve deficit.      Coordination: Coordination normal.   Psychiatric:         Behavior: Behavior normal.         Thought Content: Thought content normal.         Judgment: Judgment normal.         Patient Active Problem List   Diagnosis   • Obesity affecting pregnancy in second trimester, antepartum   • VIRA (obstructive sleep apnea)   • Migraine   • Anemia   • Joint pain   • Chronic back pain   • Anxiety   • PCOS (polycystic ovarian syndrome)   • Dyspepsia   • Seasonal allergies   • Moderate episode of recurrent major depressive disorder (HCC)   • FH: breast cancer   • Dyspnea on exertion   • Class 2 obesity due to excess calories without serious comorbidity with body mass index (BMI) of 39.0 to 39.9 in adult   • Elevated blood pressure reading   • GERD without esophagitis   • Gastric hourglass stricture or stenosis   • Dysphagia   • Generalized anxiety disorder   • Acute vaginitis   • Pelvic pressure in female   • Vitamin D deficiency   • Screen for STD (sexually transmitted disease)   • High risk medication use   • Abnormal glucose   • B12 deficiency   • Iron deficiency       Assessment:    Geneva Cordova is a 37 y.o. year old female with medically complicated obesity status post laparoscopic sleeve gastrectomy and paraesophageal hernia repair July 2015, laparoscopic revision sleeve gastrectomy December 2020 complicated by recurrent paraesophageal hernia and partial gastric outlet obstruction.    Revisional metabolic and bariatric surgery is deemed medically necessary given  the following obesity related comorbidities including anemia, anxiety and depression, peripheral edema, recurrent axillary boils, chronic back pain, dyspnea exertion, fatigue, impaired glucose tolerance, history of H. pylori gastritis, migraine headaches, obstructive sleep apnea, PCOS, history of removal left ovarian serous cystadenoma, recurrent paraesophageal hernia and reflux with current Weight: 125 kg (276 lb 8 oz) and Body mass index is 39.67 kg/m²..    We had a long discussion regarding the risk, benefits, and alternative therapies to revisional metabolic and bariatric surgical options.  I used flip charts and Internet diagrams.  I went over potential short and long-term complications and the need for lifelong vitamin, mineral supplementation and laboratory follow-up and the possibility that further surgery may be warranted for potential complications, etc.  We discussed revision to a Alejandro-en-Y gastric bypass with or without biliopancreatic diversion with duodenal switch.  I feel given her partial gastric outlet obstruction with subsequent recurrent paraesophageal hernia that likely her sleeve cannot be salvaged and therefore I think a modified or formal duodenal switch is now off the table.  I strongly encouraged the patient to seek second opinion(s) and to consider referral to medical weight loss.    Complications of laparoscopic/possible robotic gastric bypass with biliopancreatic diversion and duodenal switch were discussed including but not limited to bleeding, infection, deep venous thrombosis, pulmonary embolism, pulmonary complications such as pneumonia, cardiac events, hernias, small bowel obstruction, damage to the spleen or other organs, bowel injury, disfiguring scars, failure to lose weight, need for additional surgery, conversion to an open procedure, and death. The most common short term complics in addition to cardiopulm risk, VTE, bleeding, infection, etc is leak or obstruction at the  anastomoses, which could have potentially serious and even fatal consequences and require further surgery(s).  The patient understands that they will have more frequent BM's, will be malodorous, and that at increased risk for vitamin deficiencies, pardeep the fat soluble vitamins, and that this can be serious and irreversible - still wishes to proceed, says they will be compliant w f/u and vit/supplment recommendations.  Aware that may need an elongation procedure in the future if diarrhea and/or vitamin issues not controlled.  Patient is also aware of complications which apply in this particular procedure that can include but are not limited to leaking of gastric and/or enteric contents at the staple or suture lines which could lead to intra-abdominal abscess, or chronic complications of strictures, ulcers, or vitamin/mineral deficiencies.      Plan:    After discussion of the risks, benefits, and alternative therapies as above she wishes to proceed with laparoscopic possible robotic assisted revision of her previous revised sleeve gastrectomy to a Alejandro-en-Y gastric bypass with biliopancreatic diversion and duodenal switch, laparoscopic recurrent paraesophageal hiatal hernia repair, and EGD.  She understands that this is likely going to be a prolonged operation with a Arellano catheter, TOLU drain, etc.  Prior to scheduling surgery she will reattend informed consent class and I will see her back in the office for final evaluation.    Thank you Alice Fuentes PA-C for the opportunity to reevaluate Ms. Cordova.      Erik Hughes MD

## 2023-01-24 NOTE — PROGRESS NOTES
"[facility]  [address]  [address]  [facility] [id number] [phone]      Patient  Name:  Geneva Cordova  :  1985      Date of Visit: 23    Chief Complaint:  weight gain; unable to maintain weight loss.   Evaluate for possible revisional metabolic and bariatric surgery    History of Present Illness:  Geneva Cordova is a 37 y.o. female who presents today for evaluation, education and consultation regarding revisional metabolic and bariatric surgery (MBS).  Since last seen 2022 she has gained roughly 1 pound.  Most rate evaluation Carmelita CANALES PA-C reviewed.    She notes the patient has history of sleeve gastrectomy with paraesophageal hernia repair with Dr. Hughes in  and underwent a sleeve revision in 2020 with Dr. Mariscal complicated by a postoperative stricture.  She notes the patient has had \"really bad acid reflux\" for the last year worsening the last couple months despite twice daily PPI and is waking almost nightly with burning reflux issues not getting enough sleep having headaches fatigue and nausea associated globus sensation but denies abdominal pain no vomiting no fevers or chills and has been constipated since her sleeve gastrectomy with her bowels moving every 7 to 10 days and usually takes a laxative and that upper endoscopy in 2021 with Dr. Mariscal revealed a sliding hiatal hernia and a slight stricture at the incisura with pyloric spasm both dilated to 20 mm advising repeat endoscopy/dilatation with Dr. Galvan but the patient canceled appointment because she was having night terrors related to anesthesia repeat upper endoscopy by Dr. La in 2022 revealed LA grade a esophagitis with no bleeding in the lower third of the esophagus erosions noted diffuse mild inflammation characterized by erythema in the antrum and gastric body normal second portion of the duodenum.  Upper GI in 2022 at [facility] " "[place] showed no stricture moderate reflux in the small size type III paraesophageal hernia.  She notes the patient's presurgery weight was 343 pounds and her weight that day was 273 pounds with a BMI of 39.2 with a weight loss of 70 pounds since surgery.    The patient has had issues with morbid obesity for years and only temporary success with surgical and non-surgical methods of weight loss.  The patient is seeking revisional metabolic and bariatric surgery to help with the morbid obesity related conditions of anemia, anxiety and depression, peripheral edema, recurrent axillary boils, chronic back pain, dyspnea exertion, fatigue, impaired glucose tolerance, history of H. pylori gastritis, migraine headaches, obstructive sleep apnea, PCOS, history of removal left ovarian serous cystadenoma, recurrent paraesophageal hernia and reflux.    37-year-old morbidly obese female from [place] with past bariatric surgical history as above.  She says she does avoid high fructose corn syrup.  She denies sleep apnea.  She complains of chronic constipation.  Reflux symptoms and dysphagia not controlled with twice daily PPI.  I encouraged her to seek second opinion(s) and she says \"I trust you\".  On EGD 1/16/2023 I noted a recurrent paraesophageal hernia with some narrowing at the hiatal outlet and narrowing without twist at the angularis Z-line roughly 37 cm diaphragmatic pinch at 40 cm.  I noted that I initially performed her sleeve and paraesophageal hernia in 2015 and she presented seeking revision options and plans were for a modified duodenal switch however her insurance would not cover it and I removed her gallbladder in 2018 and EGD and upper GI were both unremarkable at that time as was her immediate postoperative upper GI.  Dr. Mariscal ended up taking her back for revision sleeve gastrectomy in December 2020, no hiatal hernia noted at the time of revision.  Upper GI the next day showed a hiatal hernia which was " "confirmed again on upper GI a year later.  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  Pathology of the antrum showed reactive changes negative for H. pylori distal esophageal biopsies showed reactive changes otherwise unremarkable.      Past Medical History:   Diagnosis Date   • Anemia    • Anxiety    • Bilateral lower extremity edema     R>L   • Boil, axilla     recurrent   • Chronic back pain     no meds or injections, feels related to breasts   • Concussion with no loss of consciousness 2022   • COVID-19 2020   • Depression    • Dyspepsia    • Dyspnea on exertion    • Fatigue    • Gastroesophageal reflux disease concurrent with and due to paraesophageal hernia     recurrent after LSG revision, EGD Dr. Hughes 23   • Glucose intolerance (impaired glucose tolerance)    • Helicobacter pylori (H. pylori) infection     asx and grossly nl EGD. \"abundant\"on path. HBT still + after PrevPak tx. LSG path neg. neg testing 2018   • Hypoalbuminemia    • Left Ovarian serous cystadenoma -removed at  section 2017   • Migraine     resolved with weightloss   • Obesity, morbid (HCC)    • VIRA (obstructive sleep apnea)     improved since WLS   • PCOS (polycystic ovarian syndrome)    • Right Essure implantation 2017 10/06/2017   • S/P  section;left ovarian cystectomy and partial salpingectomy 2017   • Seasonal allergies      Past Surgical History:   Procedure Laterality Date   • ABDOMINOPLASTY  2022    Osmany Mclain at [facility]   • BILATERAL BREAST REDUCTION      2022   •  SECTION N/A 2017      SECTION PRIMARY;  CYSTECTOMY; " "Kwadwo Baxter MD; :  [facility];  Service:    • DILATATION AND CURETTAGE  2015   • ENDOMETRIAL ABLATION  02/2020    Dr Cuevas   • ENDOSCOPY N/A 12/01/2020    Procedure: ESOPHAGOGASTRODUODENOSCOPY;  Surgeon: Natalia Mariscal MD;  Location:  [facility];  Service: Bariatric;  Laterality: N/A;   • ENDOSCOPY  09/2022    Dr. La   • ESSURE TUBAL LIGATION  2018   • GASTRIC SLEEVE LAPAROSCOPIC  07/2015    With Dr. Hughes   • GASTRIC SLEEVE LAPAROSCOPIC N/A 12/01/2020    Procedure: GASTRIC SLEEVE LAPAROSCOPIC;  Surgeon: Natalia Mariscal MD;  Location:  [facility];  Service: Bariatric;  Laterality: N/A;   • LAPAROSCOPIC CHOLECYSTECTOMY  02/2018    Dr. Hughes   • SALPINGECTOMY Bilateral 02/2020    Dr Cuevas   • TONSILLECTOMY  2014   • WISDOM TOOTH EXTRACTION  2014       Allergies   Allergen Reactions   • Codeine Other (See Comments)     UNKNOWN       Current Outpatient Medications:   •  B-D 3CC LUER-LYLY SYR 22MG0-1/2 23G X 1-1/2\" 3 ML misc, AS DIRECTED DAILY WITH THIAMINE, Disp: , Rfl:   •  Cholecalciferol (Vitamin D) 50 MCG (2000 UT) tablet, Take 2,000 Units by mouth Daily., Disp: 90 tablet, Rfl: 1  •  cyclobenzaprine (FLEXERIL) 10 MG tablet, TAKE 1 TABLET BY MOUTH THREE TIMES DAILY AS NEEDED FOR MUSCLE SPASMS, Disp: 30 tablet, Rfl: 0  •  ferrous gluconate (FERGON) 324 MG tablet, Take 1 tablet by mouth 2 (Two) Times a Day., Disp: 180 tablet, Rfl: 1  •  Multiple Vitamins-Minerals (MULTIPLE VITAMINS/WOMENS) tablet, Take 1 tablet by mouth Daily., Disp: , Rfl:   •  pantoprazole (PROTONIX) 40 MG EC tablet, Take 1 tablet by mouth 2 (Two) Times a Day., Disp: 60 tablet, Rfl: 1  •  phentermine (Adipex-P) 37.5 MG tablet, Take 1 tablet by mouth Every Morning Before Breakfast., Disp: 30 tablet, Rfl: 0  •  vitamin D (ERGOCALCIFEROL) 1.25 MG (48014 UT) capsule capsule, Take 50,000 Units by mouth 1 (One) Time Per Week., Disp: , Rfl:   •  buPROPion SR (Wellbutrin SR) 150 MG 12 hr tablet, Take 1 tablet by mouth 2 (Two) Times a " Day., Disp: 180 tablet, Rfl: 1  •  fluconazole (DIFLUCAN) 150 MG tablet, Take 150 mg by mouth Daily., Disp: , Rfl:   •  ibuprofen (ADVIL,MOTRIN) 800 MG tablet, Take 800 mg by mouth Every 6 (Six) Hours As Needed. for pain, Disp: , Rfl:   •  topiramate (Topamax) 25 MG tablet, Take 1 tablet by mouth 2 (Two) Times a Day., Disp: 60 tablet, Rfl: 0    Current Facility-Administered Medications:   •  cyanocobalamin injection 1,000 mcg, 1,000 mcg, Intramuscular, Q28 Days, Alice Fuentes PA-C, 1,000 mcg at 01/19/23 0951    Social History     Socioeconomic History   • Marital status: Single   • Number of children: 4   • Years of education: College    Tobacco Use   • Smoking status: Never   • Smokeless tobacco: Never   Vaping Use   • Vaping Use: Never used   Substance and Sexual Activity   • Alcohol use: Not Currently   • Drug use: No   • Sexual activity: Yes     Partners: Male     Birth control/protection: Surgical     Comment: no OCP     Family History   Problem Relation Age of Onset   • No Known Problems Other    • Sleep apnea Mother    • Diabetes Mother    • No Known Problems Father    • Pancreatic cancer Maternal Grandmother    • No Known Problems Sister    • No Known Problems Brother    • No Known Problems Daughter    • No Known Problems Son    • No Known Problems Paternal Grandmother    • No Known Problems Maternal Aunt    • No Known Problems Paternal Aunt    • BRCA 1/2 Neg Hx    • Colon cancer Neg Hx    • Endometrial cancer Neg Hx    • Ovarian cancer Neg Hx        Review of Systems   Constitutional: Positive for fatigue and unexpected weight gain. Negative for chills, diaphoresis, fever and unexpected weight loss.   HENT: Negative for congestion and facial swelling.    Eyes: Negative for blurred vision, double vision and discharge.   Respiratory: Negative for chest tightness, shortness of breath and stridor.    Cardiovascular: Positive for leg swelling. Negative for chest pain and palpitations.   Gastrointestinal:  Positive for GERD. Negative for blood in stool.   Endocrine: Negative for polydipsia.   Genitourinary: Negative for hematuria.   Musculoskeletal: Positive for arthralgias and back pain.   Skin: Negative for color change.   Allergic/Immunologic: Negative for immunocompromised state.   Neurological: Negative for confusion.   Psychiatric/Behavioral: Positive for sleep disturbance. Negative for self-injury. The patient is nervous/anxious.        I have reviewed the ROS and confirm that it's accurate today.    Physical Exam:  Vital Signs:  Weight: 125 kg (276 lb 8 oz)   Body mass index is 39.67 kg/m².  Temp: 97.8 °F (36.6 °C)   Heart Rate: 82   BP: 120/78     Physical Exam  Vitals reviewed.   Constitutional:       Appearance: She is well-developed.   HENT:      Head: Normocephalic and atraumatic.      Nose:      Comments: mask  Eyes:      Conjunctiva/sclera: Conjunctivae normal.      Pupils: Pupils are equal, round, and reactive to light.   Neck:      Thyroid: No thyromegaly.      Vascular: No carotid bruit.      Trachea: No tracheal deviation.   Cardiovascular:      Rate and Rhythm: Normal rate and regular rhythm.      Heart sounds: Normal heart sounds.   Pulmonary:      Effort: Pulmonary effort is normal. No respiratory distress.      Breath sounds: Normal breath sounds.   Abdominal:      General: There is no distension.      Palpations: Abdomen is soft.      Tenderness: There is no abdominal tenderness.      Comments: Laparoscopy scars, abdominoplasty scars   Musculoskeletal:         General: No deformity. Normal range of motion.      Cervical back: Normal range of motion and neck supple.   Skin:     General: Skin is warm and dry.      Findings: No rash.      Comments: Tattoos   Neurological:      Mental Status: She is alert and oriented to person, place, and time.      Cranial Nerves: No cranial nerve deficit.      Coordination: Coordination normal.   Psychiatric:         Behavior: Behavior normal.         Thought  Content: Thought content normal.         Judgment: Judgment normal.         Patient Active Problem List   Diagnosis   • Obesity affecting pregnancy in second trimester, antepartum   • VIRA (obstructive sleep apnea)   • Migraine   • Anemia   • Joint pain   • Chronic back pain   • Anxiety   • PCOS (polycystic ovarian syndrome)   • Dyspepsia   • Seasonal allergies   • Moderate episode of recurrent major depressive disorder (HCC)   • FH: breast cancer   • Dyspnea on exertion   • Class 2 obesity due to excess calories without serious comorbidity with body mass index (BMI) of 39.0 to 39.9 in adult   • Elevated blood pressure reading   • GERD without esophagitis   • Gastric hourglass stricture or stenosis   • Dysphagia   • Generalized anxiety disorder   • Acute vaginitis   • Pelvic pressure in female   • Vitamin D deficiency   • Screen for STD (sexually transmitted disease)   • High risk medication use   • Abnormal glucose   • B12 deficiency   • Iron deficiency       Assessment:    Geneva Cordova is a 37 y.o. year old female with medically complicated obesity status post laparoscopic sleeve gastrectomy and paraesophageal hernia repair July 2015, laparoscopic revision sleeve gastrectomy December 2020 complicated by recurrent paraesophageal hernia and partial gastric outlet obstruction.    Revisional metabolic and bariatric surgery is deemed medically necessary given the following obesity related comorbidities including anemia, anxiety and depression, peripheral edema, recurrent axillary boils, chronic back pain, dyspnea exertion, fatigue, impaired glucose tolerance, history of H. pylori gastritis, migraine headaches, obstructive sleep apnea, PCOS, history of removal left ovarian serous cystadenoma, recurrent paraesophageal hernia and reflux with current Weight: 125 kg (276 lb 8 oz) and Body mass index is 39.67 kg/m²..    We had a long discussion regarding the risk, benefits, and alternative therapies to revisional metabolic  and bariatric surgical options.  I used flip charts and Internet diagrams.  I went over potential short and long-term complications and the need for lifelong vitamin, mineral supplementation and laboratory follow-up and the possibility that further surgery may be warranted for potential complications, etc.  We discussed revision to a Alejandro-en-Y gastric bypass with or without biliopancreatic diversion with duodenal switch.  I feel given her partial gastric outlet obstruction with subsequent recurrent paraesophageal hernia that likely her sleeve cannot be salvaged and therefore I think a modified or formal duodenal switch is now off the table.  I strongly encouraged the patient to seek second opinion(s) and to consider referral to medical weight loss.    Complications of laparoscopic/possible robotic gastric bypass with biliopancreatic diversion and duodenal switch were discussed including but not limited to bleeding, infection, deep venous thrombosis, pulmonary embolism, pulmonary complications such as pneumonia, cardiac events, hernias, small bowel obstruction, damage to the spleen or other organs, bowel injury, disfiguring scars, failure to lose weight, need for additional surgery, conversion to an open procedure, and death. The most common short term complics in addition to cardiopulm risk, VTE, bleeding, infection, etc is leak or obstruction at the anastomoses, which could have potentially serious and even fatal consequences and require further surgery(s).  The patient understands that they will have more frequent BM's, will be malodorous, and that at increased risk for vitamin deficiencies, pardeep the fat soluble vitamins, and that this can be serious and irreversible - still wishes to proceed, says they will be compliant w f/u and vit/supplment recommendations.  Aware that may need an elongation procedure in the future if diarrhea and/or vitamin issues not controlled.  Patient is also aware of complications which  apply in this particular procedure that can include but are not limited to leaking of gastric and/or enteric contents at the staple or suture lines which could lead to intra-abdominal abscess, or chronic complications of strictures, ulcers, or vitamin/mineral deficiencies.      Plan:    After discussion of the risks, benefits, and alternative therapies as above she wishes to proceed with laparoscopic possible robotic assisted revision of her previous revised sleeve gastrectomy to a Alejandro-en-Y gastric bypass with biliopancreatic diversion and duodenal switch, laparoscopic recurrent paraesophageal hiatal hernia repair, and EGD.  She understands that this is likely going to be a prolonged operation with a Arellano catheter, TOLU drain, etc.  Prior to scheduling surgery she will reattend informed consent class and I will see her back in the office for final evaluation.    Thank you Alice Fuentes PA-C for the opportunity to reevaluate Ms. Cordova.      Erik Hughes MD

## 2023-01-26 DIAGNOSIS — M25.50 ARTHRALGIA, UNSPECIFIED JOINT: ICD-10-CM

## 2023-01-26 DIAGNOSIS — G47.33 OSA (OBSTRUCTIVE SLEEP APNEA): ICD-10-CM

## 2023-01-26 DIAGNOSIS — E53.8 B12 DEFICIENCY: ICD-10-CM

## 2023-01-26 DIAGNOSIS — E66.09 CLASS 2 OBESITY DUE TO EXCESS CALORIES WITHOUT SERIOUS COMORBIDITY WITH BODY MASS INDEX (BMI) OF 39.0 TO 39.9 IN ADULT: Primary | ICD-10-CM

## 2023-01-26 DIAGNOSIS — D50.8 OTHER IRON DEFICIENCY ANEMIA: ICD-10-CM

## 2023-01-26 DIAGNOSIS — F41.1 GENERALIZED ANXIETY DISORDER: ICD-10-CM

## 2023-01-26 DIAGNOSIS — R10.13 DYSPEPSIA: ICD-10-CM

## 2023-02-01 ENCOUNTER — TELEPHONE (OUTPATIENT)
Dept: BARIATRICS/WEIGHT MGMT | Facility: CLINIC | Age: 38
End: 2023-02-01
Payer: COMMERCIAL

## 2023-02-01 ENCOUNTER — OFFICE VISIT (OUTPATIENT)
Dept: BEHAVIORAL HEALTH | Facility: CLINIC | Age: 38
End: 2023-02-01
Payer: COMMERCIAL

## 2023-02-01 DIAGNOSIS — F43.21 GRIEF: ICD-10-CM

## 2023-02-01 DIAGNOSIS — Z71.3 ENCOUNTER FOR WEIGHT LOSS COUNSELING: ICD-10-CM

## 2023-02-01 DIAGNOSIS — F33.0 MDD (MAJOR DEPRESSIVE DISORDER), RECURRENT EPISODE, MILD: Primary | ICD-10-CM

## 2023-02-01 PROCEDURE — 90834 PSYTX W PT 45 MINUTES: CPT | Performed by: PSYCHOLOGIST

## 2023-02-01 NOTE — TELEPHONE ENCOUNTER
"Bariatric Nutrition Consult     Name: Geneva Cordova   : 1985   AGE: 37 y.o.   MRN: [id number]      Consult Date:  23    Surgery desired: revision for severe reflux  Sleeve , sleeve revision   Highest weight 435lbs  Lowest weight achieved 274lbs  Goal weight 200lbs, to resolve reflux      Height: 177.8cm                  Current weight: 276lbs                   BMI: 39.67      Past Medical History:   Diagnosis Date   • Anemia    • Anxiety    • Bilateral lower extremity edema     R>L   • Boil, axilla     recurrent   • Chronic back pain     no meds or injections, feels related to breasts   • Concussion with no loss of consciousness 2022   • COVID-19 2020   • Depression    • Dyspepsia    • Dyspnea on exertion    • Fatigue    • Gastroesophageal reflux disease concurrent with and due to paraesophageal hernia     recurrent after LSG revision, EGD Dr. Hughes 23   • Glucose intolerance (impaired glucose tolerance)    • Helicobacter pylori (H. pylori) infection     asx and grossly nl EGD. \"abundant\"on path. HBT still + after PrevPak tx. LSG path neg. neg testing 2018   • Hypoalbuminemia    • Left Ovarian serous cystadenoma -removed at  section 2017   • Migraine     resolved with weightloss   • Obesity, morbid (HCC)    • VIRA (obstructive sleep apnea)     improved since WLS   • PCOS (polycystic ovarian syndrome)    • Right Essure implantation 2017 10/06/2017   • S/P  section;left ovarian cystectomy and partial salpingectomy 2017   • Seasonal allergies                                  Diet history reveals eats  1 meal and numerous snacks daily. Limited re intake due to reflux. Trying to focus on high protein foods. Severe constipation.    Breakfast: boiled egg, string cheese or sausage biscuit no biscuit from [employer]  Juicy juice  Snacks: pickles    Pot pie/pork chop  Cheddar popcorn (helps constipation), cheese, slim jims      Protein " sources: meat, cheese, fried fish, eggs, slim jims, premier protein    Drinks: water, HiC orange, juicy juice    Food allergies/intolerances: no    Night eating: no    Patient has/has not been diagnosed with an eating disorder: no    Exercise/activity: walk 30 mins day     Main bariatric nutrition principles discussed and explained. Patient needs to focus on 100g protein daily, 100-140g carbohydrates daily, healthy fat intake, 64 oz fluid daily, no carbonation, and try protein drinks/protein powders. Avoid high fructose corn syrup. Patient verbalized understanding and queries were answered.  Additional nutritional counseling will be available      Michelle Maradiaag RD,LD

## 2023-02-01 NOTE — PROGRESS NOTES
PROGRESS NOTE    Data:    Geneva Cordova is a 37 y.o. female who met with the undersigned for a scheduled psychological evaluation from 10:30 - 11:15am.      Clinical Maneuvering/Intervention:      The pt talked about struggling with depression, having the father of three of her children pass away two months ago, and her sister attempting suicide recently. Stressors were processed individually and in detail. Venting of frustrations was conducted in order to help the pt feel less tense emotionally and gain insight into issues. Feelings were processed and validated several times in session. Perspective taking was conducted multiple times in order to help the pt feel less stuck, less overwhelmed, and see challenges as much more manageable. Active listening was conducted in order to help the pt make sense of stressors and start moving towards potential solutions. The pt was assisted with finding solutions based on existing skill-set and abilities. She was assisted with processing loss and trauma. Healing from loss was discussed in terms of how she is feeling and what more healing might look like. The pt was assisted with putting feelings into words several times in session in order to both help diminish emotional tension and to highlight direction for change. Time was allocated specifically to assess what is 'working' in the pt's life, versus what is 'not working,' (if anything) in terms of helping to improve mood and quality of life. The pt was assisted in recognizing progress in order to show encouragement and promote motivation to keep making positive changes in life. She is losing weight, starting to feel better physically and has strengthened her juan manuel in God. Looking at the good and what she hopes for the future were discussed. She expressed concern about not meeting the right man. Maladaptive thought patterns were identified, challenged, and evaluated for validity in order to allow for the pt to chose different  and more adaptive ways of thinking. Homework was assigned tailored to pt's needs. The pt expressed gratitude for today's session.    Mental Status Exam (MSE):  Hygiene:  good  Dress: normal  Attitude:  cooperative and proactive  Motor Activity: normal  Speech: normal  Mood:   depressed and sad  Affect:  congruent  Thought Processes: normal  Thought Content:  normal  Suicidal Thoughts:  not endorsed  Homicidal Thoughts:  not endorsed  Crisis Safety Plan: not needed   Hallucinations:  none      Patient's Support Network Includes:  family, friends      Progress toward goal: there is evidence to suggest that she is improving her health, juan manuel, and in turn, mental health      Functional Status: moderate to high      Prognosis: good with healing from trauma, weight loss/improved health, and spiritual growth    Evaluation, Diagnoses, and Ability/Capacity to Respond to Treatment:      The pt presented to be struggling with depression and grief. She tends to benefit from supportive and empowering therapy. The pt is a kind, thoughtful, and insightful person.     Treatment Plan:      In order to feel less distressed (depressed and grief-stricken), the pt will continue to lean on loved ones in order to process loss and notice small steps of progress in terms of losing weight and growing spiritually (ongoing).    Mahsa Garrett, PhD, LP

## 2023-02-07 ENCOUNTER — TELEPHONE (OUTPATIENT)
Dept: CARDIOLOGY | Facility: CLINIC | Age: 38
End: 2023-02-07
Payer: COMMERCIAL

## 2023-02-07 NOTE — PROGRESS NOTES
"[facility] Cardiology    Encounter Date: 02/10/2023    Patient ID: Geneva Cordova is a 37 y.o. female.  : 1985     PCP: Alice Fuentes PA-C       Chief Complaint: Encounter for pre-operative cardiovascular clearance      PROBLEM LIST:  1. Morbid obesity, BMI > 40  1. Sleeve gastrectomy with paraesophageal hernia repair in   2. Gastric sleeve revision in  complicated by postoperative stricture  2. Glucose intolerance.  3. PCOS.  4. Seasonal allergies.    History of Present Illness: Geneva Cordova is a 37 y.o. female who presents to the cardiology office today as a self-referral to be seen in consultation for cardiac clearance prior to gastric bypass.  Patient has been doing well from a cardiac standpoint.  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  She denies any chest pain, shortness of breath, palpitations, orthopnea, edema, presyncope or syncope.    Past Medical History:   Past Medical History:   Diagnosis Date   • Anemia    • Anxiety    • Bilateral lower extremity edema     R>L   • Boil, axilla     recurrent   • Chronic back pain     no meds or injections, feels related to breasts   • Concussion with no loss of consciousness 2022   • COVID-19 2020   • Depression    • Dyspepsia    • Dyspnea on exertion    • Fatigue    • Gastroesophageal reflux disease concurrent with and due to paraesophageal hernia     recurrent after LSG revision, EGD Dr. Hughes 23   • Glucose intolerance (impaired glucose tolerance)    • Helicobacter pylori (H. pylori) infection     asx and grossly nl EGD. \"abundant\"on path. HBT still + after PrevPak tx. LSG path neg. neg testing 2018   • Hypoalbuminemia    • Left Ovarian serous cystadenoma -removed at  section 2017   • Migraine     resolved with weightloss   • Obesity, morbid (HCC)    • VIRA (obstructive sleep apnea)     improved since WLS   • PCOS " (polycystic ovarian syndrome)    • Right Essure implantation 2017 10/06/2017   • S/P  section;left ovarian cystectomy and partial salpingectomy 2017   • Seasonal allergies        Past Surgical History:   Past Surgical History:   Procedure Laterality Date   • ABDOMINOPLASTY  2022    Hall Rayne at [facility]   • BILATERAL BREAST REDUCTION      2022   •  SECTION N/A 2017      SECTION PRIMARY;  CYSTECTOMY; Kwadwo Baxter MD; :  [facility];  Service:    • DILATATION AND CURETTAGE     • ENDOMETRIAL ABLATION  2020    Dr Cuevas   • ENDOSCOPY N/A 2020    Procedure: ESOPHAGOGASTRODUODENOSCOPY;  Surgeon: Natalia Mariscal MD;  Location:  [facility];  Service: Bariatric;  Laterality: N/A;   • ENDOSCOPY  2022    Dr. La   • ESSURE TUBAL LIGATION     • GASTRIC SLEEVE LAPAROSCOPIC  2015    With Dr. Hughes   • GASTRIC SLEEVE LAPAROSCOPIC N/A 2020    Procedure: GASTRIC SLEEVE LAPAROSCOPIC;  Surgeon: Natalia Mariscal MD;  Location:  [facility];  Service: Bariatric;  Laterality: N/A;   • LAPAROSCOPIC CHOLECYSTECTOMY  2018    Dr. Hughes   • SALPINGECTOMY Bilateral 2020    Dr Cuevas   • TONSILLECTOMY     • WISDOM TOOTH EXTRACTION         Family History:   Family History   Problem Relation Age of Onset   • No Known Problems Other    • Sleep apnea Mother    • Diabetes Mother    • No Known Problems Father    • Pancreatic cancer Maternal Grandmother    • No Known Problems Sister    • No Known Problems Brother    • No Known Problems Daughter    • No Known Problems Son    • No Known Problems Paternal Grandmother    • No Known Problems Maternal Aunt    • No Known Problems Paternal Aunt    • BRCA 1/2 Neg Hx    • Colon cancer Neg Hx    • Endometrial cancer Neg Hx    • Ovarian cancer Neg Hx        Social History:   Social History     Socioeconomic History   • Marital status: Single   • Number of children: 4   • Years of  "education: College    Tobacco Use   • Smoking status: Never   • Smokeless tobacco: Never   Vaping Use   • Vaping Use: Never used   Substance and Sexual Activity   • Alcohol use: Not Currently   • Drug use: No   • Sexual activity: Yes     Partners: Male     Birth control/protection: Surgical     Comment: no OCP       Tobacco History:   Social History     Tobacco Use   Smoking Status Never   Smokeless Tobacco Never       Medications:     Current Outpatient Medications:   •  B-D 3CC LUER-LYLY SYR 42OY4-7/2 23G X 1-1/2\" 3 ML misc, AS DIRECTED DAILY WITH THIAMINE, Disp: , Rfl:   •  ferrous gluconate (FERGON) 324 MG tablet, Take 1 tablet by mouth 2 (Two) Times a Day., Disp: 180 tablet, Rfl: 1  •  Multiple Vitamins-Minerals (MULTIPLE VITAMINS/WOMENS) tablet, Take 1 tablet by mouth Daily., Disp: , Rfl:   •  phentermine (Adipex-P) 37.5 MG tablet, Take 1 tablet by mouth Every Morning Before Breakfast., Disp: 30 tablet, Rfl: 0  •  topiramate (Topamax) 25 MG tablet, Take 1 tablet by mouth 2 (Two) Times a Day., Disp: 60 tablet, Rfl: 0  •  vitamin D (ERGOCALCIFEROL) 1.25 MG (95759 UT) capsule capsule, Take 50,000 Units by mouth 1 (One) Time Per Week., Disp: , Rfl:     Current Facility-Administered Medications:   •  cyanocobalamin injection 1,000 mcg, 1,000 mcg, Intramuscular, Q28 Days, Alice Fuentes PA-C, 1,000 mcg at 01/19/23 0951    Allergies:   Allergies   Allergen Reactions   • Codeine Other (See Comments)     UNKNOWN     The following portions of the patient's history were reviewed and updated as appropriate: allergies, current medications, past family history, past medical history, past social history, past surgical history and problem list.    Review of Systems   12 point ROS negative except for that listed in the HPI.         Objective:     /82 (BP Location: Left arm, Patient Position: Sitting)   Pulse 56   Ht 177.8 cm (70\")   Wt 125 kg (274 lb 9.6 oz)   SpO2 99%   BMI 39.40 kg/m²      Physical " Exam  Constitutional: Patient appears well-developed and well-nourished.   HENT: HEENT exam unremarkable.   Neck: Neck supple. No JVD present. No carotid bruits.   Cardiovascular: Normal rate, regular rhythm and normal heart sounds. No murmur heard.   2+ symmetric pulses.   Pulmonary/Chest: Breath sounds normal. Does not exhibit tenderness.   Abdominal: Abdomen benign.   Musculoskeletal: Does not exhibit edema.   Neurological: Neurological exam unremarkable.   Vitals reviewed.    Data Review:   Lab Results   Component Value Date    GLUCOSE 62 (L) 02/08/2023    BUN 11 02/08/2023    CREATININE 0.81 02/08/2023    EGFRIFNONA 76 08/13/2021    EGFRIFAFRI 98 02/20/2022    BCR 14 02/08/2023    K 3.9 02/08/2023    CO2 23 02/08/2023    CALCIUM 9.0 02/08/2023    ALBUMIN 4.0 02/08/2023    AST 19 02/08/2023    ALT 16 02/08/2023     Lab Results   Component Value Date    CHOL 139 07/06/2018    CHLPL 118 02/08/2023    TRIG 24 02/08/2023    HDL 50 02/08/2023    LDL 60 02/08/2023      Lab Results   Component Value Date    WBC 6.6 02/08/2023    RBC 3.94 02/08/2023    HGB 11.7 02/08/2023    HCT 36.4 02/08/2023    MCV 92 02/08/2023     02/08/2023     Lab Results   Component Value Date    TSH 0.958 02/08/2023     Lab Results   Component Value Date    HGBA1C 5.3 02/08/2023          ECG 12 Lead    Date/Time: 2/10/2023 8:44 AM  Performed by: Millie Choe PA-C  Authorized by: Millie Choe PA-C   Comparison: compared with previous ECG from 5/10/2022  Similar to previous ECG  Comparison to previous ECG: Now with bradycardia  Rhythm: sinus rhythm  Rate: normal  BPM: 56    Clinical impression: normal ECG  Comments: Sinus bradycardia, otherwise normal               Assessment:      Diagnosis   1. Encounter for pre-operative cardiovascular clearance    2. Bradycardia, sinus      Plan:   Patient is a 37-year-old without any significant prior medical or cardiac history.  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  Her ECG is within normal limits.  Patient is felt to be at low/acceptable cardiac risk to proceed with bariatric surgery.  There is no need for further testing.   Continue current medications.   FU as needed.  We will be available to help out with her management as needed during hospitalization.  Thank you for allowing us to participate in the care of your patient.       Scribed for Faith Hamm MD by Millie Choe PA-C. 2/10/2023  09:04 EST     I, Faith Hamm MD, personally performed the services described in this documentation as scribed by the above named individual in my presence, and it is both accurate and complete.  2/10/2023  09:04 EST        Part of this note may be an electronic transcription/translation of spoken language to printed text using the Dragon Dictation System.

## 2023-02-07 NOTE — TELEPHONE ENCOUNTER
Called patient to confirm appointment. Patient has not had any cardiac testing done anywhere else.

## 2023-02-08 ENCOUNTER — OFFICE VISIT (OUTPATIENT)
Dept: BARIATRICS/WEIGHT MGMT | Facility: CLINIC | Age: 38
End: 2023-02-08
Payer: COMMERCIAL

## 2023-02-08 VITALS
DIASTOLIC BLOOD PRESSURE: 76 MMHG | TEMPERATURE: 98.2 F | OXYGEN SATURATION: 97 % | SYSTOLIC BLOOD PRESSURE: 118 MMHG | HEIGHT: 70 IN | BODY MASS INDEX: 39.17 KG/M2 | WEIGHT: 273.6 LBS | HEART RATE: 67 BPM | RESPIRATION RATE: 16 BRPM

## 2023-02-08 DIAGNOSIS — Z98.84 STATUS POST BARIATRIC SURGERY: ICD-10-CM

## 2023-02-08 DIAGNOSIS — E66.9 OBESITY, CLASS II, BMI 35-39.9: Primary | ICD-10-CM

## 2023-02-08 PROCEDURE — 99214 OFFICE O/P EST MOD 30 MIN: CPT | Performed by: PHYSICIAN ASSISTANT

## 2023-02-08 NOTE — PROGRESS NOTES
"[facility] Bariatric Surgery  [address]  [address]  [facility] [id number] [phone]    Patient Name:  Geneva Cordova.  :  1985        Reason for Visit:  Monthly Diet Visit      HPI:  Presents for monthly supervised diet visit.  s/p LSG/paraHHR  GDW, s/p LSG revision 20 w/ Dr. Mariscal (complicated by post op stricture) Pursuing  laparoscopic possible robotic assisted revision of her previous revised sleeve gastrectomy to a Alejandro-en-Y gastric bypass with biliopancreatic diversion and duodenal switch, laparoscopic recurrent paraesophageal hiatal hernia repair, and EGD Denies any medical issues since last visit.     Has been focusing on regular meals, Eating 2 full meals and snacks. mostly Chicken, rice, corn dogs.  Snacks on slim aura, pork rinds, pickles.  Protein 100g goal a day.drinking mostly water, drinks Hi C as a reward.  Exercising- walking.    Avoiding HFCS.     Very tired today, up all night dealing with family issues with her sister.     Current weight:  273    Past Medical History:   Diagnosis Date   • Anemia    • Anxiety    • Bilateral lower extremity edema     R>L   • Boil, axilla     recurrent   • Chronic back pain     no meds or injections, feels related to breasts   • Concussion with no loss of consciousness 2022   • COVID-19 2020   • Depression    • Dyspepsia    • Dyspnea on exertion    • Fatigue    • Gastroesophageal reflux disease concurrent with and due to paraesophageal hernia     recurrent after LSG revision, EGD Dr. Hughes 23   • Glucose intolerance (impaired glucose tolerance)    • Helicobacter pylori (H. pylori) infection     asx and grossly nl EGD. \"abundant\"on path. HBT still + after PrevPak tx. LSG path neg. neg testing 2018   • Hypoalbuminemia    • Left Ovarian serous cystadenoma -removed at  section 2017   • Migraine     resolved with weightloss   • Obesity, morbid (HCC)    • VIRA (obstructive sleep " "apnea)     improved since WLS   • PCOS (polycystic ovarian syndrome)    • Right Essure implantation 2017 10/06/2017   • S/P  section;left ovarian cystectomy and partial salpingectomy 2017   • Seasonal allergies      Past Surgical History:   Procedure Laterality Date   • ABDOMINOPLASTY  2022    [facility]   • BILATERAL BREAST REDUCTION      2022   •  SECTION N/A 2017      SECTION PRIMARY;  CYSTECTOMY; Kwadwo Baxter MD; :  [facility];  Service:    • DILATATION AND CURETTAGE     • ENDOMETRIAL ABLATION  2020    Dr Cuevas   • ENDOSCOPY N/A 2020    Procedure: ESOPHAGOGASTRODUODENOSCOPY;  Surgeon: Natalia Mariscal MD;  Location:  [facility];  Service: Bariatric;  Laterality: N/A;   • ENDOSCOPY  2022    Dr. La   • ESSURE TUBAL LIGATION     • GASTRIC SLEEVE LAPAROSCOPIC  2015    With Dr. Hughes   • GASTRIC SLEEVE LAPAROSCOPIC N/A 2020    Procedure: GASTRIC SLEEVE LAPAROSCOPIC;  Surgeon: Natalia Mariscal MD;  Location:  [facility];  Service: Bariatric;  Laterality: N/A;   • LAPAROSCOPIC CHOLECYSTECTOMY  2018    Dr. Hughes   • SALPINGECTOMY Bilateral 2020    Dr Cuevas   • TONSILLECTOMY     • WISDOM TOOTH EXTRACTION         Allergies   Allergen Reactions   • Codeine Other (See Comments)     UNKNOWN         Current Outpatient Medications:   •  B-D 3CC LUER-LYLY SYR 73KL3-6/2 23G X 1-/2\" 3 ML misc, AS DIRECTED DAILY WITH THIAMINE, Disp: , Rfl:   •  Cholecalciferol (Vitamin D) 50 MCG ( UT) tablet, Take 2,000 Units by mouth Daily., Disp: 90 tablet, Rfl: 1  •  cyclobenzaprine (FLEXERIL) 10 MG tablet, TAKE 1 TABLET BY MOUTH THREE TIMES DAILY AS NEEDED FOR MUSCLE SPASMS, Disp: 30 tablet, Rfl: 0  •  ferrous gluconate (FERGON) 324 MG tablet, Take 1 tablet by mouth 2 (Two) Times a Day., Disp: 180 tablet, Rfl: 1  •  fluconazole (DIFLUCAN) 150 MG tablet, Take 150 mg by mouth Daily., Disp: , " "Rfl:   •  Multiple Vitamins-Minerals (MULTIPLE VITAMINS/WOMENS) tablet, Take 1 tablet by mouth Daily., Disp: , Rfl:   •  phentermine (Adipex-P) 37.5 MG tablet, Take 1 tablet by mouth Every Morning Before Breakfast., Disp: 30 tablet, Rfl: 0  •  topiramate (Topamax) 25 MG tablet, Take 1 tablet by mouth 2 (Two) Times a Day., Disp: 60 tablet, Rfl: 0  •  vitamin D (ERGOCALCIFEROL) 1.25 MG (11105 UT) capsule capsule, Take 50,000 Units by mouth 1 (One) Time Per Week., Disp: , Rfl:     Current Facility-Administered Medications:   •  cyanocobalamin injection 1,000 mcg, 1,000 mcg, Intramuscular, Q28 Days, Alice Fuentes PA-C, 1,000 mcg at 01/19/23 0951      /76 (BP Location: Left arm, Patient Position: Sitting)   Pulse 67   Temp 98.2 °F (36.8 °C)   Resp 16   Ht 177.8 cm (70\")   Wt 124 kg (273 lb 9.6 oz)   SpO2 97%   BMI 39.26 kg/m²   Physical Exam  Constitutional:       Appearance: She is well-developed.   HENT:      Head: Normocephalic and atraumatic.   Cardiovascular:      Rate and Rhythm: Normal rate.   Pulmonary:      Effort: Pulmonary effort is normal.   Neurological:      Mental Status: She is alert and oriented to person, place, and time.   Psychiatric:         Behavior: Behavior normal.         Thought Content: Thought content normal.         Judgment: Judgment normal.           Assessment:   Pursuing Weight Loss Surgery.    ICD-10-CM ICD-9-CM   1. Obesity, Class II, BMI 35-39.9  E66.9 278.00       Discussion/Plan:  During diet appointments the patient is educated on high quality nutrition and habits to facilitate good health and possibly some weight loss. Necessary lifestyle changes and behavior modifications were discussed. Please note, the patient remains compliant in completing her diet requirements.     Goals:   1. Continue to be mindful of healthy food choices and portion control.   2. Increase daily protein intake and reduce carbs.  3. Increase daily exercise/activity as able.   4. Avoid HFCS. "     Checking labs today.      Will let referral coordinator know diet vist completed.  Call w/ issues/concerns.

## 2023-02-09 LAB
ALBUMIN SERPL-MCNC: 4 G/DL (ref 3.8–4.8)
ALBUMIN/GLOB SERPL: 1.5 {RATIO} (ref 1.2–2.2)
ALP SERPL-CCNC: 72 IU/L (ref 44–121)
ALT SERPL-CCNC: 16 IU/L (ref 0–32)
AST SERPL-CCNC: 19 IU/L (ref 0–40)
BASOPHILS # BLD AUTO: 0 X10E3/UL (ref 0–0.2)
BASOPHILS NFR BLD AUTO: 1 %
BILIRUB SERPL-MCNC: 0.5 MG/DL (ref 0–1.2)
BUN SERPL-MCNC: 11 MG/DL (ref 6–20)
BUN/CREAT SERPL: 14 (ref 9–23)
CALCIUM SERPL-MCNC: 9 MG/DL (ref 8.7–10.2)
CHLORIDE SERPL-SCNC: 107 MMOL/L (ref 96–106)
CHOLEST SERPL-MCNC: 118 MG/DL (ref 100–199)
CO2 SERPL-SCNC: 23 MMOL/L (ref 20–29)
CREAT SERPL-MCNC: 0.81 MG/DL (ref 0.57–1)
EGFRCR SERPLBLD CKD-EPI 2021: 96 ML/MIN/1.73
EOSINOPHIL # BLD AUTO: 0.2 X10E3/UL (ref 0–0.4)
EOSINOPHIL NFR BLD AUTO: 3 %
ERYTHROCYTE [DISTWIDTH] IN BLOOD BY AUTOMATED COUNT: 11.8 % (ref 11.7–15.4)
GLOBULIN SER CALC-MCNC: 2.7 G/DL (ref 1.5–4.5)
GLUCOSE SERPL-MCNC: 62 MG/DL (ref 70–99)
HBA1C MFR BLD: 5.3 % (ref 4.8–5.6)
HCT VFR BLD AUTO: 36.4 % (ref 34–46.6)
HDLC SERPL-MCNC: 50 MG/DL
HGB BLD-MCNC: 11.7 G/DL (ref 11.1–15.9)
IMM GRANULOCYTES # BLD AUTO: 0 X10E3/UL (ref 0–0.1)
IMM GRANULOCYTES NFR BLD AUTO: 0 %
LDLC SERPL CALC-MCNC: 60 MG/DL (ref 0–99)
LYMPHOCYTES # BLD AUTO: 2 X10E3/UL (ref 0.7–3.1)
LYMPHOCYTES NFR BLD AUTO: 30 %
MCH RBC QN AUTO: 29.7 PG (ref 26.6–33)
MCHC RBC AUTO-ENTMCNC: 32.1 G/DL (ref 31.5–35.7)
MCV RBC AUTO: 92 FL (ref 79–97)
MONOCYTES # BLD AUTO: 0.3 X10E3/UL (ref 0.1–0.9)
MONOCYTES NFR BLD AUTO: 4 %
NEUTROPHILS # BLD AUTO: 4.2 X10E3/UL (ref 1.4–7)
NEUTROPHILS NFR BLD AUTO: 62 %
PLATELET # BLD AUTO: 250 X10E3/UL (ref 150–450)
POTASSIUM SERPL-SCNC: 3.9 MMOL/L (ref 3.5–5.2)
PROT SERPL-MCNC: 6.7 G/DL (ref 6–8.5)
RBC # BLD AUTO: 3.94 X10E6/UL (ref 3.77–5.28)
SODIUM SERPL-SCNC: 142 MMOL/L (ref 134–144)
TRIGL SERPL-MCNC: 24 MG/DL (ref 0–149)
TSH SERPL DL<=0.005 MIU/L-ACNC: 0.96 UIU/ML (ref 0.45–4.5)
UREA BREATH TEST QL: NEGATIVE
VLDLC SERPL CALC-MCNC: 8 MG/DL (ref 5–40)
WBC # BLD AUTO: 6.6 X10E3/UL (ref 3.4–10.8)

## 2023-02-10 ENCOUNTER — OFFICE VISIT (OUTPATIENT)
Dept: CARDIOLOGY | Facility: CLINIC | Age: 38
End: 2023-02-10
Payer: COMMERCIAL

## 2023-02-10 VITALS
BODY MASS INDEX: 39.31 KG/M2 | SYSTOLIC BLOOD PRESSURE: 126 MMHG | DIASTOLIC BLOOD PRESSURE: 82 MMHG | HEIGHT: 70 IN | HEART RATE: 56 BPM | OXYGEN SATURATION: 99 % | WEIGHT: 274.6 LBS

## 2023-02-10 DIAGNOSIS — Z01.810 ENCOUNTER FOR PRE-OPERATIVE CARDIOVASCULAR CLEARANCE: Primary | ICD-10-CM

## 2023-02-10 DIAGNOSIS — R00.1 BRADYCARDIA, SINUS: ICD-10-CM

## 2023-02-10 PROCEDURE — 99243 OFF/OP CNSLTJ NEW/EST LOW 30: CPT | Performed by: INTERNAL MEDICINE

## 2023-02-24 ENCOUNTER — OFFICE VISIT (OUTPATIENT)
Dept: BEHAVIORAL HEALTH | Facility: CLINIC | Age: 38
End: 2023-02-24
Payer: COMMERCIAL

## 2023-02-24 DIAGNOSIS — F33.0 MDD (MAJOR DEPRESSIVE DISORDER), RECURRENT EPISODE, MILD: Primary | ICD-10-CM

## 2023-02-24 DIAGNOSIS — Z71.89 ENCOUNTER FOR PSYCHOLOGICAL ASSESSMENT PRIOR TO BARIATRIC SURGERY: ICD-10-CM

## 2023-02-24 DIAGNOSIS — E66.9 OBESITY (BMI 35.0-39.9 WITHOUT COMORBIDITY): ICD-10-CM

## 2023-02-24 PROCEDURE — 90791 PSYCH DIAGNOSTIC EVALUATION: CPT | Performed by: PSYCHOLOGIST

## 2023-02-24 NOTE — PROGRESS NOTES
PROGRESS NOTE    Data:    Geneva Cordova is a 37 y.o. female who met with the undersigned for a scheduled psychological evaluation from 11:25 - 12:05pm.       Clinical Maneuvering/Intervention:      Chief complaint and history of presenting illness/Problems: struggling with obesity for several years. Despite trying different weight loss plans and diets, the pt reported being unsuccessful in losing weight. She did have weight loss surgery in 2015, but subsequently struggled to lose weight/keep weight off after that time. A psychological evaluation was conducted in order to assess past and current level of functioning. Areas assessed included, but were not limited to: perception of social support, perception of ability to face and deal with challenges in life (positive functioning), anxiety symptoms, depressive symptoms, perspective on beliefs/belief system, coping skills for stress, intelligence level, addiction issues, etc. Therapeutic rapport was established. Interventions conducted today were geared towards assessing the pt's readiness for weight loss surgery and identifying and psychological contraindications for undergoing such a major life change. Social support was deemed strong (specific to weight loss surgery/weight loss in this manner and in a general sense): friends, co-workers, doctors. Current psychological struggles were described as low to moderate, but included depression and frustrations with being overweight. Coping skills for distress and related to undergoing a major life change such as weight loss surgery/weight loss were deemed strong and included strong juan manuel in God, perseverance, boundary setting, work ethic, knowing what she wants in life, maintaining quality relationships with others (close friends, sister, co-workers, doctors), and believes in herself that she will be successful with weight loss surgery. The pt endorsed having characteristics of readiness to undergo major life changes  inherent in the journey of weight loss surgery. She could speak to having 'suffered enough,' and the decision to have a second weight loss surgery is one she feels determined to have. The pt expressed gratitude for today's visit. Follow up appointments for counseling were discussed.     Past Family and Social History:      History of family mental health problems: none endorsed    Psychosocial history: treatment of psychiatric care in the past (N/A), alcohol/substance abuse treatment in the past (N/A) , alcohol/substance abuse problems (N/A), inpatient psychiatric care (N/A).    Mental Status Exam (MSE):  Hygiene:  good  Dress: normal  Attitude:  cooperative and proactive  Motor Activity: normal  Speech: normal  Mood:   determined   Affect:  congruent  Thought Processes: normal  Thought Content:  normal  Suicidal Thoughts:  not endorsed  Homicidal Thoughts:  not endorsed  Crisis Safety Plan: not needed   Hallucinations:  none      Patient's Support Network Includes:  family, friends      Progress toward goal: there is evidence to suggest that she is taking measures to improve the quality of her life including seeking weight loss surgery.       Functional Status: moderate to high      Prognosis: good with weight loss surgery    Evaluation, Diagnoses, and Ability/Capacity to Respond to Treatment:      The pt presented to be struggling with depression and obesity (BMI = 39.40, obesity). Results of MSE demonstrated a functional status of moderate to high. Strengths: belief in self that she will be successful with weight loss surgery, etc (see detailed list of coping skills above). Needed for growth (CPT code requirement for Weaknesses): weight loss.      From a psychological standpoint, the pt presents as a good candidate for bariatric surgery. She trusts her surgeon and speaks quite highly of him. She is motivated for the surgery, has showed readiness for the lifestyle change in terms of starting to adjust her eating  habits, and seems to have appropriate expectations of how to prepare and how to live after surgery in order to lose weight successfully.    Treatment Plan:      Short term goals: Start improving her health by following up with her bariatric surgeon in order to receive weight loss surgery as soon as feasible/appropriate and demonstrate success with compliance to adhering to the recommended diet. Long term goals: reach a healthy weight and alleviation of depression via taking control over her health. Short term/long term goals: She was informed about the ongoing support she has available for psychotherapy moving forward and she agreed to make appointments as desired/needed.     Mahsa Garrett, PhD, LP

## 2023-03-06 DIAGNOSIS — R53.83 FATIGUE, UNSPECIFIED TYPE: Primary | ICD-10-CM

## 2023-03-06 DIAGNOSIS — R06.00 DYSPNEA, UNSPECIFIED TYPE: ICD-10-CM

## 2023-03-20 ENCOUNTER — HOSPITAL ENCOUNTER (OUTPATIENT)
Dept: GENERAL RADIOLOGY | Facility: HOSPITAL | Age: 38
Discharge: HOME | End: 2023-03-20
Payer: COMMERCIAL

## 2023-03-20 ENCOUNTER — LAB (OUTPATIENT)
Dept: LAB | Facility: HOSPITAL | Age: 38
End: 2023-03-20
Payer: COMMERCIAL

## 2023-03-20 DIAGNOSIS — R06.00 DYSPNEA, UNSPECIFIED TYPE: ICD-10-CM

## 2023-03-20 DIAGNOSIS — R53.83 FATIGUE, UNSPECIFIED TYPE: ICD-10-CM

## 2023-03-20 LAB
DEPRECATED RDW RBC AUTO: 38.4 FL (ref 37–54)
ERYTHROCYTE [DISTWIDTH] IN BLOOD BY AUTOMATED COUNT: 11.7 % (ref 12.3–15.4)
HCT VFR BLD AUTO: 36.6 % (ref 34–46.6)
HGB BLD-MCNC: 11.8 G/DL (ref 12–15.9)
MCH RBC QN AUTO: 29.1 PG (ref 26.6–33)
MCHC RBC AUTO-ENTMCNC: 32.2 G/DL (ref 31.5–35.7)
MCV RBC AUTO: 90.4 FL (ref 79–97)
PLATELET # BLD AUTO: 257 10*3/MM3 (ref 140–450)
PMV BLD AUTO: 12.3 FL (ref 6–12)
RBC # BLD AUTO: 4.05 10*6/MM3 (ref 3.77–5.28)
WBC NRBC COR # BLD: 6.06 10*3/MM3 (ref 3.4–10.8)

## 2023-03-20 PROCEDURE — 36415 COLL VENOUS BLD VENIPUNCTURE: CPT

## 2023-03-20 PROCEDURE — 80053 COMPREHEN METABOLIC PANEL: CPT

## 2023-03-20 PROCEDURE — 85027 COMPLETE CBC AUTOMATED: CPT

## 2023-03-20 PROCEDURE — 71046 X-RAY EXAM CHEST 2 VIEWS: CPT

## 2023-03-21 LAB
ALBUMIN SERPL-MCNC: 4 G/DL (ref 3.5–5.2)
ALBUMIN/GLOB SERPL: 1.3 G/DL
ALP SERPL-CCNC: 70 U/L (ref 39–117)
ALT SERPL W P-5'-P-CCNC: 14 U/L (ref 1–33)
ANION GAP SERPL CALCULATED.3IONS-SCNC: 11 MMOL/L (ref 5–15)
AST SERPL-CCNC: 24 U/L (ref 1–32)
BILIRUB SERPL-MCNC: 0.5 MG/DL (ref 0–1.2)
BUN SERPL-MCNC: 9 MG/DL (ref 6–20)
BUN/CREAT SERPL: 10.3 (ref 7–25)
CALCIUM SPEC-SCNC: 9.2 MG/DL (ref 8.6–10.5)
CHLORIDE SERPL-SCNC: 105 MMOL/L (ref 98–107)
CO2 SERPL-SCNC: 25 MMOL/L (ref 22–29)
CREAT SERPL-MCNC: 0.87 MG/DL (ref 0.57–1)
EGFRCR SERPLBLD CKD-EPI 2021: 88.7 ML/MIN/1.73
GLOBULIN UR ELPH-MCNC: 3.1 GM/DL
GLUCOSE SERPL-MCNC: 72 MG/DL (ref 65–99)
POTASSIUM SERPL-SCNC: 4.2 MMOL/L (ref 3.5–5.2)
PROT SERPL-MCNC: 7.1 G/DL (ref 6–8.5)
SODIUM SERPL-SCNC: 141 MMOL/L (ref 136–145)

## 2023-03-28 ENCOUNTER — CONSULT (OUTPATIENT)
Dept: BARIATRICS/WEIGHT MGMT | Facility: CLINIC | Age: 38
End: 2023-03-28
Payer: COMMERCIAL

## 2023-03-28 VITALS
RESPIRATION RATE: 16 BRPM | HEIGHT: 70 IN | SYSTOLIC BLOOD PRESSURE: 118 MMHG | BODY MASS INDEX: 40.63 KG/M2 | HEART RATE: 56 BPM | DIASTOLIC BLOOD PRESSURE: 76 MMHG | OXYGEN SATURATION: 96 % | WEIGHT: 283.8 LBS | TEMPERATURE: 97.5 F

## 2023-03-28 DIAGNOSIS — K31.1 PARTIAL GASTRIC OUTLET OBSTRUCTION: Primary | ICD-10-CM

## 2023-03-28 DIAGNOSIS — E66.01 MORBID OBESITY WITH BODY MASS INDEX OF 40.0-44.9 IN ADULT: ICD-10-CM

## 2023-03-28 DIAGNOSIS — Z98.84 S/P LAPAROSCOPIC SLEEVE GASTRECTOMY: ICD-10-CM

## 2023-03-28 DIAGNOSIS — K21.9 HIATAL HERNIA WITH GASTROESOPHAGEAL REFLUX: ICD-10-CM

## 2023-03-28 DIAGNOSIS — K44.9 HIATAL HERNIA WITH GASTROESOPHAGEAL REFLUX: ICD-10-CM

## 2023-03-28 PROCEDURE — 99214 OFFICE O/P EST MOD 30 MIN: CPT | Performed by: SURGERY

## 2023-03-28 RX ORDER — PANTOPRAZOLE SODIUM 40 MG/10ML
40 INJECTION, POWDER, LYOPHILIZED, FOR SOLUTION INTRAVENOUS ONCE
Status: CANCELLED | OUTPATIENT
Start: 2023-03-28 | End: 2023-03-28

## 2023-03-28 RX ORDER — GABAPENTIN 100 MG/1
600 CAPSULE ORAL ONCE
Status: CANCELLED | OUTPATIENT
Start: 2023-03-28 | End: 2023-03-28

## 2023-03-28 RX ORDER — SODIUM CHLORIDE 0.9 % (FLUSH) 0.9 %
3-10 SYRINGE (ML) INJECTION AS NEEDED
Status: CANCELLED | OUTPATIENT
Start: 2023-03-28

## 2023-03-28 RX ORDER — ENOXAPARIN SODIUM 100 MG/ML
40 INJECTION SUBCUTANEOUS ONCE
Status: CANCELLED | OUTPATIENT
Start: 2023-03-28 | End: 2023-03-28

## 2023-03-28 RX ORDER — SODIUM CHLORIDE 0.9 % (FLUSH) 0.9 %
3 SYRINGE (ML) INJECTION EVERY 12 HOURS SCHEDULED
Status: CANCELLED | OUTPATIENT
Start: 2023-03-28

## 2023-03-28 RX ORDER — SODIUM CHLORIDE 9 MG/ML
40 INJECTION, SOLUTION INTRAVENOUS AS NEEDED
Status: CANCELLED | OUTPATIENT
Start: 2023-03-28

## 2023-03-28 RX ORDER — SCOLOPAMINE TRANSDERMAL SYSTEM 1 MG/1
1 PATCH, EXTENDED RELEASE TRANSDERMAL ONCE
Status: CANCELLED | OUTPATIENT
Start: 2023-03-28 | End: 2023-03-28

## 2023-03-28 RX ORDER — SODIUM CHLORIDE, SODIUM LACTATE, POTASSIUM CHLORIDE, CALCIUM CHLORIDE 600; 310; 30; 20 MG/100ML; MG/100ML; MG/100ML; MG/100ML
150 INJECTION, SOLUTION INTRAVENOUS CONTINUOUS
Status: CANCELLED | OUTPATIENT
Start: 2023-03-28

## 2023-03-28 RX ORDER — CHLORHEXIDINE GLUCONATE 0.12 MG/ML
30 RINSE ORAL
Status: CANCELLED | OUTPATIENT
Start: 2023-03-28 | End: 2023-03-28

## 2023-03-28 RX ORDER — ACETAMINOPHEN 500 MG
1000 TABLET ORAL ONCE
Status: CANCELLED | OUTPATIENT
Start: 2023-03-28 | End: 2023-03-28

## 2023-03-28 NOTE — PROGRESS NOTES
"[facility] BARIATRIC SURGERY  [address]  [address]  [facility] [id number] [phone]      Patient  Name:  Geneva Cordova  :  1985      Date of Visit: 3/28/23    Chief Complaint:  weight gain; unable to maintain weight loss.   Reevaluate for possible revisional metabolic and bariatric surgery with partial gastric outlet obstruction which developed after revision sleeve gastrectomy 2020    History of Present Illness:  Geneva Cordova is a 37 y.o. female who presents today for reevaluation, education and consultation regarding metabolic and bariatric surgery (MBS) to address her partial gastric outlet obstruction status post redo sleeve gastrectomy in 2020.  Since last seen 2/10/2023 she has gained roughly 9 pounds.  My most recent evaluation dated 2023 reviewed:      \"Most rate evaluation Carmelita CANALES PA-C reviewed.    She notes the patient has history of sleeve gastrectomy with paraesophageal hernia repair with Dr. Hughes in  and underwent a sleeve revision in 2020 with Dr. Mariscal complicated by a postoperative stricture.  She notes the patient has had \"really bad acid reflux\" for the last year worsening the last couple months despite twice daily PPI and is waking almost nightly with burning reflux issues not getting enough sleep having headaches fatigue and nausea associated globus sensation but denies abdominal pain no vomiting no fevers or chills and has been constipated since her sleeve gastrectomy with her bowels moving every 7 to 10 days and usually takes a laxative and that upper endoscopy in 2021 with Dr. Mariscal revealed a sliding hiatal hernia and a slight stricture at the incisura with pyloric spasm both dilated to 20 mm advising repeat endoscopy/dilatation with Dr. Galvan but the patient canceled appointment because she was having night terrors related to anesthesia repeat upper endoscopy by Dr. La in 2022 revealed LA " "grade a esophagitis with no bleeding in the lower third of the esophagus erosions noted diffuse mild inflammation characterized by erythema in the antrum and gastric body normal second portion of the duodenum.  Upper GI in December 2022 at [facility] showed no stricture moderate reflux in the small size type III paraesophageal hernia.  She notes the patient's presurgery weight was 343 pounds and her weight that day was 273 pounds with a BMI of 39.2 with a weight loss of 70 pounds since surgery.     The patient has had issues with morbid obesity for years and only temporary success with surgical and non-surgical methods of weight loss.  The patient is seeking revisional metabolic and bariatric surgery to help with the morbid obesity related conditions of anemia, anxiety and depression, peripheral edema, recurrent axillary boils, chronic back pain, dyspnea exertion, fatigue, impaired glucose tolerance, history of H. pylori gastritis, migraine headaches, obstructive sleep apnea, PCOS, history of removal left ovarian serous cystadenoma, recurrent paraesophageal hernia and reflux.     37-year-old morbidly obese female from [place] with past bariatric surgical history as above.  She says she does avoid high fructose corn syrup.  She denies sleep apnea.  She complains of chronic constipation.  Reflux symptoms and dysphagia not controlled with twice daily PPI.  I encouraged her to seek second opinion(s) and she says \"I trust you\".  On EGD 1/16/2023 I noted a recurrent paraesophageal hernia with some narrowing at the hiatal outlet and narrowing without twist at the angularis Z-line roughly 37 cm diaphragmatic pinch at 40 cm.  I noted that I initially performed her sleeve and paraesophageal hernia in 2015 and she presented seeking revision options and plans were for a modified duodenal switch however her insurance would not cover it and I removed her gallbladder in 2018 and EGD and upper GI were both " unremarkable at that time as was her immediate postoperative upper GI.  Dr. Mariscal ended up taking her back for revision sleeve gastrectomy in December 2020, no hiatal hernia noted at the time of revision.  Upper GI the next day showed a hiatal hernia which was confirmed again on upper GI a year later.  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  Pathology of the antrum showed reactive changes negative for H. pylori distal esophageal biopsies showed reactive changes otherwise unremarkable.....    Assessment:     Geneva Cordova is a 37 y.o. year old female with medically complicated obesity status post laparoscopic sleeve gastrectomy and paraesophageal hernia repair July 2015, laparoscopic revision sleeve gastrectomy December 2020 complicated by recurrent paraesophageal hernia and partial gastric outlet obstruction.     Revisional metabolic and bariatric surgery is deemed medically necessary given the following obesity related comorbidities including anemia, anxiety and depression, peripheral edema, recurrent axillary boils, chronic back pain, dyspnea exertion, fatigue, impaired glucose tolerance, history of H. pylori gastritis, migraine headaches, obstructive sleep apnea, PCOS, history of removal left ovarian serous cystadenoma, recurrent paraesophageal hernia and reflux with current Weight: 125 kg (276 lb 8 oz) and Body mass index is 39.67 kg/m²..     We had a long discussion regarding the risk, benefits, and alternative therapies to revisional metabolic and bariatric surgical options.  I used flip charts and Internet diagrams.  I went over potential short and long-term complications and the need for lifelong  vitamin, mineral supplementation and laboratory follow-up and the possibility that further surgery may be warranted for potential complications, etc.  We discussed revision to a Alejandro-en-Y gastric bypass with or without biliopancreatic diversion with duodenal switch.  I feel given her partial gastric outlet obstruction with subsequent recurrent paraesophageal hernia that likely her sleeve cannot be salvaged and therefore I think a modified or formal duodenal switch is now off the table.  I strongly encouraged the patient to seek second opinion(s) and to consider referral to medical weight loss.     Complications of laparoscopic/possible robotic gastric bypass with biliopancreatic diversion and duodenal switch were discussed including but not limited to bleeding, infection, deep venous thrombosis, pulmonary embolism, pulmonary complications such as pneumonia, cardiac events, hernias, small bowel obstruction, damage to the spleen or other organs, bowel injury, disfiguring scars, failure to lose weight, need for additional surgery, conversion to an open procedure, and death. The most common short term complics in addition to cardiopulm risk, VTE, bleeding, infection, etc is leak or obstruction at the anastomoses, which could have potentially serious and even fatal consequences and require further surgery(s).  The patient understands that they will have more frequent BM's, will be malodorous, and that at increased risk for vitamin deficiencies, pardeep the fat soluble vitamins, and that this can be serious and irreversible - still wishes to proceed, says they will be compliant w f/u and vit/supplment recommendations.  Aware that may need an elongation procedure in the future if diarrhea and/or vitamin issues not controlled.  Patient is also aware of complications which apply in this particular procedure that can include but are not limited to leaking of gastric and/or enteric contents at the staple or suture lines which  "could lead to intra-abdominal abscess, or chronic complications of strictures, ulcers, or vitamin/mineral deficiencies.        Plan:    After discussion of the risks, benefits, and alternative therapies as above she wishes to proceed with laparoscopic possible robotic assisted revision of her previous revised sleeve gastrectomy to a Alejandro-en-Y gastric bypass with biliopancreatic diversion and duodenal switch, laparoscopic recurrent paraesophageal hiatal hernia repair, and EGD.  She understands that this is likely going to be a prolonged operation with a Arellano catheter, TOLU drain, etc.  Prior to scheduling surgery she will reattend informed consent class and I will see her back in the office for final evaluation.\"    She returns for final visit prior to scheduling surgery.  She attended informed consent last night, said it was a good refresher and plans to get her kids off high fructose corn syrup.  She says no changes since my evaluation on 1/24/2023.  Prior to her original sleeve gastrectomy she weighed 424 pounds and had a BMI of 60.8.  She has been deemed intermediate pulmonary risk however says she was never a smoker and denies asthma.  She said she quit taking her PPI because it just did not work.  She says she awakens choking and it is scary.  She says she eats Tums like candy.      Past Medical History:   Diagnosis Date   • Anemia    • Anxiety    • Bilateral lower extremity edema     R>L   • Boil, axilla     recurrent   • Chronic back pain     no meds or injections, feels related to breasts   • Concussion with no loss of consciousness 08/17/2022   • COVID-19 12/2020   • Depression    • Dyspepsia    • Dyspnea on exertion    • Fatigue    • Gastroesophageal reflux disease concurrent with and due to paraesophageal hernia     recurrent after LSG revision, EGD Dr. Hughes 1/16/23   • Glucose intolerance (impaired glucose tolerance)    • Helicobacter pylori (H. pylori) infection     asx and grossly nl EGD. \"abundant\"on " "path. HBT still + after PrevPak tx. LSG path neg. neg testing 2018   • Hypoalbuminemia    • Left Ovarian serous cystadenoma -removed at  section 2017   • Migraine     resolved with weightloss   • Obesity, morbid (HCC)    • VIRA (obstructive sleep apnea)     improved since WLS   • PCOS (polycystic ovarian syndrome)    • Right Essure implantation 2017 10/06/2017   • S/P  section;left ovarian cystectomy and partial salpingectomy 2017   • Seasonal allergies      Past Surgical History:   Procedure Laterality Date   • ABDOMINOPLASTY  2022    Osmany Mclain at [facility]   • BILATERAL BREAST REDUCTION      2022   •  SECTION N/A 2017      SECTION PRIMARY;  CYSTECTOMY; Kwadwo Baxter MD; :  [facility];  Service:    • DILATATION AND CURETTAGE     • ENDOMETRIAL ABLATION  2020    Dr Cuevas   • ENDOSCOPY N/A 2020    Procedure: ESOPHAGOGASTRODUODENOSCOPY;  Surgeon: Natalia Mariscal MD;  Location:  [facility];  Service: Bariatric;  Laterality: N/A;   • ENDOSCOPY  2022    Dr. La   • ESSURE TUBAL LIGATION     • GASTRIC SLEEVE LAPAROSCOPIC  2015    With Dr. Hughes   • GASTRIC SLEEVE LAPAROSCOPIC N/A 2020    Procedure: GASTRIC SLEEVE LAPAROSCOPIC;  Surgeon: Natalia Mariscal MD;  Location:  [facility];  Service: Bariatric;  Laterality: N/A;   • LAPAROSCOPIC CHOLECYSTECTOMY  2018    Dr. Hughes   • SALPINGECTOMY Bilateral 2020    Dr Cuevas   • TONSILLECTOMY     • WISDOM TOOTH EXTRACTION         Allergies   Allergen Reactions   • Codeine Other (See Comments)     UNKNOWN       Current Outpatient Medications:   •  ferrous gluconate (FERGON) 324 MG tablet, Take 1 tablet by mouth 2 (Two) Times a Day., Disp: 180 tablet, Rfl: 1  •  Multiple Vitamins-Minerals (MULTIPLE VITAMINS/WOMENS) tablet, Take 1 tablet by mouth Daily., Disp: , Rfl:   •  B-D 3CC LUER-LYLY SYR 66SK2-1/2 23G X 1-1/2\" 3 ML misc, " AS DIRECTED DAILY WITH THIAMINE, Disp: , Rfl:     Current Facility-Administered Medications:   •  cyanocobalamin injection 1,000 mcg, 1,000 mcg, Intramuscular, Q28 Days, Alice Fuentes PA-C, 1,000 mcg at 01/19/23 0951    Social History     Socioeconomic History   • Marital status: Single   • Number of children: 4   • Years of education: College    Tobacco Use   • Smoking status: Never   • Smokeless tobacco: Never   Vaping Use   • Vaping Use: Never used   Substance and Sexual Activity   • Alcohol use: Not Currently   • Drug use: No   • Sexual activity: Yes     Partners: Male     Birth control/protection: Surgical     Comment: no OCP     Family History   Problem Relation Age of Onset   • No Known Problems Other    • Sleep apnea Mother    • Diabetes Mother    • No Known Problems Father    • Pancreatic cancer Maternal Grandmother    • No Known Problems Sister    • No Known Problems Brother    • No Known Problems Daughter    • No Known Problems Son    • No Known Problems Paternal Grandmother    • No Known Problems Maternal Aunt    • No Known Problems Paternal Aunt    • BRCA 1/2 Neg Hx    • Colon cancer Neg Hx    • Endometrial cancer Neg Hx    • Ovarian cancer Neg Hx        Review of Systems   Constitutional: Positive for fatigue and unexpected weight gain. Negative for chills, diaphoresis, fever and unexpected weight loss.   HENT: Negative for congestion and facial swelling.    Eyes: Negative for blurred vision, double vision and discharge.   Respiratory: Negative for chest tightness, shortness of breath and stridor.    Cardiovascular: Negative for chest pain, palpitations and leg swelling.   Gastrointestinal: Positive for constipation and GERD. Negative for blood in stool.   Endocrine: Negative for polydipsia.   Genitourinary: Negative for hematuria.   Musculoskeletal: Positive for back pain.   Skin: Negative for color change.   Allergic/Immunologic: Negative for immunocompromised state.   Neurological: Negative for  confusion.   Psychiatric/Behavioral: Positive for sleep disturbance. Negative for self-injury. The patient is nervous/anxious.        I have reviewed the ROS and confirm that it's accurate today.    Physical Exam:  Vital Signs:  Weight: 129 kg (283 lb 12.8 oz)   Body mass index is 40.72 kg/m².  Temp: 97.5 °F (36.4 °C)   Heart Rate: 56   BP: 118/76     Physical Exam  Vitals reviewed.   Constitutional:       Appearance: She is well-developed.   HENT:      Head: Normocephalic and atraumatic.      Nose: Nose normal.   Eyes:      Conjunctiva/sclera: Conjunctivae normal.      Pupils: Pupils are equal, round, and reactive to light.   Neck:      Thyroid: No thyromegaly.      Vascular: No carotid bruit.      Trachea: No tracheal deviation.   Cardiovascular:      Rate and Rhythm: Normal rate and regular rhythm.      Heart sounds: Normal heart sounds.   Pulmonary:      Effort: Pulmonary effort is normal. No respiratory distress.      Breath sounds: Normal breath sounds.   Abdominal:      General: There is no distension.      Palpations: Abdomen is soft.      Tenderness: There is no abdominal tenderness.      Comments: Abdominoplasty scars with neoumbilicus   Musculoskeletal:         General: No deformity. Normal range of motion.      Cervical back: Normal range of motion and neck supple.   Skin:     General: Skin is warm and dry.      Findings: No rash.      Comments: Tattoos   Neurological:      Mental Status: She is alert and oriented to person, place, and time.      Cranial Nerves: No cranial nerve deficit.      Coordination: Coordination normal.   Psychiatric:         Behavior: Behavior normal.         Thought Content: Thought content normal.         Judgment: Judgment normal.         Patient Active Problem List   Diagnosis   • Obesity affecting pregnancy in second trimester, antepartum   • VIRA (obstructive sleep apnea)   • Migraine   • Anemia   • Joint pain   • Chronic back pain   • Anxiety   • PCOS (polycystic ovarian  syndrome)   • Dyspepsia   • Seasonal allergies   • Moderate episode of recurrent major depressive disorder (HCC)   • FH: breast cancer   • Dyspnea on exertion   • Class 2 obesity due to excess calories without serious comorbidity with body mass index (BMI) of 39.0 to 39.9 in adult   • Elevated blood pressure reading   • GERD without esophagitis   • Gastric hourglass stricture or stenosis   • Dysphagia   • Generalized anxiety disorder   • Acute vaginitis   • Pelvic pressure in female   • Vitamin D deficiency   • Screen for STD (sexually transmitted disease)   • High risk medication use   • Abnormal glucose   • B12 deficiency   • Iron deficiency   • Encounter for pre-operative cardiovascular clearance   • Bradycardia, sinus       Assessment:    Geneva Cordova is a 37 y.o. year old female with medically complicated obesity and partial gastric outlet obstruction status post sleeve revision December 2020    Revisional metabolic and bariatric surgery is deemed medically necessary to address her partial gastric outlet obstruction in addition to the following obesity related comorbidities including anemia, anxiety and depression, peripheral edema, recurrent axillary boils, chronic back pain, dyspnea exertion, fatigue, impaired glucose tolerance, history of H. pylori gastritis, migraine headaches, obstructive sleep apnea, PCOS, history of removal left ovarian serous cystadenoma, recurrent paraesophageal hernia and reflux with current Weight: 129 kg (283 lb 12.8 oz) and Body mass index is 40.72 kg/m²..    Patient is aware that surgery is a tool, and that weight loss and improvement in comorbidities is not guaranteed but only seen in the context of appropriate use, follow up and physical activity.    The patient was present for an approximately a 2.5 hour discussion of the purpose of MBS, how MBS is a tool to assist in achieving weight loss goals, the most common complications and how best to avoid them, and the strategies for  short and long term weight loss and improvement in comorbidities.  Ample opportunity to discuss questions was available both in group and during the time of individual examination.    I reviewed her Adam report showing phentermine x4, oxycodone x1.  Psychosocial evaluation dated 2/24/2023 Mahsa GONZALEZ, PhD good candidate.  Dietitian evaluation dated 2/1/2023 Michelle SANTIAGO RD noting the patient has 1 meal and numerous snacks daily limited intake due to her reflux tries to focus on high-protein foods and has severe constipation.  Negative H. pylori breath test dated 2/9/2023 unremarkable CBC of note hemoglobin 11.7 unremarkable CBC except for glucose of 62 chloride 107 normal lipid panel normal hemoglobin A1c normal TSH.  Pulmonary clearance Nori TREY APRN dated 11/1/2022 at intermediate risk with a total criteria point count of 31.  Cardiology clearance dated 2/10/2023 Dr. Hamm low/acceptable risk.  Please see scanned records that I have reviewed and signed off on today.  All of this in addition to the patient's unique history and exam has been taken into consideration in determining their appropriate candidacy for MBS.    Complications of laparoscopic/possible robotic gastric bypass were discussed including but not limited to bleeding, infection, deep venous thrombosis, pulmonary embolism, pulmonary complications such as pneumonia, cardiac events, hernias, small bowel obstruction, damage to the spleen or other organs, bowel injury, disfiguring scars, failure to lose weight, need for additional surgery, conversion to an open procedure, and death.  Patient is also aware of complications which apply in this particular procedure that can include but are not limited to leaking of gastric contents at the staple or suture lines which could lead to intra-abdominal abscess, or chronic complications of strictures, ulcers, or vitamin/mineral deficiencies.    Risks, benefits, alternative therapies were discussed to laparoscopic  possible robotic assisted concomitant biliopancreatic diversion.  The most common short term complics in addition to cardiopulm risk, VTE, bleeding, infection, etc is leak or obstruction at the anastomosis, which could have potentially serious and even fatal consequences and require further surgery(s).  The patient understands that they will have more frequent BM's, will be malodorous, and that at increased risk for vitamin deficiencies, pardeep the fat soluble vitamins, and that this can be serious and irreversible - still wishes to proceed, says they will be compliant w f/u and vit/supplment recommendations.  Aware that may need an elongation procedure in the future if diarrhea and/or vitamin issues not controlled.     R/B/A Rx to recurrent paraesophageal hernia repair were discussed as outlined in our long consent form.  Briefly risks in addition to those for gastric bypass with biliopancreatic diversion include recurrent hernia, YUSEF, dysphagia, esophageal injury, pneumothorax, injury to the vagus nerves, injury to the thoracic duct, aorta or vena cava.    Discussed the risks, benefits and alternative therapies at great length as outlined in our extensive consent forms, consent videos, and educational teaching process under the direction of the center's .    A copy of the patient's signed informed consent is on file.    R/B/A Rx discussed to postop anticoagulation incl but not limited to bleeding, drug reaction, venothromboembolic events, etc. and the patient declined.        Plan:      After reevaluation today I think the patient remains a reasonable candidate for laparoscopic robotic assisted revision of her sleeve gastrectomy with partial gastric outlet obstruction to a Alejandro-en-Y gastric bypass with biliopancreatic diversion and duodenal switch, recurrent paraesophageal hernia repair, and EGD.  Once again likely a 3+ hour operation, Arellano catheter, TOLU drain, etc.    Other issues include anemia,  anxiety and depression, peripheral edema, recurrent axillary boils, chronic back pain, dyspnea exertion, fatigue, impaired glucose tolerance, history of H. pylori gastritis, migraine headaches, obstructive sleep apnea, PCOS, history of removal left ovarian serous cystadenoma, recurrent paraesophageal hernia and reflux    Thank you Alice GONZALEZ PA-C for the opportunity to reevaluate Ms. Cordova.    Erik Hughes MD

## 2023-03-28 NOTE — LETTER
"April 10, 2023     Alice Fuentes PA-C   [address]  [address]  [facility] [address]    Patient: Geneva Cordova   YOB: 1985   Date of Visit: 3/28/2023       Dear Dr. Alfredo PA-C:    Thank you for referring Geneva Cordova to me for evaluation. Below are the relevant portions of my assessment and plan of care.    If you have questions, please do not hesitate to call me. I look forward to following Geneva along with you.         Sincerely,        Erik Hughes MD        CC: No Recipients    Erik Hughes MD  04/10/23 1045  Signed  [facility] BARIATRIC SURGERY  [address]  [address]  [facility] [address] [phone]      Patient  Name:  Geneva Cordova  :  1985      Date of Visit: 3/28/23    Chief Complaint:  weight gain; unable to maintain weight loss.   Reevaluate for possible revisional metabolic and bariatric surgery with partial gastric outlet obstruction which developed after revision sleeve gastrectomy 2020    History of Present Illness:  Geneva Cordova is a 37 y.o. female who presents today for reevaluation, education and consultation regarding metabolic and bariatric surgery (MBS) to address her partial gastric outlet obstruction status post redo sleeve gastrectomy in 2020.  Since last seen 2/10/2023 she has gained roughly 9 pounds.  My most recent evaluation dated 2023 reviewed:      \"Most rate evaluation Carmelita CANALES PA-C reviewed.    She notes the patient has history of sleeve gastrectomy with paraesophageal hernia repair with Dr. Hughes in  and underwent a sleeve revision in 2020 with Dr. Mariscal complicated by a postoperative stricture.  She notes the patient has had \"really bad acid reflux\" for the last year worsening the last couple months despite twice daily PPI and is waking almost nightly with burning reflux issues not getting enough sleep having headaches fatigue and nausea associated globus sensation but " denies abdominal pain no vomiting no fevers or chills and has been constipated since her sleeve gastrectomy with her bowels moving every 7 to 10 days and usually takes a laxative and that upper endoscopy in February 2021 with Dr. Mariscal revealed a sliding hiatal hernia and a slight stricture at the incisura with pyloric spasm both dilated to 20 mm advising repeat endoscopy/dilatation with Dr. Galvan but the patient canceled appointment because she was having night terrors related to anesthesia repeat upper endoscopy by Dr. La in September 2022 revealed LA grade a esophagitis with no bleeding in the lower third of the esophagus erosions noted diffuse mild inflammation characterized by erythema in the antrum and gastric body normal second portion of the duodenum.  Upper GI in December 2022 at [facility] showed no stricture moderate reflux in the small size type III paraesophageal hernia.  She notes the patient's presurgery weight was 343 pounds and her weight that day was 273 pounds with a BMI of 39.2 with a weight loss of 70 pounds since surgery.     The patient has had issues with morbid obesity for years and only temporary success with surgical and non-surgical methods of weight loss.  The patient is seeking revisional metabolic and bariatric surgery to help with the morbid obesity related conditions of anemia, anxiety and depression, peripheral edema, recurrent axillary boils, chronic back pain, dyspnea exertion, fatigue, impaired glucose tolerance, history of H. pylori gastritis, migraine headaches, obstructive sleep apnea, PCOS, history of removal left ovarian serous cystadenoma, recurrent paraesophageal hernia and reflux.     37-year-old morbidly obese female from [place] with past bariatric surgical history as above.  She says she does avoid high fructose corn syrup.  She denies sleep apnea.  She complains of chronic constipation.  Reflux symptoms and dysphagia not controlled with twice  "daily PPI.  I encouraged her to seek second opinion(s) and she says \"I trust you\".  On EGD 1/16/2023 I noted a recurrent paraesophageal hernia with some narrowing at the hiatal outlet and narrowing without twist at the angularis Z-line roughly 37 cm diaphragmatic pinch at 40 cm.  I noted that I initially performed her sleeve and paraesophageal hernia in 2015 and she presented seeking revision options and plans were for a modified duodenal switch however her insurance would not cover it and I removed her gallbladder in 2018 and EGD and upper GI were both unremarkable at that time as was her immediate postoperative upper GI.  Dr. Mariscal ended up taking her back for revision sleeve gastrectomy in December 2020, no hiatal hernia noted at the time of revision.  Upper GI the next day showed a hiatal hernia which was confirmed again on upper GI a year later.  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  Pathology of the antrum showed reactive changes negative for H. pylori distal esophageal biopsies showed reactive changes otherwise unremarkable.....    Assessment:     Geneva Cordova is a 37 y.o. year old female with medically complicated obesity status post laparoscopic sleeve gastrectomy and paraesophageal hernia repair July 2015, laparoscopic revision sleeve gastrectomy December 2020 complicated by recurrent paraesophageal hernia and partial gastric outlet obstruction.     Revisional metabolic and bariatric surgery is deemed medically necessary given the following obesity related comorbidities including anemia, anxiety and depression, peripheral edema, recurrent axillary boils, chronic back pain, dyspnea exertion, " fatigue, impaired glucose tolerance, history of H. pylori gastritis, migraine headaches, obstructive sleep apnea, PCOS, history of removal left ovarian serous cystadenoma, recurrent paraesophageal hernia and reflux with current Weight: 125 kg (276 lb 8 oz) and Body mass index is 39.67 kg/m²..     We had a long discussion regarding the risk, benefits, and alternative therapies to revisional metabolic and bariatric surgical options.  I used flip charts and Internet diagrams.  I went over potential short and long-term complications and the need for lifelong vitamin, mineral supplementation and laboratory follow-up and the possibility that further surgery may be warranted for potential complications, etc.  We discussed revision to a Alejandro-en-Y gastric bypass with or without biliopancreatic diversion with duodenal switch.  I feel given her partial gastric outlet obstruction with subsequent recurrent paraesophageal hernia that likely her sleeve cannot be salvaged and therefore I think a modified or formal duodenal switch is now off the table.  I strongly encouraged the patient to seek second opinion(s) and to consider referral to medical weight loss.     Complications of laparoscopic/possible robotic gastric bypass with biliopancreatic diversion and duodenal switch were discussed including but not limited to bleeding, infection, deep venous thrombosis, pulmonary embolism, pulmonary complications such as pneumonia, cardiac events, hernias, small bowel obstruction, damage to the spleen or other organs, bowel injury, disfiguring scars, failure to lose weight, need for additional surgery, conversion to an open procedure, and death. The most common short term complics in addition to cardiopulm risk, VTE, bleeding, infection, etc is leak or obstruction at the anastomoses, which could have potentially serious and even fatal consequences and require further surgery(s).  The patient understands that they will have more frequent  "BM's, will be malodorous, and that at increased risk for vitamin deficiencies, pardeep the fat soluble vitamins, and that this can be serious and irreversible - still wishes to proceed, says they will be compliant w f/u and vit/supplment recommendations.  Aware that may need an elongation procedure in the future if diarrhea and/or vitamin issues not controlled.  Patient is also aware of complications which apply in this particular procedure that can include but are not limited to leaking of gastric and/or enteric contents at the staple or suture lines which could lead to intra-abdominal abscess, or chronic complications of strictures, ulcers, or vitamin/mineral deficiencies.        Plan:    After discussion of the risks, benefits, and alternative therapies as above she wishes to proceed with laparoscopic possible robotic assisted revision of her previous revised sleeve gastrectomy to a Alejandro-en-Y gastric bypass with biliopancreatic diversion and duodenal switch, laparoscopic recurrent paraesophageal hiatal hernia repair, and EGD.  She understands that this is likely going to be a prolonged operation with a Arellano catheter, TOLU drain, etc.  Prior to scheduling surgery she will reattend informed consent class and I will see her back in the office for final evaluation.\"    She returns for final visit prior to scheduling surgery.  She attended informed consent last night, said it was a good refresher and plans to get her kids off high fructose corn syrup.  She says no changes since my evaluation on 1/24/2023.  Prior to her original sleeve gastrectomy she weighed 424 pounds and had a BMI of 60.8.  She has been deemed intermediate pulmonary risk however says she was never a smoker and denies asthma.  She said she quit taking her PPI because it just did not work.  She says she awakens choking and it is scary.  She says she eats Tums like candy.      Past Medical History:   Diagnosis Date   • Anemia    • Anxiety    • Bilateral " "lower extremity edema     R>L   • Boil, axilla     recurrent   • Chronic back pain     no meds or injections, feels related to breasts   • Concussion with no loss of consciousness 2022   • COVID-19 2020   • Depression    • Dyspepsia    • Dyspnea on exertion    • Fatigue    • Gastroesophageal reflux disease concurrent with and due to paraesophageal hernia     recurrent after LSG revision, EGD Dr. Hughes 23   • Glucose intolerance (impaired glucose tolerance)    • Helicobacter pylori (H. pylori) infection     asx and grossly nl EGD. \"abundant\"on path. HBT still + after PrevPak tx. LSG path neg. neg testing 2018   • Hypoalbuminemia    • Left Ovarian serous cystadenoma -removed at  section 2017   • Migraine     resolved with weightloss   • Obesity, morbid (HCC)    • VIRA (obstructive sleep apnea)     improved since WLS   • PCOS (polycystic ovarian syndrome)    • Right Essure implantation 2017 10/06/2017   • S/P  section;left ovarian cystectomy and partial salpingectomy 2017   • Seasonal allergies      Past Surgical History:   Procedure Laterality Date   • ABDOMINOPLASTY  2022    Osmany Mclain at [facility]   • BILATERAL BREAST REDUCTION      2022   •  SECTION N/A 2017      SECTION PRIMARY;  CYSTECTOMY; Kwadwo Baxter MD; :  [facility];  Service:    • DILATATION AND CURETTAGE     • ENDOMETRIAL ABLATION  2020    Dr Cuevas   • ENDOSCOPY N/A 2020    Procedure: ESOPHAGOGASTRODUODENOSCOPY;  Surgeon: Natalia Mariscal MD;  Location:  [facility];  Service: Bariatric;  Laterality: N/A;   • ENDOSCOPY  2022    Dr. La   • ESSURE TUBAL LIGATION     • GASTRIC SLEEVE LAPAROSCOPIC  2015    With Dr. Hughes   • GASTRIC SLEEVE LAPAROSCOPIC N/A 2020    Procedure: GASTRIC SLEEVE LAPAROSCOPIC;  Surgeon: Natalia Mariscal MD;  Location:  [facility];  Service: Bariatric;  Laterality: N/A;   • " "LAPAROSCOPIC CHOLECYSTECTOMY  02/2018    Dr. Hughes   • SALPINGECTOMY Bilateral 02/2020    Dr Cuevas   • TONSILLECTOMY  2014   • WISDOM TOOTH EXTRACTION  2014       Allergies   Allergen Reactions   • Codeine Other (See Comments)     UNKNOWN       Current Outpatient Medications:   •  ferrous gluconate (FERGON) 324 MG tablet, Take 1 tablet by mouth 2 (Two) Times a Day., Disp: 180 tablet, Rfl: 1  •  Multiple Vitamins-Minerals (MULTIPLE VITAMINS/WOMENS) tablet, Take 1 tablet by mouth Daily., Disp: , Rfl:   •  B-D 3CC LUER-LYLY SYR 73YM3-7/2 23G X 1-1/2\" 3 ML misc, AS DIRECTED DAILY WITH THIAMINE, Disp: , Rfl:     Current Facility-Administered Medications:   •  cyanocobalamin injection 1,000 mcg, 1,000 mcg, Intramuscular, Q28 Days, Alice Fuentes PA-C, 1,000 mcg at 01/19/23 0951    Social History     Socioeconomic History   • Marital status: Single   • Number of children: 4   • Years of education: College    Tobacco Use   • Smoking status: Never   • Smokeless tobacco: Never   Vaping Use   • Vaping Use: Never used   Substance and Sexual Activity   • Alcohol use: Not Currently   • Drug use: No   • Sexual activity: Yes     Partners: Male     Birth control/protection: Surgical     Comment: no OCP     Family History   Problem Relation Age of Onset   • No Known Problems Other    • Sleep apnea Mother    • Diabetes Mother    • No Known Problems Father    • Pancreatic cancer Maternal Grandmother    • No Known Problems Sister    • No Known Problems Brother    • No Known Problems Daughter    • No Known Problems Son    • No Known Problems Paternal Grandmother    • No Known Problems Maternal Aunt    • No Known Problems Paternal Aunt    • BRCA 1/2 Neg Hx    • Colon cancer Neg Hx    • Endometrial cancer Neg Hx    • Ovarian cancer Neg Hx        Review of Systems   Constitutional: Positive for fatigue and unexpected weight gain. Negative for chills, diaphoresis, fever and unexpected weight loss.   HENT: Negative for congestion and facial " swelling.    Eyes: Negative for blurred vision, double vision and discharge.   Respiratory: Negative for chest tightness, shortness of breath and stridor.    Cardiovascular: Negative for chest pain, palpitations and leg swelling.   Gastrointestinal: Positive for constipation and GERD. Negative for blood in stool.   Endocrine: Negative for polydipsia.   Genitourinary: Negative for hematuria.   Musculoskeletal: Positive for back pain.   Skin: Negative for color change.   Allergic/Immunologic: Negative for immunocompromised state.   Neurological: Negative for confusion.   Psychiatric/Behavioral: Positive for sleep disturbance. Negative for self-injury. The patient is nervous/anxious.        I have reviewed the ROS and confirm that it's accurate today.    Physical Exam:  Vital Signs:  Weight: 129 kg (283 lb 12.8 oz)   Body mass index is 40.72 kg/m².  Temp: 97.5 °F (36.4 °C)   Heart Rate: 56   BP: 118/76     Physical Exam  Vitals reviewed.   Constitutional:       Appearance: She is well-developed.   HENT:      Head: Normocephalic and atraumatic.      Nose: Nose normal.   Eyes:      Conjunctiva/sclera: Conjunctivae normal.      Pupils: Pupils are equal, round, and reactive to light.   Neck:      Thyroid: No thyromegaly.      Vascular: No carotid bruit.      Trachea: No tracheal deviation.   Cardiovascular:      Rate and Rhythm: Normal rate and regular rhythm.      Heart sounds: Normal heart sounds.   Pulmonary:      Effort: Pulmonary effort is normal. No respiratory distress.      Breath sounds: Normal breath sounds.   Abdominal:      General: There is no distension.      Palpations: Abdomen is soft.      Tenderness: There is no abdominal tenderness.      Comments: Abdominoplasty scars with neoumbilicus   Musculoskeletal:         General: No deformity. Normal range of motion.      Cervical back: Normal range of motion and neck supple.   Skin:     General: Skin is warm and dry.      Findings: No rash.      Comments:  Tattoos   Neurological:      Mental Status: She is alert and oriented to person, place, and time.      Cranial Nerves: No cranial nerve deficit.      Coordination: Coordination normal.   Psychiatric:         Behavior: Behavior normal.         Thought Content: Thought content normal.         Judgment: Judgment normal.         Patient Active Problem List   Diagnosis   • Obesity affecting pregnancy in second trimester, antepartum   • VIRA (obstructive sleep apnea)   • Migraine   • Anemia   • Joint pain   • Chronic back pain   • Anxiety   • PCOS (polycystic ovarian syndrome)   • Dyspepsia   • Seasonal allergies   • Moderate episode of recurrent major depressive disorder (HCC)   • FH: breast cancer   • Dyspnea on exertion   • Class 2 obesity due to excess calories without serious comorbidity with body mass index (BMI) of 39.0 to 39.9 in adult   • Elevated blood pressure reading   • GERD without esophagitis   • Gastric hourglass stricture or stenosis   • Dysphagia   • Generalized anxiety disorder   • Acute vaginitis   • Pelvic pressure in female   • Vitamin D deficiency   • Screen for STD (sexually transmitted disease)   • High risk medication use   • Abnormal glucose   • B12 deficiency   • Iron deficiency   • Encounter for pre-operative cardiovascular clearance   • Bradycardia, sinus       Assessment:    Geneva Cordova is a 37 y.o. year old female with medically complicated obesity and partial gastric outlet obstruction status post sleeve revision December 2020    Revisional metabolic and bariatric surgery is deemed medically necessary to address her partial gastric outlet obstruction in addition to the following obesity related comorbidities including anemia, anxiety and depression, peripheral edema, recurrent axillary boils, chronic back pain, dyspnea exertion, fatigue, impaired glucose tolerance, history of H. pylori gastritis, migraine headaches, obstructive sleep apnea, PCOS, history of removal left ovarian serous  cystadenoma, recurrent paraesophageal hernia and reflux with current Weight: 129 kg (283 lb 12.8 oz) and Body mass index is 40.72 kg/m²..    Patient is aware that surgery is a tool, and that weight loss and improvement in comorbidities is not guaranteed but only seen in the context of appropriate use, follow up and physical activity.    The patient was present for an approximately a 2.5 hour discussion of the purpose of MBS, how MBS is a tool to assist in achieving weight loss goals, the most common complications and how best to avoid them, and the strategies for short and long term weight loss and improvement in comorbidities.  Ample opportunity to discuss questions was available both in group and during the time of individual examination.    I reviewed her Aadm report showing phentermine x4, oxycodone x1.  Psychosocial evaluation dated 2/24/2023 Mahsa GONZALEZ, PhD good candidate.  Dietitian evaluation dated 2/1/2023 Michelle SANTIAGO RD noting the patient has 1 meal and numerous snacks daily limited intake due to her reflux tries to focus on high-protein foods and has severe constipation.  Negative H. pylori breath test dated 2/9/2023 unremarkable CBC of note hemoglobin 11.7 unremarkable CBC except for glucose of 62 chloride 107 normal lipid panel normal hemoglobin A1c normal TSH.  Pulmonary clearance Nori YANG APRN dated 11/1/2022 at intermediate risk with a total criteria point count of 31.  Cardiology clearance dated 2/10/2023 Dr. Hamm low/acceptable risk.  Please see scanned records that I have reviewed and signed off on today.  All of this in addition to the patient's unique history and exam has been taken into consideration in determining their appropriate candidacy for MBS.    Complications of laparoscopic/possible robotic gastric bypass were discussed including but not limited to bleeding, infection, deep venous thrombosis, pulmonary embolism, pulmonary complications such as pneumonia, cardiac events, hernias, small  bowel obstruction, damage to the spleen or other organs, bowel injury, disfiguring scars, failure to lose weight, need for additional surgery, conversion to an open procedure, and death.  Patient is also aware of complications which apply in this particular procedure that can include but are not limited to leaking of gastric contents at the staple or suture lines which could lead to intra-abdominal abscess, or chronic complications of strictures, ulcers, or vitamin/mineral deficiencies.    Risks, benefits, alternative therapies were discussed to laparoscopic possible robotic assisted concomitant biliopancreatic diversion.  The most common short term complics in addition to cardiopulm risk, VTE, bleeding, infection, etc is leak or obstruction at the anastomosis, which could have potentially serious and even fatal consequences and require further surgery(s).  The patient understands that they will have more frequent BM's, will be malodorous, and that at increased risk for vitamin deficiencies, pardeep the fat soluble vitamins, and that this can be serious and irreversible - still wishes to proceed, says they will be compliant w f/u and vit/supplment recommendations.  Aware that may need an elongation procedure in the future if diarrhea and/or vitamin issues not controlled.     R/B/A Rx to recurrent paraesophageal hernia repair were discussed as outlined in our long consent form.  Briefly risks in addition to those for gastric bypass with biliopancreatic diversion include recurrent hernia, YUSEF, dysphagia, esophageal injury, pneumothorax, injury to the vagus nerves, injury to the thoracic duct, aorta or vena cava.    Discussed the risks, benefits and alternative therapies at great length as outlined in our extensive consent forms, consent videos, and educational teaching process under the direction of the center's .    A copy of the patient's signed informed consent is on file.    R/B/A Rx discussed to  postop anticoagulation incl but not limited to bleeding, drug reaction, venothromboembolic events, etc. and the patient declined.        Plan:      After reevaluation today I think the patient remains a reasonable candidate for laparoscopic robotic assisted revision of her sleeve gastrectomy with partial gastric outlet obstruction to a Alejandro-en-Y gastric bypass with biliopancreatic diversion and duodenal switch, recurrent paraesophageal hernia repair, and EGD.  Once again likely a 3+ hour operation, Arellano catheter, TOLU drain, etc.    Other issues include anemia, anxiety and depression, peripheral edema, recurrent axillary boils, chronic back pain, dyspnea exertion, fatigue, impaired glucose tolerance, history of H. pylori gastritis, migraine headaches, obstructive sleep apnea, PCOS, history of removal left ovarian serous cystadenoma, recurrent paraesophageal hernia and reflux    Thank you Alice GONZALEZ PA-C for the opportunity to reevaluate Ms. Cordova.    Erik Hughes MD

## 2023-03-28 NOTE — H&P (VIEW-ONLY)
"[facility] BARIATRIC SURGERY  [address]  [address]  [facility] [id number] [phone]      Patient  Name:  Geneva Cordova  :  1985      Date of Visit: 3/28/23    Chief Complaint:  weight gain; unable to maintain weight loss.   Reevaluate for possible revisional metabolic and bariatric surgery with partial gastric outlet obstruction which developed after revision sleeve gastrectomy 2020    History of Present Illness:  Geneva Cordova is a 37 y.o. female who presents today for reevaluation, education and consultation regarding metabolic and bariatric surgery (MBS) to address her partial gastric outlet obstruction status post redo sleeve gastrectomy in 2020.  Since last seen 2/10/2023 she has gained roughly 9 pounds.  My most recent evaluation dated 2023 reviewed:      \"Most rate evaluation Carmelita CANALES PA-C reviewed.    She notes the patient has history of sleeve gastrectomy with paraesophageal hernia repair with Dr. Hughes in  and underwent a sleeve revision in 2020 with Dr. Mariscal complicated by a postoperative stricture.  She notes the patient has had \"really bad acid reflux\" for the last year worsening the last couple months despite twice daily PPI and is waking almost nightly with burning reflux issues not getting enough sleep having headaches fatigue and nausea associated globus sensation but denies abdominal pain no vomiting no fevers or chills and has been constipated since her sleeve gastrectomy with her bowels moving every 7 to 10 days and usually takes a laxative and that upper endoscopy in 2021 with Dr. Mariscal revealed a sliding hiatal hernia and a slight stricture at the incisura with pyloric spasm both dilated to 20 mm advising repeat endoscopy/dilatation with Dr. Galvan but the patient canceled appointment because she was having night terrors related to anesthesia repeat upper endoscopy by Dr. La in 2022 revealed LA " "grade a esophagitis with no bleeding in the lower third of the esophagus erosions noted diffuse mild inflammation characterized by erythema in the antrum and gastric body normal second portion of the duodenum.  Upper GI in December 2022 at [facility] showed no stricture moderate reflux in the small size type III paraesophageal hernia.  She notes the patient's presurgery weight was 343 pounds and her weight that day was 273 pounds with a BMI of 39.2 with a weight loss of 70 pounds since surgery.     The patient has had issues with morbid obesity for years and only temporary success with surgical and non-surgical methods of weight loss.  The patient is seeking revisional metabolic and bariatric surgery to help with the morbid obesity related conditions of anemia, anxiety and depression, peripheral edema, recurrent axillary boils, chronic back pain, dyspnea exertion, fatigue, impaired glucose tolerance, history of H. pylori gastritis, migraine headaches, obstructive sleep apnea, PCOS, history of removal left ovarian serous cystadenoma, recurrent paraesophageal hernia and reflux.     37-year-old morbidly obese female from [place] with past bariatric surgical history as above.  She says she does avoid high fructose corn syrup.  She denies sleep apnea.  She complains of chronic constipation.  Reflux symptoms and dysphagia not controlled with twice daily PPI.  I encouraged her to seek second opinion(s) and she says \"I trust you\".  On EGD 1/16/2023 I noted a recurrent paraesophageal hernia with some narrowing at the hiatal outlet and narrowing without twist at the angularis Z-line roughly 37 cm diaphragmatic pinch at 40 cm.  I noted that I initially performed her sleeve and paraesophageal hernia in 2015 and she presented seeking revision options and plans were for a modified duodenal switch however her insurance would not cover it and I removed her gallbladder in 2018 and EGD and upper GI were both " unremarkable at that time as was her immediate postoperative upper GI.  Dr. Mariscal ended up taking her back for revision sleeve gastrectomy in December 2020, no hiatal hernia noted at the time of revision.  Upper GI the next day showed a hiatal hernia which was confirmed again on upper GI a year later.  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  Pathology of the antrum showed reactive changes negative for H. pylori distal esophageal biopsies showed reactive changes otherwise unremarkable.....    Assessment:     Geneva Cordova is a 37 y.o. year old female with medically complicated obesity status post laparoscopic sleeve gastrectomy and paraesophageal hernia repair July 2015, laparoscopic revision sleeve gastrectomy December 2020 complicated by recurrent paraesophageal hernia and partial gastric outlet obstruction.     Revisional metabolic and bariatric surgery is deemed medically necessary given the following obesity related comorbidities including anemia, anxiety and depression, peripheral edema, recurrent axillary boils, chronic back pain, dyspnea exertion, fatigue, impaired glucose tolerance, history of H. pylori gastritis, migraine headaches, obstructive sleep apnea, PCOS, history of removal left ovarian serous cystadenoma, recurrent paraesophageal hernia and reflux with current Weight: 125 kg (276 lb 8 oz) and Body mass index is 39.67 kg/m²..     We had a long discussion regarding the risk, benefits, and alternative therapies to revisional metabolic and bariatric surgical options.  I used flip charts and Internet diagrams.  I went over potential short and long-term complications and the need for lifelong  vitamin, mineral supplementation and laboratory follow-up and the possibility that further surgery may be warranted for potential complications, etc.  We discussed revision to a Alejandro-en-Y gastric bypass with or without biliopancreatic diversion with duodenal switch.  I feel given her partial gastric outlet obstruction with subsequent recurrent paraesophageal hernia that likely her sleeve cannot be salvaged and therefore I think a modified or formal duodenal switch is now off the table.  I strongly encouraged the patient to seek second opinion(s) and to consider referral to medical weight loss.     Complications of laparoscopic/possible robotic gastric bypass with biliopancreatic diversion and duodenal switch were discussed including but not limited to bleeding, infection, deep venous thrombosis, pulmonary embolism, pulmonary complications such as pneumonia, cardiac events, hernias, small bowel obstruction, damage to the spleen or other organs, bowel injury, disfiguring scars, failure to lose weight, need for additional surgery, conversion to an open procedure, and death. The most common short term complics in addition to cardiopulm risk, VTE, bleeding, infection, etc is leak or obstruction at the anastomoses, which could have potentially serious and even fatal consequences and require further surgery(s).  The patient understands that they will have more frequent BM's, will be malodorous, and that at increased risk for vitamin deficiencies, pardeep the fat soluble vitamins, and that this can be serious and irreversible - still wishes to proceed, says they will be compliant w f/u and vit/supplment recommendations.  Aware that may need an elongation procedure in the future if diarrhea and/or vitamin issues not controlled.  Patient is also aware of complications which apply in this particular procedure that can include but are not limited to leaking of gastric and/or enteric contents at the staple or suture lines which  "could lead to intra-abdominal abscess, or chronic complications of strictures, ulcers, or vitamin/mineral deficiencies.        Plan:    After discussion of the risks, benefits, and alternative therapies as above she wishes to proceed with laparoscopic possible robotic assisted revision of her previous revised sleeve gastrectomy to a Alejandro-en-Y gastric bypass with biliopancreatic diversion and duodenal switch, laparoscopic recurrent paraesophageal hiatal hernia repair, and EGD.  She understands that this is likely going to be a prolonged operation with a Arellano catheter, TOLU drain, etc.  Prior to scheduling surgery she will reattend informed consent class and I will see her back in the office for final evaluation.\"    She returns for final visit prior to scheduling surgery.  She attended informed consent last night, said it was a good refresher and plans to get her kids off high fructose corn syrup.  She says no changes since my evaluation on 1/24/2023.  Prior to her original sleeve gastrectomy she weighed 424 pounds and had a BMI of 60.8.  She has been deemed intermediate pulmonary risk however says she was never a smoker and denies asthma.  She said she quit taking her PPI because it just did not work.  She says she awakens choking and it is scary.  She says she eats Tums like candy.      Past Medical History:   Diagnosis Date   • Anemia    • Anxiety    • Bilateral lower extremity edema     R>L   • Boil, axilla     recurrent   • Chronic back pain     no meds or injections, feels related to breasts   • Concussion with no loss of consciousness 08/17/2022   • COVID-19 12/2020   • Depression    • Dyspepsia    • Dyspnea on exertion    • Fatigue    • Gastroesophageal reflux disease concurrent with and due to paraesophageal hernia     recurrent after LSG revision, EGD Dr. Hughes 1/16/23   • Glucose intolerance (impaired glucose tolerance)    • Helicobacter pylori (H. pylori) infection     asx and grossly nl EGD. \"abundant\"on " "path. HBT still + after PrevPak tx. LSG path neg. neg testing 2018   • Hypoalbuminemia    • Left Ovarian serous cystadenoma -removed at  section 2017   • Migraine     resolved with weightloss   • Obesity, morbid (HCC)    • VIRA (obstructive sleep apnea)     improved since WLS   • PCOS (polycystic ovarian syndrome)    • Right Essure implantation 2017 10/06/2017   • S/P  section;left ovarian cystectomy and partial salpingectomy 2017   • Seasonal allergies      Past Surgical History:   Procedure Laterality Date   • ABDOMINOPLASTY  2022    Osmany Mclain at [facility]   • BILATERAL BREAST REDUCTION      2022   •  SECTION N/A 2017      SECTION PRIMARY;  CYSTECTOMY; Kwadwo Baxter MD; :  [facility];  Service:    • DILATATION AND CURETTAGE     • ENDOMETRIAL ABLATION  2020    Dr Cuevas   • ENDOSCOPY N/A 2020    Procedure: ESOPHAGOGASTRODUODENOSCOPY;  Surgeon: Natalia Mariscal MD;  Location:  [facility];  Service: Bariatric;  Laterality: N/A;   • ENDOSCOPY  2022    Dr. La   • ESSURE TUBAL LIGATION     • GASTRIC SLEEVE LAPAROSCOPIC  2015    With Dr. Hughes   • GASTRIC SLEEVE LAPAROSCOPIC N/A 2020    Procedure: GASTRIC SLEEVE LAPAROSCOPIC;  Surgeon: Natalia Mariscal MD;  Location:  [facility];  Service: Bariatric;  Laterality: N/A;   • LAPAROSCOPIC CHOLECYSTECTOMY  2018    Dr. Hughes   • SALPINGECTOMY Bilateral 2020    Dr Cuevas   • TONSILLECTOMY     • WISDOM TOOTH EXTRACTION         Allergies   Allergen Reactions   • Codeine Other (See Comments)     UNKNOWN       Current Outpatient Medications:   •  ferrous gluconate (FERGON) 324 MG tablet, Take 1 tablet by mouth 2 (Two) Times a Day., Disp: 180 tablet, Rfl: 1  •  Multiple Vitamins-Minerals (MULTIPLE VITAMINS/WOMENS) tablet, Take 1 tablet by mouth Daily., Disp: , Rfl:   •  B-D 3CC LUER-LYLY SYR 68UK5-3/2 23G X 1-1/2\" 3 ML misc, " AS DIRECTED DAILY WITH THIAMINE, Disp: , Rfl:     Current Facility-Administered Medications:   •  cyanocobalamin injection 1,000 mcg, 1,000 mcg, Intramuscular, Q28 Days, Alice Fuentes PA-C, 1,000 mcg at 01/19/23 0951    Social History     Socioeconomic History   • Marital status: Single   • Number of children: 4   • Years of education: College    Tobacco Use   • Smoking status: Never   • Smokeless tobacco: Never   Vaping Use   • Vaping Use: Never used   Substance and Sexual Activity   • Alcohol use: Not Currently   • Drug use: No   • Sexual activity: Yes     Partners: Male     Birth control/protection: Surgical     Comment: no OCP     Family History   Problem Relation Age of Onset   • No Known Problems Other    • Sleep apnea Mother    • Diabetes Mother    • No Known Problems Father    • Pancreatic cancer Maternal Grandmother    • No Known Problems Sister    • No Known Problems Brother    • No Known Problems Daughter    • No Known Problems Son    • No Known Problems Paternal Grandmother    • No Known Problems Maternal Aunt    • No Known Problems Paternal Aunt    • BRCA 1/2 Neg Hx    • Colon cancer Neg Hx    • Endometrial cancer Neg Hx    • Ovarian cancer Neg Hx        Review of Systems   Constitutional: Positive for fatigue and unexpected weight gain. Negative for chills, diaphoresis, fever and unexpected weight loss.   HENT: Negative for congestion and facial swelling.    Eyes: Negative for blurred vision, double vision and discharge.   Respiratory: Negative for chest tightness, shortness of breath and stridor.    Cardiovascular: Negative for chest pain, palpitations and leg swelling.   Gastrointestinal: Positive for constipation and GERD. Negative for blood in stool.   Endocrine: Negative for polydipsia.   Genitourinary: Negative for hematuria.   Musculoskeletal: Positive for back pain.   Skin: Negative for color change.   Allergic/Immunologic: Negative for immunocompromised state.   Neurological: Negative for  confusion.   Psychiatric/Behavioral: Positive for sleep disturbance. Negative for self-injury. The patient is nervous/anxious.        I have reviewed the ROS and confirm that it's accurate today.    Physical Exam:  Vital Signs:  Weight: 129 kg (283 lb 12.8 oz)   Body mass index is 40.72 kg/m².  Temp: 97.5 °F (36.4 °C)   Heart Rate: 56   BP: 118/76     Physical Exam  Vitals reviewed.   Constitutional:       Appearance: She is well-developed.   HENT:      Head: Normocephalic and atraumatic.      Nose: Nose normal.   Eyes:      Conjunctiva/sclera: Conjunctivae normal.      Pupils: Pupils are equal, round, and reactive to light.   Neck:      Thyroid: No thyromegaly.      Vascular: No carotid bruit.      Trachea: No tracheal deviation.   Cardiovascular:      Rate and Rhythm: Normal rate and regular rhythm.      Heart sounds: Normal heart sounds.   Pulmonary:      Effort: Pulmonary effort is normal. No respiratory distress.      Breath sounds: Normal breath sounds.   Abdominal:      General: There is no distension.      Palpations: Abdomen is soft.      Tenderness: There is no abdominal tenderness.      Comments: Abdominoplasty scars with neoumbilicus   Musculoskeletal:         General: No deformity. Normal range of motion.      Cervical back: Normal range of motion and neck supple.   Skin:     General: Skin is warm and dry.      Findings: No rash.      Comments: Tattoos   Neurological:      Mental Status: She is alert and oriented to person, place, and time.      Cranial Nerves: No cranial nerve deficit.      Coordination: Coordination normal.   Psychiatric:         Behavior: Behavior normal.         Thought Content: Thought content normal.         Judgment: Judgment normal.         Patient Active Problem List   Diagnosis   • Obesity affecting pregnancy in second trimester, antepartum   • VIRA (obstructive sleep apnea)   • Migraine   • Anemia   • Joint pain   • Chronic back pain   • Anxiety   • PCOS (polycystic ovarian  syndrome)   • Dyspepsia   • Seasonal allergies   • Moderate episode of recurrent major depressive disorder (HCC)   • FH: breast cancer   • Dyspnea on exertion   • Class 2 obesity due to excess calories without serious comorbidity with body mass index (BMI) of 39.0 to 39.9 in adult   • Elevated blood pressure reading   • GERD without esophagitis   • Gastric hourglass stricture or stenosis   • Dysphagia   • Generalized anxiety disorder   • Acute vaginitis   • Pelvic pressure in female   • Vitamin D deficiency   • Screen for STD (sexually transmitted disease)   • High risk medication use   • Abnormal glucose   • B12 deficiency   • Iron deficiency   • Encounter for pre-operative cardiovascular clearance   • Bradycardia, sinus       Assessment:    Geneva Cordova is a 37 y.o. year old female with medically complicated obesity and partial gastric outlet obstruction status post sleeve revision December 2020    Revisional metabolic and bariatric surgery is deemed medically necessary to address her partial gastric outlet obstruction in addition to the following obesity related comorbidities including anemia, anxiety and depression, peripheral edema, recurrent axillary boils, chronic back pain, dyspnea exertion, fatigue, impaired glucose tolerance, history of H. pylori gastritis, migraine headaches, obstructive sleep apnea, PCOS, history of removal left ovarian serous cystadenoma, recurrent paraesophageal hernia and reflux with current Weight: 129 kg (283 lb 12.8 oz) and Body mass index is 40.72 kg/m²..    Patient is aware that surgery is a tool, and that weight loss and improvement in comorbidities is not guaranteed but only seen in the context of appropriate use, follow up and physical activity.    The patient was present for an approximately a 2.5 hour discussion of the purpose of MBS, how MBS is a tool to assist in achieving weight loss goals, the most common complications and how best to avoid them, and the strategies for  short and long term weight loss and improvement in comorbidities.  Ample opportunity to discuss questions was available both in group and during the time of individual examination.    I reviewed her Adam report showing phentermine x4, oxycodone x1.  Psychosocial evaluation dated 2/24/2023 Mahsa GONZALEZ, PhD good candidate.  Dietitian evaluation dated 2/1/2023 Michelle SANTIAGO RD noting the patient has 1 meal and numerous snacks daily limited intake due to her reflux tries to focus on high-protein foods and has severe constipation.  Negative H. pylori breath test dated 2/9/2023 unremarkable CBC of note hemoglobin 11.7 unremarkable CBC except for glucose of 62 chloride 107 normal lipid panel normal hemoglobin A1c normal TSH.  Pulmonary clearance Nori TREY APRN dated 11/1/2022 at intermediate risk with a total criteria point count of 31.  Cardiology clearance dated 2/10/2023 Dr. Hamm low/acceptable risk.  Please see scanned records that I have reviewed and signed off on today.  All of this in addition to the patient's unique history and exam has been taken into consideration in determining their appropriate candidacy for MBS.    Complications of laparoscopic/possible robotic gastric bypass were discussed including but not limited to bleeding, infection, deep venous thrombosis, pulmonary embolism, pulmonary complications such as pneumonia, cardiac events, hernias, small bowel obstruction, damage to the spleen or other organs, bowel injury, disfiguring scars, failure to lose weight, need for additional surgery, conversion to an open procedure, and death.  Patient is also aware of complications which apply in this particular procedure that can include but are not limited to leaking of gastric contents at the staple or suture lines which could lead to intra-abdominal abscess, or chronic complications of strictures, ulcers, or vitamin/mineral deficiencies.    Risks, benefits, alternative therapies were discussed to laparoscopic  possible robotic assisted concomitant biliopancreatic diversion.  The most common short term complics in addition to cardiopulm risk, VTE, bleeding, infection, etc is leak or obstruction at the anastomosis, which could have potentially serious and even fatal consequences and require further surgery(s).  The patient understands that they will have more frequent BM's, will be malodorous, and that at increased risk for vitamin deficiencies, pardeep the fat soluble vitamins, and that this can be serious and irreversible - still wishes to proceed, says they will be compliant w f/u and vit/supplment recommendations.  Aware that may need an elongation procedure in the future if diarrhea and/or vitamin issues not controlled.     R/B/A Rx to recurrent paraesophageal hernia repair were discussed as outlined in our long consent form.  Briefly risks in addition to those for gastric bypass with biliopancreatic diversion include recurrent hernia, YUSEF, dysphagia, esophageal injury, pneumothorax, injury to the vagus nerves, injury to the thoracic duct, aorta or vena cava.    Discussed the risks, benefits and alternative therapies at great length as outlined in our extensive consent forms, consent videos, and educational teaching process under the direction of the center's .    A copy of the patient's signed informed consent is on file.    R/B/A Rx discussed to postop anticoagulation incl but not limited to bleeding, drug reaction, venothromboembolic events, etc. and the patient declined.        Plan:      After reevaluation today I think the patient remains a reasonable candidate for laparoscopic robotic assisted revision of her sleeve gastrectomy with partial gastric outlet obstruction to a Alejandro-en-Y gastric bypass with biliopancreatic diversion and duodenal switch, recurrent paraesophageal hernia repair, and EGD.  Once again likely a 3+ hour operation, Arellano catheter, TOLU drain, etc.    Other issues include anemia,  anxiety and depression, peripheral edema, recurrent axillary boils, chronic back pain, dyspnea exertion, fatigue, impaired glucose tolerance, history of H. pylori gastritis, migraine headaches, obstructive sleep apnea, PCOS, history of removal left ovarian serous cystadenoma, recurrent paraesophageal hernia and reflux    Thank you Alice GONZALEZ PA-C for the opportunity to reevaluate Ms. Cordova.    Erik Hughes MD

## 2023-03-29 PROBLEM — K21.9 HIATAL HERNIA WITH GASTROESOPHAGEAL REFLUX: Status: ACTIVE | Noted: 2023-03-29

## 2023-03-29 PROBLEM — Z98.84 S/P LAPAROSCOPIC SLEEVE GASTRECTOMY: Status: ACTIVE | Noted: 2023-03-29

## 2023-03-29 PROBLEM — E66.01 MORBID OBESITY WITH BODY MASS INDEX OF 40.0-44.9 IN ADULT: Status: ACTIVE | Noted: 2023-03-29

## 2023-03-29 PROBLEM — K44.9 HIATAL HERNIA WITH GASTROESOPHAGEAL REFLUX: Status: ACTIVE | Noted: 2023-03-29

## 2023-04-07 ENCOUNTER — PRE-ADMISSION TESTING (OUTPATIENT)
Dept: PREADMISSION TESTING | Facility: HOSPITAL | Age: 38
End: 2023-04-07
Payer: COMMERCIAL

## 2023-04-07 DIAGNOSIS — E66.09 CLASS 2 OBESITY DUE TO EXCESS CALORIES WITHOUT SERIOUS COMORBIDITY WITH BODY MASS INDEX (BMI) OF 39.0 TO 39.9 IN ADULT: ICD-10-CM

## 2023-04-07 DIAGNOSIS — K44.9 HIATAL HERNIA WITH GASTROESOPHAGEAL REFLUX: ICD-10-CM

## 2023-04-07 DIAGNOSIS — R10.13 DYSPEPSIA: ICD-10-CM

## 2023-04-07 DIAGNOSIS — E66.01 MORBID OBESITY WITH BODY MASS INDEX OF 40.0-44.9 IN ADULT: ICD-10-CM

## 2023-04-07 DIAGNOSIS — Z98.84 S/P LAPAROSCOPIC SLEEVE GASTRECTOMY: ICD-10-CM

## 2023-04-07 DIAGNOSIS — K31.1 PARTIAL GASTRIC OUTLET OBSTRUCTION: ICD-10-CM

## 2023-04-07 DIAGNOSIS — K21.9 HIATAL HERNIA WITH GASTROESOPHAGEAL REFLUX: ICD-10-CM

## 2023-04-07 DIAGNOSIS — F41.1 GENERALIZED ANXIETY DISORDER: ICD-10-CM

## 2023-04-07 DIAGNOSIS — G47.33 OSA (OBSTRUCTIVE SLEEP APNEA): ICD-10-CM

## 2023-04-07 DIAGNOSIS — M25.50 ARTHRALGIA, UNSPECIFIED JOINT: ICD-10-CM

## 2023-04-07 DIAGNOSIS — E53.8 B12 DEFICIENCY: ICD-10-CM

## 2023-04-07 DIAGNOSIS — D50.8 OTHER IRON DEFICIENCY ANEMIA: ICD-10-CM

## 2023-04-07 LAB
ABO GROUP BLD: NORMAL
ALBUMIN SERPL-MCNC: 4.2 G/DL (ref 3.5–5.2)
ALBUMIN/GLOB SERPL: 1.3 G/DL
ALP SERPL-CCNC: 71 U/L (ref 39–117)
ALT SERPL W P-5'-P-CCNC: 15 U/L (ref 1–33)
ANION GAP SERPL CALCULATED.3IONS-SCNC: 9 MMOL/L (ref 5–15)
AST SERPL-CCNC: 17 U/L (ref 1–32)
BASOPHILS # BLD AUTO: 0.02 10*3/MM3 (ref 0–0.2)
BASOPHILS NFR BLD AUTO: 0.4 % (ref 0–1.5)
BILIRUB SERPL-MCNC: 0.6 MG/DL (ref 0–1.2)
BUN SERPL-MCNC: 14 MG/DL (ref 6–20)
BUN/CREAT SERPL: 17.9 (ref 7–25)
CALCIUM SPEC-SCNC: 9 MG/DL (ref 8.6–10.5)
CHLORIDE SERPL-SCNC: 108 MMOL/L (ref 98–107)
CHOLEST SERPL-MCNC: 145 MG/DL (ref 0–200)
CO2 SERPL-SCNC: 23 MMOL/L (ref 22–29)
CREAT SERPL-MCNC: 0.78 MG/DL (ref 0.57–1)
DEPRECATED RDW RBC AUTO: 41.1 FL (ref 37–54)
EGFRCR SERPLBLD CKD-EPI 2021: 101.1 ML/MIN/1.73
EOSINOPHIL # BLD AUTO: 0.15 10*3/MM3 (ref 0–0.4)
EOSINOPHIL NFR BLD AUTO: 2.8 % (ref 0.3–6.2)
ERYTHROCYTE [DISTWIDTH] IN BLOOD BY AUTOMATED COUNT: 12 % (ref 12.3–15.4)
GLOBULIN UR ELPH-MCNC: 3.2 GM/DL
GLUCOSE SERPL-MCNC: 77 MG/DL (ref 65–99)
HBA1C MFR BLD: 5 % (ref 4.8–5.6)
HCT VFR BLD AUTO: 39.7 % (ref 34–46.6)
HDLC SERPL-MCNC: 55 MG/DL (ref 40–60)
HGB BLD-MCNC: 12.6 G/DL (ref 12–15.9)
IMM GRANULOCYTES # BLD AUTO: 0.01 10*3/MM3 (ref 0–0.05)
IMM GRANULOCYTES NFR BLD AUTO: 0.2 % (ref 0–0.5)
LDLC SERPL CALC-MCNC: 82 MG/DL (ref 0–100)
LDLC/HDLC SERPL: 1.51 {RATIO}
LYMPHOCYTES # BLD AUTO: 1.41 10*3/MM3 (ref 0.7–3.1)
LYMPHOCYTES NFR BLD AUTO: 26.7 % (ref 19.6–45.3)
MCH RBC QN AUTO: 29.6 PG (ref 26.6–33)
MCHC RBC AUTO-ENTMCNC: 31.7 G/DL (ref 31.5–35.7)
MCV RBC AUTO: 93.4 FL (ref 79–97)
MONOCYTES # BLD AUTO: 0.33 10*3/MM3 (ref 0.1–0.9)
MONOCYTES NFR BLD AUTO: 6.3 % (ref 5–12)
NEUTROPHILS NFR BLD AUTO: 3.36 10*3/MM3 (ref 1.7–7)
NEUTROPHILS NFR BLD AUTO: 63.6 % (ref 42.7–76)
NRBC BLD AUTO-RTO: 0 /100 WBC (ref 0–0.2)
PLATELET # BLD AUTO: 252 10*3/MM3 (ref 140–450)
PMV BLD AUTO: 10.9 FL (ref 6–12)
POTASSIUM SERPL-SCNC: 3.6 MMOL/L (ref 3.5–5.2)
PROT SERPL-MCNC: 7.4 G/DL (ref 6–8.5)
RBC # BLD AUTO: 4.25 10*6/MM3 (ref 3.77–5.28)
RH BLD: POSITIVE
SODIUM SERPL-SCNC: 140 MMOL/L (ref 136–145)
TRIGL SERPL-MCNC: 34 MG/DL (ref 0–150)
TSH SERPL DL<=0.05 MIU/L-ACNC: 0.63 UIU/ML (ref 0.27–4.2)
VLDLC SERPL-MCNC: 8 MG/DL (ref 5–40)
WBC NRBC COR # BLD: 5.28 10*3/MM3 (ref 3.4–10.8)

## 2023-04-07 PROCEDURE — 84443 ASSAY THYROID STIM HORMONE: CPT

## 2023-04-07 PROCEDURE — 80053 COMPREHEN METABOLIC PANEL: CPT

## 2023-04-07 PROCEDURE — 80061 LIPID PANEL: CPT

## 2023-04-07 PROCEDURE — 86900 BLOOD TYPING SEROLOGIC ABO: CPT

## 2023-04-07 PROCEDURE — 36415 COLL VENOUS BLD VENIPUNCTURE: CPT

## 2023-04-07 PROCEDURE — 87081 CULTURE SCREEN ONLY: CPT

## 2023-04-07 PROCEDURE — 86901 BLOOD TYPING SEROLOGIC RH(D): CPT

## 2023-04-07 PROCEDURE — 83036 HEMOGLOBIN GLYCOSYLATED A1C: CPT

## 2023-04-07 PROCEDURE — 85025 COMPLETE CBC W/AUTO DIFF WBC: CPT

## 2023-04-07 NOTE — PAT
An arrival time for procedure was not provided during PAT visit. If patient had any questions or concerns about their arrival time, they were instructed to contact their surgeon/physician.  Additionally, if the patient referred to an arrival time that was acquired from their my chart account, patient was encouraged to verify that time with their surgeon/physician. Arrival times are NOT provided in Pre Admission Testing Department.    Patient viewed general PAT education video as instructed in their preoperative information received from their surgeon.  Patient stated the general PAT education video was viewed in its entirety and survey completed.  Copies of PAT general education handouts (Incentive Spirometry, Meds to Beds Program, Patient Belongings, Pre-op skin preparation instructions, Blood Glucose testing, Visitor policy, Surgery FAQ, Code H) distributed to patient if not printed. Education related to the PAT pass and skin preparation for surgery (if applicable) completed in PAT as a reinforcement to PAT education video. Patient instructed to return PAT pass provided today as well as completed skin preparation sheet (if applicable) on the day of procedure.     Additionally if patient had not viewed video yet but intended to view it at home or in our waiting area, then referred them to the handout with QR code/link provided during PAT visit.  Instructed patient to complete survey after viewing the video in its entirety.  Encouraged patient/family to read PAT general education handouts thoroughly and notify PAT staff with any questions or concerns. Patient verbalized understanding of all information and priority content.    Patient denies any current skin issues.     Patient to apply Chlorhexadine wipes  to surgical area (as instructed) the night before procedure and the AM of procedure. Wipes provided.    Patient instructed to drink 20 ounces of Gatorade and it needs to be completed 1 hour (for Main OR patients)  or 2 hours (scheduled  section & [facility] patients) before given arrival time for procedure (NO RED Gatorade)    Patient verbalized understanding.    Too early to draw type and screen in PAT.  Please obtain blood bank specimen in pre-op on the day of surgery.    Verified patient previously completed cardiology visit for cardiac risk assessment in preparation for upcoming procedure, completion of 12-lead ECG within six months, and risk assessment letter reviewed. No further interventions required.   CARDIAC RISK ASSESSMENT FROM DR. SIFUENTES ON 2/10/23 ON CHART.

## 2023-04-08 LAB — MRSA SPEC QL CULT: NORMAL

## 2023-04-11 ENCOUNTER — TELEPHONE (OUTPATIENT)
Dept: INTERNAL MEDICINE | Facility: CLINIC | Age: 38
End: 2023-04-11
Payer: COMMERCIAL

## 2023-04-11 NOTE — TELEPHONE ENCOUNTER
Caller: Geneva Cordova    Relationship: Self    Best call back number: [phone]    What medication are you requesting: VITAMIN D-3 5,000 UNITS DAILY, VITAMIN B1 100 MG DAILY    Have you had these symptoms before:    [x] Yes  [] No    Have you been treated for these symptoms before:   [x] Yes  [] No    If a prescription is needed, what is your preferred pharmacy and phone number: [facility] #[id number] - [place], [place] - [address] AT [facility] [address] include 234 beds at the [facility] [phone] [facility] [phone] FX     Additional notes: PATIENT IS HAVING BARIATRIC SURGERY ON 04/14/23.    PLEASE CALL PATIENT WHEN CALLED IN, LEAVE A VOICEMAIL IF NO ANSWER.

## 2023-04-12 RX ORDER — THIAMINE MONONITRATE (VIT B1) 100 MG
100 TABLET ORAL DAILY
Qty: 90 TABLET | Refills: 1 | Status: SHIPPED | OUTPATIENT
Start: 2023-04-12

## 2023-04-12 RX ORDER — ERGOCALCIFEROL 1.25 MG/1
CAPSULE ORAL
Qty: 12 CAPSULE | Refills: 0 | OUTPATIENT
Start: 2023-04-12

## 2023-04-12 NOTE — TELEPHONE ENCOUNTER
Called and spoke with patient, informed her that medications were sent to the pharmacy. She wants to cancel appt tomorrow since she made it to have [facility] fill these prescriptions. She will call back to reschedule after her surgery on 4/14/2023.

## 2023-04-13 ENCOUNTER — ANESTHESIA EVENT (OUTPATIENT)
Dept: PERIOP | Facility: HOSPITAL | Age: 38
End: 2023-04-13
Payer: COMMERCIAL

## 2023-04-13 NOTE — ANESTHESIA PREPROCEDURE EVALUATION
Anesthesia Evaluation     Patient summary reviewed and Nursing notes reviewed   no history of anesthetic complications:  NPO Solid Status: > 8 hours  NPO Liquid Status: > 2 hours           Airway   Mallampati: II  TM distance: >3 FB  Neck ROM: full  No difficulty expected  Comment: Schmitt used for previous intubation  Dental      Pulmonary - normal exam   (+) shortness of breath, sleep apnea,   Cardiovascular - negative cardio ROS and normal exam    ECG reviewed      ROS comment: CARDIAC RISK ASSESSMENT FROM DR. SIFUENTES ON 2/10/23 ON CHART    Neuro/Psych  (+) headaches, psychiatric history Depression,    (-) seizures  GI/Hepatic/Renal/Endo    (+) obesity, morbid obesity, hiatal hernia, GERD,  diabetes mellitus,     ROS Comment: G Sleeve 2020    Musculoskeletal     (+) back pain, chronic pain,   Abdominal    Substance History      OB/GYN    (-)  Pregnant    Comment: PCOS      Other        ROS/Med Hx Other: eliquis off 2 days    Schmitt intubation 2020 for sleeve                  Anesthesia Plan    ASA 3     general with block   Rapid sequence  intravenous induction     Anesthetic plan, risks, benefits, and alternatives have been provided, discussed and informed consent has been obtained with: patient.    Plan discussed with CRNA.

## 2023-04-14 ENCOUNTER — HOSPITAL ENCOUNTER (INPATIENT)
Facility: HOSPITAL | Age: 38
LOS: 3 days | Discharge: HOME | End: 2023-04-17
Attending: SURGERY | Admitting: SURGERY
Payer: COMMERCIAL

## 2023-04-14 ENCOUNTER — ANESTHESIA EVENT CONVERTED (OUTPATIENT)
Dept: ANESTHESIOLOGY | Facility: HOSPITAL | Age: 38
End: 2023-04-14
Payer: COMMERCIAL

## 2023-04-14 ENCOUNTER — ANESTHESIA (OUTPATIENT)
Dept: PERIOP | Facility: HOSPITAL | Age: 38
End: 2023-04-14
Payer: COMMERCIAL

## 2023-04-14 DIAGNOSIS — K21.9 HIATAL HERNIA WITH GASTROESOPHAGEAL REFLUX: ICD-10-CM

## 2023-04-14 DIAGNOSIS — K44.9 HIATAL HERNIA WITH GASTROESOPHAGEAL REFLUX: ICD-10-CM

## 2023-04-14 DIAGNOSIS — K31.1 PARTIAL GASTRIC OUTLET OBSTRUCTION: ICD-10-CM

## 2023-04-14 DIAGNOSIS — Z98.84 S/P LAPAROSCOPIC SLEEVE GASTRECTOMY: ICD-10-CM

## 2023-04-14 DIAGNOSIS — E66.01 MORBID OBESITY WITH BODY MASS INDEX OF 40.0-44.9 IN ADULT: ICD-10-CM

## 2023-04-14 LAB
ABO GROUP BLD: NORMAL
B-HCG UR QL: NEGATIVE
BLD GP AB SCN SERPL QL: NEGATIVE
EXPIRATION DATE: NORMAL
HCT VFR BLD AUTO: 34.4 % (ref 34–46.6)
HGB BLD-MCNC: 11.5 G/DL (ref 12–15.9)
INTERNAL NEGATIVE CONTROL: NORMAL
INTERNAL POSITIVE CONTROL: NORMAL
Lab: NORMAL
RH BLD: POSITIVE
T&S EXPIRATION DATE: NORMAL

## 2023-04-14 PROCEDURE — S2900 ROBOTIC SURGICAL SYSTEM: HCPCS | Performed by: SURGERY

## 2023-04-14 PROCEDURE — 0D1A4ZA BYPASS JEJUNUM TO JEJUNUM, PERCUTANEOUS ENDOSCOPIC APPROACH: ICD-10-PCS | Performed by: SURGERY

## 2023-04-14 PROCEDURE — 25010000002 FENTANYL CITRATE (PF) 100 MCG/2ML SOLUTION

## 2023-04-14 PROCEDURE — 94799 UNLISTED PULMONARY SVC/PX: CPT

## 2023-04-14 PROCEDURE — 25010000002 FENTANYL CITRATE (PF) 50 MCG/ML SOLUTION

## 2023-04-14 PROCEDURE — 25010000002 ONDANSETRON PER 1 MG

## 2023-04-14 PROCEDURE — 43644 LAP GASTRIC BYPASS/ROUX-EN-Y: CPT | Performed by: SURGERY

## 2023-04-14 PROCEDURE — 43644 LAP GASTRIC BYPASS/ROUX-EN-Y: CPT | Performed by: PHYSICIAN ASSISTANT

## 2023-04-14 PROCEDURE — 0BQT4ZZ REPAIR DIAPHRAGM, PERCUTANEOUS ENDOSCOPIC APPROACH: ICD-10-PCS | Performed by: SURGERY

## 2023-04-14 PROCEDURE — 81025 URINE PREGNANCY TEST: CPT | Performed by: SURGERY

## 2023-04-14 PROCEDURE — 25010000002 CEFAZOLIN PER 500 MG: Performed by: SURGERY

## 2023-04-14 PROCEDURE — 25010000002 PROPOFOL 10 MG/ML EMULSION

## 2023-04-14 PROCEDURE — 25010000002 ENOXAPARIN PER 10 MG: Performed by: SURGERY

## 2023-04-14 PROCEDURE — 25010000002 DEXAMETHASONE SODIUM PHOSPHATE 10 MG/ML SOLUTION

## 2023-04-14 PROCEDURE — 86850 RBC ANTIBODY SCREEN: CPT | Performed by: SURGERY

## 2023-04-14 PROCEDURE — 0D164ZA BYPASS STOMACH TO JEJUNUM, PERCUTANEOUS ENDOSCOPIC APPROACH: ICD-10-PCS | Performed by: SURGERY

## 2023-04-14 PROCEDURE — 86900 BLOOD TYPING SEROLOGIC ABO: CPT | Performed by: SURGERY

## 2023-04-14 PROCEDURE — 8E0W4CZ ROBOTIC ASSISTED PROCEDURE OF TRUNK REGION, PERCUTANEOUS ENDOSCOPIC APPROACH: ICD-10-PCS | Performed by: SURGERY

## 2023-04-14 PROCEDURE — 43281 LAP PARAESOPHAG HERN REPAIR: CPT | Performed by: SURGERY

## 2023-04-14 PROCEDURE — 86901 BLOOD TYPING SEROLOGIC RH(D): CPT | Performed by: SURGERY

## 2023-04-14 PROCEDURE — 85018 HEMOGLOBIN: CPT | Performed by: SURGERY

## 2023-04-14 PROCEDURE — 43281 LAP PARAESOPHAG HERN REPAIR: CPT | Performed by: PHYSICIAN ASSISTANT

## 2023-04-14 PROCEDURE — 85014 HEMATOCRIT: CPT | Performed by: SURGERY

## 2023-04-14 PROCEDURE — 25010000002 DROPERIDOL PER 5 MG

## 2023-04-14 PROCEDURE — 25010000002 PROPOFOL 1000 MG/ML EMULSION

## 2023-04-14 DEVICE — PBT NON ABSORBABLE WOUND CLOSURE DEVICE
Type: IMPLANTABLE DEVICE | Site: ABDOMEN | Status: FUNCTIONAL
Brand: V-LOC

## 2023-04-14 DEVICE — STAPLER 60 RELOAD GREEN
Type: IMPLANTABLE DEVICE | Site: ABDOMEN | Status: FUNCTIONAL
Brand: SUREFORM

## 2023-04-14 DEVICE — ABSORBABLE WOUND CLOSURE DEVICE
Type: IMPLANTABLE DEVICE | Site: ABDOMEN | Status: FUNCTIONAL
Brand: SYNETURE

## 2023-04-14 DEVICE — STAPLER 60 RELOAD WHITE
Type: IMPLANTABLE DEVICE | Site: ABDOMEN | Status: FUNCTIONAL
Brand: SUREFORM

## 2023-04-14 DEVICE — DEV CONTRL TISS STRATAFIX SPIRAL PDS PLS 3/0 0 15CM VIL: Type: IMPLANTABLE DEVICE | Site: ABDOMEN | Status: FUNCTIONAL

## 2023-04-14 DEVICE — STAPLER 60 RELOAD BLUE
Type: IMPLANTABLE DEVICE | Site: ABDOMEN | Status: FUNCTIONAL
Brand: SUREFORM

## 2023-04-14 RX ORDER — PANTOPRAZOLE SODIUM 40 MG/10ML
40 INJECTION, POWDER, LYOPHILIZED, FOR SOLUTION INTRAVENOUS
Status: DISCONTINUED | OUTPATIENT
Start: 2023-04-15 | End: 2023-04-17 | Stop reason: HOSPADM

## 2023-04-14 RX ORDER — ALBUTEROL SULFATE 2.5 MG/3ML
2.5 SOLUTION RESPIRATORY (INHALATION) EVERY 4 HOURS PRN
Status: DISCONTINUED | OUTPATIENT
Start: 2023-04-14 | End: 2023-04-17 | Stop reason: HOSPADM

## 2023-04-14 RX ORDER — NALOXONE HCL 0.4 MG/ML
0.1 VIAL (ML) INJECTION
Status: DISCONTINUED | OUTPATIENT
Start: 2023-04-14 | End: 2023-04-17 | Stop reason: HOSPADM

## 2023-04-14 RX ORDER — SODIUM CHLORIDE, SODIUM LACTATE, POTASSIUM CHLORIDE, CALCIUM CHLORIDE 600; 310; 30; 20 MG/100ML; MG/100ML; MG/100ML; MG/100ML
150 INJECTION, SOLUTION INTRAVENOUS CONTINUOUS
Status: DISCONTINUED | OUTPATIENT
Start: 2023-04-14 | End: 2023-04-14

## 2023-04-14 RX ORDER — SODIUM CHLORIDE, SODIUM LACTATE, POTASSIUM CHLORIDE, CALCIUM CHLORIDE 600; 310; 30; 20 MG/100ML; MG/100ML; MG/100ML; MG/100ML
9 INJECTION, SOLUTION INTRAVENOUS CONTINUOUS
Status: DISCONTINUED | OUTPATIENT
Start: 2023-04-14 | End: 2023-04-14

## 2023-04-14 RX ORDER — EPHEDRINE SULFATE 50 MG/ML
INJECTION INTRAVENOUS AS NEEDED
Status: DISCONTINUED | OUTPATIENT
Start: 2023-04-14 | End: 2023-04-14 | Stop reason: SURG

## 2023-04-14 RX ORDER — SCOLOPAMINE TRANSDERMAL SYSTEM 1 MG/1
1 PATCH, EXTENDED RELEASE TRANSDERMAL ONCE
Status: COMPLETED | OUTPATIENT
Start: 2023-04-14 | End: 2023-04-17

## 2023-04-14 RX ORDER — IPRATROPIUM BROMIDE AND ALBUTEROL SULFATE 2.5; .5 MG/3ML; MG/3ML
3 SOLUTION RESPIRATORY (INHALATION) ONCE AS NEEDED
Status: DISCONTINUED | OUTPATIENT
Start: 2023-04-14 | End: 2023-04-14 | Stop reason: HOSPADM

## 2023-04-14 RX ORDER — DROPERIDOL 2.5 MG/ML
INJECTION, SOLUTION INTRAMUSCULAR; INTRAVENOUS
Status: COMPLETED
Start: 2023-04-14 | End: 2023-04-14

## 2023-04-14 RX ORDER — SODIUM CHLORIDE AND POTASSIUM CHLORIDE 150; 450 MG/100ML; MG/100ML
125 INJECTION, SOLUTION INTRAVENOUS CONTINUOUS
Status: DISCONTINUED | OUTPATIENT
Start: 2023-04-15 | End: 2023-04-15

## 2023-04-14 RX ORDER — CYANOCOBALAMIN 1000 UG/ML
1000 INJECTION, SOLUTION INTRAMUSCULAR; SUBCUTANEOUS ONCE
Status: COMPLETED | OUTPATIENT
Start: 2023-04-15 | End: 2023-04-15

## 2023-04-14 RX ORDER — CHLORHEXIDINE GLUCONATE 0.12 MG/ML
30 RINSE ORAL
Status: DISCONTINUED | OUTPATIENT
Start: 2023-04-14 | End: 2023-04-14

## 2023-04-14 RX ORDER — SODIUM CHLORIDE 0.9 % (FLUSH) 0.9 %
3 SYRINGE (ML) INJECTION EVERY 12 HOURS SCHEDULED
Status: DISCONTINUED | OUTPATIENT
Start: 2023-04-14 | End: 2023-04-14 | Stop reason: HOSPADM

## 2023-04-14 RX ORDER — PROMETHAZINE HYDROCHLORIDE 25 MG/1
25 SUPPOSITORY RECTAL ONCE AS NEEDED
Status: DISCONTINUED | OUTPATIENT
Start: 2023-04-14 | End: 2023-04-14 | Stop reason: HOSPADM

## 2023-04-14 RX ORDER — LORAZEPAM 2 MG/ML
0.5 INJECTION INTRAMUSCULAR EVERY 12 HOURS PRN
Status: DISCONTINUED | OUTPATIENT
Start: 2023-04-14 | End: 2023-04-17 | Stop reason: HOSPADM

## 2023-04-14 RX ORDER — LIDOCAINE HYDROCHLORIDE 10 MG/ML
0.5 INJECTION, SOLUTION EPIDURAL; INFILTRATION; INTRACAUDAL; PERINEURAL ONCE AS NEEDED
Status: COMPLETED | OUTPATIENT
Start: 2023-04-14 | End: 2023-04-14

## 2023-04-14 RX ORDER — PROPOFOL 10 MG/ML
VIAL (ML) INTRAVENOUS AS NEEDED
Status: DISCONTINUED | OUTPATIENT
Start: 2023-04-14 | End: 2023-04-14 | Stop reason: SURG

## 2023-04-14 RX ORDER — SODIUM CHLORIDE, SODIUM LACTATE, POTASSIUM CHLORIDE, CALCIUM CHLORIDE 600; 310; 30; 20 MG/100ML; MG/100ML; MG/100ML; MG/100ML
175 INJECTION, SOLUTION INTRAVENOUS CONTINUOUS
Status: DISCONTINUED | OUTPATIENT
Start: 2023-04-14 | End: 2023-04-15

## 2023-04-14 RX ORDER — PANTOPRAZOLE SODIUM 40 MG/10ML
40 INJECTION, POWDER, LYOPHILIZED, FOR SOLUTION INTRAVENOUS ONCE
Status: COMPLETED | OUTPATIENT
Start: 2023-04-14 | End: 2023-04-14

## 2023-04-14 RX ORDER — ONDANSETRON 2 MG/ML
INJECTION INTRAMUSCULAR; INTRAVENOUS AS NEEDED
Status: DISCONTINUED | OUTPATIENT
Start: 2023-04-14 | End: 2023-04-14 | Stop reason: SURG

## 2023-04-14 RX ORDER — ROCURONIUM BROMIDE 10 MG/ML
INJECTION, SOLUTION INTRAVENOUS AS NEEDED
Status: DISCONTINUED | OUTPATIENT
Start: 2023-04-14 | End: 2023-04-14 | Stop reason: SURG

## 2023-04-14 RX ORDER — CEFAZOLIN SODIUM IN 0.9 % NACL 3 G/100 ML
3 INTRAVENOUS SOLUTION, PIGGYBACK (ML) INTRAVENOUS ONCE
Status: COMPLETED | OUTPATIENT
Start: 2023-04-14 | End: 2023-04-14

## 2023-04-14 RX ORDER — ESMOLOL HYDROCHLORIDE 10 MG/ML
INJECTION INTRAVENOUS AS NEEDED
Status: DISCONTINUED | OUTPATIENT
Start: 2023-04-14 | End: 2023-04-14 | Stop reason: SURG

## 2023-04-14 RX ORDER — ENOXAPARIN SODIUM 100 MG/ML
40 INJECTION SUBCUTANEOUS EVERY 12 HOURS SCHEDULED
Status: DISCONTINUED | OUTPATIENT
Start: 2023-04-15 | End: 2023-04-14

## 2023-04-14 RX ORDER — SODIUM CHLORIDE 0.9 % (FLUSH) 0.9 %
3-10 SYRINGE (ML) INJECTION AS NEEDED
Status: DISCONTINUED | OUTPATIENT
Start: 2023-04-14 | End: 2023-04-14 | Stop reason: HOSPADM

## 2023-04-14 RX ORDER — SODIUM CHLORIDE 0.9 % (FLUSH) 0.9 %
10 SYRINGE (ML) INJECTION AS NEEDED
Status: DISCONTINUED | OUTPATIENT
Start: 2023-04-14 | End: 2023-04-14 | Stop reason: HOSPADM

## 2023-04-14 RX ORDER — ACETAMINOPHEN 160 MG/5ML
1000 SOLUTION ORAL EVERY 8 HOURS
Status: COMPLETED | OUTPATIENT
Start: 2023-04-14 | End: 2023-04-16

## 2023-04-14 RX ORDER — SODIUM CHLORIDE 9 MG/ML
INJECTION, SOLUTION INTRAVENOUS AS NEEDED
Status: DISCONTINUED | OUTPATIENT
Start: 2023-04-14 | End: 2023-04-14 | Stop reason: HOSPADM

## 2023-04-14 RX ORDER — HYDRALAZINE HYDROCHLORIDE 20 MG/ML
5 INJECTION INTRAMUSCULAR; INTRAVENOUS
Status: DISCONTINUED | OUTPATIENT
Start: 2023-04-14 | End: 2023-04-14 | Stop reason: HOSPADM

## 2023-04-14 RX ORDER — DROPERIDOL 2.5 MG/ML
0.62 INJECTION, SOLUTION INTRAMUSCULAR; INTRAVENOUS
Status: DISCONTINUED | OUTPATIENT
Start: 2023-04-14 | End: 2023-04-14 | Stop reason: HOSPADM

## 2023-04-14 RX ORDER — PROCHLORPERAZINE MALEATE 10 MG
10 TABLET ORAL EVERY 6 HOURS PRN
Status: DISCONTINUED | OUTPATIENT
Start: 2023-04-14 | End: 2023-04-17 | Stop reason: HOSPADM

## 2023-04-14 RX ORDER — SODIUM CHLORIDE 9 MG/ML
40 INJECTION, SOLUTION INTRAVENOUS AS NEEDED
Status: DISCONTINUED | OUTPATIENT
Start: 2023-04-14 | End: 2023-04-14 | Stop reason: HOSPADM

## 2023-04-14 RX ORDER — CEFAZOLIN SODIUM IN 0.9 % NACL 3 G/100 ML
3 INTRAVENOUS SOLUTION, PIGGYBACK (ML) INTRAVENOUS EVERY 8 HOURS
Status: COMPLETED | OUTPATIENT
Start: 2023-04-14 | End: 2023-04-15

## 2023-04-14 RX ORDER — LIDOCAINE HYDROCHLORIDE 10 MG/ML
INJECTION, SOLUTION EPIDURAL; INFILTRATION; INTRACAUDAL; PERINEURAL AS NEEDED
Status: DISCONTINUED | OUTPATIENT
Start: 2023-04-14 | End: 2023-04-14 | Stop reason: SURG

## 2023-04-14 RX ORDER — MAGNESIUM HYDROXIDE 1200 MG/15ML
LIQUID ORAL AS NEEDED
Status: DISCONTINUED | OUTPATIENT
Start: 2023-04-14 | End: 2023-04-14 | Stop reason: HOSPADM

## 2023-04-14 RX ORDER — ONDANSETRON 4 MG/1
4 TABLET, FILM COATED ORAL EVERY 4 HOURS PRN
Status: DISCONTINUED | OUTPATIENT
Start: 2023-04-14 | End: 2023-04-17 | Stop reason: SDUPTHER

## 2023-04-14 RX ORDER — ACETAMINOPHEN 500 MG
1000 TABLET ORAL EVERY 8 HOURS
Status: COMPLETED | OUTPATIENT
Start: 2023-04-14 | End: 2023-04-16

## 2023-04-14 RX ORDER — FENTANYL CITRATE 50 UG/ML
INJECTION, SOLUTION INTRAMUSCULAR; INTRAVENOUS AS NEEDED
Status: DISCONTINUED | OUTPATIENT
Start: 2023-04-14 | End: 2023-04-14 | Stop reason: SURG

## 2023-04-14 RX ORDER — NALOXONE HCL 0.4 MG/ML
0.4 VIAL (ML) INJECTION
Status: DISCONTINUED | OUTPATIENT
Start: 2023-04-14 | End: 2023-04-17 | Stop reason: HOSPADM

## 2023-04-14 RX ORDER — OXYCODONE HYDROCHLORIDE 5 MG/1
5 TABLET ORAL EVERY 6 HOURS PRN
Status: DISCONTINUED | OUTPATIENT
Start: 2023-04-14 | End: 2023-04-17 | Stop reason: HOSPADM

## 2023-04-14 RX ORDER — GABAPENTIN 100 MG/1
100 CAPSULE ORAL 3 TIMES DAILY
Status: COMPLETED | OUTPATIENT
Start: 2023-04-14 | End: 2023-04-16

## 2023-04-14 RX ORDER — PROMETHAZINE HYDROCHLORIDE 12.5 MG/1
12.5 TABLET ORAL EVERY 6 HOURS PRN
Status: DISCONTINUED | OUTPATIENT
Start: 2023-04-14 | End: 2023-04-17 | Stop reason: HOSPADM

## 2023-04-14 RX ORDER — DEXAMETHASONE SODIUM PHOSPHATE 10 MG/ML
INJECTION, SOLUTION INTRAMUSCULAR; INTRAVENOUS
Status: COMPLETED | OUTPATIENT
Start: 2023-04-14 | End: 2023-04-14

## 2023-04-14 RX ORDER — ONDANSETRON 2 MG/ML
4 INJECTION INTRAMUSCULAR; INTRAVENOUS ONCE AS NEEDED
Status: DISCONTINUED | OUTPATIENT
Start: 2023-04-14 | End: 2023-04-14 | Stop reason: HOSPADM

## 2023-04-14 RX ORDER — HYDRALAZINE HYDROCHLORIDE 20 MG/ML
10 INJECTION INTRAMUSCULAR; INTRAVENOUS
Status: DISCONTINUED | OUTPATIENT
Start: 2023-04-14 | End: 2023-04-17 | Stop reason: HOSPADM

## 2023-04-14 RX ORDER — DROPERIDOL 2.5 MG/ML
0.62 INJECTION, SOLUTION INTRAMUSCULAR; INTRAVENOUS ONCE AS NEEDED
Status: DISCONTINUED | OUTPATIENT
Start: 2023-04-14 | End: 2023-04-14 | Stop reason: HOSPADM

## 2023-04-14 RX ORDER — DEXMEDETOMIDINE HYDROCHLORIDE 100 UG/ML
INJECTION, SOLUTION INTRAVENOUS AS NEEDED
Status: DISCONTINUED | OUTPATIENT
Start: 2023-04-14 | End: 2023-04-14 | Stop reason: SURG

## 2023-04-14 RX ORDER — ACETAMINOPHEN 500 MG
1000 TABLET ORAL ONCE
Status: COMPLETED | OUTPATIENT
Start: 2023-04-14 | End: 2023-04-14

## 2023-04-14 RX ORDER — PROMETHAZINE HYDROCHLORIDE 25 MG/1
25 TABLET ORAL ONCE AS NEEDED
Status: DISCONTINUED | OUTPATIENT
Start: 2023-04-14 | End: 2023-04-14 | Stop reason: HOSPADM

## 2023-04-14 RX ORDER — SIMETHICONE 80 MG
80 TABLET,CHEWABLE ORAL 4 TIMES DAILY PRN
Status: DISCONTINUED | OUTPATIENT
Start: 2023-04-14 | End: 2023-04-17 | Stop reason: HOSPADM

## 2023-04-14 RX ORDER — ONDANSETRON 4 MG/1
4 TABLET, FILM COATED ORAL EVERY 6 HOURS PRN
Status: DISCONTINUED | OUTPATIENT
Start: 2023-04-18 | End: 2023-04-17 | Stop reason: HOSPADM

## 2023-04-14 RX ORDER — SUCCINYLCHOLINE/SOD CL,ISO/PF 200MG/10ML
SYRINGE (ML) INTRAVENOUS AS NEEDED
Status: DISCONTINUED | OUTPATIENT
Start: 2023-04-14 | End: 2023-04-14 | Stop reason: SURG

## 2023-04-14 RX ORDER — ALPRAZOLAM 0.25 MG/1
0.25 TABLET ORAL ONCE AS NEEDED
Status: DISCONTINUED | OUTPATIENT
Start: 2023-04-14 | End: 2023-04-17 | Stop reason: SDUPTHER

## 2023-04-14 RX ORDER — INDOCYANINE GREEN AND WATER 25 MG
KIT INJECTION AS NEEDED
Status: DISCONTINUED | OUTPATIENT
Start: 2023-04-14 | End: 2023-04-14 | Stop reason: SURG

## 2023-04-14 RX ORDER — BUPIVACAINE HYDROCHLORIDE 2.5 MG/ML
INJECTION, SOLUTION EPIDURAL; INFILTRATION; INTRACAUDAL
Status: COMPLETED | OUTPATIENT
Start: 2023-04-14 | End: 2023-04-14

## 2023-04-14 RX ORDER — FENTANYL CITRATE 50 UG/ML
INJECTION, SOLUTION INTRAMUSCULAR; INTRAVENOUS
Status: COMPLETED
Start: 2023-04-14 | End: 2023-04-14

## 2023-04-14 RX ORDER — LABETALOL HYDROCHLORIDE 5 MG/ML
INJECTION, SOLUTION INTRAVENOUS
Status: COMPLETED
Start: 2023-04-14 | End: 2023-04-14

## 2023-04-14 RX ORDER — DIPHENHYDRAMINE HYDROCHLORIDE 50 MG/ML
25 INJECTION INTRAMUSCULAR; INTRAVENOUS EVERY 4 HOURS PRN
Status: DISCONTINUED | OUTPATIENT
Start: 2023-04-14 | End: 2023-04-17 | Stop reason: HOSPADM

## 2023-04-14 RX ORDER — ENOXAPARIN SODIUM 100 MG/ML
40 INJECTION SUBCUTANEOUS ONCE
Status: DISCONTINUED | OUTPATIENT
Start: 2023-04-14 | End: 2023-04-14 | Stop reason: HOSPADM

## 2023-04-14 RX ORDER — MORPHINE SULFATE 4 MG/ML
4 INJECTION, SOLUTION INTRAMUSCULAR; INTRAVENOUS
Status: DISCONTINUED | OUTPATIENT
Start: 2023-04-14 | End: 2023-04-17 | Stop reason: HOSPADM

## 2023-04-14 RX ORDER — ENOXAPARIN SODIUM 100 MG/ML
INJECTION SUBCUTANEOUS AS NEEDED
Status: DISCONTINUED | OUTPATIENT
Start: 2023-04-14 | End: 2023-04-14 | Stop reason: HOSPADM

## 2023-04-14 RX ORDER — LABETALOL HYDROCHLORIDE 5 MG/ML
5 INJECTION, SOLUTION INTRAVENOUS
Status: DISCONTINUED | OUTPATIENT
Start: 2023-04-14 | End: 2023-04-14 | Stop reason: HOSPADM

## 2023-04-14 RX ORDER — GABAPENTIN 300 MG/1
600 CAPSULE ORAL ONCE
Status: COMPLETED | OUTPATIENT
Start: 2023-04-14 | End: 2023-04-14

## 2023-04-14 RX ORDER — BUPIVACAINE HYDROCHLORIDE AND EPINEPHRINE 5; 5 MG/ML; UG/ML
INJECTION, SOLUTION PERINEURAL AS NEEDED
Status: DISCONTINUED | OUTPATIENT
Start: 2023-04-14 | End: 2023-04-14 | Stop reason: HOSPADM

## 2023-04-14 RX ORDER — MIDAZOLAM HYDROCHLORIDE 1 MG/ML
1 INJECTION INTRAMUSCULAR; INTRAVENOUS
Status: DISCONTINUED | OUTPATIENT
Start: 2023-04-14 | End: 2023-04-14 | Stop reason: HOSPADM

## 2023-04-14 RX ORDER — HYDROMORPHONE HYDROCHLORIDE 2 MG/1
2 TABLET ORAL EVERY 4 HOURS PRN
Status: DISCONTINUED | OUTPATIENT
Start: 2023-04-14 | End: 2023-04-17 | Stop reason: HOSPADM

## 2023-04-14 RX ORDER — GLYCOPYRROLATE 0.2 MG/ML
INJECTION INTRAMUSCULAR; INTRAVENOUS AS NEEDED
Status: DISCONTINUED | OUTPATIENT
Start: 2023-04-14 | End: 2023-04-14 | Stop reason: SURG

## 2023-04-14 RX ORDER — TRANEXAMIC ACID 10 MG/ML
1000 INJECTION, SOLUTION INTRAVENOUS ONCE
Status: COMPLETED | OUTPATIENT
Start: 2023-04-14 | End: 2023-04-14

## 2023-04-14 RX ORDER — ONDANSETRON 2 MG/ML
4 INJECTION INTRAMUSCULAR; INTRAVENOUS EVERY 4 HOURS PRN
Status: DISCONTINUED | OUTPATIENT
Start: 2023-04-14 | End: 2023-04-17 | Stop reason: HOSPADM

## 2023-04-14 RX ORDER — SODIUM CHLORIDE 0.9 % (FLUSH) 0.9 %
10 SYRINGE (ML) INJECTION EVERY 12 HOURS SCHEDULED
Status: DISCONTINUED | OUTPATIENT
Start: 2023-04-14 | End: 2023-04-14 | Stop reason: HOSPADM

## 2023-04-14 RX ORDER — FENTANYL CITRATE 50 UG/ML
50 INJECTION, SOLUTION INTRAMUSCULAR; INTRAVENOUS
Status: DISCONTINUED | OUTPATIENT
Start: 2023-04-14 | End: 2023-04-14 | Stop reason: HOSPADM

## 2023-04-14 RX ORDER — LORAZEPAM 1 MG/1
1 TABLET ORAL EVERY 12 HOURS PRN
Status: DISCONTINUED | OUTPATIENT
Start: 2023-04-14 | End: 2023-04-17 | Stop reason: HOSPADM

## 2023-04-14 RX ORDER — GABAPENTIN 250 MG/5ML
100 SOLUTION ORAL 3 TIMES DAILY
Status: COMPLETED | OUTPATIENT
Start: 2023-04-14 | End: 2023-04-16

## 2023-04-14 RX ADMIN — OXYCODONE HYDROCHLORIDE 5 MG: 5 TABLET ORAL at 18:41

## 2023-04-14 RX ADMIN — CEFAZOLIN 3 G: 10 INJECTION, POWDER, FOR SOLUTION INTRAVENOUS at 15:46

## 2023-04-14 RX ADMIN — BUPIVACAINE HYDROCHLORIDE 60 ML: 2.5 INJECTION, SOLUTION EPIDURAL; INFILTRATION; INTRACAUDAL; PERINEURAL at 07:43

## 2023-04-14 RX ADMIN — LABETALOL HYDROCHLORIDE 5 MG: 5 INJECTION, SOLUTION INTRAVENOUS at 14:23

## 2023-04-14 RX ADMIN — DEXMEDETOMIDINE HYDROCHLORIDE 4 MCG: 100 INJECTION, SOLUTION INTRAVENOUS at 09:52

## 2023-04-14 RX ADMIN — FENTANYL CITRATE 50 MCG: 0.05 INJECTION, SOLUTION INTRAMUSCULAR; INTRAVENOUS at 07:43

## 2023-04-14 RX ADMIN — DEXMEDETOMIDINE HYDROCHLORIDE 4 MCG: 100 INJECTION, SOLUTION INTRAVENOUS at 08:29

## 2023-04-14 RX ADMIN — ROCURONIUM BROMIDE 50 MG: 10 SOLUTION INTRAVENOUS at 08:07

## 2023-04-14 RX ADMIN — ROCURONIUM BROMIDE 10 MG: 10 SOLUTION INTRAVENOUS at 10:22

## 2023-04-14 RX ADMIN — FENTANYL CITRATE 25 MCG: 50 INJECTION, SOLUTION INTRAMUSCULAR; INTRAVENOUS at 12:21

## 2023-04-14 RX ADMIN — SODIUM CHLORIDE, POTASSIUM CHLORIDE, SODIUM LACTATE AND CALCIUM CHLORIDE 150 ML/HR: 600; 310; 30; 20 INJECTION, SOLUTION INTRAVENOUS at 15:27

## 2023-04-14 RX ADMIN — CHLORHEXIDINE GLUCONATE 0.12% ORAL RINSE 30 ML: 1.2 LIQUID ORAL at 07:31

## 2023-04-14 RX ADMIN — DEXAMETHASONE SODIUM PHOSPHATE 6 MG: 10 INJECTION, SOLUTION INTRAMUSCULAR; INTRAVENOUS at 08:15

## 2023-04-14 RX ADMIN — SUGAMMADEX 300 MG: 100 INJECTION, SOLUTION INTRAVENOUS at 10:49

## 2023-04-14 RX ADMIN — DEXAMETHASONE SODIUM PHOSPHATE 4 MG: 10 INJECTION, SOLUTION INTRAMUSCULAR; INTRAVENOUS at 07:43

## 2023-04-14 RX ADMIN — DROPERIDOL 0.62 MG: 2.5 INJECTION, SOLUTION INTRAMUSCULAR; INTRAVENOUS at 11:34

## 2023-04-14 RX ADMIN — FENTANYL CITRATE 25 MCG: 0.05 INJECTION, SOLUTION INTRAMUSCULAR; INTRAVENOUS at 08:20

## 2023-04-14 RX ADMIN — DEXMEDETOMIDINE HYDROCHLORIDE 4 MCG: 100 INJECTION, SOLUTION INTRAVENOUS at 08:39

## 2023-04-14 RX ADMIN — FENTANYL CITRATE 25 MCG: 50 INJECTION, SOLUTION INTRAMUSCULAR; INTRAVENOUS at 13:30

## 2023-04-14 RX ADMIN — PANTOPRAZOLE SODIUM 40 MG: 40 INJECTION, POWDER, FOR SOLUTION INTRAVENOUS at 07:28

## 2023-04-14 RX ADMIN — CEFAZOLIN 3 G: 10 INJECTION, POWDER, FOR SOLUTION INTRAVENOUS at 07:47

## 2023-04-14 RX ADMIN — ONDANSETRON 4 MG: 2 INJECTION INTRAMUSCULAR; INTRAVENOUS at 10:37

## 2023-04-14 RX ADMIN — ROCURONIUM BROMIDE 20 MG: 10 SOLUTION INTRAVENOUS at 08:56

## 2023-04-14 RX ADMIN — DEXMEDETOMIDINE HYDROCHLORIDE 4 MCG: 100 INJECTION, SOLUTION INTRAVENOUS at 08:47

## 2023-04-14 RX ADMIN — INDOCYANINE GREEN AND WATER 25 MG: KIT at 09:44

## 2023-04-14 RX ADMIN — ROCURONIUM BROMIDE 10 MG: 10 SOLUTION INTRAVENOUS at 09:53

## 2023-04-14 RX ADMIN — ESMOLOL HYDROCHLORIDE 10 MG: 10 INJECTION, SOLUTION INTRAVENOUS at 08:47

## 2023-04-14 RX ADMIN — PROPOFOL 25 MCG/KG/MIN: 10 INJECTION, EMULSION INTRAVENOUS at 07:54

## 2023-04-14 RX ADMIN — ROCURONIUM BROMIDE 10 MG: 10 SOLUTION INTRAVENOUS at 09:35

## 2023-04-14 RX ADMIN — ACETAMINOPHEN 1000 MG: 500 TABLET ORAL at 15:46

## 2023-04-14 RX ADMIN — DEXMEDETOMIDINE HYDROCHLORIDE 8 MCG: 100 INJECTION, SOLUTION INTRAVENOUS at 08:25

## 2023-04-14 RX ADMIN — ROCURONIUM BROMIDE 5 MG: 10 SOLUTION INTRAVENOUS at 07:44

## 2023-04-14 RX ADMIN — ESMOLOL HYDROCHLORIDE 10 MG: 10 INJECTION, SOLUTION INTRAVENOUS at 10:00

## 2023-04-14 RX ADMIN — LIDOCAINE HYDROCHLORIDE 0.5 ML: 10 INJECTION, SOLUTION EPIDURAL; INFILTRATION; INTRACAUDAL; PERINEURAL at 07:32

## 2023-04-14 RX ADMIN — ESMOLOL HYDROCHLORIDE 10 MG: 10 INJECTION, SOLUTION INTRAVENOUS at 10:39

## 2023-04-14 RX ADMIN — ESMOLOL HYDROCHLORIDE 20 MG: 10 INJECTION, SOLUTION INTRAVENOUS at 08:33

## 2023-04-14 RX ADMIN — GABAPENTIN 600 MG: 300 CAPSULE ORAL at 07:30

## 2023-04-14 RX ADMIN — ESMOLOL HYDROCHLORIDE 10 MG: 10 INJECTION, SOLUTION INTRAVENOUS at 08:59

## 2023-04-14 RX ADMIN — FENTANYL CITRATE 25 MCG: 0.05 INJECTION, SOLUTION INTRAMUSCULAR; INTRAVENOUS at 10:28

## 2023-04-14 RX ADMIN — GABAPENTIN 100 MG: 100 CAPSULE ORAL at 15:46

## 2023-04-14 RX ADMIN — Medication 200 MG: at 07:44

## 2023-04-14 RX ADMIN — SCOPALAMINE 1 PATCH: 1 PATCH, EXTENDED RELEASE TRANSDERMAL at 07:31

## 2023-04-14 RX ADMIN — TRANEXAMIC ACID 1000 MG: 10 INJECTION, SOLUTION INTRAVENOUS at 19:32

## 2023-04-14 RX ADMIN — LIDOCAINE HYDROCHLORIDE 100 MG: 10 INJECTION, SOLUTION EPIDURAL; INFILTRATION; INTRACAUDAL; PERINEURAL at 07:44

## 2023-04-14 RX ADMIN — PROPOFOL 250 MG: 10 INJECTION, EMULSION INTRAVENOUS at 07:44

## 2023-04-14 RX ADMIN — SODIUM CHLORIDE, POTASSIUM CHLORIDE, SODIUM LACTATE AND CALCIUM CHLORIDE 1000 ML: 600; 310; 30; 20 INJECTION, SOLUTION INTRAVENOUS at 07:28

## 2023-04-14 RX ADMIN — ACETAMINOPHEN 1000 MG: 500 TABLET ORAL at 07:30

## 2023-04-14 RX ADMIN — DEXMEDETOMIDINE HYDROCHLORIDE 4 MCG: 100 INJECTION, SOLUTION INTRAVENOUS at 09:21

## 2023-04-14 RX ADMIN — SODIUM CHLORIDE, POTASSIUM CHLORIDE, SODIUM LACTATE AND CALCIUM CHLORIDE: 600; 310; 30; 20 INJECTION, SOLUTION INTRAVENOUS at 07:37

## 2023-04-14 RX ADMIN — SIMETHICONE 80 MG: 80 TABLET, CHEWABLE ORAL at 15:46

## 2023-04-14 RX ADMIN — ESMOLOL HYDROCHLORIDE 10 MG: 10 INJECTION, SOLUTION INTRAVENOUS at 09:44

## 2023-04-14 RX ADMIN — GLYCOPYRROLATE 0.1 MCG: 0.2 INJECTION INTRAMUSCULAR; INTRAVENOUS at 08:13

## 2023-04-14 RX ADMIN — ESMOLOL HYDROCHLORIDE 10 MG: 10 INJECTION, SOLUTION INTRAVENOUS at 09:20

## 2023-04-14 RX ADMIN — EPHEDRINE SULFATE 5 MG: 50 INJECTION INTRAVENOUS at 08:13

## 2023-04-14 RX ADMIN — GLYCOPYRROLATE 0.1 MCG: 0.2 INJECTION INTRAMUSCULAR; INTRAVENOUS at 08:16

## 2023-04-14 NOTE — ANESTHESIA POSTPROCEDURE EVALUATION
Patient: Geneva Cordova    Procedure Summary     Date: 04/14/23 Room / Location:  [facility] /  [facility]    Anesthesia Start: 0737 Anesthesia Stop: 1114    Procedures:       GASTRIC BYPASS LAPAROSCOPIC WITH DAVINCI ROBOT AND BILIOPANCREATIC DIVERSION WITH DUODENAL SWITCH (Abdomen)      LAPAROSCOPIC RECURRENT PARAESOPHAGEAL HERNIA REPAIR WITH DAVINCI ROBOT AND FALCIFORM LIGAMENT SLING  (Abdomen)      ESOPHAGOGASTRODUODENOSCOPY (Esophagus) Diagnosis:       Partial gastric outlet obstruction      Morbid obesity with body mass index of 40.0-44.9 in adult      S/P laparoscopic sleeve gastrectomy      Paraesophageal hernia with gastroesophageal reflux      (Partial gastric outlet obstruction [K31.1])      (Morbid obesity with body mass index of 40.0-44.9 in adult (HCC) [E66.01, Z68.41])      (S/P laparoscopic sleeve gastrectomy [Z98.84])      (Hiatal hernia with gastroesophageal reflux [K44.9, K21.9])    Surgeons: Erik Hughes MD Provider: Haydee Sinha MD    Anesthesia Type: general with block ASA Status: 3          Anesthesia Type: general with block    Vitals  Vitals Value Taken Time   /74 04/14/23 1113   Temp 96.6 °F (35.9 °C) 04/14/23 1113   Pulse 100 04/14/23 1113   Resp 14 04/14/23 1113   SpO2 96 % 04/14/23 1113           Post Anesthesia Care and Evaluation    Patient location during evaluation: PACU  Patient participation: complete - patient participated  Level of consciousness: awake and alert  Pain score: 0  Pain management: adequate    Airway patency: patent  Anesthetic complications: No anesthetic complications  PONV Status: none  Cardiovascular status: hemodynamically stable and acceptable  Respiratory status: nonlabored ventilation, acceptable and nasal cannula  Hydration status: acceptable    Comments: Pt arrived to PACU with no issues during transport. Pt placed directly to monitors. Vital signs are within parameters. Pt maintaining ventilation spontaneously. No changes to dental.  Report given to PACU and all question and concerns were addressed as well as the plan of care.

## 2023-04-14 NOTE — ANESTHESIA PROCEDURE NOTES
"Peripheral Block      Patient reassessed immediately prior to procedure    Patient location during procedure: OR  Start time: 4/14/2023 7:43 AM  Stop time: 4/14/2023 7:50 AM  Reason for block: at surgeon's request and post-op pain management  Performed by  Anesthesiologist: Haydee Sinha MD  Preanesthetic Checklist  Completed: patient identified, IV checked, site marked, risks and benefits discussed, surgical consent, monitors and equipment checked, pre-op evaluation and timeout performed  Prep:  Pt Position: supine  Sterile barriers:cap, gloves, mask and washed/disinfected hands  Prep: ChloraPrep  Patient monitoring: blood pressure monitoring, continuous pulse oximetry and EKG  Procedure    Sedation: yes  Performed under: general  Guidance:ultrasound guided  Images:still images obtained, printed/placed on chart    Laterality:Bilateral  Block Type:TAP  Injection Technique:single-shot  Needle Type:short-bevel and echogenic  Needle Gauge:20 G  Resistance on Injection: none    Medications Used: dexamethasone sodium phosphate injection - Injection   4 mg - 4/14/2023 7:43:00 AM  bupivacaine PF (MARCAINE) 0.25 % injection - Injection   60 mL - 4/14/2023 7:43:00 AM      Medications  Comment:Block Injection:  LA dose divided between Right and Left block        Post Assessment  Injection Assessment: negative aspiration for heme, incremental injection and no paresthesia on injection  Patient Tolerance:comfortable throughout block  Complications:no  Additional Notes    Subcostal TAPs    A high-frequency linear transducer, with sterile cover, was placed sub-xiphoid to identify [place], right and left Rectus Abdominus Muscles (KELLY). The transducer was moved either right or left subcostally to identify the KELLY and the Transverse Abdominus Muscle (RANDOLPH). The insertion site was prepped in sterile fashion and then localized with 2-5 ml of 1% Lidocaine. Using ultrasound-guidance, a 20-gauge B-Kelsey 4\" Ultraplex 360 " "non-stimulating echogenic needle was advanced in plane, from medial to lateral, until the tip of the needle was in the fascial plane between the KELLY and RANDOLPH. 1-3ml of preservative free normal saline was used to hydro-dissect the fascial planes. After the fascial plane was verified, the local anesthetic (LA) was injected. The procedure was repeated on the opposite side for bilateral coverage. Aspiration every 5 ml to prevent intravascular injection. Injection was completed with negative aspiration of blood and negative intravascular injection. Injection pressures were normal with minimal resistance. The subcostal approach to the TAP nerve block ideally anesthetizes the intercostal nerves T6-T9.     Mid-Axillary/Lateral TAPs    A high-frequency linear transducer, with sterile cover, was placed in the midaxillary line between the ASIS and costal margin. The External Oblique Muscle (EOM), Internal Oblique Muscle (IOM), Transverse Abdominus Muscle (RANDOLPH), and Peritoneum were identified. The insertion site was prepped in sterile fashion and then localized with 2-5 ml of 1% Lidocaine. Using ultrasound-guidance, a 20-gauge B-Kelsey 4\" Ultraplex 360 non-stimulating echogenic needle was advanced in plane, from medial to lateral, until the tip of the needle was in the fascial plane between the IOM and RANDOLPH. 1-3ml of preservative free normal saline was used to hydro-dissect the fascial planes. After the fascial plane was verified, the local anesthetic (LA) was injected. The procedure was repeated on the opposite side for bilateral coverage. Aspiration every 5 ml to prevent intravascular injection. Injection was completed with negative aspiration of blood and negative intravascular injection. Injection pressures were normal with minimal resistance. Midaxillary TAPs should reach intercostal nerves T10- T11 and the subcostal nerve T12.            "

## 2023-04-14 NOTE — PLAN OF CARE
Goal Outcome Evaluation:  Plan of Care Reviewed With: patient        Progress: improving  Outcome Evaluation: Pt admit to floor from PACU. VSS. 3L NC O2. Pt is very drowsy and arouses to voice. Pt complaints of gas pain. PRN simethicone given. No complaints of nausea. Lap sites clean and dry with no drainage. Right sided TOLU drain in place with bloody output. Fluids infusing. Antibiotic infused. Encouraging ICS, ambulation, and chewing gum. Pt voided. Pt resting comfortably. No further complaints at this time.

## 2023-04-14 NOTE — BRIEF OP NOTE
GASTRIC BYPASS LAPAROSCOPIC, LAPAROSCOPIC PARAESOPHAGEAL HERNIA REPAIR WITH DAVINCI ROBOT, ESOPHAGOGASTRODUODENOSCOPY  Progress Note    Geneva Cordova  4/14/2023    Pre-op Diagnosis:   Partial gastric outlet obstruction [K31.1]  Morbid obesity with body mass index of 40.0-44.9 in adult ([facility]) [E66.01, Z68.41]  S/P laparoscopic sleeve gastrectomy [Z98.84]  Hiatal hernia with gastroesophageal reflux [K44.9, K21.9]       Post-Op Diagnosis Codes:     * Partial gastric outlet obstruction [K31.1]     * Morbid obesity with body mass index of 40.0-44.9 in adult [E66.01, Z68.41]     * S/P laparoscopic sleeve gastrectomy [Z98.84]     * Paraesophageal hernia with gastroesophageal reflux [K44.9, K21.9]    Procedure/CPT® Codes:  CO LAP GASTRIC BYPASS/BRYCE-EN-Y [21293]  CO LAPS RPR PARAESPHGL HRNA INCL FUNDPLSTY W/O MESH [69133]  CO ESOPHAGOGASTRODUODENOSCOPY TRANSORAL DIAGNOSTIC [61885]      Procedure(s):  GASTRIC BYPASS LAPAROSCOPIC WITH DAVINCI ROBOT AND BILIOPANCREATIC DIVERSION WITH DUODENAL SWITCH  LAPAROSCOPIC RECURRENT PARAESOPHAGEAL HERNIA REPAIR WITH DAVINCI ROBOT AND FALCIFORM LIGAMENT SLING   ESOPHAGOGASTRODUODENOSCOPY        Surgeon(s):  Erik Hughes MD    Anesthesia: General with Block    Staff:   Circulator: Millie Dunham RN  Scrub Person: Armida Mccauley  Nursing Assistant: Jane Mesa CNA  Assistant: Wicho Shen PA-C  Assistant: Wicho Shen PA-C      Estimated Blood Loss: minimal    Urine Voided: 275 mL    Specimens:                None          Drains:   Closed/Suction Drain 1 Lateral RLQ (Active)       Urethral Catheter Silicone 16 Fr. (Active)       Findings:         Complications: None    Assistant: Wicho Shen PA-C  was responsible for performing the following activities: Retraction, Suction, Irrigation, Suturing, Closing, Placing Dressing and Held/Positioned Camera and their skilled assistance was necessary for the success of this case.    Erik Hughes MD     Date:  4/14/2023  Time: 10:59 EDT

## 2023-04-14 NOTE — OP NOTE
Preoperative Diagnosis:                    Partial gastric outlet obstruction, recurrent paraesophageal hernia with severe reflux status post laparoscopic sleeve gastrectomy and paraesophageal hiatal hernia repair July 2015 (Dr. Hughes), laparoscopic sleeve revision December 2020 (Dr. Mariscal), morbid obesity (129 kg, BMI 40.81)         Postoperative Diagnosis:                                 Same     Procedure:                                                   Laparoscopic robotic assisted antecolic revision previous sleeve gastrectomy to Alejandro-en-Y gastric bypass with biliopancreatic diversion and duodenal switch                                         Laparoscopic robotic assisted recurrent paraesophageal repair with falciform ligament sling                                                                       EGD     Surgeon:                                                       AMAN Hughes MD     Assistant: Wicho Shen PA-C  was responsible for performing the following activities: Retraction, Suction, Irrigation, Suturing, Closing, Placing Dressing and Held/Positioned Camera and their skilled assistance was necessary for the success of this case.       Anesthesia:                                                   GETA     EBL:                                                              Min     Fluids:                                                           Crystalloid     Specimens:                                                None     Drains:                                                          #10 TOLU     Counts:                                                          Correct     Complications:                                               None    Findings:    Proximal sleeve staple line herniating anteriorly into the mediastinum.  After reduction deep infolding and partial gastric outlet obstruction approximately 5 cm from the GE junction.  After recurrent paraesophageal hernia repair Z-line  roughly 39 cm, gastrojejunostomy roughly 43 cm.  Upon completion her Alejandro limb was 125 cm, BP limb 275 cm, common channel 440 cm, total alimentary limb 565 cm    Indications:    This is a 37-year-old previously super morbidly obese female originally known to me status post laparoscopic sleeve gastrectomy and paraesophageal hiatal hernia repair in 2015.  At that time she weighed 424 pounds and had a BMI of 60.8.  She did well and returned seeking revisional metabolic and bariatric surgical options after discussion I planned to revise her to a modified duodenal switch/sips.  Her insurance did not cover this and she did not come back to discuss further options with me.  She then underwent laparoscopic sleeve revision with Dr. Mariscal in December 2020 and upper GI postop day #1 showed a new onset hiatal hernia.  Shortly thereafter she developed severe reflux and was diagnosed with a recurrent paraesophageal hernia and partial gastric outlet obstruction that did not responded to achalasia balloon dilatation by GI.  Symptoms are not controlled with maximal medical therapy.  Please see our extensive office notes and my most recent consultation.  Risks, benefits, and alternative therapies were discussed and she wished to proceed with laparoscopic robotic assisted revision of her sleeve gastrectomy to a Alejandro-en-Y gastric bypass with biliopancreatic diversion duodenal switch, recurrent paraesophageal hernia repair, and EGD.     Operative technique:      The patient was brought to the operating room, and placed supine upon the operating room table.  SCD hose were placed, she underwent uneventful general endotracheal anesthesia per the anesthesiology staff, the anesthesiology staff performed a tap block, and received IV Ancef and subcutaneous Lovenox, a Arellano catheter was placed, and her abdomen was prepped and draped with chloroprep in a sterile fashion, an Ioban was used as well.     The peritoneal cavity was entered above  and to the left of the neoumbilicus using a 5 mm fascial splitting trocar utilizing an Optiview technique and the abdomen was insufflated to pressure of 15 mmHg with CO2 gas.   Exploratory laparoscopy revealed no evidence of injury from the entrance technique, a relatively normal-appearing liver no significant intra-abdominal adhesions, status post cholecystectomy.    Remaining trocars were placed under direct visualization.  This included 8 mm robotic trocars in the left upper and left lower lateral abdomen, to the right of midline, and exchanging out the entrance 5 mm trocar.  A 12 mm trocar was placed in the right lateral abdomen.  Through a stab incision in the epigastrium a Slick retractor was used to elevate the left lobe of the liver where there were adhesions to the lateral undersurface lesser omentum precluding visualization of the hiatus.    The cecum was identified.  The appendix was normal.  Small bowel was run proximally to the ligament of Treitz.  Total bowel length from ileocecal valve to the ligament of Treitz was 840 cm.  I wanted to create a Alejandro limb of approximately 125 cm and have a common channel at least 400 cm.  Therefore the ligament of Treitz was identified and run distally for 275 cm and marked on the antimesenteric border with a 2-0 Vicryl plus suture.  It was then run distally another 125 cm and marked in a similar fashion.     The Xi robot was docked and I scrubbed out and went to the robotic console.   Adhesions to the undersurface of the left lobe of the liver were divided with the vessel sealing device exposing the hiatus.  Quite a bit of scar tissue in the area from previous repair.  The pars flaccida was divided, there was not a replaced hepatic vessel.  The recurrent paraesophageal hernia was fairly unimpressive from the view of the right facundo.  Omental adhesions to the proximal lateral sleeve were lysed exposing the left facundo with there was quite a bit of chronic scarring.   The recurrent paraesophageal hernia remained unimpressive.  The hernia sac was incised along the base of the right facundo and this was extended anteriorly where there was a staple line extending into the mediastinum and approximately the 12 o'clock position of the esophagus.  The hernia sac was incised along the base of the left facundo and this also extended to this area of staples.  This area was completely dissected free and reduced.  Posteriorly permanent crural sutures were noted to be intact and photo documented.  The paraesophageal hernia sac and its contents were dissected out of the mediastinum and reduced below the level of the crura.  A latex free drain was used temporarily for esophageal retraction.  The GE junction was lengthened to well below the level of the crura by dissecting scar tissue fresh areolar planes well into the mediastinum.  The crura were dissected to their meeting point inferiorly.   The recurrent paraesophageal hernia repair was performed posteriorly using a running nonabsorbable 2-0 V-loc suture with good result, photodocumentation obtained before and after repair.      The proximal sleeve had a deep infolding anteriorly a few centimeters distal to the GE junction and this was photo documented.  The falciform ligament was amputated at its base and mobilized up to the level of the liver and then passed underneath the esophagus in front of the crural repair where it would not wrap back to itself in a 360 degree fashion without undue tension.  It was therefore sutured in a sling like fashion over the angle of Hiss to the GE junction using a running nonabsorbable 2-0 V-loc suture with good result.  Photodocumentation obtained.    Approximately 4 to 5 cm below the GE junction near the area of the deep infolding and below the left gastric artery a window was created along the lesser curvature of the stomach and the sleeve divided horizontally in this area with a 60 mm robotic stapler using a  green load.    The omentum was divided anteriorly creating a path for the future Alejandro limb.  The ligament of Treitz identified and the small bowel brought up in appropriate orientation to the newly created gastric pouch where it was anchored temporarily with an absorbable 3-0 Stratafix suture.       The anesthesiology staff passed a 36 Lao blunt-tipped bougie dilator which was manipulated along the lesser curvature up to the level of the staple line.  A linear gastrojejunostomy was carried out as follows.  A small pouchotomy was made below the staple line and across from this a small enterotomy was made in the Alejandro limb.  A common lumen was created between the gastric pouch and the Alejandro limb with a blue load going in about 25 mm. The bougie dilator was then advanced into the Alejandro limb and the intervening enterotomy closed in 2 layers.  The inner layer was a full-thickness running 2-0 Vicryl plus suture and the second layer was an imbricating running seromuscular suture using the previously placed 3-0 Stratafix suture.  The bougie dilator was removed.  The anastomosis rested nicely without tension or torsion.    Intravenous ICG given confirming good perfusion of the anastomosis.     A window was created in the small bowel on the biliopancreatic side of the anastomosis and the small bowel was divided with a 60 mm white load.  The underlying mesentery was divided for a short distance to take off tension.      The Alejandro limb was run distally to the next marking and then lined up in appropriate orientation with the biliopancreatic limb.  A linear jejunojejunostomy was then carried out as follows.  An enterotomy was made on the antimesenteric border of each limb and a common lumen created with a white load.  The intervening enterotomy was closed in a single seromuscular layer using running 3-0 absorbable stratafix suture.  The jejunojejunostomy rested nicely.     The potential mesenteric defects at the jejunojejunostomy  and at Anguiano's space were individually closed using running 2-0 nonabsorbable V-loc suture.  Upon completion everything rested nicely and photodocumentation obtained.     The Alejandro limb several centimeters distal to the gastrojejunostomy was occluded with a noncrushing bowel clamp and the gastrojejunostomy was submerged under saline.  I performed upper endoscopy.  The endoscope advanced through the esophagus into the gastric pouch,  gastrojejunostomy widely patent.  No ischemia of the anastomosis, no bleeding at the anastomosis, no air bubbles or leak seen.  Nice distention of the Alejandro limb.  The endoscope was withdrawn.  Z-line roughly 39 cm, gastrojejunostomy roughly 43 cm.  Endoscopic photodocumentation obtained of the Alejandro limb, gastric pouch, gastrojejunostomy and Z-line.     I scrubbed back in and the robot was undocked.   Irrigation fluid was suctioned free.  Under direct visualization a 5 mm trocar was placed high in the right upper quadrant through which to withdraw the TOLU drain.  A #10 TOLU drain was placed under the left lobe of the liver and up over the spleen and brought out through this 5 mm trocar site incision and anchored to the skin with a 2-0 nylon suture.   Fascia at the 12 mm trocar site incision was closed with a horizontal mattress 0 Vicryl suture placed with a suture passer under direct visualization and tying the knot extracorporeally.  Remaining trochars were removed under direct visualization, no bleeding noted from their sites.     Skin in each incision was closed using 3-0 Monocryl plus in an interrupted subcuticular stitch followed by skin glue.  A dry sterile dressing was placed around the TOLU site.     The patient tolerated the procedure well without complication, the Arellano catheter was removed, and she was taken to the recovery room in stable condition.

## 2023-04-14 NOTE — ANESTHESIA PROCEDURE NOTES
Airway  Urgency: elective    Date/Time: 4/14/2023 7:45 AM  Airway not difficult    General Information and Staff    Patient location during procedure: OR  CRNA/CAA: Vitaliy James CRNA    Indications and Patient Condition  Indications for airway management: airway protection    Preoxygenated: yes  MILS not maintained throughout  Mask difficulty assessment: 0 - not attempted    Final Airway Details  Final airway type: endotracheal airway      Successful airway: ETT  Cuffed: yes   Successful intubation technique: video laryngoscopy  Endotracheal tube insertion site: oral  Blade: Schmitt  Blade size: 3  ETT size (mm): 7.0  Cormack-Lehane Classification: grade I - full view of glottis  Placement verified by: chest auscultation and capnometry   Cuff volume (mL): 10  Measured from: lips  ETT/EBT  to lips (cm): 21  Number of attempts at approach: 1  Assessment: lips, teeth, and gum same as pre-op and atraumatic intubation    Additional Comments  Negative epigastric sounds, Breath sound equal bilaterally with symmetric chest rise and fall. RSI

## 2023-04-14 NOTE — PROGRESS NOTES
"Cc:  NOS  RNY/BPD, rPHHR  \"why am I so sleepy?\"    She is in bed alone in the room.  She wonders why she is so sleepy.  Heart rate 90s at the bedside.  TOLU bulb full bloody without clots.  Also dressing saturated.  TOLU bulb emptied and did not immediately refill.  No clot noted in the empty contents.  Dressing removed.  No active bleeding seen.  Minimal serosanguineous drainage around the TOLU site.  Abdomen seems appropriate nondistended.  No ecchymosis.  1500 spirometer observed.  We will hold Lovenox, perform serial H&H, give 1 g TXA IV, continue close observation in telemetry.  "

## 2023-04-15 ENCOUNTER — APPOINTMENT (OUTPATIENT)
Dept: GENERAL RADIOLOGY | Facility: HOSPITAL | Age: 38
End: 2023-04-15
Payer: COMMERCIAL

## 2023-04-15 LAB
ALBUMIN SERPL-MCNC: 3.3 G/DL (ref 3.5–5.2)
ALBUMIN/GLOB SERPL: 1 G/DL
ALP SERPL-CCNC: 57 U/L (ref 39–117)
ALT SERPL W P-5'-P-CCNC: 32 U/L (ref 1–33)
ANION GAP SERPL CALCULATED.3IONS-SCNC: 9 MMOL/L (ref 5–15)
AST SERPL-CCNC: 48 U/L (ref 1–32)
BASOPHILS # BLD AUTO: 0.02 10*3/MM3 (ref 0–0.2)
BASOPHILS NFR BLD AUTO: 0.2 % (ref 0–1.5)
BILIRUB SERPL-MCNC: 0.5 MG/DL (ref 0–1.2)
BUN SERPL-MCNC: 9 MG/DL (ref 6–20)
BUN/CREAT SERPL: 11.3 (ref 7–25)
CALCIUM SPEC-SCNC: 8.5 MG/DL (ref 8.6–10.5)
CHLORIDE SERPL-SCNC: 105 MMOL/L (ref 98–107)
CO2 SERPL-SCNC: 19 MMOL/L (ref 22–29)
CREAT SERPL-MCNC: 0.8 MG/DL (ref 0.57–1)
DEPRECATED RDW RBC AUTO: 41.7 FL (ref 37–54)
EGFRCR SERPLBLD CKD-EPI 2021: 98.1 ML/MIN/1.73
EOSINOPHIL # BLD AUTO: 0.04 10*3/MM3 (ref 0–0.4)
EOSINOPHIL NFR BLD AUTO: 0.3 % (ref 0.3–6.2)
ERYTHROCYTE [DISTWIDTH] IN BLOOD BY AUTOMATED COUNT: 12 % (ref 12.3–15.4)
GLOBULIN UR ELPH-MCNC: 3.3 GM/DL
GLUCOSE SERPL-MCNC: 125 MG/DL (ref 65–99)
HCT VFR BLD AUTO: 32.5 % (ref 34–46.6)
HCT VFR BLD AUTO: 32.9 % (ref 34–46.6)
HCT VFR BLD AUTO: 33.7 % (ref 34–46.6)
HCT VFR BLD AUTO: 36.1 % (ref 34–46.6)
HGB BLD-MCNC: 10.6 G/DL (ref 12–15.9)
HGB BLD-MCNC: 10.6 G/DL (ref 12–15.9)
HGB BLD-MCNC: 11.8 G/DL (ref 12–15.9)
HGB BLD-MCNC: 9.8 G/DL (ref 12–15.9)
IMM GRANULOCYTES # BLD AUTO: 0.1 10*3/MM3 (ref 0–0.05)
IMM GRANULOCYTES NFR BLD AUTO: 0.9 % (ref 0–0.5)
IRON 24H UR-MRATE: 49 MCG/DL (ref 37–145)
LYMPHOCYTES # BLD AUTO: 1.11 10*3/MM3 (ref 0.7–3.1)
LYMPHOCYTES NFR BLD AUTO: 9.5 % (ref 19.6–45.3)
MCH RBC QN AUTO: 29.6 PG (ref 26.6–33)
MCHC RBC AUTO-ENTMCNC: 31.5 G/DL (ref 31.5–35.7)
MCV RBC AUTO: 94.1 FL (ref 79–97)
MONOCYTES # BLD AUTO: 0.76 10*3/MM3 (ref 0.1–0.9)
MONOCYTES NFR BLD AUTO: 6.5 % (ref 5–12)
NEUTROPHILS NFR BLD AUTO: 82.6 % (ref 42.7–76)
NEUTROPHILS NFR BLD AUTO: 9.64 10*3/MM3 (ref 1.7–7)
NRBC BLD AUTO-RTO: 0 /100 WBC (ref 0–0.2)
PLATELET # BLD AUTO: 296 10*3/MM3 (ref 140–450)
PMV BLD AUTO: 12.2 FL (ref 6–12)
POTASSIUM SERPL-SCNC: 4.7 MMOL/L (ref 3.5–5.2)
PREALB SERPL-MCNC: 11.7 MG/DL (ref 20–40)
PROT SERPL-MCNC: 6.6 G/DL (ref 6–8.5)
RBC # BLD AUTO: 3.58 10*6/MM3 (ref 3.77–5.28)
SODIUM SERPL-SCNC: 133 MMOL/L (ref 136–145)
WBC NRBC COR # BLD: 11.67 10*3/MM3 (ref 3.4–10.8)

## 2023-04-15 PROCEDURE — 99024 POSTOP FOLLOW-UP VISIT: CPT | Performed by: SURGERY

## 2023-04-15 PROCEDURE — 63710000001 PROCHLORPERAZINE MALEATE PER 10 MG: Performed by: SURGERY

## 2023-04-15 PROCEDURE — 0 DIATRIZOATE MEGLUMINE & SODIUM PER 1 ML: Performed by: SURGERY

## 2023-04-15 PROCEDURE — 93005 ELECTROCARDIOGRAM TRACING: CPT | Performed by: NURSE PRACTITIONER

## 2023-04-15 PROCEDURE — 25010000002 CYANOCOBALAMIN PER 1000 MCG: Performed by: SURGERY

## 2023-04-15 PROCEDURE — 74240 X-RAY XM UPR GI TRC 1CNTRST: CPT

## 2023-04-15 PROCEDURE — 85018 HEMOGLOBIN: CPT | Performed by: SURGERY

## 2023-04-15 PROCEDURE — 25010000002 THIAMINE PER 100 MG: Performed by: SURGERY

## 2023-04-15 PROCEDURE — 93010 ELECTROCARDIOGRAM REPORT: CPT | Performed by: INTERNAL MEDICINE

## 2023-04-15 PROCEDURE — 99222 1ST HOSP IP/OBS MODERATE 55: CPT | Performed by: NURSE PRACTITIONER

## 2023-04-15 PROCEDURE — 83540 ASSAY OF IRON: CPT | Performed by: SURGERY

## 2023-04-15 PROCEDURE — 85014 HEMATOCRIT: CPT | Performed by: SURGERY

## 2023-04-15 PROCEDURE — 25010000002 IRON SUCROSE PER 1 MG: Performed by: SURGERY

## 2023-04-15 PROCEDURE — 25010000002 HYDRALAZINE PER 20 MG: Performed by: SURGERY

## 2023-04-15 PROCEDURE — 80053 COMPREHEN METABOLIC PANEL: CPT | Performed by: SURGERY

## 2023-04-15 PROCEDURE — 84134 ASSAY OF PREALBUMIN: CPT | Performed by: SURGERY

## 2023-04-15 PROCEDURE — 85025 COMPLETE CBC W/AUTO DIFF WBC: CPT | Performed by: SURGERY

## 2023-04-15 PROCEDURE — 25010000002 CEFAZOLIN PER 500 MG: Performed by: SURGERY

## 2023-04-15 RX ORDER — SODIUM CHLORIDE 9 MG/ML
250 INJECTION, SOLUTION INTRAVENOUS CONTINUOUS
Status: ACTIVE | OUTPATIENT
Start: 2023-04-15 | End: 2023-04-15

## 2023-04-15 RX ORDER — SODIUM CHLORIDE 9 MG/ML
150 INJECTION, SOLUTION INTRAVENOUS CONTINUOUS
Status: DISCONTINUED | OUTPATIENT
Start: 2023-04-15 | End: 2023-04-16

## 2023-04-15 RX ORDER — LABETALOL HYDROCHLORIDE 5 MG/ML
10 INJECTION, SOLUTION INTRAVENOUS
Status: DISCONTINUED | OUTPATIENT
Start: 2023-04-15 | End: 2023-04-17 | Stop reason: HOSPADM

## 2023-04-15 RX ADMIN — GABAPENTIN 100 MG: 100 CAPSULE ORAL at 20:49

## 2023-04-15 RX ADMIN — GABAPENTIN 100 MG: 100 CAPSULE ORAL at 15:58

## 2023-04-15 RX ADMIN — GABAPENTIN 100 MG: 100 CAPSULE ORAL at 00:23

## 2023-04-15 RX ADMIN — SODIUM CHLORIDE 1000 ML: 9 INJECTION, SOLUTION INTRAVENOUS at 16:44

## 2023-04-15 RX ADMIN — GABAPENTIN 100 MG: 100 CAPSULE ORAL at 08:24

## 2023-04-15 RX ADMIN — OXYCODONE HYDROCHLORIDE 5 MG: 5 TABLET ORAL at 23:12

## 2023-04-15 RX ADMIN — IRON SUCROSE 200 MG: 20 INJECTION, SOLUTION INTRAVENOUS at 10:44

## 2023-04-15 RX ADMIN — CYANOCOBALAMIN 1000 MCG: 1000 INJECTION, SOLUTION INTRAMUSCULAR; SUBCUTANEOUS at 08:24

## 2023-04-15 RX ADMIN — SODIUM CHLORIDE 150 ML/HR: 9 INJECTION, SOLUTION INTRAVENOUS at 16:46

## 2023-04-15 RX ADMIN — THIAMINE HYDROCHLORIDE 100 ML/HR: 100 INJECTION, SOLUTION INTRAMUSCULAR; INTRAVENOUS at 05:28

## 2023-04-15 RX ADMIN — SIMETHICONE 80 MG: 80 TABLET, CHEWABLE ORAL at 14:00

## 2023-04-15 RX ADMIN — HYDRALAZINE HYDROCHLORIDE 10 MG: 20 INJECTION INTRAMUSCULAR; INTRAVENOUS at 12:31

## 2023-04-15 RX ADMIN — SODIUM CHLORIDE 250 ML/HR: 9 INJECTION, SOLUTION INTRAVENOUS at 13:50

## 2023-04-15 RX ADMIN — PANTOPRAZOLE SODIUM 40 MG: 40 INJECTION, POWDER, LYOPHILIZED, FOR SOLUTION INTRAVENOUS at 05:28

## 2023-04-15 RX ADMIN — SODIUM CHLORIDE 1000 ML: 9 INJECTION, SOLUTION INTRAVENOUS at 02:57

## 2023-04-15 RX ADMIN — ACETAMINOPHEN 1000 MG: 500 TABLET ORAL at 00:24

## 2023-04-15 RX ADMIN — OXYCODONE HYDROCHLORIDE 5 MG: 5 TABLET ORAL at 04:07

## 2023-04-15 RX ADMIN — ACETAMINOPHEN 1000 MG: 500 TABLET ORAL at 15:58

## 2023-04-15 RX ADMIN — HYDROMORPHONE HYDROCHLORIDE 2 MG: 2 TABLET ORAL at 16:01

## 2023-04-15 RX ADMIN — ACETAMINOPHEN 1000 MG: 500 TABLET ORAL at 08:24

## 2023-04-15 RX ADMIN — OXYCODONE HYDROCHLORIDE 5 MG: 5 TABLET ORAL at 10:51

## 2023-04-15 RX ADMIN — SIMETHICONE 80 MG: 80 TABLET, CHEWABLE ORAL at 08:02

## 2023-04-15 RX ADMIN — PROCHLORPERAZINE MALEATE 10 MG: 10 TABLET ORAL at 14:00

## 2023-04-15 RX ADMIN — HYDRALAZINE HYDROCHLORIDE 10 MG: 20 INJECTION INTRAMUSCULAR; INTRAVENOUS at 13:55

## 2023-04-15 RX ADMIN — ACETAMINOPHEN 1000 MG: 500 TABLET ORAL at 23:19

## 2023-04-15 RX ADMIN — CEFAZOLIN 3 G: 10 INJECTION, POWDER, FOR SOLUTION INTRAVENOUS at 00:24

## 2023-04-15 NOTE — CONSULTS
[facility] Medicine Services  CONSULT NOTE      Patient Name: Geneva Cordova  : 1985  MRN: [id number]    Primary Care Physician: Alice Fuentes PA-C  Provider requesting consultation: Erik Hughes MD    Subjective   Subjective     Reason for Consultation:  Post-operative medical management     HPI:  Geneva Cordova is a 37 y.o. female with past medical history significant for obesity s/p gastric sleeve, GERD, anxiety, depression, and chronic back pain who presented to [facility] on  for scheduled laparoscopic gastric bypass with davinci robot and biliopancreatic diversion with duodenal switch and laparoscopic recurrent paraesophageal hernia repair with davinci robot and falciform ligament sling by Dr. Hughes. Hospital medicine was consulted due to post-operative hypertension and tachycardia.     Patient was seen ambulating in halls with nursing with HR in 160's per telemetry monitor. She denies palpitations but does endorse shortness of breath on exertion. She denies history of tachycardia or hypertension. Pain is currently well controlled. TOLU remains in place with small amount of bloody output.     Review of Systems   Constitutional: Positive for activity change and fatigue. Negative for chills and fever.   HENT: Negative for congestion, trouble swallowing and voice change.    Eyes: Negative for photophobia, discharge and visual disturbance.   Respiratory: Positive for chest tightness and shortness of breath. Negative for cough and wheezing.    Cardiovascular: Negative for chest pain, palpitations and leg swelling.   Gastrointestinal: Positive for abdominal pain and nausea. Negative for diarrhea and vomiting.   Endocrine: Negative for polydipsia, polyphagia and polyuria.   Genitourinary: Negative for difficulty urinating, frequency and urgency.   Musculoskeletal: Negative for arthralgias, back pain, myalgias and neck pain.   Skin: Negative for color change, pallor and rash.  "  Neurological: Positive for weakness. Negative for dizziness, speech difficulty and headaches.   Hematological: Negative.    Psychiatric/Behavioral: Negative for agitation, confusion and suicidal ideas. The patient is not nervous/anxious.      All other systems reviewed and are negative.     Personal History     Past Medical History:   Diagnosis Date   • Anemia    • Anxiety    • Bilateral lower extremity edema     R>L   • Boil, axilla     recurrent   • Chronic back pain     no meds or injections, feels related to breasts   • Concussion with no loss of consciousness 2022   • COVID-19 2020   • Depression    • Dyspepsia    • Dyspnea on exertion    • Fatigue    • Gastroesophageal reflux disease concurrent with and due to paraesophageal hernia     recurrent after LSG revision, EGD Dr. Hughes 23   • Glucose intolerance (impaired glucose tolerance)    • Helicobacter pylori (H. pylori) infection     asx and grossly nl EGD. \"abundant\"on path. HBT still + after PrevPak tx. LSG path neg. neg testing 2018   • Hypoalbuminemia    • Left Ovarian serous cystadenoma -removed at  section 2017   • Migraine     resolved with weightloss   • Obesity, morbid    • VIRA (obstructive sleep apnea)     improved since WLS   • PCOS (polycystic ovarian syndrome)    • Right Essure implantation 2017 10/06/2017   • S/P  section;left ovarian cystectomy and partial salpingectomy 2017   • Seasonal allergies        Past Surgical History:   Procedure Laterality Date   • ABDOMINOPLASTY  2022    Osmany Mclain at [facility]   • BILATERAL BREAST REDUCTION      2022   •  SECTION N/A 2017      SECTION PRIMARY;  CYSTECTOMY; Kwadwo Baxter MD; :  [facility];  Service:    • DILATATION AND CURETTAGE     • ENDOMETRIAL ABLATION  2020    Dr Cuevas   • ENDOSCOPY N/A 2020    Procedure: ESOPHAGOGASTRODUODENOSCOPY;  Surgeon: Natalia Mariscal " MD Curly;  Location:  [facility];  Service: Bariatric;  Laterality: N/A;   • ENDOSCOPY  09/2022    Dr. La   • ESSURE TUBAL LIGATION  2018   • GASTRIC SLEEVE LAPAROSCOPIC  07/2015    With Dr. Hughes   • GASTRIC SLEEVE LAPAROSCOPIC N/A 12/01/2020    Procedure: GASTRIC SLEEVE LAPAROSCOPIC;  Surgeon: Natalia Mariscal MD;  Location:  [facility];  Service: Bariatric;  Laterality: N/A;   • LAPAROSCOPIC CHOLECYSTECTOMY  02/2018    Dr. Hughes   • SALPINGECTOMY Bilateral 02/2020    Dr Cuevas   • TONSILLECTOMY  2014   • WISDOM TOOTH EXTRACTION  2014       Family History:  family history includes Diabetes in her mother; No Known Problems in her brother, daughter, father, maternal aunt, paternal aunt, paternal grandmother, sister, son, and another family member; Pancreatic cancer in her maternal grandmother; Sleep apnea in her mother. Otherwise pertinent FHx was reviewed and unremarkable.     Social History:  reports that she has never smoked. She has never used smokeless tobacco. She reports that she does not currently use alcohol. She reports that she does not use drugs.    Medications:  ferrous gluconate, multivitamin with minerals, thiamine, and vitamin D3    Scheduled Meds:acetaminophen, 1,000 mg, Oral, Q8H   Or  acetaminophen, 1,000 mg, Oral, Q8H  gabapentin, 100 mg, Oral, TID   Or  gabapentin, 100 mg, Oral, TID  pantoprazole, 40 mg, Intravenous, Q AM  Scopolamine, 1 patch, Transdermal, Once      Continuous Infusions:lactated ringers, 175 mL/hr, Last Rate: 175 mL/hr (04/15/23 1232)  sodium chloride, 250 mL/hr, Last Rate: 250 mL/hr (04/15/23 1350)      PRN Meds:.•  Albuterol Sulfate NEB Orderable  •  ALPRAZolam  •  diphenhydrAMINE  •  hydrALAZINE  •  HYDROmorphone **AND** naloxone  •  HYDROmorphone  •  LORazepam  •  LORazepam  •  Morphine **AND** naloxone  •  ondansetron **FOLLOWED BY** [START ON 4/18/2023] ondansetron  •  ondansetron  •  oxyCODONE  •  phenol  •  prochlorperazine  •  promethazine  •   simethicone    Allergies   Allergen Reactions   • Codeine Nausea And Vomiting, Other (See Comments) and GI Intolerance     UNKNOWN       Objective   Objective     Vital Signs:   Temp:  [98.1 °F (36.7 °C)-99.4 °F (37.4 °C)] 98.5 °F (36.9 °C)  Heart Rate:  [] 130  Resp:  [16] 16  BP: (131-177)/() 165/95  Flow (L/min):  [1-3] 1    Physical Exam   Constitutional: Awake, alert, appears to be in pain  Eyes: PERRLA, sclerae anicteric, no conjunctival injection  HENT: NCAT, mucous membranes moist  Neck: Supple, no thyromegaly, no lymphadenopathy, trachea midline  Respiratory: clear to auscultation, nonlabored respirations on RA   Cardiovascular: Tachycardic but regular , no murmurs, rubs, or gallops, palpable pedal pulses bilaterally  Gastrointestinal: Positive bowel sounds, soft tender to palpation, Abd dressings CDI, TOLU in place   Musculoskeletal: trace bilateral ankle edema, no clubbing or cyanosis to extremities  Psychiatric: flat affect, cooperative  Neurologic: Oriented x 3, moves all extremities, speech clear  Skin:warm, dry, no visible rash    Result Review:  I have personally reviewed the results from the time of admission and agree with these findings:  [x]  Laboratory  [x]  Radiology  [x]  EKG/Telemetry   []  Pathology  [x]  Old records  []  Other:  Most notable findings include:    LAB RESULTS:      Lab 04/15/23  0446 04/15/23  0441 04/15/23  0041 04/14/23  2228   WBC 11.67*  --   --   --    HEMOGLOBIN 10.6* 10.6* 11.8* 11.5*   HEMATOCRIT 33.7* 32.9* 36.1 34.4   PLATELETS 296  --   --   --    NEUTROS ABS 9.64*  --   --   --    IMMATURE GRANS (ABS) 0.10*  --   --   --    LYMPHS ABS 1.11  --   --   --    MONOS ABS 0.76  --   --   --    EOS ABS 0.04  --   --   --    MCV 94.1  --   --   --          Lab 04/15/23  0441   SODIUM 133*   POTASSIUM 4.7   CHLORIDE 105   CO2 19.0*   ANION GAP 9.0   BUN 9   CREATININE 0.80   EGFR 98.1   GLUCOSE 125*   CALCIUM 8.5*         Lab 04/15/23  0441   TOTAL PROTEIN 6.6    ALBUMIN 3.3*   GLOBULIN 3.3   ALT (SGPT) 32   AST (SGOT) 48*   BILIRUBIN 0.5   ALK PHOS 57                 Lab 04/15/23  0441 04/14/23  0757   IRON 49  --    ABO TYPING  --  A   RH TYPING  --  Positive   ANTIBODY SCREEN  --  Negative         Brief Urine Lab Results  (Last result in the past 365 days)      Color   Clarity   Blood   Leuk Est   Nitrite   Protein   CREAT   Urine HCG        04/14/23 0704               Negative           Microbiology Results (last 10 days)     Procedure Component Value - Date/Time    MRSA Screen Culture (Outpatient) - Swab, Nares [[id number]]  (Normal) Collected: 04/07/23 0842    Lab Status: Final result Specimen: Swab from Nares Updated: 04/08/23 1522     MRSA Screen Cx No Methicillin Resistant Staphylococcus aureus isolated    Narrative:      The negative predictive value of this diagnostic test is high and should only be used to consider de-escalating anti-MRSA therapy. A positive result may indicate colonization with MRSA and must be correlated clinically.          Peripheral Block    Result Date: 4/14/2023  Vitaliy James CRNA     4/14/2023  8:15 AM Peripheral Block Patient reassessed immediately prior to procedure Patient location during procedure: OR Start time: 4/14/2023 7:43 AM Stop time: 4/14/2023 7:50 AM Reason for block: at surgeon's request and post-op pain management Performed by Anesthesiologist: Haydee Sinha MD Preanesthetic Checklist Completed: patient identified, IV checked, site marked, risks and benefits discussed, surgical consent, monitors and equipment checked, pre-op evaluation and timeout performed Prep: Pt Position: supine Sterile barriers:cap, gloves, mask and washed/disinfected hands Prep: ChloraPrep Patient monitoring: blood pressure monitoring, continuous pulse oximetry and EKG Procedure Sedation: yes Performed under: general Guidance:ultrasound guided Images:still images obtained, printed/placed on chart Laterality:Bilateral Block Type:TAP Injection  "Technique:single-shot Needle Type:short-bevel and echogenic Needle Gauge:20 G Resistance on Injection: none Medications Used: dexamethasone sodium phosphate injection - Injection  4 mg - 4/14/2023 7:43:00 AM bupivacaine PF (MARCAINE) 0.25 % injection - Injection  60 mL - 4/14/2023 7:43:00 AM Medications Comment:Block Injection:  LA dose divided between Right and Left block Post Assessment Injection Assessment: negative aspiration for heme, incremental injection and no paresthesia on injection Patient Tolerance:comfortable throughout block Complications:no Additional Notes Subcostal TAPs A high-frequency linear transducer, with sterile cover, was placed sub-xiphoid to identify Linea Alba, right and left Rectus Abdominus Muscles (KELLY). The transducer was moved either right or left subcostally to identify the KELLY and the Transverse Abdominus Muscle (RANDOLPH). The insertion site was prepped in sterile fashion and then localized with 2-5 ml of 1% Lidocaine. Using ultrasound-guidance, a 20-gauge B-Kelsey 4\" Ultraplex 360 non-stimulating echogenic needle was advanced in plane, from medial to lateral, until the tip of the needle was in the fascial plane between the KELLY and RANDOLPH. 1-3ml of preservative free normal saline was used to hydro-dissect the fascial planes. After the fascial plane was verified, the local anesthetic (LA) was injected. The procedure was repeated on the opposite side for bilateral coverage. Aspiration every 5 ml to prevent intravascular injection. Injection was completed with negative aspiration of blood and negative intravascular injection. Injection pressures were normal with minimal resistance. The subcostal approach to the TAP nerve block ideally anesthetizes the intercostal nerves T6-T9. Mid-Axillary/Lateral TAPs A high-frequency linear transducer, with sterile cover, was placed in the midaxillary line between the ASIS and costal margin. The External Oblique Muscle (EOM), Internal Oblique Muscle (IOM), " "Transverse Abdominus Muscle (RANDOLPH), and Peritoneum were identified. The insertion site was prepped in sterile fashion and then localized with 2-5 ml of 1% Lidocaine. Using ultrasound-guidance, a 20-gauge B-Kelsey 4\" Ultraplex 360 non-stimulating echogenic needle was advanced in plane, from medial to lateral, until the tip of the needle was in the fascial plane between the IOM and RANDOLPH. 1-3ml of preservative free normal saline was used to hydro-dissect the fascial planes. After the fascial plane was verified, the local anesthetic (LA) was injected. The procedure was repeated on the opposite side for bilateral coverage. Aspiration every 5 ml to prevent intravascular injection. Injection was completed with negative aspiration of blood and negative intravascular injection. Injection pressures were normal with minimal resistance. Midaxillary TAPs should reach intercostal nerves T10- T11 and the subcostal nerve T12.      FL Upper GI Single Contrast With KUB    Result Date: 4/15/2023  FL UPPER GI SINGLE CONTRAST W KUB Date of Exam: 4/15/2023 8:59 AM EDT Indication: gastric bypass water soluble. Comparison: None available. Technique:   radiograph of the abdomen was obtained. A single contrast radiographic exam was performed imaging through the upper gastrointestinal tract. Fluoroscopic Time:  30 seconds Number of Images:  image and 6 series Findings: Pre-procedure radiograph: A surgical drain overlies the left upper quadrant. Bowel gas is present in a nonobstructive pattern. There is no visible pneumoperitoneum, abnormal abdominal calcification, or abnormal mass. Esophogram: Contrast passes readily through the esophagus into the gastric pouch and gastrojejunal anastomosis. There are no signs of contrast extravasation, stricture, or obstruction.     Impression: Impression: Expected post-surgical changes of gastric bypass without leak or obstruction. Electronically Signed: Ronnell Stewart  4/15/2023 11:41 AM EDT  " Workstation ID: [id number]          Assessment & Plan   Assessment & Plan     Active Hospital Problems    Diagnosis  POA   • Morbid obesity with body mass index of 40.0-44.9 in adult [E66.01, Z68.41]  Unknown   • S/P laparoscopic sleeve gastrectomy [Z98.84]  Unknown      Resolved Hospital Problems    Diagnosis Date Resolved POA   • **Partial gastric outlet obstruction [K31.1] 04/14/2023 Unknown   • Hiatal hernia with gastroesophageal reflux [K44.9, K21.9] 04/14/2023 Unknown     Geneva Cordova is a 37 y.o. female with past medical history significant for obesity s/p gastric sleeve, GERD, anxiety, depression, and chronic back pain who presented to [facility] on 4/14 for scheduled laparoscopic gastric bypass with davinci robot and biliopancreatic diversion with duodenal switch and laparoscopic recurrent paraesophageal hernia repair with davinci robot and falciform ligament sling by Dr. Hughes. Hospital medicine was consulted due to post-operative hypertension and tachycardia.     Partial gastric outlet obstruction   Prior laparoscopic sleeve revision (12/2020)  -s/p laparoscopic gastric bypass with davinci robot and biliopancreatic diversion with duodenal switch and laparoscopic recurrent paraesophageal hernia repair with davinci robot and falciform ligament sling.   -Management per primary   -TOLU drain in place   -AM Labs     Postoperative tachycardia  Elevated blood pressure  -No prior history of HTN, suspect due to volume depletion vs pain  -s/p 1L NS bolus by Dr. Hughes  -No current dyspnea, on RA   -Repeat 1L NS bolus and continues NS at 175 ml/hr  -Oxycodone, Diluadid PRN for pain   -Labetalol PRN    Leukocytosis  -suspect reactive d/t surgery  -afebrile   -monitor   -CBC in AM     Hyponatremia  -Sodium 133 this AM    -Monitor with IVF  -BMP in AM     Thank you for allowing [facility] to provide consultative care for your patient, we will continue to follow while clinically appropriate.    Gerald MALCOLM  Kia, RAGHAV  04/15/23

## 2023-04-15 NOTE — PLAN OF CARE
Goal Outcome Evaluation:  Plan of Care Reviewed With: patient      RA, SR afebrile. Patient has ambulated today. Taking in some protein but not reaching the goal. She stated she would drink more. Drainage from TOLU drain is slowing down and no further drainage from around TOLU drain noted. Hemoglobin has gone from 10.6 to 9.8. will recheck q6h. Patient has received 2L bolus and IVF changed to NS per hospitalist. Hospitalist consulted for increased blood pressure and heart rate of which both has improved with the additional NS. Will continue current plan of care

## 2023-04-15 NOTE — PLAN OF CARE
Goal Outcome Evaluation:              Outcome Evaluation: Pt drowsy on assessment, TOLU dressing reinforced x3, MD notified regarding TOLU output, bolus given and H&H Q4 hours, up to bathroom with adequate output, scds on, pain medication x 1, VSS, tachycardic when ambulating.  Will continue with plan of care.

## 2023-04-15 NOTE — PROGRESS NOTES
"Cc: POD#1 LSG to [facility] for pGOO, rPHHR  \"sore\"    There is a woman and small child in the room.  On my initial arrival the patient was ambulating in the halls, denied orthostatic symptoms.  In her room she was sitting in a recliner at the bedside wearing her SCDs.  On questioning she says her mouth is dry and says she felt much better after the saline bolus yesterday and would like another if possible.  Some nausea, no vomiting.  No hematemesis or melena.  She says she is voiding a lot but it is dark.  Nurse is in the room.  Patient having issues with hypertension requiring as needed hydralazine.  No preoperative history of hypertension and has not met parameters for hospitalist consult yet.  TOLU dressing changed once for saturation.  It is noted her heart rate goes up with ambulation consistent with orthostasis.  At the bedside during my evaluation it ranged from the 90s to 120s.  No bowel movement or flatus.  No pulmonary complaints, spirometer 1500 observed.  Notices her preoperative reflux symptoms resolved.    No fever pulse currently listed is 98 respiration 16 blood pressure 177/103 but as high as 109 systolic.  Room air sat 97% UO 1375 - 400 TOLU 750 - 60 SS now, not as bloody.  Current dressing dry.  She is in no apparent distress.  Lungs clear to auscultation.  Abdomen soft, nontender/appropriate, nondistended, bowel sounds hypoactive.  Wounds look okay.  No ecchymosis.      White count 11.7 with 83 segs 10 lymphs 7 monocytes no bands.  CMP normal except glucose 125 sodium 133 CO2 19 calcium 8.5 albumin 3.3 AST 48.  Iron is 49 but I believe she received IV iron yesterday.  Prealbumin is 11.7.    H&H 3 weeks ago 11.8/36.6  H&H preop 12.6/39.7  H&H yesterday pm 11.5/34.4  H&H @ 0041  11.8/36.1  H&H @ 0441 10.6/32.9    Upper GI images and report reviewed, unremarkable status post Alejandro-en-Y gastric bypass    Impression: Postop day #1 robotic sleeve gastrectomy to Alejandro-en-Y gastric bypass and recurrent " paraesophageal hiatal hernia repair for partial gastric outlet obstruction.  Postoperative intraperitoneal bleeding suspected given TOLU output and tachycardia.  Preoperative anemia with fairly stable H&H which likely has not equilibrated.  Currently TOLU drainage decreased as well as drainage around the TOLU.  Suspect some of that was residual irrigation.  Increased risk of postoperative bleeding on Lovenox and with perioperative hypertension, no history of elevated blood pressure preoperatively.  Low albumin and prealbumin.  Suboptimal I-S.    Plan: Bolus additional liter of normal saline.  Continue serial H&H, may require transfusion.  Continue stage I diet with protein, out of bed, pulmonary toilet, VTE prophylaxis with ambulation and SCDs.  See orders.

## 2023-04-16 LAB
ALBUMIN SERPL-MCNC: 3.8 G/DL (ref 3.5–5.2)
ALBUMIN/GLOB SERPL: 1.7 G/DL
ALP SERPL-CCNC: 58 U/L (ref 39–117)
ALT SERPL W P-5'-P-CCNC: 20 U/L (ref 1–33)
ANION GAP SERPL CALCULATED.3IONS-SCNC: 8 MMOL/L (ref 5–15)
AST SERPL-CCNC: 25 U/L (ref 1–32)
BACTERIA UR QL AUTO: ABNORMAL /HPF
BASOPHILS # BLD AUTO: 0.03 10*3/MM3 (ref 0–0.2)
BASOPHILS NFR BLD AUTO: 0.3 % (ref 0–1.5)
BILIRUB SERPL-MCNC: 0.5 MG/DL (ref 0–1.2)
BILIRUB UR QL STRIP: NEGATIVE
BUN SERPL-MCNC: 7 MG/DL (ref 6–20)
BUN/CREAT SERPL: 10 (ref 7–25)
CALCIUM SPEC-SCNC: 8.5 MG/DL (ref 8.6–10.5)
CHLORIDE SERPL-SCNC: 108 MMOL/L (ref 98–107)
CLARITY UR: ABNORMAL
CO2 SERPL-SCNC: 23 MMOL/L (ref 22–29)
COLOR UR: YELLOW
CREAT SERPL-MCNC: 0.7 MG/DL (ref 0.57–1)
DEPRECATED RDW RBC AUTO: 43.3 FL (ref 37–54)
EGFRCR SERPLBLD CKD-EPI 2021: 115.1 ML/MIN/1.73
EOSINOPHIL # BLD AUTO: 0.08 10*3/MM3 (ref 0–0.4)
EOSINOPHIL NFR BLD AUTO: 0.9 % (ref 0.3–6.2)
ERYTHROCYTE [DISTWIDTH] IN BLOOD BY AUTOMATED COUNT: 12.7 % (ref 12.3–15.4)
GLOBULIN UR ELPH-MCNC: 2.2 GM/DL
GLUCOSE SERPL-MCNC: 85 MG/DL (ref 65–99)
GLUCOSE UR STRIP-MCNC: NEGATIVE MG/DL
HCT VFR BLD AUTO: 24.1 % (ref 34–46.6)
HCT VFR BLD AUTO: 28.4 % (ref 34–46.6)
HCT VFR BLD AUTO: 28.4 % (ref 34–46.6)
HCT VFR BLD AUTO: 28.9 % (ref 34–46.6)
HGB BLD-MCNC: 7.9 G/DL (ref 12–15.9)
HGB BLD-MCNC: 8.9 G/DL (ref 12–15.9)
HGB BLD-MCNC: 8.9 G/DL (ref 12–15.9)
HGB BLD-MCNC: 9.1 G/DL (ref 12–15.9)
HGB UR QL STRIP.AUTO: NEGATIVE
HYALINE CASTS UR QL AUTO: ABNORMAL /LPF
IMM GRANULOCYTES # BLD AUTO: 0.07 10*3/MM3 (ref 0–0.05)
IMM GRANULOCYTES NFR BLD AUTO: 0.8 % (ref 0–0.5)
KETONES UR QL STRIP: ABNORMAL
LEUKOCYTE ESTERASE UR QL STRIP.AUTO: NEGATIVE
LYMPHOCYTES # BLD AUTO: 1.71 10*3/MM3 (ref 0.7–3.1)
LYMPHOCYTES NFR BLD AUTO: 18.8 % (ref 19.6–45.3)
MCH RBC QN AUTO: 29.3 PG (ref 26.6–33)
MCHC RBC AUTO-ENTMCNC: 31.3 G/DL (ref 31.5–35.7)
MCV RBC AUTO: 93.4 FL (ref 79–97)
MONOCYTES # BLD AUTO: 0.54 10*3/MM3 (ref 0.1–0.9)
MONOCYTES NFR BLD AUTO: 5.9 % (ref 5–12)
MUCOUS THREADS URNS QL MICRO: ABNORMAL /HPF
NEUTROPHILS NFR BLD AUTO: 6.69 10*3/MM3 (ref 1.7–7)
NEUTROPHILS NFR BLD AUTO: 73.3 % (ref 42.7–76)
NITRITE UR QL STRIP: NEGATIVE
NRBC BLD AUTO-RTO: 0 /100 WBC (ref 0–0.2)
PH UR STRIP.AUTO: 5.5 [PH] (ref 5–8)
PLATELET # BLD AUTO: 215 10*3/MM3 (ref 140–450)
PMV BLD AUTO: 11.2 FL (ref 6–12)
POTASSIUM SERPL-SCNC: 3.9 MMOL/L (ref 3.5–5.2)
PROT SERPL-MCNC: 6 G/DL (ref 6–8.5)
PROT UR QL STRIP: NEGATIVE
RBC # BLD AUTO: 3.04 10*6/MM3 (ref 3.77–5.28)
RBC # UR STRIP: ABNORMAL /HPF
REF LAB TEST METHOD: ABNORMAL
SODIUM SERPL-SCNC: 139 MMOL/L (ref 136–145)
SP GR UR STRIP: >=1.03 (ref 1–1.03)
SQUAMOUS #/AREA URNS HPF: ABNORMAL /HPF
UROBILINOGEN UR QL STRIP: ABNORMAL
WBC # UR STRIP: ABNORMAL /HPF
WBC NRBC COR # BLD: 9.12 10*3/MM3 (ref 3.4–10.8)

## 2023-04-16 PROCEDURE — 25010000002 HYDROMORPHONE 1 MG/ML SOLUTION: Performed by: SURGERY

## 2023-04-16 PROCEDURE — 85018 HEMOGLOBIN: CPT | Performed by: SURGERY

## 2023-04-16 PROCEDURE — 85025 COMPLETE CBC W/AUTO DIFF WBC: CPT | Performed by: SURGERY

## 2023-04-16 PROCEDURE — 25010000002 ONDANSETRON PER 1 MG: Performed by: SURGERY

## 2023-04-16 PROCEDURE — 80053 COMPREHEN METABOLIC PANEL: CPT | Performed by: SURGERY

## 2023-04-16 PROCEDURE — 85014 HEMATOCRIT: CPT | Performed by: SURGERY

## 2023-04-16 PROCEDURE — 99024 POSTOP FOLLOW-UP VISIT: CPT | Performed by: SURGERY

## 2023-04-16 PROCEDURE — 99232 SBSQ HOSP IP/OBS MODERATE 35: CPT | Performed by: INTERNAL MEDICINE

## 2023-04-16 PROCEDURE — 81001 URINALYSIS AUTO W/SCOPE: CPT | Performed by: SURGERY

## 2023-04-16 PROCEDURE — 0 POTASSIUM CHLORIDE PER 2 MEQ: Performed by: SURGERY

## 2023-04-16 RX ORDER — SODIUM CHLORIDE AND POTASSIUM CHLORIDE 150; 450 MG/100ML; MG/100ML
75 INJECTION, SOLUTION INTRAVENOUS CONTINUOUS
Status: DISCONTINUED | OUTPATIENT
Start: 2023-04-16 | End: 2023-04-17 | Stop reason: HOSPADM

## 2023-04-16 RX ADMIN — OXYCODONE HYDROCHLORIDE 5 MG: 5 TABLET ORAL at 16:23

## 2023-04-16 RX ADMIN — HYDROMORPHONE HYDROCHLORIDE 1 MG: 1 INJECTION, SOLUTION INTRAMUSCULAR; INTRAVENOUS; SUBCUTANEOUS at 17:40

## 2023-04-16 RX ADMIN — OXYCODONE HYDROCHLORIDE 5 MG: 5 TABLET ORAL at 10:05

## 2023-04-16 RX ADMIN — POTASSIUM CHLORIDE AND SODIUM CHLORIDE 75 ML/HR: 450; 150 INJECTION, SOLUTION INTRAVENOUS at 11:30

## 2023-04-16 RX ADMIN — GABAPENTIN 100 MG: 100 CAPSULE ORAL at 08:45

## 2023-04-16 RX ADMIN — ACETAMINOPHEN 1000 MG: 500 TABLET ORAL at 08:45

## 2023-04-16 RX ADMIN — OXYCODONE HYDROCHLORIDE 5 MG: 5 TABLET ORAL at 22:52

## 2023-04-16 RX ADMIN — ONDANSETRON 4 MG: 2 INJECTION INTRAMUSCULAR; INTRAVENOUS at 16:58

## 2023-04-16 RX ADMIN — MAGNESIUM HYDROXIDE 10 ML: 2400 SUSPENSION ORAL at 14:54

## 2023-04-16 RX ADMIN — SIMETHICONE 80 MG: 80 TABLET, CHEWABLE ORAL at 23:11

## 2023-04-16 RX ADMIN — PANTOPRAZOLE SODIUM 40 MG: 40 INJECTION, POWDER, LYOPHILIZED, FOR SOLUTION INTRAVENOUS at 04:55

## 2023-04-16 RX ADMIN — SIMETHICONE 80 MG: 80 TABLET, CHEWABLE ORAL at 14:05

## 2023-04-16 RX ADMIN — HYDROMORPHONE HYDROCHLORIDE 2 MG: 2 TABLET ORAL at 04:56

## 2023-04-16 NOTE — PLAN OF CARE
Goal Outcome Evaluation:              Outcome Evaluation: VSS, up in lucia ambulating, prn pain meds given, drinking protein, TOLU output minimal, ST on monitor, on room air.  Will continue with plan of care.

## 2023-04-16 NOTE — PROGRESS NOTES
"Cc: POD#2  Robot LSG to [facility] for pGOO, rPHHR  \"doing ok\"    She is sitting up in in a chair at the side of the bed.  She says she awoke to a gush of blood around her TOLU site and the dressing was changed at that time.  She complains of some right lower quadrant pain.  She drank her entire protein shake this morning, no nausea.  Preoperative symptoms remain resolved.  She says she is ambulating without lightheadedness or dizziness but that her heart rate does go up.  She says she is voiding a lot.  She says she passed flatus, no bowel movement.  No pulmonary complaints, spirometer over 2000 observed.    No fever pulse 95 respiration 16 blood pressure 134/81 room air sat 99%.  At the bedside her heart rates in the 80s on the monitor UO 1400 -200  TOLU 210 bloody/serosanguineous she is in no apparent distress.  Lungs clear.  Abdomen soft, nontender/appropriate including the right lower quadrant, nondistended, bowel sounds active.  Wounds look okay.  No ecchymosis.  TOLU dressing currently dry    CMP normal except chloride 108 calcium 8.5.  White count normal at 9.12 with 73 segs 19 lymphs 6 monocytes 1 eosinophil no bands H&H is 8.9 and 20.4.    0723  8.9/28.4  0035  9.1/28.9  1810  9.8/32.5   4/15/23 @ 0446  10.6/33.7  3 weeks ago 11.8/36.6    Impression: POD#2  Robot LSG to [facility] for pGOO, rPHHR     Doing okay clinically.  Suspected postoperative intra peritoneal bleed.  Hemoglobin down 2 points from baseline anemia and still slowly drifting downward.  Appears to have equilibrated from a volume standpoint at this point.  Has been off Lovenox and blood pressure better controlled.  No signs of active bleeding.  Do not feel she meets transfusion criteria at this point and discussed the risks, benefits, and alternative therapies with the patient who agrees.  Low albumin and prealbumin.  Improved incentive spirometry.    Plan:    Continue stage I diet with protein, out of bed, pulmonary toilet, VTE prophylaxis with ambulation and " SCDs while in bed.  Lower IV fluids.  Recheck H&H this evening.  If H&H remained stable remove TOLU drain and discharge home tomorrow.

## 2023-04-16 NOTE — PLAN OF CARE
Goal Outcome Evaluation:              Outcome Evaluation: VSSGUSTAVO. Pt improving. Up walking in the halls, tolerating clear liquids and protein shakes. PO PRNs given for pain. Milk of mag started for constipation. Trending H/H, hopeful to DC tomorrow if stable.

## 2023-04-16 NOTE — PROGRESS NOTES
[facility] Medicine Services  PROGRESS NOTE    Patient Name: Geneva Cordova  : 1985  MRN: [id number]    Date of Admission: 2023  Primary Care Physician: Alice Fuentes PA-C    Subjective   Subjective     CC:  RNY    HPI:  Still having some abdominal discomfort. Tolerating PO fluids.  Flatus this am    ROS:  Gen: no fever  CV: no chest pain  GI: as above    Objective   Objective     Vital Signs:   Temp:  [98 °F (36.7 °C)-98.8 °F (37.1 °C)] 98.8 °F (37.1 °C)  Heart Rate:  [] 91  Resp:  [16-18] 16  BP: (115-148)/(70-86) 134/81     Physical Exam:  Constitutional - appears slightly uncomfortable, up in chair  HEENT-NCAT, mucous membranes moist  CV-RRR  Resp-grossly clear bilaterally  Abd-soft, nontender, nondistended, normoactive bowel sounds  Ext-trace edema  Neuro-alert, speech clear, moves all extremities   Psych-normal affect   Skin- No rash on exposed UE or LE bilaterally      Results Reviewed:  LAB RESULTS:      Lab 23  0723 23  0035 04/15/23  1810 04/15/23  0446 04/15/23  0441   WBC 9.12  --   --  11.67*  --    HEMOGLOBIN 8.9*  8.9* 9.1* 9.8* 10.6* 10.6*   HEMATOCRIT 28.4*  28.4* 28.9* 32.5* 33.7* 32.9*   PLATELETS 215  --   --  296  --    NEUTROS ABS 6.69  --   --  9.64*  --    IMMATURE GRANS (ABS) 0.07*  --   --  0.10*  --    LYMPHS ABS 1.71  --   --  1.11  --    MONOS ABS 0.54  --   --  0.76  --    EOS ABS 0.08  --   --  0.04  --    MCV 93.4  --   --  94.1  --          Lab 23  0723 04/15/23  0441   SODIUM 139 133*   POTASSIUM 3.9 4.7   CHLORIDE 108* 105   CO2 23.0 19.0*   ANION GAP 8.0 9.0   BUN 7 9   CREATININE 0.70 0.80   EGFR 115.1 98.1   GLUCOSE 85 125*   CALCIUM 8.5* 8.5*         Lab 23  0723 04/15/23  0441   TOTAL PROTEIN 6.0 6.6   ALBUMIN 3.8 3.3*   GLOBULIN 2.2 3.3   ALT (SGPT) 20 32   AST (SGOT) 25 48*   BILIRUBIN 0.5 0.5   ALK PHOS 58 57                 Lab 04/15/23  0441 23  0757   IRON 49  --    ABO TYPING  --  A   RH  TYPING  --  Positive   ANTIBODY SCREEN  --  Negative         Brief Urine Lab Results  (Last result in the past 365 days)      Color   Clarity   Blood   Leuk Est   Nitrite   Protein   CREAT   Urine HCG        04/16/23 1327 Yellow   Cloudy   Negative   Negative   Negative   Negative                 Microbiology Results Abnormal     None          FL Upper GI Single Contrast With KUB    Result Date: 4/15/2023  FL UPPER GI SINGLE CONTRAST W KUB Date of Exam: 4/15/2023 8:59 AM EDT Indication: gastric bypass water soluble. Comparison: None available. Technique:   radiograph of the abdomen was obtained. A single contrast radiographic exam was performed imaging through the upper gastrointestinal tract. Fluoroscopic Time:  30 seconds Number of Images:  image and 6 series Findings: Pre-procedure radiograph: A surgical drain overlies the left upper quadrant. Bowel gas is present in a nonobstructive pattern. There is no visible pneumoperitoneum, abnormal abdominal calcification, or abnormal mass. Esophogram: Contrast passes readily through the esophagus into the gastric pouch and gastrojejunal anastomosis. There are no signs of contrast extravasation, stricture, or obstruction.     Impression: Impression: Expected post-surgical changes of gastric bypass without leak or obstruction. Electronically Signed: Ronnell Stewart  4/15/2023 11:41 AM EDT  Workstation ID: [id number]          Current medications:  Scheduled Meds:magnesium hydroxide, 10 mL, Oral, Daily  pantoprazole, 40 mg, Intravenous, Q AM  Scopolamine, 1 patch, Transdermal, Once      Continuous Infusions:sodium chloride 0.45 % with KCl 20 mEq, 75 mL/hr, Last Rate: 75 mL/hr (04/16/23 1130)      PRN Meds:.•  Albuterol Sulfate NEB Orderable  •  ALPRAZolam  •  diphenhydrAMINE  •  hydrALAZINE  •  HYDROmorphone **AND** naloxone  •  HYDROmorphone  •  labetalol  •  LORazepam  •  LORazepam  •  Morphine **AND** naloxone  •  ondansetron **FOLLOWED BY** [START ON 4/18/2023]  ondansetron  •  ondansetron  •  oxyCODONE  •  phenol  •  prochlorperazine  •  promethazine  •  simethicone    Assessment & Plan   Assessment & Plan     Active Hospital Problems    Diagnosis  POA   • Morbid obesity with body mass index of 40.0-44.9 in adult [E66.01, Z68.41]  Unknown   • S/P laparoscopic sleeve gastrectomy [Z98.84]  Unknown      Resolved Hospital Problems    Diagnosis Date Resolved POA   • **Partial gastric outlet obstruction [K31.1] 04/14/2023 Unknown   • Hiatal hernia with gastroesophageal reflux [K44.9, K21.9] 04/14/2023 Unknown        Brief Hospital Course to date:  Geneva Cordova is a 37 y.o. female with history of GERD, anxiety, depression, chronic back pain and gastric sleeve here for conversion to RNY for partial gastric outlet obstruction    RNY  - POD 2  - pain control  - stage 1 diet    Anemia  - possible intraperotoneal bleeding post op  - hemoglobin stable, 8.9    Hyponatremia  - sodium improved    GERD    Anxiety and Depression      Expected Discharge Location and Transportation: home  Expected Discharge   Expected Discharge Date and Time     Expected Discharge Date Expected Discharge Time    Apr 17, 2023            DVT prophylaxis:  Mechanical DVT prophylaxis orders are present.     AM-PAC 6 Clicks Score (PT): 24 (04/16/23 0800)    CODE STATUS:   Code Status and Medical Interventions:   Ordered at: 04/14/23 1101     Level Of Support Discussed With:    Patient     Code Status (Patient has no pulse and is not breathing):    CPR (Attempt to Resuscitate)     Medical Interventions (Patient has pulse or is breathing):    Full Support     Release to patient:    Routine Release       Arron Brown MD  04/16/23

## 2023-04-17 ENCOUNTER — READMISSION MANAGEMENT (OUTPATIENT)
Dept: CALL CENTER | Facility: HOSPITAL | Age: 38
End: 2023-04-17
Payer: COMMERCIAL

## 2023-04-17 VITALS
HEIGHT: 70 IN | OXYGEN SATURATION: 96 % | RESPIRATION RATE: 16 BRPM | BODY MASS INDEX: 40.71 KG/M2 | TEMPERATURE: 98.5 F | DIASTOLIC BLOOD PRESSURE: 87 MMHG | WEIGHT: 284.39 LBS | HEART RATE: 83 BPM | SYSTOLIC BLOOD PRESSURE: 124 MMHG

## 2023-04-17 LAB
ALBUMIN SERPL-MCNC: 3.4 G/DL (ref 3.5–5.2)
ALBUMIN/GLOB SERPL: 1.3 G/DL
ALP SERPL-CCNC: 54 U/L (ref 39–117)
ALT SERPL W P-5'-P-CCNC: 17 U/L (ref 1–33)
ANION GAP SERPL CALCULATED.3IONS-SCNC: 10 MMOL/L (ref 5–15)
AST SERPL-CCNC: 24 U/L (ref 1–32)
BASOPHILS # BLD AUTO: 0.03 10*3/MM3 (ref 0–0.2)
BASOPHILS NFR BLD AUTO: 0.3 % (ref 0–1.5)
BILIRUB SERPL-MCNC: 0.4 MG/DL (ref 0–1.2)
BUN SERPL-MCNC: 7 MG/DL (ref 6–20)
BUN/CREAT SERPL: 12.5 (ref 7–25)
CALCIUM SPEC-SCNC: 8.1 MG/DL (ref 8.6–10.5)
CHLORIDE SERPL-SCNC: 107 MMOL/L (ref 98–107)
CO2 SERPL-SCNC: 22 MMOL/L (ref 22–29)
CREAT SERPL-MCNC: 0.56 MG/DL (ref 0.57–1)
DEPRECATED RDW RBC AUTO: 43.9 FL (ref 37–54)
EGFRCR SERPLBLD CKD-EPI 2021: 121.5 ML/MIN/1.73
EOSINOPHIL # BLD AUTO: 0.22 10*3/MM3 (ref 0–0.4)
EOSINOPHIL NFR BLD AUTO: 2.1 % (ref 0.3–6.2)
ERYTHROCYTE [DISTWIDTH] IN BLOOD BY AUTOMATED COUNT: 12.8 % (ref 12.3–15.4)
GEN 5 2HR TROPONIN T REFLEX: 18 NG/L
GLOBULIN UR ELPH-MCNC: 2.6 GM/DL
GLUCOSE SERPL-MCNC: 88 MG/DL (ref 65–99)
HCT VFR BLD AUTO: 26.2 % (ref 34–46.6)
HGB BLD-MCNC: 8.4 G/DL (ref 12–15.9)
IMM GRANULOCYTES # BLD AUTO: 0.04 10*3/MM3 (ref 0–0.05)
IMM GRANULOCYTES NFR BLD AUTO: 0.4 % (ref 0–0.5)
LYMPHOCYTES # BLD AUTO: 2.57 10*3/MM3 (ref 0.7–3.1)
LYMPHOCYTES NFR BLD AUTO: 25 % (ref 19.6–45.3)
MCH RBC QN AUTO: 29.9 PG (ref 26.6–33)
MCHC RBC AUTO-ENTMCNC: 32.1 G/DL (ref 31.5–35.7)
MCV RBC AUTO: 93.2 FL (ref 79–97)
MONOCYTES # BLD AUTO: 0.51 10*3/MM3 (ref 0.1–0.9)
MONOCYTES NFR BLD AUTO: 5 % (ref 5–12)
NEUTROPHILS NFR BLD AUTO: 6.93 10*3/MM3 (ref 1.7–7)
NEUTROPHILS NFR BLD AUTO: 67.2 % (ref 42.7–76)
NRBC BLD AUTO-RTO: 0 /100 WBC (ref 0–0.2)
PLATELET # BLD AUTO: 234 10*3/MM3 (ref 140–450)
PMV BLD AUTO: 10.8 FL (ref 6–12)
POTASSIUM SERPL-SCNC: 3.9 MMOL/L (ref 3.5–5.2)
PROT SERPL-MCNC: 6 G/DL (ref 6–8.5)
QT INTERVAL: 304 MS
QT INTERVAL: 342 MS
QTC INTERVAL: 426 MS
QTC INTERVAL: 432 MS
RBC # BLD AUTO: 2.81 10*6/MM3 (ref 3.77–5.28)
SODIUM SERPL-SCNC: 139 MMOL/L (ref 136–145)
TROPONIN T DELTA: -3 NG/L
TROPONIN T SERPL HS-MCNC: 21 NG/L
WBC NRBC COR # BLD: 10.3 10*3/MM3 (ref 3.4–10.8)

## 2023-04-17 PROCEDURE — 25010000002 MORPHINE PER 10 MG: Performed by: SURGERY

## 2023-04-17 PROCEDURE — 93010 ELECTROCARDIOGRAM REPORT: CPT | Performed by: INTERNAL MEDICINE

## 2023-04-17 PROCEDURE — 99024 POSTOP FOLLOW-UP VISIT: CPT | Performed by: SURGERY

## 2023-04-17 PROCEDURE — 80053 COMPREHEN METABOLIC PANEL: CPT | Performed by: SURGERY

## 2023-04-17 PROCEDURE — 93005 ELECTROCARDIOGRAM TRACING: CPT | Performed by: SURGERY

## 2023-04-17 PROCEDURE — 99232 SBSQ HOSP IP/OBS MODERATE 35: CPT | Performed by: INTERNAL MEDICINE

## 2023-04-17 PROCEDURE — 85025 COMPLETE CBC W/AUTO DIFF WBC: CPT | Performed by: SURGERY

## 2023-04-17 PROCEDURE — 84484 ASSAY OF TROPONIN QUANT: CPT

## 2023-04-17 RX ORDER — OMEPRAZOLE 40 MG/1
40 CAPSULE, DELAYED RELEASE ORAL DAILY
Qty: 60 CAPSULE | Refills: 0 | Status: SHIPPED | OUTPATIENT
Start: 2023-04-17 | End: 2023-06-16

## 2023-04-17 RX ORDER — OXYCODONE HYDROCHLORIDE 5 MG/1
5 TABLET ORAL EVERY 4 HOURS PRN
Qty: 10 TABLET | Refills: 0 | Status: SHIPPED | OUTPATIENT
Start: 2023-04-17 | End: 2023-04-24

## 2023-04-17 RX ORDER — SODIUM CHLORIDE 9 MG/ML
250 INJECTION, SOLUTION INTRAVENOUS CONTINUOUS
Status: ACTIVE | OUTPATIENT
Start: 2023-04-17 | End: 2023-04-17

## 2023-04-17 RX ORDER — POLYETHYLENE GLYCOL 3350 17 G/17G
17 POWDER, FOR SOLUTION ORAL DAILY
Status: DISCONTINUED | OUTPATIENT
Start: 2023-04-17 | End: 2023-04-17 | Stop reason: HOSPADM

## 2023-04-17 RX ORDER — ONDANSETRON 4 MG/1
4 TABLET, FILM COATED ORAL EVERY 4 HOURS PRN
Qty: 10 TABLET | Refills: 0 | Status: SHIPPED | OUTPATIENT
Start: 2023-04-17

## 2023-04-17 RX ADMIN — PANTOPRAZOLE SODIUM 40 MG: 40 INJECTION, POWDER, LYOPHILIZED, FOR SOLUTION INTRAVENOUS at 05:52

## 2023-04-17 RX ADMIN — POLYETHYLENE GLYCOL 3350 17 G: 17 POWDER, FOR SOLUTION ORAL at 12:28

## 2023-04-17 RX ADMIN — MORPHINE SULFATE 4 MG: 4 INJECTION, SOLUTION INTRAMUSCULAR; INTRAVENOUS at 02:48

## 2023-04-17 RX ADMIN — OXYCODONE HYDROCHLORIDE 5 MG: 5 TABLET ORAL at 10:56

## 2023-04-17 RX ADMIN — SODIUM CHLORIDE 250 ML/HR: 9 INJECTION, SOLUTION INTRAVENOUS at 08:41

## 2023-04-17 NOTE — PROGRESS NOTES
"Cc: POD#3 Robot LSG to RNY for pGOO, rPHHR  \"feel fine\"    She is sitting up at the side of the bed.  She feels fairly well at this point and would like to go home.  Still with pain and nausea but controlled with oral medication and keeping down oral intake.  Passing flatus, still no bowel movement.  Ambulating and voiding well.  No pulmonary complaints, spirometer over 2000.   Had chest pain with elevated blood pressure last evening, chest pain resolved.    No fever.  Pulse 109 respiration 16 blood pressure 124/87 as high as 188/93 room air sat 96%   TOLU 40 SS, not bloody.  Dressing not changed since yesterday, partially saturated serosanguineous.  Abdomen is soft and benign bowel sounds active wounds look okay no ecchymosis    Troponin T 18 troponin (earlier 21) T delta -3 CMP normal except creatinine 0.56 calcium 8.1 albumin 3.4 white count normal at 10.3 with a normal differential H&H stable at 8.4 and 26.2.  Urine specific gravity greater than 1.030 15 ketones unremarkable differential except for poor sample with 21-30 squamous cells.    Impression:   POD#3 Robot LSG to RNY for pGOO, rPHHR    Doing okay clinically, wants to go home and does meet discharge criteria from a surgical standpoint.  No evidence of further bleeding off Lovenox.  Intermittent hypertension, improved.  Chest pain resolved.  Hospitalist note appreciated that EKG and troponins reassuring.  Low albumin and prealbumin.    Plan: Discharge home.  Discontinue TOLU drain.  Discharge instructions discussed.  Reiterated avoiding ulcerogenic agents.  Follow-up in the office in 1 to 2 weeks.  Call in the meantime with any problems questions or concerns.  See orders.  "

## 2023-04-17 NOTE — PLAN OF CARE
Goal Outcome Evaluation:              Outcome Evaluation: GUSTAVO AYERS. Pt DC home with family this afternoon. TOLU drain removed. Education video played

## 2023-04-17 NOTE — PROGRESS NOTES
[facility] Medicine Services  PROGRESS NOTE    Patient Name: Geneva Cordova  : 1985  MRN: [id number]    Date of Admission: 2023  Primary Care Physician: Alice Fuentes PA-C    Subjective   Subjective     CC:  Bariatric surgery     HPI:  Had chest pain last night assoc with elevated blood pressure.  Hoping to go home today.  BP has normalized and no further chest pain.  She is walking in the halls.     ROS:  As above    Objective   Objective     Vital Signs:   Temp:  [97.9 °F (36.6 °C)-98.8 °F (37.1 °C)] 97.9 °F (36.6 °C)  Heart Rate:  [] 86  Resp:  [16] 16  BP: (134-188)/(72-93) 135/72     Physical Exam:  Gen:  Up in chair.  Later seen walking in halls   Neuro: alert and oriented, clear speech, follows commands, grossly nonfocal  HEENT:  NC/AT PERRL, OP benign  Neck:  Supple, no LAD  Heart RRR no murmur, rub, or gallop  Abd:  Soft, approp tender, TOLU drain R side   Extrem:  No c/c/e      Results Reviewed:  LAB RESULTS:      Lab 23  0256 23  2100 23  0723 23  0035 04/15/23  1810 04/15/23  0446   WBC 10.30  --  9.12  --   --  11.67*   HEMOGLOBIN 8.4* 7.9* 8.9*  8.9* 9.1* 9.8* 10.6*   HEMATOCRIT 26.2* 24.1* 28.4*  28.4* 28.9* 32.5* 33.7*   PLATELETS 234  --  215  --   --  296   NEUTROS ABS 6.93  --  6.69  --   --  9.64*   IMMATURE GRANS (ABS) 0.04  --  0.07*  --   --  0.10*   LYMPHS ABS 2.57  --  1.71  --   --  1.11   MONOS ABS 0.51  --  0.54  --   --  0.76   EOS ABS 0.22  --  0.08  --   --  0.04   MCV 93.2  --  93.4  --   --  94.1         Lab 23  0256 23  0723 04/15/23  0441   SODIUM 139 139 133*   POTASSIUM 3.9 3.9 4.7   CHLORIDE 107 108* 105   CO2 22.0 23.0 19.0*   ANION GAP 10.0 8.0 9.0   BUN 7 7 9   CREATININE 0.56* 0.70 0.80   EGFR 121.5 115.1 98.1   GLUCOSE 88 85 125*   CALCIUM 8.1* 8.5* 8.5*         Lab 23  0256 23  0723 04/15/23  0441   TOTAL PROTEIN 6.0 6.0 6.6   ALBUMIN 3.4* 3.8 3.3*   GLOBULIN 2.6 2.2 3.3   ALT  (SGPT) 17 20 32   AST (SGOT) 24 25 48*   BILIRUBIN 0.4 0.5 0.5   ALK PHOS 54 58 57         Lab 04/17/23  0631 04/17/23  0256   HSTROP T 18* 21*             Lab 04/15/23  0441 04/14/23  0757   IRON 49  --    ABO TYPING  --  A   RH TYPING  --  Positive   ANTIBODY SCREEN  --  Negative         Brief Urine Lab Results  (Last result in the past 365 days)      Color   Clarity   Blood   Leuk Est   Nitrite   Protein   CREAT   Urine HCG        04/16/23 1327 Yellow   Cloudy   Negative   Negative   Negative   Negative                 Microbiology Results Abnormal     None          FL Upper GI Single Contrast With KUB    Result Date: 4/15/2023  FL UPPER GI SINGLE CONTRAST W KUB Date of Exam: 4/15/2023 8:59 AM EDT Indication: gastric bypass water soluble. Comparison: None available. Technique:   radiograph of the abdomen was obtained. A single contrast radiographic exam was performed imaging through the upper gastrointestinal tract. Fluoroscopic Time:  30 seconds Number of Images:  image and 6 series Findings: Pre-procedure radiograph: A surgical drain overlies the left upper quadrant. Bowel gas is present in a nonobstructive pattern. There is no visible pneumoperitoneum, abnormal abdominal calcification, or abnormal mass. Esophogram: Contrast passes readily through the esophagus into the gastric pouch and gastrojejunal anastomosis. There are no signs of contrast extravasation, stricture, or obstruction.     Impression: Impression: Expected post-surgical changes of gastric bypass without leak or obstruction. Electronically Signed: Ronnell Stewart  4/15/2023 11:41 AM EDT  Workstation ID: [id number]          Current medications:  Scheduled Meds:magnesium hydroxide, 10 mL, Oral, Daily  pantoprazole, 40 mg, Intravenous, Q AM      Continuous Infusions:sodium chloride 0.45 % with KCl 20 mEq, 75 mL/hr, Last Rate: 75 mL/hr (04/16/23 1130)  sodium chloride, 250 mL/hr, Last Rate: 250 mL/hr (04/17/23 0841)      PRN Meds:.•   Albuterol Sulfate NEB Orderable  •  diphenhydrAMINE  •  hydrALAZINE  •  HYDROmorphone **AND** naloxone  •  HYDROmorphone  •  labetalol  •  LORazepam  •  LORazepam  •  Morphine **AND** naloxone  •  ondansetron **FOLLOWED BY** [START ON 4/18/2023] ondansetron  •  oxyCODONE  •  phenol  •  prochlorperazine  •  promethazine  •  simethicone    Assessment & Plan   Assessment & Plan     Active Hospital Problems    Diagnosis  POA   • Morbid obesity with body mass index of 40.0-44.9 in adult [E66.01, Z68.41]  Unknown   • S/P laparoscopic sleeve gastrectomy [Z98.84]  Unknown      Resolved Hospital Problems    Diagnosis Date Resolved POA   • **Partial gastric outlet obstruction [K31.1] 04/14/2023 Unknown   • Hiatal hernia with gastroesophageal reflux [K44.9, K21.9] 04/14/2023 Unknown        Brief Hospital Course to date:  Geneva Cordova is a 37 y.o. female with history of GERD, anxiety, depression, chronic back pain and gastric sleeve here for conversion to RNY for partial gastric outlet obstruction    Chest pain episode last night  - hsTrop and EKG are reassuring.       RNY  - POD 3  - pain control  - stage 1 diet     Anemia  - possible intraperotoneal bleeding post op  - hemoglobin  8.4    Hyponatremia - resolved    GERD     Anxiety and Depression    Expected Discharge Location and Transportation: home by car  Expected Discharge   Expected Discharge Date and Time     Expected Discharge Date Expected Discharge Time    Apr 17, 2023            DVT prophylaxis:  Mechanical DVT prophylaxis orders are present.     AM-PAC 6 Clicks Score (PT): 24 (04/16/23 0800)    CODE STATUS:   Code Status and Medical Interventions:   Ordered at: 04/14/23 1101     Level Of Support Discussed With:    Patient     Code Status (Patient has no pulse and is not breathing):    CPR (Attempt to Resuscitate)     Medical Interventions (Patient has pulse or is breathing):    Full Support     Release to patient:    Routine Release       Dolores Slater  MD  04/17/23

## 2023-04-17 NOTE — OUTREACH NOTE
Prep Survey    Flowsheet Row Responses   [facility] facility patient discharged from? [place]   Is LACE score < 7 ? Yes   Eligibility [facility]   Date of Admission 04/14/23   Date of Discharge 04/17/23   Discharge Disposition Home or Self Care   Discharge diagnosis Partial gastric outlet obstruction, Lap gastric bypass and biliopancreatic diversion with duodenal switch   Does the patient have one of the following disease processes/diagnoses(primary or secondary)? General Surgery   Does the patient have Home health ordered? No   Is there a DME ordered? No   Prep survey completed? Yes          Vero HOOK - Registered Nurse

## 2023-04-17 NOTE — CASE MANAGEMENT/SOCIAL WORK
Continued Stay Note  [facility]     Patient Name: Geneva Cordova  MRN: [id number]  Today's Date: 4/17/2023    Admit Date: 4/14/2023    Plan: home at discharge   Discharge Plan     Row Name 04/17/23 1513       Plan    Plan home at discharge    Final Discharge Disposition Code 01 - home or self-care    Final Note Patient discharged home - no dc needs identified - angelina [phone]               Discharge Codes    No documentation.               Expected Discharge Date and Time     Expected Discharge Date Expected Discharge Time    Apr 17, 2023             Angelina Wisdom RN

## 2023-04-17 NOTE — PLAN OF CARE
Goal Outcome Evaluation:  Plan of Care Reviewed With: patient        Progress: no change  Outcome Evaluation: VSS on room air, tolerating clear liquids, prn meds given for c/o abd pain,  Still no BM. IV fluids infusing. H&H 8.4. TOLU drain in place. Pt resting in bed at this time.

## 2023-04-18 ENCOUNTER — TRANSITIONAL CARE MANAGEMENT TELEPHONE ENCOUNTER (OUTPATIENT)
Dept: CALL CENTER | Facility: HOSPITAL | Age: 38
End: 2023-04-18
Payer: COMMERCIAL

## 2023-04-18 ENCOUNTER — OFFICE VISIT (OUTPATIENT)
Dept: BARIATRICS/WEIGHT MGMT | Facility: CLINIC | Age: 38
End: 2023-04-18
Payer: COMMERCIAL

## 2023-04-18 ENCOUNTER — HOSPITAL ENCOUNTER (OUTPATIENT)
Dept: CT IMAGING | Facility: HOSPITAL | Age: 38
Discharge: HOME | End: 2023-04-18
Admitting: PHYSICIAN ASSISTANT
Payer: COMMERCIAL

## 2023-04-18 VITALS
WEIGHT: 291.5 LBS | OXYGEN SATURATION: 98 % | BODY MASS INDEX: 41.73 KG/M2 | SYSTOLIC BLOOD PRESSURE: 122 MMHG | RESPIRATION RATE: 16 BRPM | DIASTOLIC BLOOD PRESSURE: 64 MMHG | HEART RATE: 82 BPM | TEMPERATURE: 97.5 F | HEIGHT: 70 IN

## 2023-04-18 DIAGNOSIS — R10.10 PAIN OF UPPER ABDOMEN: Primary | ICD-10-CM

## 2023-04-18 DIAGNOSIS — R10.10 PAIN OF UPPER ABDOMEN: ICD-10-CM

## 2023-04-18 PROCEDURE — 99284 EMERGENCY DEPT VISIT MOD MDM: CPT

## 2023-04-18 PROCEDURE — 25510000001 IOPAMIDOL 61 % SOLUTION: Performed by: PHYSICIAN ASSISTANT

## 2023-04-18 PROCEDURE — 1159F MED LIST DOCD IN RCRD: CPT | Performed by: PHYSICIAN ASSISTANT

## 2023-04-18 PROCEDURE — 0 DIATRIZOATE MEGLUMINE & SODIUM PER 1 ML: Performed by: PHYSICIAN ASSISTANT

## 2023-04-18 PROCEDURE — 99024 POSTOP FOLLOW-UP VISIT: CPT | Performed by: PHYSICIAN ASSISTANT

## 2023-04-18 PROCEDURE — 74177 CT ABD & PELVIS W/CONTRAST: CPT

## 2023-04-18 PROCEDURE — 1160F RVW MEDS BY RX/DR IN RCRD: CPT | Performed by: PHYSICIAN ASSISTANT

## 2023-04-18 RX ORDER — LANOLIN ALCOHOL/MO/W.PET/CERES
1 CREAM (GRAM) TOPICAL DAILY
COMMUNITY
Start: 2023-04-12 | End: 2023-04-24

## 2023-04-18 RX ADMIN — IOPAMIDOL 100 ML: 612 INJECTION, SOLUTION INTRAVENOUS at 14:06

## 2023-04-18 RX ADMIN — DIATRIZOATE MEGLUMINE AND DIATRIZOATE SODIUM 15 ML: 660; 100 LIQUID ORAL; RECTAL at 14:06

## 2023-04-18 NOTE — PROGRESS NOTES
"[facility] Bariatric Surgery  [address]  [address]  [facility] [id number] [phone]      Patient Name:  Geneva Cordova  :  1985      Date of Visit: 2023      Reason for Visit:  POD #4    HPI:  Geneva Cordova is a 37 y.o. female s/p robotic RNY/BPD/recurrentPHHR 23 GDW (previous LSG/pHHR  GDW and sleeve revision 2020 PNQ complicated by postop stricture and uncontrolled reflux)    Discharged on POD#3.  Called the exchange this morning with complaints of \"left abdominal swelling\".  She was brought into the office for evaluation and was noted to have edema at her left flank.  She had no bruising.  She just notes some incisional soreness at her left abdominal trocar site.  She has not had a bowel movement, but is passing flatus.  She denies any nausea, vomiting, dizziness, orthostasis.  She is getting adequate protein and fluid intake.  She has not had any fevers or pulmonary complaints. Omeprazole .  Holding ASA , NSAIDs , Tramadol, Hormones, Diuretics , Steroids and Immunologics.  Ambulating.     Presurgery weight: 283 pounds.  Today's weight is 132 kg (291 lb 8 oz) pounds, today's  Body mass index is 41.83 kg/m²., and weight loss since surgery is +8 pounds.       Past Medical History:   Diagnosis Date   • Anemia    • Anxiety    • Bilateral lower extremity edema     R>L   • Boil, axilla     recurrent   • Chronic back pain     no meds or injections, feels related to breasts   • Concussion with no loss of consciousness 2022   • COVID-19 2020   • Depression    • Dyspepsia    • Dyspnea on exertion    • Fatigue    • Gastroesophageal reflux disease concurrent with and due to paraesophageal hernia     recurrent after LSG revision, EGD Dr. Hughes 23   • Glucose intolerance (impaired glucose tolerance)    • Helicobacter pylori (H. pylori) infection     asx and grossly nl EGD. \"abundant\"on path. HBT still + after PrevPak tx. LSG path neg. neg testing 2018   • " Hypoalbuminemia    • Left Ovarian serous cystadenoma -removed at  section 2017   • Migraine     resolved with weightloss   • Obesity, morbid    • VIRA (obstructive sleep apnea)     improved since WLS   • PCOS (polycystic ovarian syndrome)    • Right Essure implantation 2017 10/06/2017   • S/P  section;left ovarian cystectomy and partial salpingectomy 2017   • Seasonal allergies      Past Surgical History:   Procedure Laterality Date   • ABDOMINOPLASTY  2022    [place] [facility]   • BILATERAL BREAST REDUCTION      2022   •  SECTION N/A 2017      SECTION PRIMARY;  CYSTECTOMY; Kwadwo Baxter MD; :  [facility];  Service:    • DILATATION AND CURETTAGE     • ENDOMETRIAL ABLATION  2020    Dr Cuevas   • ENDOSCOPY N/A 2020    Procedure: ESOPHAGOGASTRODUODENOSCOPY;  Surgeon: Natalia Mariscal MD;  Location:  [facility];  Service: Bariatric;  Laterality: N/A;   • ENDOSCOPY  2022    Dr. La   • ENDOSCOPY N/A 2023    Procedure: ESOPHAGOGASTRODUODENOSCOPY;  Surgeon: Erik Hughes MD;  Location:  [facility];  Service: Robotics - DaVinci;  Laterality: N/A;   • ESSURE TUBAL LIGATION     • GASTRIC BYPASS N/A 2023    Procedure: GASTRIC BYPASS LAPAROSCOPIC WITH DAVINCI ROBOT AND BILIOPANCREATIC DIVERSION WITH DUODENAL SWITCH;  Surgeon: Erik Hughes MD;  Location:  [facility];  Service: Robotics - DaVinci;  Laterality: N/A;   • GASTRIC SLEEVE LAPAROSCOPIC  2015    With Dr. Hughes   • GASTRIC SLEEVE LAPAROSCOPIC N/A 2020    Procedure: GASTRIC SLEEVE LAPAROSCOPIC;  Surgeon: Natalia Mariscal MD;  Location:  [facility];  Service: Bariatric;  Laterality: N/A;   • LAPAROSCOPIC CHOLECYSTECTOMY  2018    Dr. Hughes   • PARAESOPHAGEAL HERNIA REPAIR N/A 2023    Procedure: LAPAROSCOPIC RECURRENT PARAESOPHAGEAL HERNIA REPAIR WITH DAVINCI ROBOT AND FALCIFORM LIGAMENT SLING ;   Surgeon: Erik Hughes MD;  Location: [facility];  Service: Robotics - DaVinci;  Laterality: N/A;   • SALPINGECTOMY Bilateral 02/2020    Dr Cuevas   • TONSILLECTOMY  2014   • WISDOM TOOTH EXTRACTION  2014     Outpatient Medications Marked as Taking for the 4/18/23 encounter (Office Visit) with Shaneka Ivey PA   Medication Sig Dispense Refill   • ferrous gluconate (FERGON) 324 MG tablet Take 1 tablet by mouth 2 (Two) Times a Day. (Patient taking differently: Take 1 tablet by mouth. PT NOT CURRENTLY TAKING) 180 tablet 1   • Multiple Vitamins-Minerals (MULTIPLE VITAMINS/WOMENS) tablet Take 1 tablet by mouth. PT NOT CURRENTLY TAKING     • omeprazole (priLOSEC) 40 MG capsule Take 1 capsule by mouth Daily for 60 days. 60 capsule 0   • ondansetron (Zofran) 4 MG tablet Take 1 tablet by mouth Every 4 (Four) Hours As Needed for Nausea. 10 tablet 0   • oxyCODONE (Roxicodone) 5 MG immediate release tablet Take 1 tablet by mouth Every 4 (Four) Hours As Needed for Moderate Pain. 10 tablet 0   • thiamine (VITAMIN B-1) 100 MG tablet Take 1 tablet by mouth Daily. 90 tablet 1   • thiamine (VITAMIN B1) 100 MG tablet Take 1 tablet by mouth Daily.     • vitamin D3 125 MCG (5000 UT) capsule capsule Take 1 capsule by mouth Daily. 90 capsule 1     Current Facility-Administered Medications for the 4/18/23 encounter (Office Visit) with Shaneka Ivey PA   Medication Dose Route Frequency Provider Last Rate Last Admin   • [DISCONTINUED] cyanocobalamin injection 1,000 mcg  1,000 mcg Intramuscular Q28 Days Alice Fuentes PA-C   1,000 mcg at 01/19/23 0951     Allergies   Allergen Reactions   • Codeine Nausea And Vomiting, Other (See Comments) and GI Intolerance     UNKNOWN       Social History     Socioeconomic History   • Marital status: Single   • Number of children: 4   • Years of education: College    Tobacco Use   • Smoking status: Never   • Smokeless tobacco: Never   Vaping Use   • Vaping Use: Never used   Substance and Sexual  "Activity   • Alcohol use: Not Currently   • Drug use: No   • Sexual activity: Defer     Social History     Social History Narrative    Pt lives in [facility], she is single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 She currently works full time at [employer] as a .     The father of PT's oldest child passed away 2 weeks ago,  was yesterday 2022       /64 (BP Location: Left arm, Patient Position: Sitting)   Pulse 82   Temp 97.5 °F (36.4 °C)   Resp 16   Ht 177.8 cm (70\")   Wt 132 kg (291 lb 8 oz)   SpO2 98%   BMI 41.83 kg/m²   Physical Exam  Constitutional:       Appearance: She is obese.   HENT:      Head: Normocephalic and atraumatic.   Cardiovascular:      Rate and Rhythm: Normal rate and regular rhythm.   Pulmonary:      Effort: Pulmonary effort is normal.      Breath sounds: Normal breath sounds.   Abdominal:      General: Bowel sounds are normal. There is no distension.      Palpations: Abdomen is soft. There is no mass.      Tenderness: There is no abdominal tenderness.      Comments: Edema noted in left upper abdomen and left flank.  No bruising appreciated.  Lap incisions healing well.  No erythema, induration, fluctuance, drainage   Skin:     General: Skin is warm and dry.   Neurological:      General: No focal deficit present.      Mental Status: She is alert and oriented to person, place, and time.   Psychiatric:         Mood and Affect: Mood normal.         Behavior: Behavior normal.           Assessment:   POD #4    Class 3 Severe Obesity (BMI >=40). Obesity-related health conditions include the following: As above. Obesity is improving with treatment. BMI is is above average; BMI management plan is completed. We discussed low calorie, low carb based diet program, portion control and increasing exercise.      Plan: Will check labs and arrange for CT abdomen pelvis with IV and p.o. contrast today for further evaluation.  She was offered inpatient admission for pain control if needed, " but she declined.  Instructed patient that should her symptoms worsen she needs to present to the ER for further evaluation.

## 2023-04-18 NOTE — DISCHARGE SUMMARY
Date of admission:   4/14/2023    Date of discharge:   4/17/2023    Admission Diagnosis:   Partial gastric outlet obstruction, recurrent paraesophageal hernia with severe reflux status post laparoscopic sleeve gastrectomy and paraesophageal hiatal hernia repair July 2015 (Dr. Hughes), laparoscopic sleeve revision December 2020 (Dr. Mariscal), morbid obesity (129 kg, BMI 40.81)        Discharge Diagnosis:  Same    Principal Procedure Performed:   Laparoscopic robotic assisted antecolic revision previous sleeve gastrectomy to Alejandro-en-Y gastric bypass with biliopancreatic diversion and duodenal switch                                         Laparoscopic robotic assisted recurrent paraesophageal repair with falciform ligament sling                                                                       EGD 4/14/2023     Other procedures performed: Upper GI 4/15/2023    Complications:   Postoperative presumed intraperitoneal bleeding not requiring transfusion or invasive intervention    Consultations:   Hospitalist service    History of present illness:  This is a 37-year-old previously super morbidly obese female originally known to me status post laparoscopic sleeve gastrectomy and paraesophageal hiatal hernia repair in 2015.  At that time she weighed 424 pounds and had a BMI of 60.8.  She did well and returned seeking revisional metabolic and bariatric surgical options after discussion I planned to revise her to a modified duodenal switch/sips.  Her insurance did not cover this and she did not come back to discuss further options with me.  She then underwent laparoscopic sleeve revision with Dr. Mariscal in December 2020 and upper GI postop day #1 showed a new onset hiatal hernia.  Shortly thereafter she developed severe reflux and was diagnosed with a recurrent paraesophageal hernia and partial gastric outlet obstruction that did not responded to achalasia balloon dilatation by GI.  Symptoms are not controlled with  maximal medical therapy.  Please see our extensive office notes and my most recent consultation.  Risks, benefits, and alternative therapies were discussed and she wished to proceed with laparoscopic robotic assisted revision of her sleeve gastrectomy to a Alejandro-en-Y gastric bypass with biliopancreatic diversion duodenal switch, recurrent paraesophageal hernia repair, and EGD.    Hospital Course:  The patient was admitted and underwent uneventful surgery as described.  Findings at surgery included: Proximal sleeve staple line herniating anteriorly into the mediastinum.  After reduction deep infolding and partial gastric outlet obstruction approximately 5 cm from the GE junction.  After recurrent paraesophageal hernia repair Z-line roughly 39 cm, gastrojejunostomy roughly 43 cm.  Upon completion her Alejandro limb was 125 cm, BP limb 275 cm, common channel 440 cm, total alimentary limb 565 cm.  Postoperatively the patient was transferred to the bariatric telemetry unit.  That evening I noted she had heart rate in the 90s and that her TOLU drainage appeared bloody without clots and that her dressing around the TOLU site was saturated.  Her Lovenox was held and serial H&H ordered, 1 g TXA IV given and she was closely observed on telemetry.  Her type and screen was current.  She received a liter bolus of normal saline.  The next day her heart rate was noted to elevate with ambulation and her mouth was dry.  No hematemesis or melena.  Upper GI showed no leak or obstruction.  She was tolerating her diet without nausea.  Her TLOU drainage remained bloody serosanguineous and she continued to have saturation of her TOLU dressing.  She was noted to be hypertensive postoperatively which was felt to be contributing to her risk of postoperative bleeding and this was treated with hydralazine as needed per protocol.  She noted her preoperative reflux and partial gastric outlet obstruction symptoms had resolved.  Abdomen was appropriate without  ecchymosis.  H&H 3 weeks ago 11.8/36.6  H&H preop 12.6/39.7  H&H yesterday pm 11.5/34.4  H&H @ 0041  11.8/36.1  H&H @ 0441 10.6/32.9  She was also noted to have decreased albumin 3.3, low prealbumin 11.7.  She did receive IV iron prior to drawing her level on postop day #1.  She was bolused additional normal saline and serial H&H continued.  VTE prophylaxis continued with ambulation and SCDs.  The morning of postoperative day #2 she had another episode of what she described as a gush of blood around her TOLU site.  She was voiding adequately and without orthostatic symptoms but tachycardia with ambulation remained.  0723  8.9/28.4  0035  9.1/28.9  1810  9.8/32.5  It appeared her H&H was relatively stable off Lovenox and no signs of active bleeding.  Hospitalist service consulted for ongoing hypertension which they felt was due to volume depletion versus pain and recommended labetalol as needed.  Later that evening she had chest pain associated with hypertension and they reevaluated and ruled out myocardial infarction or other issue.  By postop day #3 she felt well and wanted to go home if possible.  Was tolerating a diet and passing flatus but no bowel movement.  Incentive spirometry over 2000 and no pulmonary complaints.  Chest pain resolved.  Blood pressure now 124/87 from a high of 188/93 the night prior.  TOLU drainage was down to 40 cc and was serosanguineous not bloody and the dressing had not been changed since the prior day and was only partially saturated with serosanguineous material.  Abdomen remained benign with no ecchymosis appreciated.  Discharge CMP normal except creatinine 0.56 calcium 8.1 albumin 3.4 white count normal at 10.3 with a normal differential H&H stable at 8.4 and 26.2.  Urine specific gravity greater than 1.030 15 ketones unremarkable differential except for poor sample with 21-30 squamous cells.    TOLU drain removed.  The patient is discharged home in good condition.  Discharge instructions  were discussed.  The medication reconciliation has been completed.  Reiterated avoiding ulcerogenic agents.  The patient is to follow-up in 1-2 weeks in the office and call in the meantime with any problems questions or concerns.

## 2023-04-18 NOTE — OUTREACH NOTE
Call Center TCM Note    Flowsheet Row Responses   [facility] patient discharged from? [facility]   Does the patient have one of the following disease processes/diagnoses(primary or secondary)? General Surgery   TCM attempt successful? Yes  [Sister - Tracey]   Call start time 1305   Call end time 1307   Discharge diagnosis Partial gastric outlet obstruction, Lap gastric bypass and biliopancreatic diversion with duodenal switch   Meds reviewed with patient/caregiver? Yes   Is the patient having any side effects they believe may be caused by any medication additions or changes? No   Does the patient have all medications related to this admission filled (includes all antibiotics, pain medications, etc.) Yes   Is the patient taking all medications as directed (includes completed medication regime)? Yes   Comments HOSP DC FU appt 4/24/23 11 am   Does the patient have an appointment with their PCP within 7 days of discharge? Yes   Has home health visited the patient within 72 hours of discharge? N/A   Psychosocial issues? No   Did the patient receive a copy of their discharge instructions? Yes   Nursing interventions Reviewed instructions with patient   What is the patient's perception of their health status since discharge? Worsening   Nursing interventions Nurse provided patient education   Is the patient /caregiver able to teach back basic post-op care? Lifting as instructed by MD in discharge instructions, Take showers only when approved by MD-sponge bathe until then, No tub bath, swimming, or hot tub until instructed by MD   Is the patient/caregiver able to teach back signs and symptoms of incisional infection? Increased redness, swelling or pain at the incisonal site, Increased drainage or bleeding, Incisional warmth, Pus or odor from incision, Fever   Is the patient/caregiver able to teach back steps to recovery at home? Set small, achievable goals for return to baseline health, Rest and rebuild strength,  gradually increase activity   Is the patient/caregiver able to teach back the hierarchy of who to call/visit for symptoms/problems? PCP, Specialist, Home health nurse, Urgent Care, ED, 911 Yes   TCM call completed? Yes   Wrap up additional comments Pt reports she is having Ct scan done at this time as one side of abd larger than other side. Surgeon office aware.    Call end time 1797          Agnes Lopez RN    4/18/2023, 13:08 EDT

## 2023-04-18 NOTE — PAYOR COMM NOTE
"Shaneka Jarrell RN  Utilization Management  P:[phone]  F:[phone]    Auth# [id number]  Geneva Garay (37 y.o. Female)     Date of Birth   1985    Social Security Number       Address   [address] [place], [place] [address]    Home Phone   [phone]    MRN   [id number]       Restoration   None    Marital Status   Single                            Admission Date   4/14/23    Admission Type   Elective    Admitting Provider   Erik Hughes MD    Attending Provider       Department, Room/Bed   [facility] 2F, S207/1       Discharge Date   4/17/2023    Discharge Disposition   Home or Self Care    Discharge Destination                               Attending Provider: (none)   Allergies: Codeine    Isolation: None   Infection: None   Code Status: Prior    Ht: 177.8 cm (70\")   Wt: 129 kg (284 lb 6.3 oz)    Admission Cmt: None   Principal Problem: Partial gastric outlet obstruction [K31.1]                 Active Insurance as of 4/14/2023     Primary Coverage     Payor Plan Insurance Group Employer/Plan Group    [facility] [employer] [facility]      Payor Plan Address Payor Plan Phone Number Payor Plan Fax Number Effective Dates    [address]   11/1/2017 - None Entered    [facility] [id number]       Subscriber Name Subscriber Birth Date Member ID       GENEVA GARAY 1985 [id number]                 Emergency Contacts      (Rel.) Home Phone Work Phone Mobile Phone    Edilson Riley (Sister) -- -- [phone]    skylar valdovinos (Friend) -- -- [phone]               Discharge Summary      Erik Hughes MD at 04/17/23 1505        Date of admission:   4/14/2023    Date of discharge:   4/17/2023    Admission Diagnosis:   Partial gastric outlet obstruction, recurrent paraesophageal hernia with severe reflux status post laparoscopic sleeve gastrectomy and paraesophageal hiatal hernia repair July 2015 (Dr. Hughes), laparoscopic sleeve " revision December 2020 (Dr. Mariscal), morbid obesity (129 kg, BMI 40.81)        Discharge Diagnosis:  Same    Principal Procedure Performed:   Laparoscopic robotic assisted antecolic revision previous sleeve gastrectomy to Alejandro-en-Y gastric bypass with biliopancreatic diversion and duodenal switch                                         Laparoscopic robotic assisted recurrent paraesophageal repair with falciform ligament sling                                                                       EGD 4/14/2023     Other procedures performed: Upper GI 4/15/2023    Complications:   Postoperative presumed intraperitoneal bleeding not requiring transfusion or invasive intervention    Consultations:   Hospitalist service    History of present illness:  This is a 37-year-old previously super morbidly obese female originally known to me status post laparoscopic sleeve gastrectomy and paraesophageal hiatal hernia repair in 2015.  At that time she weighed 424 pounds and had a BMI of 60.8.  She did well and returned seeking revisional metabolic and bariatric surgical options after discussion I planned to revise her to a modified duodenal switch/sips.  Her insurance did not cover this and she did not come back to discuss further options with me.  She then underwent laparoscopic sleeve revision with Dr. Mariscal in December 2020 and upper GI postop day #1 showed a new onset hiatal hernia.  Shortly thereafter she developed severe reflux and was diagnosed with a recurrent paraesophageal hernia and partial gastric outlet obstruction that did not responded to achalasia balloon dilatation by GI.  Symptoms are not controlled with maximal medical therapy.  Please see our extensive office notes and my most recent consultation.  Risks, benefits, and alternative therapies were discussed and she wished to proceed with laparoscopic robotic assisted revision of her sleeve gastrectomy to a Alejandro-en-Y gastric bypass with biliopancreatic  diversion duodenal switch, recurrent paraesophageal hernia repair, and EGD.    Hospital Course:  The patient was admitted and underwent uneventful surgery as described.  Findings at surgery included: Proximal sleeve staple line herniating anteriorly into the mediastinum.  After reduction deep infolding and partial gastric outlet obstruction approximately 5 cm from the GE junction.  After recurrent paraesophageal hernia repair Z-line roughly 39 cm, gastrojejunostomy roughly 43 cm.  Upon completion her Alejandro limb was 125 cm, BP limb 275 cm, common channel 440 cm, total alimentary limb 565 cm.  Postoperatively the patient was transferred to the bariatric telemetry unit.  That evening I noted she had heart rate in the 90s and that her TOLU drainage appeared bloody without clots and that her dressing around the TOLU site was saturated.  Her Lovenox was held and serial H&H ordered, 1 g TXA IV given and she was closely observed on telemetry.  Her type and screen was current.  She received a liter bolus of normal saline.  The next day her heart rate was noted to elevate with ambulation and her mouth was dry.  No hematemesis or melena.  Upper GI showed no leak or obstruction.  She was tolerating her diet without nausea.  Her TOLU drainage remained bloody serosanguineous and she continued to have saturation of her TOLU dressing.  She was noted to be hypertensive postoperatively which was felt to be contributing to her risk of postoperative bleeding and this was treated with hydralazine as needed per protocol.  She noted her preoperative reflux and partial gastric outlet obstruction symptoms had resolved.  Abdomen was appropriate without ecchymosis.  H&H 3 weeks ago 11.8/36.6  H&H preop 12.6/39.7  H&H yesterday pm 11.5/34.4  H&H @ 0041  11.8/36.1  H&H @ 0441 10.6/32.9  She was also noted to have decreased albumin 3.3, low prealbumin 11.7.  She did receive IV iron prior to drawing her level on postop day #1.  She was bolused additional  normal saline and serial H&H continued.  VTE prophylaxis continued with ambulation and SCDs.  The morning of postoperative day #2 she had another episode of what she described as a gush of blood around her TOLU site.  She was voiding adequately and without orthostatic symptoms but tachycardia with ambulation remained.  0723  8.9/28.4  0035  9.1/28.9  1810  9.8/32.5  It appeared her H&H was relatively stable off Lovenox and no signs of active bleeding.  Hospitalist service consulted for ongoing hypertension which they felt was due to volume depletion versus pain and recommended labetalol as needed.  Later that evening she had chest pain associated with hypertension and they reevaluated and ruled out myocardial infarction or other issue.  By postop day #3 she felt well and wanted to go home if possible.  Was tolerating a diet and passing flatus but no bowel movement.  Incentive spirometry over 2000 and no pulmonary complaints.  Chest pain resolved.  Blood pressure now 124/87 from a high of 188/93 the night prior.  TOLU drainage was down to 40 cc and was serosanguineous not bloody and the dressing had not been changed since the prior day and was only partially saturated with serosanguineous material.  Abdomen remained benign with no ecchymosis appreciated.  Discharge CMP normal except creatinine 0.56 calcium 8.1 albumin 3.4 white count normal at 10.3 with a normal differential H&H stable at 8.4 and 26.2.  Urine specific gravity greater than 1.030 15 ketones unremarkable differential except for poor sample with 21-30 squamous cells.    TOLU drain removed.  The patient is discharged home in good condition.  Discharge instructions were discussed.  The medication reconciliation has been completed.  Reiterated avoiding ulcerogenic agents.  The patient is to follow-up in 1-2 weeks in the office and call in the meantime with any problems questions or concerns.      Electronically signed by Erik Hughes MD at 04/18/23 6187

## 2023-04-19 ENCOUNTER — HOSPITAL ENCOUNTER (OUTPATIENT)
Facility: HOSPITAL | Age: 38
Setting detail: OBSERVATION
Discharge: HOME | End: 2023-04-20
Attending: EMERGENCY MEDICINE | Admitting: SURGERY
Payer: COMMERCIAL

## 2023-04-19 DIAGNOSIS — R11.0 NAUSEA: ICD-10-CM

## 2023-04-19 DIAGNOSIS — Z98.84 S/P BARIATRIC SURGERY: ICD-10-CM

## 2023-04-19 DIAGNOSIS — D64.9 ANEMIA, UNSPECIFIED TYPE: ICD-10-CM

## 2023-04-19 DIAGNOSIS — S30.1XXA ABDOMINAL WALL HEMATOMA, INITIAL ENCOUNTER: Primary | ICD-10-CM

## 2023-04-19 DIAGNOSIS — Z98.890 POST-OPERATIVE STATE: ICD-10-CM

## 2023-04-19 LAB
ABO GROUP BLD: NORMAL
ALBUMIN SERPL-MCNC: 3.4 G/DL (ref 3.5–5.2)
ALBUMIN SERPL-MCNC: 3.5 G/DL (ref 3.5–5.2)
ALBUMIN/GLOB SERPL: 1.2 G/DL
ALBUMIN/GLOB SERPL: 1.5 G/DL
ALP SERPL-CCNC: 55 U/L (ref 39–117)
ALP SERPL-CCNC: 59 U/L (ref 39–117)
ALT SERPL W P-5'-P-CCNC: 21 U/L (ref 1–33)
ALT SERPL-CCNC: 16 U/L (ref 1–33)
ANION GAP SERPL CALCULATED.3IONS-SCNC: 10 MMOL/L (ref 5–15)
AST SERPL-CCNC: 25 U/L (ref 1–32)
AST SERPL-CCNC: 42 U/L (ref 1–32)
BACTERIA UR QL AUTO: ABNORMAL /HPF
BASOPHILS # BLD AUTO: 0.02 10*3/MM3 (ref 0–0.2)
BASOPHILS # BLD AUTO: 0.02 10*3/MM3 (ref 0–0.2)
BASOPHILS NFR BLD AUTO: 0.3 % (ref 0–1.5)
BASOPHILS NFR BLD AUTO: 0.3 % (ref 0–1.5)
BILIRUB SERPL-MCNC: 0.6 MG/DL (ref 0–1.2)
BILIRUB SERPL-MCNC: 0.6 MG/DL (ref 0–1.2)
BILIRUB UR QL STRIP: NEGATIVE
BLD GP AB SCN SERPL QL: NEGATIVE
BUN SERPL-MCNC: 10 MG/DL (ref 6–20)
BUN SERPL-MCNC: 7 MG/DL (ref 6–20)
BUN/CREAT SERPL: 11.9 (ref 7–25)
BUN/CREAT SERPL: 16.1 (ref 7–25)
CALCIUM SERPL-MCNC: 8.8 MG/DL (ref 8.6–10.5)
CALCIUM SPEC-SCNC: 8.5 MG/DL (ref 8.6–10.5)
CHLORIDE SERPL-SCNC: 104 MMOL/L (ref 98–107)
CHLORIDE SERPL-SCNC: 104 MMOL/L (ref 98–107)
CLARITY UR: ABNORMAL
CO2 SERPL-SCNC: 23 MMOL/L (ref 22–29)
CO2 SERPL-SCNC: 24.8 MMOL/L (ref 22–29)
COLOR UR: YELLOW
CREAT SERPL-MCNC: 0.59 MG/DL (ref 0.57–1)
CREAT SERPL-MCNC: 0.62 MG/DL (ref 0.57–1)
DEPRECATED RDW RBC AUTO: 42.5 FL (ref 37–54)
EGFRCR SERPLBLD CKD-EPI 2021: 118.5 ML/MIN/1.73
EGFRCR SERPLBLD CKD-EPI 2021: 120 ML/MIN/1.73
EOSINOPHIL # BLD AUTO: 0.2 10*3/MM3 (ref 0–0.4)
EOSINOPHIL # BLD AUTO: 0.24 10*3/MM3 (ref 0–0.4)
EOSINOPHIL NFR BLD AUTO: 2.6 % (ref 0.3–6.2)
EOSINOPHIL NFR BLD AUTO: 3.4 % (ref 0.3–6.2)
ERYTHROCYTE [DISTWIDTH] IN BLOOD BY AUTOMATED COUNT: 12.7 % (ref 12.3–15.4)
ERYTHROCYTE [DISTWIDTH] IN BLOOD BY AUTOMATED COUNT: 12.7 % (ref 12.3–15.4)
GLOBULIN SER CALC-MCNC: 2.3 GM/DL
GLOBULIN UR ELPH-MCNC: 2.9 GM/DL
GLUCOSE BLDC GLUCOMTR-MCNC: 72 MG/DL (ref 70–130)
GLUCOSE BLDC GLUCOMTR-MCNC: 86 MG/DL (ref 70–130)
GLUCOSE BLDC GLUCOMTR-MCNC: 90 MG/DL (ref 70–130)
GLUCOSE BLDC GLUCOMTR-MCNC: 90 MG/DL (ref 70–130)
GLUCOSE SERPL-MCNC: 77 MG/DL (ref 65–99)
GLUCOSE SERPL-MCNC: 88 MG/DL (ref 65–99)
GLUCOSE UR STRIP-MCNC: NEGATIVE MG/DL
HCT VFR BLD AUTO: 24.2 % (ref 34–46.6)
HCT VFR BLD AUTO: 25.7 % (ref 34–46.6)
HGB BLD-MCNC: 7.8 G/DL (ref 12–15.9)
HGB BLD-MCNC: 8 G/DL (ref 12–15.9)
HGB UR QL STRIP.AUTO: NEGATIVE
HYALINE CASTS UR QL AUTO: ABNORMAL /LPF
IMM GRANULOCYTES # BLD AUTO: 0.03 10*3/MM3 (ref 0–0.05)
IMM GRANULOCYTES # BLD AUTO: 0.04 10*3/MM3 (ref 0–0.05)
IMM GRANULOCYTES NFR BLD AUTO: 0.4 % (ref 0–0.5)
IMM GRANULOCYTES NFR BLD AUTO: 0.5 % (ref 0–0.5)
KETONES UR QL STRIP: ABNORMAL
LEUKOCYTE ESTERASE UR QL STRIP.AUTO: NEGATIVE
LIPASE SERPL-CCNC: 23 U/L (ref 13–60)
LYMPHOCYTES # BLD AUTO: 1.23 10*3/MM3 (ref 0.7–3.1)
LYMPHOCYTES # BLD AUTO: 1.44 10*3/MM3 (ref 0.7–3.1)
LYMPHOCYTES NFR BLD AUTO: 16.3 % (ref 19.6–45.3)
LYMPHOCYTES NFR BLD AUTO: 20.4 % (ref 19.6–45.3)
MCH RBC QN AUTO: 29.6 PG (ref 26.6–33)
MCH RBC QN AUTO: 29.9 PG (ref 26.6–33)
MCHC RBC AUTO-ENTMCNC: 31.1 G/DL (ref 31.5–35.7)
MCHC RBC AUTO-ENTMCNC: 32.2 G/DL (ref 31.5–35.7)
MCV RBC AUTO: 92.7 FL (ref 79–97)
MCV RBC AUTO: 95.2 FL (ref 79–97)
MONOCYTES # BLD AUTO: 0.35 10*3/MM3 (ref 0.1–0.9)
MONOCYTES # BLD AUTO: 0.4 10*3/MM3 (ref 0.1–0.9)
MONOCYTES NFR BLD AUTO: 4.6 % (ref 5–12)
MONOCYTES NFR BLD AUTO: 5.7 % (ref 5–12)
NEUTROPHILS # BLD AUTO: 5.72 10*3/MM3 (ref 1.7–7)
NEUTROPHILS NFR BLD AUTO: 4.94 10*3/MM3 (ref 1.7–7)
NEUTROPHILS NFR BLD AUTO: 69.8 % (ref 42.7–76)
NEUTROPHILS NFR BLD AUTO: 75.7 % (ref 42.7–76)
NITRITE UR QL STRIP: NEGATIVE
NRBC BLD AUTO-RTO: 0 /100 WBC (ref 0–0.2)
NRBC BLD AUTO-RTO: 0 /100 WBC (ref 0–0.2)
PH UR STRIP.AUTO: 5.5 [PH] (ref 5–8)
PLATELET # BLD AUTO: 216 10*3/MM3 (ref 140–450)
PLATELET # BLD AUTO: 233 10*3/MM3 (ref 140–450)
PMV BLD AUTO: 11.1 FL (ref 6–12)
POTASSIUM SERPL-SCNC: 4.1 MMOL/L (ref 3.5–5.2)
POTASSIUM SERPL-SCNC: 4.2 MMOL/L (ref 3.5–5.2)
PREALB SERPL-MCNC: 7 MG/DL (ref 14–35)
PROT SERPL-MCNC: 5.8 G/DL (ref 6–8.5)
PROT SERPL-MCNC: 6.3 G/DL (ref 6–8.5)
PROT UR QL STRIP: NEGATIVE
RBC # BLD AUTO: 2.61 10*6/MM3 (ref 3.77–5.28)
RBC # BLD AUTO: 2.7 10*6/MM3 (ref 3.77–5.28)
RBC # UR STRIP: ABNORMAL /HPF
REF LAB TEST METHOD: ABNORMAL
RH BLD: POSITIVE
SODIUM SERPL-SCNC: 137 MMOL/L (ref 136–145)
SODIUM SERPL-SCNC: 139 MMOL/L (ref 136–145)
SP GR UR STRIP: 1.03 (ref 1–1.03)
SQUAMOUS #/AREA URNS HPF: ABNORMAL /HPF
T&S EXPIRATION DATE: NORMAL
UROBILINOGEN UR QL STRIP: ABNORMAL
WBC # BLD AUTO: 7.56 10*3/MM3 (ref 3.4–10.8)
WBC # UR STRIP: ABNORMAL /HPF
WBC NRBC COR # BLD: 7.07 10*3/MM3 (ref 3.4–10.8)

## 2023-04-19 PROCEDURE — G0378 HOSPITAL OBSERVATION PER HR: HCPCS

## 2023-04-19 PROCEDURE — 80053 COMPREHEN METABOLIC PANEL: CPT | Performed by: EMERGENCY MEDICINE

## 2023-04-19 PROCEDURE — 81001 URINALYSIS AUTO W/SCOPE: CPT | Performed by: EMERGENCY MEDICINE

## 2023-04-19 PROCEDURE — 82962 GLUCOSE BLOOD TEST: CPT

## 2023-04-19 PROCEDURE — 83690 ASSAY OF LIPASE: CPT | Performed by: EMERGENCY MEDICINE

## 2023-04-19 PROCEDURE — 99024 POSTOP FOLLOW-UP VISIT: CPT | Performed by: SURGERY

## 2023-04-19 PROCEDURE — 86850 RBC ANTIBODY SCREEN: CPT | Performed by: EMERGENCY MEDICINE

## 2023-04-19 PROCEDURE — 0 POTASSIUM CHLORIDE PER 2 MEQ: Performed by: SURGERY

## 2023-04-19 PROCEDURE — 25010000002 HYDROMORPHONE PER 4 MG: Performed by: EMERGENCY MEDICINE

## 2023-04-19 PROCEDURE — 85025 COMPLETE CBC W/AUTO DIFF WBC: CPT | Performed by: EMERGENCY MEDICINE

## 2023-04-19 PROCEDURE — 96374 THER/PROPH/DIAG INJ IV PUSH: CPT

## 2023-04-19 PROCEDURE — 86900 BLOOD TYPING SEROLOGIC ABO: CPT | Performed by: EMERGENCY MEDICINE

## 2023-04-19 PROCEDURE — 86901 BLOOD TYPING SEROLOGIC RH(D): CPT | Performed by: EMERGENCY MEDICINE

## 2023-04-19 RX ORDER — ONDANSETRON 2 MG/ML
4 INJECTION INTRAMUSCULAR; INTRAVENOUS EVERY 6 HOURS PRN
Status: DISCONTINUED | OUTPATIENT
Start: 2023-04-19 | End: 2023-04-20 | Stop reason: HOSPADM

## 2023-04-19 RX ORDER — ONDANSETRON 4 MG/1
4 TABLET, FILM COATED ORAL EVERY 4 HOURS PRN
Status: DISCONTINUED | OUTPATIENT
Start: 2023-04-19 | End: 2023-04-19 | Stop reason: SDUPTHER

## 2023-04-19 RX ORDER — HYDRALAZINE HYDROCHLORIDE 20 MG/ML
10 INJECTION INTRAMUSCULAR; INTRAVENOUS
Status: DISCONTINUED | OUTPATIENT
Start: 2023-04-19 | End: 2023-04-20 | Stop reason: HOSPADM

## 2023-04-19 RX ORDER — OXYCODONE HYDROCHLORIDE 5 MG/1
5 TABLET ORAL EVERY 6 HOURS PRN
Status: DISCONTINUED | OUTPATIENT
Start: 2023-04-19 | End: 2023-04-20 | Stop reason: HOSPADM

## 2023-04-19 RX ORDER — ACETAMINOPHEN 160 MG/5ML
1000 SOLUTION ORAL EVERY 8 HOURS PRN
Status: DISCONTINUED | OUTPATIENT
Start: 2023-04-19 | End: 2023-04-20 | Stop reason: HOSPADM

## 2023-04-19 RX ORDER — FENTANYL CITRATE 50 UG/ML
50-100 INJECTION, SOLUTION INTRAMUSCULAR; INTRAVENOUS
Status: DISCONTINUED | OUTPATIENT
Start: 2023-04-19 | End: 2023-04-19

## 2023-04-19 RX ORDER — PROMETHAZINE HYDROCHLORIDE 12.5 MG/1
12.5 TABLET ORAL EVERY 6 HOURS PRN
Status: DISCONTINUED | OUTPATIENT
Start: 2023-04-19 | End: 2023-04-20 | Stop reason: HOSPADM

## 2023-04-19 RX ORDER — ONDANSETRON 4 MG/1
4 TABLET, FILM COATED ORAL EVERY 6 HOURS PRN
Status: DISCONTINUED | OUTPATIENT
Start: 2023-04-23 | End: 2023-04-20 | Stop reason: HOSPADM

## 2023-04-19 RX ORDER — DIPHENHYDRAMINE HYDROCHLORIDE 50 MG/ML
25 INJECTION INTRAMUSCULAR; INTRAVENOUS EVERY 4 HOURS PRN
Status: DISCONTINUED | OUTPATIENT
Start: 2023-04-19 | End: 2023-04-20 | Stop reason: HOSPADM

## 2023-04-19 RX ORDER — SODIUM CHLORIDE 9 MG/ML
125 INJECTION, SOLUTION INTRAVENOUS CONTINUOUS
Status: DISCONTINUED | OUTPATIENT
Start: 2023-04-19 | End: 2023-04-19

## 2023-04-19 RX ORDER — ONDANSETRON 2 MG/ML
4 INJECTION INTRAMUSCULAR; INTRAVENOUS EVERY 6 HOURS PRN
Status: DISCONTINUED | OUTPATIENT
Start: 2023-04-19 | End: 2023-04-19 | Stop reason: SDUPTHER

## 2023-04-19 RX ORDER — PROCHLORPERAZINE MALEATE 10 MG
10 TABLET ORAL EVERY 6 HOURS PRN
Status: DISCONTINUED | OUTPATIENT
Start: 2023-04-19 | End: 2023-04-20 | Stop reason: HOSPADM

## 2023-04-19 RX ORDER — NALOXONE HCL 0.4 MG/ML
0.1 VIAL (ML) INJECTION
Status: DISCONTINUED | OUTPATIENT
Start: 2023-04-19 | End: 2023-04-20 | Stop reason: HOSPADM

## 2023-04-19 RX ORDER — SODIUM PHOSPHATE,MONO-DIBASIC 19G-7G/118
1 ENEMA (ML) RECTAL ONCE
Status: DISCONTINUED | OUTPATIENT
Start: 2023-04-19 | End: 2023-04-20

## 2023-04-19 RX ORDER — HYDROMORPHONE HYDROCHLORIDE 1 MG/ML
0.5 INJECTION, SOLUTION INTRAMUSCULAR; INTRAVENOUS; SUBCUTANEOUS
Status: DISCONTINUED | OUTPATIENT
Start: 2023-04-19 | End: 2023-04-19 | Stop reason: SDUPTHER

## 2023-04-19 RX ORDER — SODIUM CHLORIDE AND POTASSIUM CHLORIDE 150; 450 MG/100ML; MG/100ML
125 INJECTION, SOLUTION INTRAVENOUS CONTINUOUS
Status: DISCONTINUED | OUTPATIENT
Start: 2023-04-19 | End: 2023-04-20 | Stop reason: HOSPADM

## 2023-04-19 RX ORDER — SIMETHICONE 80 MG
80 TABLET,CHEWABLE ORAL 4 TIMES DAILY PRN
Status: DISCONTINUED | OUTPATIENT
Start: 2023-04-19 | End: 2023-04-20 | Stop reason: HOSPADM

## 2023-04-19 RX ORDER — SODIUM CHLORIDE 0.9 % (FLUSH) 0.9 %
10 SYRINGE (ML) INJECTION AS NEEDED
Status: DISCONTINUED | OUTPATIENT
Start: 2023-04-19 | End: 2023-04-20 | Stop reason: HOSPADM

## 2023-04-19 RX ORDER — NALOXONE HCL 0.4 MG/ML
0.4 VIAL (ML) INJECTION
Status: DISCONTINUED | OUTPATIENT
Start: 2023-04-19 | End: 2023-04-20 | Stop reason: HOSPADM

## 2023-04-19 RX ORDER — SODIUM PHOSPHATE,MONO-DIBASIC 19G-7G/118
1 ENEMA (ML) RECTAL ONCE AS NEEDED
Status: COMPLETED | OUTPATIENT
Start: 2023-04-19 | End: 2023-04-19

## 2023-04-19 RX ORDER — PANTOPRAZOLE SODIUM 40 MG/1
40 TABLET, DELAYED RELEASE ORAL
Status: DISCONTINUED | OUTPATIENT
Start: 2023-04-19 | End: 2023-04-20 | Stop reason: HOSPADM

## 2023-04-19 RX ORDER — ONDANSETRON 2 MG/ML
4 INJECTION INTRAMUSCULAR; INTRAVENOUS ONCE
Status: DISCONTINUED | OUTPATIENT
Start: 2023-04-19 | End: 2023-04-19

## 2023-04-19 RX ORDER — ACETAMINOPHEN 500 MG
1000 TABLET ORAL ONCE
Status: COMPLETED | OUTPATIENT
Start: 2023-04-19 | End: 2023-04-19

## 2023-04-19 RX ORDER — LORAZEPAM 2 MG/ML
0.5 INJECTION INTRAMUSCULAR EVERY 12 HOURS PRN
Status: DISCONTINUED | OUTPATIENT
Start: 2023-04-19 | End: 2023-04-20 | Stop reason: HOSPADM

## 2023-04-19 RX ORDER — ALBUTEROL SULFATE 2.5 MG/3ML
2.5 SOLUTION RESPIRATORY (INHALATION) EVERY 4 HOURS PRN
Status: DISCONTINUED | OUTPATIENT
Start: 2023-04-19 | End: 2023-04-20 | Stop reason: HOSPADM

## 2023-04-19 RX ORDER — MORPHINE SULFATE 4 MG/ML
4 INJECTION, SOLUTION INTRAMUSCULAR; INTRAVENOUS ONCE
Status: DISCONTINUED | OUTPATIENT
Start: 2023-04-19 | End: 2023-04-19

## 2023-04-19 RX ORDER — INSULIN LISPRO 100 [IU]/ML
0-7 INJECTION, SOLUTION INTRAVENOUS; SUBCUTANEOUS
Status: DISCONTINUED | OUTPATIENT
Start: 2023-04-19 | End: 2023-04-20 | Stop reason: HOSPADM

## 2023-04-19 RX ORDER — LORAZEPAM 1 MG/1
1 TABLET ORAL EVERY 12 HOURS PRN
Status: DISCONTINUED | OUTPATIENT
Start: 2023-04-19 | End: 2023-04-20 | Stop reason: HOSPADM

## 2023-04-19 RX ORDER — ALPRAZOLAM 0.25 MG/1
0.25 TABLET ORAL ONCE AS NEEDED
Status: DISCONTINUED | OUTPATIENT
Start: 2023-04-19 | End: 2023-04-19 | Stop reason: SDUPTHER

## 2023-04-19 RX ORDER — POLYETHYLENE GLYCOL 3350 17 G/17G
17 POWDER, FOR SOLUTION ORAL 2 TIMES DAILY
Status: DISCONTINUED | OUTPATIENT
Start: 2023-04-19 | End: 2023-04-20 | Stop reason: HOSPADM

## 2023-04-19 RX ORDER — ACETAMINOPHEN 500 MG
1000 TABLET ORAL EVERY 8 HOURS PRN
Status: DISCONTINUED | OUTPATIENT
Start: 2023-04-19 | End: 2023-04-20 | Stop reason: HOSPADM

## 2023-04-19 RX ORDER — HYDROMORPHONE HYDROCHLORIDE 2 MG/1
2 TABLET ORAL EVERY 4 HOURS PRN
Status: DISCONTINUED | OUTPATIENT
Start: 2023-04-19 | End: 2023-04-20 | Stop reason: HOSPADM

## 2023-04-19 RX ADMIN — POTASSIUM CHLORIDE AND SODIUM CHLORIDE 125 ML/HR: 450; 150 INJECTION, SOLUTION INTRAVENOUS at 07:55

## 2023-04-19 RX ADMIN — POLYETHYLENE GLYCOL 3350 17 G: 17 POWDER, FOR SOLUTION ORAL at 12:53

## 2023-04-19 RX ADMIN — SODIUM CHLORIDE 500 ML: 9 INJECTION, SOLUTION INTRAVENOUS at 07:55

## 2023-04-19 RX ADMIN — HYDROMORPHONE HYDROCHLORIDE 0.5 MG: 1 INJECTION, SOLUTION INTRAMUSCULAR; INTRAVENOUS; SUBCUTANEOUS at 04:01

## 2023-04-19 RX ADMIN — ACETAMINOPHEN 1000 MG: 500 TABLET ORAL at 01:53

## 2023-04-19 RX ADMIN — OXYCODONE HYDROCHLORIDE 5 MG: 5 TABLET ORAL at 09:38

## 2023-04-19 RX ADMIN — SODIUM PHOSPHATE 1 ENEMA: 7; 19 ENEMA RECTAL at 15:56

## 2023-04-19 RX ADMIN — PANTOPRAZOLE SODIUM 40 MG: 40 TABLET, DELAYED RELEASE ORAL at 07:55

## 2023-04-19 RX ADMIN — OXYCODONE HYDROCHLORIDE 5 MG: 5 TABLET ORAL at 20:50

## 2023-04-19 NOTE — H&P
"Cc:  Abdominal pain POD#5, (discharged POD#3 from Laparoscopic robotic assisted antecolic revision previous sleeve gastrectomy to Alejandro-en-Y gastric bypass with biliopancreatic diversion and duodenal switch, laparoscopic robotic assisted recurrent paraesophageal repair with falciform ligament sling, EGD)    Seen in the office yesterday with our Shaneka SHINE PA-C.  From her office note yesterday:      HPI:  Geneva Cordova is a 37 y.o. female s/p robotic RNY/BPD/recurrentPHHR 4/14/23 GDW (previous LSG/pHHR 2015 GDW and sleeve revision 12/2020 PNQ complicated by postop stricture and uncontrolled reflux)    \"Discharged on POD#3.  Called the exchange this morning with complaints of \"left abdominal swelling\".  She was brought into the office for evaluation and was noted to have edema at her left flank.  She had no bruising.  She just notes some incisional soreness at her left abdominal trocar site.  She has not had a bowel movement, but is passing flatus.  She denies any nausea, vomiting, dizziness, orthostasis.  She is getting adequate protein and fluid intake.  She has not had any fevers or pulmonary complaints. Omeprazole .  Holding ASA , NSAIDs , Tramadol, Hormones, Diuretics , Steroids and Immunologics.  Ambulating.     Presurgery weight: 283 pounds.  Today's weight is 132 kg (291 lb 8 oz) pounds, today's  Body mass index is 41.83 kg/m²., and weight loss since surgery is +8 pounds.        Medical History        Past Medical History:   Diagnosis Date   • Anemia     • Anxiety     • Bilateral lower extremity edema       R>L   • Boil, axilla       recurrent   • Chronic back pain       no meds or injections, feels related to breasts   • Concussion with no loss of consciousness 08/17/2022   • COVID-19 12/2020   • Depression     • Dyspepsia     • Dyspnea on exertion     • Fatigue     • Gastroesophageal reflux disease concurrent with and due to paraesophageal hernia       recurrent after LSG revision, EGD Dr. Hughes 1/16/23   • " "Glucose intolerance (impaired glucose tolerance)     • Helicobacter pylori (H. pylori) infection       asx and grossly nl EGD. \"abundant\"on path. HBT still + after PrevPak tx. LSG path neg. neg testing 2018   • Hypoalbuminemia     • Left Ovarian serous cystadenoma -removed at  section 2017   • Migraine       resolved with weightloss   • Obesity, morbid     • VIRA (obstructive sleep apnea)       improved since WLS   • PCOS (polycystic ovarian syndrome)     • Right Essure implantation 2017 10/06/2017   • S/P  section;left ovarian cystectomy and partial salpingectomy 2017   • Seasonal allergies           Surgical History         Past Surgical History:   Procedure Laterality Date   • ABDOMINOPLASTY   2022     [facility]ne at [facility]   • BILATERAL BREAST REDUCTION         2022   •  SECTION N/A 2017       SECTION PRIMARY;  CYSTECTOMY; Kwadwo Baxter MD; :  [facility];  Service:    • DILATATION AND CURETTAGE      • ENDOMETRIAL ABLATION   2020     Dr Cuevas   • ENDOSCOPY N/A 2020     Procedure: ESOPHAGOGASTRODUODENOSCOPY;  Surgeon: Natalia Mariscal MD;  Location:  [facility];  Service: Bariatric;  Laterality: N/A;   • ENDOSCOPY   2022     Dr. La   • ENDOSCOPY N/A 2023     Procedure: ESOPHAGOGASTRODUODENOSCOPY;  Surgeon: Erik Hughes MD;  Location:  [facility];  Service: Robotics - DaVinci;  Laterality: N/A;   • ESSURE TUBAL LIGATION      • GASTRIC BYPASS N/A 2023     Procedure: GASTRIC BYPASS LAPAROSCOPIC WITH DAVINCI ROBOT AND BILIOPANCREATIC DIVERSION WITH DUODENAL SWITCH;  Surgeon: Erik Hughes MD;  Location:  [facility];  Service: Robotics - DaVinci;  Laterality: N/A;   • GASTRIC SLEEVE LAPAROSCOPIC   2015     With Dr. Hughes   • GASTRIC SLEEVE LAPAROSCOPIC N/A 2020     Procedure: GASTRIC SLEEVE LAPAROSCOPIC;  Surgeon: Natalia Mariscal MD;  Location:  " [facility];  Service: Bariatric;  Laterality: N/A;   • LAPAROSCOPIC CHOLECYSTECTOMY   02/2018     Dr. Hughes   • PARAESOPHAGEAL HERNIA REPAIR N/A 4/14/2023     Procedure: LAPAROSCOPIC RECURRENT PARAESOPHAGEAL HERNIA REPAIR WITH DAVINCI ROBOT AND FALCIFORM LIGAMENT SLING ;  Surgeon: Erik Hughes MD;  Location:  [facility];  Service: Robotics - DaVinci;  Laterality: N/A;   • SALPINGECTOMY Bilateral 02/2020     Dr Cuevas   • TONSILLECTOMY   2014   • WISDOM TOOTH EXTRACTION   2014         Medications Taking   Outpatient Medications Marked as Taking for the 4/18/23 encounter (Office Visit) with Shaneka Ivey PA   Medication Sig Dispense Refill   • ferrous gluconate (FERGON) 324 MG tablet Take 1 tablet by mouth 2 (Two) Times a Day. (Patient taking differently: Take 1 tablet by mouth. PT NOT CURRENTLY TAKING) 180 tablet 1   • Multiple Vitamins-Minerals (MULTIPLE VITAMINS/WOMENS) tablet Take 1 tablet by mouth. PT NOT CURRENTLY TAKING       • omeprazole (priLOSEC) 40 MG capsule Take 1 capsule by mouth Daily for 60 days. 60 capsule 0   • ondansetron (Zofran) 4 MG tablet Take 1 tablet by mouth Every 4 (Four) Hours As Needed for Nausea. 10 tablet 0   • oxyCODONE (Roxicodone) 5 MG immediate release tablet Take 1 tablet by mouth Every 4 (Four) Hours As Needed for Moderate Pain. 10 tablet 0   • thiamine (VITAMIN B-1) 100 MG tablet Take 1 tablet by mouth Daily. 90 tablet 1   • thiamine (VITAMIN B1) 100 MG tablet Take 1 tablet by mouth Daily.       • vitamin D3 125 MCG (5000 UT) capsule capsule Take 1 capsule by mouth Daily. 90 capsule 1                Current Facility-Administered Medications for the 4/18/23 encounter (Office Visit) with Shaneka Ivey PA   Medication Dose Route Frequency Provider Last Rate Last Admin   • [DISCONTINUED] cyanocobalamin injection 1,000 mcg  1,000 mcg Intramuscular Q28 Days Alice Fuentes PA-C   1,000 mcg at 01/19/23 0925               Allergies   Allergen Reactions   • Codeine Nausea And  "Vomiting, Other (See Comments) and GI Intolerance       UNKNOWN         Social History   Social History           Socioeconomic History   • Marital status: Single   • Number of children: 4   • Years of education: College    Tobacco Use   • Smoking status: Never   • Smokeless tobacco: Never   Vaping Use   • Vaping Use: Never used   Substance and Sexual Activity   • Alcohol use: Not Currently   • Drug use: No   • Sexual activity: Defer         Social History          Social History Narrative     Pt lives in [facility], she is single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 She currently works full time at [employer] as a .      The father of PT's oldest child passed away 2 weeks ago,  was yesterday 2022         /64 (BP Location: Left arm, Patient Position: Sitting)   Pulse 82   Temp 97.5 °F (36.4 °C)   Resp 16   Ht 177.8 cm (70\")   Wt 132 kg (291 lb 8 oz)   SpO2 98%   BMI 41.83 kg/m²   Physical Exam  Constitutional:       Appearance: She is obese.   HENT:      Head: Normocephalic and atraumatic.   Cardiovascular:      Rate and Rhythm: Normal rate and regular rhythm.   Pulmonary:      Effort: Pulmonary effort is normal.      Breath sounds: Normal breath sounds.   Abdominal:      General: Bowel sounds are normal. There is no distension.      Palpations: Abdomen is soft. There is no mass.      Tenderness: There is no abdominal tenderness.      Comments: Edema noted in left upper abdomen and left flank.  No bruising appreciated.  Lap incisions healing well.  No erythema, induration, fluctuance, drainage   Skin:     General: Skin is warm and dry.   Neurological:      General: No focal deficit present.      Mental Status: She is alert and oriented to person, place, and time.   Psychiatric:         Mood and Affect: Mood normal.         Behavior: Behavior normal.      Assessment:   POD #4     Class 3 Severe Obesity (BMI >=40). Obesity-related health conditions include the following: As above. Obesity is " "improving with treatment. BMI is is above average; BMI management plan is completed. We discussed low calorie, low carb based diet program, portion control and increasing exercise.        Plan: Will check labs and arrange for CT abdomen pelvis with IV and p.o. contrast today for further evaluation.  She was offered inpatient admission for pain control if needed, but she declined.  Instructed patient that should her symptoms worsen she needs to present to the ER for further evaluation.\"    She did go to the emergency room last night due to increasing pain and some nausea and wanted to be admitted for symptom control.  No fever or tachycardia pulse 83 respiration 16 blood pressure 135/63 room air sat 95% abdomen remains soft with left lateral abdominal wall soft tissue swelling, flank edema, no ecchymosis.  Wounds healing.  Bowel sounds normoactive.  UA cloudy specific gravity 1.02 615 ketones negative micro but 7 to 12 epithelial cells.  CMP normal except for calcium 8.5 albumin 3.4 AST 42.  Lipase normal at 23.  Type and screen obtained.  White count normal at 7.07 with a normal differential.  Platelet normal 233.  H&H 7.8 and 24.2 with normocytic indices.    Impression: Postop day #5 robotic RNY/BPD/recurrentPHHR 4/14/23 GDW (previous LSG/pHHR 2015 GDW and sleeve revision 12/2020 PNQ complicated by immediate postop recurrent hiatal hernia, stricture with partial GOO, and uncontrolled reflux.  Postoperative bleeding presumed intraperitoneal in addition to left abdominal wall/soft tissues.  Preoperative anemia with baseline hemoglobin in the 11 range, dropped to around 8, no transfusion so far.  No evidence of active bleeding.  Postoperative chest pain and hypertension without prior history of hypertension and followed by the medical service during her recent hospitalization.  Low albumin and prealbumin.  Admitted for symptom control.     Plan: Admit for symptom control.  Avoid NSAIDs.  Reconsult hospitalist for " medical issues.  Stage I bariatric diet.  Out of bed, pulmonary toilet, VTE prophylaxis with ambulation and SCDs.  Oral PPI.  Recheck iron level.

## 2023-04-19 NOTE — ED PROVIDER NOTES
"Subjective   History of Present Illness  Patient is a 37-year-old female presenting to the emergency department postop day #4 status post Alejandro-en-Y secondary to increasing pain.  Patient actually saw her bariatric surgery team earlier where they did an evaluation including a CT scan which demonstrated an abdominal wall hematoma.  She had documentation further details.  Patient was offered direct admission at that time but she declined.  Patient now reports that her symptoms have worsened with increased pain and some nausea.  Patient is willing to be admitted at this point.    History provided by:  Patient and medical records      Review of Systems    Past Medical History:   Diagnosis Date   • Anemia    • Anxiety    • Bilateral lower extremity edema     R>L   • Boil, axilla     recurrent   • Chronic back pain     no meds or injections, feels related to breasts   • Concussion with no loss of consciousness 2022   • COVID-19 2020   • Depression    • Dyspepsia    • Dyspnea on exertion    • Fatigue    • Gastroesophageal reflux disease concurrent with and due to paraesophageal hernia     recurrent after LSG revision, EGD Dr. Hughes 23   • Glucose intolerance (impaired glucose tolerance)    • Helicobacter pylori (H. pylori) infection     asx and grossly nl EGD. \"abundant\"on path. HBT still + after PrevPak tx. LSG path neg. neg testing 2018   • Hypoalbuminemia    • Left Ovarian serous cystadenoma -removed at  section 2017   • Migraine     resolved with weightloss   • Obesity, morbid    • VIRA (obstructive sleep apnea)     improved since WLS   • PCOS (polycystic ovarian syndrome)    • Right Essure implantation 2017 10/06/2017   • S/P  section;left ovarian cystectomy and partial salpingectomy 2017   • Seasonal allergies        Allergies   Allergen Reactions   • Codeine Nausea And Vomiting, Other (See Comments) and GI Intolerance     UNKNOWN       Past " Surgical History:   Procedure Laterality Date   • ABDOMINOPLASTY  2022    [facility] at [facility]   • BILATERAL BREAST REDUCTION      2022   •  SECTION N/A 2017      SECTION PRIMARY;  CYSTECTOMY; Kwadwo Baxter MD; :  [facility];  Service:    • DILATATION AND CURETTAGE     • ENDOMETRIAL ABLATION  2020    Dr Cuevas   • ENDOSCOPY N/A 2020    Procedure: ESOPHAGOGASTRODUODENOSCOPY;  Surgeon: Natalia Mariscal MD;  Location:  [facility];  Service: Bariatric;  Laterality: N/A;   • ENDOSCOPY  2022    Dr. La   • ENDOSCOPY N/A 2023    Procedure: ESOPHAGOGASTRODUODENOSCOPY;  Surgeon: Erik Hughes MD;  Location:  [facility];  Service: Robotics - DaVinci;  Laterality: N/A;   • ESSURE TUBAL LIGATION     • GASTRIC BYPASS N/A 2023    Procedure: GASTRIC BYPASS LAPAROSCOPIC WITH DAVINCI ROBOT AND BILIOPANCREATIC DIVERSION WITH DUODENAL SWITCH;  Surgeon: Erik Hughes MD;  Location:  [facility];  Service: Robotics - DaVinci;  Laterality: N/A;   • GASTRIC SLEEVE LAPAROSCOPIC  2015    With Dr. Hughes   • GASTRIC SLEEVE LAPAROSCOPIC N/A 2020    Procedure: GASTRIC SLEEVE LAPAROSCOPIC;  Surgeon: Natalia Mariscal MD;  Location:  [facility];  Service: Bariatric;  Laterality: N/A;   • LAPAROSCOPIC CHOLECYSTECTOMY  2018    Dr. Hughes   • PARAESOPHAGEAL HERNIA REPAIR N/A 2023    Procedure: LAPAROSCOPIC RECURRENT PARAESOPHAGEAL HERNIA REPAIR WITH DAVINCI ROBOT AND FALCIFORM LIGAMENT SLING ;  Surgeon: Erik Hughes MD;  Location:  [facility];  Service: Robotics - DaVinci;  Laterality: N/A;   • SALPINGECTOMY Bilateral 2020    Dr Cuevas   • TONSILLECTOMY     • WISDOM TOOTH EXTRACTION         Family History   Problem Relation Age of Onset   • No Known Problems Other    • Sleep apnea Mother    • Diabetes Mother    • No Known Problems Father    • Pancreatic cancer Maternal Grandmother    • No Known Problems Sister    • No  Known Problems Brother    • No Known Problems Daughter    • No Known Problems Son    • No Known Problems Paternal Grandmother    • No Known Problems Maternal Aunt    • No Known Problems Paternal Aunt    • BRCA 1/2 Neg Hx    • Colon cancer Neg Hx    • Endometrial cancer Neg Hx    • Ovarian cancer Neg Hx        Social History     Socioeconomic History   • Marital status: Single   • Number of children: 4   • Years of education: College    Tobacco Use   • Smoking status: Never   • Smokeless tobacco: Never   Vaping Use   • Vaping Use: Never used   Substance and Sexual Activity   • Alcohol use: Not Currently   • Drug use: No   • Sexual activity: Defer           Objective   Physical Exam  Vitals and nursing note reviewed.   Constitutional:       General: She is not in acute distress.     Appearance: She is well-developed. She is obese. She is not toxic-appearing.      Comments: Patient tearful and appears uncomfortable   Cardiovascular:      Rate and Rhythm: Normal rate and regular rhythm.      Heart sounds: Normal heart sounds.   Pulmonary:      Effort: Pulmonary effort is normal. No respiratory distress.      Breath sounds: Normal breath sounds.   Abdominal:      Palpations: Abdomen is soft.      Tenderness: There is generalized abdominal tenderness.   Skin:     General: Skin is warm and dry.   Neurological:      Mental Status: She is alert and oriented to person, place, and time.         Procedures           ED Course  ED Course as of 04/19/23 0054   Wed Apr 19, 2023   0014 I reviewed the case with Ms. Uche with the bariatric service.  She had offered this patient admission earlier in the day.  She advised that if the patient would like admission at this point, to admit to their service and they will manage the patient.  Patient with a postoperative abdominal wall hematoma.  CT scan performed earlier in the outpatient setting.  See those results for details. [RS]   0053 Hemoglobin(!): 7.8  Mild worsening of  postoperative anemia. [RS]      ED Course User Index  [RS] Maury Gracia MD                                           Medical Decision Making  Abdominal wall hematoma, initial encounter: acute illness or injury  Anemia, unspecified type: acute illness or injury  Nausea: acute illness or injury  Post-operative state: acute illness or injury  S/P bariatric surgery: acute illness or injury  Amount and/or Complexity of Data Reviewed  External Data Reviewed: labs, radiology and notes.  Labs: ordered. Decision-making details documented in ED Course.      Risk  OTC drugs.  Prescription drug management.  Decision regarding hospitalization.          Final diagnoses:   Abdominal wall hematoma, initial encounter   Post-operative state   S/P bariatric surgery   Nausea   Anemia, unspecified type       ED Disposition  ED Disposition     ED Disposition   Decision to Admit    Condition   --    Comment   Level of Care: Telemetry [5]   Admitting Physician: PAULINO KONG [[id number]   Bed Request Comments: 2F               No follow-up provider specified.       Medication List      No changes were made to your prescriptions during this visit.          Maury Gracia MD  04/19/23 0055

## 2023-04-19 NOTE — CASE MANAGEMENT/SOCIAL WORK
Discharge Planning Assessment  [facility]     Patient Name: Geneva Cordova  MRN: [id number]  Today's Date: 4/19/2023    Admit Date: 4/19/2023    Plan: CM eval   Discharge Needs Assessment     Row Name 04/19/23 1333       Living Environment    People in Home friend(s)    Current Living Arrangements home    Primary Care Provided by self    Provides Primary Care For no one    Family Caregiver if Needed friend(s);sibling(s)    Quality of Family Relationships helpful;involved    Able to Return to Prior Arrangements yes       Resource/Environmental Concerns    Resource/Environmental Concerns none       Transition Planning    Patient/Family Anticipates Transition to home with family;home    Patient/Family Anticipated Services at Transition     Transportation Anticipated family or friend will provide       Discharge Needs Assessment    Readmission Within the Last 30 Days no previous admission in last 30 days    Concerns to be Addressed discharge planning    Anticipated Changes Related to Illness none    Equipment Needed After Discharge none               Discharge Plan     Row Name 04/19/23 5391       Plan    Plan CM eval    Plan Comments Patient lives in [facility] and is independent of ADLs. She does not use and DME at baseline. Readmit due to post op hematoma. Goal remains to return home upon discharge. She has support from friends and family. No dc needs identified at this time - CM will continue to follow- angelina [phone]    Final Discharge Disposition Code 01 - home or self-care              Continued Care and Services - Admitted Since 4/19/2023    Coordination has not been started for this encounter.          Demographic Summary     Row Name 04/19/23 1333       General Information    Admission Type inpatient    Arrived From home    Referral Source admission list    Reason for Consult discharge planning    Preferred Language English       Contact Information    Permission Granted to Share Info With                 Functional Status     Row Name 04/19/23 1333       Functional Status    Usual Activity Tolerance good    Current Activity Tolerance moderate       Functional Status, IADL    Medications independent    Meal Preparation independent    Housekeeping independent    Laundry independent    Shopping independent               Psychosocial    No documentation.                Abuse/Neglect    No documentation.                Legal    No documentation.                Substance Abuse    No documentation.                Patient Forms    No documentation.                   Liliana Wisdom RN

## 2023-04-19 NOTE — PLAN OF CARE
Goal Outcome Evaluation:  Plan of Care Reviewed With: patient        Progress: no change   VSS, Ra, A/Ox4. Provider notified for abdominal discomfort patient contributed to constipation. Abdominal binder placed, heating pad in use. Miralax given, Fleet enema given. Will continue plan of care.    Problem: Adult Inpatient Plan of Care  Goal: Plan of Care Review  Outcome: Ongoing, Progressing  Flowsheets (Taken 4/19/2023 1641)  Progress: no change  Plan of Care Reviewed With: patient  Goal: Patient-Specific Goal (Individualized)  Outcome: Ongoing, Progressing  Goal: Absence of Hospital-Acquired Illness or Injury  Outcome: Ongoing, Progressing  Intervention: Identify and Manage Fall Risk  Recent Flowsheet Documentation  Taken 4/19/2023 1600 by Thalia Franklin, RN  Safety Promotion/Fall Prevention:   clutter free environment maintained   assistive device/personal items within reach   activity supervised   fall prevention program maintained   toileting scheduled   safety round/check completed   room organization consistent   nonskid shoes/slippers when out of bed   lighting adjusted  Taken 4/19/2023 1400 by Thalia Franklin, RN  Safety Promotion/Fall Prevention:   clutter free environment maintained   assistive device/personal items within reach   activity supervised   fall prevention program maintained   toileting scheduled   safety round/check completed   room organization consistent   nonskid shoes/slippers when out of bed   lighting adjusted  Taken 4/19/2023 1200 by Thalia Franklin, RN  Safety Promotion/Fall Prevention:   clutter free environment maintained   assistive device/personal items within reach   activity supervised   fall prevention program maintained   toileting scheduled   room organization consistent   safety round/check completed   nonskid shoes/slippers when out of bed   lighting adjusted  Taken 4/19/2023 1008 by Thalia Franklin, RN  Safety Promotion/Fall Prevention:   clutter free environment maintained    assistive device/personal items within reach   activity supervised   fall prevention program maintained   toileting scheduled   safety round/check completed   room organization consistent   nonskid shoes/slippers when out of bed   lighting adjusted  Taken 4/19/2023 0800 by Thalia Franklin RN  Safety Promotion/Fall Prevention:   assistive device/personal items within reach   clutter free environment maintained   activity supervised   fall prevention program maintained   toileting scheduled   room organization consistent   safety round/check completed   nonskid shoes/slippers when out of bed   lighting adjusted  Intervention: Prevent Skin Injury  Recent Flowsheet Documentation  Taken 4/19/2023 1600 by Thalia Franklin RN  Body Position: position changed independently  Taken 4/19/2023 1400 by Thalia Franklin RN  Body Position: position changed independently  Taken 4/19/2023 1200 by Thalia Franklin RN  Body Position: position changed independently  Taken 4/19/2023 1008 by Thalia Franklin RN  Body Position: position changed independently  Taken 4/19/2023 0800 by Thalia Franklin RN  Body Position: position changed independently  Intervention: Prevent and Manage VTE (Venous Thromboembolism) Risk  Recent Flowsheet Documentation  Taken 4/19/2023 1600 by Thalia Franklin RN  Activity Management: activity encouraged  Taken 4/19/2023 1400 by Thalia Franklin RN  Activity Management: ambulated outside room  Taken 4/19/2023 1200 by Thalia Franklin RN  Activity Management: ambulated in room  Taken 4/19/2023 1008 by Thalia Franklin RN  Activity Management: up ad basia  Taken 4/19/2023 0800 by Thalia Franklin RN  Activity Management: up ad basia  VTE Prevention/Management:   bilateral   sequential compression devices off  Intervention: Prevent Infection  Recent Flowsheet Documentation  Taken 4/19/2023 1600 by Thalia Franklin RN  Infection Prevention:   visitors restricted/screened   single patient room provided   rest/sleep promoted   hand hygiene  promoted  Taken 4/19/2023 1400 by Thalia Franklin RN  Infection Prevention:   visitors restricted/screened   single patient room provided   rest/sleep promoted   hand hygiene promoted  Taken 4/19/2023 1200 by Thalia Franklin RN  Infection Prevention:   visitors restricted/screened   single patient room provided   rest/sleep promoted   hand hygiene promoted  Taken 4/19/2023 1008 by Thalia Franklin RN  Infection Prevention:   rest/sleep promoted   single patient room provided   visitors restricted/screened   hand hygiene promoted  Taken 4/19/2023 0800 by Thalia Franklin RN  Infection Prevention:   rest/sleep promoted   single patient room provided   visitors restricted/screened   hand hygiene promoted  Goal: Optimal Comfort and Wellbeing  Outcome: Ongoing, Progressing  Intervention: Monitor Pain and Promote Comfort  Recent Flowsheet Documentation  Taken 4/19/2023 0938 by Thalia Franklin RN  Pain Management Interventions: see MAR  Intervention: Provide Person-Centered Care  Recent Flowsheet Documentation  Taken 4/19/2023 0800 by Thalia Franklin RN  Trust Relationship/Rapport:   choices provided   care explained  Goal: Readiness for Transition of Care  Outcome: Ongoing, Progressing  Goal: Plan of Care Review  Outcome: Ongoing, Progressing  Flowsheets (Taken 4/19/2023 1641)  Progress: no change  Plan of Care Reviewed With: patient  Goal: Patient-Specific Goal (Individualized)  Outcome: Ongoing, Progressing  Goal: Absence of Hospital-Acquired Illness or Injury  Outcome: Ongoing, Progressing  Intervention: Identify and Manage Fall Risk  Recent Flowsheet Documentation  Taken 4/19/2023 1600 by Thalia Franklin RN  Safety Promotion/Fall Prevention:   clutter free environment maintained   assistive device/personal items within reach   activity supervised   fall prevention program maintained   toileting scheduled   safety round/check completed   room organization consistent   nonskid shoes/slippers when out of bed   lighting  adjusted  Taken 4/19/2023 1400 by Thalia Franklin RN  Safety Promotion/Fall Prevention:   clutter free environment maintained   assistive device/personal items within reach   activity supervised   fall prevention program maintained   toileting scheduled   safety round/check completed   room organization consistent   nonskid shoes/slippers when out of bed   lighting adjusted  Taken 4/19/2023 1200 by Thalia Franklin RN  Safety Promotion/Fall Prevention:   clutter free environment maintained   assistive device/personal items within reach   activity supervised   fall prevention program maintained   toileting scheduled   room organization consistent   safety round/check completed   nonskid shoes/slippers when out of bed   lighting adjusted  Taken 4/19/2023 1008 by Thalia Franklin RN  Safety Promotion/Fall Prevention:   clutter free environment maintained   assistive device/personal items within reach   activity supervised   fall prevention program maintained   toileting scheduled   safety round/check completed   room organization consistent   nonskid shoes/slippers when out of bed   lighting adjusted  Taken 4/19/2023 0800 by Thalia Franklin RN  Safety Promotion/Fall Prevention:   assistive device/personal items within reach   clutter free environment maintained   activity supervised   fall prevention program maintained   toileting scheduled   room organization consistent   safety round/check completed   nonskid shoes/slippers when out of bed   lighting adjusted  Intervention: Prevent Skin Injury  Recent Flowsheet Documentation  Taken 4/19/2023 1600 by Thalia Franklin RN  Body Position: position changed independently  Taken 4/19/2023 1400 by Thalia Franklin RN  Body Position: position changed independently  Taken 4/19/2023 1200 by Thalia Franklin RN  Body Position: position changed independently  Taken 4/19/2023 1008 by Thalia Franklin RN  Body Position: position changed independently  Taken 4/19/2023 0800 by Thalia Franklin  RN  Body Position: position changed independently  Intervention: Prevent and Manage VTE (Venous Thromboembolism) Risk  Recent Flowsheet Documentation  Taken 4/19/2023 1600 by Thalia Franklin RN  Activity Management: activity encouraged  Taken 4/19/2023 1400 by Thalia Franklin RN  Activity Management: ambulated outside room  Taken 4/19/2023 1200 by Thalia Franklin RN  Activity Management: ambulated in room  Taken 4/19/2023 1008 by Thalia Franklin RN  Activity Management: up ad basia  Taken 4/19/2023 0800 by Thalia Franklin RN  Activity Management: up ad basia  VTE Prevention/Management:   bilateral   sequential compression devices off  Intervention: Prevent Infection  Recent Flowsheet Documentation  Taken 4/19/2023 1600 by Thalia Franklin RN  Infection Prevention:   visitors restricted/screened   single patient room provided   rest/sleep promoted   hand hygiene promoted  Taken 4/19/2023 1400 by Thalia Franklin RN  Infection Prevention:   visitors restricted/screened   single patient room provided   rest/sleep promoted   hand hygiene promoted  Taken 4/19/2023 1200 by Thalia Franklin RN  Infection Prevention:   visitors restricted/screened   single patient room provided   rest/sleep promoted   hand hygiene promoted  Taken 4/19/2023 1008 by Thalia Franklin RN  Infection Prevention:   rest/sleep promoted   single patient room provided   visitors restricted/screened   hand hygiene promoted  Taken 4/19/2023 0800 by Thalia Franklin RN  Infection Prevention:   rest/sleep promoted   single patient room provided   visitors restricted/screened   hand hygiene promoted  Goal: Optimal Comfort and Wellbeing  Outcome: Ongoing, Progressing  Intervention: Monitor Pain and Promote Comfort  Recent Flowsheet Documentation  Taken 4/19/2023 0938 by Thalia Franklin RN  Pain Management Interventions: see MAR  Intervention: Provide Person-Centered Care  Recent Flowsheet Documentation  Taken 4/19/2023 0800 by Thalia Franklin RN  Trust Relationship/Rapport:    choices provided   care explained  Goal: Readiness for Transition of Care  Outcome: Ongoing, Progressing

## 2023-04-20 ENCOUNTER — READMISSION MANAGEMENT (OUTPATIENT)
Dept: CALL CENTER | Facility: HOSPITAL | Age: 38
End: 2023-04-20
Payer: COMMERCIAL

## 2023-04-20 VITALS
RESPIRATION RATE: 18 BRPM | SYSTOLIC BLOOD PRESSURE: 144 MMHG | WEIGHT: 290 LBS | BODY MASS INDEX: 41.52 KG/M2 | DIASTOLIC BLOOD PRESSURE: 97 MMHG | TEMPERATURE: 98.6 F | OXYGEN SATURATION: 94 % | HEART RATE: 85 BPM | HEIGHT: 70 IN

## 2023-04-20 LAB
ALBUMIN SERPL-MCNC: 3.2 G/DL (ref 3.5–5.2)
ALBUMIN/GLOB SERPL: 1.6 G/DL
ALP SERPL-CCNC: 56 U/L (ref 39–117)
ALT SERPL W P-5'-P-CCNC: 28 U/L (ref 1–33)
ANION GAP SERPL CALCULATED.3IONS-SCNC: 7 MMOL/L (ref 5–15)
AST SERPL-CCNC: 47 U/L (ref 1–32)
BASOPHILS # BLD AUTO: 0.01 10*3/MM3 (ref 0–0.2)
BASOPHILS NFR BLD AUTO: 0.2 % (ref 0–1.5)
BILIRUB SERPL-MCNC: 0.7 MG/DL (ref 0–1.2)
BUN SERPL-MCNC: 5 MG/DL (ref 6–20)
BUN/CREAT SERPL: 8.5 (ref 7–25)
CALCIUM SPEC-SCNC: 8.2 MG/DL (ref 8.6–10.5)
CHLORIDE SERPL-SCNC: 106 MMOL/L (ref 98–107)
CO2 SERPL-SCNC: 25 MMOL/L (ref 22–29)
CREAT SERPL-MCNC: 0.59 MG/DL (ref 0.57–1)
DEPRECATED RDW RBC AUTO: 45 FL (ref 37–54)
EGFRCR SERPLBLD CKD-EPI 2021: 120 ML/MIN/1.73
EOSINOPHIL # BLD AUTO: 0.22 10*3/MM3 (ref 0–0.4)
EOSINOPHIL NFR BLD AUTO: 4.5 % (ref 0.3–6.2)
ERYTHROCYTE [DISTWIDTH] IN BLOOD BY AUTOMATED COUNT: 13.3 % (ref 12.3–15.4)
GLOBULIN UR ELPH-MCNC: 2 GM/DL
GLUCOSE BLDC GLUCOMTR-MCNC: 103 MG/DL (ref 70–130)
GLUCOSE BLDC GLUCOMTR-MCNC: 112 MG/DL (ref 70–130)
GLUCOSE BLDC GLUCOMTR-MCNC: 113 MG/DL (ref 70–130)
GLUCOSE SERPL-MCNC: 102 MG/DL (ref 65–99)
HCT VFR BLD AUTO: 23.1 % (ref 34–46.6)
HCT VFR BLD AUTO: 24.7 % (ref 34–46.6)
HGB BLD-MCNC: 7.2 G/DL (ref 12–15.9)
HGB BLD-MCNC: 7.7 G/DL (ref 12–15.9)
IMM GRANULOCYTES # BLD AUTO: 0.03 10*3/MM3 (ref 0–0.05)
IMM GRANULOCYTES NFR BLD AUTO: 0.6 % (ref 0–0.5)
IRON 24H UR-MRATE: 33 MCG/DL (ref 37–145)
LYMPHOCYTES # BLD AUTO: 1.32 10*3/MM3 (ref 0.7–3.1)
LYMPHOCYTES NFR BLD AUTO: 26.7 % (ref 19.6–45.3)
MCH RBC QN AUTO: 29.6 PG (ref 26.6–33)
MCHC RBC AUTO-ENTMCNC: 31.2 G/DL (ref 31.5–35.7)
MCV RBC AUTO: 95.1 FL (ref 79–97)
MONOCYTES # BLD AUTO: 0.39 10*3/MM3 (ref 0.1–0.9)
MONOCYTES NFR BLD AUTO: 7.9 % (ref 5–12)
NEUTROPHILS NFR BLD AUTO: 2.97 10*3/MM3 (ref 1.7–7)
NEUTROPHILS NFR BLD AUTO: 60.1 % (ref 42.7–76)
NRBC BLD AUTO-RTO: 0.4 /100 WBC (ref 0–0.2)
PLATELET # BLD AUTO: 246 10*3/MM3 (ref 140–450)
PMV BLD AUTO: 11 FL (ref 6–12)
POTASSIUM SERPL-SCNC: 3.8 MMOL/L (ref 3.5–5.2)
PREALB SERPL-MCNC: 5.6 MG/DL (ref 20–40)
PROT SERPL-MCNC: 5.2 G/DL (ref 6–8.5)
RBC # BLD AUTO: 2.43 10*6/MM3 (ref 3.77–5.28)
SODIUM SERPL-SCNC: 138 MMOL/L (ref 136–145)
WBC NRBC COR # BLD: 4.94 10*3/MM3 (ref 3.4–10.8)

## 2023-04-20 PROCEDURE — G0378 HOSPITAL OBSERVATION PER HR: HCPCS

## 2023-04-20 PROCEDURE — 84134 ASSAY OF PREALBUMIN: CPT | Performed by: SURGERY

## 2023-04-20 PROCEDURE — 25010000002 IRON SUCROSE PER 1 MG: Performed by: SURGERY

## 2023-04-20 PROCEDURE — 99024 POSTOP FOLLOW-UP VISIT: CPT | Performed by: SURGERY

## 2023-04-20 PROCEDURE — 25010000002 HYDROMORPHONE 1 MG/ML SOLUTION: Performed by: SURGERY

## 2023-04-20 PROCEDURE — 96376 TX/PRO/DX INJ SAME DRUG ADON: CPT

## 2023-04-20 PROCEDURE — 83540 ASSAY OF IRON: CPT | Performed by: SURGERY

## 2023-04-20 PROCEDURE — 82962 GLUCOSE BLOOD TEST: CPT

## 2023-04-20 PROCEDURE — 85025 COMPLETE CBC W/AUTO DIFF WBC: CPT | Performed by: SURGERY

## 2023-04-20 PROCEDURE — 96375 TX/PRO/DX INJ NEW DRUG ADDON: CPT

## 2023-04-20 PROCEDURE — 85018 HEMOGLOBIN: CPT | Performed by: SURGERY

## 2023-04-20 PROCEDURE — 85014 HEMATOCRIT: CPT | Performed by: SURGERY

## 2023-04-20 PROCEDURE — 80053 COMPREHEN METABOLIC PANEL: CPT | Performed by: SURGERY

## 2023-04-20 PROCEDURE — 0 POTASSIUM CHLORIDE PER 2 MEQ: Performed by: SURGERY

## 2023-04-20 PROCEDURE — 96361 HYDRATE IV INFUSION ADD-ON: CPT

## 2023-04-20 RX ADMIN — POTASSIUM CHLORIDE AND SODIUM CHLORIDE 125 ML/HR: 450; 150 INJECTION, SOLUTION INTRAVENOUS at 15:19

## 2023-04-20 RX ADMIN — PANTOPRAZOLE SODIUM 40 MG: 40 TABLET, DELAYED RELEASE ORAL at 06:07

## 2023-04-20 RX ADMIN — HYDROMORPHONE HYDROCHLORIDE 1 MG: 1 INJECTION, SOLUTION INTRAMUSCULAR; INTRAVENOUS; SUBCUTANEOUS at 04:34

## 2023-04-20 RX ADMIN — HYDROMORPHONE HYDROCHLORIDE 1 MG: 1 INJECTION, SOLUTION INTRAMUSCULAR; INTRAVENOUS; SUBCUTANEOUS at 01:50

## 2023-04-20 RX ADMIN — IRON SUCROSE 200 MG: 20 INJECTION, SOLUTION INTRAVENOUS at 10:23

## 2023-04-20 RX ADMIN — SIMETHICONE 80 MG: 80 TABLET, CHEWABLE ORAL at 01:49

## 2023-04-20 RX ADMIN — POTASSIUM CHLORIDE AND SODIUM CHLORIDE 125 ML/HR: 450; 150 INJECTION, SOLUTION INTRAVENOUS at 05:19

## 2023-04-20 RX ADMIN — IRON SUCROSE 200 MG: 20 INJECTION, SOLUTION INTRAVENOUS at 14:00

## 2023-04-20 RX ADMIN — HYDROMORPHONE HYDROCHLORIDE 1 MG: 1 INJECTION, SOLUTION INTRAMUSCULAR; INTRAVENOUS; SUBCUTANEOUS at 10:22

## 2023-04-20 NOTE — OUTREACH NOTE
Prep Survey    Flowsheet Row Responses   [facility] patient discharged from? [place]   Is LACE score < 7 ? Yes   Eligibility [facility]   Date of Admission 04/19/23   Date of Discharge 04/20/23   Discharge Disposition Home or Self Care   Discharge diagnosis Abdominal wall hematoma   Does the patient have one of the following disease processes/diagnoses(primary or secondary)? Other   Does the patient have Home health ordered? No   Is there a DME ordered? No   Prep survey completed? Yes          Carole CANALES - Registered Nurse

## 2023-04-20 NOTE — PLAN OF CARE
Goal Outcome Evaluation:           Progress: improving  Outcome Evaluation: Patient is being discharged at this time. Most recent hemoglobin is 7.7 and hematocrit of 24.7. Peripheral IV has been removed. Discharge paperwork has been printed off and reviewed with the patient. All of the patient's belongings have been gathered and taken out with the patient. Family member will drive the patient home.

## 2023-04-20 NOTE — PLAN OF CARE
Goal Outcome Evaluation:   VSS, RA, A&Ox4. PRNs given for pain and gas. IV fluids infusing. Pt up in the chair. Slept periodically throughout the night. Pt reported diarrhea. No further concerns at this time, will continue plan of care.

## 2023-04-21 ENCOUNTER — TRANSITIONAL CARE MANAGEMENT TELEPHONE ENCOUNTER (OUTPATIENT)
Dept: CALL CENTER | Facility: HOSPITAL | Age: 38
End: 2023-04-21
Payer: COMMERCIAL

## 2023-04-21 NOTE — OUTREACH NOTE
Call Center TCM Note    Flowsheet Row Responses   [facility] patient discharged from? [place]   Does the patient have one of the following disease processes/diagnoses(primary or secondary)? Other   TCM attempt successful? Yes   Call start time 1058   Call end time 1104   Discharge diagnosis Abdominal wall hematoma (S/P recent bariatric surgery revision/Paraesophageal repair)   Person spoke with today (if not patient) and relationship Patient   Meds reviewed with patient/caregiver? Yes  [resumed home meds. ]   Does the patient have all medications ordered at discharge? N/A  [No changes made to medications. ]   Is the patient taking all medications as directed (includes completed medication regime)? Yes   Comments Hospital Follow Up with YAMILET Wakefield- Monday Apr 24, 2023 11:00 AM   Does the patient have an appointment with their PCP within 7 days of discharge? Yes   Has home health visited the patient within 72 hours of discharge? N/A   Psychosocial issues? No   Did the patient receive a copy of their discharge instructions? Yes   Nursing interventions Reviewed instructions with patient   What is the patient's perception of their health status since discharge? Improving   Is the patient/caregiver able to teach back signs and symptoms related to disease process for when to call PCP? Yes   Is the patient/caregiver able to teach back the hierarchy of who to call/visit for symptoms/problems? PCP, Specialist, Home health nurse, Urgent Care, ED, 911 Yes   If the patient is a current smoker, are they able to teach back resources for cessation? Not a smoker   TCM call completed? Yes   Wrap up additional comments Discharge Hgb 7.2,  needs follow up lab work   Call end time 1104   Would this patient benefit from a Referral to Amb Social Work? No   Is the patient interested in additional calls from an ambulatory ?  NOTE:  applies to high risk patients requiring additional follow-up. Dena YANG  BRYAN Robertson    4/21/2023, 11:06 EDT

## 2023-04-24 ENCOUNTER — OFFICE VISIT (OUTPATIENT)
Dept: BARIATRICS/WEIGHT MGMT | Facility: CLINIC | Age: 38
End: 2023-04-24
Payer: COMMERCIAL

## 2023-04-24 ENCOUNTER — OFFICE VISIT (OUTPATIENT)
Dept: INTERNAL MEDICINE | Facility: CLINIC | Age: 38
End: 2023-04-24
Payer: COMMERCIAL

## 2023-04-24 VITALS
BODY MASS INDEX: 40.52 KG/M2 | DIASTOLIC BLOOD PRESSURE: 74 MMHG | SYSTOLIC BLOOD PRESSURE: 120 MMHG | HEIGHT: 70 IN | HEART RATE: 81 BPM | TEMPERATURE: 97.7 F | OXYGEN SATURATION: 97 % | WEIGHT: 283 LBS | RESPIRATION RATE: 16 BRPM

## 2023-04-24 VITALS
HEIGHT: 70 IN | HEART RATE: 89 BPM | RESPIRATION RATE: 20 BRPM | BODY MASS INDEX: 40.69 KG/M2 | WEIGHT: 284.2 LBS | OXYGEN SATURATION: 97 % | DIASTOLIC BLOOD PRESSURE: 54 MMHG | SYSTOLIC BLOOD PRESSURE: 102 MMHG | TEMPERATURE: 97.3 F

## 2023-04-24 DIAGNOSIS — Z98.84 S/P LAPAROSCOPIC SLEEVE GASTRECTOMY: Primary | ICD-10-CM

## 2023-04-24 DIAGNOSIS — R11.2 NAUSEA AND VOMITING, UNSPECIFIED VOMITING TYPE: ICD-10-CM

## 2023-04-24 DIAGNOSIS — R82.2 BILIRUBIN IN URINE: ICD-10-CM

## 2023-04-24 DIAGNOSIS — E66.01 OBESITY, CLASS III, BMI 40-49.9 (MORBID OBESITY): Primary | ICD-10-CM

## 2023-04-24 DIAGNOSIS — D64.9 ANEMIA, UNSPECIFIED TYPE: ICD-10-CM

## 2023-04-24 DIAGNOSIS — Z98.84 STATUS POST BARIATRIC SURGERY: ICD-10-CM

## 2023-04-24 LAB
BILIRUB BLD-MCNC: ABNORMAL MG/DL
CLARITY, POC: ABNORMAL
COLOR UR: ABNORMAL
EXPIRATION DATE: ABNORMAL
EXPIRATION DATE: ABNORMAL
GLUCOSE UR STRIP-MCNC: NEGATIVE MG/DL
HGB BLDA-MCNC: 7.2 G/DL (ref 12–17)
KETONES UR QL: ABNORMAL
LEUKOCYTE EST, POC: ABNORMAL
Lab: ABNORMAL
Lab: ABNORMAL
NITRITE UR-MCNC: NEGATIVE MG/ML
PH UR: 7 [PH] (ref 5–8)
PROT UR STRIP-MCNC: ABNORMAL MG/DL
RBC # UR STRIP: NEGATIVE /UL
SP GR UR: 1.01 (ref 1–1.03)
UROBILINOGEN UR QL: ABNORMAL

## 2023-04-24 PROCEDURE — 99024 POSTOP FOLLOW-UP VISIT: CPT | Performed by: PHYSICIAN ASSISTANT

## 2023-04-24 PROCEDURE — 1160F RVW MEDS BY RX/DR IN RCRD: CPT | Performed by: PHYSICIAN ASSISTANT

## 2023-04-24 PROCEDURE — 1159F MED LIST DOCD IN RCRD: CPT | Performed by: PHYSICIAN ASSISTANT

## 2023-04-24 RX ORDER — SPIRONOLACTONE 25 MG
1 TABLET ORAL DAILY
Qty: 90 TABLET | Refills: 1 | Status: SHIPPED | OUTPATIENT
Start: 2023-04-24

## 2023-04-24 NOTE — ASSESSMENT & PLAN NOTE
Adv to go to ER for eval and fluids d/t possible dehydration, and bilirubin in urine, need for additional labs.

## 2023-04-24 NOTE — PROGRESS NOTES
Chief Complaint  Abdominal Wall Hematoma  and Constipation    Subjective          History of Present Illness  Gen[place] Cordova presents to [facility] for   History of Present Illness  She is here for f/u on bariatric surgery.  Had surgery on 4.14.23 and they released her but she went back the next day due to swelling in her abdomen and she had CT which showed hematoma on left side. She feels like this has decreased in size. They kept her at the hospital 4.19-4.20 for symptom control due to hematoma related pain.     Over the weekend she started having nausea and vomiting after drinking a protein shake. She has had 1 bottle of water since yesterday and is trying to drink a protein shake now. She is peeing like normal but her pee is dark colored, she thinks she may be dehydrated.   She is feeling a little better today, not having any vomiting. She is trying to drink a protein shake this morning.   Left side is  over the area of bruising but improving. Surgical sites looked good. No fevers.     She has not had a BM since Tuesday, is very constipated. Just started iron pills and knows that she is constipating.   Has had a mild cough and some phlegm, no SOA, is using incentive spirometer.       The following portions of the patient's history were reviewed and updated as appropriate: allergies, current medications, past family history, past medical history, past social history, past surgical history and problem list.  Allergies   Allergen Reactions   • Codeine Nausea And Vomiting, Other (See Comments) and GI Intolerance     UNKNOWN       Current Outpatient Medications:   •  ferrous gluconate (FERGON) 324 MG tablet, Take 1 tablet by mouth 2 (Two) Times a Day. (Patient taking differently: Take 1 tablet by mouth. PT NOT CURRENTLY TAKING), Disp: 180 tablet, Rfl: 1  •  omeprazole (priLOSEC) 40 MG capsule, Take 1 capsule by mouth Daily for 60 days., Disp: 60 capsule, Rfl: 0  •   "ondansetron (Zofran) 4 MG tablet, Take 1 tablet by mouth Every 4 (Four) Hours As Needed for Nausea., Disp: 10 tablet, Rfl: 0  •  oxyCODONE (Roxicodone) 5 MG immediate release tablet, Take 1 tablet by mouth Every 4 (Four) Hours As Needed for Moderate Pain., Disp: 10 tablet, Rfl: 0  •  Calcium Carb-Cholecalciferol (Calcium 600/Vitamin D) 600-10 MG-MCG chewable tablet, Chew 1 tablet Daily., Disp: 90 tablet, Rfl: 1  •  Multiple Vitamins-Minerals (MULTIPLE VITAMINS/WOMENS) tablet, Take 1 tablet by mouth. PT NOT CURRENTLY TAKING (Patient not taking: Reported on 4/24/2023), Disp: , Rfl:   •  thiamine (VITAMIN B-1) 100 MG tablet, Take 1 tablet by mouth Daily. (Patient not taking: Reported on 4/24/2023), Disp: 90 tablet, Rfl: 1  •  thiamine (VITAMIN B1) 100 MG tablet, Take 1 tablet by mouth Daily. (Patient not taking: Reported on 4/24/2023), Disp: , Rfl:   •  vitamin D3 125 MCG (5000 UT) capsule capsule, Take 1 capsule by mouth Daily. (Patient not taking: Reported on 4/24/2023), Disp: 90 capsule, Rfl: 1  New Medications Ordered This Visit   Medications   • Calcium Carb-Cholecalciferol (Calcium 600/Vitamin D) 600-10 MG-MCG chewable tablet     Sig: Chew 1 tablet Daily.     Dispense:  90 tablet     Refill:  1     Social History     Tobacco Use   Smoking Status Never   Smokeless Tobacco Never        Objective   Vital Signs:   Vitals:    04/24/23 1120 04/24/23 1135   BP: 100/68  Comment: catrina alonzo 102/54   Pulse: 89    Resp: 20    Temp: 97.3 °F (36.3 °C)    TempSrc: Temporal    SpO2: 97%    Weight: 129 kg (284 lb 3.2 oz)    Height: 177.8 cm (70\")    PainSc:   3    PainLoc: Abdomen       Body mass index is 40.78 kg/m².  Physical Exam  Vitals reviewed.   Constitutional:       General: She is not in acute distress.     Appearance: Normal appearance. She is ill-appearing.   HENT:      Head: Normocephalic and atraumatic.   Eyes:      General: No scleral icterus.     Extraocular Movements: Extraocular movements intact.      " Conjunctiva/sclera: Conjunctivae normal.   Cardiovascular:      Rate and Rhythm: Normal rate and regular rhythm.      Heart sounds: Normal heart sounds. No murmur heard.  Pulmonary:      Effort: Pulmonary effort is normal. No respiratory distress.      Breath sounds: Normal breath sounds. No stridor. No wheezing or rhonchi.   Abdominal:      General: Bowel sounds are normal.      Palpations: Abdomen is soft.      Tenderness: There is abdominal tenderness (Left sided abd tenderness).   Musculoskeletal:      Cervical back: Normal range of motion and neck supple.   Skin:     General: Skin is warm and dry.      Coloration: Skin is not jaundiced.   Neurological:      General: No focal deficit present.      Mental Status: She is alert and oriented to person, place, and time.      Gait: Gait normal.   Psychiatric:         Mood and Affect: Mood normal.         Behavior: Behavior normal.        No LMP recorded. Patient has had an ablation.    Result Review :              Results for orders placed or performed in visit on 04/24/23   POC Urinalysis Dipstick, Automated    Specimen: Urine   Result Value Ref Range    Color Orange (A) Yellow, Straw, Dark Yellow, Fatoumata    Clarity, UA Cloudy (A) Clear    Specific Gravity  1.015 1.005 - 1.030    pH, Urine 7.0 5.0 - 8.0    Leukocytes Trace (A) Negative    Nitrite, UA Negative Negative    Protein, POC 1+ (A) Negative mg/dL    Glucose, UA Negative Negative mg/dL    Ketones, UA Trace (A) Negative    Urobilinogen, UA 8 E.U./dL (A) Normal, 0.2 E.U./dL    Bilirubin Moderate (2+) (A) Negative    Blood, UA Negative Negative    Lot Number [id number]     Expiration Date 2/10/2024    POC Hemoglobin    Specimen: Blood   Result Value Ref Range    Hemoglobin 7.2 (A) 12.0 - 17.0 g/dL    Lot Number [id number]     Expiration Date 9/24/2023           Assessment and Plan    Diagnoses and all orders for this visit:    1. S/P laparoscopic sleeve gastrectomy (Primary)  Assessment & Plan:  F/u with  bariatrics as dir      2. Anemia, unspecified type  Assessment & Plan:  Cont vitamin supplements as dir. Hgb is 7.2 in office which is less than it was 4d ago at discharge. Adv to go to ER for eval. Pt in agreement.     Orders:  -     POC Urinalysis Dipstick, Automated  -     POC Hemoglobin    3. Nausea and vomiting, unspecified vomiting type  Assessment & Plan:  Adv to go to ER for eval and fluids d/t possible dehydration, and bilirubin in urine, need for additional labs.       4. Bilirubin in urine    Other orders  -     Calcium Carb-Cholecalciferol (Calcium 600/Vitamin D) 600-10 MG-MCG chewable tablet; Chew 1 tablet Daily.  Dispense: 90 tablet; Refill: 1      Follow Up   Return if symptoms worsen or fail to improve.    Follow up if symptoms worsen or persist or has new or concerning symptoms, go to ER for severe symptoms.   Reviewed common medication effects and side effects and advised to report side effects immediately.  Encouraged medication compliance and the importance of keeping scheduled follow up appointments with me and any other providers.  If a referral was made please contact our office if you have not heard about an appointment in the next 2 weeks.   If labs or images are ordered we will contact you with the results within the next week.  If you have not heard from us after a week please call our office to inquire about the results.   Patient was given instructions and counseling regarding her condition or for health maintenance advice. Please see specific information pulled into the AVS if appropriate.     Alice Fuentes PA-C    * Please note that portions of this note were completed with a voice recognition program.

## 2023-04-24 NOTE — ASSESSMENT & PLAN NOTE
Cont vitamin supplements as dir. Hgb is 7.2 in office which is less than it was 4d ago at discharge. Adv to go to ER for eval. Pt in agreement.

## 2023-04-24 NOTE — PROGRESS NOTES
[facility]ariat[facility]  [address]  [address]  [facility] [address] [phone]      Patient Name:  Geneva Cordova.  :  1985      Reason for Visit:  POD #10    HPI:  Geneva Cordova is a 37 y.o. female  s/p robotic RNY/BPD/recurrentPHHR 23 GDW (previous LSG/pHHR  GDW and sleeve revision 2020 PNQ complicated by postop stricture and uncontrolled reflux)    Seen in office POD#4 with L flank edema. Labs and CT ordered as opt, offered inpatient admission but declined. Then went to ED that night and admitted. CT with soft tissue hematoma. D/c hospital day #2 23.     Doing well.  Presents today with concern from PCP about bili in urine and low hemoglobin. Says she is actully feeling really good. Swelling of abdomen has gone down. Is wearing an abdominal binder and had noticed suprapubic swelling which has also resolved. Denies presyncope, dizziness, lightheadedness.  On Saturday, had nausea and vomiting after protein shake and has been hesitant to take in too much PO intake for fear of getting sick but didn't have any issues .  A little nauseated today.   Has constant LLQ abd pain, described as cramping, no worse.   No association with PO intake.  Denies dysphagia, reflux, pulmonary issues and fevers. Using IS to 3000.  Tolerating diet progression - on stage 1.  Getting 60-70g prot/day.  Drinking <64 fluid oz/day.  urine has been orangish.  Hasn't started vitamins yet.  On Omeprazole .  Holding ASA , NSAIDs , Tramadol, Hormones, Diuretics , Steroids and Immunologics and tobacco use/ exposure.  Ambulating- active, back to normal.     Presurgery weight: 283 pounds.  Today's weight is 128 kg (283 lb) pounds, today's  Body mass index is 40.61 kg/m²., and@ weight loss since surgery is 0 pounds.   (down 8lb from LOV)    Past Medical History:   Diagnosis Date   • Anemia    • Anxiety    • Bilateral lower extremity edema     R>L   • Boil, axilla     recurrent   •  "Chronic back pain     no meds or injections, feels related to breasts   • Concussion with no loss of consciousness 2022   • COVID-19 2020   • Depression    • Dyspepsia    • Dyspnea on exertion    • Fatigue    • Gastroesophageal reflux disease concurrent with and due to paraesophageal hernia     recurrent after LSG revision, EGD Dr. Hughes 23   • Glucose intolerance (impaired glucose tolerance)    • Helicobacter pylori (H. pylori) infection     asx and grossly nl EGD. \"abundant\"on path. HBT still + after PrevPak tx. LSG path neg. neg testing 2018   • Hypoalbuminemia    • Left Ovarian serous cystadenoma -removed at  section 2017   • Migraine     resolved with weightloss   • Obesity, morbid    • VIRA (obstructive sleep apnea)     improved since WLS   • PCOS (polycystic ovarian syndrome)    • Right Essure implantation 2017 10/06/2017   • S/P  section;left ovarian cystectomy and partial salpingectomy 2017   • Seasonal allergies      Past Surgical History:   Procedure Laterality Date   • ABDOMINOPLASTY  2022    Osmany Mclain at [facility]   • BILATERAL BREAST REDUCTION      2022   •  SECTION N/A 2017      SECTION PRIMARY;  CYSTECTOMY; Kwadwo Baxter MD; :  [facility];  Service:    • DILATATION AND CURETTAGE     • ENDOMETRIAL ABLATION  2020    Dr Cuevas   • ENDOSCOPY N/A 2020    Procedure: ESOPHAGOGASTRODUODENOSCOPY;  Surgeon: Natalia Mariscal MD;  Location:  [facility];  Service: Bariatric;  Laterality: N/A;   • ENDOSCOPY  2022    Dr. La   • ENDOSCOPY N/A 2023    Procedure: ESOPHAGOGASTRODUODENOSCOPY;  Surgeon: Erik Hughes MD;  Location:  [facility];  Service: Robotics - DaVinci;  Laterality: N/A;   • ESSURE TUBAL LIGATION     • GASTRIC BYPASS N/A 2023    Procedure: GASTRIC BYPASS LAPAROSCOPIC WITH DAVINCI ROBOT AND BILIOPANCREATIC DIVERSION WITH DUODENAL SWITCH;  " "Surgeon: Erik Hughes MD;  Location:  [facility];  Service: Robotics - DaVinci;  Laterality: N/A;   • GASTRIC SLEEVE LAPAROSCOPIC  07/2015    With Dr. Hughes   • GASTRIC SLEEVE LAPAROSCOPIC N/A 12/01/2020    Procedure: GASTRIC SLEEVE LAPAROSCOPIC;  Surgeon: Natalia Mariscal MD;  Location:  [facility];  Service: Bariatric;  Laterality: N/A;   • LAPAROSCOPIC CHOLECYSTECTOMY  02/2018    Dr. Hughes   • PARAESOPHAGEAL HERNIA REPAIR N/A 4/14/2023    Procedure: LAPAROSCOPIC RECURRENT PARAESOPHAGEAL HERNIA REPAIR WITH DAVINCI ROBOT AND FALCIFORM LIGAMENT SLING ;  Surgeon: Erik Hughes MD;  Location:  [facility];  Service: Robotics - DaVinci;  Laterality: N/A;   • SALPINGECTOMY Bilateral 02/2020    Dr Cuevas   • TONSILLECTOMY  2014   • WISDOM TOOTH EXTRACTION  2014     Outpatient Medications Marked as Taking for the 4/24/23 encounter (Office Visit) with Zenobia Leahy PA-C   Medication Sig Dispense Refill   • omeprazole (priLOSEC) 40 MG capsule Take 1 capsule by mouth Daily for 60 days. 60 capsule 0   • ondansetron (Zofran) 4 MG tablet Take 1 tablet by mouth Every 4 (Four) Hours As Needed for Nausea. 10 tablet 0     Allergies   Allergen Reactions   • Codeine Nausea And Vomiting, Other (See Comments) and GI Intolerance     UNKNOWN       Social History     Socioeconomic History   • Marital status: Single   • Number of children: 4   • Years of education: College    Tobacco Use   • Smoking status: Never   • Smokeless tobacco: Never   Vaping Use   • Vaping Use: Never used   Substance and Sexual Activity   • Alcohol use: Not Currently   • Drug use: No   • Sexual activity: Not Currently     Partners: Male       /74 (BP Location: Left arm, Patient Position: Sitting)   Pulse 81   Temp 97.7 °F (36.5 °C)   Resp 16   Ht 177.8 cm (70\")   Wt 128 kg (283 lb)   SpO2 97%   BMI 40.61 kg/m²   Physical Exam  Constitutional:       Appearance: She is well-developed. She is obese.   HENT:      Head: Normocephalic and " atraumatic.   Cardiovascular:      Rate and Rhythm: Normal rate and regular rhythm.   Pulmonary:      Effort: Pulmonary effort is normal.      Breath sounds: Normal breath sounds.   Abdominal:      General: Bowel sounds are normal. There is no distension.      Palpations: Abdomen is soft.      Tenderness: There is no abdominal tenderness.      Comments: Incisions healing well  Wearing abd binder  Without ecchymosis or notable swelling   Skin:     General: Skin is warm and dry.   Neurological:      Mental Status: She is alert.   Psychiatric:         Mood and Affect: Mood normal.         Behavior: Behavior normal.         Thought Content: Thought content normal.         Judgment: Judgment normal.           Assessment:   POD #10 s/p robotic RNY/BPD/recurrentPHHR 4/14/23 GDW (previous LSG/pHHR 2015 GDW and sleeve revision 12/2020 PNQ complicated by postop stricture and uncontrolled reflux)    ICD-10-CM ICD-9-CM   1. Obesity, Class III, BMI 40-49.9 (morbid obesity)  E66.01 278.01   2. Status post bariatric surgery  Z98.84 V45.86       Plan:  Reviewed Hg and UA from today with Dr. Hughes. Bilirubin in urine not unexpected with resorbing hematoma. Hg is stable and pt is asymptomatic.  Will f/u in one week. Advised to call or go to ED with any onset of dizziness, worsening abd pain, etc. We discussed importance of increasing PO intake with both protein and hydration overall.  She feels strongly she can increase her PO intake with effort. If unable to hit goals or symptoms arise or change, please let us know.  Continue to advance diet per manual.  Increase protein intake to 100g/day.  Increase exercise/activity as tolerated.  Reviewed lifting restrictions, nothing >25 lbs x 2 more weeks.  Start full regimen of BPD vitamins.  Continue PPI.  Continue to avoid ASA/NSAIDs/tramadol/tobacco, steroids indefinitely.  Call w/ problems/concerns.    Class 3 Severe Obesity (BMI >=40). Obesity-related health conditions include the  following: as above. Obesity is improving with treatment. BMI is is above average; BMI management plan is completed. We discussed low calorie, low carb based diet program, portion control and increasing exercise.        The patient was instructed to follow up in 1 week, sooner if needed.

## 2023-05-01 ENCOUNTER — CLINICAL SUPPORT (OUTPATIENT)
Dept: INTERNAL MEDICINE | Facility: CLINIC | Age: 38
End: 2023-05-01
Payer: COMMERCIAL

## 2023-05-01 DIAGNOSIS — E53.8 B12 DEFICIENCY: Primary | ICD-10-CM

## 2023-05-01 PROCEDURE — 96372 THER/PROPH/DIAG INJ SC/IM: CPT | Performed by: PHYSICIAN ASSISTANT

## 2023-05-01 RX ORDER — CYANOCOBALAMIN 1000 UG/ML
1000 INJECTION, SOLUTION INTRAMUSCULAR; SUBCUTANEOUS
Status: SHIPPED | OUTPATIENT
Start: 2023-05-01

## 2023-05-01 RX ADMIN — CYANOCOBALAMIN 1000 MCG: 1000 INJECTION, SOLUTION INTRAMUSCULAR; SUBCUTANEOUS at 09:30

## 2023-05-02 ENCOUNTER — OFFICE VISIT (OUTPATIENT)
Dept: BARIATRICS/WEIGHT MGMT | Facility: CLINIC | Age: 38
End: 2023-05-02
Payer: COMMERCIAL

## 2023-05-02 VITALS
HEART RATE: 67 BPM | DIASTOLIC BLOOD PRESSURE: 70 MMHG | WEIGHT: 271.5 LBS | BODY MASS INDEX: 38.87 KG/M2 | SYSTOLIC BLOOD PRESSURE: 120 MMHG | HEIGHT: 70 IN | TEMPERATURE: 98.2 F | OXYGEN SATURATION: 100 %

## 2023-05-02 DIAGNOSIS — R53.83 FATIGUE, UNSPECIFIED TYPE: ICD-10-CM

## 2023-05-02 DIAGNOSIS — E66.9 OBESITY, CLASS II, BMI 35-39.9: Primary | ICD-10-CM

## 2023-05-02 NOTE — PROGRESS NOTES
"[facility] Bariatric Surgery  [address]  [address]  [facility] [id number] [phone]      Patient Name:  Geneva Cordova  :  1985      Date of Visit: 2023      Reason for Visit:  POD #18    HPI:  Geneva Cordova is a 37 y.o. female s/p robotic RNY/BPD/recurrentPHHR 23 GDW (previous LSG/pHHR  GDW and sleeve revision 2020 PNQ complicated by postop stricture and uncontrolled reflux)    LOV 23 with Zenobia Leahy PA-C:     \"Seen in office POD#4 with L flank edema. Labs and CT ordered as opt, offered inpatient admission but declined. Then went to ED that night and admitted. CT with soft tissue hematoma. D/c hospital day #2 23.      Doing well.  Presents today with concern from PCP about bili in urine and low hemoglobin. Says she is actully feeling really good. Swelling of abdomen has gone down. Is wearing an abdominal binder and had noticed suprapubic swelling which has also resolved. Denies presyncope, dizziness, lightheadedness.  On Saturday, had nausea and vomiting after protein shake and has been hesitant to take in too much PO intake for fear of getting sick but didn't have any issues .  A little nauseated today.   Has constant LLQ abd pain, described as cramping, no worse.   No association with PO intake.  Denies dysphagia, reflux, pulmonary issues and fevers. Using IS to 3000.  Tolerating diet progression - on stage 1.  Getting 60-70g prot/day.  Drinking <64 fluid oz/day.  urine has been orangish.  Hasn't started vitamins yet.  On Omeprazole .  Holding ASA , NSAIDs , Tramadol, Hormones, Diuretics , Steroids and Immunologics and tobacco use/ exposure.  Ambulating- active, back to normal.\"    Labs and UA review with Dr. Hughes- bilirubin in urine not unexpected with resorbing hematoma. Plan to follow-up in one week.      Today's update:      Doing better.  Abdominal pain and swelling have significantly improved.  Only notices some mild \"tightness\".  " "She has had 1 episode of vomiting over the last week after introducing hard-boiled eggs.  Otherwise, she has done well with her intake.  She is still not quite at 100 g/day, but is averaging 70 or more a day.  She denies any dizziness, shortness of breath, fever.  She is having regular bowel movements daily.  Urine is now clear to pale yellow.  Admits her postoperative complications have made her little anxious.  Denies dysphagia, reflux, nausea, pulmonary issues and fevers.  Tolerating diet progression - on stage 3.  Getting 70+g prot/day.  Drinking 64 fluid oz/day. Voiding ok-clear to pale yellow. Started all vitamins except Calcium.  On Omeprazole .  Holding ASA , NSAIDs , Tramadol, Hormones, Diuretics , Steroids and Immunologics.  Ambulating.     Presurgery weight: 283 pounds.  Today's weight is 123 kg (271 lb 8 oz) pounds, today's  Body mass index is 38.96 kg/m²., and weight loss since surgery is 12 pounds.       Past Medical History:   Diagnosis Date   • Anemia    • Anxiety    • Bilateral lower extremity edema     R>L   • Boil, axilla     recurrent   • Chronic back pain     no meds or injections, feels related to breasts   • Concussion with no loss of consciousness 2022   • COVID-19 2020   • Depression    • Dyspepsia    • Dyspnea on exertion    • Fatigue    • Gastroesophageal reflux disease concurrent with and due to paraesophageal hernia     recurrent after LSG revision, EGD Dr. Hughes 23   • Glucose intolerance (impaired glucose tolerance)    • Helicobacter pylori (H. pylori) infection     asx and grossly nl EGD. \"abundant\"on path. HBT still + after PrevPak tx. LSG path neg. neg testing 2018   • Hypoalbuminemia    • Left Ovarian serous cystadenoma -removed at  section 2017   • Migraine     resolved with weightloss   • Obesity, morbid    • VIRA (obstructive sleep apnea)     improved since WLS   • PCOS (polycystic ovarian syndrome)    • Right Essure implantation October " 6, 2017 10/06/2017   • S/P  section;left ovarian cystectomy and partial salpingectomy 2017   • Seasonal allergies      Past Surgical History:   Procedure Laterality Date   • ABDOMINOPLASTY  2022    [facility]   • BILATERAL BREAST REDUCTION      2022   •  SECTION N/A 2017      SECTION PRIMARY;  CYSTECTOMY; Kwadwo Baxter MD; :  [facility];  Service:    • DILATATION AND CURETTAGE     • ENDOMETRIAL ABLATION  2020    Dr Cuevas   • ENDOSCOPY N/A 2020    Procedure: ESOPHAGOGASTRODUODENOSCOPY;  Surgeon: Natalia Mariscal MD;  Location:  [facility];  Service: Bariatric;  Laterality: N/A;   • ENDOSCOPY  2022    Dr. La   • ENDOSCOPY N/A 2023    Procedure: ESOPHAGOGASTRODUODENOSCOPY;  Surgeon: Erik Hughes MD;  Location:  [facility];  Service: Robotics - DaVinci;  Laterality: N/A;   • ESSURE TUBAL LIGATION     • GASTRIC BYPASS N/A 2023    Procedure: GASTRIC BYPASS LAPAROSCOPIC WITH DAVINCI ROBOT AND BILIOPANCREATIC DIVERSION WITH DUODENAL SWITCH;  Surgeon: Erik Hughes MD;  Location:  [facility];  Service: Robotics - DaVinci;  Laterality: N/A;   • GASTRIC SLEEVE LAPAROSCOPIC  2015    With Dr. Hughes   • GASTRIC SLEEVE LAPAROSCOPIC N/A 2020    Procedure: GASTRIC SLEEVE LAPAROSCOPIC;  Surgeon: Natalia Mariscal MD;  Location:  [facility];  Service: Bariatric;  Laterality: N/A;   • LAPAROSCOPIC CHOLECYSTECTOMY  2018    Dr. Hughes   • PARAESOPHAGEAL HERNIA REPAIR N/A 2023    Procedure: LAPAROSCOPIC RECURRENT PARAESOPHAGEAL HERNIA REPAIR WITH DAVINCI ROBOT AND FALCIFORM LIGAMENT SLING ;  Surgeon: Erik Hughes MD;  Location:  [facility];  Service: Robotics - DaVinci;  Laterality: N/A;   • SALPINGECTOMY Bilateral 2020    Dr Cuevas   • TONSILLECTOMY     • WISDOM TOOTH EXTRACTION       Outpatient Medications Marked as Taking for the 23 encounter (Office Visit) with  "Shaneka Ivey PA   Medication Sig Dispense Refill   • Calcium Carb-Cholecalciferol (Calcium 600/Vitamin D) 600-10 MG-MCG chewable tablet Chew 1 tablet Daily. 90 tablet 1   • ferrous gluconate (FERGON) 324 MG tablet Take 1 tablet by mouth 2 (Two) Times a Day. 180 tablet 1   • Multiple Vitamins-Minerals (MULTIPLE VITAMINS/WOMENS) tablet Take 1 tablet by mouth. PT NOT CURRENTLY TAKING     • omeprazole (priLOSEC) 40 MG capsule Take 1 capsule by mouth Daily for 60 days. 60 capsule 0   • ondansetron (Zofran) 4 MG tablet Take 1 tablet by mouth Every 4 (Four) Hours As Needed for Nausea. 10 tablet 0   • thiamine (VITAMIN B-1) 100 MG tablet Take 1 tablet by mouth Daily. 90 tablet 1   • vitamin D3 125 MCG (5000 UT) capsule capsule Take 1 capsule by mouth Daily. 90 capsule 1     Current Facility-Administered Medications for the 23 encounter (Office Visit) with Shaneka Ivey PA   Medication Dose Route Frequency Provider Last Rate Last Admin   • cyanocobalamin injection 1,000 mcg  1,000 mcg Intramuscular Q30 Days Alice Fuentes PA-C         Allergies   Allergen Reactions   • Codeine Nausea And Vomiting, Other (See Comments) and GI Intolerance     UNKNOWN       Social History     Socioeconomic History   • Marital status: Single   • Number of children: 4   • Years of education: College    Tobacco Use   • Smoking status: Never   • Smokeless tobacco: Never   Vaping Use   • Vaping Use: Never used   Substance and Sexual Activity   • Alcohol use: Not Currently   • Drug use: No   • Sexual activity: Not Currently     Partners: Male     Social History     Social History Narrative    Pt lives in [facility] [facility] [place], she is single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 She currently works full time at [employer] as a .     The father of PT's oldest child passed away 2 weeks ago,  was yesterday 2022       /70 (BP Location: Left arm, Patient Position: Sitting)   Pulse 67   Temp 98.2 °F (36.8 °C)   Ht 177.8 cm (70\")   Wt 123 " kg (271 lb 8 oz)   SpO2 100%   BMI 38.96 kg/m²   Physical Exam  Constitutional:       Appearance: She is obese.   HENT:      Head: Normocephalic and atraumatic.   Cardiovascular:      Rate and Rhythm: Normal rate and regular rhythm.   Pulmonary:      Effort: Pulmonary effort is normal.      Breath sounds: Normal breath sounds.   Abdominal:      General: Bowel sounds are normal. There is no distension.      Palpations: Abdomen is soft. There is no mass.      Tenderness: There is no abdominal tenderness.      Comments: Wearing an abdominal binder   Skin:     General: Skin is warm and dry.   Neurological:      General: No focal deficit present.      Mental Status: She is alert and oriented to person, place, and time.   Psychiatric:         Mood and Affect: Mood normal.         Behavior: Behavior normal.           Assessment:   POD #18    Class 2 Severe Obesity (BMI >=35 and <=39.9). Obesity-related health conditions include the following: As above. Obesity is improving with treatment. BMI is is above average; BMI management plan is completed. We discussed low calorie, low carb based diet program, portion control and increasing exercise.        Plan:  Doing well.  Will recheck CBC and urinalysis.  Continue to advance diet per manual.  Increase protein 100g/day.  Increase exercise/activity as tolerated.  Reviewed lifting restrictions, nothing >25 lbs x 2 more weeks.  Start vitamins as discussed.  Continue PPI.  Continue to avoid ASA/NSAIDs/tobacco/steroids.  Call w/ problems/concerns.    The patient was instructed to follow up in ~2 weeks for 1 month postop, sooner if needed.

## 2023-05-03 LAB
ALBUMIN SERPL-MCNC: 3.9 G/DL (ref 3.5–5.2)
ALBUMIN/GLOB SERPL: 1.4 G/DL
ALP SERPL-CCNC: 65 U/L (ref 39–117)
ALT SERPL-CCNC: 15 U/L (ref 1–33)
APPEARANCE UR: ABNORMAL
AST SERPL-CCNC: 17 U/L (ref 1–32)
BACTERIA #/AREA URNS HPF: NORMAL /HPF
BASOPHILS # BLD AUTO: 0.02 10*3/MM3 (ref 0–0.2)
BASOPHILS NFR BLD AUTO: 0.4 % (ref 0–1.5)
BILIRUB SERPL-MCNC: 0.5 MG/DL (ref 0–1.2)
BILIRUB UR QL STRIP: NEGATIVE
BUN SERPL-MCNC: 5 MG/DL (ref 6–20)
BUN/CREAT SERPL: 6.8 (ref 7–25)
CALCIUM SERPL-MCNC: 9.8 MG/DL (ref 8.6–10.5)
CASTS URNS QL MICRO: NORMAL /LPF
CHLORIDE SERPL-SCNC: 108 MMOL/L (ref 98–107)
CO2 SERPL-SCNC: 25.9 MMOL/L (ref 22–29)
COLOR UR: YELLOW
CREAT SERPL-MCNC: 0.74 MG/DL (ref 0.57–1)
EGFRCR SERPLBLD CKD-EPI 2021: 107.7 ML/MIN/1.73
EOSINOPHIL # BLD AUTO: 0.19 10*3/MM3 (ref 0–0.4)
EOSINOPHIL NFR BLD AUTO: 3.9 % (ref 0.3–6.2)
EPI CELLS #/AREA URNS HPF: NORMAL /HPF (ref 0–10)
ERYTHROCYTE [DISTWIDTH] IN BLOOD BY AUTOMATED COUNT: 13.6 % (ref 12.3–15.4)
GLOBULIN SER CALC-MCNC: 2.8 GM/DL
GLUCOSE SERPL-MCNC: 85 MG/DL (ref 65–99)
GLUCOSE UR QL STRIP: NEGATIVE
HCT VFR BLD AUTO: 29.9 % (ref 34–46.6)
HGB BLD-MCNC: 9.3 G/DL (ref 12–15.9)
HGB UR QL STRIP: NEGATIVE
IMM GRANULOCYTES # BLD AUTO: 0.01 10*3/MM3 (ref 0–0.05)
IMM GRANULOCYTES NFR BLD AUTO: 0.2 % (ref 0–0.5)
KETONES UR QL STRIP: ABNORMAL
LEUKOCYTE ESTERASE UR QL STRIP: NEGATIVE
LYMPHOCYTES # BLD AUTO: 1.55 10*3/MM3 (ref 0.7–3.1)
LYMPHOCYTES NFR BLD AUTO: 31.8 % (ref 19.6–45.3)
MCH RBC QN AUTO: 29.5 PG (ref 26.6–33)
MCHC RBC AUTO-ENTMCNC: 31.1 G/DL (ref 31.5–35.7)
MCV RBC AUTO: 94.9 FL (ref 79–97)
MICRO URNS: ABNORMAL
MICRO URNS: ABNORMAL
MONOCYTES # BLD AUTO: 0.24 10*3/MM3 (ref 0.1–0.9)
MONOCYTES NFR BLD AUTO: 4.9 % (ref 5–12)
MUCOUS THREADS URNS QL MICRO: PRESENT /HPF
NEUTROPHILS # BLD AUTO: 2.86 10*3/MM3 (ref 1.7–7)
NEUTROPHILS NFR BLD AUTO: 58.8 % (ref 42.7–76)
NITRITE UR QL STRIP: NEGATIVE
NRBC BLD AUTO-RTO: 0.2 /100 WBC (ref 0–0.2)
PH UR STRIP: 5.5 [PH] (ref 5–7.5)
PLATELET # BLD AUTO: 480 10*3/MM3 (ref 140–450)
POTASSIUM SERPL-SCNC: 4.1 MMOL/L (ref 3.5–5.2)
PROT SERPL-MCNC: 6.7 G/DL (ref 6–8.5)
PROT UR QL STRIP: ABNORMAL
RBC # BLD AUTO: 3.15 10*6/MM3 (ref 3.77–5.28)
RBC #/AREA URNS HPF: NORMAL /HPF (ref 0–2)
SODIUM SERPL-SCNC: 142 MMOL/L (ref 136–145)
SP GR UR STRIP: 1.03 (ref 1–1.03)
URINALYSIS REFLEX: ABNORMAL
UROBILINOGEN UR STRIP-MCNC: 1 MG/DL (ref 0.2–1)
WBC # BLD AUTO: 4.87 10*3/MM3 (ref 3.4–10.8)
WBC #/AREA URNS HPF: NORMAL /HPF (ref 0–5)

## 2023-05-10 ENCOUNTER — TELEPHONE (OUTPATIENT)
Dept: BARIATRICS/WEIGHT MGMT | Facility: CLINIC | Age: 38
End: 2023-05-10
Payer: COMMERCIAL

## 2023-05-10 PROCEDURE — 99283 EMERGENCY DEPT VISIT LOW MDM: CPT

## 2023-05-10 RX ORDER — SODIUM CHLORIDE 9 MG/ML
10 INJECTION INTRAVENOUS AS NEEDED
Status: DISCONTINUED | OUTPATIENT
Start: 2023-05-10 | End: 2023-05-11 | Stop reason: HOSPADM

## 2023-05-10 NOTE — TELEPHONE ENCOUNTER
Pt called, would like a return to work letter made. Had sx on 04/14/23, reports that her abdomen feels tight and cramp like. Otherwise feels fine to RTW. Please advise, thanks!

## 2023-05-11 ENCOUNTER — HOSPITAL ENCOUNTER (EMERGENCY)
Facility: HOSPITAL | Age: 38
Discharge: HOME | End: 2023-05-11
Attending: STUDENT IN AN ORGANIZED HEALTH CARE EDUCATION/TRAINING PROGRAM | Admitting: STUDENT IN AN ORGANIZED HEALTH CARE EDUCATION/TRAINING PROGRAM
Payer: COMMERCIAL

## 2023-05-11 ENCOUNTER — APPOINTMENT (OUTPATIENT)
Dept: CT IMAGING | Facility: HOSPITAL | Age: 38
End: 2023-05-11
Payer: COMMERCIAL

## 2023-05-11 VITALS
TEMPERATURE: 98.4 F | SYSTOLIC BLOOD PRESSURE: 127 MMHG | WEIGHT: 260 LBS | RESPIRATION RATE: 16 BRPM | DIASTOLIC BLOOD PRESSURE: 82 MMHG | HEIGHT: 70 IN | HEART RATE: 61 BPM | BODY MASS INDEX: 37.22 KG/M2 | OXYGEN SATURATION: 100 %

## 2023-05-11 DIAGNOSIS — G89.18 POSTOPERATIVE PAIN: ICD-10-CM

## 2023-05-11 DIAGNOSIS — Z98.84 S/P GASTRIC BYPASS: ICD-10-CM

## 2023-05-11 DIAGNOSIS — E86.0 DEHYDRATION: ICD-10-CM

## 2023-05-11 DIAGNOSIS — R10.84 GENERALIZED ABDOMINAL PAIN: Primary | ICD-10-CM

## 2023-05-11 LAB
ALBUMIN SERPL-MCNC: 3.7 G/DL (ref 3.5–5.2)
ALBUMIN/GLOB SERPL: 1.3 G/DL
ALP SERPL-CCNC: 70 U/L (ref 39–117)
ALT SERPL W P-5'-P-CCNC: 15 U/L (ref 1–33)
ANION GAP SERPL CALCULATED.3IONS-SCNC: 8 MMOL/L (ref 5–15)
AST SERPL-CCNC: 28 U/L (ref 1–32)
BASOPHILS # BLD AUTO: 0.03 10*3/MM3 (ref 0–0.2)
BASOPHILS NFR BLD AUTO: 0.5 % (ref 0–1.5)
BILIRUB SERPL-MCNC: 0.5 MG/DL (ref 0–1.2)
BUN SERPL-MCNC: 9 MG/DL (ref 6–20)
BUN/CREAT SERPL: 10.8 (ref 7–25)
CALCIUM SPEC-SCNC: 8.6 MG/DL (ref 8.6–10.5)
CHLORIDE SERPL-SCNC: 110 MMOL/L (ref 98–107)
CO2 SERPL-SCNC: 25 MMOL/L (ref 22–29)
CREAT SERPL-MCNC: 0.83 MG/DL (ref 0.57–1)
DEPRECATED RDW RBC AUTO: 49.2 FL (ref 37–54)
EGFRCR SERPLBLD CKD-EPI 2021: 93.8 ML/MIN/1.73
EOSINOPHIL # BLD AUTO: 0.46 10*3/MM3 (ref 0–0.4)
EOSINOPHIL NFR BLD AUTO: 7.6 % (ref 0.3–6.2)
ERYTHROCYTE [DISTWIDTH] IN BLOOD BY AUTOMATED COUNT: 13.8 % (ref 12.3–15.4)
GLOBULIN UR ELPH-MCNC: 2.8 GM/DL
GLUCOSE SERPL-MCNC: 88 MG/DL (ref 65–99)
HCT VFR BLD AUTO: 35.6 % (ref 34–46.6)
HGB BLD-MCNC: 10.8 G/DL (ref 12–15.9)
HOLD SPECIMEN: NORMAL
IMM GRANULOCYTES # BLD AUTO: 0.01 10*3/MM3 (ref 0–0.05)
IMM GRANULOCYTES NFR BLD AUTO: 0.2 % (ref 0–0.5)
LIPASE SERPL-CCNC: 17 U/L (ref 13–60)
LYMPHOCYTES # BLD AUTO: 2.03 10*3/MM3 (ref 0.7–3.1)
LYMPHOCYTES NFR BLD AUTO: 33.4 % (ref 19.6–45.3)
MCH RBC QN AUTO: 29.1 PG (ref 26.6–33)
MCHC RBC AUTO-ENTMCNC: 30.3 G/DL (ref 31.5–35.7)
MCV RBC AUTO: 96 FL (ref 79–97)
MONOCYTES # BLD AUTO: 0.36 10*3/MM3 (ref 0.1–0.9)
MONOCYTES NFR BLD AUTO: 5.9 % (ref 5–12)
NEUTROPHILS NFR BLD AUTO: 3.18 10*3/MM3 (ref 1.7–7)
NEUTROPHILS NFR BLD AUTO: 52.4 % (ref 42.7–76)
NRBC BLD AUTO-RTO: 0 /100 WBC (ref 0–0.2)
PLATELET # BLD AUTO: 298 10*3/MM3 (ref 140–450)
PMV BLD AUTO: 11.4 FL (ref 6–12)
POTASSIUM SERPL-SCNC: 3.9 MMOL/L (ref 3.5–5.2)
PROT SERPL-MCNC: 6.5 G/DL (ref 6–8.5)
RBC # BLD AUTO: 3.71 10*6/MM3 (ref 3.77–5.28)
SODIUM SERPL-SCNC: 143 MMOL/L (ref 136–145)
WBC NRBC COR # BLD: 6.07 10*3/MM3 (ref 3.4–10.8)
WHOLE BLOOD HOLD COAG: NORMAL
WHOLE BLOOD HOLD SPECIMEN: NORMAL

## 2023-05-11 PROCEDURE — 25510000001 IOPAMIDOL 61 % SOLUTION: Performed by: STUDENT IN AN ORGANIZED HEALTH CARE EDUCATION/TRAINING PROGRAM

## 2023-05-11 PROCEDURE — 25010000002 DICYCLOMINE PER 20 MG: Performed by: STUDENT IN AN ORGANIZED HEALTH CARE EDUCATION/TRAINING PROGRAM

## 2023-05-11 PROCEDURE — 83690 ASSAY OF LIPASE: CPT

## 2023-05-11 PROCEDURE — 96361 HYDRATE IV INFUSION ADD-ON: CPT

## 2023-05-11 PROCEDURE — 96374 THER/PROPH/DIAG INJ IV PUSH: CPT

## 2023-05-11 PROCEDURE — 25010000002 KETOROLAC TROMETHAMINE PER 15 MG: Performed by: STUDENT IN AN ORGANIZED HEALTH CARE EDUCATION/TRAINING PROGRAM

## 2023-05-11 PROCEDURE — 25010000002 ONDANSETRON PER 1 MG: Performed by: STUDENT IN AN ORGANIZED HEALTH CARE EDUCATION/TRAINING PROGRAM

## 2023-05-11 PROCEDURE — 80053 COMPREHEN METABOLIC PANEL: CPT

## 2023-05-11 PROCEDURE — 85025 COMPLETE CBC W/AUTO DIFF WBC: CPT

## 2023-05-11 PROCEDURE — 96372 THER/PROPH/DIAG INJ SC/IM: CPT

## 2023-05-11 PROCEDURE — 74177 CT ABD & PELVIS W/CONTRAST: CPT

## 2023-05-11 RX ORDER — ONDANSETRON 2 MG/ML
4 INJECTION INTRAMUSCULAR; INTRAVENOUS ONCE
Status: COMPLETED | OUTPATIENT
Start: 2023-05-11 | End: 2023-05-11

## 2023-05-11 RX ORDER — KETOROLAC TROMETHAMINE 15 MG/ML
15 INJECTION, SOLUTION INTRAMUSCULAR; INTRAVENOUS ONCE
Status: DISCONTINUED | OUTPATIENT
Start: 2023-05-11 | End: 2023-05-11 | Stop reason: HOSPADM

## 2023-05-11 RX ORDER — PROMETHAZINE HYDROCHLORIDE 25 MG/1
25 SUPPOSITORY RECTAL EVERY 6 HOURS PRN
Qty: 6 SUPPOSITORY | Refills: 0 | Status: SHIPPED | OUTPATIENT
Start: 2023-05-11

## 2023-05-11 RX ORDER — DICYCLOMINE HCL 20 MG
20 TABLET ORAL EVERY 8 HOURS PRN
Qty: 21 TABLET | Refills: 0 | Status: SHIPPED | OUTPATIENT
Start: 2023-05-11

## 2023-05-11 RX ORDER — ONDANSETRON 4 MG/1
4 TABLET, ORALLY DISINTEGRATING ORAL 4 TIMES DAILY PRN
Qty: 12 TABLET | Refills: 0 | Status: SHIPPED | OUTPATIENT
Start: 2023-05-11

## 2023-05-11 RX ORDER — DICYCLOMINE HYDROCHLORIDE 10 MG/ML
20 INJECTION INTRAMUSCULAR ONCE
Status: COMPLETED | OUTPATIENT
Start: 2023-05-11 | End: 2023-05-11

## 2023-05-11 RX ADMIN — IOPAMIDOL 94 ML: 612 INJECTION, SOLUTION INTRAVENOUS at 00:50

## 2023-05-11 RX ADMIN — SODIUM CHLORIDE, POTASSIUM CHLORIDE, SODIUM LACTATE AND CALCIUM CHLORIDE 1500 ML: 600; 310; 30; 20 INJECTION, SOLUTION INTRAVENOUS at 02:53

## 2023-05-11 RX ADMIN — DICYCLOMINE HYDROCHLORIDE 20 MG: 10 INJECTION, SOLUTION INTRAMUSCULAR at 02:54

## 2023-05-11 RX ADMIN — ONDANSETRON 4 MG: 2 INJECTION INTRAMUSCULAR; INTRAVENOUS at 02:54

## 2023-05-11 NOTE — Clinical Note
[facility] EMERGENCY DEPARTMENT  [address] [address]  Phone: [phone]    Geneva Cordova was seen and treated in our emergency department on 5/10/2023.  She may return to work on 05/12/2023.         Thank you for choosing [facility].    Kameron Connell MD

## 2023-05-11 NOTE — Clinical Note
[facility] EMERGENCY DEPARTMENT  [address] [address]  Phone: [phone]    Geneva Cordova was seen and treated in our emergency department on 5/10/2023.  She may return to work on 05/12/2023.         Thank you for choosing [facility].    Kameron Connell MD

## 2023-05-11 NOTE — Clinical Note
[facility] EMERGENCY DEPARTMENT  [address] [address]  Phone: [phone]    Geneva Cordova was seen and treated in our emergency department on 5/10/2023.  She may return to work on 05/12/2023.         Thank you for choosing [facility].    Kameron Connell MD

## 2023-05-11 NOTE — DISCHARGE INSTRUCTIONS
Use the provided pain and nausea medicine to help with symptoms and to help maintain good oral hydration.  Contact bariatrics today let them know you are seen in the emergency department.  If you develop worsening pain, fevers, or any other concerning symptoms please return to the ED or seek other medical care.

## 2023-05-11 NOTE — Clinical Note
[facility] EMERGENCY DEPARTMENT  [address] [address]  Phone: [phone]    Geneva Cordova was seen and treated in our emergency department on 5/10/2023.  She may return to work on 05/12/2023.         Thank you for choosing [facility].    Kameron Connell MD

## 2023-05-11 NOTE — Clinical Note
[facility] EMERGENCY DEPARTMENT  [address] [address]  Phone: [phone]    Geneva Cordova was seen and treated in our emergency department on 5/10/2023.  She may return to work on 05/12/2023.         Thank you for choosing [facility].    Kameron Connell MD

## 2023-05-11 NOTE — ED PROVIDER NOTES
" EMERGENCY DEPARTMENT ENCOUNTER    Pt Name: Geneva Cordova  MRN: [id number]  Pt :   1985  Room Number:    Date of encounter:  5/10/2023  PCP: Alice Fuentes PA-C  ED Provider: Kameron Connell MD    Historian: Patient      HPI:  Chief Complaint: Abdominal pain, recent gastric bypass        Context: Geneva Cordova is a 37-year-old female who presents because of exacerbation of the abdominal pain she has been having since surgery nearly a month ago.  On the  she underwent gastric bypass, hiatal hernia repair, with Dr. Hughes.  She says since that time she has been having persistent abdominal pain.  She was readmitted for a hematoma and anemia.  She says yesterday morning she started developing exacerbation of the pain she has been having since the surgery.  She describes a severe cramping periumbilical pain with associated nausea.  She says she is not able to eat anything because it just sounds terrible.  Denies fevers or systemic symptoms.  No other complaints at this time.    PAST MEDICAL HISTORY  Past Medical History:   Diagnosis Date   • Anemia    • Anxiety    • Bilateral lower extremity edema     R>L   • Boil, axilla     recurrent   • Chronic back pain     no meds or injections, feels related to breasts   • Concussion with no loss of consciousness 2022   • COVID-19 2020   • Depression    • Dyspepsia    • Dyspnea on exertion    • Fatigue    • Gastroesophageal reflux disease concurrent with and due to paraesophageal hernia     recurrent after LSG revision, EGD Dr. Hughes 23   • Glucose intolerance (impaired glucose tolerance)    • Helicobacter pylori (H. pylori) infection     asx and grossly nl EGD. \"abundant\"on path. HBT still + after PrevPak tx. LSG path neg. neg testing 2018   • Hypoalbuminemia    • Left Ovarian serous cystadenoma -removed at  section 2017   • Migraine     resolved with weightloss   • Obesity, morbid    • VIRA (obstructive sleep apnea) "     improved since WLS   • PCOS (polycystic ovarian syndrome)    • Right Essure implantation 2017 10/06/2017   • S/P  section;left ovarian cystectomy and partial salpingectomy 2017   • Seasonal allergies          PAST SURGICAL HISTORY  Past Surgical History:   Procedure Laterality Date   • ABDOMINOPLASTY  2022    [facility]   • BILATERAL BREAST REDUCTION      2022   •  SECTION N/A 2017      SECTION PRIMARY;  CYSTECTOMY; Kwadwo Baxter MD; :  [facility];  Service:    • DILATATION AND CURETTAGE     • ENDOMETRIAL ABLATION  2020    Dr Cuevas   • ENDOSCOPY N/A 2020    Procedure: ESOPHAGOGASTRODUODENOSCOPY;  Surgeon: Natalia Mariscal MD;  Location:  [facility];  Service: Bariatric;  Laterality: N/A;   • ENDOSCOPY  2022    Dr. La   • ENDOSCOPY N/A 2023    Procedure: ESOPHAGOGASTRODUODENOSCOPY;  Surgeon: Erik Hughes MD;  Location:  [facility];  Service: Robotics - DaVinci;  Laterality: N/A;   • ESSURE TUBAL LIGATION     • GASTRIC BYPASS N/A 2023    Procedure: GASTRIC BYPASS LAPAROSCOPIC WITH DAVINCI ROBOT AND BILIOPANCREATIC DIVERSION WITH DUODENAL SWITCH;  Surgeon: Erik Hughes MD;  Location:  [facility];  Service: Robotics - DaVinci;  Laterality: N/A;   • GASTRIC SLEEVE LAPAROSCOPIC  2015    With Dr. Hughes   • GASTRIC SLEEVE LAPAROSCOPIC N/A 2020    Procedure: GASTRIC SLEEVE LAPAROSCOPIC;  Surgeon: Natalia Mariscal MD;  Location:  [facility];  Service: Bariatric;  Laterality: N/A;   • LAPAROSCOPIC CHOLECYSTECTOMY  2018    Dr. Hughes   • PARAESOPHAGEAL HERNIA REPAIR N/A 2023    Procedure: LAPAROSCOPIC RECURRENT PARAESOPHAGEAL HERNIA REPAIR WITH DAVINCI ROBOT AND FALCIFORM LIGAMENT SLING ;  Surgeon: Erik Hughes MD;  Location:  [facility];  Service: Robotics - DaVinci;  Laterality: N/A;   • SALPINGECTOMY Bilateral 2020    Dr Cuevas   • TONSILLECTOMY      • WISDOM TOOTH EXTRACTION  2014         FAMILY HISTORY  Family History   Problem Relation Age of Onset   • No Known Problems Other    • Sleep apnea Mother    • Diabetes Mother    • No Known Problems Father    • Pancreatic cancer Maternal Grandmother    • No Known Problems Sister    • No Known Problems Brother    • No Known Problems Daughter    • No Known Problems Son    • No Known Problems Paternal Grandmother    • No Known Problems Maternal Aunt    • No Known Problems Paternal Aunt    • BRCA 1/2 Neg Hx    • Colon cancer Neg Hx    • Endometrial cancer Neg Hx    • Ovarian cancer Neg Hx          SOCIAL HISTORY  Social History     Socioeconomic History   • Marital status: Single   • Number of children: 4   • Years of education: College    Tobacco Use   • Smoking status: Never   • Smokeless tobacco: Never   Vaping Use   • Vaping Use: Never used   Substance and Sexual Activity   • Alcohol use: Not Currently   • Drug use: No   • Sexual activity: Not Currently     Partners: Male         ALLERGIES  Codeine        REVIEW OF SYSTEMS  Review of Systems       All systems reviewed and negative except for those discussed in HPI.       PHYSICAL EXAM    I have reviewed the triage vital signs and nursing notes.    ED Triage Vitals   Temp Heart Rate Resp BP SpO2   05/10/23 2307 05/10/23 2307 05/10/23 2307 05/10/23 2307 05/10/23 2307   98.4 °F (36.9 °C) 74 16 131/86 99 %      Temp src Heart Rate Source Patient Position BP Location FiO2 (%)   -- 05/11/23 0235 -- -- --    Monitor          Physical Exam  GENERAL:   Appears in no acute distress.   HENT: Nares patent.  EYES: No scleral icterus.  CV: Regular rhythm, regular rate.  RESPIRATORY: Normal effort.  No audible wheezes, rales or rhonchi.  ABDOMEN: Soft, endorses tenderness to palpation just above the umbilicus in the midline without guarding or rigidity  MUSCULOSKELETAL: No deformities.   NEURO: Alert, moves all extremities, follows commands.  SKIN: Warm, dry, no rash  visualized.      LAB RESULTS  Recent Results (from the past 24 hour(s))   Comprehensive Metabolic Panel    Collection Time: 05/11/23 12:34 AM    Specimen: Blood   Result Value Ref Range    Glucose 88 65 - 99 mg/dL    BUN 9 6 - 20 mg/dL    Creatinine 0.83 0.57 - 1.00 mg/dL    Sodium 143 136 - 145 mmol/L    Potassium 3.9 3.5 - 5.2 mmol/L    Chloride 110 (H) 98 - 107 mmol/L    CO2 25.0 22.0 - 29.0 mmol/L    Calcium 8.6 8.6 - 10.5 mg/dL    Total Protein 6.5 6.0 - 8.5 g/dL    Albumin 3.7 3.5 - 5.2 g/dL    ALT (SGPT) 15 1 - 33 U/L    AST (SGOT) 28 1 - 32 U/L    Alkaline Phosphatase 70 39 - 117 U/L    Total Bilirubin 0.5 0.0 - 1.2 mg/dL    Globulin 2.8 gm/dL    A/G Ratio 1.3 g/dL    BUN/Creatinine Ratio 10.8 7.0 - 25.0    Anion Gap 8.0 5.0 - 15.0 mmol/L    eGFR 93.8 >60.0 mL/min/1.73   Lipase    Collection Time: 05/11/23 12:34 AM    Specimen: Blood   Result Value Ref Range    Lipase 17 13 - 60 U/L   Green Top (Gel)    Collection Time: 05/11/23 12:34 AM   Result Value Ref Range    Extra Tube Hold for add-ons.    Lavender Top    Collection Time: 05/11/23 12:34 AM   Result Value Ref Range    Extra Tube hold for add-on    Gold Top - SST    Collection Time: 05/11/23 12:34 AM   Result Value Ref Range    Extra Tube Hold for add-ons.    Gray Top    Collection Time: 05/11/23 12:34 AM   Result Value Ref Range    Extra Tube Hold for add-ons.    Light Blue Top    Collection Time: 05/11/23 12:34 AM   Result Value Ref Range    Extra Tube Hold for add-ons.    CBC Auto Differential    Collection Time: 05/11/23 12:34 AM    Specimen: Blood   Result Value Ref Range    WBC 6.07 3.40 - 10.80 10*3/mm3    RBC 3.71 (L) 3.77 - 5.28 10*6/mm3    Hemoglobin 10.8 (L) 12.0 - 15.9 g/dL    Hematocrit 35.6 34.0 - 46.6 %    MCV 96.0 79.0 - 97.0 fL    MCH 29.1 26.6 - 33.0 pg    MCHC 30.3 (L) 31.5 - 35.7 g/dL    RDW 13.8 12.3 - 15.4 %    RDW-SD 49.2 37.0 - 54.0 fl    MPV 11.4 6.0 - 12.0 fL    Platelets 298 140 - 450 10*3/mm3    Neutrophil % 52.4 42.7 - 76.0  %    Lymphocyte % 33.4 19.6 - 45.3 %    Monocyte % 5.9 5.0 - 12.0 %    Eosinophil % 7.6 (H) 0.3 - 6.2 %    Basophil % 0.5 0.0 - 1.5 %    Immature Grans % 0.2 0.0 - 0.5 %    Neutrophils, Absolute 3.18 1.70 - 7.00 10*3/mm3    Lymphocytes, Absolute 2.03 0.70 - 3.10 10*3/mm3    Monocytes, Absolute 0.36 0.10 - 0.90 10*3/mm3    Eosinophils, Absolute 0.46 (H) 0.00 - 0.40 10*3/mm3    Basophils, Absolute 0.03 0.00 - 0.20 10*3/mm3    Immature Grans, Absolute 0.01 0.00 - 0.05 10*3/mm3    nRBC 0.0 0.0 - 0.2 /100 WBC       If labs were ordered, I independently reviewed the results and considered them in treating the patient.        RADIOLOGY  CT Abdomen Pelvis With Contrast    Result Date: 5/11/2023  EXAMINATION: CT SCAN OF THE ABDOMEN AND PELVIS WITH INTRAVENOUS CONTRAST DATE OF EXAM: 5/11/2023 12:45 AM HISTORY: Severe diffuse abdominal pain with nausea and vomiting.  Recent gastric bypass. COMPARISON: April 18, 2023. TECHNIQUE: CT examination of the abdomen and pelvis was performed following the intravenous administration of 94 mL Isovue 300. CT dose lowering techniques were used, to include: automated exposure control, adjustment for patient size, and/or use of iterative reconstruction. FINDINGS: ABDOMEN/PELVIS: Lower Chest: Normal. Liver: Normal. Gallbladder/Biliary: Cholecystectomy. Pancreas: Normal. Spleen: Normal. Adrenal Glands: Normal. Kidneys, Ureters, Urinary Bladder: Normal. GI Tract: Gastric bypass.  No obstruction, free air, or mucosal thickening.. Mesentery/Peritoneum: Mild pelvic ascites. Reproductive: Normal. Vasculature: Normal. Lymph Nodes: Normal. Abdominal Wall: This is new since the prior exam.  Marked left-sided anasarca has almost entirely resolved.  Oblique muscles are also normal.. Musculoskeletal: Normal.     1.  Mild pelvic ascites, nonspecific. 2.  Cholecystectomy and gastric bypass. Electronically signed by:  Cody Hitchcock M.D.  5/11/2023 12:28 AM Mountain Time      I ordered and independently  reviewed the above noted radiographic studies.      I viewed images of CT scan of abdomen pelvis which shows her gastric bypass but no significant fluid collection, hematoma, free air, or any other abnormalities that I can appreciate.  Formal over read comments but left-sided anasarca has nearly resolved when compared with prior imaging.  See radiologist's dictation for official interpretation.        PROCEDURES    Procedures    No orders to display       MEDICATIONS GIVEN IN ER    Medications   iopamidol (ISOVUE-300) 61 % injection 100 mL (94 mL Intravenous Given 5/11/23 0050)   lactated ringers bolus 1,500 mL (0 mL Intravenous Stopped 5/11/23 0428)   ondansetron (ZOFRAN) injection 4 mg (4 mg Intravenous Given 5/11/23 0254)   dicyclomine (BENTYL) injection 20 mg (20 mg Intramuscular Given 5/11/23 0254)         MEDICAL DECISION MAKING, PROGRESS, and CONSULTS    All labs have been independently reviewed by me.  All radiology studies have been reviewed by me and the radiologist dictating the report.  All EKG's have been independently viewed and interpreted by me.      Discussion below represents my analysis of pertinent findings related to patient's condition, differential diagnosis, treatment plan and final disposition.      Differential diagnosis:    Bowel perforation, postoperative hematoma, postoperative abscess, volvulus, ileus, colitis, ovarian pathology, UTI, sepsis, anemia, electrolyte abnormality, dehydration      Additional sources:    - Discussed/ obtained information from independent historians:      - External (non-ED) record review: Admission from the 19th for postoperative abdominal wall hematoma, which appears to have nearly resolved on imaging today.    - Chronic or social conditions impacting care:    - Shared decision making: Patient is requesting discharge after treatment with fluids and medications with reassuring work-up this is reasonable.  Counseled on return precautions.  Will contact  bariatric surgery today.      Orders placed during this visit:  Orders Placed This Encounter   Procedures   • CT Abdomen Pelvis With Contrast   • [facility] Draw   • Comprehensive Metabolic Panel   • Lipase   • CBC Auto Differential   • CBC & Differential   • Green Top (Gel)   • Lavender Top   • Gold Top - SST   • Gray Top   • Light Blue Top         Additional orders considered but not ordered:      ED Course:    Consultants:      ED Course as of 05/11/23 1706   Thu May 11, 2023   0240 In summary this is a very nice 37-year-old female who presents because of exacerbation of the abdominal pain she has been having since surgery nearly a month ago.  On the 14th she underwent gastric bypass, hiatal hernia repair, with Dr. Hughes.  She says since that time she has been having persistent abdominal pain.  She was readmitted for a hematoma and anemia.  She says yesterday morning she started developing exacerbation of the pain she has been having since the surgery.  She describes a severe cramping periumbilical pain with associated nausea.  She says she is not able to eat anything because it just sounds terrible.  Denies fevers or systemic symptoms.  No other complaints at this time. [CC]   0530 She arrived awake and alert but uncomfortable she has tenderness in the supraumbilical area without rigidity or guarding.  Treated with IV fluids, Zofran, Bentyl. [CC]   0531 CT scan of the abdomen pelvis shows significant improvement in the fluid and inflammation she had in the left abdomen on her previous scan.  Mild amount of pelvic fluid potentially physiologic.  Her pain is significantly higher than that I do not think this is a concern at this time.  CBC shows anemia hemoglobin 10.8 however this is improved nearly a point from prior value.  CMP is very mild hyperchloremia she is already received IV fluids.  No elevation in lipase.  She has yet to provide a urine sample or pregnancy test but nursing staff notified me that she is  requesting discharge.  I went and spoke with her she says she is feeling much better after the medications.  She is denying any dysuria and says she is definitely not pregnant.  If she wants to leave I think that is reasonable we discussed strict return precautions.  She will touch base with bariatrics later today.  Counseled on return precautions verbally expressed understanding of these. [CC]      ED Course User Index  [CC] Kameron Connell MD                  AS OF 17:06 EDT VITALS:    BP - 127/82  HR - 61  TEMP - 98.4 °F (36.9 °C)  O2 SATS - 100%                  DIAGNOSIS  Final diagnoses:   Generalized abdominal pain   Postoperative pain   Dehydration   S/P gastric bypass         DISPOSITION  DISCHARGE    Patient discharged in stable condition.    Reviewed implications of results, diagnosis, meds, responsibility to follow up, warning signs and symptoms of possible worsening, potential complications and reasons to return to ER.    Patient/Family voiced understanding of above instructions.    Discussed plan for discharge, as there is no emergent indication for admission.  Pt/family is agreeable and understands need for follow up and possible repeat testing.  Pt/family is aware that discharge does not mean that nothing is wrong but that it indicates no emergency is currently present that requires admission and they must continue care with follow-up as given below or with a physician of their choice.     FOLLOW-UP  Erik Hughes MD  [address] [address] [phone]    Call   Let them know you are seen in the emergency department.         Medication List      New Prescriptions    dicyclomine 20 MG tablet  Commonly known as: BENTYL  Take 1 tablet by mouth Every 8 (Eight) Hours As Needed (Abdominal cramps).     ondansetron ODT 4 MG disintegrating tablet  Commonly known as: ZOFRAN-ODT  Place 1 tablet on the tongue 4 (Four) Times a Day As Needed for Nausea or Vomiting.      promethazine 25 MG suppository  Commonly known as: PHENERGAN  Insert 1 suppository into the rectum Every 6 (Six) Hours As Needed for Nausea or Vomiting.           Where to Get Your Medications      These medications were sent to [id number] - [place], [place] - [address] [place] [facility] [address] - [phone]  - [phone] FX  [address], [facility] [address]    Phone: [phone]   · dicyclomine 20 MG tablet  · ondansetron ODT 4 MG disintegrating tablet  · promethazine 25 MG suppository             Please note that portions of this document were completed with voice recognition software.        Kameron Connell MD  05/11/23 3065

## 2023-05-15 ENCOUNTER — OFFICE VISIT (OUTPATIENT)
Dept: BARIATRICS/WEIGHT MGMT | Facility: CLINIC | Age: 38
End: 2023-05-15

## 2023-05-15 VITALS
TEMPERATURE: 97.1 F | DIASTOLIC BLOOD PRESSURE: 70 MMHG | OXYGEN SATURATION: 99 % | HEIGHT: 70 IN | BODY MASS INDEX: 37.87 KG/M2 | WEIGHT: 264.5 LBS | SYSTOLIC BLOOD PRESSURE: 130 MMHG | HEART RATE: 70 BPM

## 2023-05-15 DIAGNOSIS — E55.9 VITAMIN D DEFICIENCY: ICD-10-CM

## 2023-05-15 DIAGNOSIS — R10.13 DYSPEPSIA: ICD-10-CM

## 2023-05-15 DIAGNOSIS — E66.9 OBESITY, CLASS II, BMI 35-39.9: Primary | ICD-10-CM

## 2023-05-15 DIAGNOSIS — R79.0 ABNORMAL BLOOD LEVEL OF IRON: ICD-10-CM

## 2023-05-15 DIAGNOSIS — R53.83 FATIGUE, UNSPECIFIED TYPE: ICD-10-CM

## 2023-05-15 DIAGNOSIS — K90.9 INTESTINAL MALABSORPTION, UNSPECIFIED TYPE: ICD-10-CM

## 2023-05-15 PROCEDURE — 1159F MED LIST DOCD IN RCRD: CPT | Performed by: PHYSICIAN ASSISTANT

## 2023-05-15 PROCEDURE — 99024 POSTOP FOLLOW-UP VISIT: CPT | Performed by: PHYSICIAN ASSISTANT

## 2023-05-15 PROCEDURE — 1160F RVW MEDS BY RX/DR IN RCRD: CPT | Performed by: PHYSICIAN ASSISTANT

## 2023-05-15 NOTE — PROGRESS NOTES
[facility]  [address]  [address]  [facility] [address] [phone]      Patient Name:  Geneva Cordova  :  1985      Date of Visit: 05/15/2023        Reason for Visit:  1 month postop    HPI:  Geneva Cordova is a 37 y.o. female s/p robotic RNY/BPD/recurrentPHHR 23 GDW (previous LSG/pHHR  GDW and sleeve revision 2020 PNQ complicated by postop stricture and uncontrolled reflux)    Readmitted on POD#4 w/ (L) flank pain.  CT revealed (L) abd.wall hematoma.     LOV POD #18 was doing better.     Today says she is feeling very discouraged, struggling w/ diet progression.  Says does best w/ baked potatoes.  Tries a bite of baked chicken, goes down, but then develops nausea w/ foamy emesis, sometimes vomits undigested food.  Getting only 70-80g prot/day.  Having nausea w/ water/liquid intake - likes beverages ice-cold but cannot tolerate, has not tried room temp liquids.  Having 2 loose stools/day - lots of gas, foul-smelling.     (L) flank pain has resolved.  Continues w/ generalized/periumbilical abdominal discomfort.  Denies fevers/chilles.  Went to [facility] c/o acute exacerbation of abd.pain.  Labs okay.  CT ab/pel w/ IV only contrast - unremarkable.  Notes say patient requested discharge.  Was given Bentyl and Zofran RX - has not picked up from pharmacy.     Taking MVI (3), Vit B1, Calcium, Vit D, and iron daily w/ monthly B12 injections.       Presurgery weight: 283 pounds.  Today's weight is 120 kg (264 lb 8 oz) pounds, today's  Body mass index is 37.95 kg/m²., and weight loss since surgery is 19 pounds.       Past Medical History:   Diagnosis Date   • Anemia    • Anxiety    • Bilateral lower extremity edema     R>L   • Boil, axilla     recurrent   • Chronic back pain     no meds or injections, feels related to breasts   • Concussion with no loss of consciousness 2022   • COVID-19 2020   • Depression    • Dyspepsia    • Dyspnea on  "exertion    • Fatigue    • Gastroesophageal reflux disease concurrent with and due to paraesophageal hernia     recurrent after LSG revision, EGD Dr. Hughes 23   • Glucose intolerance (impaired glucose tolerance)    • Helicobacter pylori (H. pylori) infection     asx and grossly nl EGD. \"abundant\"on path. HBT still + after PrevPak tx. LSG path neg. neg testing 2018   • Hypoalbuminemia    • Left Ovarian serous cystadenoma -removed at  section 2017   • Migraine     resolved with weightloss   • Obesity, morbid    • VIRA (obstructive sleep apnea)     improved since WLS   • PCOS (polycystic ovarian syndrome)    • Right Essure implantation 2017 10/06/2017   • S/P  section;left ovarian cystectomy and partial salpingectomy 2017   • Seasonal allergies      Past Surgical History:   Procedure Laterality Date   • ABDOMINOPLASTY  2022    Osmany Mclain at [facility]   • BILATERAL BREAST REDUCTION      2022   •  SECTION N/A 2017      SECTION PRIMARY;  CYSTECTOMY; Kwadwo Baxter MD; :  [facility];  Service:    • DILATATION AND CURETTAGE     • ENDOMETRIAL ABLATION  2020    Dr Cuevas   • ENDOSCOPY N/A 2020    Procedure: ESOPHAGOGASTRODUODENOSCOPY;  Surgeon: Natalia Mariscal MD;  Location:  [facility];  Service: Bariatric;  Laterality: N/A;   • ENDOSCOPY  2022    Dr. La   • ENDOSCOPY N/A 2023    Procedure: ESOPHAGOGASTRODUODENOSCOPY;  Surgeon: Erik Hughes MD;  Location:  [facility];  Service: Robotics - DaVinci;  Laterality: N/A;   • ESSURE TUBAL LIGATION     • GASTRIC BYPASS N/A 2023    Procedure: GASTRIC BYPASS LAPAROSCOPIC WITH [employer]ingSt[employer] ROBOT AND BILIOPANCREATIC DIVERSION WITH DUODENAL SWITCH;  Surgeon: Erik Hughes MD;  Location:  [facility];  Service: Robotics - DaVinci;  Laterality: N/A;   • GASTRIC SLEEVE LAPAROSCOPIC  2015    With Dr. Hughes   • GASTRIC SLEEVE " LAPAROSCOPIC N/A 12/01/2020    Procedure: GASTRIC SLEEVE LAPAROSCOPIC;  Surgeon: Natalia Mariscal MD;  Location:  [facility];  Service: Bariatric;  Laterality: N/A;   • LAPAROSCOPIC CHOLECYSTECTOMY  02/2018    Dr. Hughes   • PARAESOPHAGEAL HERNIA REPAIR N/A 4/14/2023    Procedure: LAPAROSCOPIC RECURRENT PARAESOPHAGEAL HERNIA REPAIR WITH DAVINCI ROBOT AND FALCIFORM LIGAMENT SLING ;  Surgeon: Erik Hughes MD;  Location:  [facility];  Service: Robotics - DaVinci;  Laterality: N/A;   • SALPINGECTOMY Bilateral 02/2020    Dr Cuevas   • TONSILLECTOMY  2014   • WISDOM TOOTH EXTRACTION  2014     Outpatient Medications Marked as Taking for the 5/15/23 encounter (Office Visit) with Carmelita Bell PA   Medication Sig Dispense Refill   • Calcium Carb-Cholecalciferol (Calcium 600/Vitamin D) 600-10 MG-MCG chewable tablet Chew 1 tablet Daily. 90 tablet 1   • dicyclomine (BENTYL) 20 MG tablet Take 1 tablet by mouth Every 8 (Eight) Hours As Needed (Abdominal cramps). 21 tablet 0   • ferrous gluconate (FERGON) 324 MG tablet Take 1 tablet by mouth 2 (Two) Times a Day. 180 tablet 1   • Multiple Vitamins-Minerals (MULTIPLE VITAMINS/WOMENS) tablet Take 1 tablet by mouth. PT NOT CURRENTLY TAKING     • omeprazole (priLOSEC) 40 MG capsule Take 1 capsule by mouth Daily for 60 days. 60 capsule 0   • ondansetron ODT (ZOFRAN-ODT) 4 MG disintegrating tablet Place 1 tablet on the tongue 4 (Four) Times a Day As Needed for Nausea or Vomiting. 12 tablet 0   • promethazine (PHENERGAN) 25 MG suppository Insert 1 suppository into the rectum Every 6 (Six) Hours As Needed for Nausea or Vomiting. 6 suppository 0   • thiamine (VITAMIN B-1) 100 MG tablet Take 1 tablet by mouth Daily. 90 tablet 1   • vitamin D3 125 MCG (5000 UT) capsule capsule Take 1 capsule by mouth Daily. 90 capsule 1     Current Facility-Administered Medications for the 5/15/23 encounter (Office Visit) with Carmelita Bell PA   Medication Dose Route Frequency Provider  "Last Rate Last Admin   • cyanocobalamin injection 1,000 mcg  1,000 mcg Intramuscular Q30 Days Alice Fuentes PA-C   1,000 mcg at 23 0930     Allergies   Allergen Reactions   • Codeine Nausea And Vomiting, Other (See Comments) and GI Intolerance     UNKNOWN       Social History     Socioeconomic History   • Marital status: Single   • Number of children: 4   • Years of education: College    Tobacco Use   • Smoking status: Never   • Smokeless tobacco: Never   Vaping Use   • Vaping Use: Never used   Substance and Sexual Activity   • Alcohol use: Not Currently   • Drug use: No   • Sexual activity: Not Currently     Partners: Male     Social History     Social History Narrative    Pt lives in [facility], she is single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 She currently works full time at [employer] as a .     The father of PT's oldest child passed away 2 weeks ago,  was yesterday 2022       /70 (BP Location: Left arm, Patient Position: Sitting)   Pulse 70   Temp 97.1 °F (36.2 °C)   Ht 177.8 cm (70\")   Wt 120 kg (264 lb 8 oz)   SpO2 99%   BMI 37.95 kg/m²      Physical Exam  Constitutional:       Appearance: She is well-developed. She is not ill-appearing.   Eyes:      General: No scleral icterus.  Cardiovascular:      Rate and Rhythm: Normal rate.   Pulmonary:      Effort: Pulmonary effort is normal.   Abdominal:      Palpations: Abdomen is soft.      Tenderness: There is no abdominal tenderness.      Comments: incisions healing well   Musculoskeletal:         General: Normal range of motion.   Skin:     General: Skin is warm and dry.      Findings: No rash.   Neurological:      Mental Status: She is alert.   Psychiatric:         Behavior: Behavior is cooperative.         Judgment: Judgment normal.           Assessment:   1 month s/p robotic RNY/BPD/recurrentPHHR 23 GDW (previous LSG/pHHR  GDW and sleeve revision 2020 PNQ complicated by postop stricture and uncontrolled " reflux)        ICD-10-CM ICD-9-CM   1. Obesity, Class II, BMI 35-39.9  E66.9 278.00   2. Intestinal malabsorption, unspecified type  K90.9 579.9   3. Dyspepsia  R10.13 536.8   4. Fatigue, unspecified type  R53.83 780.79   5. Vitamin D deficiency  E55.9 268.9   6. Abnormal blood level of iron  R79.0 790.6         Plan:  Reassured patient.  Encouraged to continue w/ gradual diet progression only as tolerated.  Focus on getting 70+g prot/day.  Try room temp or warm liquids.  Use Zofran + Bentyl prn.  Routine bariatric labs ordered.  Continue vitamins w/ adjustments pending lab results.  Continue to avoid ASA/NSAIDS/steroids/tobacco indefinitely.  Call w/ problems/concerns.    The patient was instructed to follow up in 2 months, sooner if needed.

## 2023-05-24 LAB
25(OH)D3+25(OH)D2 SERPL-MCNC: 25.3 NG/ML (ref 30–100)
A-TOCOPHEROL VIT E SERPL-MCNC: 6.1 MG/L (ref 5.9–19.4)
ALBUMIN SERPL-MCNC: 3.9 G/DL (ref 3.8–4.8)
ALBUMIN/GLOB SERPL: 1.6 {RATIO} (ref 1.2–2.2)
ALP SERPL-CCNC: 73 IU/L (ref 44–121)
ALT SERPL-CCNC: 16 IU/L (ref 0–32)
AST SERPL-CCNC: 16 IU/L (ref 0–40)
BASOPHILS # BLD AUTO: 0 X10E3/UL (ref 0–0.2)
BASOPHILS NFR BLD AUTO: 0 %
BILIRUB SERPL-MCNC: 0.4 MG/DL (ref 0–1.2)
BUN SERPL-MCNC: 9 MG/DL (ref 6–20)
BUN/CREAT SERPL: 14 (ref 9–23)
CALCIUM SERPL-MCNC: 8.8 MG/DL (ref 8.7–10.2)
CHLORIDE SERPL-SCNC: 108 MMOL/L (ref 96–106)
CO2 SERPL-SCNC: 23 MMOL/L (ref 20–29)
CREAT SERPL-MCNC: 0.63 MG/DL (ref 0.57–1)
EGFRCR SERPLBLD CKD-EPI 2021: 118 ML/MIN/1.73
EOSINOPHIL # BLD AUTO: 0.5 X10E3/UL (ref 0–0.4)
EOSINOPHIL NFR BLD AUTO: 7 %
ERYTHROCYTE [DISTWIDTH] IN BLOOD BY AUTOMATED COUNT: 12.7 % (ref 11.7–15.4)
FERRITIN SERPL-MCNC: 152 NG/ML (ref 15–150)
FOLATE SERPL-MCNC: >20 NG/ML
GAMMA TOCOPHEROL SERPL-MCNC: 0.6 MG/L (ref 0.7–4.9)
GLOBULIN SER CALC-MCNC: 2.4 G/DL (ref 1.5–4.5)
GLUCOSE SERPL-MCNC: 79 MG/DL (ref 70–99)
HCT VFR BLD AUTO: 33.7 % (ref 34–46.6)
HGB BLD-MCNC: 10.6 G/DL (ref 11.1–15.9)
IMM GRANULOCYTES # BLD AUTO: 0 X10E3/UL (ref 0–0.1)
IMM GRANULOCYTES NFR BLD AUTO: 0 %
IRON SERPL-MCNC: 43 UG/DL (ref 27–159)
LYMPHOCYTES # BLD AUTO: 1.8 X10E3/UL (ref 0.7–3.1)
LYMPHOCYTES NFR BLD AUTO: 29 %
MAGNESIUM SERPL-MCNC: 2 MG/DL (ref 1.6–2.3)
MCH RBC QN AUTO: 29.7 PG (ref 26.6–33)
MCHC RBC AUTO-ENTMCNC: 31.5 G/DL (ref 31.5–35.7)
MCV RBC AUTO: 94 FL (ref 79–97)
METHYLMALONATE SERPL-SCNC: 131 NMOL/L (ref 0–378)
MONOCYTES # BLD AUTO: 0.4 X10E3/UL (ref 0.1–0.9)
MONOCYTES NFR BLD AUTO: 6 %
NEUTROPHILS # BLD AUTO: 3.5 X10E3/UL (ref 1.4–7)
NEUTROPHILS NFR BLD AUTO: 58 %
PHOSPHATE SERPL-MCNC: 3 MG/DL (ref 3–4.3)
PHYTONADIONE SERPL-MCNC: <0.1 NG/ML (ref 0.1–2.2)
PLATELET # BLD AUTO: 214 X10E3/UL (ref 150–450)
POTASSIUM SERPL-SCNC: 3.7 MMOL/L (ref 3.5–5.2)
PREALB SERPL-MCNC: 8 MG/DL (ref 14–35)
PROT SERPL-MCNC: 6.3 G/DL (ref 6–8.5)
PTH-INTACT SERPL-MCNC: 37 PG/ML (ref 15–65)
RBC # BLD AUTO: 3.57 X10E6/UL (ref 3.77–5.28)
SODIUM SERPL-SCNC: 142 MMOL/L (ref 134–144)
VIT A SERPL-MCNC: 9.6 UG/DL (ref 18.9–57.3)
VIT B1 BLD-SCNC: 130.4 NMOL/L (ref 66.5–200)
WBC # BLD AUTO: 6.2 X10E3/UL (ref 3.4–10.8)
ZINC SERPL-MCNC: 72 UG/DL (ref 44–115)

## 2023-05-29 RX ORDER — ERGOCALCIFEROL 1.25 MG/1
50000 CAPSULE ORAL
Qty: 12 CAPSULE | Refills: 0 | Status: SHIPPED | OUTPATIENT
Start: 2023-05-29 | End: 2023-08-15

## 2023-05-29 RX ORDER — VITAMIN A 3000 MCG
CAPSULE ORAL
Qty: 36 CAPSULE | Refills: 0 | Status: SHIPPED | OUTPATIENT
Start: 2023-05-29

## 2023-05-29 RX ORDER — PHYTONADIONE 5 MG/1
10 TABLET ORAL ONCE
Qty: 2 TABLET | Refills: 0 | Status: SHIPPED | OUTPATIENT
Start: 2023-05-29 | End: 2023-05-29

## 2023-06-14 ENCOUNTER — CLINICAL SUPPORT (OUTPATIENT)
Dept: INTERNAL MEDICINE | Facility: CLINIC | Age: 38
End: 2023-06-14
Payer: COMMERCIAL

## 2023-06-14 ENCOUNTER — TELEPHONE (OUTPATIENT)
Dept: INTERNAL MEDICINE | Facility: CLINIC | Age: 38
End: 2023-06-14

## 2023-06-14 RX ADMIN — CYANOCOBALAMIN 1000 MCG: 1000 INJECTION, SOLUTION INTRAMUSCULAR; SUBCUTANEOUS at 13:33

## 2023-06-14 NOTE — TELEPHONE ENCOUNTER
Pt stopped in today for Vitamin B12 injection. She is asking if her Vitamin B12 levels can be increased. She states that she is getting monthly injections and still feeling very tired. She did make an appointment on 6/20 to see Alice and discuss. She didn't know if there was anything she could do in the mean time to help with her energy levels. Pt did state that she did have gastric bypass completed recently as well.     Informed her that Alice was out today but I would send a message back and we would call her with a response.

## 2023-06-15 NOTE — TELEPHONE ENCOUNTER
We can check her vitamin levels. At her last visit w/bariatrics 1 mo ago many of her vitamin levels were low. They sent in vitamin prescriptions, please make sure she got those and is taking them. They also wanted to see her again about this time to follow up. Please ask her to sched a f/u with bariatrics as well.   To help energy levels make sure she is eating nutritious foods (no fast foods and limit processed foods, make sure she is getting enough protein in her diet) and make sure she is getting enough sleep, stay well hydrated, and exercise more days than not.

## 2023-06-28 PROBLEM — R60.0 LOWER EXTREMITY EDEMA: Status: ACTIVE | Noted: 2023-06-28

## 2023-06-28 PROBLEM — Z98.84 HISTORY OF BARIATRIC SURGERY: Status: ACTIVE | Noted: 2023-06-28

## 2023-07-27 ENCOUNTER — TELEPHONE (OUTPATIENT)
Dept: BARIATRICS/WEIGHT MGMT | Facility: CLINIC | Age: 38
End: 2023-07-27
Payer: COMMERCIAL

## 2023-07-27 RX ORDER — HYDROCHLOROTHIAZIDE 12.5 MG/1
12.5 TABLET ORAL DAILY
Qty: 30 TABLET | Refills: 0 | Status: SHIPPED | OUTPATIENT
Start: 2023-07-27

## 2023-07-27 NOTE — TELEPHONE ENCOUNTER
Pharmacy called, stated that pt is just now trying fill her Vitamin A 7.5 prescription with them, was sent in on 05/29/23.     They are asking if she still needs this since her PCP sent in Rx for Vit A 67808 UT (7.5 mg) on 06/26/23 with 2 refills. If she still needs this, they are requesting a new Rx to be sent in. Please advise, thanks!

## 2023-08-15 ENCOUNTER — OFFICE VISIT (OUTPATIENT)
Dept: BARIATRICS/WEIGHT MGMT | Facility: CLINIC | Age: 38
End: 2023-08-15
Payer: COMMERCIAL

## 2023-08-15 VITALS
HEART RATE: 65 BPM | DIASTOLIC BLOOD PRESSURE: 80 MMHG | OXYGEN SATURATION: 99 % | TEMPERATURE: 97.8 F | BODY MASS INDEX: 36.79 KG/M2 | SYSTOLIC BLOOD PRESSURE: 130 MMHG | WEIGHT: 257 LBS | HEIGHT: 70 IN

## 2023-08-15 DIAGNOSIS — E66.9 OBESITY, CLASS II, BMI 35-39.9: Primary | ICD-10-CM

## 2023-08-15 DIAGNOSIS — R53.83 FATIGUE, UNSPECIFIED TYPE: ICD-10-CM

## 2023-08-15 DIAGNOSIS — K90.9 INTESTINAL MALABSORPTION, UNSPECIFIED TYPE: ICD-10-CM

## 2023-08-15 DIAGNOSIS — R79.0 ABNORMAL BLOOD LEVEL OF IRON: ICD-10-CM

## 2023-08-15 DIAGNOSIS — E55.9 VITAMIN D DEFICIENCY: ICD-10-CM

## 2023-08-15 PROCEDURE — 1159F MED LIST DOCD IN RCRD: CPT | Performed by: PHYSICIAN ASSISTANT

## 2023-08-15 PROCEDURE — 1160F RVW MEDS BY RX/DR IN RCRD: CPT | Performed by: PHYSICIAN ASSISTANT

## 2023-08-15 PROCEDURE — 99214 OFFICE O/P EST MOD 30 MIN: CPT | Performed by: PHYSICIAN ASSISTANT

## 2023-08-15 RX ORDER — VITAMIN A 3000 MCG
CAPSULE ORAL
Qty: 36 CAPSULE | Refills: 0 | Status: SHIPPED | OUTPATIENT
Start: 2023-08-15

## 2023-08-15 NOTE — PROGRESS NOTES
"[facility]  [address]  [address]  [facility] [id number] [phone]        Patient Name:  Geneva Cordova  :  1985      Date of Visit: 08/15/2023      Reason for Visit:   4 months postop      HPI: Geneva Cordova is a 37 y.o. female s/p robotic RNY/BPD/recurrentPHHR 23 GDW (previous LSG/pHHR  GDW and sleeve revision 2020 PNQ complicated by postop stricture and uncontrolled reflux)     Patient was very emotional during her visit today and says she has not felt herself since she had surgery.  Stated she does feel fatigued and sometimes feels like she gets \"close to blacking out\" and then \"wakes up \"but does not lose consciousness.  She is also having increased issues with lower extremity edema since surgery.  She was seen in the ER for this in  and her work-up was benign including a negative venous duplex.  No issues/concerns. Denies dysphagia, reflux, nausea, vomiting, abdominal pain, hair loss, memory loss, vision changes, and numbness/tingling. Having BMs daily. Getting at least ~90g prot/day. Drinks 2 protein shakes per day. Will eat eggs, baked chicken, green beans, broccoli, fruits.  Drinking 64 fluid oz/day.  1 month labs revealed low Vitamin A, D, K- replacement rx sent to pharmacy. Not on any GI meds. Physical activity: walking one hour per day..    Was unable to get Vitamin A filled by pharmacy- was told they did not have any. Did take replacement Vitamin K and Vitamin D.     MVI (3), Vit K 400mcg, Calcium, Vit D, Vit B12, B1, Mag, Iron, Vitamin C.      Presurgery weight: 283 pounds.  Today's weight is 117 kg (257 lb) pounds, today's  Body mass index is 36.88 kg/mý., and weight loss since surgery is 26 pounds.      Past Medical History:   Diagnosis Date    Anemia     Anxiety     Bilateral lower extremity edema     R>L    Boil, axilla     recurrent    Chronic back pain     no meds or injections, feels related to breasts    Concussion with no " "loss of consciousness 2022    COVID-19 2020    Depression     Dyspepsia     Dyspnea on exertion     Fatigue     Gastroesophageal reflux disease concurrent with and due to paraesophageal hernia     recurrent after LSG revision, EGD Dr. Hughes 23    Glucose intolerance (impaired glucose tolerance)     Helicobacter pylori (H. pylori) infection     asx and grossly nl EGD. \"abundant\"on path. HBT still + after PrevPak tx. LSG path neg. neg testing 2018    Hypoalbuminemia     Left Ovarian serous cystadenoma -removed at  section 2017    Migraine     resolved with weightloss    Obesity, morbid     VIRA (obstructive sleep apnea)     improved since WLS    PCOS (polycystic ovarian syndrome)     Right Essure implantation 2017 10/06/2017    S/P  section;left ovarian cystectomy and partial salpingectomy 2017    Seasonal allergies      Past Surgical History:   Procedure Laterality Date    ABDOMINOPLASTY  2022    [facility]    BILATERAL BREAST REDUCTION      2022     SECTION N/A 2017      SECTION PRIMARY;  CYSTECTOMY; Kwadwo Baxter MD; :  [facility];  Service:     DILATATION AND CURETTAGE  2015    ENDOMETRIAL ABLATION  2020    Dr Cuevas    ENDOSCOPY N/A 2020    Procedure: ESOPHAGOGASTRODUODENOSCOPY;  Surgeon: Natalia Mariscal MD;  Location:  [facility];  Service: Bariatric;  Laterality: N/A;    ENDOSCOPY  2022    Dr. La    ENDOSCOPY N/A 2023    Procedure: ESOPHAGOGASTRODUODENOSCOPY;  Surgeon: Erik Hughes MD;  Location:  [facility];  Service: Robotics - DaVinci;  Laterality: N/A;    ESSURE TUBAL LIGATION  2018    GASTRIC BYPASS N/A 2023    Procedure: GASTRIC BYPASS LAPAROSCOPIC WITH DAVINCI ROBOT AND BILIOPANCREATIC DIVERSION WITH DUODENAL SWITCH;  Surgeon: Erik Hughes MD;  Location:  [facility];  Service: Robotics - DaVinci;  Laterality: N/A;    GASTRIC " SLEEVE LAPAROSCOPIC  07/2015    With Dr. Hughes    GASTRIC SLEEVE LAPAROSCOPIC N/A 12/01/2020    Procedure: GASTRIC SLEEVE LAPAROSCOPIC;  Surgeon: Natalia Mariscal MD;  Location:  [facility];  Service: Bariatric;  Laterality: N/A;    LAPAROSCOPIC CHOLECYSTECTOMY  02/2018    Dr. Hughes    PARAESOPHAGEAL HERNIA REPAIR N/A 4/14/2023    Procedure: LAPAROSCOPIC RECURRENT PARAESOPHAGEAL HERNIA REPAIR WITH DAVINCI ROBOT AND FALCIFORM LIGAMENT SLING ;  Surgeon: Erik Hughes MD;  Location:  [facility];  Service: Robotics - DaVinci;  Laterality: N/A;    SALPINGECTOMY Bilateral 02/2020    Dr Cuevas    TONSILLECTOMY  2014    WISDOM TOOTH EXTRACTION  2014     Outpatient Medications Marked as Taking for the 8/15/23 encounter (Office Visit) with Shaneka Ivey PA   Medication Sig Dispense Refill    Calcium Carb-Cholecalciferol (Calcium 600/Vitamin D) 600-10 MG-MCG chewable tablet Chew 1 tablet Daily. 90 tablet 1    dicyclomine (BENTYL) 20 MG tablet Take 1 tablet by mouth Every 8 (Eight) Hours As Needed (Abdominal cramps). 21 tablet 0    ferrous gluconate (FERGON) 324 MG tablet Take 1 tablet by mouth 2 (Two) Times a Day. 180 tablet 1    hydroCHLOROthiazide (HYDRODIURIL) 12.5 MG tablet TAKE 1 TABLET BY MOUTH DAILY 30 tablet 0    Multiple Vitamins-Minerals (MULTIPLE VITAMINS/WOMENS) tablet Take 1 tablet by mouth. PT NOT CURRENTLY TAKING      ondansetron ODT (ZOFRAN-ODT) 4 MG disintegrating tablet Place 1 tablet on the tongue 4 (Four) Times a Day As Needed for Nausea or Vomiting. 12 tablet 0    promethazine (PHENERGAN) 25 MG suppository Insert 1 suppository into the rectum Every 6 (Six) Hours As Needed for Nausea or Vomiting. 6 suppository 0    thiamine (VITAMIN B-1) 100 MG tablet Take 1 tablet by mouth Daily. 90 tablet 1    vitamin D (ERGOCALCIFEROL) 1.25 MG (99986 UT) capsule capsule Take 1 capsule by mouth Every 7 (Seven) Days for 12 doses. 12 capsule 0    vitamin D3 125 MCG (5000 UT) capsule capsule Take 1 capsule by mouth  "Daily. 90 capsule 1    [DISCONTINUED] Vitamin A 3 MG (40144 UT) capsule Take 2 capsules by mouth Daily for 90 days. To be started after initial high-dose therapy completed. 180 capsule 0     Current Facility-Administered Medications for the 8/15/23 encounter (Office Visit) with Shaneka Ivey PA   Medication Dose Route Frequency Provider Last Rate Last Admin    cyanocobalamin injection 1,000 mcg  1,000 mcg Intramuscular Q30 Days Alice Fuentes PA-PARKER   1,000 mcg at 23 1321       Allergies   Allergen Reactions    Codeine Nausea And Vomiting, Other (See Comments) and GI Intolerance     UNKNOWN       Social History     Socioeconomic History    Marital status: Single    Number of children: 4    Years of education: College    Tobacco Use    Smoking status: Never    Smokeless tobacco: Never   Vaping Use    Vaping Use: Never used   Substance and Sexual Activity    Alcohol use: Not Currently    Drug use: No    Sexual activity: Not Currently     Partners: Male     Social History     Social History Narrative    Pt lives in [facility], she is single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 She currently works full time at [employer] as a .     The father of PT's oldest child passed away 2 weeks ago,  was yesterday 2022       /80 (BP Location: Left arm, Patient Position: Sitting)   Pulse 65   Temp 97.8 øF (36.6 øC)   Ht 177.8 cm (70\")   Wt 117 kg (257 lb)   SpO2 99%   BMI 36.88 kg/mý     Physical Exam  Constitutional:       Appearance: She is obese.   HENT:      Head: Normocephalic and atraumatic.   Cardiovascular:      Rate and Rhythm: Normal rate and regular rhythm.   Pulmonary:      Effort: Pulmonary effort is normal.      Breath sounds: Normal breath sounds.   Abdominal:      General: Bowel sounds are normal. There is no distension.      Palpations: Abdomen is soft. There is no mass.      Tenderness: There is no abdominal tenderness.   Musculoskeletal:      Comments: + pedal edema    Skin:     " General: Skin is warm and dry.   Neurological:      General: No focal deficit present.      Mental Status: She is alert and oriented to person, place, and time.   Psychiatric:         Mood and Affect: Mood normal.         Behavior: Behavior normal.         Assessment:  4 months s/p robotic RNY/BPD/recurrentPHHR 4/14/23 GDW (previous LSG/pHHR 2015 GDW and sleeve revision 12/2020 PNQ complicated by postop stricture and uncontrolled reflux)     ICD-10-CM ICD-9-CM   1. Obesity, Class II, BMI 35-39.9  E66.9 278.00   2. Intestinal malabsorption, unspecified type  K90.9 579.9   3. Vitamin D deficiency  E55.9 268.9   4. Abnormal blood level of iron  R79.0 790.6   5. Fatigue, unspecified type  R53.83 780.79          Class 2 Severe Obesity (BMI >=35 and <=39.9). Obesity-related health conditions include the following:  as above . Obesity is improving with treatment. BMI is is above average; BMI management plan is completed. We discussed low calorie, low carb based diet program, portion control, and increasing exercise.      Plan: Provided reassurance today.  Her prealbumin was very low at her 1 month follow-up and I do think this could be contributing to her fatigue and other symptoms she is reporting today.  We discussed increasing her protein to no less than 120 g of protein per day.  Continue w/ good food choices and healthy habits.  I provided handouts with protein sources and meal ideas.  Continue routine physical activity.  Routine bariatric labs ordered.  Continue vitamins w/ adjustments pending lab results.  Call w/ problems/concerns.     I will resend vitamin A replacement prescription and instructed patient to let us know if she has any trouble filling.    The patient was instructed to follow up with me in 1 month to review food log, sooner if needed.      I spent over 30 minutes caring for this patient on this date of service. This time includes time spent by me in the following activities: preparing for the visit,  reviewing tests, performing a medically appropriate examination and/or evaluation, ordering medications, tests, or procedures and documenting information in the medical record.

## 2023-08-17 ENCOUNTER — OFFICE VISIT (OUTPATIENT)
Dept: INTERNAL MEDICINE | Facility: CLINIC | Age: 38
End: 2023-08-17
Payer: COMMERCIAL

## 2023-08-17 VITALS
OXYGEN SATURATION: 98 % | BODY MASS INDEX: 36.94 KG/M2 | TEMPERATURE: 97.6 F | DIASTOLIC BLOOD PRESSURE: 90 MMHG | HEIGHT: 70 IN | HEART RATE: 87 BPM | WEIGHT: 258 LBS | RESPIRATION RATE: 20 BRPM | SYSTOLIC BLOOD PRESSURE: 126 MMHG

## 2023-08-17 DIAGNOSIS — R30.9 PAINFUL URINATION: ICD-10-CM

## 2023-08-17 DIAGNOSIS — N76.0 ACUTE VAGINITIS: Primary | ICD-10-CM

## 2023-08-17 LAB
BILIRUB BLD-MCNC: NEGATIVE MG/DL
CLARITY, POC: ABNORMAL
COLOR UR: ABNORMAL
EXPIRATION DATE: ABNORMAL
GLUCOSE UR STRIP-MCNC: NEGATIVE MG/DL
KETONES UR QL: NEGATIVE
LEUKOCYTE EST, POC: ABNORMAL
Lab: ABNORMAL
NITRITE UR-MCNC: NEGATIVE MG/ML
PH UR: 6 [PH] (ref 5–8)
PROT UR STRIP-MCNC: NEGATIVE MG/DL
RBC # UR STRIP: NEGATIVE /UL
SP GR UR: 1.01 (ref 1–1.03)
UROBILINOGEN UR QL: ABNORMAL

## 2023-08-17 PROCEDURE — 87798 DETECT AGENT NOS DNA AMP: CPT | Performed by: PHYSICIAN ASSISTANT

## 2023-08-17 PROCEDURE — 87661 TRICHOMONAS VAGINALIS AMPLIF: CPT | Performed by: PHYSICIAN ASSISTANT

## 2023-08-17 PROCEDURE — 87491 CHLMYD TRACH DNA AMP PROBE: CPT | Performed by: PHYSICIAN ASSISTANT

## 2023-08-17 PROCEDURE — 87801 DETECT AGNT MULT DNA AMPLI: CPT | Performed by: PHYSICIAN ASSISTANT

## 2023-08-17 PROCEDURE — 87086 URINE CULTURE/COLONY COUNT: CPT | Performed by: PHYSICIAN ASSISTANT

## 2023-08-17 PROCEDURE — 87591 N.GONORRHOEAE DNA AMP PROB: CPT | Performed by: PHYSICIAN ASSISTANT

## 2023-08-17 RX ORDER — FLUCONAZOLE 150 MG/1
150 TABLET ORAL ONCE
Qty: 1 TABLET | Refills: 1 | Status: SHIPPED | OUTPATIENT
Start: 2023-08-17 | End: 2023-08-17

## 2023-08-17 RX ADMIN — CYANOCOBALAMIN 1000 MCG: 1000 INJECTION, SOLUTION INTRAMUSCULAR; SUBCUTANEOUS at 11:00

## 2023-08-17 NOTE — ASSESSMENT & PLAN NOTE
Sent for nuswab, will treat with Diflucan, f/u if sx worsen. Stay well hydrated. Sent for urine culture.

## 2023-08-17 NOTE — PROGRESS NOTES
Chief Complaint  No chief complaint on file.    Subjective          History of Present Illness  Geneva Cordova presents to [facility] for   History of Present Illness  Vaginitis:  Has had vaginal irritation for the last few days. No abnormal discharge. At first she had pain w/voids but now she just has vaginal itching. No blood in urine. No urinary urgency, has been voiding more than usual and it seemed cloudy. No fever or flank pain. Not sexually active currently so no concern for pregnancy or STD    The following portions of the patient's history were reviewed and updated as appropriate: allergies, current medications, past family history, past medical history, past social history, past surgical history and problem list.  Allergies   Allergen Reactions    Codeine Nausea And Vomiting, Other (See Comments) and GI Intolerance     UNKNOWN       Current Outpatient Medications:     Calcium Carb-Cholecalciferol (Calcium 600/Vitamin D) 600-10 MG-MCG chewable tablet, Chew 1 tablet Daily., Disp: 90 tablet, Rfl: 1    dicyclomine (BENTYL) 20 MG tablet, Take 1 tablet by mouth Every 8 (Eight) Hours As Needed (Abdominal cramps)., Disp: 21 tablet, Rfl: 0    ferrous gluconate (FERGON) 324 MG tablet, Take 1 tablet by mouth 2 (Two) Times a Day., Disp: 180 tablet, Rfl: 1    hydroCHLOROthiazide (HYDRODIURIL) 12.5 MG tablet, TAKE 1 TABLET BY MOUTH DAILY, Disp: 30 tablet, Rfl: 0    Multiple Vitamins-Minerals (MULTIPLE VITAMINS/WOMENS) tablet, Take 1 tablet by mouth. PT NOT CURRENTLY TAKING, Disp: , Rfl:     ondansetron ODT (ZOFRAN-ODT) 4 MG disintegrating tablet, Place 1 tablet on the tongue 4 (Four) Times a Day As Needed for Nausea or Vomiting., Disp: 12 tablet, Rfl: 0    promethazine (PHENERGAN) 25 MG suppository, Insert 1 suppository into the rectum Every 6 (Six) Hours As Needed for Nausea or Vomiting., Disp: 6 suppository, Rfl: 0    thiamine (VITAMIN B-1) 100 MG tablet, Take 1 tablet by mouth Daily.,  "Disp: 90 tablet, Rfl: 1    Vitamin A 3 MG (10385 UT) capsule, Take 2 capsules by mouth Daily for 90 days. To be started after initial high-dose therapy completed., Disp: 180 capsule, Rfl: 0    Vitamin A 7.5 MG (87997 UT) capsule, Take 100,000IU (4 caps) daily x 3 days, then take 50,000IU (2 caps) daily x 2 wks (will follow w/ 20,000IU daily x 3 mos in other RX), Disp: 36 capsule, Rfl: 0    vitamin D3 125 MCG (5000 UT) capsule capsule, Take 1 capsule by mouth Daily., Disp: 90 capsule, Rfl: 1    fluconazole (Diflucan) 150 MG tablet, Take 1 tablet by mouth 1 (One) Time for 1 dose., Disp: 1 tablet, Rfl: 1    Current Facility-Administered Medications:     cyanocobalamin injection 1,000 mcg, 1,000 mcg, Intramuscular, Q30 Days, Alice Fuentes PA-C, 1,000 mcg at 07/03/23 1321  New Medications Ordered This Visit   Medications    fluconazole (Diflucan) 150 MG tablet     Sig: Take 1 tablet by mouth 1 (One) Time for 1 dose.     Dispense:  1 tablet     Refill:  1     Social History     Tobacco Use   Smoking Status Never   Smokeless Tobacco Never        Objective   Vital Signs:   Vitals:    08/17/23 1134   BP: 126/90   Pulse: 87   Resp: 20   Temp: 97.6 øF (36.4 øC)   TempSrc: Temporal   SpO2: 98%   Weight: 117 kg (258 lb)   Height: 177.8 cm (70\")   PainSc: 0-No pain      Body mass index is 37.02 kg/mý.  Physical Exam  Vitals reviewed.   Constitutional:       General: She is not in acute distress.     Appearance: Normal appearance.   HENT:      Head: Normocephalic and atraumatic.   Eyes:      General: No scleral icterus.     Extraocular Movements: Extraocular movements intact.      Conjunctiva/sclera: Conjunctivae normal.   Cardiovascular:      Rate and Rhythm: Normal rate and regular rhythm.      Heart sounds: Normal heart sounds. No murmur heard.  Pulmonary:      Effort: Pulmonary effort is normal. No respiratory distress.      Breath sounds: Normal breath sounds. No stridor. No wheezing or rhonchi.   Musculoskeletal:      " Cervical back: Normal range of motion and neck supple.   Skin:     General: Skin is warm and dry.      Coloration: Skin is not jaundiced.   Neurological:      General: No focal deficit present.      Mental Status: She is alert and oriented to person, place, and time.      Gait: Gait normal.   Psychiatric:         Mood and Affect: Mood normal.         Behavior: Behavior normal.      No LMP recorded. Patient has had an ablation.         Result Review :              Results for orders placed or performed in visit on 08/17/23   POCT urinalysis dipstick, automated    Specimen: Urine   Result Value Ref Range    Color Dark Yellow Yellow, Straw, Dark Yellow, Fatoumata    Clarity, UA Cloudy (A) Clear    Specific Gravity  1.015 1.005 - 1.030    pH, Urine 6.0 5.0 - 8.0    Leukocytes Trace (A) Negative    Nitrite, UA Negative Negative    Protein, POC Negative Negative mg/dL    Glucose, UA Negative Negative mg/dL    Ketones, UA Negative Negative    Urobilinogen, UA 1 E.U./dL (A) Normal, 0.2 E.U./dL    Bilirubin Negative Negative    Blood, UA Negative Negative    Lot Number [id number]     Expiration Date 10/25/2024           Assessment and Plan    Diagnoses and all orders for this visit:    1. Acute vaginitis (Primary)  Assessment & Plan:  Sent for nuswab, will treat with Diflucan, f/u if sx worsen. Stay well hydrated. Sent for urine culture.    Orders:  -     fluconazole (Diflucan) 150 MG tablet; Take 1 tablet by mouth 1 (One) Time for 1 dose.  Dispense: 1 tablet; Refill: 1  -     NuSwab VG+ - Swab, Vagina; Future  -     Urine Culture - Urine, Urine, Clean Catch    2. Painful urination  -     POCT urinalysis dipstick, automated  -     NuSwab VG+ - Swab, Vagina; Future  -     Urine Culture - Urine, Urine, Clean Catch        Follow Up   Return if symptoms worsen or fail to improve.    Follow up if symptoms worsen or persist or has new or concerning symptoms, go to ER for severe symptoms.   Reviewed common medication effects and  side effects and advised to report side effects immediately.  Encouraged medication compliance and the importance of keeping scheduled follow up appointments with me and any other providers.  If a referral was made please contact our office if you have not heard about an appointment in the next 2 weeks.   If labs or images are ordered we will contact you with the results within the next week.  If you have not heard from us after a week please call our office to inquire about the results.   Patient was given instructions and counseling regarding her condition or for health maintenance advice. Please see specific information pulled into the AVS if appropriate.     Alice Fuentes PA-C    * Please note that portions of this note were completed with a voice recognition program.

## 2023-08-19 LAB — BACTERIA SPEC AEROBE CULT: NO GROWTH

## 2023-08-21 LAB
25(OH)D3+25(OH)D2 SERPL-MCNC: 28.3 NG/ML (ref 30–100)
A-TOCOPHEROL VIT E SERPL-MCNC: 7.9 MG/L (ref 5.9–19.4)
ALBUMIN SERPL-MCNC: 4.2 G/DL (ref 3.9–4.9)
ALBUMIN/GLOB SERPL: 1.4 {RATIO} (ref 1.2–2.2)
ALP SERPL-CCNC: 98 IU/L (ref 44–121)
ALT SERPL-CCNC: 46 IU/L (ref 0–32)
AST SERPL-CCNC: 44 IU/L (ref 0–40)
BASOPHILS # BLD AUTO: 0 X10E3/UL (ref 0–0.2)
BASOPHILS NFR BLD AUTO: 0 %
BILIRUB SERPL-MCNC: 0.6 MG/DL (ref 0–1.2)
BUN SERPL-MCNC: 9 MG/DL (ref 6–20)
BUN/CREAT SERPL: 13 (ref 9–23)
CALCIUM SERPL-MCNC: 9.4 MG/DL (ref 8.7–10.2)
CHLORIDE SERPL-SCNC: 106 MMOL/L (ref 96–106)
CO2 SERPL-SCNC: 22 MMOL/L (ref 20–29)
CREAT SERPL-MCNC: 0.72 MG/DL (ref 0.57–1)
EGFRCR SERPLBLD CKD-EPI 2021: 111 ML/MIN/1.73
EOSINOPHIL # BLD AUTO: 0.2 X10E3/UL (ref 0–0.4)
EOSINOPHIL NFR BLD AUTO: 3 %
ERYTHROCYTE [DISTWIDTH] IN BLOOD BY AUTOMATED COUNT: 12.5 % (ref 11.7–15.4)
FERRITIN SERPL-MCNC: 38 NG/ML (ref 15–150)
FOLATE SERPL-MCNC: 10.9 NG/ML
GAMMA TOCOPHEROL SERPL-MCNC: 0.6 MG/L (ref 0.7–4.9)
GLOBULIN SER CALC-MCNC: 3 G/DL (ref 1.5–4.5)
GLUCOSE SERPL-MCNC: 77 MG/DL (ref 70–99)
HCT VFR BLD AUTO: 35.6 % (ref 34–46.6)
HGB BLD-MCNC: 11.2 G/DL (ref 11.1–15.9)
IMM GRANULOCYTES # BLD AUTO: 0 X10E3/UL (ref 0–0.1)
IMM GRANULOCYTES NFR BLD AUTO: 0 %
IRON SERPL-MCNC: 55 UG/DL (ref 27–159)
LYMPHOCYTES # BLD AUTO: 1.6 X10E3/UL (ref 0.7–3.1)
LYMPHOCYTES NFR BLD AUTO: 32 %
MAGNESIUM SERPL-MCNC: 2.1 MG/DL (ref 1.6–2.3)
MCH RBC QN AUTO: 28.8 PG (ref 26.6–33)
MCHC RBC AUTO-ENTMCNC: 31.5 G/DL (ref 31.5–35.7)
MCV RBC AUTO: 92 FL (ref 79–97)
METHYLMALONATE SERPL-SCNC: 248 NMOL/L (ref 0–378)
MONOCYTES # BLD AUTO: 0.3 X10E3/UL (ref 0.1–0.9)
MONOCYTES NFR BLD AUTO: 6 %
NEUTROPHILS # BLD AUTO: 2.8 X10E3/UL (ref 1.4–7)
NEUTROPHILS NFR BLD AUTO: 59 %
PHOSPHATE SERPL-MCNC: 3.9 MG/DL (ref 3–4.3)
PHYTONADIONE SERPL-MCNC: <0.1 NG/ML (ref 0.1–2.2)
PLATELET # BLD AUTO: 236 X10E3/UL (ref 150–450)
POTASSIUM SERPL-SCNC: 3.9 MMOL/L (ref 3.5–5.2)
PREALB SERPL-MCNC: 11 MG/DL (ref 14–35)
PROT SERPL-MCNC: 7.2 G/DL (ref 6–8.5)
PTH-INTACT SERPL-MCNC: 56 PG/ML (ref 15–65)
RBC # BLD AUTO: 3.89 X10E6/UL (ref 3.77–5.28)
SODIUM SERPL-SCNC: 143 MMOL/L (ref 134–144)
VIT A SERPL-MCNC: 13.4 UG/DL (ref 18.9–57.3)
VIT B1 BLD-SCNC: 92.4 NMOL/L (ref 66.5–200)
WBC # BLD AUTO: 4.9 X10E3/UL (ref 3.4–10.8)
ZINC SERPL-MCNC: 57 UG/DL (ref 44–115)

## 2023-08-22 ENCOUNTER — TELEPHONE (OUTPATIENT)
Dept: INTERNAL MEDICINE | Facility: CLINIC | Age: 38
End: 2023-08-22
Payer: COMMERCIAL

## 2023-08-22 NOTE — TELEPHONE ENCOUNTER
PT STATES SHE WAS FEELING FAINT ON 8/21/2023. SHE STATES IT WENT AWAY AND SHW WENT HOME TO PROP HER FEET UP.     ADVISED TO GO TO THE ER. SHE STATES SHE WENT A WEEK AGO AND SHE HAD AN US AND A CT BUT IT WAS NEGATIVE FOR BOTH.     BED REST FOR A COUPLE DAYS AND COME IN ON THURSDAY FOR APPT. IS THIS POSSIBLE FOR YOU TO HAVE HER STAY HOME ON BED REST UNTIL THURSDAY? SHE WOULD LIKE TO REST AND SEE IF THIS IS A SOLUTION. YOU HAVE AN OPENING FOR 9AM THURSDAY.

## 2023-08-22 NOTE — TELEPHONE ENCOUNTER
Have her sched an appt to get seen with me or urgent care if she is feeling faint. We can backdate her work note for today if needed.

## 2023-08-24 ENCOUNTER — OFFICE VISIT (OUTPATIENT)
Dept: INTERNAL MEDICINE | Facility: CLINIC | Age: 38
End: 2023-08-24
Payer: COMMERCIAL

## 2023-08-24 VITALS
HEIGHT: 70 IN | TEMPERATURE: 98.2 F | DIASTOLIC BLOOD PRESSURE: 84 MMHG | WEIGHT: 258.2 LBS | BODY MASS INDEX: 36.97 KG/M2 | OXYGEN SATURATION: 98 % | SYSTOLIC BLOOD PRESSURE: 120 MMHG | RESPIRATION RATE: 20 BRPM | HEART RATE: 94 BPM

## 2023-08-24 DIAGNOSIS — E56.1 VITAMIN K DEFICIENCY: Primary | ICD-10-CM

## 2023-08-24 DIAGNOSIS — R53.83 FATIGUE, UNSPECIFIED TYPE: Primary | ICD-10-CM

## 2023-08-24 DIAGNOSIS — Z98.84 HISTORY OF BARIATRIC SURGERY: ICD-10-CM

## 2023-08-24 DIAGNOSIS — R60.0 LOWER EXTREMITY EDEMA: ICD-10-CM

## 2023-08-24 PROCEDURE — 1160F RVW MEDS BY RX/DR IN RCRD: CPT | Performed by: PHYSICIAN ASSISTANT

## 2023-08-24 PROCEDURE — 99214 OFFICE O/P EST MOD 30 MIN: CPT | Performed by: PHYSICIAN ASSISTANT

## 2023-08-24 PROCEDURE — 1159F MED LIST DOCD IN RCRD: CPT | Performed by: PHYSICIAN ASSISTANT

## 2023-08-24 RX ORDER — POTASSIUM CHLORIDE 750 MG/1
TABLET, FILM COATED, EXTENDED RELEASE ORAL
Qty: 90 TABLET | OUTPATIENT
Start: 2023-08-24

## 2023-08-24 RX ORDER — FUROSEMIDE 20 MG/1
20 TABLET ORAL DAILY PRN
Qty: 30 TABLET | Refills: 0 | Status: SHIPPED | OUTPATIENT
Start: 2023-08-24

## 2023-08-24 RX ORDER — ERGOCALCIFEROL 1.25 MG/1
50000 CAPSULE ORAL 2 TIMES WEEKLY
Qty: 24 CAPSULE | Refills: 0 | Status: SHIPPED | OUTPATIENT
Start: 2023-08-24 | End: 2023-11-14

## 2023-08-24 RX ORDER — POTASSIUM CHLORIDE 750 MG/1
10 TABLET, FILM COATED, EXTENDED RELEASE ORAL DAILY PRN
Qty: 30 TABLET | Refills: 0 | Status: SHIPPED | OUTPATIENT
Start: 2023-08-24

## 2023-08-24 RX ORDER — FLUCONAZOLE 150 MG/1
150 TABLET ORAL ONCE
COMMUNITY
Start: 2023-08-17 | End: 2023-08-24

## 2023-08-24 NOTE — PROGRESS NOTES
"Chief Complaint  Loss of Consciousness and Edema    Subjective          History of Present Illness  Geneva Cordova presents to [facility] for   History of Present Illness  Dizziness:  Has been having episodes of dizziness and \"blacking out\" for the last few weeks. She had an episode at work where she \"blacked out\" while she was in her car on break, someone came to get her to let her know she had been on break for too long.  She is not sure if she fell asleep or zoned out.  Some days she feels great and some days she feels \"off\". The other day she felt jittery and had to pull off the road and eat something which improved her sx. She does have nausea sometimes but no vomiting. Has been fatigued. This has all been going on since she had gastric bypass but it is getting worse. She does admit to working a lot and is always on her feet. She will do 12 hr shifts during the weekend and nights during the week. Sometimes she is up for 48-72 hrs. She also is not eating meals sometimes, she doesn't feel hungry since surgery so will skip meals.   She thinks she is being too hard on her body and is requesting some time off work.  She takes multitude of vitamins but is not sure if they are helping.  She is keeping her follow-ups regularly with bariatrics.    Edema:  Has been having le edema for the last few months.  She initially went to the ER for work-up on this, no cause was found, they started her on HCTZ which helped a little.  She is always on her feet and sometimes does not sleep due to working back to back shifts.  No chest pains or SOA.  Not Currently taking HCTZ    The following portions of the patient's history were reviewed and updated as appropriate: allergies, current medications, past family history, past medical history, past social history, past surgical history and problem list.  Allergies   Allergen Reactions    Codeine Nausea And Vomiting, Other (See Comments) and GI Intolerance    "  UNKNOWN       Current Outpatient Medications:     Calcium Carb-Cholecalciferol (Calcium 600/Vitamin D) 600-10 MG-MCG chewable tablet, Chew 1 tablet Daily., Disp: 90 tablet, Rfl: 1    dicyclomine (BENTYL) 20 MG tablet, Take 1 tablet by mouth Every 8 (Eight) Hours As Needed (Abdominal cramps)., Disp: 21 tablet, Rfl: 0    ferrous gluconate (FERGON) 324 MG tablet, Take 1 tablet by mouth 2 (Two) Times a Day., Disp: 180 tablet, Rfl: 1    Multiple Vitamins-Minerals (MULTIPLE VITAMINS/WOMENS) tablet, Take 1 tablet by mouth. PT NOT CURRENTLY TAKING, Disp: , Rfl:     ondansetron ODT (ZOFRAN-ODT) 4 MG disintegrating tablet, Place 1 tablet on the tongue 4 (Four) Times a Day As Needed for Nausea or Vomiting., Disp: 12 tablet, Rfl: 0    promethazine (PHENERGAN) 25 MG suppository, Insert 1 suppository into the rectum Every 6 (Six) Hours As Needed for Nausea or Vomiting., Disp: 6 suppository, Rfl: 0    thiamine (VITAMIN B-1) 100 MG tablet, Take 1 tablet by mouth Daily., Disp: 90 tablet, Rfl: 1    Vitamin A 3 MG (03938 UT) capsule, Take 2 capsules by mouth Daily for 90 days. To be started after initial high-dose therapy completed., Disp: 180 capsule, Rfl: 0    Vitamin A 7.5 MG (74497 UT) capsule, Take 100,000IU (4 caps) daily x 3 days, then take 50,000IU (2 caps) daily x 2 wks (will follow w/ 20,000IU daily x 3 mos in other RX), Disp: 36 capsule, Rfl: 0    vitamin D3 125 MCG (5000 UT) capsule capsule, Take 1 capsule by mouth Daily., Disp: 90 capsule, Rfl: 1    furosemide (Lasix) 20 MG tablet, Take 1 tablet by mouth Daily As Needed (edema)., Disp: 30 tablet, Rfl: 0    potassium chloride 10 MEQ CR tablet, Take 1 tablet by mouth Daily As Needed (when taking lasix)., Disp: 30 tablet, Rfl: 0    vitamin D (ERGOCALCIFEROL) 1.25 MG (71324 UT) capsule capsule, Take 1 capsule by mouth 2 (Two) Times a Week for 24 doses., Disp: 24 capsule, Rfl: 0    Current Facility-Administered Medications:     cyanocobalamin injection 1,000 mcg, 1,000 mcg,  "Intramuscular, Q30 Days, Alice Fuentes PA-C, 1,000 mcg at 08/17/23 1100  New Medications Ordered This Visit   Medications    furosemide (Lasix) 20 MG tablet     Sig: Take 1 tablet by mouth Daily As Needed (edema).     Dispense:  30 tablet     Refill:  0    potassium chloride 10 MEQ CR tablet     Sig: Take 1 tablet by mouth Daily As Needed (when taking lasix).     Dispense:  30 tablet     Refill:  0     Social History     Tobacco Use   Smoking Status Never   Smokeless Tobacco Never        Objective   Vital Signs:   Vitals:    08/24/23 0811   BP: 120/84   Pulse: 94   Resp: 20   Temp: 98.2 °F (36.8 °C)   TempSrc: Temporal   SpO2: 98%   Weight: 117 kg (258 lb 3.2 oz)   Height: 177.8 cm (70\")   PainSc:   6   PainLoc: Leg      Body mass index is 37.05 kg/m².  Physical Exam  Vitals reviewed.   Constitutional:       General: She is not in acute distress.     Appearance: Normal appearance. She is obese.   HENT:      Head: Normocephalic and atraumatic.   Eyes:      General: No scleral icterus.     Extraocular Movements: Extraocular movements intact.      Conjunctiva/sclera: Conjunctivae normal.   Cardiovascular:      Rate and Rhythm: Normal rate and regular rhythm.      Heart sounds: Normal heart sounds. No murmur heard.  Pulmonary:      Effort: Pulmonary effort is normal. No respiratory distress.      Breath sounds: Normal breath sounds. No stridor. No wheezing or rhonchi.   Musculoskeletal:      Cervical back: Normal range of motion and neck supple.      Right lower leg: Edema (Mild) present.      Left lower leg: Edema (Mild) present.   Skin:     General: Skin is warm and dry.      Coloration: Skin is not jaundiced.   Neurological:      General: No focal deficit present.      Mental Status: She is alert and oriented to person, place, and time.      Gait: Gait normal.   Psychiatric:         Mood and Affect: Mood normal.         Behavior: Behavior normal.      No LMP recorded. Patient has had an ablation.         Result " Review :   The following data was reviewed by: Alice Fuentes PA-C on 08/24/2023:  CMP          5/15/2023    11:22 6/19/2023    09:53 8/15/2023    10:02   CMP   Glucose 79  90  77    BUN 9  8  9    Creatinine 0.63  0.64  0.72    EGFR  117.6     Sodium 142  144  143    Potassium 3.7  3.2  3.9    Chloride 108  110  106    Calcium 8.8  8.9  9.4    Total Protein 6.3   7.2    Total Protein  6.5     Albumin 3.9  3.6  4.2    Globulin 2.4   3.0    Globulin  2.9     Total Bilirubin 0.4  0.4  0.6    Alkaline Phosphatase 73  83  98    AST (SGOT) 16  49  44    ALT (SGPT) 16  60  46    Albumin/Globulin Ratio  1.2     BUN/Creatinine Ratio 14  12.5  13    Anion Gap  11.0       Other Labs from 8/15/2023 reviewed, she was low in vitamin K, vitamin C, vitamin D, vitamin A, and prealbumin                  Assessment and Plan    Diagnoses and all orders for this visit:    1. Fatigue, unspecified type (Primary)  Assessment & Plan:  Discussed importance of getting sleep and staying hydrated and eating a healthy balanced diet.  She is not getting herself time to sleep and she has been skipping meals.  We will give her a week off to focus on this and if she is not having improvement after that we will follow-up with a work-up      2. History of bariatric surgery    3. Lower extremity edema  Assessment & Plan:  Chronic, uncontrolled, Rx Lasix with potassium as needed for swelling, if symptoms occur she can take for a few days and then stop as symptoms improve.  Elevate legs when able, use compression stockings daily, try to avoid standing in 1 spot for too long    Orders:  -     furosemide (Lasix) 20 MG tablet; Take 1 tablet by mouth Daily As Needed (edema).  Dispense: 30 tablet; Refill: 0  -     potassium chloride 10 MEQ CR tablet; Take 1 tablet by mouth Daily As Needed (when taking lasix).  Dispense: 30 tablet; Refill: 0        Follow Up   Return in about 1 week (around 8/31/2023), or if symptoms worsen or fail to improve.    Follow up  if symptoms worsen or persist or has new or concerning symptoms, go to ER for severe symptoms.   Reviewed common medication effects and side effects and advised to report side effects immediately.  Encouraged medication compliance and the importance of keeping scheduled follow up appointments with me and any other providers.  If a referral was made please contact our office if you have not heard about an appointment in the next 2 weeks.   If labs or images are ordered we will contact you with the results within the next week.  If you have not heard from us after a week please call our office to inquire about the results.   Patient was given instructions and counseling regarding her condition or for health maintenance advice. Please see specific information pulled into the AVS if appropriate.     Alice Fuentes PA-C    * Please note that portions of this note were completed with a voice recognition program.

## 2023-08-24 NOTE — LETTER
August 24, 2023     Patient: Geneva Cordova   YOB: 1985   Date of Visit: 8/24/2023       To Whom It May Concern:    It is my medical opinion that Geneva Cordova may return to work on 9/1/23.            Sincerely,        Alice Fuentes PA-C    CC: No Recipients

## 2023-08-28 ENCOUNTER — OFFICE VISIT (OUTPATIENT)
Dept: INTERNAL MEDICINE | Facility: CLINIC | Age: 38
End: 2023-08-28
Payer: COMMERCIAL

## 2023-08-28 VITALS
HEART RATE: 74 BPM | BODY MASS INDEX: 37.05 KG/M2 | HEIGHT: 70 IN | RESPIRATION RATE: 20 BRPM | TEMPERATURE: 97.8 F | DIASTOLIC BLOOD PRESSURE: 82 MMHG | SYSTOLIC BLOOD PRESSURE: 138 MMHG | OXYGEN SATURATION: 99 %

## 2023-08-28 DIAGNOSIS — J06.9 ACUTE URI: Primary | ICD-10-CM

## 2023-08-28 DIAGNOSIS — Z20.822 EXPOSURE TO COVID-19 VIRUS: ICD-10-CM

## 2023-08-28 DIAGNOSIS — R05.9 COUGH, UNSPECIFIED TYPE: ICD-10-CM

## 2023-08-28 LAB
EXPIRATION DATE: NORMAL
FLUAV AG UPPER RESP QL IA.RAPID: NOT DETECTED
FLUBV AG UPPER RESP QL IA.RAPID: NOT DETECTED
INTERNAL CONTROL: NORMAL
Lab: NORMAL
SARS-COV-2 AG UPPER RESP QL IA.RAPID: NOT DETECTED

## 2023-08-28 PROCEDURE — 1160F RVW MEDS BY RX/DR IN RCRD: CPT | Performed by: PHYSICIAN ASSISTANT

## 2023-08-28 PROCEDURE — 1159F MED LIST DOCD IN RCRD: CPT | Performed by: PHYSICIAN ASSISTANT

## 2023-08-28 PROCEDURE — 87428 SARSCOV & INF VIR A&B AG IA: CPT | Performed by: PHYSICIAN ASSISTANT

## 2023-08-28 PROCEDURE — 99213 OFFICE O/P EST LOW 20 MIN: CPT | Performed by: PHYSICIAN ASSISTANT

## 2023-08-28 NOTE — PROGRESS NOTES
Chief Complaint  Cough, Generalized Body Aches, and Exposure To Known Illness (COVID /)    Subjective          History of Present Illness  Geneva Cordova presents to [facility] for   History of Present Illness  URI:  Was exposed to somebody with COVID on Thursday, 3 days later started developing symptoms.  She has had 1 day of scratchy throat, cough, body aches, sinus congestion. She has not had n/v/d, no wheeze, cough is not productive.    The following portions of the patient's history were reviewed and updated as appropriate: allergies, current medications, past family history, past medical history, past social history, past surgical history and problem list.  Allergies   Allergen Reactions    Codeine Nausea And Vomiting, Other (See Comments) and GI Intolerance     UNKNOWN       Current Outpatient Medications:     Calcium Carb-Cholecalciferol (Calcium 600/Vitamin D) 600-10 MG-MCG chewable tablet, Chew 1 tablet Daily., Disp: 90 tablet, Rfl: 1    dicyclomine (BENTYL) 20 MG tablet, Take 1 tablet by mouth Every 8 (Eight) Hours As Needed (Abdominal cramps)., Disp: 21 tablet, Rfl: 0    ferrous gluconate (FERGON) 324 MG tablet, Take 1 tablet by mouth 2 (Two) Times a Day., Disp: 180 tablet, Rfl: 1    furosemide (Lasix) 20 MG tablet, Take 1 tablet by mouth Daily As Needed (edema)., Disp: 30 tablet, Rfl: 0    Multiple Vitamins-Minerals (MULTIPLE VITAMINS/WOMENS) tablet, Take 1 tablet by mouth. PT NOT CURRENTLY TAKING, Disp: , Rfl:     ondansetron ODT (ZOFRAN-ODT) 4 MG disintegrating tablet, Place 1 tablet on the tongue 4 (Four) Times a Day As Needed for Nausea or Vomiting., Disp: 12 tablet, Rfl: 0    potassium chloride 10 MEQ CR tablet, Take 1 tablet by mouth Daily As Needed (when taking lasix)., Disp: 30 tablet, Rfl: 0    promethazine (PHENERGAN) 25 MG suppository, Insert 1 suppository into the rectum Every 6 (Six) Hours As Needed for Nausea or Vomiting., Disp: 6 suppository, Rfl: 0     "thiamine (VITAMIN B-1) 100 MG tablet, Take 1 tablet by mouth Daily., Disp: 90 tablet, Rfl: 1    Vitamin A 3 MG (20390 UT) capsule, Take 2 capsules by mouth Daily for 90 days. To be started after initial high-dose therapy completed., Disp: 180 capsule, Rfl: 0    Vitamin A 7.5 MG (65249 UT) capsule, Take 100,000IU (4 caps) daily x 3 days, then take 50,000IU (2 caps) daily x 2 wks (will follow w/ 20,000IU daily x 3 mos in other RX), Disp: 36 capsule, Rfl: 0    vitamin D (ERGOCALCIFEROL) 1.25 MG (82572 UT) capsule capsule, Take 1 capsule by mouth 2 (Two) Times a Week for 24 doses., Disp: 24 capsule, Rfl: 0    vitamin D3 125 MCG (5000 UT) capsule capsule, Take 1 capsule by mouth Daily., Disp: 90 capsule, Rfl: 1    Current Facility-Administered Medications:     cyanocobalamin injection 1,000 mcg, 1,000 mcg, Intramuscular, Q30 Days, Alice Fuentes PA-C, 1,000 mcg at 08/17/23 1100  No orders of the defined types were placed in this encounter.    Social History     Tobacco Use   Smoking Status Never   Smokeless Tobacco Never        Objective   Vital Signs:   Vitals:    08/28/23 1147   BP: 138/82   Pulse: 74   Resp: 20   Temp: 97.8 øF (36.6 øC)   TempSrc: Temporal   SpO2: 99%   Height: 177.8 cm (70\")   PainSc:   6   PainLoc: Back      Body mass index is 37.05 kg/mý.  Physical Exam  Vitals reviewed.   Constitutional:       General: She is not in acute distress.     Appearance: Normal appearance.   HENT:      Head: Normocephalic and atraumatic.      Right Ear: Tympanic membrane, ear canal and external ear normal.      Left Ear: Tympanic membrane, ear canal and external ear normal.      Nose: Nose normal.      Mouth/Throat:      Mouth: Mucous membranes are moist.      Pharynx: No oropharyngeal exudate or posterior oropharyngeal erythema.   Eyes:      General: No scleral icterus.     Extraocular Movements: Extraocular movements intact.      Conjunctiva/sclera: Conjunctivae normal.   Cardiovascular:      Rate and Rhythm: Normal " rate and regular rhythm.      Heart sounds: Normal heart sounds. No murmur heard.  Pulmonary:      Effort: Pulmonary effort is normal. No respiratory distress.      Breath sounds: Normal breath sounds. No stridor. No wheezing or rhonchi.   Musculoskeletal:      Cervical back: Normal range of motion and neck supple.   Skin:     General: Skin is warm and dry.      Coloration: Skin is not jaundiced.   Neurological:      General: No focal deficit present.      Mental Status: She is alert and oriented to person, place, and time.      Gait: Gait normal.   Psychiatric:         Mood and Affect: Mood normal.         Behavior: Behavior normal.      No LMP recorded. Patient has had an ablation.         Result Review :                   Assessment and Plan    Diagnoses and all orders for this visit:    1. Acute URI (Primary)  Assessment & Plan:  Supportive care, stay hydrated, rest.  Follow up if symptoms worsen or persist. Monitor for new symptoms. Go to the ER for respiratory distress, dehydration, or severe symptoms.  Covid neg, if sx persist consider retesting as she has only had sx x1 with known exposure      2. Cough, unspecified type  -     POCT SARS-CoV-2 Antigen LIAN + Flu    3. Exposure to COVID-19 virus  -     POCT SARS-CoV-2 Antigen LIAN + Flu        Follow Up   Return if symptoms worsen or fail to improve.    Follow up if symptoms worsen or persist or has new or concerning symptoms, go to ER for severe symptoms.   Reviewed common medication effects and side effects and advised to report side effects immediately.  Encouraged medication compliance and the importance of keeping scheduled follow up appointments with me and any other providers.  If a referral was made please contact our office if you have not heard about an appointment in the next 2 weeks.   If labs or images are ordered we will contact you with the results within the next week.  If you have not heard from us after a week please call our office to inquire  about the results.   Patient was given instructions and counseling regarding her condition or for health maintenance advice. Please see specific information pulled into the AVS if appropriate.     Alice Fuentes PA-C    * Please note that portions of this note were completed with a voice recognition program.

## 2023-08-29 PROBLEM — J06.9 UPPER RESPIRATORY TRACT INFECTION: Status: ACTIVE | Noted: 2023-08-29

## 2023-08-29 NOTE — ASSESSMENT & PLAN NOTE
Supportive care, stay hydrated, rest.  Follow up if symptoms worsen or persist. Monitor for new symptoms. Go to the ER for respiratory distress, dehydration, or severe symptoms.  Covid neg, if sx persist consider retesting as she has only had sx x1 with known exposure

## 2023-09-09 PROBLEM — R53.83 FATIGUE: Status: ACTIVE | Noted: 2023-09-09

## 2023-09-09 NOTE — ASSESSMENT & PLAN NOTE
Chronic, uncontrolled, Rx Lasix with potassium as needed for swelling, if symptoms occur she can take for a few days and then stop as symptoms improve.  Elevate legs when able, use compression stockings daily, try to avoid standing in 1 spot for too long

## 2023-09-09 NOTE — ASSESSMENT & PLAN NOTE
Discussed importance of getting sleep and staying hydrated and eating a healthy balanced diet.  She is not getting herself time to sleep and she has been skipping meals.  We will give her a week off to focus on this and if she is not having improvement after that we will follow-up with a work-up

## 2023-09-14 ENCOUNTER — OFFICE VISIT (OUTPATIENT)
Dept: BARIATRICS/WEIGHT MGMT | Facility: CLINIC | Age: 38
End: 2023-09-14
Payer: COMMERCIAL

## 2023-09-14 VITALS
SYSTOLIC BLOOD PRESSURE: 116 MMHG | OXYGEN SATURATION: 98 % | BODY MASS INDEX: 35.86 KG/M2 | HEART RATE: 92 BPM | WEIGHT: 250.5 LBS | TEMPERATURE: 99.1 F | RESPIRATION RATE: 16 BRPM | DIASTOLIC BLOOD PRESSURE: 80 MMHG | HEIGHT: 70 IN

## 2023-09-14 DIAGNOSIS — Z98.84 STATUS POST BARIATRIC SURGERY: ICD-10-CM

## 2023-09-14 DIAGNOSIS — K90.9 INTESTINAL MALABSORPTION, UNSPECIFIED TYPE: Primary | ICD-10-CM

## 2023-09-14 PROCEDURE — 99214 OFFICE O/P EST MOD 30 MIN: CPT | Performed by: PHYSICIAN ASSISTANT

## 2023-09-14 PROCEDURE — 1160F RVW MEDS BY RX/DR IN RCRD: CPT | Performed by: PHYSICIAN ASSISTANT

## 2023-09-14 PROCEDURE — 1159F MED LIST DOCD IN RCRD: CPT | Performed by: PHYSICIAN ASSISTANT

## 2023-09-14 NOTE — PROGRESS NOTES
[facility]  [address]  [address]  [facility] [id number] [phone]        Patient Name:  Geneva Cordova.  :  1985      Reason for Visit:   postop RNY/ BPD    HPI: Geneva Cordova is a 37 y.o. female s/p robotic RNY/BPD/recurrentPHHR 23 GDW (previous LSG/pHHR  GDW and sleeve revision 2020 PNQ complicated by postop stricture and uncontrolled reflux)      LOV a month ago. Sent VIt A rx, advised f/u for diet recal in a month. Lab result recs below.         [x]  Increase Protein intake to 120 grams per day- your protein levels are very low.      [x]   Increase your Vitamin B12 to 2500mcg under your tongue daily     [x]  Start Vitamin D 69668FM by mouth TWICE weekly x 3 months - prescription sent to your pharmacy      [x]  Make sure you are taking the Vitamin A replacement I sent to your pharmacy as directed at our last office visit.      [x]  Your Vitamin K levels are low. I would like to check a PT/INR. This can be collected at any [facility] facility. Continue Vitamin K 400mcg daily.      [x]  Otherwise, continue current vitamin regimen.     [x]  Discuss results with primary care provider.    Today says she is doing better. Overall feeling pretty good. Has BM twice a day. Denies dysphagia, reflux, nausea, vomiting, abdominal pain, pulmonary issues, and fevers. Feels her diet is pretty good, says she is tracking intake but left it at home, doesn't know how much protien she is getting. Recall:  Shake and egg in am. Turkey/ cheese roll up.  Dinner- pot roast, mashed potatoes, vegetables. Occ roll up snack or cheez its. Getting ?g prot/day.  Drinking 64+ fluid oz/day. Taking full SIPS vitamins.  B12 injections once a months and daily 1000 PO.  Thinks she is taking Vit D once a week, hasn't picked up new Rx for twice weekly yet. She is Is taking the A Rx and otc Vit K. Hasn't got INR checked yet.  Not on antacid. Is exercising.       Presurgery weight: 283  "pounds.  Today's weight is 114 kg (250 lb 8 oz) pounds, today's  Body mass index is 35.94 kg/m²., andHIS@ weight loss since surgery is 33 pounds.      Past Medical History:   Diagnosis Date    Anemia     Anxiety     Bilateral lower extremity edema     R>L    Boil, axilla     recurrent    Chronic back pain     no meds or injections, feels related to breasts    Concussion with no loss of consciousness 2022    COVID-19 2020    Depression     Dyspepsia     Dyspnea on exertion     Fatigue     Gastroesophageal reflux disease concurrent with and due to paraesophageal hernia     recurrent after LSG revision, EGD Dr. Hughes 23    Glucose intolerance (impaired glucose tolerance)     Helicobacter pylori (H. pylori) infection     asx and grossly nl EGD. \"abundant\"on path. HBT still + after PrevPak tx. LSG path neg. neg testing 2018    Hypoalbuminemia     Left Ovarian serous cystadenoma -removed at  section 2017    Migraine     resolved with weightloss    Obesity, morbid     VIRA (obstructive sleep apnea)     improved since WLS    PCOS (polycystic ovarian syndrome)     Right Essure implantation 2017 10/06/2017    S/P  section;left ovarian cystectomy and partial salpingectomy 2017    Seasonal allergies      Past Surgical History:   Procedure Laterality Date    ABDOMINOPLASTY  2022    [facility]    BILATERAL BREAST REDUCTION      2022     SECTION N/A 2017      SECTION PRIMARY;  CYSTECTOMY; Kwadwo Baxter MD; :  [facility];  Service:     DILATATION AND CURETTAGE      ENDOMETRIAL ABLATION  2020    Dr Cuevas    ENDOSCOPY N/A 2020    Procedure: ESOPHAGOGASTRODUODENOSCOPY;  Surgeon: Natalia Mariscal MD;  Location: [facility];  Service: Bariatric;  Laterality: N/A;    ENDOSCOPY  2022    Dr. La    ENDOSCOPY N/A 2023    Procedure: ESOPHAGOGASTRODUODENOSCOPY;  Surgeon: Ellen, " Erik Mercado MD;  Location:  [facility];  Service: Robotics - DaVinci;  Laterality: N/A;    ESSURE TUBAL LIGATION  2018    GASTRIC BYPASS N/A 4/14/2023    Procedure: GASTRIC BYPASS LAPAROSCOPIC WITH DAVINCI ROBOT AND BILIOPANCREATIC DIVERSION WITH DUODENAL SWITCH;  Surgeon: Erik Hughes MD;  Location:  [facility];  Service: Robotics - DaVinci;  Laterality: N/A;    GASTRIC SLEEVE LAPAROSCOPIC  07/2015    With Dr. Hughes    GASTRIC SLEEVE LAPAROSCOPIC N/A 12/01/2020    Procedure: GASTRIC SLEEVE LAPAROSCOPIC;  Surgeon: Natalia Mariscal MD;  Location:  [facility];  Service: Bariatric;  Laterality: N/A;    LAPAROSCOPIC CHOLECYSTECTOMY  02/2018    Dr. Hughes    PARAESOPHAGEAL HERNIA REPAIR N/A 4/14/2023    Procedure: LAPAROSCOPIC RECURRENT PARAESOPHAGEAL HERNIA REPAIR WITH DAVINCI ROBOT AND FALCIFORM LIGAMENT SLING ;  Surgeon: Erik Hughes MD;  Location:  [facility];  Service: Robotics - DaVinci;  Laterality: N/A;    SALPINGECTOMY Bilateral 02/2020    Dr Cuevas    TONSILLECTOMY  2014    WISDOM TOOTH EXTRACTION  2014     Outpatient Medications Marked as Taking for the 9/14/23 encounter (Office Visit) with Zenobia Leahy PA-C   Medication Sig Dispense Refill    Calcium Carb-Cholecalciferol (Calcium 600/Vitamin D) 600-10 MG-MCG chewable tablet Chew 1 tablet Daily. 90 tablet 1    dicyclomine (BENTYL) 20 MG tablet Take 1 tablet by mouth Every 8 (Eight) Hours As Needed (Abdominal cramps). 21 tablet 0    ferrous gluconate (FERGON) 324 MG tablet Take 1 tablet by mouth 2 (Two) Times a Day. 180 tablet 1    furosemide (Lasix) 20 MG tablet Take 1 tablet by mouth Daily As Needed (edema). 30 tablet 0    Multiple Vitamins-Minerals (MULTIPLE VITAMINS/WOMENS) tablet Take 1 tablet by mouth. PT NOT CURRENTLY TAKING      ondansetron ODT (ZOFRAN-ODT) 4 MG disintegrating tablet Place 1 tablet on the tongue 4 (Four) Times a Day As Needed for Nausea or Vomiting. 12 tablet 0    potassium chloride 10 MEQ CR tablet Take 1 tablet by  "mouth Daily As Needed (when taking lasix). 30 tablet 0    thiamine (VITAMIN B-1) 100 MG tablet Take 1 tablet by mouth Daily. 90 tablet 1    Vitamin A 3 MG (83469 UT) capsule Take 2 capsules by mouth Daily for 90 days. To be started after initial high-dose therapy completed. 180 capsule 0    vitamin D (ERGOCALCIFEROL) 1.25 MG (03392 UT) capsule capsule Take 1 capsule by mouth 2 (Two) Times a Week for 24 doses. 24 capsule 0    vitamin D3 125 MCG (5000 UT) capsule capsule Take 1 capsule by mouth Daily. 90 capsule 1     Current Facility-Administered Medications for the 9/14/23 encounter (Office Visit) with Zenobia Leahy PA-C   Medication Dose Route Frequency Provider Last Rate Last Admin    cyanocobalamin injection 1,000 mcg  1,000 mcg Intramuscular Q30 Days Alice Fuentes PA-C   1,000 mcg at 08/17/23 1100       Allergies   Allergen Reactions    Codeine Nausea And Vomiting, Other (See Comments) and GI Intolerance     UNKNOWN       Social History     Socioeconomic History    Marital status: Single    Number of children: 4    Years of education: College    Tobacco Use    Smoking status: Never    Smokeless tobacco: Never   Vaping Use    Vaping Use: Never used   Substance and Sexual Activity    Alcohol use: Not Currently    Drug use: No    Sexual activity: Not Currently     Partners: Male       /80 (BP Location: Right arm, Patient Position: Sitting)   Pulse 92   Temp 99.1 °F (37.3 °C)   Resp 16   Ht 177.8 cm (70\")   Wt 114 kg (250 lb 8 oz)   SpO2 98%   BMI 35.94 kg/m²     Physical Exam  Constitutional:       Appearance: She is well-developed. She is obese.   HENT:      Head: Normocephalic and atraumatic.   Cardiovascular:      Rate and Rhythm: Normal rate and regular rhythm.   Pulmonary:      Effort: Pulmonary effort is normal.      Breath sounds: Normal breath sounds.   Abdominal:      General: Bowel sounds are normal.      Palpations: Abdomen is soft.   Skin:     General: Skin is warm and dry. "   Neurological:      Mental Status: She is alert.   Psychiatric:         Mood and Affect: Mood normal.         Behavior: Behavior normal.         Thought Content: Thought content normal.         Judgment: Judgment normal.         Assessment:  s/p robotic RNY/BPD/recurrentPHHR 4/14/23 GDW (previous LSG/pHHR 2015 GDW and sleeve revision 12/2020 PNQ complicated by postop stricture and uncontrolled reflux)      ICD-10-CM ICD-9-CM   1. Intestinal malabsorption, unspecified type  K90.9 579.9   2. Status post bariatric surgery  Z98.84 V45.86         Plan:  discussed specific ways to cont to improve protein nutrition. Will have dieticina follow up in 1 month to help reach goal of 120g per day. Went over expected vitamins. Check INR today.  F/u with PA in 2 months. Discussed importance of getting appropriate nutrition, if unable to improve prealbumin, may need to discuss elongation.  Focus on good food choices and healthy habits. Increase  protein to 120g/day.  Continue fluids 64+oz daily. cont routine exercise.avoid ulcerogenics. Call w/ problems/concerns.         The patient was instructed to follow up in 2 months, sooner if needed.    I spent 30 minutes caring for Geneva on this date of service. This time includes time spent by me in the following activities: preparing for the visit, reviewing tests, performing a medically appropriate examination and/or evaluation, counseling and educating the patient/family/caregiver, ordering medications, tests, or procedures, and documenting information in the medical record.

## 2023-09-21 ENCOUNTER — LAB (OUTPATIENT)
Dept: INTERNAL MEDICINE | Facility: CLINIC | Age: 38
End: 2023-09-21
Payer: COMMERCIAL

## 2023-09-21 ENCOUNTER — OFFICE VISIT (OUTPATIENT)
Dept: INTERNAL MEDICINE | Facility: CLINIC | Age: 38
End: 2023-09-21
Payer: COMMERCIAL

## 2023-09-21 VITALS
SYSTOLIC BLOOD PRESSURE: 130 MMHG | BODY MASS INDEX: 36.65 KG/M2 | RESPIRATION RATE: 20 BRPM | DIASTOLIC BLOOD PRESSURE: 78 MMHG | HEIGHT: 70 IN | WEIGHT: 256 LBS | TEMPERATURE: 97.9 F | OXYGEN SATURATION: 98 % | HEART RATE: 82 BPM

## 2023-09-21 DIAGNOSIS — E56.1 VITAMIN K DEFICIENCY: ICD-10-CM

## 2023-09-21 DIAGNOSIS — K90.89 OTHER SPECIFIED INTESTINAL MALABSORPTION: ICD-10-CM

## 2023-09-21 DIAGNOSIS — Z11.3 SCREEN FOR STD (SEXUALLY TRANSMITTED DISEASE): ICD-10-CM

## 2023-09-21 DIAGNOSIS — Z00.00 ROUTINE GENERAL MEDICAL EXAMINATION AT A HEALTH CARE FACILITY: Primary | ICD-10-CM

## 2023-09-21 DIAGNOSIS — Z28.21 INFLUENZA VACCINATION DECLINED: ICD-10-CM

## 2023-09-21 DIAGNOSIS — R30.0 DYSURIA: ICD-10-CM

## 2023-09-21 DIAGNOSIS — E53.8 B12 DEFICIENCY: ICD-10-CM

## 2023-09-21 DIAGNOSIS — L73.2 HIDRADENITIS SUPPURATIVA: ICD-10-CM

## 2023-09-21 DIAGNOSIS — E66.09 CLASS 2 OBESITY DUE TO EXCESS CALORIES WITHOUT SERIOUS COMORBIDITY WITH BODY MASS INDEX (BMI) OF 36.0 TO 36.9 IN ADULT: ICD-10-CM

## 2023-09-21 PROBLEM — K90.9 MALABSORPTION: Status: ACTIVE | Noted: 2023-09-21

## 2023-09-21 LAB
BILIRUB BLD-MCNC: ABNORMAL MG/DL
CLARITY, POC: ABNORMAL
COLOR UR: ABNORMAL
EXPIRATION DATE: ABNORMAL
GLUCOSE UR STRIP-MCNC: NEGATIVE MG/DL
HAV IGM SERPL QL IA: NORMAL
HBV CORE IGM SERPL QL IA: NORMAL
HBV SURFACE AG SERPL QL IA: NORMAL
HCV AB SER DONR QL: NORMAL
HIV 1+2 AB+HIV1 P24 AG SERPL QL IA: NORMAL
INR PPP: 1.14 (ref 0.89–1.12)
KETONES UR QL: NEGATIVE
LEUKOCYTE EST, POC: NEGATIVE
Lab: ABNORMAL
NITRITE UR-MCNC: NEGATIVE MG/ML
PH UR: 5.5 [PH] (ref 5–8)
PROT UR STRIP-MCNC: ABNORMAL MG/DL
PROTHROMBIN TIME: 14.7 SECONDS (ref 12.2–14.5)
RBC # UR STRIP: NEGATIVE /UL
RPR SER QL: NORMAL
SP GR UR: 1.03 (ref 1–1.03)
UROBILINOGEN UR QL: NORMAL

## 2023-09-21 PROCEDURE — 87661 TRICHOMONAS VAGINALIS AMPLIF: CPT | Performed by: PHYSICIAN ASSISTANT

## 2023-09-21 PROCEDURE — 36415 COLL VENOUS BLD VENIPUNCTURE: CPT | Performed by: PHYSICIAN ASSISTANT

## 2023-09-21 PROCEDURE — 87491 CHLMYD TRACH DNA AMP PROBE: CPT | Performed by: PHYSICIAN ASSISTANT

## 2023-09-21 PROCEDURE — 87205 SMEAR GRAM STAIN: CPT | Performed by: PHYSICIAN ASSISTANT

## 2023-09-21 PROCEDURE — 87798 DETECT AGENT NOS DNA AMP: CPT | Performed by: PHYSICIAN ASSISTANT

## 2023-09-21 PROCEDURE — 87070 CULTURE OTHR SPECIMN AEROBIC: CPT | Performed by: PHYSICIAN ASSISTANT

## 2023-09-21 PROCEDURE — 85610 PROTHROMBIN TIME: CPT | Performed by: PHYSICIAN ASSISTANT

## 2023-09-21 PROCEDURE — 86592 SYPHILIS TEST NON-TREP QUAL: CPT | Performed by: PHYSICIAN ASSISTANT

## 2023-09-21 PROCEDURE — G0432 EIA HIV-1/HIV-2 SCREEN: HCPCS | Performed by: PHYSICIAN ASSISTANT

## 2023-09-21 PROCEDURE — 87801 DETECT AGNT MULT DNA AMPLI: CPT | Performed by: PHYSICIAN ASSISTANT

## 2023-09-21 PROCEDURE — 80074 ACUTE HEPATITIS PANEL: CPT | Performed by: PHYSICIAN ASSISTANT

## 2023-09-21 PROCEDURE — 87591 N.GONORRHOEAE DNA AMP PROB: CPT | Performed by: PHYSICIAN ASSISTANT

## 2023-09-21 RX ORDER — SULFAMETHOXAZOLE AND TRIMETHOPRIM 800; 160 MG/1; MG/1
1 TABLET ORAL 2 TIMES DAILY
Qty: 14 TABLET | Refills: 0 | Status: SHIPPED | OUTPATIENT
Start: 2023-09-21

## 2023-09-21 RX ORDER — FLUCONAZOLE 150 MG/1
150 TABLET ORAL ONCE
Qty: 1 TABLET | Refills: 0 | Status: SHIPPED | OUTPATIENT
Start: 2023-09-21 | End: 2023-09-21

## 2023-09-21 NOTE — PROGRESS NOTES
Chief Complaint  Annual Exam, Exposure to STD (New partner - needs screening ), and Boil on Incision    Subjective          History of Present Illness  Geneva Cordova presents to [facility] for a routine physical.  History of Present Illness  Overweight:  Had bariatric surgery on 4/14/2023. She has gone from 284 to 256lbs. she feels like she is doing okay currently. She just had f/u recently with bariatrics. She is to inc protien to 120g daily and also eat 5 times a day. She is taking her vitamins as dir but it is a lot of pills. She has had issues with malabsorption and is now taking oral B12 along with B12 shots and high dose vit D twice a week. Also taking vit A and vit K, needs INR checked today. Has not been taking antacid   No more episodes of dizziness.     Edema:  Legs have not been swelling recently. Has lasix to use if this starts up again.     Hidradenitis Suppurativa:  Saw derm a few mo ago and was dx with HS. She does get recurrent boils and has one along her spine today and in right groin area. She has not f/u with them recently.    Has a new partner and would like STD screening today  Diet/exercise: working closely with bariatrics at this time due to malabsorption issues, is increasing protein in her diet and eating 5 times a day.  Eye exams: utd  Dental exams: utd    PHQ-2 Depression Screening  Little interest or pleasure in doing things? 0-->not at all   Feeling down, depressed, or hopeless? 0-->not at all   PHQ-2 Total Score 0       No LMP recorded. Patient has had an ablation.   reports that she is not currently sexually active and has had partner(s) who are male.  Cancer-related family history includes Pancreatic cancer in her maternal grandmother. There is no history of Colon cancer, Endometrial cancer, or Ovarian cancer.    Mammogram- never  Pap- 8/2023 nl  Colonoscopy- never     reports that she has never smoked. She has never used smokeless tobacco.     Health  Maintenance   Topic Date Due    ANNUAL PHYSICAL  11/14/2019    COVID-19 Vaccine (3 - Pfizer series) 10/21/2021    INFLUENZA VACCINE  10/01/2023    PAP SMEAR  08/01/2024    BMI FOLLOWUP  09/21/2024    TDAP/TD VACCINES (4 - Td or Tdap) 01/14/2031    HEPATITIS C SCREENING  Completed    Pneumococcal Vaccine 0-64  Aged Out       Immunization History   Administered Date(s) Administered    COVID-19 (PFIZER) Purple Cap Monovalent 08/05/2021, 08/26/2021    Flu Vaccine Quad PF >36MO 12/10/2015, 09/08/2017    Fluzone (or Fluarix & Flulaval for VFC) >6mos 12/10/2015, 09/08/2017, 10/17/2022    Hep B, Adolescent or Pediatric 10/04/1999    Hepatitis B Adult/Adolescent IM 11/23/1998    Influenza, Unspecified 11/14/2018, 01/14/2021, 10/17/2022    MMR 11/23/1998, 09/17/2016    Tdap 09/16/2014, 08/10/2015, 01/14/2021    flucelvax quad pfs =>4 YRS 11/14/2018         The following portions of the patient's history were reviewed and updated as appropriate: allergies, current medications, past family history, past medical history, past social history, past surgical history and problem list.    Patient Active Problem List   Diagnosis    Obesity affecting pregnancy in second trimester, antepartum    VIRA (obstructive sleep apnea)    Migraine    Anemia    Joint pain    Chronic back pain    Anxiety    PCOS (polycystic ovarian syndrome)    Dyspepsia    Seasonal allergies    Moderate episode of recurrent major depressive disorder    FH: breast cancer    Dyspnea on exertion    Class 2 obesity due to excess calories without serious comorbidity with body mass index (BMI) of 36.0 to 36.9 in adult    Elevated blood pressure reading    GERD without esophagitis    Gastric hourglass stricture or stenosis    Dysphagia    Generalized anxiety disorder    Acute vaginitis    Pelvic pressure in female    Vitamin D deficiency    High risk medication use    Abnormal glucose    B12 deficiency    Iron deficiency    Routine general medical examination at a health  care facility    Bradycardia, sinus    Morbid obesity with body mass index of 40.0-44.9 in adult    S/P laparoscopic sleeve gastrectomy    Abdominal wall hematoma, initial encounter    Nausea and vomiting    History of bariatric surgery    Lower extremity edema    Acute URI    Fatigue    Hidradenitis suppurativa    Malabsorption     Allergies   Allergen Reactions    Codeine Nausea And Vomiting, Other (See Comments) and GI Intolerance     UNKNOWN       Current Outpatient Medications:     Calcium Carb-Cholecalciferol (Calcium 600/Vitamin D) 600-10 MG-MCG chewable tablet, Chew 1 tablet Daily., Disp: 90 tablet, Rfl: 1    dicyclomine (BENTYL) 20 MG tablet, Take 1 tablet by mouth Every 8 (Eight) Hours As Needed (Abdominal cramps)., Disp: 21 tablet, Rfl: 0    ferrous gluconate (FERGON) 324 MG tablet, Take 1 tablet by mouth 2 (Two) Times a Day., Disp: 180 tablet, Rfl: 1    furosemide (Lasix) 20 MG tablet, Take 1 tablet by mouth Daily As Needed (edema)., Disp: 30 tablet, Rfl: 0    Multiple Vitamins-Minerals (MULTIPLE VITAMINS/WOMENS) tablet, Take 1 tablet by mouth. PT NOT CURRENTLY TAKING, Disp: , Rfl:     ondansetron ODT (ZOFRAN-ODT) 4 MG disintegrating tablet, Place 1 tablet on the tongue 4 (Four) Times a Day As Needed for Nausea or Vomiting., Disp: 12 tablet, Rfl: 0    potassium chloride 10 MEQ CR tablet, Take 1 tablet by mouth Daily As Needed (when taking lasix)., Disp: 30 tablet, Rfl: 0    promethazine (PHENERGAN) 25 MG suppository, Insert 1 suppository into the rectum Every 6 (Six) Hours As Needed for Nausea or Vomiting., Disp: 6 suppository, Rfl: 0    thiamine (VITAMIN B-1) 100 MG tablet, Take 1 tablet by mouth Daily., Disp: 90 tablet, Rfl: 1    Vitamin A 3 MG (88352 UT) capsule, Take 2 capsules by mouth Daily for 90 days. To be started after initial high-dose therapy completed., Disp: 180 capsule, Rfl: 0    vitamin D (ERGOCALCIFEROL) 1.25 MG (63176 UT) capsule capsule, Take 1 capsule by mouth 2 (Two) Times a Week for  "24 doses., Disp: 24 capsule, Rfl: 0    vitamin D3 125 MCG (5000 UT) capsule capsule, Take 1 capsule by mouth Daily., Disp: 90 capsule, Rfl: 1    fluconazole (Diflucan) 150 MG tablet, Take 1 tablet by mouth 1 (One) Time for 1 dose., Disp: 1 tablet, Rfl: 0    sulfamethoxazole-trimethoprim (Bactrim DS) 800-160 MG per tablet, Take 1 tablet by mouth 2 (Two) Times a Day., Disp: 14 tablet, Rfl: 0    Current Facility-Administered Medications:     cyanocobalamin injection 1,000 mcg, 1,000 mcg, Intramuscular, Q30 Days, Alice Fuentes PA-C, 1,000 mcg at 23 1100  New Medications Ordered This Visit   Medications    sulfamethoxazole-trimethoprim (Bactrim DS) 800-160 MG per tablet     Sig: Take 1 tablet by mouth 2 (Two) Times a Day.     Dispense:  14 tablet     Refill:  0    fluconazole (Diflucan) 150 MG tablet     Sig: Take 1 tablet by mouth 1 (One) Time for 1 dose.     Dispense:  1 tablet     Refill:  0     Past Medical History:   Diagnosis Date    Anemia     Anxiety     Bilateral lower extremity edema     R>L    Boil, axilla     recurrent    Chronic back pain     no meds or injections, feels related to breasts    Concussion with no loss of consciousness 2022    COVID-19 2020    Depression     Dyspepsia     Dyspnea on exertion     Fatigue     Gastroesophageal reflux disease concurrent with and due to paraesophageal hernia     recurrent after LSG revision, EGD Dr. Hughes 23    Glucose intolerance (impaired glucose tolerance)     Helicobacter pylori (H. pylori) infection     asx and grossly nl EGD. \"abundant\"on path. HBT still + after PrevPak tx. LSG path neg. neg testing 2018    Hypoalbuminemia     Left Ovarian serous cystadenoma -removed at  section 2017    Migraine     resolved with weightloss    Obesity, morbid     VIRA (obstructive sleep apnea)     improved since WLS    PCOS (polycystic ovarian syndrome)     Right Essure implantation 2017 10/06/2017    S/P  " section;left ovarian cystectomy and partial salpingectomy 2017    Seasonal allergies       Past Surgical History:   Procedure Laterality Date    ABDOM[facility]Y  2022    [facility]    BILATERAL BREAST REDUCTION      2022     SECTION N/A 2017      SECTION PRIMARY;  CYSTECTOMY; Kwadwo Baxter MD; : [facility];  Service:     DILATATION AND CURETTAGE      ENDOMETRIAL ABLATION  2020    Dr Cuevas    ENDOSCOPY N/A 2020    Procedure: ESOPHAGOGASTRODUODENOSCOPY;  Surgeon: Natalia Mariscal MD;  Location:  [facility];  Service: Bariatric;  Laterality: N/A;    ENDOSCOPY  2022    Dr. La    ENDOSCOPY N/A 2023    Procedure: ESOPHAGOGASTRODUODENOSCOPY;  Surgeon: Erik Hughes MD;  Location:  [facility];  Service: Robotics - DaVinci;  Laterality: N/A;    ESSURE TUBAL LIGATION      GASTRIC BYPASS N/A 2023    Procedure: GASTRIC BYPASS LAPAROSCOPIC WITH DAVINCI ROBOT AND BILIOPANCREATIC DIVERSION WITH DUODENAL SWITCH;  Surgeon: Erik Hughes MD;  Location:  [facility];  Service: Robotics - DaVinci;  Laterality: N/A;    GASTRIC SLEEVE LAPAROSCOPIC  2015    With Dr. Hughes    GASTRIC SLEEVE LAPAROSCOPIC N/A 2020    Procedure: GASTRIC SLEEVE LAPAROSCOPIC;  Surgeon: Natalia Mariscal MD;  Location:  [facility];  Service: Bariatric;  Laterality: N/A;    LAPAROSCOPIC CHOLECYSTECTOMY  2018    Dr. Hughes    PARAESOPHAGEAL HERNIA REPAIR N/A 2023    Procedure: LAPAROSCOPIC RECURRENT PARAESOPHAGEAL HERNIA REPAIR WITH DAVINCI ROBOT AND FALCIFORM LIGAMENT SLING ;  Surgeon: Erik Hughes MD;  Location:  [facility];  Service: Robotics - DaVinci;  Laterality: N/A;    SALPINGECTOMY Bilateral 2020    Dr Cuevas    TONSILLECTOMY  2014    WISDOM TOOTH EXTRACTION  2014      Family History   Problem Relation Age of Onset    No Known Problems Other     Sleep apnea Mother     Diabetes Mother     No Known Problems  "Father     Pancreatic cancer Maternal Grandmother     No Known Problems Sister     No Known Problems Brother     No Known Problems Daughter     No Known Problems Son     No Known Problems Paternal Grandmother     No Known Problems Maternal Aunt     No Known Problems Paternal Aunt     BRCA 1/2 Neg Hx     Colon cancer Neg Hx     Endometrial cancer Neg Hx     Ovarian cancer Neg Hx       Social History     Socioeconomic History    Marital status: Single    Number of children: 4    Years of education: College    Tobacco Use    Smoking status: Never    Smokeless tobacco: Never   Vaping Use    Vaping Use: Never used   Substance and Sexual Activity    Alcohol use: Not Currently    Drug use: No    Sexual activity: Not Currently     Partners: Male        Objective   Vital Signs:   Vitals:    09/21/23 0822   BP: 130/78   Pulse: 82   Resp: 20   Temp: 97.9 °F (36.6 °C)   TempSrc: Temporal   SpO2: 98%   Weight: 116 kg (256 lb)   Height: 177.8 cm (70\")   PainSc: 0-No pain      Body mass index is 36.73 kg/m².       Physical Exam  Vitals reviewed.   Constitutional:       General: She is not in acute distress.     Appearance: Normal appearance. She is obese.   HENT:      Head: Normocephalic and atraumatic.   Eyes:      General: No scleral icterus.     Extraocular Movements: Extraocular movements intact.      Conjunctiva/sclera: Conjunctivae normal.   Cardiovascular:      Rate and Rhythm: Normal rate and regular rhythm.      Heart sounds: Normal heart sounds. No murmur heard.  Pulmonary:      Effort: Pulmonary effort is normal. No respiratory distress.      Breath sounds: Normal breath sounds. No stridor. No wheezing or rhonchi.   Musculoskeletal:      Cervical back: Normal range of motion and neck supple.   Skin:     General: Skin is warm and dry.      Coloration: Skin is not jaundiced.      Findings: Lesion (2cm abscess rt groin, 6mm abscess mid back) present.   Neurological:      General: No focal deficit present.      Mental " Status: She is alert and oriented to person, place, and time.      Gait: Gait normal.   Psychiatric:         Mood and Affect: Mood normal.         Behavior: Behavior normal.        Result Review :                   Assessment and Plan    Diagnoses and all orders for this visit:    1. Routine general medical examination at a health care facility (Primary)    2. Hidradenitis suppurativa  Assessment & Plan:  Treat acute lesions with bactrim, f/u with derm (saw [facility])     Orders:  -     sulfamethoxazole-trimethoprim (Bactrim DS) 800-160 MG per tablet; Take 1 tablet by mouth 2 (Two) Times a Day.  Dispense: 14 tablet; Refill: 0  -     Wound Culture - Wound, Back    3. Screen for STD (sexually transmitted disease)  -     HIV-1 / O / 2 Ag / Antibody; Future  -     Hepatitis Panel, Acute; Future  -     RPR; Future  -     NuSwab VG+ - Swab, Vagina; Future    4. Class 2 obesity due to excess calories without serious comorbidity with body mass index (BMI) of 36.0 to 36.9 in adult  Assessment & Plan:  Patient's (Body mass index is 36.73 kg/m².) indicates that they are morbidly/severely obese (BMI > 40 or > 35 with obesity - related health condition) with health conditions that include GERD . Weight is improving with treatment. BMI is is above average; BMI management plan is completed. We discussed portion control and increasing exercise. F/u with Bariatrics as dir.  Advised to take vitamins as directed, discussed ways to inc protein.      5. B12 deficiency  Assessment & Plan:  Cont B12 shots monthly along with oral B12 as dir by bariatrics      6. Other specified intestinal malabsorption  Assessment & Plan:  Cont vitamins as dir, balanced diet, eat 5x a day as dir by bariatrics      7. Dysuria  -     fluconazole (Diflucan) 150 MG tablet; Take 1 tablet by mouth 1 (One) Time for 1 dose.  Dispense: 1 tablet; Refill: 0  -     Cancel: POC Urinalysis Dipstick, Automated  -     POC Urinalysis Dipstick, Automated; Future    8.  Influenza vaccination declined            Follow Up   Return in about 6 months (around 3/21/2024) for Chronic Conditions.    Counseled on health maintenance topics and preventative care recommendations. Follow up yearly for routine physical exam. Diet and exercise counseling given. See dentist and eye doctor yearly as directed.    If a referral was made please contact our office if you have not heard about an appointment in the next 2 weeks.   If labs or images are ordered we will contact you with the results within the next week.  If you have not heard from us after a week please call our office to inquire about the results.    Alice Fuentes PA-C    Patient was given instructions and counseling regarding her condition or for health maintenance advice. Please see specific information pulled into the AVS if appropriate.     * Please note that portions of this note were completed with a voice recognition program.

## 2023-09-21 NOTE — ASSESSMENT & PLAN NOTE
Patient's (Body mass index is 36.73 kg/m².) indicates that they are morbidly/severely obese (BMI > 40 or > 35 with obesity - related health condition) with health conditions that include GERD . Weight is improving with treatment. BMI is is above average; BMI management plan is completed. We discussed portion control and increasing exercise. F/u with Bariatrics as dir.  Advised to take vitamins as directed, discussed ways to inc protein.

## 2023-09-23 LAB
BACTERIA SPEC AEROBE CULT: NORMAL
GRAM STN SPEC: NORMAL
GRAM STN SPEC: NORMAL

## 2023-09-24 LAB
A VAGINAE DNA VAG QL NAA+PROBE: NORMAL SCORE
BVAB2 DNA VAG QL NAA+PROBE: NORMAL SCORE
C ALBICANS DNA VAG QL NAA+PROBE: NEGATIVE
C GLABRATA DNA VAG QL NAA+PROBE: NEGATIVE
C TRACH DNA VAG QL NAA+PROBE: NEGATIVE
MEGA1 DNA VAG QL NAA+PROBE: NORMAL SCORE
N GONORRHOEA DNA VAG QL NAA+PROBE: NEGATIVE
T VAGINALIS DNA VAG QL NAA+PROBE: NEGATIVE

## 2023-10-05 DIAGNOSIS — E56.1 VITAMIN K DEFICIENCY: Primary | ICD-10-CM

## 2023-10-05 RX ORDER — PHYTONADIONE 5 MG/1
20 TABLET ORAL ONCE
Qty: 4 TABLET | Refills: 0 | Status: SHIPPED | OUTPATIENT
Start: 2023-10-05 | End: 2023-10-05

## 2023-10-09 ENCOUNTER — OFFICE VISIT (OUTPATIENT)
Dept: INTERNAL MEDICINE | Facility: CLINIC | Age: 38
End: 2023-10-09
Payer: COMMERCIAL

## 2023-10-09 VITALS
HEART RATE: 75 BPM | SYSTOLIC BLOOD PRESSURE: 138 MMHG | DIASTOLIC BLOOD PRESSURE: 90 MMHG | TEMPERATURE: 98 F | OXYGEN SATURATION: 98 % | RESPIRATION RATE: 20 BRPM

## 2023-10-09 DIAGNOSIS — G43.909 MIGRAINE WITHOUT STATUS MIGRAINOSUS, NOT INTRACTABLE, UNSPECIFIED MIGRAINE TYPE: Primary | ICD-10-CM

## 2023-10-09 DIAGNOSIS — B34.9 VIRAL INFECTION: ICD-10-CM

## 2023-10-09 LAB
EXPIRATION DATE: NORMAL
EXPIRATION DATE: NORMAL
FLUAV AG NPH QL: NEGATIVE
FLUBV AG NPH QL: NEGATIVE
INTERNAL CONTROL: NORMAL
INTERNAL CONTROL: NORMAL
Lab: NORMAL
Lab: NORMAL
SARS-COV-2 AG UPPER RESP QL IA.RAPID: NOT DETECTED

## 2023-10-09 PROCEDURE — 87804 INFLUENZA ASSAY W/OPTIC: CPT | Performed by: PHYSICIAN ASSISTANT

## 2023-10-09 PROCEDURE — 1159F MED LIST DOCD IN RCRD: CPT | Performed by: PHYSICIAN ASSISTANT

## 2023-10-09 PROCEDURE — 1160F RVW MEDS BY RX/DR IN RCRD: CPT | Performed by: PHYSICIAN ASSISTANT

## 2023-10-09 PROCEDURE — 99214 OFFICE O/P EST MOD 30 MIN: CPT | Performed by: PHYSICIAN ASSISTANT

## 2023-10-09 PROCEDURE — 87426 SARSCOV CORONAVIRUS AG IA: CPT | Performed by: PHYSICIAN ASSISTANT

## 2023-10-09 RX ORDER — SUMATRIPTAN 50 MG/1
TABLET, FILM COATED ORAL
Qty: 9 TABLET | Refills: 0 | Status: SHIPPED | OUTPATIENT
Start: 2023-10-09

## 2023-10-09 NOTE — ASSESSMENT & PLAN NOTE
Supportive care, stay hydrated, rest.  Follow up if symptoms worsen or persist. Monitor for new symptoms. Go to the ER for respiratory distress, dehydration, or severe symptoms.  Flu/covid neg

## 2023-10-09 NOTE — PROGRESS NOTES
Chief Complaint  headache and Abdominal Pain    Subjective          History of Present Illness  Geneva Cordova presents to [facility] for   History of Present Illness  H/a:  Has had body aches and chills that started last night. She has a h/a and stomach ache in the middle. Has not had nausea or vomiting, no diarrhea but may be constipated. She gets migraines infrequently and this feels like a migraine. She used to take migraine med prn but does not recall what it was.   Has been around people at work who were sick. Has not had a cough or SOA/wheeze.       The following portions of the patient's history were reviewed and updated as appropriate: allergies, current medications, past family history, past medical history, past social history, past surgical history and problem list.  Allergies   Allergen Reactions    Codeine Nausea And Vomiting, Other (See Comments) and GI Intolerance     UNKNOWN       Current Outpatient Medications:     Calcium Carb-Cholecalciferol (Calcium 600/Vitamin D) 600-10 MG-MCG chewable tablet, Chew 1 tablet Daily., Disp: 90 tablet, Rfl: 1    dicyclomine (BENTYL) 20 MG tablet, Take 1 tablet by mouth Every 8 (Eight) Hours As Needed (Abdominal cramps)., Disp: 21 tablet, Rfl: 0    ferrous gluconate (FERGON) 324 MG tablet, Take 1 tablet by mouth 2 (Two) Times a Day., Disp: 180 tablet, Rfl: 1    furosemide (Lasix) 20 MG tablet, Take 1 tablet by mouth Daily As Needed (edema)., Disp: 30 tablet, Rfl: 0    Multiple Vitamins-Minerals (MULTIPLE VITAMINS/WOMENS) tablet, Take 1 tablet by mouth. PT NOT CURRENTLY TAKING, Disp: , Rfl:     ondansetron ODT (ZOFRAN-ODT) 4 MG disintegrating tablet, Place 1 tablet on the tongue 4 (Four) Times a Day As Needed for Nausea or Vomiting., Disp: 12 tablet, Rfl: 0    potassium chloride 10 MEQ CR tablet, Take 1 tablet by mouth Daily As Needed (when taking lasix)., Disp: 30 tablet, Rfl: 0    promethazine (PHENERGAN) 25 MG suppository, Insert 1  suppository into the rectum Every 6 (Six) Hours As Needed for Nausea or Vomiting., Disp: 6 suppository, Rfl: 0    thiamine (VITAMIN B-1) 100 MG tablet, Take 1 tablet by mouth Daily., Disp: 90 tablet, Rfl: 1    Vitamin A 3 MG (71679 UT) capsule, Take 2 capsules by mouth Daily for 90 days. To be started after initial high-dose therapy completed., Disp: 180 capsule, Rfl: 0    vitamin D (ERGOCALCIFEROL) 1.25 MG (38800 UT) capsule capsule, Take 1 capsule by mouth 2 (Two) Times a Week for 24 doses., Disp: 24 capsule, Rfl: 0    vitamin D3 125 MCG (5000 UT) capsule capsule, Take 1 capsule by mouth Daily., Disp: 90 capsule, Rfl: 1    SUMAtriptan (Imitrex) 50 MG tablet, Take one tablet at onset of headache. May repeat dose one time in 2 hours if headache not relieved., Disp: 9 tablet, Rfl: 0    Current Facility-Administered Medications:     cyanocobalamin injection 1,000 mcg, 1,000 mcg, Intramuscular, Q30 Days, Alice Fuentes PA-C, 1,000 mcg at 08/17/23 1100  New Medications Ordered This Visit   Medications    SUMAtriptan (Imitrex) 50 MG tablet     Sig: Take one tablet at onset of headache. May repeat dose one time in 2 hours if headache not relieved.     Dispense:  9 tablet     Refill:  0     Social History     Tobacco Use   Smoking Status Never   Smokeless Tobacco Never        Objective   Vital Signs:   Vitals:    10/09/23 1115   BP: 138/90   Pulse: 75   Resp: 20   Temp: 98 øF (36.7 øC)   TempSrc: Temporal   SpO2: 98%   PainSc:   4   PainLoc: Generalized      There is no height or weight on file to calculate BMI.  Physical Exam  Vitals reviewed.   Constitutional:       General: She is not in acute distress.     Appearance: Normal appearance.   HENT:      Head: Normocephalic and atraumatic.      Right Ear: Tympanic membrane, ear canal and external ear normal.      Left Ear: Tympanic membrane, ear canal and external ear normal.      Nose: Nose normal.      Mouth/Throat:      Mouth: Mucous membranes are moist.      Pharynx:  No oropharyngeal exudate or posterior oropharyngeal erythema.   Eyes:      General: No scleral icterus.     Extraocular Movements: Extraocular movements intact.      Conjunctiva/sclera: Conjunctivae normal.   Cardiovascular:      Rate and Rhythm: Normal rate and regular rhythm.      Heart sounds: Normal heart sounds. No murmur heard.  Pulmonary:      Effort: Pulmonary effort is normal. No respiratory distress.      Breath sounds: Normal breath sounds. No stridor. No wheezing or rhonchi.   Musculoskeletal:      Cervical back: Normal range of motion and neck supple.   Skin:     General: Skin is warm and dry.      Coloration: Skin is not jaundiced.   Neurological:      General: No focal deficit present.      Mental Status: She is alert and oriented to person, place, and time.      Gait: Gait normal.   Psychiatric:         Mood and Affect: Mood normal.         Behavior: Behavior normal.        No LMP recorded. Patient has had an ablation.         Result Review :              Results for orders placed or performed in visit on 10/09/23   POCT Influenza A/B    Specimen: Swab   Result Value Ref Range    Rapid Influenza A Ag Negative Negative    Rapid Influenza B Ag Negative Negative    Internal Control Passed Passed    Lot Number [id number]     Expiration Date 12/01/2025    POCT VERITOR SARS-CoV-2 Antigen    Specimen: Nasopharynx; Swab   Result Value Ref Range    SARS Antigen Not Detected Not Detected, Presumptive Negative    Internal Control Passed Passed    Lot Number [id number]     Expiration Date 4/2/2024           Assessment and Plan    Diagnoses and all orders for this visit:    1. Migraine without status migrainosus, not intractable, unspecified migraine type (Primary)  Assessment & Plan:  Chronic, uncontrolled, will start imitrex for prn use. Was started on topamax earlier this year, consider restarting this if having frequent migraine.    Orders:  -     SUMAtriptan (Imitrex) 50 MG tablet; Take one tablet at onset of  headache. May repeat dose one time in 2 hours if headache not relieved.  Dispense: 9 tablet; Refill: 0    2. Viral infection  Assessment & Plan:  Supportive care, stay hydrated, rest.  Follow up if symptoms worsen or persist. Monitor for new symptoms. Go to the ER for respiratory distress, dehydration, or severe symptoms.  Flu/covid neg    Orders:  -     POCT Influenza A/B  -     POCT VERITOR SARS-CoV-2 Antigen        Follow Up   Return if symptoms worsen or fail to improve.    Follow up if symptoms worsen or persist or has new or concerning symptoms, go to ER for severe symptoms.   Reviewed common medication effects and side effects and advised to report side effects immediately.  Encouraged medication compliance and the importance of keeping scheduled follow up appointments with me and any other providers.  If a referral was made please contact our office if you have not heard about an appointment in the next 2 weeks.   If labs or images are ordered we will contact you with the results within the next week.  If you have not heard from us after a week please call our office to inquire about the results.   Patient was given instructions and counseling regarding her condition or for health maintenance advice. Please see specific information pulled into the AVS if appropriate.     Alice Fuentes PA-C    * Please note that portions of this note were completed with a voice recognition program.

## 2023-10-09 NOTE — ASSESSMENT & PLAN NOTE
Chronic, uncontrolled, will start imitrex for prn use. Was started on topamax earlier this year, consider restarting this if having frequent migraine.

## 2023-10-10 ENCOUNTER — TELEPHONE (OUTPATIENT)
Dept: INTERNAL MEDICINE | Facility: CLINIC | Age: 38
End: 2023-10-10
Payer: COMMERCIAL

## 2023-10-10 NOTE — TELEPHONE ENCOUNTER
Caller: Geneva Cordova    Relationship: Self    Best call back number: [phone]    What form or medical record are you requesting: WORK EXCUSE    Who is requesting this form or medical record from you: EMPLOYER    How would you like to receive the form or medical records (pick-up, mail, fax):     PLEASE PUT IN [employer]      Additional notes:     NEEDS WORK EXCUSE FOR 10/9-10/11 RETURNING 10/12.  PLEASE PUT IN Get[employer]

## 2023-10-12 DIAGNOSIS — D64.9 ANEMIA, UNSPECIFIED TYPE: ICD-10-CM

## 2023-10-12 RX ORDER — LANOLIN ALCOHOL/MO/W.PET/CERES
CREAM (GRAM) TOPICAL
Qty: 90 TABLET | Refills: 1 | OUTPATIENT
Start: 2023-10-12

## 2023-12-14 ENCOUNTER — OFFICE VISIT (OUTPATIENT)
Dept: INTERNAL MEDICINE | Facility: CLINIC | Age: 38
End: 2023-12-14
Payer: COMMERCIAL

## 2023-12-14 VITALS
TEMPERATURE: 97.5 F | WEIGHT: 249.8 LBS | OXYGEN SATURATION: 97 % | SYSTOLIC BLOOD PRESSURE: 122 MMHG | BODY MASS INDEX: 35.76 KG/M2 | DIASTOLIC BLOOD PRESSURE: 70 MMHG | HEART RATE: 84 BPM | HEIGHT: 70 IN

## 2023-12-14 DIAGNOSIS — N89.8 VAGINAL ITCHING: ICD-10-CM

## 2023-12-14 DIAGNOSIS — Z20.2 POTENTIAL EXPOSURE TO STD: Primary | ICD-10-CM

## 2023-12-14 LAB
BILIRUB BLD-MCNC: NEGATIVE MG/DL
CLARITY, POC: CLEAR
COLOR UR: ABNORMAL
EXPIRATION DATE: ABNORMAL
GLUCOSE UR STRIP-MCNC: NEGATIVE MG/DL
KETONES UR QL: NEGATIVE
LEUKOCYTE EST, POC: NEGATIVE
Lab: ABNORMAL
NITRITE UR-MCNC: NEGATIVE MG/ML
PH UR: 6 [PH] (ref 5–8)
PROT UR STRIP-MCNC: ABNORMAL MG/DL
RBC # UR STRIP: NEGATIVE /UL
SP GR UR: 1.03 (ref 1–1.03)
UROBILINOGEN UR QL: ABNORMAL

## 2023-12-14 PROCEDURE — 87798 DETECT AGENT NOS DNA AMP: CPT

## 2023-12-14 PROCEDURE — 87801 DETECT AGNT MULT DNA AMPLI: CPT

## 2023-12-14 PROCEDURE — 87661 TRICHOMONAS VAGINALIS AMPLIF: CPT

## 2023-12-14 PROCEDURE — 87591 N.GONORRHOEAE DNA AMP PROB: CPT

## 2023-12-14 PROCEDURE — 87491 CHLMYD TRACH DNA AMP PROBE: CPT

## 2023-12-14 RX ORDER — CEFTRIAXONE 1 G/1
1 INJECTION, POWDER, FOR SOLUTION INTRAMUSCULAR; INTRAVENOUS EVERY 24 HOURS
Status: COMPLETED | OUTPATIENT
Start: 2023-12-14 | End: 2023-12-14

## 2023-12-14 RX ORDER — DOXYCYCLINE HYCLATE 100 MG/1
100 CAPSULE ORAL 2 TIMES DAILY
Qty: 14 CAPSULE | Refills: 0 | Status: SHIPPED | OUTPATIENT
Start: 2023-12-14

## 2023-12-14 RX ORDER — FLUCONAZOLE 150 MG/1
150 TABLET ORAL ONCE
Qty: 1 TABLET | Refills: 0 | Status: SHIPPED | OUTPATIENT
Start: 2023-12-14 | End: 2023-12-14

## 2023-12-14 RX ADMIN — CEFTRIAXONE 1 G: 1 INJECTION, POWDER, FOR SOLUTION INTRAMUSCULAR; INTRAVENOUS at 13:52

## 2023-12-14 NOTE — PROGRESS NOTES
"    Office Note     Name: Geneva Cordova    : 1985     MRN: [id number]     Chief Complaint  Urinary Tract Infection (Patient in clinic with complaints of a UTI and itching in vaginal area ) and Exposure to STD    Subjective     History of Present Illness:  Geneva Cordova is a 38 y.o. female who presents today for vaginal itching & greenish vaginal discharge since Monday. She denies dysuria, hematuria, fevers or abdominal, rash or lesions. Concerned for potential exposure to STI. States she had a sexual encounter with a man who may have been exposed to an STD.     Review of Systems    Past Medical History:   Diagnosis Date    Anemia     Anxiety     Bilateral lower extremity edema     R>L    Boil, axilla     recurrent    Chronic back pain     no meds or injections, feels related to breasts    Concussion with no loss of consciousness 2022    COVID-19 2020    Depression     Dyspepsia     Dyspnea on exertion     Fatigue     Gastroesophageal reflux disease concurrent with and due to paraesophageal hernia     recurrent after LSG revision, EGD Dr. Hughes 23    Glucose intolerance (impaired glucose tolerance)     Helicobacter pylori (H. pylori) infection     asx and grossly nl EGD. \"abundant\"on path. HBT still + after PrevPak tx. LSG path neg. neg testing 2018    Hypoalbuminemia     Left Ovarian serous cystadenoma -removed at  section 2017    Migraine     resolved with weightloss    Obesity, morbid     VIRA (obstructive sleep apnea)     improved since WLS    PCOS (polycystic ovarian syndrome)     Right Essure implantation 2017 10/06/2017    S/P  section;left ovarian cystectomy and partial salpingectomy 2017    Seasonal allergies      Past Surgical History:   Procedure Laterality Date    ABDOMINOPLASTY  2022    Osmany Mclain at [facility]    BILATERAL BREAST REDUCTION      2022     SECTION N/A 2017      SECTION " PRIMARY;  CYSTECTOMY; Kwadwo Baxter MD; : [facility];  Service:     DILATATION AND CURETTAGE  2015    ENDOMETRIAL ABLATION  02/2020    Dr Cuevas    ENDOSCOPY N/A 12/01/2020    Procedure: ESOPHAGOGASTRODUODENOSCOPY;  Surgeon: Natalia Mariscal MD;  Location: [facility];  Service: Bariatric;  Laterality: N/A;    ENDOSCOPY  09/2022    Dr. La    ENDOSCOPY N/A 4/14/2023    Procedure: ESOPHAGOGASTRODUODENOSCOPY;  Surgeon: Erik Hughes MD;  Location:  [facility];  Service: Robotics - DaVinci;  Laterality: N/A;    ESSURE TUBAL LIGATION  2018    GASTRIC BYPASS N/A 4/14/2023    Procedure: GASTRIC BYPASS LAPAROSCOPIC WITH DAVINCI ROBOT AND BILIOPANCREATIC DIVERSION WITH DUODENAL SWITCH;  Surgeon: Erik Hughes MD;  Location: [facility];  Service: Robotics - DaVinci;  Laterality: N/A;    GASTRIC SLEEVE LAPAROSCOPIC  07/2015    With Dr. Hughes    GASTRIC SLEEVE LAPAROSCOPIC N/A 12/01/2020    Procedure: GASTRIC SLEEVE LAPAROSCOPIC;  Surgeon: Natalia Mariscal MD;  Location:  [facility];  Service: Bariatric;  Laterality: N/A;    LAPAROSCOPIC CHOLECYSTECTOMY  02/2018    Dr. Hughes    PARAESOPHAGEAL HERNIA REPAIR N/A 4/14/2023    Procedure: LAPAROSCOPIC RECURRENT PARAESOPHAGEAL HERNIA REPAIR WITH DAVINCI ROBOT AND FALCIFORM LIGAMENT SLING ;  Surgeon: Erik Hughes MD;  Location:  [facility];  Service: Robotics - DaVinci;  Laterality: N/A;    SALPINGECTOMY Bilateral 02/2020    Dr Cuevas    TONSILLECTOMY  2014    WISDOM TOOTH EXTRACTION  2014       Current Outpatient Medications:     Calcium Carb-Cholecalciferol (Calcium 600/Vitamin D) 600-10 MG-MCG chewable tablet, Chew 1 tablet Daily., Disp: 90 tablet, Rfl: 1    dicyclomine (BENTYL) 20 MG tablet, Take 1 tablet by mouth Every 8 (Eight) Hours As Needed (Abdominal cramps)., Disp: 21 tablet, Rfl: 0    ferrous gluconate (FERGON) 324 MG tablet, Take 1 tablet by mouth 2 (Two) Times a Day., Disp: 180 tablet, Rfl: 1    furosemide (Lasix) 20 MG tablet, Take 1  "tablet by mouth Daily As Needed (edema)., Disp: 30 tablet, Rfl: 0    Multiple Vitamins-Minerals (MULTIPLE VITAMINS/WOMENS) tablet, Take 1 tablet by mouth. PT NOT CURRENTLY TAKING, Disp: , Rfl:     ondansetron ODT (ZOFRAN-ODT) 4 MG disintegrating tablet, Place 1 tablet on the tongue 4 (Four) Times a Day As Needed for Nausea or Vomiting., Disp: 12 tablet, Rfl: 0    potassium chloride 10 MEQ CR tablet, Take 1 tablet by mouth Daily As Needed (when taking lasix)., Disp: 30 tablet, Rfl: 0    promethazine (PHENERGAN) 25 MG suppository, Insert 1 suppository into the rectum Every 6 (Six) Hours As Needed for Nausea or Vomiting., Disp: 6 suppository, Rfl: 0    SUMAtriptan (Imitrex) 50 MG tablet, Take one tablet at onset of headache. May repeat dose one time in 2 hours if headache not relieved., Disp: 9 tablet, Rfl: 0    thiamine (VITAMIN B-1) 100 MG tablet, Take 1 tablet by mouth Daily., Disp: 90 tablet, Rfl: 1    vitamin D3 125 MCG (5000 UT) capsule capsule, Take 1 capsule by mouth Daily., Disp: 90 capsule, Rfl: 1    doxycycline (VIBRAMYCIN) 100 MG capsule, Take 1 capsule by mouth 2 (Two) Times a Day., Disp: 14 capsule, Rfl: 0    fluconazole (Diflucan) 150 MG tablet, Take 1 tablet by mouth 1 (One) Time for 1 dose., Disp: 1 tablet, Rfl: 0    Current Facility-Administered Medications:     cyanocobalamin injection 1,000 mcg, 1,000 mcg, Intramuscular, Q30 Days, Alice Fuentes PA-C, 1,000 mcg at 08/17/23 1100  Allergies   Allergen Reactions    Codeine Nausea And Vomiting, Other (See Comments) and GI Intolerance     UNKNOWN       Objective     Vital Signs  /70   Pulse 84   Temp 97.5 °F (36.4 °C)   Ht 177.8 cm (70\")   Wt 113 kg (249 lb 12.8 oz)   SpO2 97%   BMI 35.84 kg/m²   Estimated body mass index is 35.84 kg/m² as calculated from the following:    Height as of this encounter: 177.8 cm (70\").    Weight as of this encounter: 113 kg (249 lb 12.8 oz).            Physical Exam  Vitals reviewed.   Constitutional:       " Appearance: Normal appearance. She is not ill-appearing.   HENT:      Head: Normocephalic and atraumatic.      Right Ear: Tympanic membrane, ear canal and external ear normal. There is no impacted cerumen.      Left Ear: Tympanic membrane, ear canal and external ear normal. There is no impacted cerumen.      Nose: Nose normal. No congestion or rhinorrhea.      Mouth/Throat:      Mouth: Mucous membranes are moist.      Pharynx: Oropharynx is clear. Uvula midline. No oropharyngeal exudate or posterior oropharyngeal erythema.      Tonsils: No tonsillar exudate or tonsillar abscesses. 0 on the right. 0 on the left.   Eyes:      Extraocular Movements: Extraocular movements intact.      Conjunctiva/sclera: Conjunctivae normal.      Pupils: Pupils are equal, round, and reactive to light.   Neck:      Thyroid: No thyroid mass, thyromegaly or thyroid tenderness.   Cardiovascular:      Rate and Rhythm: Normal rate and regular rhythm.      Pulses: Normal pulses.           Radial pulses are 2+ on the right side and 2+ on the left side.      Heart sounds: Normal heart sounds, S1 normal and S2 normal. No murmur heard.  Pulmonary:      Effort: Pulmonary effort is normal. No tachypnea.      Breath sounds: Normal breath sounds and air entry. No decreased breath sounds, wheezing or rhonchi.   Abdominal:      General: Abdomen is flat. Bowel sounds are normal.      Palpations: Abdomen is soft.      Tenderness: There is no abdominal tenderness. There is no guarding or rebound.   Musculoskeletal:      Right lower leg: No edema.      Left lower leg: No edema.   Lymphadenopathy:      Cervical: No cervical adenopathy.   Skin:     General: Skin is warm and dry.      Capillary Refill: Capillary refill takes less than 2 seconds.   Neurological:      General: No focal deficit present.      Mental Status: She is alert and oriented to person, place, and time. Mental status is at baseline.      Sensory: Sensation is intact.      Motor: Motor  function is intact.      Coordination: Coordination is intact.      Gait: Gait is intact.   Psychiatric:         Attention and Perception: Attention and perception normal.         Mood and Affect: Mood and affect normal.         Speech: Speech normal.         Behavior: Behavior normal. Behavior is cooperative.         Thought Content: Thought content normal.         Cognition and Memory: Cognition and memory normal.         Judgment: Judgment normal.          Results for orders placed or performed in visit on 12/14/23   POCT urinalysis dipstick, automated    Specimen: Urine   Result Value Ref Range    Color Dark Yellow Yellow, Straw, Dark Yellow, Fatoumata    Clarity, UA Clear Clear    Specific Gravity  1.030 1.005 - 1.030    pH, Urine 6.0 5.0 - 8.0    Leukocytes Negative Negative    Nitrite, UA Negative Negative    Protein, POC Trace (A) Negative mg/dL    Glucose, UA Negative Negative mg/dL    Ketones, UA Negative Negative    Urobilinogen, UA Color obscures results (A) Normal, 0.2 E.U./dL    Bilirubin Negative Negative    Blood, UA Negative Negative    Lot Number [id number]     Expiration Date 01/14/2025               Assessment and Plan     Diagnoses and all orders for this visit:    1. Potential exposure to STD (Primary)  -     cefTRIAXone (ROCEPHIN) injection 1 g  -     doxycycline (VIBRAMYCIN) 100 MG capsule; Take 1 capsule by mouth 2 (Two) Times a Day.  Dispense: 14 capsule; Refill: 0  -     NuSwab BV & Candida - Swab, Vagina; Future  -     Chlamydia trachomatis, Neisseria gonorrhoeae, Trichomonas vaginalis, PCR - Urine, Urine, Clean Catch; Future  -     NuSwab BV & Candida - Swab, Vagina    2. Vaginal itching  -     POCT urinalysis dipstick, automated  -     fluconazole (Diflucan) 150 MG tablet; Take 1 tablet by mouth 1 (One) Time for 1 dose.  Dispense: 1 tablet; Refill: 0  -     NuSwab BV & Candida - Swab, Vagina; Future  -     Chlamydia trachomatis, Neisseria gonorrhoeae, Trichomonas vaginalis, PCR - Urine,  Urine, Clean Catch; Future  -     NuSwab BV & Candida - Swab, Vagina    -UA negative for infection  -will test for chlamydia, gonorrhea and trich.  -based on patients symptoms we will go ahead and treat for infection. 1g rocephin given in clinic today. Doxy sent to pharmacy  -Nuswab sent  -will also send diflucan to pharmacy  -will notify patient of results    If labs or images are ordered we will contact you with the results within the next week.  If you have not heard from us after a week please call our office to inquire about the results.    Follow Up  Return if symptoms worsen or fail to improve.    Prerna Wyatt, APRN

## 2023-12-18 LAB
A VAGINAE DNA VAG QL NAA+PROBE: NORMAL SCORE
BVAB2 DNA VAG QL NAA+PROBE: NORMAL SCORE
C ALBICANS DNA VAG QL NAA+PROBE: NEGATIVE
C GLABRATA DNA VAG QL NAA+PROBE: NEGATIVE
MEGA1 DNA VAG QL NAA+PROBE: NORMAL SCORE

## 2023-12-19 ENCOUNTER — TELEPHONE (OUTPATIENT)
Dept: INTERNAL MEDICINE | Facility: CLINIC | Age: 38
End: 2023-12-19
Payer: COMMERCIAL

## 2023-12-19 LAB
C TRACH RRNA SPEC QL NAA+PROBE: NEGATIVE
N GONORRHOEA RRNA SPEC QL NAA+PROBE: NEGATIVE
T VAGINALIS RRNA SPEC QL NAA+PROBE: NEGATIVE

## 2023-12-19 NOTE — TELEPHONE ENCOUNTER
----- Message from RAGHAV Cox sent at 12/19/2023  8:43 AM EST -----  Please let Geneva know that her STD panel was negative.

## 2024-01-05 ENCOUNTER — OFFICE VISIT (OUTPATIENT)
Dept: BARIATRICS/WEIGHT MGMT | Facility: CLINIC | Age: 39
End: 2024-01-05
Payer: COMMERCIAL

## 2024-01-05 VITALS
RESPIRATION RATE: 17 BRPM | WEIGHT: 251.5 LBS | OXYGEN SATURATION: 99 % | SYSTOLIC BLOOD PRESSURE: 128 MMHG | HEART RATE: 70 BPM | DIASTOLIC BLOOD PRESSURE: 68 MMHG | TEMPERATURE: 98.9 F | BODY MASS INDEX: 36.01 KG/M2 | HEIGHT: 70 IN

## 2024-01-05 DIAGNOSIS — E56.1 VITAMIN K DEFICIENCY: ICD-10-CM

## 2024-01-05 DIAGNOSIS — R79.0 ABNORMAL BLOOD LEVEL OF IRON: ICD-10-CM

## 2024-01-05 DIAGNOSIS — R53.83 FATIGUE, UNSPECIFIED TYPE: ICD-10-CM

## 2024-01-05 DIAGNOSIS — E66.9 OBESITY, CLASS II, BMI 35-39.9: Primary | ICD-10-CM

## 2024-01-05 DIAGNOSIS — E55.9 VITAMIN D DEFICIENCY: ICD-10-CM

## 2024-01-05 DIAGNOSIS — K90.9 INTESTINAL MALABSORPTION, UNSPECIFIED TYPE: ICD-10-CM

## 2024-01-05 PROCEDURE — 99214 OFFICE O/P EST MOD 30 MIN: CPT | Performed by: PHYSICIAN ASSISTANT

## 2024-01-05 NOTE — PROGRESS NOTES
"[facility] Bariatric Surgery  [address]  [address]  [facility] [address] [phone]        Patient Name:  Geneva Cordova  :  1985      Date of Visit: 2024      Reason for Visit:   9 months postop      HPI: Geneva Cordova is a 38 y.o. female s/p robotic RNY/BPD/recurrentPHHR 23 GDW (previous LSG/pHHR  GDW and sleeve revision 2020 PNQ complicated by postop stricture and uncontrolled reflux)      Has had RLQ pain x 2 days. It will radiate into her groin at times. Rates her pain as an 8/10 and it has progressively worsened over the last 2 days. Describes it as a \"feel like I can punch a wall it hurts so bad\" pain. Having associated nausea. No fever or vomiting.       She does not understand why she is not losing weight. Feeling very frustrated. Denies dysphagia, reflux, nausea, vomiting, hair loss, memory loss, vision changes, and numbness/tingling.  Getting 60-90g prot/day. Fasting for 16 hours a day. Will eat slim jims, sausage, pepperoni, cheese. Cooks hamburgers in gravy, baked chicken. Drinking 64 fluid oz/day.  6 month labs revealed low Vit A, D, K, prealbumin. Taking all SIPS vitamins.  Physical activity: goes to gym regularly. Walking 15 min, weights for 20 min, elliptical; 3 days/week. Not taking any ASA, NSAID, steroids     Presurgery weight: 283 pounds.  Today's weight is 114 kg (251 lb 8 oz) pounds, today's  Body mass index is 36.09 kg/m²., and weight loss since surgery is 32 pounds.      Past Medical History:   Diagnosis Date    Anemia     Anxiety     Bilateral lower extremity edema     R>L    Boil, axilla     recurrent    Chronic back pain     no meds or injections, feels related to breasts    Concussion with no loss of consciousness 2022    COVID-19 2020    Depression     Dyspepsia     Dyspnea on exertion     Fatigue     Gastroesophageal reflux disease concurrent with and due to paraesophageal hernia     recurrent after LSG revision, EGD Dr." "Ellen 23    Glucose intolerance (impaired glucose tolerance)     Helicobacter pylori (H. pylori) infection     asx and grossly nl EGD. \"abundant\"on path. HBT still + after PrevPak tx. LSG path neg. neg testing 2018    Hypoalbuminemia     Left Ovarian serous cystadenoma -removed at  section 2017    Migraine     resolved with weightloss    Obesity, morbid     VIRA (obstructive sleep apnea)     improved since WLS    PCOS (polycystic ovarian syndrome)     Right Essure implantation 2017 10/06/2017    S/P  section;left ovarian cystectomy and partial salpingectomy 2017    Seasonal allergies      Past Surgical History:   Procedure Laterality Date    ABDOMINOPLASTY  2022    [facility] Rayne at [facility]    BILATERAL BREAST REDUCTION      2022     SECTION N/A 2017      SECTION PRIMARY;  CYSTECTOMY; Kwadwo Baxter MD; :  [facility];  Service:     DILATATION AND CURETTAGE      ENDOMETRIAL ABLATION  2020    Dr Cuevas    ENDOSCOPY N/A 2020    Procedure: ESOPHAGOGASTRODUODENOSCOPY;  Surgeon: Natalia Mariscal MD;  Location:  [facility];  Service: Bariatric;  Laterality: N/A;    ENDOSCOPY  2022    Dr. La    ENDOSCOPY N/A 2023    Procedure: ESOPHAGOGASTRODUODENOSCOPY;  Surgeon: Erik Hughes MD;  Location:  [facility];  Service: Robotics - DaVinci;  Laterality: N/A;    ESSURE TUBAL LIGATION      GASTRIC BYPASS N/A 2023    Procedure: GASTRIC BYPASS LAPAROSCOPIC WITH DAVINCI ROBOT AND BILIOPANCREATIC DIVERSION WITH DUODENAL SWITCH;  Surgeon: Erik Hughes MD;  Location:  [facility];  Service: Robotics - DaVinci;  Laterality: N/A;    GASTRIC SLEEVE LAPAROSCOPIC  2015    With Dr. Hughes    GASTRIC SLEEVE LAPAROSCOPIC N/A 2020    Procedure: GASTRIC SLEEVE LAPAROSCOPIC;  Surgeon: Natalai Mariscal MD;  Location:  [facility];  Service: Bariatric;  Laterality: N/A;    LAPAROSCOPIC " CHOLECYSTECTOMY  02/2018    Dr. Hughes    PARAESOPHAGEAL HERNIA REPAIR N/A 4/14/2023    Procedure: LAPAROSCOPIC RECURRENT PARAESOPHAGEAL HERNIA REPAIR WITH DAVINCI ROBOT AND FALCIFORM LIGAMENT SLING ;  Surgeon: Erik Hughes MD;  Location: [facility];  Service: Robotics - DaVinci;  Laterality: N/A;    SALPINGECTOMY Bilateral 02/2020    Dr Cuevas    TONSILLECTOMY  2014    WISDOM TOOTH EXTRACTION  2014     Outpatient Medications Marked as Taking for the 1/5/24 encounter (Office Visit) with Shaneka Ivey PA   Medication Sig Dispense Refill    Calcium Carb-Cholecalciferol (Calcium 600/Vitamin D) 600-10 MG-MCG chewable tablet Chew 1 tablet Daily. 90 tablet 1    ferrous gluconate (FERGON) 324 MG tablet Take 1 tablet by mouth 2 (Two) Times a Day. 180 tablet 1    furosemide (Lasix) 20 MG tablet Take 1 tablet by mouth Daily As Needed (edema). 30 tablet 0    Multiple Vitamins-Minerals (MULTIPLE VITAMINS/WOMENS) tablet Take 1 tablet by mouth. PT NOT CURRENTLY TAKING      SUMAtriptan (Imitrex) 50 MG tablet Take one tablet at onset of headache. May repeat dose one time in 2 hours if headache not relieved. 9 tablet 0    thiamine (VITAMIN B-1) 100 MG tablet Take 1 tablet by mouth Daily. 90 tablet 1    vitamin D3 125 MCG (5000 UT) capsule capsule Take 1 capsule by mouth Daily. 90 capsule 1     Current Facility-Administered Medications for the 1/5/24 encounter (Office Visit) with Shaneka Ivey PA   Medication Dose Route Frequency Provider Last Rate Last Admin    cyanocobalamin injection 1,000 mcg  1,000 mcg Intramuscular Q30 Days Alice Fuentes PA-C   1,000 mcg at 08/17/23 1100       Allergies   Allergen Reactions    Codeine Nausea And Vomiting, Other (See Comments) and GI Intolerance     UNKNOWN       Social History     Socioeconomic History    Marital status: Single    Number of children: 4    Years of education: College    Tobacco Use    Smoking status: Never    Smokeless tobacco: Never   Vaping Use    Vaping Use: Never  "used   Substance and Sexual Activity    Alcohol use: Not Currently    Drug use: No    Sexual activity: Not Currently     Partners: Male     Social History     Social History Narrative    Pt lives in [facility], she is single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 She currently works full time at [employer] as a .     The father of PT's oldest child passed away 2 weeks ago,  was yesterday 2022       /68 (BP Location: Right arm, Patient Position: Sitting)   Pulse 70   Temp 98.9 °F (37.2 °C)   Resp 17   Ht 177.8 cm (70\")   Wt 114 kg (251 lb 8 oz)   SpO2 99%   BMI 36.09 kg/m²     Physical Exam  Constitutional:       Appearance: She is obese.   HENT:      Head: Normocephalic and atraumatic.   Cardiovascular:      Rate and Rhythm: Normal rate and regular rhythm.   Pulmonary:      Effort: Pulmonary effort is normal.      Breath sounds: Normal breath sounds.   Abdominal:      General: Bowel sounds are normal. There is no distension.      Palpations: Abdomen is soft. There is no mass.      Tenderness: There is no guarding or rebound.      Comments: + RLQ tenderness    Skin:     General: Skin is warm and dry.   Neurological:      General: No focal deficit present.      Mental Status: She is alert and oriented to person, place, and time.   Psychiatric:         Mood and Affect: Mood normal.         Behavior: Behavior normal.           Assessment:  9 months s/p robotic RNY/BPD/recurrentPHHR 23 GDW (previous LSG/pHHR  GDW and sleeve revision 2020 PNQ complicated by postop stricture and uncontrolled reflux)       ICD-10-CM ICD-9-CM   1. Obesity, Class II, BMI 35-39.9  E66.9 278.00   2. Intestinal malabsorption, unspecified type  K90.9 579.9   3. Fatigue, unspecified type  R53.83 780.79   4. Abnormal blood level of iron  R79.0 790.6   5. Vitamin D deficiency  E55.9 268.9   6. Vitamin K deficiency  E56.1 269.0        Class 2 Severe Obesity (BMI >=35 and <=39.9). Obesity-related health conditions " include the following:  as above . Obesity is improving with treatment. BMI is is above average; BMI management plan is completed. We discussed low calorie, low carb based diet program, portion control, and increasing exercise.      Plan:  Given tenderness in RLQ on physical exam and rating an 8/10, I have advised patient be evaluated in the Emergency Department. I do not think this is related to her bariatric surgery.     She has had a stall in weight loss and has been chronically protein malnourished since surgery. I advised to not continue intermittent fasting as I do not think an 8 hour window is enough time for to get adequate caloric intake. I will have our dietician reach out next week. Increase protein >100g/day.  Continue routine physical activity.  Routine bariatric labs ordered.  Continue vitamins w/ adjustments pending lab results.  Call w/ problems/concerns.     The patient was instructed to follow up in 3 months, sooner if needed.

## 2024-01-11 ENCOUNTER — OFFICE VISIT (OUTPATIENT)
Dept: INTERNAL MEDICINE | Facility: CLINIC | Age: 39
End: 2024-01-11
Payer: COMMERCIAL

## 2024-01-11 VITALS
DIASTOLIC BLOOD PRESSURE: 80 MMHG | RESPIRATION RATE: 18 BRPM | SYSTOLIC BLOOD PRESSURE: 124 MMHG | BODY MASS INDEX: 36.19 KG/M2 | HEIGHT: 70 IN | OXYGEN SATURATION: 100 % | HEART RATE: 66 BPM | TEMPERATURE: 97.7 F | WEIGHT: 252.8 LBS

## 2024-01-11 DIAGNOSIS — E66.09 CLASS 2 OBESITY DUE TO EXCESS CALORIES WITHOUT SERIOUS COMORBIDITY WITH BODY MASS INDEX (BMI) OF 36.0 TO 36.9 IN ADULT: ICD-10-CM

## 2024-01-11 DIAGNOSIS — E55.9 VITAMIN D DEFICIENCY: Primary | ICD-10-CM

## 2024-01-11 DIAGNOSIS — R10.9 FLANK PAIN: ICD-10-CM

## 2024-01-11 PROBLEM — E66.812 CLASS 2 OBESITY DUE TO EXCESS CALORIES WITHOUT SERIOUS COMORBIDITY WITH BODY MASS INDEX (BMI) OF 36.0 TO 36.9 IN ADULT: Status: ACTIVE | Noted: 2023-03-29

## 2024-01-11 PROBLEM — E66.812 CLASS 2 OBESITY DUE TO EXCESS CALORIES WITHOUT SERIOUS COMORBIDITY WITH BODY MASS INDEX (BMI) OF 36.0 TO 36.9 IN ADULT: Status: RESOLVED | Noted: 2023-03-29 | Resolved: 2024-01-11

## 2024-01-11 LAB
25(OH)D3+25(OH)D2 SERPL-MCNC: 12.2 NG/ML (ref 30–100)
A-TOCOPHEROL VIT E SERPL-MCNC: 7.7 MG/L (ref 5.9–19.4)
ALBUMIN SERPL-MCNC: 4 G/DL (ref 3.9–4.9)
ALBUMIN/GLOB SERPL: 1.5 {RATIO} (ref 1.2–2.2)
ALP SERPL-CCNC: 93 IU/L (ref 44–121)
ALT SERPL-CCNC: 38 IU/L (ref 0–32)
AST SERPL-CCNC: 38 IU/L (ref 0–40)
BASOPHILS # BLD AUTO: 0 X10E3/UL (ref 0–0.2)
BASOPHILS NFR BLD AUTO: 1 %
BILIRUB BLD-MCNC: NEGATIVE MG/DL
BILIRUB SERPL-MCNC: 0.7 MG/DL (ref 0–1.2)
BUN SERPL-MCNC: 7 MG/DL (ref 6–20)
BUN/CREAT SERPL: 9 (ref 9–23)
CALCIUM SERPL-MCNC: 9.1 MG/DL (ref 8.7–10.2)
CHLORIDE SERPL-SCNC: 107 MMOL/L (ref 96–106)
CLARITY, POC: ABNORMAL
CO2 SERPL-SCNC: 22 MMOL/L (ref 20–29)
COLOR UR: YELLOW
COPPER SERPL-MCNC: 61 UG/DL (ref 80–158)
CREAT SERPL-MCNC: 0.81 MG/DL (ref 0.57–1)
EGFRCR SERPLBLD CKD-EPI 2021: 96 ML/MIN/1.73
EOSINOPHIL # BLD AUTO: 0.1 X10E3/UL (ref 0–0.4)
EOSINOPHIL NFR BLD AUTO: 2 %
ERYTHROCYTE [DISTWIDTH] IN BLOOD BY AUTOMATED COUNT: 11.8 % (ref 11.7–15.4)
EXPIRATION DATE: ABNORMAL
FERRITIN SERPL-MCNC: 13 NG/ML (ref 15–150)
FOLATE SERPL-MCNC: 16.4 NG/ML
GAMMA TOCOPHEROL SERPL-MCNC: 0.8 MG/L (ref 0.7–4.9)
GLOBULIN SER CALC-MCNC: 2.6 G/DL (ref 1.5–4.5)
GLUCOSE SERPL-MCNC: 82 MG/DL (ref 70–99)
GLUCOSE UR STRIP-MCNC: NEGATIVE MG/DL
HCT VFR BLD AUTO: 36.9 % (ref 34–46.6)
HGB BLD-MCNC: 11.9 G/DL (ref 11.1–15.9)
IMM GRANULOCYTES # BLD AUTO: 0 X10E3/UL (ref 0–0.1)
IMM GRANULOCYTES NFR BLD AUTO: 0 %
INR PPP: 1.1 (ref 0.9–1.2)
IRON SERPL-MCNC: 74 UG/DL (ref 27–159)
KETONES UR QL: NEGATIVE
LEUKOCYTE EST, POC: NEGATIVE
LYMPHOCYTES # BLD AUTO: 2.2 X10E3/UL (ref 0.7–3.1)
LYMPHOCYTES NFR BLD AUTO: 36 %
Lab: ABNORMAL
MAGNESIUM SERPL-MCNC: 2.1 MG/DL (ref 1.6–2.3)
MCH RBC QN AUTO: 29.2 PG (ref 26.6–33)
MCHC RBC AUTO-ENTMCNC: 32.2 G/DL (ref 31.5–35.7)
MCV RBC AUTO: 90 FL (ref 79–97)
METHYLMALONATE SERPL-SCNC: 270 NMOL/L (ref 0–378)
MONOCYTES # BLD AUTO: 0.4 X10E3/UL (ref 0.1–0.9)
MONOCYTES NFR BLD AUTO: 6 %
NEUTROPHILS # BLD AUTO: 3.3 X10E3/UL (ref 1.4–7)
NEUTROPHILS NFR BLD AUTO: 55 %
NITRITE UR-MCNC: NEGATIVE MG/ML
PH UR: 6 [PH] (ref 5–8)
PHOSPHATE SERPL-MCNC: 3.9 MG/DL (ref 3–4.3)
PLATELET # BLD AUTO: 242 X10E3/UL (ref 150–450)
POTASSIUM SERPL-SCNC: 3.8 MMOL/L (ref 3.5–5.2)
PREALB SERPL-MCNC: 13 MG/DL (ref 14–35)
PROT SERPL-MCNC: 6.6 G/DL (ref 6–8.5)
PROT UR STRIP-MCNC: NEGATIVE MG/DL
PROTHROMBIN TIME: 11.3 SEC (ref 9.1–12)
PTH-INTACT SERPL-MCNC: 64 PG/ML (ref 15–65)
RBC # BLD AUTO: 4.08 X10E6/UL (ref 3.77–5.28)
RBC # UR STRIP: ABNORMAL /UL
SODIUM SERPL-SCNC: 143 MMOL/L (ref 134–144)
SP GR UR: 1.03 (ref 1–1.03)
UROBILINOGEN UR QL: ABNORMAL
VIT A SERPL-MCNC: 19.6 UG/DL (ref 18.9–57.3)
VIT B1 BLD-SCNC: 112.7 NMOL/L (ref 66.5–200)
WBC # BLD AUTO: 5.9 X10E3/UL (ref 3.4–10.8)
ZINC SERPL-MCNC: 65 UG/DL (ref 44–115)

## 2024-01-11 RX ORDER — ERGOCALCIFEROL 1.25 MG/1
50000 CAPSULE ORAL WEEKLY
Qty: 8 CAPSULE | Refills: 0 | Status: SHIPPED | OUTPATIENT
Start: 2024-01-11

## 2024-01-11 NOTE — PROGRESS NOTES
Chief Complaint  Weight Loss (Patient was having some issues with her side but says they are better now would still like to do UA to make sure she doesn't have UTI. She would just like to talk about weight loss, she went to bariatric clinic. )    Subjective          History of Present Illness  Geneva Cordova presents to [facility] for   History of Present Illness  Abd Pain:  Was having right sided abd pain last week for 2 days then it eased off. Has not had constipation. No vomiting or fever. No urinary sx. Wanted to make sure it is not a UTI    Overweight:  She wants to loose 30 more lbs to be able to get the excess skin off her arms through [employer]. She had gastric bypass 4/2023 and has been working on weight loss. She is following the diet and working on exercise. She is interested in a weight loss medication. Her ins won't cover the injectables.       The following portions of the patient's history were reviewed and updated as appropriate: allergies, current medications, past family history, past medical history, past social history, past surgical history and problem list.  Allergies   Allergen Reactions    Codeine Nausea And Vomiting, Other (See Comments) and GI Intolerance     UNKNOWN       Current Outpatient Medications:     Calcium Carb-Cholecalciferol (Calcium 600/Vitamin D) 600-10 MG-MCG chewable tablet, Chew 1 tablet Daily., Disp: 90 tablet, Rfl: 1    ferrous gluconate (FERGON) 324 MG tablet, Take 1 tablet by mouth 2 (Two) Times a Day., Disp: 180 tablet, Rfl: 1    furosemide (Lasix) 20 MG tablet, Take 1 tablet by mouth Daily As Needed (edema)., Disp: 30 tablet, Rfl: 0    Multiple Vitamins-Minerals (MULTIPLE VITAMINS/WOMENS) tablet, Take 1 tablet by mouth. PT NOT CURRENTLY TAKING, Disp: , Rfl:     SUMAtriptan (Imitrex) 50 MG tablet, Take one tablet at onset of headache. May repeat dose one time in 2 hours if headache not relieved., Disp: 9 tablet, Rfl: 0    thiamine (VITAMIN B-1)  "100 MG tablet, Take 1 tablet by mouth Daily., Disp: 90 tablet, Rfl: 1    vitamin D3 125 MCG (5000 UT) capsule capsule, Take 1 capsule by mouth Daily., Disp: 90 capsule, Rfl: 1    naltrexone-bupropion ER (CONTRAVE) 8-90 MG tablet, Wk 1: 1 tab daily, Wk 2: 1 tab twice a day, Wk 3: 2 tabs in AM, 1 tab in PM, Wk 4: 2 tabs twice a day, Maintenance dose: 2 tabs twice daily., Disp: 120 tablet, Rfl: 5    vitamin D (ERGOCALCIFEROL) 1.25 MG (29104 UT) capsule capsule, Take 1 capsule by mouth 1 (One) Time Per Week., Disp: 8 capsule, Rfl: 0    Current Facility-Administered Medications:     cyanocobalamin injection 1,000 mcg, 1,000 mcg, Intramuscular, Q30 Days, Alice Fuentes PA-C, 1,000 mcg at 08/17/23 1100  New Medications Ordered This Visit   Medications    naltrexone-bupropion ER (CONTRAVE) 8-90 MG tablet     Sig: Wk 1: 1 tab daily, Wk 2: 1 tab twice a day, Wk 3: 2 tabs in AM, 1 tab in PM, Wk 4: 2 tabs twice a day, Maintenance dose: 2 tabs twice daily.     Dispense:  120 tablet     Refill:  5    vitamin D (ERGOCALCIFEROL) 1.25 MG (85745 UT) capsule capsule     Sig: Take 1 capsule by mouth 1 (One) Time Per Week.     Dispense:  8 capsule     Refill:  0     Social History     Tobacco Use   Smoking Status Never   Smokeless Tobacco Never        Objective   Vital Signs:   Vitals:    01/11/24 0822   BP: 124/80   Pulse: 66   Resp: 18   Temp: 97.7 °F (36.5 °C)   TempSrc: Temporal   SpO2: 100%   Weight: 115 kg (252 lb 12.8 oz)   Height: 177.8 cm (70\")      Body mass index is 36.27 kg/m².  Physical Exam  Vitals reviewed.   Constitutional:       General: She is not in acute distress.     Appearance: Normal appearance. She is obese.   HENT:      Head: Normocephalic and atraumatic.   Eyes:      General: No scleral icterus.     Extraocular Movements: Extraocular movements intact.      Conjunctiva/sclera: Conjunctivae normal.   Cardiovascular:      Rate and Rhythm: Normal rate and regular rhythm.      Heart sounds: Normal heart sounds. No " murmur heard.  Pulmonary:      Effort: Pulmonary effort is normal. No respiratory distress.      Breath sounds: Normal breath sounds. No stridor. No wheezing or rhonchi.   Abdominal:      General: Bowel sounds are normal.      Palpations: Abdomen is soft. There is no mass.      Tenderness: There is no abdominal tenderness. There is no right CVA tenderness, left CVA tenderness, guarding or rebound.   Musculoskeletal:      Cervical back: Normal range of motion and neck supple.   Skin:     General: Skin is warm and dry.      Coloration: Skin is not jaundiced.   Neurological:      General: No focal deficit present.      Mental Status: She is alert and oriented to person, place, and time.      Gait: Gait normal.   Psychiatric:         Mood and Affect: Mood normal.         Behavior: Behavior normal.        No LMP recorded. Patient has had an ablation.         Result Review :              Results for orders placed or performed in visit on 01/11/24   POCT urinalysis dipstick, automated    Specimen: Urine   Result Value Ref Range    Color Yellow Yellow, Straw, Dark Yellow, Fatoumata    Clarity, UA Slightly Cloudy (A) Clear    Specific Gravity  1.030 1.005 - 1.030    pH, Urine 6.0 5.0 - 8.0    Leukocytes Negative Negative    Nitrite, UA Negative Negative    Protein, POC Negative Negative mg/dL    Glucose, UA Negative Negative mg/dL    Ketones, UA Negative Negative    Urobilinogen, UA 0.2 E.U./dL Normal, 0.2 E.U./dL    Bilirubin Negative Negative    Blood, UA Trace (A) Negative    Lot Number [id number]     Expiration Date 2025/01/14           Assessment and Plan    Diagnoses and all orders for this visit:    1. Vitamin D deficiency (Primary)  Assessment & Plan:  Recent labs showed vit D def, will change daily vit d cap to high dose for a few months.    Orders:  -     vitamin D (ERGOCALCIFEROL) 1.25 MG (41719 UT) capsule capsule; Take 1 capsule by mouth 1 (One) Time Per Week.  Dispense: 8 capsule; Refill: 0    2. Class 2  obesity due to excess calories without serious comorbidity with body mass index (BMI) of 36.0 to 36.9 in adult  Assessment & Plan:  Patient's (Body mass index is 36.27 kg/m².) indicates that they are morbidly/severely obese (BMI > 40 or > 35 with obesity - related health condition) with health conditions that include GERD . Weight is improving with treatment. BMI is is above average; BMI management plan is completed. We discussed portion control and increasing exercise. F/u with Bariatrics as dir.  Advised to take vitamins as directed, discussed ways to inc protein.  Will add Contrave and after a few months consider restarting adipex.     Orders:  -     naltrexone-bupropion ER (CONTRAVE) 8-90 MG tablet; Wk 1: 1 tab daily, Wk 2: 1 tab twice a day, Wk 3: 2 tabs in AM, 1 tab in PM, Wk 4: 2 tabs twice a day, Maintenance dose: 2 tabs twice daily.  Dispense: 120 tablet; Refill: 5    3. Flank pain  Assessment & Plan:  Resolved, monitor.    Orders:  -     POCT urinalysis dipstick, automated        Follow Up   Return if symptoms worsen or fail to improve.    Follow up if symptoms worsen or persist or has new or concerning symptoms, go to ER for severe symptoms.   Reviewed common medication effects and side effects and advised to report side effects immediately.  Encouraged medication compliance and the importance of keeping scheduled follow up appointments with me and any other providers.  If a referral was made please contact our office if you have not heard about an appointment in the next 2 weeks.   If labs or images are ordered we will contact you with the results within the next week.  If you have not heard from us after a week please call our office to inquire about the results.   Patient was given instructions and counseling regarding her condition or for health maintenance advice. Please see specific information pulled into the AVS if appropriate.     Alice Fuentes PA-C    * Please note that portions of this note  were completed with a voice recognition program.

## 2024-01-11 NOTE — ASSESSMENT & PLAN NOTE
Patient's (Body mass index is 36.27 kg/m².) indicates that they are morbidly/severely obese (BMI > 40 or > 35 with obesity - related health condition) with health conditions that include GERD . Weight is improving with treatment. BMI is is above average; BMI management plan is completed. We discussed portion control and increasing exercise. F/u with Bariatrics as dir.  Advised to take vitamins as directed, discussed ways to inc protein.  Will add Contrave and after a few months consider restarting adipex.

## 2024-01-22 ENCOUNTER — TELEPHONE (OUTPATIENT)
Dept: BARIATRICS/WEIGHT MGMT | Facility: CLINIC | Age: 39
End: 2024-01-22
Payer: COMMERCIAL

## 2024-01-22 NOTE — TELEPHONE ENCOUNTER
Post op bariatric surgery:    9 months post op- RNY/BPD 4/14/23 (sleeve 2015)  Chronically protein malnourished  Prealbumin 13    Previously skipping breakfast, works 3rd shift. Unless she is out she typically snacks more than eating meals. Eats fast food frequently    Recall:  Estimated 60g protein daily but pt not tracking    Breakfast: 4 sl sue 16 g protein  Lunch: more snacking when  at home-2 string cheese 4 strawberries  Or if out :  1 chicken thigh (31g protein ) all mashed potato     Snacks--up to 6 String cheese a day,(30g protein) recently Boiled egg  Dinner: fried chicken greens mac n cheese   slushy 2/12, typically water    Pt needs to ensure she eats regular meals and planned snacks  Increase protein-- food sources and meal ideas discussed  Reduce fried foods  Mailed meal idea list to pt  Queries answered  Pt to call with [facility]

## 2024-01-29 ENCOUNTER — OFFICE VISIT (OUTPATIENT)
Dept: GASTROENTEROLOGY | Facility: CLINIC | Age: 39
End: 2024-01-29
Payer: COMMERCIAL

## 2024-01-29 VITALS
HEIGHT: 69 IN | WEIGHT: 258.2 LBS | BODY MASS INDEX: 38.24 KG/M2 | DIASTOLIC BLOOD PRESSURE: 60 MMHG | SYSTOLIC BLOOD PRESSURE: 120 MMHG

## 2024-01-29 DIAGNOSIS — Z87.19 HISTORY OF HIATAL HERNIA: ICD-10-CM

## 2024-01-29 DIAGNOSIS — R14.0 BLOATING: ICD-10-CM

## 2024-01-29 DIAGNOSIS — Z80.0 FAMILY HISTORY OF PANCREATIC CANCER: ICD-10-CM

## 2024-01-29 DIAGNOSIS — R19.8 BORBORYGMI: ICD-10-CM

## 2024-01-29 DIAGNOSIS — K21.00 GASTROESOPHAGEAL REFLUX DISEASE WITH ESOPHAGITIS WITHOUT HEMORRHAGE: ICD-10-CM

## 2024-01-29 DIAGNOSIS — K59.04 CHRONIC IDIOPATHIC CONSTIPATION: Primary | ICD-10-CM

## 2024-01-29 NOTE — PROGRESS NOTES
"GASTROENTEROLOGY OFFICE NOTE    Geneva Cordova  [id number]  1985    CARE TEAM  Patient Care Team:  Alice Fuentes PA-C as PCP - General (Physician Assistant)  Erik Hughes MD as Consulting Physician (General Surgery)  Nori Trujillo APRN as Nurse Practitioner (Pulmonary Disease)  Faith Hamm MD as Consulting Physician (Cardiology)    Referring Provider: No ref. provider found    Chief Complaint   Patient presents with    Bloated     New patient.         HISTORY OF PRESENT ILLNESS:   Geneva Cordova is a 38 y.o. female who presents to the clinic today for ***    Past Medical History:   Diagnosis Date    Anemia     Anxiety     Bilateral lower extremity edema     R>L    Boil, axilla     recurrent    Chronic back pain     no meds or injections, feels related to breasts    Concussion with no loss of consciousness 2022    COVID-19 2020    Depression     Dyspepsia     Dyspnea on exertion     Fatigue     Gastroesophageal reflux disease concurrent with and due to paraesophageal hernia     recurrent after LSG revision, EGD Dr. Hughes 23    Glucose intolerance (impaired glucose tolerance)     Helicobacter pylori (H. pylori) infection     asx and grossly nl EGD. \"abundant\"on path. HBT still + after PrevPak tx. LSG path neg. neg testing 2018    Hypoalbuminemia     Left Ovarian serous cystadenoma -removed at  section 2017    Migraine     resolved with weightloss    Nausea and vomiting 2023    Obesity, morbid     VIRA (obstructive sleep apnea)     improved since WLS    PCOS (polycystic ovarian syndrome)     Right Essure implantation 2017 10/06/2017    Routine general medical examination at a [facility] care facility 02/10/2023    S/P  section;left ovarian cystectomy and partial salpingectomy 2017    Seasonal allergies         Past Surgical History:   Procedure Laterality Date    ABDOMINOPLASTY  2022    Osmany Mclain at " [facility]    BILATERAL BREAST REDUCTION      2022     SECTION N/A 2017      SECTION PRIMARY;  CYSTECTOMY; Kwadwo Baxter MD; :  [facility];  Service:     DILATATION AND CURETTAGE      ENDOMETRIAL ABLATION  2020    Dr Cuevas    ENDOSCOPY N/A 2020    Procedure: ESOPHAGOGASTRODUODENOSCOPY;  Surgeon: Natalia Mariscal MD;  Location:  [facility];  Service: Bariatric;  Laterality: N/A;    ENDOSCOPY  2022    Dr. La    ENDOSCOPY N/A 2023    Procedure: ESOPHAGOGASTRODUODENOSCOPY;  Surgeon: Erik Hughes MD;  Location:  [facility];  Service: Robotics - DaVinci;  Laterality: N/A;    ESSURE TUBAL LIGATION      GASTRIC BYPASS N/A 2023    Procedure: GASTRIC BYPASS LAPAROSCOPIC WITH DAVINCI ROBOT AND BILIOPANCREATIC DIVERSION WITH DUODENAL SWITCH;  Surgeon: Erik Hughes MD;  Location:  [facility];  Service: Robotics - DaVinci;  Laterality: N/A;    GASTRIC SLEEVE LAPAROSCOPIC  2015    With Dr. Hughes    GASTRIC SLEEVE LAPAROSCOPIC N/A 2020    Procedure: GASTRIC SLEEVE LAPAROSCOPIC;  Surgeon: Natalia Mariscal MD;  Location:  [facility];  Service: Bariatric;  Laterality: N/A;    LAPAROSCOPIC CHOLECYSTECTOMY  2018    Dr. Hughes    PARAESOPHAGEAL HERNIA REPAIR N/A 2023    Procedure: LAPAROSCOPIC RECURRENT PARAESOPHAGEAL HERNIA REPAIR WITH DAVINCI ROBOT AND FALCIFORM LIGAMENT SLING ;  Surgeon: Erik Hughes MD;  Location:  [facility];  Service: Robotics - DaVinci;  Laterality: N/A;    SALPINGECTOMY Bilateral 2020    Dr Cuevas    TONSILLECTOMY      WISDOM TOOTH EXTRACTION          Current Outpatient Medications on File Prior to Visit   Medication Sig    Calcium Carb-Cholecalciferol (Calcium 600/Vitamin D) 600-10 MG-MCG chewable tablet Chew 1 tablet Daily.    ferrous gluconate (FERGON) 324 MG tablet Take 1 tablet by mouth 2 (Two) Times a Day.    furosemide (Lasix) 20 MG tablet Take 1 tablet by mouth Daily As Needed (edema).     Multiple Vitamins-Minerals (MULTIPLE VITAMINS/WOMENS) tablet Take 1 tablet by mouth. PT NOT CURRENTLY TAKING    naltrexone-bupropion ER (CONTRAVE) 8-90 MG tablet Wk 1: 1 tab daily, Wk 2: 1 tab twice a day, Wk 3: 2 tabs in AM, 1 tab in PM, Wk 4: 2 tabs twice a day, Maintenance dose: 2 tabs twice daily.    SUMAtriptan (Imitrex) 50 MG tablet Take one tablet at onset of headache. May repeat dose one time in 2 hours if headache not relieved.    thiamine (VITAMIN B-1) 100 MG tablet Take 1 tablet by mouth Daily.    vitamin D (ERGOCALCIFEROL) 1.25 MG (24890 UT) capsule capsule Take 1 capsule by mouth 1 (One) Time Per Week.    vitamin D3 125 MCG (5000 UT) capsule capsule Take 1 capsule by mouth Daily.     Current Facility-Administered Medications on File Prior to Visit   Medication    cyanocobalamin injection 1,000 mcg       Allergies   Allergen Reactions    Codeine Nausea And Vomiting, Other (See Comments) and GI Intolerance     UNKNOWN       Family History   Problem Relation Age of Onset    No Known Problems Other     Sleep apnea Mother     Diabetes Mother     No Known Problems Father     Pancreatic cancer Maternal Grandmother     No Known Problems Sister     No Known Problems Brother     No Known Problems Daughter     No Known Problems Son     No Known Problems Paternal Grandmother     No Known Problems Maternal Aunt     No Known Problems Paternal Aunt     BRCA 1/2 Neg Hx     Colon cancer Neg Hx     Endometrial cancer Neg Hx     Ovarian cancer Neg Hx        Social History     Socioeconomic History    Marital status: Single    Number of children: 4    Years of education: College    Tobacco Use    Smoking status: Never    Smokeless tobacco: Never   Vaping Use    Vaping Use: Never used   Substance and Sexual Activity    Alcohol use: Not Currently    Drug use: No    Sexual activity: Not Currently     Partners: Male       PHYSICAL EXAM   /60 (BP Location: Left arm, Patient Position: Sitting, Cuff Size: Adult)   Ht 175.3  "cm (69\")   Wt 117 kg (258 lb 3.2 oz)   BMI 38.13 kg/m²   Physical Exam    Results Review:  ***  {Ambulatory Labs:90922}    ASSESSMENT / PLAN  There are no diagnoses linked to this encounter.    No follow-ups on file.    Lynne Ríos MA  01/29/2024  "

## 2024-01-29 NOTE — PROGRESS NOTES
GASTROENTEROLOGY OFFICE NOTE    Geneva Cordova  [id number]  1985    CARE TEAM  Patient Care Team:  Alice Fuentes PA-C as PCP - General (Physician Assistant)  Erik Hughes MD as Consulting Physician (General Surgery)  Nori Trujillo APRN as Nurse Practitioner (Pulmonary Disease)  Faith Hamm MD as Consulting Physician (Cardiology)    Referring Provider: No ref. provider found    Chief Complaint   Patient presents with    Bloated     New patient in with c/o abdominal bloated & pain x3mths. Says she's nauseated sometimes but hasn't had any vomiting.         HISTORY OF PRESENT ILLNESS:   Geneva Cordova is a 38 y.o. female who presents to the clinic today for evaluation regarding bloating. She reports for the past 2 to 3 months she has experienced increased noise from the abdominal area and sensation of bloating, inability to lose weight in midsection despite prior bariatric procedures.     She reports regular bowel movements at times but intermittently skips 2 to 3 days without a bowel movement. She has previously used an enema, tried Miralax as needed and stool softeners. Having a bowel movement does not seem to help bloating.     She also reports family history of pancreatic cancer in 3 family members.     She also expresses concern about possible recurrent hiatal hernia.     9/19/2022 EGD per Dr. La due to reflux and anemia, occasional dysphagia to solids and liquids with description it feels like his spasm s/p gastric sleeve.  EGD revealed LA reflux esophagitis, gastritis.  It was recommended patient take pantoprazole 40 mg twice daily.    1/16/2023 patient underwent EGD per Dr. Hughes due to recurrent paraesophageal hernia, severe reflux, status post laparoscopic sleeve gastrectomy and paraesophageal hiatal hernia repair in 2015.  Revision of sleeve gastrectomy December 2020.  Findings of recurrent paraesophageal hernia with some narrowing at the hiatal outlet, also narrowing without twist at  the angularis, Z-line roughly 37 cm and diaphragmatic pinch at 40 cm.  It was documented there was quite a bit of tertiary spasm.  On reaching the distal esophagus unusual appearance with a paraesophageal hernia versus some redundant cardia and the narrowing at the diaphragmatic pinch, no Gage's esophagus, linear streaks of erosive esophagitis there was bile in the duodenum.  Biopsy of the gastric antrum revealed reactive gastropathy and negative for H. pylori.  Biopsy of the distal esophagus revealed mild reactive change, negative for eosinophils.    Stomach intestinal pylorus sparing procedure was previously planned but not covered by insurance, revision of sleeve gastrectomy December 2020 by Dr. Mariscal.  Hiatal hernia was not found at time of surgery.  Postoperatively this was complicated by upper GI the following day showing a hiatal hernia and confirmed on upper GI a year later.  She also had stricture of the angularis requiring dilation.  She presented with severe uncontrolled reflux and interested in undergoing revisional metabolic and bariatric surgery.    She underwent Alejandro-en-Y with Biliopancreatic diversion with duodenal switch/recurrent PHHR/EGD 4/14/23 [facility] (previous LSG/pHHR 2015 [facility] and sleeve revision 12/2020 PNQ complicated by postop stricture and uncontrolled reflux) . Preop diagnosis included partial gastric outlet obstruction, recurrent paraesophageal hernia with severe reflux s/p sleeve gastrectomy and paraesophageal HHR 7/2015, laparoscopic sleeve revision 12/2020, morbid obesity  Past Medical History:   Diagnosis Date    Anemia     Anxiety     Bilateral lower extremity edema     R>L    Boil, axilla     recurrent    Chronic back pain     no meds or injections, feels related to breasts    Concussion with no loss of consciousness 08/17/2022    COVID-19 12/2020    Depression     Dyspepsia     Dyspnea on exertion     Fatigue     Gastroesophageal reflux disease concurrent with and due to  "paraesophageal hernia     recurrent after LSG revision, EGD Dr. Hughes 23    Glucose intolerance (impaired glucose tolerance)     Helicobacter pylori (H. pylori) infection     asx and grossly nl EGD. \"abundant\"on path. HBT still + after PrevPak tx. LSG path neg. neg testing 2018    Hypoalbuminemia     Left Ovarian serous cystadenoma -removed at  section 2017    Migraine     resolved with weightloss    Nausea and vomiting 2023    Obesity, morbid     VIRA (obstructive sleep apnea)     improved since WLS    PCOS (polycystic ovarian syndrome)     Right Essure implantation 2017 10/06/2017    Routine general medical examination at [facility] facility 02/10/2023    S/P  section;left ovarian cystectomy and partial salpingectomy 2017    Seasonal allergies         Past Surgical History:   Procedure Laterality Date    ABDOMINOPLASTY  2022    Bancroftrichie Mclain at [facility]    BILATERAL BREAST REDUCTION      2022     SECTION N/A 2017      SECTION PRIMARY;  CYSTECTOMY; Kwadwo Baxter MD; :  [facility];  Service:     DILATATION AND CURETTAGE      ENDOMETRIAL ABLATION  2020    Dr Cuevas    ENDOSCOPY N/A 2020    Procedure: ESOPHAGOGASTRODUODENOSCOPY;  Surgeon: Natalia Mariscal MD;  Location:  [facility];  Service: Bariatric;  Laterality: N/A;    ENDOSCOPY  2022    Dr. La    ENDOSCOPY N/A 2023    Procedure: ESOPHAGOGASTRODUODENOSCOPY;  Surgeon: Erik Hughes MD;  Location:  [facility];  Service: Robotics - DaVinci;  Laterality: N/A;    ESSURE TUBAL LIGATION      GASTRIC BYPASS N/A 2023    Procedure: GASTRIC BYPASS LAPAROSCOPIC WITH [employer] AND BILIOPANCREATIC DIVERSION WITH DUODENAL SWITCH;  Surgeon: Erik Hughes MD;  Location:  [facility];  Service: Robotics - DaVinci;  Laterality: N/A;    GASTRIC SLEEVE LAPAROSCOPIC  2015    With Dr. Hughes    GASTRIC SLEEVE " LAPAROSCOPIC N/A 12/01/2020    Procedure: GASTRIC SLEEVE LAPAROSCOPIC;  Surgeon: Natalia Mariscal MD;  Location:  [facility];  Service: Bariatric;  Laterality: N/A;    LAPAROSCOPIC CHOLECYSTECTOMY  02/2018    Dr. Hughes    PARAESOPHAGEAL HERNIA REPAIR N/A 4/14/2023    Procedure: LAPAROSCOPIC RECURRENT PARAESOPHAGEAL HERNIA REPAIR WITH [employer] ROBOT AND FALCIFORM LIGAMENT SLING ;  Surgeon: Erik Hughes MD;  Location:  [facility];  Service: Robotics - DaVinci;  Laterality: N/A;    SALPINGECTOMY Bilateral 02/2020    Dr Cuevas    TONSILLECTOMY  2014    WISDOM TOOTH EXTRACTION  2014        Current Outpatient Medications on File Prior to Visit   Medication Sig    Calcium Carb-Cholecalciferol (Calcium 600/Vitamin D) 600-10 MG-MCG chewable tablet Chew 1 tablet Daily.    ferrous gluconate (FERGON) 324 MG tablet Take 1 tablet by mouth 2 (Two) Times a Day.    furosemide (Lasix) 20 MG tablet Take 1 tablet by mouth Daily As Needed (edema).    Multiple Vitamins-Minerals (MULTIPLE VITAMINS/WOMENS) tablet Take 1 tablet by mouth. PT NOT CURRENTLY TAKING    naltrexone-bupropion ER (CONTRAVE) 8-90 MG tablet Wk 1: 1 tab daily, Wk 2: 1 tab twice a day, Wk 3: 2 tabs in AM, 1 tab in PM, Wk 4: 2 tabs twice a day, Maintenance dose: 2 tabs twice daily.    SUMAtriptan (Imitrex) 50 MG tablet Take one tablet at onset of headache. May repeat dose one time in 2 hours if headache not relieved.    thiamine (VITAMIN B-1) 100 MG tablet Take 1 tablet by mouth Daily.    vitamin D (ERGOCALCIFEROL) 1.25 MG (49887 UT) capsule capsule Take 1 capsule by mouth 1 (One) Time Per Week.    vitamin D3 125 MCG (5000 UT) capsule capsule Take 1 capsule by mouth Daily.     Current Facility-Administered Medications on File Prior to Visit   Medication    cyanocobalamin injection 1,000 mcg       Allergies   Allergen Reactions    Codeine Nausea And Vomiting, Other (See Comments) and GI Intolerance     UNKNOWN       Family History   Problem Relation Age of Onset  "   No Known Problems Other     Sleep apnea Mother     Diabetes Mother     No Known Problems Father     Pancreatic cancer Maternal Grandmother     No Known Problems Sister     No Known Problems Brother     No Known Problems Daughter     No Known Problems Son     No Known Problems Paternal Grandmother     No Known Problems Maternal Aunt     No Known Problems Paternal Aunt     BRCA 1/2 Neg Hx     Colon cancer Neg Hx     Endometrial cancer Neg Hx     Ovarian cancer Neg Hx        Social History     Socioeconomic History    Marital status: Single    Number of children: 4    Years of education: College    Tobacco Use    Smoking status: Never    Smokeless tobacco: Never   Vaping Use    Vaping Use: Never used   Substance and Sexual Activity    Alcohol use: Not Currently    Drug use: No    Sexual activity: Not Currently     Partners: Male       PHYSICAL EXAM   /60 (BP Location: Left arm, Patient Position: Sitting, Cuff Size: Adult)   Ht 175.3 cm (69\")   Wt 117 kg (258 lb 3.2 oz)   BMI 38.13 kg/m²   Physical Exam  Constitutional:       General: She is not in acute distress.     Appearance: She is not toxic-appearing.   HENT:      Head: Normocephalic and atraumatic. No contusion.      Right Ear: External ear normal.      Left Ear: External ear normal.   Eyes:      General: Lids are normal. No scleral icterus.        Right eye: No discharge.         Left eye: No discharge.      Extraocular Movements: Extraocular movements intact.   Neck:      Trachea: Trachea normal.      Comments: No visible mass  No visible adenopathy  Pulmonary:      Effort: No respiratory distress.      Comments: Symmetrical expansion    Abdominal:      Palpations: Abdomen is soft. There is no mass.      Tenderness: There is no abdominal tenderness.   Musculoskeletal:      Comments: Symmetrical movement of upper extremities  Symmetrical movement of lower extremities  No visible deformities   Skin:     General: Skin is warm and dry.      Coloration: " Skin is not jaundiced.   Neurological:      General: No focal deficit present.      Mental Status: She is alert and oriented to person, place, and time.   Psychiatric:         Mood and Affect: Mood normal.         Behavior: Behavior normal.         Thought Content: Thought content normal.     Results Review:  9/19/2022 EGD per Dr. La due to reflux and anemia, occasional dysphagia to solids and liquids with description it feels like his spasm s/p gastric sleeve.  EGD revealed LA reflux esophagitis, gastritis.  It was recommended patient take pantoprazole 40 mg twice daily.    1/16/2023 patient underwent EGD per Dr. Hughes due to recurrent paraesophageal hernia, severe reflux, status post laparoscopic sleeve gastrectomy and paraesophageal hiatal hernia repair in 2015.  Revision of sleeve gastrectomy December 2020.  Findings of recurrent paraesophageal hernia with some narrowing at the hiatal outlet, also narrowing without twist at the angularis, Z-line roughly 37 cm and diaphragmatic pinch at 40 cm.  It was documented there was quite a bit of tertiary spasm.  On reaching the distal esophagus unusual appearance with a paraesophageal hernia versus some redundant cardia and the narrowing at the diaphragmatic pinch, no Gage's esophagus, linear streaks of erosive esophagitis there was bile in the duodenum.  Biopsy of the gastric antrum revealed reactive gastropathy and negative for H. pylori.  Biopsy of the distal esophagus revealed mild reactive change, negative for eosinophils.    Stomach intestinal pylorus sparing procedure was previously planned but not covered by insurance, revision of sleeve gastrectomy December 2020 by Dr. Mariscal.  Hiatal hernia was not found at time of surgery.  Postoperatively this was complicated by upper GI the following day showing a hiatal hernia and confirmed on upper GI a year later.  She also had stricture of the angularis requiring dilation.  She presented with severe uncontrolled  reflux and interested in undergoing revisional metabolic and bariatric surgery.    She underwent Alejandro-en-Y with Biliopancreatic diversion with duodenal switch/recurrent PHHR/EGD 4/14/23 [facility] (previous LSG/pHHR 2015 [facility] and sleeve revision 12/2020 PNQ complicated by postop stricture and uncontrolled reflux) . Preop diagnosis included partial gastric outlet obstruction, recurrent paraesophageal hernia with severe reflux s/p sleeve gastrectomy and paraesophageal HHR 7/2015, laparoscopic sleeve revision 12/2020, morbid obesity    5/10/2023 CT abdomen and pelvis revealed mild pelvic ascites, marked left sided anasarca has almost nearly resolved      CBC          6/19/2023    09:53 8/15/2023    10:02 1/5/2024    13:42   CBC   WBC 5.61  4.9  5.9    RBC 3.78  3.89  4.08    Hemoglobin 10.7  11.2  11.9    Hematocrit 34.9  35.6  36.9    MCV 92.3  92  90    MCH 28.3  28.8  29.2    MCHC 30.7  31.5  32.2    RDW 13.6  12.5  11.8    Platelets 239  236  242        ASSESSMENT / PLAN  1. Chronic idiopathic constipation  2. Bloating  3. Borborygmi  - prior use of enema, Miralax, stool softeners with report of intermittently skipping days between bowel movements for which I recommend patient start Linzess daily  - upper GI as below  - consider ox bile or digestzymes in the future  - recommend continued follow up with bariatric providers and communicate symptoms with bariatric providers in the event post surgical changes could be constributing   - linaclotide (Linzess) 72 MCG capsule capsule; Take 1 capsule by mouth Daily. 30 minutes prior to a meal  Dispense: 90 capsule; Refill:3  Zofran and Bentyl previously prescribed by bariatric provider.   4. History of hiatal hernia  5. History of Gastroesophageal reflux disease with esophagitis without hemorrhage  - s/p Alejandro-en-Y with Biliopancreatic diversion with duodenal switch/recurrent PHHR/EGD 4/14/23 [facility]   - patient expressed concern about recurrent HH, will check upper GI  - [place] Upper GI  Double Contrast & SBFT; Future  6. Family history of pancreatic cancer  - report of 3 family members with pancreatic cancer, recommend referral for genetic counseling  - pancreas appeared unremarkable on CT scan 4/2023 and 5/2023  - Ambulatory Referral to Genetic Counseling/Testing      Return in about 3 months (around 4/29/2024).    Alcira Dove, APRN  01/29/2024

## 2024-01-29 NOTE — LETTER
February 4, 2024       No Recipients    Patient: Geneva Cordova   YOB: 1985   Date of Visit: 1/29/2024     Dear Alice Fuentes PA-C:       Thank you for referring Geneva Cordova to me for evaluation. Below are the relevant portions of my assessment and plan of care.    If you have questions, please do not hesitate to call me. I look forward to following Geneva along with you.         Sincerely,        RAGHAV Knight        CC:   No Recipients    Alcira Dove APRN  02/04/24 0757  Addendum  GASTROENTEROLOGY OFFICE NOTE    Geneva Cordova  [id number]  1985    CARE TEAM  Patient Care Team:  Alice Fuentes PA-C as PCP - General (Physician Assistant)  Erik Hughes MD as Consulting Physician (General Surgery)  Nori Trujillo APRN as Nurse Practitioner (Pulmonary Disease)  Faith Hamm MD as Consulting Physician (Cardiology)    Referring Provider: No ref. provider found    Chief Complaint   Patient presents with   • Bloated     New patient in with c/o abdominal bloated & pain x3mths. Says she's nauseated sometimes but hasn't had any vomiting.         HISTORY OF PRESENT ILLNESS:   Geneva Cordova is a 38 y.o. female who presents to the clinic today for evaluation regarding bloating. She reports for the past 2 to 3 months she has experienced increased noise from the abdominal area and sensation of bloating, inability to lose weight in midsection despite prior bariatric procedures.     She reports regular bowel movements at times but intermittently skips 2 to 3 days without a bowel movement. She has previously used an enema, tried Miralax as needed and stool softeners. Having a bowel movement does not seem to help bloating.     She also reports family history of pancreatic cancer in 3 family members.     She also expresses concern about possible recurrent hiatal hernia.     9/19/2022 EGD per Dr. La due to reflux and anemia, occasional dysphagia to solids and liquids with description  it feels like his spasm s/p gastric sleeve.  EGD revealed LA reflux esophagitis, gastritis.  It was recommended patient take pantoprazole 40 mg twice daily.    1/16/2023 patient underwent EGD per Dr. Hughes due to recurrent paraesophageal hernia, severe reflux, status post laparoscopic sleeve gastrectomy and paraesophageal hiatal hernia repair in 2015.  Revision of sleeve gastrectomy December 2020.  Findings of recurrent paraesophageal hernia with some narrowing at the hiatal outlet, also narrowing without twist at the angularis, Z-line roughly 37 cm and diaphragmatic pinch at 40 cm.  It was documented there was quite a bit of tertiary spasm.  On reaching the distal esophagus unusual appearance with a paraesophageal hernia versus some redundant cardia and the narrowing at the diaphragmatic pinch, no Gage's esophagus, linear streaks of erosive esophagitis there was bile in the duodenum.  Biopsy of the gastric antrum revealed reactive gastropathy and negative for H. pylori.  Biopsy of the distal esophagus revealed mild reactive change, negative for eosinophils.    Stomach intestinal pylorus sparing procedure was previously planned but not covered by insurance, revision of sleeve gastrectomy December 2020 by Dr. Mariscal.  Hiatal hernia was not found at time of surgery.  Postoperatively this was complicated by upper GI the following day showing a hiatal hernia and confirmed on upper GI a year later.  She also had stricture of the angularis requiring dilation.  She presented with severe uncontrolled reflux and interested in undergoing revisional metabolic and bariatric surgery.    She underwent Alejandro-en-Y with Biliopancreatic diversion with duodenal switch/recurrent PHHR/EGD 4/14/23 [facility] (previous LSG/pHHR 2015 [facility] and sleeve revision 12/2020 PNQ complicated by postop stricture and uncontrolled reflux) . Preop diagnosis included partial gastric outlet obstruction, recurrent paraesophageal hernia with severe reflux s/p  "sleeve gastrectomy and paraesophageal HHR 2015, laparoscopic sleeve revision 2020, morbid obesity  Past Medical History:   Diagnosis Date   • Anemia    • Anxiety    • Bilateral lower extremity edema     R>L   • Boil, axilla     recurrent   • Chronic back pain     no meds or injections, feels related to breasts   • Concussion with no loss of consciousness 2022   • COVID-19 2020   • Depression    • Dyspepsia    • Dyspnea on exertion    • Fatigue    • Gastroesophageal reflux disease concurrent with and due to paraesophageal hernia     recurrent after LSG revision, EGD Dr. Hughes 23   • Glucose intolerance (impaired glucose tolerance)    • Helicobacter pylori (H. pylori) infection     asx and grossly nl EGD. \"abundant\"on path. HBT still + after PrevPak tx. LSG path neg. neg testing 2018   • Hypoalbuminemia    • Left Ovarian serous cystadenoma -removed at  section 2017   • Migraine     resolved with weightloss   • Nausea and vomiting 2023   • Obesity, morbid    • VIRA (obstructive sleep apnea)     improved since WLS   • PCOS (polycystic ovarian syndrome)    • Right Essure implantation 2017 10/06/2017   • Routine general medical examination at a [facility] care facility 02/10/2023   • S/P  section;left ovarian cystectomy and partial salpingectomy 2017   • Seasonal allergies         Past Surgical History:   Procedure Laterality Date   • ABDOMINOPLASTY  2022    [place] [facility]   • BILATERAL BREAST REDUCTION      2022   •  SECTION N/A 2017      SECTION PRIMARY;  CYSTECTOMY; Kwadwo Baxter MD; :  [facility];  Service:    • DILATATION AND CURETTAGE     • ENDOMETRIAL ABLATION  2020    Dr Cuevas   • ENDOSCOPY N/A 2020    Procedure: ESOPHAGOGASTRODUODENOSCOPY;  Surgeon: Natalia Mariscal MD;  Location: [facility] OR;  Service: Bariatric;  Laterality: N/A;   • ENDOSCOPY  2022    " Dr. La   • ENDOSCOPY N/A 4/14/2023    Procedure: ESOPHAGOGASTRODUODENOSCOPY;  Surgeon: Erik Hughes MD;  Location:  [facility];  [facility];  Laterality: N/A;   • ESSURE TUBAL LIGATION  2018   • GASTRIC BYPASS N/A 4/14/2023    Procedure: GASTRIC BYPASS LAPAROSCOPIC WITH DAVINCI ROBOT AND BILIOPANCREATIC DIVERSION WITH DUODENAL SWITCH;  Surgeon: Erik Hughes MD;  Location:  [facility];  [facility];  Laterality: N/A;   • GASTRIC SLEEVE LAPAROSCOPIC  07/2015    With Dr. Hughes   • GASTRIC SLEEVE LAPAROSCOPIC N/A 12/01/2020    Procedure: GASTRIC SLEEVE LAPAROSCOPIC;  Surgeon: Natalia Mariscal MD;  Location:  [facility];  Service: Bariatric;  Laterality: N/A;   • LAPAROSCOPIC CHOLECYSTECTOMY  02/2018    Dr. Hughes   • PARAESOPHAGEAL HERNIA REPAIR N/A 4/14/2023    Procedure: LAPAROSCOPIC RECURRENT PARAESOPHAGEAL HERNIA REPAIR WITH DAVINCI ROBOT AND FALCIFORM LIGAMENT SLING ;  Surgeon: Erik Hughes MD;  Location:  [facility];  [facility]: [facility];  Laterality: N/A;   • SALPINGECTOMY Bilateral 02/2020    Dr Cuevas   • TONSILLECTOMY  2014   • WISDOM TOOTH EXTRACTION  2014        Current Outpatient Medications on File Prior to Visit   Medication Sig   • Calcium Carb-Cholecalciferol (Calcium 600/Vitamin D) 600-10 MG-MCG chewable tablet Chew 1 tablet Daily.   • ferrous gluconate (FERGON) 324 MG tablet Take 1 tablet by mouth 2 (Two) Times a Day.   • furosemide (Lasix) 20 MG tablet Take 1 tablet by mouth Daily As Needed (edema).   • Multiple Vitamins-Minerals (MULTIPLE VITAMINS/WOMENS) tablet Take 1 tablet by mouth. PT NOT CURRENTLY TAKING   • naltrexone-bupropion ER (CONTRAVE) 8-90 MG tablet Wk 1: 1 tab daily, Wk 2: 1 tab twice a day, Wk 3: 2 tabs in AM, 1 tab in PM, Wk 4: 2 tabs twice a day, Maintenance dose: 2 tabs twice daily.   • SUMAtriptan (Imitrex) 50 MG tablet Take one tablet at onset of headache. May repeat dose one time in 2 hours if headache not relieved.  "  • thiamine (VITAMIN B-1) 100 MG tablet Take 1 tablet by mouth Daily.   • vitamin D (ERGOCALCIFEROL) 1.25 MG (98121 UT) capsule capsule Take 1 capsule by mouth 1 (One) Time Per Week.   • vitamin D3 125 MCG (5000 UT) capsule capsule Take 1 capsule by mouth Daily.     Current Facility-Administered Medications on File Prior to Visit   Medication   • cyanocobalamin injection 1,000 mcg       Allergies   Allergen Reactions   • Codeine Nausea And Vomiting, Other (See Comments) and GI Intolerance     UNKNOWN       Family History   Problem Relation Age of Onset   • No Known Problems Other    • Sleep apnea Mother    • Diabetes Mother    • No Known Problems Father    • Pancreatic cancer Maternal Grandmother    • No Known Problems Sister    • No Known Problems Brother    • No Known Problems Daughter    • No Known Problems Son    • No Known Problems Paternal Grandmother    • No Known Problems Maternal Aunt    • No Known Problems Paternal Aunt    • BRCA 1/2 Neg Hx    • Colon cancer Neg Hx    • Endometrial cancer Neg Hx    • Ovarian cancer Neg Hx        Social History     Socioeconomic History   • Marital status: Single   • Number of children: 4   • Years of education: College    Tobacco Use   • Smoking status: Never   • Smokeless tobacco: Never   Vaping Use   • Vaping Use: Never used   Substance and Sexual Activity   • Alcohol use: Not Currently   • Drug use: No   • Sexual activity: Not Currently     Partners: Male       PHYSICAL EXAM   /60 (BP Location: Left arm, Patient Position: Sitting, Cuff Size: Adult)   Ht 175.3 cm (69\")   Wt 117 kg (258 lb 3.2 oz)   BMI 38.13 kg/m²   Physical Exam  Constitutional:       General: She is not in acute distress.     Appearance: She is not toxic-appearing.   HENT:      Head: Normocephalic and atraumatic. No contusion.      Right Ear: External ear normal.      Left Ear: External ear normal.   Eyes:      General: Lids are normal. No scleral icterus.        Right eye: No discharge.    "      Left eye: No discharge.      Extraocular Movements: Extraocular movements intact.   Neck:      Trachea: Trachea normal.      Comments: No visible mass  No visible adenopathy  Pulmonary:      Effort: No respiratory distress.      Comments: Symmetrical expansion    Abdominal:      Palpations: Abdomen is soft. There is no mass.      Tenderness: There is no abdominal tenderness.   Musculoskeletal:      Comments: Symmetrical movement of upper extremities  Symmetrical movement of lower extremities  No visible deformities   Skin:     General: Skin is warm and dry.      Coloration: Skin is not jaundiced.   Neurological:      General: No focal deficit present.      Mental Status: She is alert and oriented to person, place, and time.   Psychiatric:         Mood and Affect: Mood normal.         Behavior: Behavior normal.         Thought Content: Thought content normal.     Results Review:  9/19/2022 EGD per Dr. La due to reflux and anemia, occasional dysphagia to solids and liquids with description it feels like his spasm s/p gastric sleeve.  EGD revealed LA reflux esophagitis, gastritis.  It was recommended patient take pantoprazole 40 mg twice daily.    1/16/2023 patient underwent EGD per Dr. Hughes due to recurrent paraesophageal hernia, severe reflux, status post laparoscopic sleeve gastrectomy and paraesophageal hiatal hernia repair in 2015.  Revision of sleeve gastrectomy December 2020.  Findings of recurrent paraesophageal hernia with some narrowing at the hiatal outlet, also narrowing without twist at the angularis, Z-line roughly 37 cm and diaphragmatic pinch at 40 cm.  It was documented there was quite a bit of tertiary spasm.  On reaching the distal esophagus unusual appearance with a paraesophageal hernia versus some redundant cardia and the narrowing at the diaphragmatic pinch, no Gage's esophagus, linear streaks of erosive esophagitis there was bile in the duodenum.  Biopsy of the gastric antrum  revealed reactive gastropathy and negative for H. pylori.  Biopsy of the distal esophagus revealed mild reactive change, negative for eosinophils.    Stomach intestinal pylorus sparing procedure was previously planned but not covered by insurance, revision of sleeve gastrectomy December 2020 by Dr. Mariscal.  Hiatal hernia was not found at time of surgery.  Postoperatively this was complicated by upper GI the following day showing a hiatal hernia and confirmed on upper GI a year later.  She also had stricture of the angularis requiring dilation.  She presented with severe uncontrolled reflux and interested in undergoing revisional metabolic and bariatric surgery.    She underwent Alejandro-en-Y with Biliopancreatic diversion with duodenal switch/recurrent PHHR/EGD 4/14/23 [facility] (previous LSG/pHHR 2015 [facility] and sleeve revision 12/2020 PNQ complicated by postop stricture and uncontrolled reflux) . Preop diagnosis included partial gastric outlet obstruction, recurrent paraesophageal hernia with severe reflux s/p sleeve gastrectomy and paraesophageal HHR 7/2015, laparoscopic sleeve revision 12/2020, morbid obesity    5/10/2023 CT abdomen and pelvis revealed mild pelvic ascites, marked left sided anasarca has almost nearly resolved      CBC          6/19/2023    09:53 8/15/2023    10:02 1/5/2024    13:42   CBC   WBC 5.61  4.9  5.9    RBC 3.78  3.89  4.08    Hemoglobin 10.7  11.2  11.9    Hematocrit 34.9  35.6  36.9    MCV 92.3  92  90    MCH 28.3  28.8  29.2    MCHC 30.7  31.5  32.2    RDW 13.6  12.5  11.8    Platelets 239  236  242        ASSESSMENT / PLAN  1. Chronic idiopathic constipation  2. Bloating  3. Borborygmi  - prior use of enema, Miralax, stool softeners with report of intermittently skipping days between bowel movements for which I recommend patient start Linzess daily  - upper GI as below  - consider ox bile or digestzymes in the future  - recommend continued follow up with bariatric providers and communicate  symptoms with bariatric providers in the event post surgical changes could be constributing   - linaclotide (Linzess) 72 MCG capsule capsule; Take 1 capsule by mouth Daily. 30 minutes prior to a meal  Dispense: 90 capsule; Refill:3  Zofran and Bentyl previously prescribed by bariatric provider.   4. History of hiatal hernia  5. History of Gastroesophageal reflux disease with esophagitis without hemorrhage  - s/p Alejandro-en-Y with Biliopancreatic diversion with duodenal switch/recurrent PHHR/EGD 4/14/23 [facility]   - patient expressed concern about recurrent HH, will check upper GI  - [place] Upper GI Double Contrast & SBFT; Future  6. Family history of pancreatic cancer  - report of 3 family members with pancreatic cancer, recommend referral for genetic counseling  - pancreas appeared unremarkable on CT scan 4/2023 and 5/2023  - Ambulatory Referral to Genetic Counseling/Testing      Return in about 3 months (around 4/29/2024).    Alcira Dove, APRN  01/29/2024

## 2024-02-01 ENCOUNTER — DOCUMENTATION (OUTPATIENT)
Dept: GASTROENTEROLOGY | Facility: CLINIC | Age: 39
End: 2024-02-01
Payer: COMMERCIAL

## 2024-02-01 NOTE — PROGRESS NOTES
PA request received on 1/31/24 for Linzess. PA submitted via CoverMyMeds.   Per [employer]/CoverMyMeds- Prior Authorization is not required for this medication dosage form and strength at the quantity and days supply requested.  Patient has to fill for a qty of 30 at a time. Pharmacist notified.

## 2024-02-16 ENCOUNTER — HOSPITAL ENCOUNTER (OUTPATIENT)
Dept: GENERAL RADIOLOGY | Facility: HOSPITAL | Age: 39
Discharge: HOME | End: 2024-02-16
Payer: COMMERCIAL

## 2024-02-19 ENCOUNTER — OFFICE VISIT (OUTPATIENT)
Dept: INTERNAL MEDICINE | Facility: CLINIC | Age: 39
End: 2024-02-19
Payer: COMMERCIAL

## 2024-02-19 VITALS
TEMPERATURE: 98.4 F | HEART RATE: 63 BPM | RESPIRATION RATE: 18 BRPM | HEIGHT: 69 IN | DIASTOLIC BLOOD PRESSURE: 88 MMHG | WEIGHT: 261 LBS | OXYGEN SATURATION: 98 % | SYSTOLIC BLOOD PRESSURE: 136 MMHG | BODY MASS INDEX: 38.66 KG/M2

## 2024-02-19 DIAGNOSIS — E66.09 CLASS 2 OBESITY DUE TO EXCESS CALORIES WITHOUT SERIOUS COMORBIDITY WITH BODY MASS INDEX (BMI) OF 38.0 TO 38.9 IN ADULT: Primary | ICD-10-CM

## 2024-02-19 DIAGNOSIS — E53.8 B12 DEFICIENCY: ICD-10-CM

## 2024-02-19 DIAGNOSIS — N76.0 ACUTE VAGINITIS: ICD-10-CM

## 2024-02-19 DIAGNOSIS — R60.0 LOWER EXTREMITY EDEMA: ICD-10-CM

## 2024-02-19 PROCEDURE — 99214 OFFICE O/P EST MOD 30 MIN: CPT | Performed by: PHYSICIAN ASSISTANT

## 2024-02-19 PROCEDURE — 87491 CHLMYD TRACH DNA AMP PROBE: CPT | Performed by: PHYSICIAN ASSISTANT

## 2024-02-19 PROCEDURE — 87801 DETECT AGNT MULT DNA AMPLI: CPT | Performed by: PHYSICIAN ASSISTANT

## 2024-02-19 PROCEDURE — 87591 N.GONORRHOEAE DNA AMP PROB: CPT | Performed by: PHYSICIAN ASSISTANT

## 2024-02-19 PROCEDURE — 87798 DETECT AGENT NOS DNA AMP: CPT | Performed by: PHYSICIAN ASSISTANT

## 2024-02-19 PROCEDURE — 96372 THER/PROPH/DIAG INJ SC/IM: CPT | Performed by: PHYSICIAN ASSISTANT

## 2024-02-19 PROCEDURE — 87661 TRICHOMONAS VAGINALIS AMPLIF: CPT | Performed by: PHYSICIAN ASSISTANT

## 2024-02-19 PROCEDURE — 1159F MED LIST DOCD IN RCRD: CPT | Performed by: PHYSICIAN ASSISTANT

## 2024-02-19 PROCEDURE — 1160F RVW MEDS BY RX/DR IN RCRD: CPT | Performed by: PHYSICIAN ASSISTANT

## 2024-02-19 RX ORDER — FUROSEMIDE 20 MG/1
20 TABLET ORAL DAILY PRN
Qty: 30 TABLET | Refills: 1 | Status: SHIPPED | OUTPATIENT
Start: 2024-02-19

## 2024-02-19 RX ORDER — POTASSIUM CHLORIDE 750 MG/1
10 TABLET, FILM COATED, EXTENDED RELEASE ORAL DAILY
Qty: 90 TABLET | OUTPATIENT
Start: 2024-02-19

## 2024-02-19 RX ORDER — CYANOCOBALAMIN 1000 UG/ML
1000 INJECTION, SOLUTION INTRAMUSCULAR; SUBCUTANEOUS
Status: SHIPPED | OUTPATIENT
Start: 2024-02-19 | End: 2025-01-14

## 2024-02-19 RX ORDER — POTASSIUM CHLORIDE 750 MG/1
10 TABLET, FILM COATED, EXTENDED RELEASE ORAL DAILY
Qty: 30 TABLET | Refills: 1 | Status: SHIPPED | OUTPATIENT
Start: 2024-02-19

## 2024-02-19 RX ADMIN — CYANOCOBALAMIN 1000 MCG: 1000 INJECTION, SOLUTION INTRAMUSCULAR; SUBCUTANEOUS at 08:56

## 2024-02-19 NOTE — PROGRESS NOTES
Chief Complaint  Weight Check (Feels like she is gaining weight but not eating a lot, feeling bloated all the time )    Subjective          History of Present Illness  Geneva Cordova presents to [facility] for   History of Present Illness  Overweight:  She wants to loose 30 more lbs to be able to get the excess skin off her arms through [employer]. She had gastric bypass 4/2023 and has been working on weight loss. She is following the diet and working on exercise. She started Contrave last month but has actually gained wt since then. Not sure if it is working. She was considering restarting Adipex at some point. Feels like most of her weight is water weight currently. She feels bloated/swollen and would like a refill on Lasix, has not had the medication in a while.  She is continuing to work on increasing protein.  She does note that she snacks on salty pretzels often so plans on cutting back on that.  She is doing better on taking her daily vitamins through bariatrics.    Constipation:  Recently seen by GI, they prescribed Linzess, she has not picked this up or started it yet.  She is wondering if constipation is causing her to feel bloated.      Vaginitis:  Has had vaginal odor and is wanting a swab also to check for any STDs    The following portions of the patient's history were reviewed and updated as appropriate: allergies, current medications, past family history, past medical history, past social history, past surgical history and problem list.  Allergies   Allergen Reactions    Codeine Nausea And Vomiting, Other (See Comments) and GI Intolerance     UNKNOWN       Current Outpatient Medications:     Calcium Carb-Cholecalciferol (Calcium 600/Vitamin D) 600-10 MG-MCG chewable tablet, Chew 1 tablet Daily., Disp: 90 tablet, Rfl: 1    ferrous gluconate (FERGON) 324 MG tablet, Take 1 tablet by mouth 2 (Two) Times a Day., Disp: 180 tablet, Rfl: 1    furosemide (Lasix) 20 MG tablet, Take 1  "tablet by mouth Daily As Needed (edema)., Disp: 30 tablet, Rfl: 1    linaclotide (Linzess) 72 MCG capsule capsule, Take 1 capsule by mouth Daily. 30 minutes prior to a meal, Disp: 90 capsule, Rfl: 3    Multiple Vitamins-Minerals (MULTIPLE VITAMINS/WOMENS) tablet, Take 1 tablet by mouth. PT NOT CURRENTLY TAKING, Disp: , Rfl:     naltrexone-bupropion ER (CONTRAVE) 8-90 MG tablet, Wk 1: 1 tab daily, Wk 2: 1 tab twice a day, Wk 3: 2 tabs in AM, 1 tab in PM, Wk 4: 2 tabs twice a day, Maintenance dose: 2 tabs twice daily., Disp: 120 tablet, Rfl: 5    SUMAtriptan (Imitrex) 50 MG tablet, Take one tablet at onset of headache. May repeat dose one time in 2 hours if headache not relieved., Disp: 9 tablet, Rfl: 0    thiamine (VITAMIN B-1) 100 MG tablet, Take 1 tablet by mouth Daily., Disp: 90 tablet, Rfl: 1    vitamin D (ERGOCALCIFEROL) 1.25 MG (53623 UT) capsule capsule, Take 1 capsule by mouth 1 (One) Time Per Week., Disp: 8 capsule, Rfl: 0    potassium chloride 10 MEQ CR tablet, Take 1 tablet by mouth Daily., Disp: 30 tablet, Rfl: 1    Current Facility-Administered Medications:     cyanocobalamin injection 1,000 mcg, 1,000 mcg, Intramuscular, Q30 Days, Alice Fuentes PA-C, 1,000 mcg at 02/19/24 0856  New Medications Ordered This Visit   Medications    furosemide (Lasix) 20 MG tablet     Sig: Take 1 tablet by mouth Daily As Needed (edema).     Dispense:  30 tablet     Refill:  1    potassium chloride 10 MEQ CR tablet     Sig: Take 1 tablet by mouth Daily.     Dispense:  30 tablet     Refill:  1    cyanocobalamin injection 1,000 mcg     Social History     Tobacco Use   Smoking Status Never   Smokeless Tobacco Never        Objective   Vital Signs:   Vitals:    02/19/24 0810   BP: 136/88   Pulse: 63   Resp: 18   Temp: 98.4 °F (36.9 °C)   TempSrc: Infrared   SpO2: 98%   Weight: 118 kg (261 lb)   Height: 175.3 cm (69.02\")      Body mass index is 38.53 kg/m².  Physical Exam  Vitals reviewed.   Constitutional:       General: She " is not in acute distress.     Appearance: Normal appearance. She is obese.   HENT:      Head: Normocephalic and atraumatic.   Eyes:      General: No scleral icterus.     Extraocular Movements: Extraocular movements intact.      Conjunctiva/sclera: Conjunctivae normal.   Cardiovascular:      Rate and Rhythm: Normal rate and regular rhythm.      Heart sounds: Normal heart sounds. No murmur heard.  Pulmonary:      Effort: Pulmonary effort is normal. No respiratory distress.      Breath sounds: Normal breath sounds. No stridor. No wheezing or rhonchi.   Musculoskeletal:      Cervical back: Normal range of motion and neck supple.   Skin:     General: Skin is warm and dry.      Coloration: Skin is not jaundiced.   Neurological:      General: No focal deficit present.      Mental Status: She is alert and oriented to person, place, and time.      Gait: Gait normal.   Psychiatric:         Mood and Affect: Mood normal.         Behavior: Behavior normal.        No LMP recorded. Patient has had an ablation.         Result Review :                   Assessment and Plan    Diagnoses and all orders for this visit:    1. Class 2 obesity due to excess calories without serious comorbidity with body mass index (BMI) of 38.0 to 38.9 in adult (Primary)  Assessment & Plan:  Patient's (Body mass index is 38.53 kg/m².) indicates that they are morbidly/severely obese (BMI > 40 or > 35 with obesity - related health condition) with health conditions that include GERD . Weight is improving with treatment. BMI is is above average; BMI management plan is completed. We discussed portion control and increasing exercise. F/u with Bariatrics as dir.  Advised to take vitamins as directed, discussed ways to inc protein.  Will cont Contrave and after a few months consider restarting adipex.       2. Lower extremity edema  Assessment & Plan:  Restart lasix and potassium prn    Orders:  -     furosemide (Lasix) 20 MG tablet; Take 1 tablet by mouth Daily  As Needed (edema).  Dispense: 30 tablet; Refill: 1  -     potassium chloride 10 MEQ CR tablet; Take 1 tablet by mouth Daily.  Dispense: 30 tablet; Refill: 1    3. Acute vaginitis  Assessment & Plan:  Sent for nuswab    Orders:  -     NuSwab VG+ - Swab, Vagina; Future    4. B12 deficiency  Assessment & Plan:  Will restart B12 shots    Orders:  -     cyanocobalamin injection 1,000 mcg        Follow Up   Return in about 4 weeks (around 3/18/2024).    Follow up if symptoms worsen or persist or has new or concerning symptoms, go to ER for severe symptoms.   Reviewed common medication effects and side effects and advised to report side effects immediately.  Encouraged medication compliance and the importance of keeping scheduled follow up appointments with me and any other providers.  If a referral was made please contact our office if you have not heard about an appointment in the next 2 weeks.   If labs or images are ordered we will contact you with the results within the next week.  If you have not heard from us after a week please call our office to inquire about the results.   Patient was given instructions and counseling regarding her condition or for health maintenance advice. Please see specific information pulled into the AVS if appropriate.     Alice Fuentes PA-C    * Please note that portions of this note were completed with a voice recognition program.

## 2024-02-19 NOTE — ASSESSMENT & PLAN NOTE
Patient's (Body mass index is 38.53 kg/m².) indicates that they are morbidly/severely obese (BMI > 40 or > 35 with obesity - related health condition) with health conditions that include GERD . Weight is improving with treatment. BMI is is above average; BMI management plan is completed. We discussed portion control and increasing exercise. F/u with Bariatrics as dir.  Advised to take vitamins as directed, discussed ways to inc protein.  Will cont Contrave and after a few months consider restarting adipex.

## 2024-02-21 ENCOUNTER — TELEPHONE (OUTPATIENT)
Dept: INTERNAL MEDICINE | Facility: CLINIC | Age: 39
End: 2024-02-21
Payer: COMMERCIAL

## 2024-02-21 DIAGNOSIS — E66.09 CLASS 2 OBESITY DUE TO EXCESS CALORIES WITHOUT SERIOUS COMORBIDITY WITH BODY MASS INDEX (BMI) OF 38.0 TO 38.9 IN ADULT: Primary | ICD-10-CM

## 2024-02-21 LAB
A VAGINAE DNA VAG QL NAA+PROBE: ABNORMAL SCORE
BVAB2 DNA VAG QL NAA+PROBE: ABNORMAL SCORE
C ALBICANS DNA VAG QL NAA+PROBE: NEGATIVE
C GLABRATA DNA VAG QL NAA+PROBE: NEGATIVE
C TRACH DNA VAG QL NAA+PROBE: NEGATIVE
MEGA1 DNA VAG QL NAA+PROBE: ABNORMAL SCORE
N GONORRHOEA DNA VAG QL NAA+PROBE: NEGATIVE
T VAGINALIS DNA VAG QL NAA+PROBE: NEGATIVE

## 2024-02-21 RX ORDER — PHENTERMINE HYDROCHLORIDE 37.5 MG/1
37.5 TABLET ORAL
Qty: 30 TABLET | Refills: 1 | Status: SHIPPED | OUTPATIENT
Start: 2024-02-21

## 2024-02-21 NOTE — TELEPHONE ENCOUNTER
We had discussed taking the lasix fluid pills for a few days to see if that helps get off the water weight and then see how she did with another few weeks of the Contrave since she just started it.   I will go ahead and send in the Phentermine, start with half a pill for a week so she can make sure it is not too strong with the Contrave as well.

## 2024-02-21 NOTE — TELEPHONE ENCOUNTER
Caller: Geneva Cordova    Relationship: Self    Best call back number: [phone]     Caller requesting test results: ZONIA LI     What test was performed: SWABS     When was the test performed: 2/19/24     Where was the test performed: IN OFFICE     Additional notes: PLEASE CALL PATIENT BACK WITH THE RESULTS.

## 2024-02-21 NOTE — TELEPHONE ENCOUNTER
Caller: Geneva Cordova    Relationship: Self    Best call back number: [phone]     What medication are you requesting: PHENTERMINE     If a prescription is needed, what is your preferred pharmacy and phone number: [id number] [address]  AT [facility] - [phone] [facility] [phone]      Additional notes: PATIENT STATES ZONAI JEN WAS GOING TO PRESCRIBED THIS FOR HER BUT THE PHARMACY HAS NOT RECEIVED ANYTHING YET.

## 2024-02-22 NOTE — TELEPHONE ENCOUNTER
Patient returned call to the clinic. Informed her of Alice's recommendations. She verbalized understanding and had no further questions.

## 2024-02-23 RX ORDER — METRONIDAZOLE 500 MG/1
500 TABLET ORAL 2 TIMES DAILY
Qty: 14 TABLET | Refills: 0 | Status: SHIPPED | OUTPATIENT
Start: 2024-02-23 | End: 2024-03-01

## 2024-02-23 NOTE — TELEPHONE ENCOUNTER
Your vaginal swab was negative for STDs, it did show mild bacterial vaginosis so I will send in an antibiotic for that.

## 2024-02-23 NOTE — TELEPHONE ENCOUNTER
Patient returned call and stated she has seen the results on mychart but would like further explanation on what it means

## 2024-02-28 RX ORDER — FLUCONAZOLE 150 MG/1
150 TABLET ORAL ONCE
Qty: 2 TABLET | Refills: 0 | Status: SHIPPED | OUTPATIENT
Start: 2024-02-28 | End: 2024-02-28

## 2024-03-01 ENCOUNTER — TELEPHONE (OUTPATIENT)
Dept: INTERNAL MEDICINE | Facility: CLINIC | Age: 39
End: 2024-03-01

## 2024-03-01 NOTE — TELEPHONE ENCOUNTER
Hub staff attempted to follow warm transfer process and was unsuccessful     Caller: Geneva Cordova    Relationship to patient: Self    Best call back number: [phone]     Patient is needing: THE PATIENT WAS ONLY WANTING TO SPEAK TO THE CLINICAL STAFF. SHE SAID SHE HAS BEEN CALLING ABOUT THIS FOR 2 DAYS BUT SHE HAS A YEAST INFECTION AND NOW HAS A YEAST RASH. SHE IS ASKING FOR A CALL BACK ASAP.

## 2024-03-11 PROCEDURE — 87798 DETECT AGENT NOS DNA AMP: CPT

## 2024-03-11 PROCEDURE — 87801 DETECT AGNT MULT DNA AMPLI: CPT

## 2024-03-11 PROCEDURE — 87077 CULTURE AEROBIC IDENTIFY: CPT

## 2024-03-11 PROCEDURE — 87086 URINE CULTURE/COLONY COUNT: CPT

## 2024-03-11 PROCEDURE — 87591 N.GONORRHOEAE DNA AMP PROB: CPT

## 2024-03-11 PROCEDURE — 87186 SC STD MICRODIL/AGAR DIL: CPT

## 2024-03-11 PROCEDURE — 87491 CHLMYD TRACH DNA AMP PROBE: CPT

## 2024-03-11 PROCEDURE — 87661 TRICHOMONAS VAGINALIS AMPLIF: CPT

## 2024-03-13 ENCOUNTER — TELEPHONE (OUTPATIENT)
Dept: URGENT CARE | Facility: CLINIC | Age: 39
End: 2024-03-13
Payer: COMMERCIAL

## 2024-03-13 DIAGNOSIS — B96.20 E. COLI UTI: Primary | ICD-10-CM

## 2024-03-13 DIAGNOSIS — N39.0 E. COLI UTI: Primary | ICD-10-CM

## 2024-03-13 DIAGNOSIS — N76.0 ACUTE VAGINITIS: ICD-10-CM

## 2024-03-13 RX ORDER — SULFAMETHOXAZOLE AND TRIMETHOPRIM 800; 160 MG/1; MG/1
1 TABLET ORAL 2 TIMES DAILY
Qty: 14 TABLET | Refills: 0 | Status: SHIPPED | OUTPATIENT
Start: 2024-03-13 | End: 2024-03-20

## 2024-03-13 RX ORDER — METRONIDAZOLE 7.5 MG/G
GEL VAGINAL NIGHTLY
Qty: 70 G | Refills: 0 | Status: SHIPPED | OUTPATIENT
Start: 2024-03-13

## 2024-03-22 ENCOUNTER — PATIENT ROUNDING (BHMG ONLY) (OUTPATIENT)
Dept: URGENT CARE | Facility: CLINIC | Age: 39
End: 2024-03-22
Payer: COMMERCIAL

## 2024-03-22 NOTE — ED NOTES
Thank you for letting us care for you in your recent visit to our urgent care center. We would love to hear about your experience with us. Was this the first time you have visited our location?    We’re always looking for ways to make our patients’ experiences even better. Do you have any recommendations on ways we may improve?     I appreciate you taking the time to respond. Please be on the lookout for a survey about your recent visit from [employer] via text or email. We would greatly appreciate if you could fill that out and turn it back in. We want your voice to be heard and we value your feedback.   Thank you for choosing [facility] for your healthcare needs.

## 2024-03-28 ENCOUNTER — CLINICAL SUPPORT (OUTPATIENT)
Dept: INTERNAL MEDICINE | Facility: CLINIC | Age: 39
End: 2024-03-28
Payer: COMMERCIAL

## 2024-03-28 DIAGNOSIS — E53.8 B12 DEFICIENCY: Primary | ICD-10-CM

## 2024-03-28 PROCEDURE — 96372 THER/PROPH/DIAG INJ SC/IM: CPT | Performed by: PHYSICIAN ASSISTANT

## 2024-03-28 RX ADMIN — CYANOCOBALAMIN 1000 MCG: 1000 INJECTION, SOLUTION INTRAMUSCULAR; SUBCUTANEOUS at 10:12

## 2024-04-22 NOTE — PROGRESS NOTES
"Cc: HD#2  \"feel fine\"    She is sitting up in a chair at the side of the bed.  Overall feels well would like to go home if possible.  Symptoms much improved with the abdominal binder.  She says she is ambulating and voiding well without orthostatic symptoms.  She is receiving her second dose of IV iron.  Now having multiple bowel movements, no melena but a small amount of bright red blood.  She is tolerating her protein shakes well.    No fever or tachycardia pulse 85 respirations 18 blood pressure 144/97 she is in no apparent distress.  Abdomen is soft and benign with much decrease edema left flank and nontender.  No ecchymosis.  Other incisions healing without evidence of infection.    CMP normal except glucose 102 BUN 5 calcium 8.2 total protein 5.2 albumin 3.2 AST 47 iron is 33 prealbumin is 5.6.  White count 5000 with a normal differential.  H&H 7.2 and 23.1.    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  Slow drift down in H&H without evidence of active bleeding.  Anemia currently not symptomatic.  She would like to go home if possible.  Very low prealbumin concerning and may put her at increased risk for delayed leak or other healing complications.  Fortunately she is tolerating her diet well.    Plan: Discharge home if hemoglobin is 7.2 or greater, if not recheck H&H in the morning.  Instructions discussed.  She says she does not need any new prescriptions.  Continue good oral protein intake.  Follow-up in the office next week.  Call with any problems questions or concerns.                                                                              "
Cc: PM check    She contacted the office because she still has not had a bowel movement with milk of magnesia and MiraLAX.  On my arrival she had 2 small bowel movements.  She says her abdomen feels much better with the abdominal binder.  She feels she is ready to go home but would like to wait until after she has a better bowel movement.  A fleets enema has been ordered.  No complaints otherwise.  No fever pulse 93 blood pressure 114/66 respiration 16 saturation 99%.  She is in no apparent distress.  Abdomen is benign, less flank edema and tenderness than yesterday.  No erythema or ecchymosis.      Impression: Postoperative left flank soft tissue hematoma and edema stable.  Symptoms improved with admission, medications for symptom control, abdominal binder.  No significant hypertension since admission.    Plan: Continue same.  Fleets enema.  Repeat labs in the morning.  Hopefully home tomorrow.  See orders      
none

## 2024-05-07 NOTE — ASSESSMENT & PLAN NOTE
Occupational Therapy  DATE: 2024    NAME: Jorge Luis Banda  MRN: [id number]   : 1942    Patient not seen this date for Occupational Therapy due to:      [] Cancel by RN or physician due to:    [] Hemodialysis    [] Critical Lab Value Level     [] Blood transfusion in progress    [] Acute or unstable cardiovascular status   _MAP < 55 or more than >115  _HR < 40 or > 130    [] Acute or unstable pulmonary status   -FiO2 > 60%   _RR < 5 or >40    _O2 sats < 85%    [] Strict Bedrest    [] Off Unit for surgery or procedure    [] Off Unit for testing       [] Pending imaging to R/O fracture    [] Refusal by Patient      [x] Other- hold eval as pt's hgb is 6.0 and not therapeutic at this time/continue to follow       [] PT being discontinued at this time. Patient independent. No further needs.     [] PT being discontinued at this time as the patient has been transferred to hospice care. No further needs.      Ayala Gagnon, OT    Resolved, monitor.

## 2024-06-05 ENCOUNTER — HOSPITAL ENCOUNTER (OUTPATIENT)
Dept: GENERAL RADIOLOGY | Facility: HOSPITAL | Age: 39
Discharge: HOME | End: 2024-06-05
Admitting: NURSE PRACTITIONER
Payer: COMMERCIAL

## 2024-06-05 DIAGNOSIS — R14.0 BLOATING: ICD-10-CM

## 2024-06-05 DIAGNOSIS — Z87.19 HISTORY OF HIATAL HERNIA: ICD-10-CM

## 2024-06-05 PROCEDURE — 74248 X-RAY SM INT F-THRU STD: CPT

## 2024-06-05 PROCEDURE — 74240 X-RAY XM UPR GI TRC 1CNTRST: CPT

## 2024-06-05 RX ADMIN — BARIUM SULFATE 240 ML: 960 POWDER, FOR SUSPENSION ORAL at 11:23

## 2024-06-05 NOTE — PAYOR COMM NOTE
"Shaneka Jarrell RN  Utilization Management  P:[phone]  F:[phone]    Auth# [id number]  Geneva Garay (37 y.o. Female)     Date of Birth   1985    Social Security Number       Address   [address] [place], [place] [address]    Home Phone   [phone]    MRN   [id number]       Yazdanism   None    Marital Status   Single                            Admission Date   4/14/23    Admission Type   Elective    Admitting Provider   Erik Hughes MD    Attending Provider   Erik Hughes MD    Department, Room/Bed   [facility] OR, [facility] OR/MAIN OR       Discharge Date       Discharge Disposition       Discharge Destination                               Attending Provider: Erik Hughes MD    Allergies: Codeine    Isolation: None   Infection: None   Code Status: CPR    Ht: 177.8 cm (70\")   Wt: 129 kg (284 lb 6.3 oz)    Admission Cmt: None   Principal Problem: Partial gastric outlet obstruction [K31.1]                 Active Insurance as of 4/14/2023     Primary Coverage     Payor Plan Insurance Group Employer/Plan Group    [facility] [employer] [place] [facility]      Payor Plan Address Payor Plan Phone Number Payor Plan Fax Number Effective Dates    [address]   11/1/2017 - None Entered    [facility] [id number]       Subscriber Name Subscriber Birth Date Member ID       GENEVA GARAY 1985 [id number]                 Emergency Contacts      (Rel.) Home Phone Work Phone Mobile Phone    Edilson Riley (Sister) -- -- [phone]    skylar valdovinos (Friend) -- -- [phone]               History & Physical      Erik Hughes MD at 04/14/23 0712          H&P reviewed. The patient was examined and there are no changes to the H&P.          Electronically signed by Erik Hughes MD at 04/14/23 0715   Source Note          [facility] BARIATRIC SURGERY  [address]  [address]  [facility] " Patient: Migdalia Sam  : 1972    Encounter Date: 2024    PROGRESS NOTE    Subjective  Chief complaint: Migdalia Sam is a 52 y.o. female who is a long term care patient being seen and evaluated for complaints of allergies and congestion.     HPI:  HPI  Patient seen and evaluated at bedside today for complaints of allergies and congestion.     Objective  Vital signs: 98.1 °F, 116/72, 70, 18, 97% room air.    Physical Exam  Vitals reviewed.   Constitutional:       General: She is not in acute distress.     Appearance: Normal appearance. She is obese.   HENT:      Head: Normocephalic and atraumatic.      Right Ear: External ear normal.      Left Ear: External ear normal.      Nose: Nose normal.      Mouth/Throat:      Mouth: Mucous membranes are moist.      Pharynx: Oropharynx is clear.   Eyes:      Extraocular Movements: Extraocular movements intact.      Pupils: Pupils are equal, round, and reactive to light.   Cardiovascular:      Rate and Rhythm: Normal rate and regular rhythm.   Pulmonary:      Effort: Pulmonary effort is normal.      Breath sounds: Normal breath sounds.   Abdominal:      General: Bowel sounds are normal. There is no distension.      Palpations: Abdomen is soft. There is no mass.      Tenderness: There is no abdominal tenderness. There is no guarding.   Musculoskeletal:      Cervical back: Normal range of motion and neck supple.      Comments: Generalized weakness   Skin:     General: Skin is warm and dry.   Neurological:      General: No focal deficit present.      Mental Status: She is alert and oriented to person, place, and time. Mental status is at baseline.   Psychiatric:         Mood and Affect: Mood normal.         Behavior: Behavior normal. Behavior is cooperative.         Assessment/Plan  Problem List Items Addressed This Visit       Fibromyalgia     Continue meds as prescribed.          Hyperlipidemia     Atorvastatin          Chronic obstructive pulmonary  "[id number]  [phone]      Patient  Name:  Geneva Cordova  :  1985      Date of Visit: 3/28/23    Chief Complaint:  weight gain; unable to maintain weight loss.   Reevaluate for possible revisional metabolic and bariatric surgery with partial gastric outlet obstruction which developed after revision sleeve gastrectomy 2020    History of Present Illness:  Geneva Cordova is a 37 y.o. female who presents today for reevaluation, education and consultation regarding metabolic and bariatric surgery (MBS) to address her partial gastric outlet obstruction status post redo sleeve gastrectomy in 2020.  Since last seen 2/10/2023 she has gained roughly 9 pounds.  My most recent evaluation dated 2023 reviewed:      \"Most rate evaluation Carmelita CANALES PA-C reviewed.    She notes the patient has history of sleeve gastrectomy with paraesophageal hernia repair with Dr. Hughes in  and underwent a sleeve revision in 2020 with Dr. Mariscal complicated by a postoperative stricture.  She notes the patient has had \"really bad acid reflux\" for the last year worsening the last couple months despite twice daily PPI and is waking almost nightly with burning reflux issues not getting enough sleep having headaches fatigue and nausea associated globus sensation but denies abdominal pain no vomiting no fevers or chills and has been constipated since her sleeve gastrectomy with her bowels moving every 7 to 10 days and usually takes a laxative and that upper endoscopy in 2021 with Dr. Mariscal revealed a sliding hiatal hernia and a slight stricture at the incisura with pyloric spasm both dilated to 20 mm advising repeat endoscopy/dilatation with Dr. Galvan but the patient canceled appointment because she was having night terrors related to anesthesia repeat upper endoscopy by Dr. La in 2022 revealed LA grade a esophagitis with no bleeding in the lower third of the esophagus erosions noted " disease, unspecified COPD type (Multi)     Stable, 97% on RA  Pt denies SOB  Monitor pulse oximetry            Nasal congestion - Primary     Start Flonase  Continue Singulair  Monitor               Medications, treatments, and labs reviewed  Continue medications and treatments as listed in EMR    VI Glass      Electronically Signed By: VI Glass   7/24/24 11:31 PM   "diffuse mild inflammation characterized by erythema in the antrum and gastric body normal second portion of the duodenum.  Upper GI in December 2022 at [facility] showed no stricture moderate reflux in the small size type III paraesophageal hernia.  She notes the patient's presurgery weight was 343 pounds and her weight that day was 273 pounds with a BMI of 39.2 with a weight loss of 70 pounds since surgery.     The patient has had issues with morbid obesity for years and only temporary success with surgical and non-surgical methods of weight loss.  The patient is seeking revisional metabolic and bariatric surgery to help with the morbid obesity related conditions of anemia, anxiety and depression, peripheral edema, recurrent axillary boils, chronic back pain, dyspnea exertion, fatigue, impaired glucose tolerance, history of H. pylori gastritis, migraine headaches, obstructive sleep apnea, PCOS, history of removal left ovarian serous cystadenoma, recurrent paraesophageal hernia and reflux.     37-year-old morbidly obese female from [place] with past bariatric surgical history as above.  She says she does avoid high fructose corn syrup.  She denies sleep apnea.  She complains of chronic constipation.  Reflux symptoms and dysphagia not controlled with twice daily PPI.  I encouraged her to seek second opinion(s) and she says \"I trust you\".  On EGD 1/16/2023 I noted a recurrent paraesophageal hernia with some narrowing at the hiatal outlet and narrowing without twist at the angularis Z-line roughly 37 cm diaphragmatic pinch at 40 cm.  I noted that I initially performed her sleeve and paraesophageal hernia in 2015 and she presented seeking revision options and plans were for a modified duodenal switch however her insurance would not cover it and I removed her gallbladder in 2018 and EGD and upper GI were both unremarkable at that time as was her immediate postoperative upper GI.  Dr. Mariscal ended up " taking her back for revision sleeve gastrectomy in December 2020, no hiatal hernia noted at the time of revision.  Upper GI the next day showed a hiatal hernia which was confirmed again on upper GI a year later.  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  Pathology of the antrum showed reactive changes negative for H. pylori distal esophageal biopsies showed reactive changes otherwise unremarkable.....    Assessment:     Geneva Cordova is a 37 y.o. year old female with medically complicated obesity status post laparoscopic sleeve gastrectomy and paraesophageal hernia repair July 2015, laparoscopic revision sleeve gastrectomy December 2020 complicated by recurrent paraesophageal hernia and partial gastric outlet obstruction.     Revisional metabolic and bariatric surgery is deemed medically necessary given the following obesity related comorbidities including anemia, anxiety and depression, peripheral edema, recurrent axillary boils, chronic back pain, dyspnea exertion, fatigue, impaired glucose tolerance, history of H. pylori gastritis, migraine headaches, obstructive sleep apnea, PCOS, history of removal left ovarian serous cystadenoma, recurrent paraesophageal hernia and reflux with current Weight: 125 kg (276 lb 8 oz) and Body mass index is 39.67 kg/m²..     We had a long discussion regarding the risk, benefits, and alternative therapies to revisional metabolic and bariatric surgical options.  I used flip charts and Internet diagrams.  I went over potential short and long-term complications and the need for lifelong vitamin, mineral supplementation and laboratory follow-up and the possibility that further surgery  may be warranted for potential complications, etc.  We discussed revision to a Alejandro-en-Y gastric bypass with or without biliopancreatic diversion with duodenal switch.  I feel given her partial gastric outlet obstruction with subsequent recurrent paraesophageal hernia that likely her sleeve cannot be salvaged and therefore I think a modified or formal duodenal switch is now off the table.  I strongly encouraged the patient to seek second opinion(s) and to consider referral to medical weight loss.     Complications of laparoscopic/possible robotic gastric bypass with biliopancreatic diversion and duodenal switch were discussed including but not limited to bleeding, infection, deep venous thrombosis, pulmonary embolism, pulmonary complications such as pneumonia, cardiac events, hernias, small bowel obstruction, damage to the spleen or other organs, bowel injury, disfiguring scars, failure to lose weight, need for additional surgery, conversion to an open procedure, and death. The most common short term complics in addition to cardiopulm risk, VTE, bleeding, infection, etc is leak or obstruction at the anastomoses, which could have potentially serious and even fatal consequences and require further surgery(s).  The patient understands that they will have more frequent BM's, will be malodorous, and that at increased risk for vitamin deficiencies, pardeep the fat soluble vitamins, and that this can be serious and irreversible - still wishes to proceed, says they will be compliant w f/u and vit/supplment recommendations.  Aware that may need an elongation procedure in the future if diarrhea and/or vitamin issues not controlled.  Patient is also aware of complications which apply in this particular procedure that can include but are not limited to leaking of gastric and/or enteric contents at the staple or suture lines which could lead to intra-abdominal abscess, or chronic complications of strictures, ulcers, or  "vitamin/mineral deficiencies.        Plan:    After discussion of the risks, benefits, and alternative therapies as above she wishes to proceed with laparoscopic possible robotic assisted revision of her previous revised sleeve gastrectomy to a Alejandro-en-Y gastric bypass with biliopancreatic diversion and duodenal switch, laparoscopic recurrent paraesophageal hiatal hernia repair, and EGD.  She understands that this is likely going to be a prolonged operation with a Arellano catheter, TOLU drain, etc.  Prior to scheduling surgery she will reattend informed consent class and I will see her back in the office for final evaluation.\"    She returns for final visit prior to scheduling surgery.  She attended informed consent last night, said it was a good refresher and plans to get her kids off high fructose corn syrup.  She says no changes since my evaluation on 1/24/2023.  Prior to her original sleeve gastrectomy she weighed 424 pounds and had a BMI of 60.8.  She has been deemed intermediate pulmonary risk however says she was never a smoker and denies asthma.  She said she quit taking her PPI because it just did not work.  She says she awakens choking and it is scary.  She says she eats Tums like candy.      Past Medical History:   Diagnosis Date   • Anemia    • Anxiety    • Bilateral lower extremity edema     R>L   • Boil, axilla     recurrent   • Chronic back pain     no meds or injections, feels related to breasts   • Concussion with no loss of consciousness 08/17/2022   • COVID-19 12/2020   • Depression    • Dyspepsia    • Dyspnea on exertion    • Fatigue    • Gastroesophageal reflux disease concurrent with and due to paraesophageal hernia     recurrent after LSG revision, EGD Dr. Hughes 1/16/23   • Glucose intolerance (impaired glucose tolerance)    • Helicobacter pylori (H. pylori) infection     asx and grossly nl EGD. \"abundant\"on path. HBT still + after PrevPak tx. LSG path neg. neg testing 7/2018   • Hypoalbuminemia  " "  • Left Ovarian serous cystadenoma -removed at  section 2017   • Migraine     resolved with weightloss   • Obesity, morbid (HCC)    • VIRA (obstructive sleep apnea)     improved since WLS   • PCOS (polycystic ovarian syndrome)    • Right Essure implantation 2017 10/06/2017   • S/P  section;left ovarian cystectomy and partial salpingectomy 2017   • Seasonal allergies      Past Surgical History:   Procedure Laterality Date   • ABDOMINOPLASTY  2022    [place] [facility]   • BILATERAL BREAST REDUCTION      2022   •  SECTION N/A 2017      SECTION PRIMARY;  CYSTECTOMY; Kwadwo Baxter MD; :  [facility];  Service:    • DILATATION AND CURETTAGE     • ENDOMETRIAL ABLATION  2020    Dr Cuevas   • ENDOSCOPY N/A 2020    Procedure: ESOPHAGOGASTRODUODENOSCOPY;  Surgeon: Natalia Mariscal MD;  Location:  [facility];  Service: Bariatric;  Laterality: N/A;   • ENDOSCOPY  2022    Dr. La   • ESSURE TUBAL LIGATION     • GASTRIC SLEEVE LAPAROSCOPIC  2015    With Dr. Hughes   • GASTRIC SLEEVE LAPAROSCOPIC N/A 2020    Procedure: GASTRIC SLEEVE LAPAROSCOPIC;  Surgeon: Natalia Mariscal MD;  Location:  [facility];  Service: Bariatric;  Laterality: N/A;   • LAPAROSCOPIC CHOLECYSTECTOMY  2018    Dr. Hughes   • SALPINGECTOMY Bilateral 2020    Dr Cuevas   • TONSILLECTOMY     • WISDOM TOOTH EXTRACTION         Allergies   Allergen Reactions   • Codeine Other (See Comments)     UNKNOWN       Current Outpatient Medications:   •  ferrous gluconate (FERGON) 324 MG tablet, Take 1 tablet by mouth 2 (Two) Times a Day., Disp: 180 tablet, Rfl: 1  •  Multiple Vitamins-Minerals (MULTIPLE VITAMINS/WOMENS) tablet, Take 1 tablet by mouth Daily., Disp: , Rfl:   •  B-D 3CC LUER-LYLY SYR [id number]/2 23G X 1-1/2\" 3 ML misc, AS DIRECTED DAILY WITH THIAMINE, Disp: , Rfl:     Current Facility-Administered " Medications:   •  cyanocobalamin injection 1,000 mcg, 1,000 mcg, Intramuscular, Q28 Days, Alice Fuentes PA-C, 1,000 mcg at 01/19/23 0951    Social History     Socioeconomic History   • Marital status: Single   • Number of children: 4   • Years of education: College    Tobacco Use   • Smoking status: Never   • Smokeless tobacco: Never   Vaping Use   • Vaping Use: Never used   Substance and Sexual Activity   • Alcohol use: Not Currently   • Drug use: No   • Sexual activity: Yes     Partners: Male     Birth control/protection: Surgical     Comment: no OCP     Family History   Problem Relation Age of Onset   • No Known Problems Other    • Sleep apnea Mother    • Diabetes Mother    • No Known Problems Father    • Pancreatic cancer Maternal Grandmother    • No Known Problems Sister    • No Known Problems Brother    • No Known Problems Daughter    • No Known Problems Son    • No Known Problems Paternal Grandmother    • No Known Problems Maternal Aunt    • No Known Problems Paternal Aunt    • BRCA 1/2 Neg Hx    • Colon cancer Neg Hx    • Endometrial cancer Neg Hx    • Ovarian cancer Neg Hx        Review of Systems   Constitutional: Positive for fatigue and unexpected weight gain. Negative for chills, diaphoresis, fever and unexpected weight loss.   HENT: Negative for congestion and facial swelling.    Eyes: Negative for blurred vision, double vision and discharge.   Respiratory: Negative for chest tightness, shortness of breath and stridor.    Cardiovascular: Negative for chest pain, palpitations and leg swelling.   Gastrointestinal: Positive for constipation and GERD. Negative for blood in stool.   Endocrine: Negative for polydipsia.   Genitourinary: Negative for hematuria.   Musculoskeletal: Positive for back pain.   Skin: Negative for color change.   Allergic/Immunologic: Negative for immunocompromised state.   Neurological: Negative for confusion.   Psychiatric/Behavioral: Positive for sleep disturbance. Negative for  self-injury. The patient is nervous/anxious.        I have reviewed the ROS and confirm that it's accurate today.    Physical Exam:  Vital Signs:  Weight: 129 kg (283 lb 12.8 oz)   Body mass index is 40.72 kg/m².  Temp: 97.5 °F (36.4 °C)   Heart Rate: 56   BP: 118/76     Physical Exam  Vitals reviewed.   Constitutional:       Appearance: She is well-developed.   HENT:      Head: Normocephalic and atraumatic.      Nose: Nose normal.   Eyes:      Conjunctiva/sclera: Conjunctivae normal.      Pupils: Pupils are equal, round, and reactive to light.   Neck:      Thyroid: No thyromegaly.      Vascular: No carotid bruit.      Trachea: No tracheal deviation.   Cardiovascular:      Rate and Rhythm: Normal rate and regular rhythm.      Heart sounds: Normal heart sounds.   Pulmonary:      Effort: Pulmonary effort is normal. No respiratory distress.      Breath sounds: Normal breath sounds.   Abdominal:      General: There is no distension.      Palpations: Abdomen is soft.      Tenderness: There is no abdominal tenderness.      Comments: Abdominoplasty scars with neoumbilicus   Musculoskeletal:         General: No deformity. Normal range of motion.      Cervical back: Normal range of motion and neck supple.   Skin:     General: Skin is warm and dry.      Findings: No rash.      Comments: Tattoos   Neurological:      Mental Status: She is alert and oriented to person, place, and time.      Cranial Nerves: No cranial nerve deficit.      Coordination: Coordination normal.   Psychiatric:         Behavior: Behavior normal.         Thought Content: Thought content normal.         Judgment: Judgment normal.         Patient Active Problem List   Diagnosis   • Obesity affecting pregnancy in second trimester, antepartum   • VIRA (obstructive sleep apnea)   • Migraine   • Anemia   • Joint pain   • Chronic back pain   • Anxiety   • PCOS (polycystic ovarian syndrome)   • Dyspepsia   • Seasonal allergies   • Moderate episode of recurrent  major depressive disorder (HCC)   • FH: breast cancer   • Dyspnea on exertion   • Class 2 obesity due to excess calories without serious comorbidity with body mass index (BMI) of 39.0 to 39.9 in adult   • Elevated blood pressure reading   • GERD without esophagitis   • Gastric hourglass stricture or stenosis   • Dysphagia   • Generalized anxiety disorder   • Acute vaginitis   • Pelvic pressure in female   • Vitamin D deficiency   • Screen for STD (sexually transmitted disease)   • High risk medication use   • Abnormal glucose   • B12 deficiency   • Iron deficiency   • Encounter for pre-operative cardiovascular clearance   • Bradycardia, sinus       Assessment:    Geneva Cordova is a 37 y.o. year old female with medically complicated obesity and partial gastric outlet obstruction status post sleeve revision December 2020    Revisional metabolic and bariatric surgery is deemed medically necessary to address her partial gastric outlet obstruction in addition to the following obesity related comorbidities including anemia, anxiety and depression, peripheral edema, recurrent axillary boils, chronic back pain, dyspnea exertion, fatigue, impaired glucose tolerance, history of H. pylori gastritis, migraine headaches, obstructive sleep apnea, PCOS, history of removal left ovarian serous cystadenoma, recurrent paraesophageal hernia and reflux with current Weight: 129 kg (283 lb 12.8 oz) and Body mass index is 40.72 kg/m²..    Patient is aware that surgery is a tool, and that weight loss and improvement in comorbidities is not guaranteed but only seen in the context of appropriate use, follow up and physical activity.    The patient was present for an approximately a 2.5 hour discussion of the purpose of MBS, how MBS is a tool to assist in achieving weight loss goals, the most common complications and how best to avoid them, and the strategies for short and long term weight loss and improvement in comorbidities.  Ample  opportunity to discuss questions was available both in group and during the time of individual examination.    I reviewed her Adam report showing phentermine x4, oxycodone x1.  Psychosocial evaluation dated 2/24/2023 Mahsa GONZALEZ, PhD good candidate.  Dietitian evaluation dated 2/1/2023 Michelle SANTIAGO RD noting the patient has 1 meal and numerous snacks daily limited intake due to her reflux tries to focus on high-protein foods and has severe constipation.  Negative H. pylori breath test dated 2/9/2023 unremarkable CBC of note hemoglobin 11.7 unremarkable CBC except for glucose of 62 chloride 107 normal lipid panel normal hemoglobin A1c normal TSH.  Pulmonary clearance Nori YANG APRN dated 11/1/2022 at intermediate risk with a total criteria point count of 31.  Cardiology clearance dated 2/10/2023 Dr. Hamm low/acceptable risk.  Please see scanned records that I have reviewed and signed off on today.  All of this in addition to the patient's unique history and exam has been taken into consideration in determining their appropriate candidacy for MBS.    Complications of laparoscopic/possible robotic gastric bypass were discussed including but not limited to bleeding, infection, deep venous thrombosis, pulmonary embolism, pulmonary complications such as pneumonia, cardiac events, hernias, small bowel obstruction, damage to the spleen or other organs, bowel injury, disfiguring scars, failure to lose weight, need for additional surgery, conversion to an open procedure, and death.  Patient is also aware of complications which apply in this particular procedure that can include but are not limited to leaking of gastric contents at the staple or suture lines which could lead to intra-abdominal abscess, or chronic complications of strictures, ulcers, or vitamin/mineral deficiencies.    Risks, benefits, alternative therapies were discussed to laparoscopic possible robotic assisted concomitant biliopancreatic diversion.  The most  common short term complics in addition to cardiopulm risk, VTE, bleeding, infection, etc is leak or obstruction at the anastomosis, which could have potentially serious and even fatal consequences and require further surgery(s).  The patient understands that they will have more frequent BM's, will be malodorous, and that at increased risk for vitamin deficiencies, pardeep the fat soluble vitamins, and that this can be serious and irreversible - still wishes to proceed, says they will be compliant w f/u and vit/supplment recommendations.  Aware that may need an elongation procedure in the future if diarrhea and/or vitamin issues not controlled.     R/B/A Rx to recurrent paraesophageal hernia repair were discussed as outlined in our long consent form.  Briefly risks in addition to those for gastric bypass with biliopancreatic diversion include recurrent hernia, YUSEF, dysphagia, esophageal injury, pneumothorax, injury to the vagus nerves, injury to the thoracic duct, aorta or vena cava.    Discussed the risks, benefits and alternative therapies at great length as outlined in our extensive consent forms, consent videos, and educational teaching process under the direction of the center's .    A copy of the patient's signed informed consent is on file.    R/B/A Rx discussed to postop anticoagulation incl but not limited to bleeding, drug reaction, venothromboembolic events, etc. and the patient declined.        Plan:      After reevaluation today I think the patient remains a reasonable candidate for laparoscopic robotic assisted revision of her sleeve gastrectomy with partial gastric outlet obstruction to a Alejandro-en-Y gastric bypass with biliopancreatic diversion and duodenal switch, recurrent paraesophageal hernia repair, and EGD.  Once again likely a 3+ hour operation, Arellano catheter, TOLU drain, etc.    Other issues include anemia, anxiety and depression, peripheral edema, recurrent axillary boils, chronic  "back pain, dyspnea exertion, fatigue, impaired glucose tolerance, history of H. pylori gastritis, migraine headaches, obstructive sleep apnea, PCOS, history of removal left ovarian serous cystadenoma, recurrent paraesophageal hernia and reflux    Thank you Alice GONZALEZ PA-C for the opportunity to reevaluate Ms. Cordova.    Erik Hughes MD                Electronically signed by Erik Hughes MD at 04/10/23 1045             Erik Hughes MD at 23 0830          [facility] BARIATRIC SURGERY  [address]  [address]  [facility] [address]  [phone]      Patient  Name:  Geneva Cordova  :  1985      Date of Visit: 3/28/23    Chief Complaint:  weight gain; unable to maintain weight loss.   Reevaluate for possible revisional metabolic and bariatric surgery with partial gastric outlet obstruction which developed after revision sleeve gastrectomy 2020    History of Present Illness:  Geneva Cordova is a 37 y.o. female who presents today for reevaluation, education and consultation regarding metabolic and bariatric surgery (MBS) to address her partial gastric outlet obstruction status post redo sleeve gastrectomy in 2020.  Since last seen 2/10/2023 she has gained roughly 9 pounds.  My most recent evaluation dated 2023 reviewed:      \"Most rate evaluation Carmelita CANALES PA-C reviewed.    She notes the patient has history of sleeve gastrectomy with paraesophageal hernia repair with Dr. Hughes in  and underwent a sleeve revision in 2020 with Dr. Mariscal complicated by a postoperative stricture.  She notes the patient has had \"really bad acid reflux\" for the last year worsening the last couple months despite twice daily PPI and is waking almost nightly with burning reflux issues not getting enough sleep having headaches fatigue and nausea associated globus sensation but denies abdominal pain no vomiting no fevers or chills and has been constipated since " "her sleeve gastrectomy with her bowels moving every 7 to 10 days and usually takes a laxative and that upper endoscopy in February 2021 with Dr. Mariscal revealed a sliding hiatal hernia and a slight stricture at the incisura with pyloric spasm both dilated to 20 mm advising repeat endoscopy/dilatation with Dr. Galvan but the patient canceled appointment because she was having night terrors related to anesthesia repeat upper endoscopy by Dr. La in September 2022 revealed LA grade a esophagitis with no bleeding in the lower third of the esophagus erosions noted diffuse mild inflammation characterized by erythema in the antrum and gastric body normal second portion of the duodenum.  Upper GI in December 2022 at [facility] showed no stricture moderate reflux in the small size type III paraesophageal hernia.  She notes the patient's presurgery weight was 343 pounds and her weight that day was 273 pounds with a BMI of 39.2 with a weight loss of 70 pounds since surgery.     The patient has had issues with morbid obesity for years and only temporary success with surgical and non-surgical methods of weight loss.  The patient is seeking revisional metabolic and bariatric surgery to help with the morbid obesity related conditions of anemia, anxiety and depression, peripheral edema, recurrent axillary boils, chronic back pain, dyspnea exertion, fatigue, impaired glucose tolerance, history of H. pylori gastritis, migraine headaches, obstructive sleep apnea, PCOS, history of removal left ovarian serous cystadenoma, recurrent paraesophageal hernia and reflux.     37-year-old morbidly obese female from [place] with past bariatric surgical history as above.  She says she does avoid high fructose corn syrup.  She denies sleep apnea.  She complains of chronic constipation.  Reflux symptoms and dysphagia not controlled with twice daily PPI.  I encouraged her to seek second opinion(s) and she says \"I trust you\".  On " EGD 1/16/2023 I noted a recurrent paraesophageal hernia with some narrowing at the hiatal outlet and narrowing without twist at the angularis Z-line roughly 37 cm diaphragmatic pinch at 40 cm.  I noted that I initially performed her sleeve and paraesophageal hernia in 2015 and she presented seeking revision options and plans were for a modified duodenal switch however her insurance would not cover it and I removed her gallbladder in 2018 and EGD and upper GI were both unremarkable at that time as was her immediate postoperative upper GI.  Dr. Mariscal ended up taking her back for revision sleeve gastrectomy in December 2020, no hiatal hernia noted at the time of revision.  Upper GI the next day showed a hiatal hernia which was confirmed again on upper GI a year later.  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  Pathology of the antrum showed reactive changes negative for H. pylori distal esophageal biopsies showed reactive changes otherwise unremarkable.....    Assessment:     Geneva Cordova is a 37 y.o. year old female with medically complicated obesity status post laparoscopic sleeve gastrectomy and paraesophageal hernia repair July 2015, laparoscopic revision sleeve gastrectomy December 2020 complicated by recurrent paraesophageal hernia and partial gastric outlet obstruction.     Revisional metabolic and bariatric surgery is deemed medically necessary given the following obesity related comorbidities including anemia, anxiety and depression, peripheral edema, recurrent axillary boils, chronic back pain, dyspnea exertion, fatigue, impaired glucose tolerance, history of H. pylori gastritis, migraine headaches,  obstructive sleep apnea, PCOS, history of removal left ovarian serous cystadenoma, recurrent paraesophageal hernia and reflux with current Weight: 125 kg (276 lb 8 oz) and Body mass index is 39.67 kg/m²..     We had a long discussion regarding the risk, benefits, and alternative therapies to revisional metabolic and bariatric surgical options.  I used flip charts and Internet diagrams.  I went over potential short and long-term complications and the need for lifelong vitamin, mineral supplementation and laboratory follow-up and the possibility that further surgery may be warranted for potential complications, etc.  We discussed revision to a Alejandro-en-Y gastric bypass with or without biliopancreatic diversion with duodenal switch.  I feel given her partial gastric outlet obstruction with subsequent recurrent paraesophageal hernia that likely her sleeve cannot be salvaged and therefore I think a modified or formal duodenal switch is now off the table.  I strongly encouraged the patient to seek second opinion(s) and to consider referral to medical weight loss.     Complications of laparoscopic/possible robotic gastric bypass with biliopancreatic diversion and duodenal switch were discussed including but not limited to bleeding, infection, deep venous thrombosis, pulmonary embolism, pulmonary complications such as pneumonia, cardiac events, hernias, small bowel obstruction, damage to the spleen or other organs, bowel injury, disfiguring scars, failure to lose weight, need for additional surgery, conversion to an open procedure, and death. The most common short term complics in addition to cardiopulm risk, VTE, bleeding, infection, etc is leak or obstruction at the anastomoses, which could have potentially serious and even fatal consequences and require further surgery(s).  The patient understands that they will have more frequent BM's, will be malodorous, and that at increased risk for vitamin deficiencies, pardeep the fat  "soluble vitamins, and that this can be serious and irreversible - still wishes to proceed, says they will be compliant w f/u and vit/supplment recommendations.  Aware that may need an elongation procedure in the future if diarrhea and/or vitamin issues not controlled.  Patient is also aware of complications which apply in this particular procedure that can include but are not limited to leaking of gastric and/or enteric contents at the staple or suture lines which could lead to intra-abdominal abscess, or chronic complications of strictures, ulcers, or vitamin/mineral deficiencies.        Plan:    After discussion of the risks, benefits, and alternative therapies as above she wishes to proceed with laparoscopic possible robotic assisted revision of her previous revised sleeve gastrectomy to a Alejandro-en-Y gastric bypass with biliopancreatic diversion and duodenal switch, laparoscopic recurrent paraesophageal hiatal hernia repair, and EGD.  She understands that this is likely going to be a prolonged operation with a Arellano catheter, TOLU drain, etc.  Prior to scheduling surgery she will reattend informed consent class and I will see her back in the office for final evaluation.\"    She returns for final visit prior to scheduling surgery.  She attended informed consent last night, said it was a good refresher and plans to get her kids off high fructose corn syrup.  She says no changes since my evaluation on 1/24/2023.  Prior to her original sleeve gastrectomy she weighed 424 pounds and had a BMI of 60.8.  She has been deemed intermediate pulmonary risk however says she was never a smoker and denies asthma.  She said she quit taking her PPI because it just did not work.  She says she awakens choking and it is scary.  She says she eats Tums like candy.      Past Medical History:   Diagnosis Date   • Anemia    • Anxiety    • Bilateral lower extremity edema     R>L   • Boil, axilla     recurrent   • Chronic back pain     no meds " "or injections, feels related to breasts   • Concussion with no loss of consciousness 2022   • COVID-19 2020   • Depression    • Dyspepsia    • Dyspnea on exertion    • Fatigue    • Gastroesophageal reflux disease concurrent with and due to paraesophageal hernia     recurrent after LSG revision, EGD Dr. Hughes 23   • Glucose intolerance (impaired glucose tolerance)    • Helicobacter pylori (H. pylori) infection     asx and grossly nl EGD. \"abundant\"on path. HBT still + after PrevPak tx. LSG path neg. neg testing 2018   • Hypoalbuminemia    • Left Ovarian serous cystadenoma -removed at  section 2017   • Migraine     resolved with weightloss   • Obesity, morbid (HCC)    • VIRA (obstructive sleep apnea)     improved since WLS   • PCOS (polycystic ovarian syndrome)    • Right Essure implantation 2017 10/06/2017   • S/P  section;left ovarian cystectomy and partial salpingectomy 2017   • Seasonal allergies      Past Surgical History:   Procedure Laterality Date   • ABDOMINOPLASTY  2022    Osmany Mclain at [facility]   • BILATERAL BREAST REDUCTION      2022   •  SECTION N/A 2017      SECTION PRIMARY;  CYSTECTOMY; Kwadwo Baxter MD; :  [facility];  Service:    • DILATATION AND CURETTAGE     • ENDOMETRIAL ABLATION  2020    Dr Cuevas   • ENDOSCOPY N/A 2020    Procedure: ESOPHAGOGASTRODUODENOSCOPY;  Surgeon: Natalia Mariscal MD;  Location:  [facility];  Service: Bariatric;  Laterality: N/A;   • ENDOSCOPY  2022    Dr. La   • ESSURE TUBAL LIGATION     • GASTRIC SLEEVE LAPAROSCOPIC  2015    With Dr. Hughes   • GASTRIC SLEEVE LAPAROSCOPIC N/A 2020    Procedure: GASTRIC SLEEVE LAPAROSCOPIC;  Surgeon: Natalia Mariscal MD;  Location:  [facility];  Service: Bariatric;  Laterality: N/A;   • LAPAROSCOPIC CHOLECYSTECTOMY  2018    Dr. Hughes   • SALPINGECTOMY Bilateral 2020    Dr" "Faith   • TONSILLECTOMY  2014   • WISDOM TOOTH EXTRACTION  2014       Allergies   Allergen Reactions   • Codeine Other (See Comments)     UNKNOWN       Current Outpatient Medications:   •  ferrous gluconate (FERGON) 324 MG tablet, Take 1 tablet by mouth 2 (Two) Times a Day., Disp: 180 tablet, Rfl: 1  •  Multiple Vitamins-Minerals (MULTIPLE VITAMINS/WOMENS) tablet, Take 1 tablet by mouth Daily., Disp: , Rfl:   •  B-D 3CC LUER-LYLY SYR 24FM1-8/2 23G X 1-1/2\" 3 ML misc, AS DIRECTED DAILY WITH THIAMINE, Disp: , Rfl:     Current Facility-Administered Medications:   •  cyanocobalamin injection 1,000 mcg, 1,000 mcg, Intramuscular, Q28 Days, Alice Fuentes PA-C, 1,000 mcg at 01/19/23 0951    Social History     Socioeconomic History   • Marital status: Single   • Number of children: 4   • Years of education: College    Tobacco Use   • Smoking status: Never   • Smokeless tobacco: Never   Vaping Use   • Vaping Use: Never used   Substance and Sexual Activity   • Alcohol use: Not Currently   • Drug use: No   • Sexual activity: Yes     Partners: Male     Birth control/protection: Surgical     Comment: no OCP     Family History   Problem Relation Age of Onset   • No Known Problems Other    • Sleep apnea Mother    • Diabetes Mother    • No Known Problems Father    • Pancreatic cancer Maternal Grandmother    • No Known Problems Sister    • No Known Problems Brother    • No Known Problems Daughter    • No Known Problems Son    • No Known Problems Paternal Grandmother    • No Known Problems Maternal Aunt    • No Known Problems Paternal Aunt    • BRCA 1/2 Neg Hx    • Colon cancer Neg Hx    • Endometrial cancer Neg Hx    • Ovarian cancer Neg Hx        Review of Systems   Constitutional: Positive for fatigue and unexpected weight gain. Negative for chills, diaphoresis, fever and unexpected weight loss.   HENT: Negative for congestion and facial swelling.    Eyes: Negative for blurred vision, double vision and discharge.   Respiratory: " Negative for chest tightness, shortness of breath and stridor.    Cardiovascular: Negative for chest pain, palpitations and leg swelling.   Gastrointestinal: Positive for constipation and GERD. Negative for blood in stool.   Endocrine: Negative for polydipsia.   Genitourinary: Negative for hematuria.   Musculoskeletal: Positive for back pain.   Skin: Negative for color change.   Allergic/Immunologic: Negative for immunocompromised state.   Neurological: Negative for confusion.   Psychiatric/Behavioral: Positive for sleep disturbance. Negative for self-injury. The patient is nervous/anxious.        I have reviewed the ROS and confirm that it's accurate today.    Physical Exam:  Vital Signs:  Weight: 129 kg (283 lb 12.8 oz)   Body mass index is 40.72 kg/m².  Temp: 97.5 °F (36.4 °C)   Heart Rate: 56   BP: 118/76     Physical Exam  Vitals reviewed.   Constitutional:       Appearance: She is well-developed.   HENT:      Head: Normocephalic and atraumatic.      Nose: Nose normal.   Eyes:      Conjunctiva/sclera: Conjunctivae normal.      Pupils: Pupils are equal, round, and reactive to light.   Neck:      Thyroid: No thyromegaly.      Vascular: No carotid bruit.      Trachea: No tracheal deviation.   Cardiovascular:      Rate and Rhythm: Normal rate and regular rhythm.      Heart sounds: Normal heart sounds.   Pulmonary:      Effort: Pulmonary effort is normal. No respiratory distress.      Breath sounds: Normal breath sounds.   Abdominal:      General: There is no distension.      Palpations: Abdomen is soft.      Tenderness: There is no abdominal tenderness.      Comments: Abdominoplasty scars with neoumbilicus   Musculoskeletal:         General: No deformity. Normal range of motion.      Cervical back: Normal range of motion and neck supple.   Skin:     General: Skin is warm and dry.      Findings: No rash.      Comments: Tattoos   Neurological:      Mental Status: She is alert and oriented to person, place, and time.       Cranial Nerves: No cranial nerve deficit.      Coordination: Coordination normal.   Psychiatric:         Behavior: Behavior normal.         Thought Content: Thought content normal.         Judgment: Judgment normal.         Patient Active Problem List   Diagnosis   • Obesity affecting pregnancy in second trimester, antepartum   • VIRA (obstructive sleep apnea)   • Migraine   • Anemia   • Joint pain   • Chronic back pain   • Anxiety   • PCOS (polycystic ovarian syndrome)   • Dyspepsia   • Seasonal allergies   • Moderate episode of recurrent major depressive disorder (HCC)   • FH: breast cancer   • Dyspnea on exertion   • Class 2 obesity due to excess calories without serious comorbidity with body mass index (BMI) of 39.0 to 39.9 in adult   • Elevated blood pressure reading   • GERD without esophagitis   • Gastric hourglass stricture or stenosis   • Dysphagia   • Generalized anxiety disorder   • Acute vaginitis   • Pelvic pressure in female   • Vitamin D deficiency   • Screen for STD (sexually transmitted disease)   • High risk medication use   • Abnormal glucose   • B12 deficiency   • Iron deficiency   • Encounter for pre-operative cardiovascular clearance   • Bradycardia, sinus       Assessment:    Geneva Cordova is a 37 y.o. year old female with medically complicated obesity and partial gastric outlet obstruction status post sleeve revision December 2020    Revisional metabolic and bariatric surgery is deemed medically necessary to address her partial gastric outlet obstruction in addition to the following obesity related comorbidities including anemia, anxiety and depression, peripheral edema, recurrent axillary boils, chronic back pain, dyspnea exertion, fatigue, impaired glucose tolerance, history of H. pylori gastritis, migraine headaches, obstructive sleep apnea, PCOS, history of removal left ovarian serous cystadenoma, recurrent paraesophageal hernia and reflux with current Weight: 129 kg (283 lb 12.8 oz)  and Body mass index is 40.72 kg/m²..    Patient is aware that surgery is a tool, and that weight loss and improvement in comorbidities is not guaranteed but only seen in the context of appropriate use, follow up and physical activity.    The patient was present for an approximately a 2.5 hour discussion of the purpose of MBS, how MBS is a tool to assist in achieving weight loss goals, the most common complications and how best to avoid them, and the strategies for short and long term weight loss and improvement in comorbidities.  Ample opportunity to discuss questions was available both in group and during the time of individual examination.    I reviewed her Adam report showing phentermine x4, oxycodone x1.  Psychosocial evaluation dated 2/24/2023 Mahsa GONZALEZ, PhD good candidate.  Dietitian evaluation dated 2/1/2023 Michelle SANTIAGO RD noting the patient has 1 meal and numerous snacks daily limited intake due to her reflux tries to focus on high-protein foods and has severe constipation.  Negative H. pylori breath test dated 2/9/2023 unremarkable CBC of note hemoglobin 11.7 unremarkable CBC except for glucose of 62 chloride 107 normal lipid panel normal hemoglobin A1c normal TSH.  Pulmonary clearance Nori AVILEZ dated 11/1/2022 at intermediate risk with a total criteria point count of 31.  Cardiology clearance dated 2/10/2023 Dr. Hamm low/acceptable risk.  Please see scanned records that I have reviewed and signed off on today.  All of this in addition to the patient's unique history and exam has been taken into consideration in determining their appropriate candidacy for MBS.    Complications of laparoscopic/possible robotic gastric bypass were discussed including but not limited to bleeding, infection, deep venous thrombosis, pulmonary embolism, pulmonary complications such as pneumonia, cardiac events, hernias, small bowel obstruction, damage to the spleen or other organs, bowel injury, disfiguring scars, failure to  lose weight, need for additional surgery, conversion to an open procedure, and death.  Patient is also aware of complications which apply in this particular procedure that can include but are not limited to leaking of gastric contents at the staple or suture lines which could lead to intra-abdominal abscess, or chronic complications of strictures, ulcers, or vitamin/mineral deficiencies.    Risks, benefits, alternative therapies were discussed to laparoscopic possible robotic assisted concomitant biliopancreatic diversion.  The most common short term complics in addition to cardiopulm risk, VTE, bleeding, infection, etc is leak or obstruction at the anastomosis, which could have potentially serious and even fatal consequences and require further surgery(s).  The patient understands that they will have more frequent BM's, will be malodorous, and that at increased risk for vitamin deficiencies, pardeep the fat soluble vitamins, and that this can be serious and irreversible - still wishes to proceed, says they will be compliant w f/u and vit/supplment recommendations.  Aware that may need an elongation procedure in the future if diarrhea and/or vitamin issues not controlled.     R/B/A Rx to recurrent paraesophageal hernia repair were discussed as outlined in our long consent form.  Briefly risks in addition to those for gastric bypass with biliopancreatic diversion include recurrent hernia, YUSEF, dysphagia, esophageal injury, pneumothorax, injury to the vagus nerves, injury to the thoracic duct, aorta or vena cava.    Discussed the risks, benefits and alternative therapies at great length as outlined in our extensive consent forms, consent videos, and educational teaching process under the direction of the center's .    A copy of the patient's signed informed consent is on file.    R/B/A Rx discussed to postop anticoagulation incl but not limited to bleeding, drug reaction, venothromboembolic events, etc.  and the patient declined.        Plan:      After reevaluation today I think the patient remains a reasonable candidate for laparoscopic robotic assisted revision of her sleeve gastrectomy with partial gastric outlet obstruction to a Bryce-en-Y gastric bypass with biliopancreatic diversion and duodenal switch, recurrent paraesophageal hernia repair, and EGD.  Once again likely a 3+ hour operation, Arellano catheter, TOLU drain, etc.    Other issues include anemia, anxiety and depression, peripheral edema, recurrent axillary boils, chronic back pain, dyspnea exertion, fatigue, impaired glucose tolerance, history of H. pylori gastritis, migraine headaches, obstructive sleep apnea, PCOS, history of removal left ovarian serous cystadenoma, recurrent paraesophageal hernia and reflux    Thank you Alice GONZALEZ PA-C for the opportunity to reevaluate Ms. Cordova.    Erik Hughes MD                Electronically signed by Erik Hughes MD at 04/10/23 1045          Operative/Procedure Notes (all)      Erik Hughes MD at 04/14/23 0814  Version 1 of 1         GASTRIC BYPASS LAPAROSCOPIC, LAPAROSCOPIC PARAESOPHAGEAL HERNIA REPAIR WITH DAVINCI ROBOT, ESOPHAGOGASTRODUODENOSCOPY  Progress Note    Geneva Cordova  4/14/2023    Pre-op Diagnosis:   Partial gastric outlet obstruction [K31.1]  Morbid obesity with body mass index of 40.0-44.9 in adult ([facility]) [E66.01, Z68.41]  S/P laparoscopic sleeve gastrectomy [Z98.84]  Hiatal hernia with gastroesophageal reflux [K44.9, K21.9]       Post-Op Diagnosis Codes:     * Partial gastric outlet obstruction [K31.1]     * Morbid obesity with body mass index of 40.0-44.9 in adult [E66.01, Z68.41]     * S/P laparoscopic sleeve gastrectomy [Z98.84]     * Paraesophageal hernia with gastroesophageal reflux [K44.9, K21.9]    Procedure/CPT® Codes:  ME LAP GASTRIC BYPASS/BRYCE-EN-Y [71340]  ME LAPS RPR PARAESPHGL HRNA INCL FUNDPLSTY W/O MESH [28196]  ME ESOPHAGOGASTRODUODENOSCOPY TRANSORAL DIAGNOSTIC  [72952]      Procedure(s):  GASTRIC BYPASS LAPAROSCOPIC WITH DAVINCI ROBOT AND BILIOPANCREATIC DIVERSION WITH DUODENAL SWITCH  LAPAROSCOPIC RECURRENT PARAESOPHAGEAL HERNIA REPAIR WITH DAVINCI ROBOT AND FALCIFORM LIGAMENT SLING   ESOPHAGOGASTRODUODENOSCOPY        Surgeon(s):  Erik Hughes MD    Anesthesia: General with Block    Staff:   Circulator: Millie Dunham RN  Scrub Person: Garrick Armida SHINE  Nursing Assistant: Jane Mesa CNA  Assistant: Wicho Shen PA-C  Assistant: Wicho Shen PA-C      Estimated Blood Loss: minimal    Urine Voided: 275 mL    Specimens:                None          Drains:   Closed/Suction Drain 1 Lateral RLQ (Active)       Urethral Catheter Silicone 16 Fr. (Active)       Findings:         Complications: None    Assistant: Wicho Shen PA-C  was responsible for performing the following activities: Retraction, Suction, Irrigation, Suturing, Closing, Placing Dressing and Held/Positioned Camera and their skilled assistance was necessary for the success of this case.    Erik Hughes MD     Date: 4/14/2023  Time: 10:59 EDT        Electronically signed by Erik Hughes MD at 04/14/23 5666

## 2024-06-07 ENCOUNTER — OFFICE VISIT (OUTPATIENT)
Dept: GASTROENTEROLOGY | Facility: CLINIC | Age: 39
End: 2024-06-07
Payer: COMMERCIAL

## 2024-06-07 VITALS
OXYGEN SATURATION: 98 % | SYSTOLIC BLOOD PRESSURE: 110 MMHG | BODY MASS INDEX: 41.09 KG/M2 | HEIGHT: 69 IN | DIASTOLIC BLOOD PRESSURE: 70 MMHG | WEIGHT: 277.4 LBS | HEART RATE: 61 BPM

## 2024-06-07 DIAGNOSIS — K44.9 HIATAL HERNIA: ICD-10-CM

## 2024-06-07 DIAGNOSIS — K21.00 GASTROESOPHAGEAL REFLUX DISEASE WITH ESOPHAGITIS WITHOUT HEMORRHAGE: ICD-10-CM

## 2024-06-07 DIAGNOSIS — Z80.0 FAMILY HISTORY OF PANCREATIC CANCER: ICD-10-CM

## 2024-06-07 DIAGNOSIS — K59.04 CHRONIC IDIOPATHIC CONSTIPATION: Primary | ICD-10-CM

## 2024-06-07 DIAGNOSIS — R14.0 BLOATING: ICD-10-CM

## 2024-06-07 DIAGNOSIS — E66.01 CLASS 3 SEVERE OBESITY WITH BODY MASS INDEX (BMI) OF 40.0 TO 44.9 IN ADULT, UNSPECIFIED OBESITY TYPE, UNSPECIFIED WHETHER SERIOUS COMORBIDITY PRESENT: ICD-10-CM

## 2024-06-07 DIAGNOSIS — R74.01 ELEVATED ALT MEASUREMENT: ICD-10-CM

## 2024-06-07 DIAGNOSIS — R19.8 BORBORYGMI: ICD-10-CM

## 2024-06-07 NOTE — PROGRESS NOTES
GASTROENTEROLOGY OFFICE NOTE    Geneva Cordova  [id number]  1985    CARE TEAM  Patient Care Team:  Alice Fuentes PA-C as PCP - General (Physician Assistant)  Erik Hughes MD as Consulting Physician (General Surgery)  Nori Trujillo APRN as Nurse Practitioner (Pulmonary Disease)  Faith Hamm MD as Consulting Physician (Cardiology)    Referring Provider: No ref. provider found    Chief Complaint   Patient presents with    Constipation     5mth follow up    Bloated     5mth follow up.         HISTORY OF PRESENT ILLNESS:   Geneva Cordova is a 38 y.o. female Who returns for 4 to 5-month follow-up regarding bloating, increased noise from abdominal area, inability to lose weight in midsection despite prior bariatric procedures.  She also previously reported family history of pancreatic cancer for which she was referred to genetic counseling.   She is s/p Alejandro-en-Y with Biliopancreatic diversion with duodenal switch/recurrent PHHR/EGD 4/14/23 per Dr. Hughes.     At last office visit, upper GI with small bowel follow-through was ordered.  Linzess 72 mcg daily prescribed.      6/5/2024 upper GI with small bowel follow-through revealed postoperative changes consistent with prior bariatric surgery, small sized sliding hiatal hernia.  It was documented that contrast was not seen reaching the colon however due to scheduling conflict the patient elected to terminate the exam prior to completion.    Her weight at last office visit was 258 pounds 3.2 ounces.  Today's weight 277 pounds 6.4 ounces.    She denies what seems like heartburn or reflux related symptoms.    She is taking Linzess 72 mcg daily with improvement in bowel habits, occasional difficulty with bowel movement/possible constipation despite taking Linzess 72 mcg daily.  She has noticed some improvement in bloating but continues to have bloating.  She takes Gas-X as needed with some improvement.    She stopped taking Contrave and Adipex approximately  "4 months ago.  She reports Contrave has high out-of-pocket cost.  She has gained approximately 19 pounds according to our office scale since her last office visit at the end of January.    Past Medical History:   Diagnosis Date    Anemia     Anxiety     Bilateral lower extremity edema     R>L    Boil, axilla     recurrent    Chronic back pain     no meds or injections, feels related to breasts    Concussion with no loss of consciousness 2022    COVID-19 2020    Depression     Dyspepsia     Dyspnea on exertion     Fatigue     Gastroesophageal reflux disease concurrent with and due to paraesophageal hernia     recurrent after LSG revision, EGD Dr. Hughes 23    Glucose intolerance (impaired glucose tolerance)     Helicobacter pylori (H. pylori) infection     asx and grossly nl EGD. \"abundant\"on path. HBT still + after PrevPak tx. LSG path neg. neg testing 2018    Hypoalbuminemia     Left Ovarian serous cystadenoma -removed at  section 2017    Migraine     resolved with weightloss    Nausea and vomiting 2023    Obesity, morbid     VIRA (obstructive sleep apnea)     improved since WLS    PCOS (polycystic ovarian syndrome)     Right Essure implantation 2017 10/06/2017    Routine general medical examination at a [facility] care facility 02/10/2023    S/P  section;left ovarian cystectomy and partial salpingectomy 2017    Seasonal allergies         Past Surgical History:   Procedure Laterality Date    ABDOMINOPLASTY  2022    Florence Francis-Horn at [facility]    BILATERAL BREAST REDUCTION      2022     SECTION N/A 2017      SECTION PRIMARY;  CYSTECTOMY; Kwadwo Baxter MD; :  [facility];  Service:     DILATATION AND CURETTAGE  2015    ENDOMETRIAL ABLATION  2020    Dr Cuevas    ENDOSCOPY N/A 2020    Procedure: ESOPHAGOGASTRODUODENOSCOPY;  Surgeon: Natalia Mariscal MD;  Location: [facility] OR;  Service: " Bariatric;  Laterality: N/A;    ENDOSCOPY  09/2022    Dr. La    ENDOSCOPY N/A 4/14/2023    Procedure: ESOPHAGOGASTRODUODENOSCOPY;  Surgeon: Erik Hughes MD;  Location:  [facility];  Service: Robotics - [employer];  Laterality: N/A;    ESSURE TUBAL LIGATION  2018    GASTRIC BYPASS N/A 4/14/2023    Procedure: GASTRIC BYPASS LAPAROSCOPIC WITH DAVINCI ROBOT AND BILIOPANCREATIC DIVERSION WITH DUODENAL SWITCH;  Surgeon: Erik Hughes MD;  Location:  [facility];  Service: Robotics - [employer];  Laterality: N/A;    GASTRIC SLEEVE LAPAROSCOPIC  07/2015    With Dr. Hughes    GASTRIC SLEEVE LAPAROSCOPIC N/A 12/01/2020    Procedure: GASTRIC SLEEVE LAPAROSCOPIC;  Surgeon: Natalia Mariscal MD;  Location:  [facility];  Service: Bariatric;  Laterality: N/A;    LAPAROSCOPIC CHOLECYSTECTOMY  02/2018    Dr. Hughes    PARAESOPHAGEAL HERNIA REPAIR N/A 4/14/2023    Procedure: LAPAROSCOPIC RECURRENT PARAESOPHAGEAL HERNIA REPAIR WITH DAVINCI ROBOT AND FALCIFORM LIGAMENT SLING ;  Surgeon: Erik Hughes MD;  Location:  [facility];  Service: Robotics - [employer];  Laterality: N/A;    SALPINGECTOMY Bilateral 02/2020    Dr Cuevas    TONSILLECTOMY  2014    WISDOM TOOTH EXTRACTION  2014        Current Outpatient Medications on File Prior to Visit   Medication Sig    Calcium Carb-Cholecalciferol (Calcium 600/Vitamin D) 600-10 MG-MCG chewable tablet Chew 1 tablet Daily.    ferrous gluconate (FERGON) 324 MG tablet Take 1 tablet by mouth 2 (Two) Times a Day.    furosemide (Lasix) 20 MG tablet Take 1 tablet by mouth Daily As Needed (edema).    linaclotide (Linzess) 72 MCG capsule capsule Take 1 capsule by mouth Daily. 30 minutes prior to a meal    Multiple Vitamins-Minerals (MULTIPLE VITAMINS/WOMENS) tablet Take 1 tablet by mouth. PT NOT CURRENTLY TAKING    potassium chloride 10 MEQ CR tablet Take 1 tablet by mouth Daily.    thiamine (VITAMIN B-1) 100 MG tablet Take 1 tablet by mouth Daily.    vitamin D (ERGOCALCIFEROL) 1.25 MG (57982 UT)  "capsule capsule Take 1 capsule by mouth 1 (One) Time Per Week.    metroNIDAZOLE (METROGEL) 0.75 % vaginal gel Insert  into the vagina Every Night.    naltrexone-bupropion ER (CONTRAVE) 8-90 MG tablet Wk 1: 1 tab daily, Wk 2: 1 tab twice a day, Wk 3: 2 tabs in AM, 1 tab in PM, Wk 4: 2 tabs twice a day, Maintenance dose: 2 tabs twice daily.    phentermine (Adipex-P) 37.5 MG tablet Take 1 tablet by mouth Every Morning Before Breakfast.     Current Facility-Administered Medications on File Prior to Visit   Medication    cyanocobalamin injection 1,000 mcg       Allergies   Allergen Reactions    Codeine Nausea And Vomiting, Other (See Comments) and GI Intolerance     UNKNOWN       Family History   Problem Relation Age of Onset    No Known Problems Other     Sleep apnea Mother     Diabetes Mother     No Known Problems Father     Pancreatic cancer Maternal Grandmother     No Known Problems Sister     No Known Problems Brother     No Known Problems Daughter     No Known Problems Son     No Known Problems Paternal Grandmother     No Known Problems Maternal Aunt     No Known Problems Paternal Aunt     BRCA 1/2 Neg Hx     Colon cancer Neg Hx     Endometrial cancer Neg Hx     Ovarian cancer Neg Hx        Social History     Socioeconomic History    Marital status: Single    Number of children: 4    Years of education: College    Tobacco Use    Smoking status: Never    Smokeless tobacco: Never   Vaping Use    Vaping status: Never Used   Substance and Sexual Activity    Alcohol use: Not Currently    Drug use: No    Sexual activity: Not Currently     Partners: Male       PHYSICAL EXAM   /70 (BP Location: Left arm, Patient Position: Sitting, Cuff Size: Adult)   Pulse 61   Ht 175.3 cm (69\")   Wt 126 kg (277 lb 6.4 oz)   SpO2 98%   BMI 40.96 kg/m²   Physical Exam  Constitutional:       General: She is not in acute distress.     Appearance: She is not toxic-appearing.   HENT:      Head: Normocephalic and atraumatic. No " contusion.      Right Ear: External ear normal.      Left Ear: External ear normal.   Eyes:      General: Lids are normal. No scleral icterus.        Right eye: No discharge.         Left eye: No discharge.      Extraocular Movements: Extraocular movements intact.   Neck:      Trachea: Trachea normal.      Comments: No visible mass  No visible adenopathy  Cardiovascular:      Rate and Rhythm: Normal rate.   Pulmonary:      Effort: No respiratory distress.      Comments: Symmetrical expansion    Musculoskeletal:      Comments: Symmetrical movement of upper extremities  Symmetrical movement of lower extremities  No visible deformities   Skin:     General: Skin is warm and dry.      Coloration: Skin is not jaundiced.   Neurological:      General: No focal deficit present.      Mental Status: She is alert and oriented to person, place, and time.   Psychiatric:         Mood and Affect: Affect is tearful.         Behavior: Behavior normal.         Thought Content: Thought content normal.     Results Review:  9/19/2022 EGD per Dr. La due to reflux and anemia, occasional dysphagia to solids and liquids with description it feels like his spasm s/p gastric sleeve.  EGD revealed LA reflux esophagitis, gastritis.  It was recommended patient take pantoprazole 40 mg twice daily.     1/16/2023 patient underwent EGD per Dr. Hughes due to recurrent paraesophageal hernia, severe reflux, status post laparoscopic sleeve gastrectomy and paraesophageal hiatal hernia repair in 2015.  Revision of sleeve gastrectomy December 2020.  Findings of recurrent paraesophageal hernia with some narrowing at the hiatal outlet, also narrowing without twist at the angularis, Z-line roughly 37 cm and diaphragmatic pinch at 40 cm.  It was documented there was quite a bit of tertiary spasm.  On reaching the distal esophagus unusual appearance with a paraesophageal hernia versus some redundant cardia and the narrowing at the diaphragmatic pinch, no  Gage's esophagus, linear streaks of erosive esophagitis there was bile in the duodenum.  Biopsy of the gastric antrum revealed reactive gastropathy and negative for H. pylori.  Biopsy of the distal esophagus revealed mild reactive change, negative for eosinophils.     Stomach intestinal pylorus sparing procedure was previously planned but not covered by insurance, revision of sleeve gastrectomy December 2020 by Dr. Mariscal.  Hiatal hernia was not found at time of surgery.  Postoperatively this was complicated by upper GI the following day showing a hiatal hernia and confirmed on upper GI a year later.  She also had stricture of the angularis requiring dilation.  She presented with severe uncontrolled reflux and interested in undergoing revisional metabolic and bariatric surgery.     She underwent Alejandro-en-Y with Biliopancreatic diversion with duodenal switch/recurrent PHHR/EGD 4/14/23 [facility] (previous LSG/pHHR 2015 [facility] and sleeve revision 12/2020 PNQ complicated by postop stricture and uncontrolled reflux) . Preop diagnosis included partial gastric outlet obstruction, recurrent paraesophageal hernia with severe reflux s/p sleeve gastrectomy and paraesophageal HHR 7/2015, laparoscopic sleeve revision 12/2020, morbid obesity     5/10/2023 CT abdomen and pelvis revealed mild pelvic ascites, marked left sided anasarca has almost nearly resolved      6/5/2024 upper GI with small bowel follow-through revealed postoperative changes consistent with prior bariatric surgery, small sized sliding hiatal hernia.  It was documented that contrast was not seen reaching the colon however due to scheduling conflict the patient elected to terminate the exam prior to completion.  CMP          6/19/2023    09:53 8/15/2023    10:02 1/5/2024    13:42   CMP   Glucose 90  77  82    BUN 8  9  7    Creatinine 0.64  0.72  0.81    EGFR 117.6      Sodium 144  143  143    Potassium 3.2  3.9  3.8    Chloride 110  106  107    Calcium 8.9  9.4  9.1     Total Protein  7.2  6.6    Total Protein 6.5      Albumin 3.6  4.2  4.0    Globulin  3.0  2.6    Globulin 2.9      Total Bilirubin 0.4  0.6  0.7    Alkaline Phosphatase 83  98  93    AST (SGOT) 49  44  38    ALT (SGPT) 60  46  38    Albumin/Globulin Ratio 1.2      BUN/Creatinine Ratio 12.5  13  9    Anion Gap 11.0        CBC          6/19/2023    09:53 8/15/2023    10:02 1/5/2024    13:42   CBC   WBC 5.61  4.9  5.9    RBC 3.78  3.89  4.08    Hemoglobin 10.7  11.2  11.9    Hematocrit 34.9  35.6  36.9    MCV 92.3  92  90    MCH 28.3  28.8  29.2    MCHC 30.7  31.5  32.2    RDW 13.6  12.5  11.8    Platelets 239  236  242        ASSESSMENT / PLAN  1. Chronic idiopathic constipation  2. Bloating  3. Borborygmi    -Suspect irritable bowel syndrome, visceral hypersensitiviy  - consider ox bile or digestzymes in the future  or FDGard   - continue Gas X as needed if helpful  - - recommend continued follow up with bariatric providers and communicate symptoms with bariatric providers in the event post surgical changes could be constributing   -Trial of increased dose of Linzess  - prior use of enema, Miralax, stool softeners with report of intermittently skipping days between bowel movements   -She was provided with take-home test kit to evaluate for congenital sucrase isomaltase deficiency    - linaclotide (LINZESS) 145 MCG capsule capsule; Take 1 capsule by mouth Every Morning Before Breakfast.  Dispense: 90 capsule; Refill: 3  4. Elevated ALT measurement  5. Class 3 severe obesity with body mass index (BMI) of 40.0 to 44.9 in adult, unspecified obesity type, unspecified whether serious comorbidity present  - Comprehensive Metabolic Panel; Future  -I am concerned that some sensation of bloating or distention of abdomen could be due to weight gain  -She has history of bariatric surgeries, history of taking phentermine and Contrave but discontinued use February 2024.  She has gained approximately 19 pounds over the past 3 to  4 months.  Recommend she return to bariatric office and discussed with dietitian through bariatric office.  I offered referral to dietitian but it seems as though she has access to dietitian at bariatric office  -Check comprehensive metabolic panel to reevaluate liver enzymes  -I expressed concern that due to weight gain we may see increased liver enzymes if fatty liver is contributing to history of elevated AST and ALT and more recently elevated ALT.  It seems as though she may have had normal liver enzymes around 250 pounds.  6. Gastroesophageal reflux disease with esophagitis without hemorrhage  7. Hiatal hernia   s/p Alejandro-en-Y with Biliopancreatic diversion with duodenal switch/recurrent PHHR/EGD 4/14/23 [facility]   -Incomplete upper GI with small bowel follow-through 6/5/2024 (see above) revealed small hiatal hernia and mild reflux.  She currently denies symptoms associated with heartburn or reflux.  Consider proton pump inhibitor in the future if it seems as though she is experiencing symptoms that could be due to heartburn or reflux.  8. Family history of pancreatic cancer  -Review of referral revealed genetic counseling department unable to reach patient for appointment, I will send a message providing phone number for patient to call if she wants to proceed with genetic counseling referral due to prior report of 3 family members with pancreatic cancer.  pancreas appeared unremarkable on CT scan 4/2023 and 5/2023   Return in about 3 months (around 9/7/2024).    Alcira Dove, RAGHAV  06/07/2024

## 2024-07-02 ENCOUNTER — APPOINTMENT (OUTPATIENT)
Facility: HOSPITAL | Age: 39
End: 2024-07-02
Payer: COMMERCIAL

## 2024-07-02 ENCOUNTER — HOSPITAL ENCOUNTER (EMERGENCY)
Facility: HOSPITAL | Age: 39
Discharge: HOME | End: 2024-07-02
Attending: FAMILY MEDICINE
Payer: COMMERCIAL

## 2024-07-02 VITALS
WEIGHT: 270 LBS | OXYGEN SATURATION: 96 % | HEIGHT: 70 IN | RESPIRATION RATE: 20 BRPM | BODY MASS INDEX: 38.65 KG/M2 | SYSTOLIC BLOOD PRESSURE: 147 MMHG | TEMPERATURE: 98.3 F | HEART RATE: 72 BPM | DIASTOLIC BLOOD PRESSURE: 94 MMHG

## 2024-07-02 DIAGNOSIS — R10.84 GENERALIZED ABDOMINAL PAIN: ICD-10-CM

## 2024-07-02 DIAGNOSIS — R10.31 RIGHT LOWER QUADRANT ABDOMINAL PAIN: Primary | ICD-10-CM

## 2024-07-02 LAB
ALBUMIN SERPL-MCNC: 4.1 G/DL (ref 3.5–5.2)
ALBUMIN/GLOB SERPL: 1.2 G/DL
ALP SERPL-CCNC: 125 U/L (ref 39–117)
ALT SERPL W P-5'-P-CCNC: 27 U/L (ref 1–33)
ANION GAP SERPL CALCULATED.3IONS-SCNC: 10.4 MMOL/L (ref 5–15)
AST SERPL-CCNC: 36 U/L (ref 1–32)
B-HCG UR QL: NEGATIVE
BASOPHILS # BLD AUTO: 0.04 10*3/MM3 (ref 0–0.2)
BASOPHILS NFR BLD AUTO: 0.4 % (ref 0–1.5)
BILIRUB SERPL-MCNC: 0.3 MG/DL (ref 0–1.2)
BILIRUB UR QL STRIP: NEGATIVE
BUN SERPL-MCNC: 9 MG/DL (ref 6–20)
BUN/CREAT SERPL: 12.5 (ref 7–25)
CALCIUM SPEC-SCNC: 8.8 MG/DL (ref 8.6–10.5)
CHLORIDE SERPL-SCNC: 107 MMOL/L (ref 98–107)
CLARITY UR: CLEAR
CO2 SERPL-SCNC: 24.6 MMOL/L (ref 22–29)
COLOR UR: YELLOW
CREAT SERPL-MCNC: 0.72 MG/DL (ref 0.57–1)
DEPRECATED RDW RBC AUTO: 43.4 FL (ref 37–54)
EGFRCR SERPLBLD CKD-EPI 2021: 110.6 ML/MIN/1.73
EOSINOPHIL # BLD AUTO: 0.39 10*3/MM3 (ref 0–0.4)
EOSINOPHIL NFR BLD AUTO: 4.1 % (ref 0.3–6.2)
ERYTHROCYTE [DISTWIDTH] IN BLOOD BY AUTOMATED COUNT: 13 % (ref 12.3–15.4)
EXPIRATION DATE: NORMAL
GLOBULIN UR ELPH-MCNC: 3.3 GM/DL
GLUCOSE SERPL-MCNC: 73 MG/DL (ref 65–99)
GLUCOSE UR STRIP-MCNC: NEGATIVE MG/DL
HCT VFR BLD AUTO: 37.5 % (ref 34–46.6)
HGB BLD-MCNC: 11.9 G/DL (ref 12–15.9)
HGB UR QL STRIP.AUTO: NEGATIVE
HOLD SPECIMEN: NORMAL
IMM GRANULOCYTES # BLD AUTO: 0.02 10*3/MM3 (ref 0–0.05)
IMM GRANULOCYTES NFR BLD AUTO: 0.2 % (ref 0–0.5)
INTERNAL NEGATIVE CONTROL: NEGATIVE
INTERNAL POSITIVE CONTROL: POSITIVE
KETONES UR QL STRIP: NEGATIVE
LEUKOCYTE ESTERASE UR QL STRIP.AUTO: NEGATIVE
LIPASE SERPL-CCNC: 18 U/L (ref 13–60)
LYMPHOCYTES # BLD AUTO: 2.96 10*3/MM3 (ref 0.7–3.1)
LYMPHOCYTES NFR BLD AUTO: 31 % (ref 19.6–45.3)
Lab: NORMAL
MCH RBC QN AUTO: 28.7 PG (ref 26.6–33)
MCHC RBC AUTO-ENTMCNC: 31.7 G/DL (ref 31.5–35.7)
MCV RBC AUTO: 90.4 FL (ref 79–97)
MONOCYTES # BLD AUTO: 0.47 10*3/MM3 (ref 0.1–0.9)
MONOCYTES NFR BLD AUTO: 4.9 % (ref 5–12)
NEUTROPHILS NFR BLD AUTO: 5.68 10*3/MM3 (ref 1.7–7)
NEUTROPHILS NFR BLD AUTO: 59.4 % (ref 42.7–76)
NITRITE UR QL STRIP: NEGATIVE
PH UR STRIP.AUTO: 6 [PH] (ref 5–8)
PLATELET # BLD AUTO: 257 10*3/MM3 (ref 140–450)
PMV BLD AUTO: 11.2 FL (ref 6–12)
POTASSIUM SERPL-SCNC: 4 MMOL/L (ref 3.5–5.2)
PROT SERPL-MCNC: 7.4 G/DL (ref 6–8.5)
PROT UR QL STRIP: NEGATIVE
RBC # BLD AUTO: 4.15 10*6/MM3 (ref 3.77–5.28)
SODIUM SERPL-SCNC: 142 MMOL/L (ref 136–145)
SP GR UR STRIP: >=1.03 (ref 1–1.03)
UROBILINOGEN UR QL STRIP: NORMAL
WBC NRBC COR # BLD AUTO: 9.56 10*3/MM3 (ref 3.4–10.8)
WHOLE BLOOD HOLD COAG: NORMAL
WHOLE BLOOD HOLD SPECIMEN: NORMAL

## 2024-07-02 PROCEDURE — 81025 URINE PREGNANCY TEST: CPT | Performed by: FAMILY MEDICINE

## 2024-07-02 PROCEDURE — 74177 CT ABD & PELVIS W/CONTRAST: CPT

## 2024-07-02 PROCEDURE — 25010000002 MORPHINE PER 10 MG: Performed by: FAMILY MEDICINE

## 2024-07-02 PROCEDURE — 96375 TX/PRO/DX INJ NEW DRUG ADDON: CPT

## 2024-07-02 PROCEDURE — 99285 EMERGENCY DEPT VISIT HI MDM: CPT

## 2024-07-02 PROCEDURE — 96374 THER/PROPH/DIAG INJ IV PUSH: CPT

## 2024-07-02 PROCEDURE — 80053 COMPREHEN METABOLIC PANEL: CPT | Performed by: FAMILY MEDICINE

## 2024-07-02 PROCEDURE — 96376 TX/PRO/DX INJ SAME DRUG ADON: CPT

## 2024-07-02 PROCEDURE — 25510000001 IOPAMIDOL 61 % SOLUTION: Performed by: FAMILY MEDICINE

## 2024-07-02 PROCEDURE — 83690 ASSAY OF LIPASE: CPT | Performed by: FAMILY MEDICINE

## 2024-07-02 PROCEDURE — 25810000003 SODIUM CHLORIDE 0.9 % SOLUTION: Performed by: FAMILY MEDICINE

## 2024-07-02 PROCEDURE — 85025 COMPLETE CBC W/AUTO DIFF WBC: CPT | Performed by: FAMILY MEDICINE

## 2024-07-02 PROCEDURE — 25010000002 ONDANSETRON PER 1 MG: Performed by: FAMILY MEDICINE

## 2024-07-02 PROCEDURE — 81003 URINALYSIS AUTO W/O SCOPE: CPT | Performed by: FAMILY MEDICINE

## 2024-07-02 RX ORDER — ONDANSETRON 2 MG/ML
4 INJECTION INTRAMUSCULAR; INTRAVENOUS EVERY 6 HOURS PRN
Status: DISCONTINUED | OUTPATIENT
Start: 2024-07-02 | End: 2024-07-02 | Stop reason: HOSPADM

## 2024-07-02 RX ORDER — DICYCLOMINE HCL 20 MG
20 TABLET ORAL EVERY 6 HOURS
Qty: 20 TABLET | Refills: 0 | Status: SHIPPED | OUTPATIENT
Start: 2024-07-02

## 2024-07-02 RX ORDER — SODIUM CHLORIDE 9 MG/ML
10 INJECTION, SOLUTION INTRAMUSCULAR; INTRAVENOUS; SUBCUTANEOUS AS NEEDED
Status: DISCONTINUED | OUTPATIENT
Start: 2024-07-02 | End: 2024-07-02 | Stop reason: HOSPADM

## 2024-07-02 RX ORDER — DICYCLOMINE HYDROCHLORIDE 10 MG/1
20 CAPSULE ORAL ONCE
Status: COMPLETED | OUTPATIENT
Start: 2024-07-02 | End: 2024-07-02

## 2024-07-02 RX ADMIN — DICYCLOMINE HYDROCHLORIDE 20 MG: 10 CAPSULE ORAL at 01:50

## 2024-07-02 RX ADMIN — IOPAMIDOL 100 ML: 612 INJECTION, SOLUTION INTRAVENOUS at 00:51

## 2024-07-02 RX ADMIN — MORPHINE SULFATE 4 MG: 4 INJECTION, SOLUTION INTRAMUSCULAR; INTRAVENOUS at 01:53

## 2024-07-02 RX ADMIN — SODIUM CHLORIDE 1000 ML: 9 INJECTION, SOLUTION INTRAVENOUS at 00:37

## 2024-07-02 RX ADMIN — MORPHINE SULFATE 4 MG: 4 INJECTION, SOLUTION INTRAMUSCULAR; INTRAVENOUS at 00:34

## 2024-07-02 RX ADMIN — ONDANSETRON 4 MG: 2 INJECTION INTRAMUSCULAR; INTRAVENOUS at 00:34

## 2024-07-02 NOTE — Clinical Note
[facility] EMERGENCY DEPARTMENT [place]  [address]  [facility] [address]  Phone: [phone]  Fax: [phone]    Geneva Cordova was seen and treated in our emergency department on 7/2/2024.  She may return to work on 07/03/2024.         Thank you for choosing [facility].    Lamont Riggins MD

## 2024-07-02 NOTE — FSED PROVIDER NOTE
Subjective     Abdominal Pain  Pain location:  RLQ  Pain quality: sharp, shooting and stabbing    Pain radiates to:  Does not radiate  Pain severity:  Severe  Onset quality:  Gradual  Duration:  18 hours  Timing: started as intermittent and is now constant.  Progression:  Worsening  Chronicity:  New  Context: alcohol use    Context: not diet changes, not eating, not previous surgeries and not recent sexual activity    Relieved by:  Nothing  Worsened by:  Movement and palpation  Ineffective treatments:  None tried  Associated symptoms: nausea    Risk factors: obesity    Risk factors: no alcohol abuse, no aspirin use, has not had multiple surgeries, no NSAID use and no recent hospitalization    That she is having right lower abdominal pain over the last 18 hours.  She reports she is also nauseous.  She states that she has had a decrease in appetite.  Has a history of constipation, had a normal bowel movement last night.  Patient denies vomiting, dark stools, other stools, vaginal pain, vaginal discharge.  Patient reports that she had an ablation several years ago and has not had a menstrual cycle since then.  Patient also had a gastric sleeve several years ago but does not complain of any upper abdominal pain.  Patient is not take any medications for symptoms.  Patient denies alcohol use and illicit drug use    Review of Systems   Gastrointestinal:  Positive for abdominal pain and nausea.   All other systems reviewed and are negative.      Past Medical History:   Diagnosis Date    Anemia     Anxiety     Bilateral lower extremity edema     R>L    Boil, axilla     recurrent    Chronic back pain     no meds or injections, feels related to breasts    Concussion with no loss of consciousness 08/17/2022    COVID-19 12/2020    Depression     Dyspepsia     Dyspnea on exertion     Fatigue     Gastroesophageal reflux disease concurrent with and due to paraesophageal hernia     recurrent after LSG revision, EGD Dr. Hughes 1/16/23  "   Glucose intolerance (impaired glucose tolerance)     Helicobacter pylori (H. pylori) infection     asx and grossly nl EGD. \"abundant\"on path. HBT still + after PrevPak tx. LSG path neg. neg testing 2018    Hypoalbuminemia     Left Ovarian serous cystadenoma -removed at  section 2017    Migraine     resolved with weightloss    Nausea and vomiting 2023    Obesity, morbid     VIRA (obstructive sleep apnea)     improved since WLS    PCOS (polycystic ovarian syndrome)     Right Essure implantation 2017 10/06/2017    Routine general medical examination at [facility] facility 02/10/2023    S/P  section;left ovarian cystectomy and partial salpingectomy 2017    Seasonal allergies        Allergies   Allergen Reactions    Codeine Nausea And Vomiting, Other (See Comments) and GI Intolerance     UNKNOWN       Past Surgical History:   Procedure Laterality Date    [facility]    BILATERAL BREAST REDUCTION      2022     SECTION N/A 2017      SECTION PRIMARY;  CYSTECTOMY; Kwadwo Baxter MD; :  [facility];  Service:     DILATATION AND CURETTAGE      ENDOMETRIAL ABLATION  2020    Dr Cuevas    ENDOSCOPY N/A 2020    Procedure: ESOPHAGOGASTRODUODENOSCOPY;  Surgeon: Natalia Mariscal MD;  Location:  [facility];  Service: Bariatric;  Laterality: N/A;    ENDOSCOPY  2022    Dr. La    ENDOSCOPY N/A 2023    Procedure: ESOPHAGOGASTRODUODENOSCOPY;  Surgeon: Erik Hughes MD;  Location:  [facility];  Service: Robotics - DaVinci;  Laterality: N/A;    ESSURE TUBAL LIGATION  2018    GASTRIC BYPASS N/A 2023    Procedure: GASTRIC BYPASS LAPAROSCOPIC WITH DAVINCI ROBOT AND BILIOPANCREATIC DIVERSION WITH DUODENAL SWITCH;  Surgeon: Erik Hughes MD;  Location:  [facility];  Service: Robotics - DaVinci;  Laterality: N/A;    GASTRIC SLEEVE LAPAROSCOPIC  2015 "    With Dr. Hughes    GASTRIC SLEEVE LAPAROSCOPIC N/A 12/01/2020    Procedure: GASTRIC SLEEVE LAPAROSCOPIC;  Surgeon: Natalia Mariscal MD;  Location:  [facility];  Service: Bariatric;  Laterality: N/A;    LAPAROSCOPIC CHOLECYSTECTOMY  02/2018    Dr. Hughes    PARAESOPHAGEAL HERNIA REPAIR N/A 4/14/2023    Procedure: LAPAROSCOPIC RECURRENT PARAESOPHAGEAL HERNIA REPAIR WITH DAVINCI ROBOT AND FALCIFORM LIGAMENT SLING ;  Surgeon: Erik Hughes MD;  Location:  [facility];  Service: Robotics - DaVinci;  Laterality: N/A;    SALPINGECTOMY Bilateral 02/2020    Dr Cuevas    TONSILLECTOMY  2014    WISDOM TOOTH EXTRACTION  2014       Family History   Problem Relation Age of Onset    No Known Problems Other     Sleep apnea Mother     Diabetes Mother     No Known Problems Father     Pancreatic cancer Maternal Grandmother     No Known Problems Sister     No Known Problems Brother     No Known Problems Daughter     No Known Problems Son     No Known Problems Paternal Grandmother     No Known Problems Maternal Aunt     No Known Problems Paternal Aunt     BRCA 1/2 Neg Hx     Colon cancer Neg Hx     Endometrial cancer Neg Hx     Ovarian cancer Neg Hx        Social History     Socioeconomic History    Marital status: Single    Number of children: 4    Years of education: College    Tobacco Use    Smoking status: Never    Smokeless tobacco: Never   Vaping Use    Vaping status: Never Used   Substance and Sexual Activity    Alcohol use: Not Currently    Drug use: No    Sexual activity: Not Currently     Partners: Male           Objective   Physical Exam  Vitals and nursing note reviewed.   Constitutional:       Appearance: Normal appearance. She is normal weight.   HENT:      Head: Normocephalic.      Nose: Nose normal.   Eyes:      Pupils: Pupils are equal, round, and reactive to light.   Cardiovascular:      Rate and Rhythm: Normal rate and regular rhythm.   Pulmonary:      Effort: Pulmonary effort is normal.      Breath sounds:  Normal breath sounds.   Abdominal:      General: Abdomen is flat.      Tenderness: There is abdominal tenderness in the right lower quadrant. There is rebound. Positive signs include McBurney's sign.   Musculoskeletal:         General: Normal range of motion.   Skin:     General: Skin is warm and dry.   Neurological:      General: No focal deficit present.      Mental Status: She is alert and oriented to person, place, and time. Mental status is at baseline.   Psychiatric:         Mood and Affect: Mood normal.         Behavior: Behavior normal.         Thought Content: Thought content normal.         Judgment: Judgment normal.         Procedures           ED Course  ED Course as of 07/02/24 0121   Tue Jul 02, 2024   0050 AST (SGOT)(!): 36 [WB]   0056 Alkaline Phosphatase(!): 125  Chronically elevated liver enzymes [WB]   0057 Hemoglobin(!): 11.9 [WB]   0057 BP(!): 161/112 [WB]   0103 CT Abdomen Pelvis With Contrast [WB]      ED Course User Index  [WB] Lorraine Hein PA-C          Medical Decision Making  Patient quadrant abdominal pain and frontals of appendicitis, nonspecific abdominal pain, constipation, mesenteric adenitis, epiploic appendagitis, bowel obstruction, urinary tract infection, kidney stone.  CT scan returned without acute findings.  Patient will be discharged home when she is feeling better to follow-up appropriately outpatient with her primary care and GI physician.    Amount and/or Complexity of Data Reviewed  Labs: ordered. Decision-making details documented in ED Course.  Radiology: ordered. Decision-making details documented in ED Course.  Discussion of management or test interpretation with external provider(s): Pt has chronically elevated liver enzymes and has been evaluated by GI for this.  Labs and CT scan returned without abnormal findings.  Mild to moderate constipation noted on CT scan no appendicitis noted on CT scan.  Patient was given pain medication and fluids in the emergency  department and is feeling much better.  Patient will be informed to follow-up with her GI physician and return to emergency room for worsening symptoms.     Risk  Prescription drug management.    Critical Care  Total time providing critical care: 0 minutes        Final diagnoses:   Right lower quadrant abdominal pain   Generalized abdominal pain       ED Disposition  ED Disposition       ED Disposition   Discharge    Condition   Stable    Comment   --               Alice Fuentes PA-C  [address]  [facility] [address]  [facility] [id number] [phone]    Schedule an appointment as soon as possible for a visit            Medication List        New Prescriptions      dicyclomine 20 MG tablet  Commonly known as: BENTYL  Take 1 tablet by mouth Every 6 (Six) Hours.               Where to Get Your Medications        These medications were sent to [id number] - [place], [place] - [address] AT [facility] [address] - [phone]  - [phone] FX  [address], [facility] [address]      Phone: [phone]   dicyclomine 20 MG tablet

## 2024-07-02 NOTE — DISCHARGE INSTRUCTIONS
Please go home and rest. Please follow up with your primary care as soon as possible for further evaluation of symptoms. Return to  ER for worsening symptoms.

## 2024-09-10 ENCOUNTER — OFFICE VISIT (OUTPATIENT)
Dept: INTERNAL MEDICINE | Facility: CLINIC | Age: 39
End: 2024-09-10
Payer: COMMERCIAL

## 2024-09-10 VITALS
BODY MASS INDEX: 39.69 KG/M2 | DIASTOLIC BLOOD PRESSURE: 68 MMHG | WEIGHT: 277.2 LBS | OXYGEN SATURATION: 98 % | HEART RATE: 72 BPM | TEMPERATURE: 97.7 F | HEIGHT: 70 IN | SYSTOLIC BLOOD PRESSURE: 110 MMHG | RESPIRATION RATE: 18 BRPM

## 2024-09-10 DIAGNOSIS — R39.198 DIFFICULTY IN URINATION: Primary | ICD-10-CM

## 2024-09-10 DIAGNOSIS — N89.8 VAGINAL DISCHARGE: ICD-10-CM

## 2024-09-10 DIAGNOSIS — Z11.3 SCREENING FOR STD (SEXUALLY TRANSMITTED DISEASE): ICD-10-CM

## 2024-09-10 LAB
BILIRUB BLD-MCNC: NEGATIVE MG/DL
CLARITY, POC: ABNORMAL
COLOR UR: YELLOW
EXPIRATION DATE: ABNORMAL
GLUCOSE UR STRIP-MCNC: NEGATIVE MG/DL
KETONES UR QL: NEGATIVE
LEUKOCYTE EST, POC: NEGATIVE
Lab: ABNORMAL
NITRITE UR-MCNC: NEGATIVE MG/ML
PH UR: 6 [PH] (ref 5–8)
PROT UR STRIP-MCNC: NEGATIVE MG/DL
RBC # UR STRIP: NEGATIVE /UL
SP GR UR: 1.03 (ref 1–1.03)
UROBILINOGEN UR QL: NORMAL

## 2024-09-10 PROCEDURE — 87661 TRICHOMONAS VAGINALIS AMPLIF: CPT

## 2024-09-10 PROCEDURE — 87801 DETECT AGNT MULT DNA AMPLI: CPT

## 2024-09-10 PROCEDURE — 1126F AMNT PAIN NOTED NONE PRSNT: CPT

## 2024-09-10 PROCEDURE — 99214 OFFICE O/P EST MOD 30 MIN: CPT

## 2024-09-10 PROCEDURE — 87491 CHLMYD TRACH DNA AMP PROBE: CPT

## 2024-09-10 PROCEDURE — 87798 DETECT AGENT NOS DNA AMP: CPT

## 2024-09-10 PROCEDURE — 1159F MED LIST DOCD IN RCRD: CPT

## 2024-09-10 PROCEDURE — 87591 N.GONORRHOEAE DNA AMP PROB: CPT

## 2024-09-10 PROCEDURE — 1160F RVW MEDS BY RX/DR IN RCRD: CPT

## 2024-09-10 NOTE — PROGRESS NOTES
"    Office Note     Name: Geneva Cordova    : 1985     MRN: [id number]     Chief Complaint  Urinary Frequency, Flank Pain (Right side), and Abdominal Pain (Lower middle of abdomen )    Subjective     History of Present Illness:  History of Present Illness      Geneva Cordova is a 38 y.o. female who presents today for UTI symptoms    Reports urinary frequency, lower abdominal discomfort and vaginal discharge with fishy odor.  She states symptoms have been intermittent since . She was treated for BV back in  but doesn't believe that it fully resolved.  She has been trying some home remedies to see if this will help but she continues to have symptoms.  She denies fevers/chills or hematuria.    She does have a new partner and is requesting STD testing as well.       Past Medical History:   Diagnosis Date    Anemia     Anxiety     Bilateral lower extremity edema     R>L    Boil, axilla     recurrent    Chronic back pain     no meds or injections, feels related to breasts    Concussion with no loss of consciousness 2022    COVID-19 2020    Depression     Dyspepsia     Dyspnea on exertion     Fatigue     Gastroesophageal reflux disease concurrent with and due to paraesophageal hernia     recurrent after LSG revision, EGD Dr. Hughes 23    Glucose intolerance (impaired glucose tolerance)     Helicobacter pylori (H. pylori) infection     asx and grossly nl EGD. \"abundant\"on path. HBT still + after PrevPak tx. LSG path neg. neg testing 2018    Hypoalbuminemia     Left Ovarian serous cystadenoma -removed at  section 2017    Migraine     resolved with weightloss    Nausea and vomiting 2023    Obesity, morbid     VIRA (obstructive sleep apnea)     improved since WLS    PCOS (polycystic ovarian syndrome)     Right Essure implantation 2017 10/06/2017    Routine general medical examination at a [facility] care facility 02/10/2023    S/P  section;left ovarian " cystectomy and partial salpingectomy 2017    Seasonal allergies      Past Surgical History:   Procedure Laterality Date    [facility]  [facility]    BILATERAL BREAST REDUCTION      2022     SECTION N/A 2017      SECTION PRIMARY;  CYSTECTOMY; Kwadwo Baxter MD; : [facility];  Service:     DILATATION AND CURETTAGE      ENDOMETRIAL ABLATION  2020    Dr Cuevas    ENDOSCOPY N/A 2020    Procedure: ESOPHAGOGASTRODUODENOSCOPY;  Surgeon: Natalia Mariscal MD;  Location:  [facility];  Service: Bariatric;  Laterality: N/A;    ENDOSCOPY  2022    Dr. La    ENDOSCOPY N/A 2023    Procedure: ESOPHAGOGASTRODUODENOSCOPY;  Surgeon: Erik Hughes MD;  Location:  [facility];  Service: Robotics - DaVinci;  Laterality: N/A;    ESSURE TUBAL LIGATION      GASTRIC BYPASS N/A 2023    Procedure: GASTRIC BYPASS LAPAROSCOPIC WITH DAVINCI ROBOT AND BILIOPANCREATIC DIVERSION WITH DUODENAL SWITCH;  Surgeon: Erik Hughes MD;  Location:  [facility];  Service: Robotics - DaVinci;  Laterality: N/A;    GASTRIC SLEEVE LAPAROSCOPIC  2015    With Dr. Hughes    GASTRIC SLEEVE LAPAROSCOPIC N/A 2020    Procedure: GASTRIC SLEEVE LAPAROSCOPIC;  Surgeon: Natalia Mariscal MD;  Location:  [facility];  Service: Bariatric;  Laterality: N/A;    LAPAROSCOPIC CHOLECYSTECTOMY  2018    Dr. Hughes    PARAESOPHAGEAL HERNIA REPAIR N/A 2023    Procedure: LAPAROSCOPIC RECURRENT PARAESOPHAGEAL HERNIA REPAIR WITH DAVINCI ROBOT AND FALCIFORM LIGAMENT SLING ;  Surgeon: Erik Hughes MD;  Location:  [facility];  Service: Robotics - DaVinci;  Laterality: N/A;    SALPINGECTOMY Bilateral 2020    Dr Cuevas    TONSILLECTOMY      WISDOM TOOTH EXTRACTION         Current Outpatient Medications:     Calcium Carb-Cholecalciferol (Calcium 600/Vitamin D) 600-10 MG-MCG chewable tablet, Chew 1 tablet Daily., Disp: 90 tablet, Rfl:  1    ferrous gluconate (FERGON) 324 MG tablet, Take 1 tablet by mouth 2 (Two) Times a Day., Disp: 180 tablet, Rfl: 1    linaclotide (LINZESS) 145 MCG capsule capsule, Take 1 capsule by mouth Every Morning Before Breakfast., Disp: 90 capsule, Rfl: 3    linaclotide (Linzess) 72 MCG capsule capsule, Take 1 capsule by mouth Daily. 30 minutes prior to a meal, Disp: 90 capsule, Rfl: 3    Multiple Vitamins-Minerals (MULTIPLE VITAMINS/WOMENS) tablet, Take 1 tablet by mouth. PT NOT CURRENTLY TAKING, Disp: , Rfl:     naltrexone-bupropion ER (CONTRAVE) 8-90 MG tablet, Wk 1: 1 tab daily, Wk 2: 1 tab twice a day, Wk 3: 2 tabs in AM, 1 tab in PM, Wk 4: 2 tabs twice a day, Maintenance dose: 2 tabs twice daily., Disp: 120 tablet, Rfl: 5    phentermine (Adipex-P) 37.5 MG tablet, Take 1 tablet by mouth Every Morning Before Breakfast., Disp: 30 tablet, Rfl: 1    potassium chloride 10 MEQ CR tablet, Take 1 tablet by mouth Daily., Disp: 30 tablet, Rfl: 1    thiamine (VITAMIN B-1) 100 MG tablet, Take 1 tablet by mouth Daily., Disp: 90 tablet, Rfl: 1    vitamin D (ERGOCALCIFEROL) 1.25 MG (17566 UT) capsule capsule, Take 1 capsule by mouth 1 (One) Time Per Week., Disp: 8 capsule, Rfl: 0    dicyclomine (BENTYL) 20 MG tablet, Take 1 tablet by mouth Every 6 (Six) Hours. (Patient not taking: Reported on 9/10/2024), Disp: 20 tablet, Rfl: 0    furosemide (Lasix) 20 MG tablet, Take 1 tablet by mouth Daily As Needed (edema). (Patient not taking: Reported on 9/10/2024), Disp: 30 tablet, Rfl: 1    Current Facility-Administered Medications:     cyanocobalamin injection 1,000 mcg, 1,000 mcg, Intramuscular, Q30 Days, Alice Fuentes PA-C, 1,000 mcg at 03/28/24 1012  Allergies   Allergen Reactions    Codeine Nausea And Vomiting, Other (See Comments) and GI Intolerance     UNKNOWN       Objective     Vital Signs  /68 (BP Location: Right arm, Patient Position: Sitting, Cuff Size: Large Adult)   Pulse 72   Temp 97.7 °F (36.5 °C) (Infrared)   Resp  "18   Ht 177.8 cm (70\")   Wt 126 kg (277 lb 3.2 oz)   SpO2 98%   BMI 39.77 kg/m²   Estimated body mass index is 39.77 kg/m² as calculated from the following:    Height as of this encounter: 177.8 cm (70\").    Weight as of this encounter: 126 kg (277 lb 3.2 oz).            Physical Exam    Physical Exam  Vitals reviewed.   Constitutional:       General: She is not in acute distress.     Appearance: Normal appearance. She is obese.   HENT:      Head: Normocephalic and atraumatic.   Eyes:      General: No scleral icterus.     Extraocular Movements: Extraocular movements intact.      Conjunctiva/sclera: Conjunctivae normal.   Cardiovascular:      Rate and Rhythm: Normal rate and regular rhythm.      Heart sounds: Normal heart sounds. No murmur heard.  Pulmonary:      Effort: Pulmonary effort is normal. No respiratory distress.      Breath sounds: Normal breath sounds. No stridor. No wheezing or rhonchi.   Musculoskeletal:      Cervical back: Normal range of motion and neck supple.   Skin:     General: Skin is warm and dry.      Coloration: Skin is not jaundiced.   Neurological:      General: No focal deficit present.      Mental Status: She is alert and oriented to person, place, and time.      Gait: Gait normal.   Psychiatric:         Mood and Affect: Mood normal.         Behavior: Behavior normal.          Results      Results for orders placed or performed in visit on 09/10/24   POCT urinalysis dipstick, automated    Specimen: Urine   Result Value Ref Range    Color Yellow Yellow, Straw, Dark Yellow, Fatoumata    Clarity, UA Slightly Cloudy (A) Clear    Specific Gravity  1.030 1.005 - 1.030    pH, Urine 6.0 5.0 - 8.0    Leukocytes Negative Negative    Nitrite, UA Negative Negative    Protein, POC Negative Negative mg/dL    Glucose, UA Negative Negative mg/dL    Ketones, UA Negative Negative    Urobilinogen, UA Normal Normal, 0.2 E.U./dL    Bilirubin Negative Negative    Blood, UA Negative Negative    Lot Number " [id number]     Expiration Date 2026/01/14             Assessment and Plan     Diagnoses and all orders for this visit:    1. Difficulty in urination (Primary)  -     POCT urinalysis dipstick, automated    2. Screening for STD (sexually transmitted disease)  -     Chlamydia trachomatis, Neisseria gonorrhoeae, Trichomonas vaginalis, PCR - Urine, Urine, Clean Catch; Future  -     NuSwab BV & Candida - Swab, Vagina; Future  -     Chlamydia trachomatis, Neisseria gonorrhoeae, Trichomonas vaginalis, PCR - Urine, Urine, Clean Catch  -     NuSwab BV & Candida - Swab, Vagina    3. Vaginal discharge  -     Chlamydia trachomatis, Neisseria gonorrhoeae, Trichomonas vaginalis, PCR - Urine, Urine, Clean Catch; Future  -     NuSwab BV & Candida - Swab, Vagina; Future  -     Chlamydia trachomatis, Neisseria gonorrhoeae, Trichomonas vaginalis, PCR - Urine, Urine, Clean Catch  -     NuSwab BV & Candida - Swab, Vagina    POC UA negative for infection  STD testing and NuSwab ordered  Assessment & Plan      If labs or images are ordered we will contact you with the results within the next week.  If you have not heard from us after a week please call our office to inquire about the results.    Follow Up  Return if symptoms worsen or fail to improve.    Prerna Wyatt, APRN      \

## 2024-09-20 ENCOUNTER — LAB (OUTPATIENT)
Dept: INTERNAL MEDICINE | Facility: CLINIC | Age: 39
End: 2024-09-20
Payer: COMMERCIAL

## 2024-09-20 ENCOUNTER — OFFICE VISIT (OUTPATIENT)
Dept: INTERNAL MEDICINE | Facility: CLINIC | Age: 39
End: 2024-09-20
Payer: COMMERCIAL

## 2024-09-20 VITALS
HEART RATE: 55 BPM | RESPIRATION RATE: 20 BRPM | HEIGHT: 70 IN | TEMPERATURE: 97.8 F | WEIGHT: 271.8 LBS | OXYGEN SATURATION: 98 % | DIASTOLIC BLOOD PRESSURE: 92 MMHG | BODY MASS INDEX: 38.91 KG/M2 | SYSTOLIC BLOOD PRESSURE: 138 MMHG

## 2024-09-20 DIAGNOSIS — Z13.220 SCREENING, LIPID: ICD-10-CM

## 2024-09-20 DIAGNOSIS — E61.1 IRON DEFICIENCY: ICD-10-CM

## 2024-09-20 DIAGNOSIS — Z13.29 SCREENING FOR ENDOCRINE DISORDER: ICD-10-CM

## 2024-09-20 DIAGNOSIS — E55.9 VITAMIN D DEFICIENCY: ICD-10-CM

## 2024-09-20 DIAGNOSIS — E66.09 CLASS 2 OBESITY DUE TO EXCESS CALORIES WITHOUT SERIOUS COMORBIDITY WITH BODY MASS INDEX (BMI) OF 39.0 TO 39.9 IN ADULT: ICD-10-CM

## 2024-09-20 DIAGNOSIS — K59.04 CHRONIC IDIOPATHIC CONSTIPATION: ICD-10-CM

## 2024-09-20 DIAGNOSIS — Z13.1 SCREENING FOR DIABETES MELLITUS: ICD-10-CM

## 2024-09-20 DIAGNOSIS — I10 PRIMARY HYPERTENSION: ICD-10-CM

## 2024-09-20 DIAGNOSIS — F31.31 BIPOLAR 1 DISORDER, DEPRESSED, MILD: Primary | ICD-10-CM

## 2024-09-20 DIAGNOSIS — F31.9 BIPOLAR 1 DISORDER: ICD-10-CM

## 2024-09-20 DIAGNOSIS — F41.1 GENERALIZED ANXIETY DISORDER: ICD-10-CM

## 2024-09-20 DIAGNOSIS — E53.8 B12 DEFICIENCY: ICD-10-CM

## 2024-09-20 DIAGNOSIS — G43.919 INTRACTABLE MIGRAINE WITHOUT STATUS MIGRAINOSUS, UNSPECIFIED MIGRAINE TYPE: ICD-10-CM

## 2024-09-20 DIAGNOSIS — E66.812 CLASS 2 OBESITY DUE TO EXCESS CALORIES WITHOUT SERIOUS COMORBIDITY WITH BODY MASS INDEX (BMI) OF 39.0 TO 39.9 IN ADULT: ICD-10-CM

## 2024-09-20 LAB
25(OH)D3 SERPL-MCNC: 11.5 NG/ML (ref 30–100)
ALBUMIN SERPL-MCNC: 4.3 G/DL (ref 3.5–5.2)
ALBUMIN/GLOB SERPL: 1.5 G/DL
ALP SERPL-CCNC: 118 U/L (ref 39–117)
ALT SERPL W P-5'-P-CCNC: 33 U/L (ref 1–33)
ANION GAP SERPL CALCULATED.3IONS-SCNC: 8 MMOL/L (ref 5–15)
AST SERPL-CCNC: 35 U/L (ref 1–32)
BASOPHILS # BLD AUTO: 0.04 10*3/MM3 (ref 0–0.2)
BASOPHILS NFR BLD AUTO: 0.7 % (ref 0–1.5)
BILIRUB SERPL-MCNC: 0.4 MG/DL (ref 0–1.2)
BUN SERPL-MCNC: 9 MG/DL (ref 6–20)
BUN/CREAT SERPL: 13.2 (ref 7–25)
CALCIUM SPEC-SCNC: 9.1 MG/DL (ref 8.6–10.5)
CHLORIDE SERPL-SCNC: 105 MMOL/L (ref 98–107)
CHOLEST SERPL-MCNC: 125 MG/DL (ref 0–200)
CO2 SERPL-SCNC: 25 MMOL/L (ref 22–29)
CREAT SERPL-MCNC: 0.68 MG/DL (ref 0.57–1)
DEPRECATED RDW RBC AUTO: 43.7 FL (ref 37–54)
EGFRCR SERPLBLD CKD-EPI 2021: 115.2 ML/MIN/1.73
EOSINOPHIL # BLD AUTO: 0.22 10*3/MM3 (ref 0–0.4)
EOSINOPHIL NFR BLD AUTO: 3.6 % (ref 0.3–6.2)
ERYTHROCYTE [DISTWIDTH] IN BLOOD BY AUTOMATED COUNT: 12.8 % (ref 12.3–15.4)
FERRITIN SERPL-MCNC: 20 NG/ML (ref 13–150)
FOLATE SERPL-MCNC: 10.1 NG/ML (ref 4.78–24.2)
GLOBULIN UR ELPH-MCNC: 2.9 GM/DL
GLUCOSE SERPL-MCNC: 73 MG/DL (ref 65–99)
HBA1C MFR BLD: 4.8 % (ref 4.8–5.6)
HCT VFR BLD AUTO: 38.1 % (ref 34–46.6)
HDLC SERPL-MCNC: 49 MG/DL (ref 40–60)
HGB BLD-MCNC: 11.7 G/DL (ref 12–15.9)
IMM GRANULOCYTES # BLD AUTO: 0.01 10*3/MM3 (ref 0–0.05)
IMM GRANULOCYTES NFR BLD AUTO: 0.2 % (ref 0–0.5)
IRON 24H UR-MRATE: 32 MCG/DL (ref 37–145)
IRON SATN MFR SERPL: 7 % (ref 20–50)
LDLC SERPL CALC-MCNC: 66 MG/DL (ref 0–100)
LDLC/HDLC SERPL: 1.38 {RATIO}
LYMPHOCYTES # BLD AUTO: 2.02 10*3/MM3 (ref 0.7–3.1)
LYMPHOCYTES NFR BLD AUTO: 33.2 % (ref 19.6–45.3)
MCH RBC QN AUTO: 28.4 PG (ref 26.6–33)
MCHC RBC AUTO-ENTMCNC: 30.7 G/DL (ref 31.5–35.7)
MCV RBC AUTO: 92.5 FL (ref 79–97)
MONOCYTES # BLD AUTO: 0.35 10*3/MM3 (ref 0.1–0.9)
MONOCYTES NFR BLD AUTO: 5.7 % (ref 5–12)
NEUTROPHILS NFR BLD AUTO: 3.45 10*3/MM3 (ref 1.7–7)
NEUTROPHILS NFR BLD AUTO: 56.6 % (ref 42.7–76)
NRBC BLD AUTO-RTO: 0 /100 WBC (ref 0–0.2)
PLATELET # BLD AUTO: 254 10*3/MM3 (ref 140–450)
PMV BLD AUTO: 12.2 FL (ref 6–12)
POTASSIUM SERPL-SCNC: 3.6 MMOL/L (ref 3.5–5.2)
PROT SERPL-MCNC: 7.2 G/DL (ref 6–8.5)
RBC # BLD AUTO: 4.12 10*6/MM3 (ref 3.77–5.28)
SODIUM SERPL-SCNC: 138 MMOL/L (ref 136–145)
TIBC SERPL-MCNC: 435 MCG/DL (ref 298–536)
TRANSFERRIN SERPL-MCNC: 292 MG/DL (ref 200–360)
TRIGL SERPL-MCNC: 41 MG/DL (ref 0–150)
TSH SERPL DL<=0.05 MIU/L-ACNC: 1.18 UIU/ML (ref 0.27–4.2)
VIT B12 BLD-MCNC: >2000 PG/ML (ref 211–946)
VLDLC SERPL-MCNC: 10 MG/DL (ref 5–40)
WBC NRBC COR # BLD AUTO: 6.09 10*3/MM3 (ref 3.4–10.8)

## 2024-09-20 PROCEDURE — 85025 COMPLETE CBC W/AUTO DIFF WBC: CPT | Performed by: PHYSICIAN ASSISTANT

## 2024-09-20 PROCEDURE — 3075F SYST BP GE 130 - 139MM HG: CPT | Performed by: PHYSICIAN ASSISTANT

## 2024-09-20 PROCEDURE — 82728 ASSAY OF FERRITIN: CPT | Performed by: PHYSICIAN ASSISTANT

## 2024-09-20 PROCEDURE — 36415 COLL VENOUS BLD VENIPUNCTURE: CPT | Performed by: PHYSICIAN ASSISTANT

## 2024-09-20 PROCEDURE — 96372 THER/PROPH/DIAG INJ SC/IM: CPT | Performed by: PHYSICIAN ASSISTANT

## 2024-09-20 PROCEDURE — 3080F DIAST BP >= 90 MM HG: CPT | Performed by: PHYSICIAN ASSISTANT

## 2024-09-20 PROCEDURE — 83540 ASSAY OF IRON: CPT | Performed by: PHYSICIAN ASSISTANT

## 2024-09-20 PROCEDURE — 82306 VITAMIN D 25 HYDROXY: CPT | Performed by: PHYSICIAN ASSISTANT

## 2024-09-20 PROCEDURE — 1126F AMNT PAIN NOTED NONE PRSNT: CPT | Performed by: PHYSICIAN ASSISTANT

## 2024-09-20 PROCEDURE — 82607 VITAMIN B-12: CPT | Performed by: PHYSICIAN ASSISTANT

## 2024-09-20 PROCEDURE — 84466 ASSAY OF TRANSFERRIN: CPT | Performed by: PHYSICIAN ASSISTANT

## 2024-09-20 PROCEDURE — 80061 LIPID PANEL: CPT | Performed by: PHYSICIAN ASSISTANT

## 2024-09-20 PROCEDURE — 83036 HEMOGLOBIN GLYCOSYLATED A1C: CPT | Performed by: PHYSICIAN ASSISTANT

## 2024-09-20 PROCEDURE — 84443 ASSAY THYROID STIM HORMONE: CPT | Performed by: PHYSICIAN ASSISTANT

## 2024-09-20 PROCEDURE — 99215 OFFICE O/P EST HI 40 MIN: CPT | Performed by: PHYSICIAN ASSISTANT

## 2024-09-20 PROCEDURE — 80053 COMPREHEN METABOLIC PANEL: CPT | Performed by: PHYSICIAN ASSISTANT

## 2024-09-20 PROCEDURE — 82746 ASSAY OF FOLIC ACID SERUM: CPT | Performed by: PHYSICIAN ASSISTANT

## 2024-09-20 RX ORDER — HYDROCHLOROTHIAZIDE 12.5 MG/1
12.5 TABLET ORAL DAILY
Qty: 90 TABLET | Refills: 1 | Status: SHIPPED | OUTPATIENT
Start: 2024-09-20

## 2024-09-20 RX ORDER — CYANOCOBALAMIN, ISOPROPYL ALCOHOL 1000MCG/ML
1000 KIT INJECTION
Qty: 1 KIT | Refills: 11 | Status: SHIPPED | OUTPATIENT
Start: 2024-09-20

## 2024-09-20 RX ORDER — TOPIRAMATE 25 MG/1
25 TABLET, FILM COATED ORAL 2 TIMES DAILY
Qty: 180 TABLET | Refills: 1 | Status: SHIPPED | OUTPATIENT
Start: 2024-09-20

## 2024-09-20 RX ORDER — PHYTONADIONE 5 MG/1
5 TABLET ORAL DAILY
COMMUNITY

## 2024-09-20 RX ORDER — DOXYCYCLINE 100 MG/1
100 CAPSULE ORAL 2 TIMES DAILY
COMMUNITY
End: 2024-10-02

## 2024-09-20 RX ORDER — ERGOCALCIFEROL 1.25 MG/1
50000 CAPSULE, LIQUID FILLED ORAL WEEKLY
Qty: 12 CAPSULE | Refills: 3 | Status: SHIPPED | OUTPATIENT
Start: 2024-09-20

## 2024-09-20 RX ORDER — PHENTERMINE HYDROCHLORIDE 37.5 MG/1
37.5 TABLET ORAL
Qty: 30 TABLET | Refills: 1 | Status: SHIPPED | OUTPATIENT
Start: 2024-09-20

## 2024-09-20 RX ORDER — FERROUS GLUCONATE 324(38)MG
324 TABLET ORAL
Qty: 90 TABLET | Refills: 3 | Status: SHIPPED | OUTPATIENT
Start: 2024-09-20

## 2024-09-20 RX ADMIN — CYANOCOBALAMIN 1000 MCG: 1000 INJECTION, SOLUTION INTRAMUSCULAR; SUBCUTANEOUS at 10:25

## 2024-09-20 NOTE — ASSESSMENT & PLAN NOTE
Chronic, newly dx, will refer to psych for eval. Start Vraylar 1.5  Follow up in 1 mo or sooner if has AE or worsening sx. Stop med and go to ER if has HI/SI. Risks and benefits of medical treatment discussed. Common side effects reviewed.

## 2024-09-20 NOTE — ASSESSMENT & PLAN NOTE
Patient's (Body mass index is 39 kg/m².) indicates that they are morbidly/severely obese (BMI > 40 or > 35 with obesity - related health condition) with health conditions that include GERD . Weight is improving with treatment. BMI is is above average; BMI management plan is completed. We discussed portion control and increasing exercise. F/u with Bariatrics as dir.  Advised to take vitamins as directed, discussed ways to inc protein.   Will restart Adipex and Topamax.  Adam 9/20/24  UDS/CSA: 9/20/24

## 2024-09-20 NOTE — PROGRESS NOTES
Chief Complaint  Medicaiton Request (Pt requesting Vyvance Rx'd to help with fatigue), Fatigue, and Obesity (/)    Subjective          History of Present Illness  Geneva Cordova presents to [facility] for   History of Present Illness  Overweight:  She wants to loose 30 more lbs to be able to get the excess skin off her arms through [employer]. She had gastric bypass 4/2023 and has been working on weight loss. She is following the diet and working on exercise.   Not sure if it is working. She was considering restarting Adipex at some point. Feels like most of her weight is water weight currently. She feels bloated/swollen and would like a refill on Lasix, has not had the medication in a while.  She is continuing to work on increasing protein.  She does note that she snacks on salty pretzels often so plans on cutting back on that.  She is doing better on taking her daily vitamins through bariatrics.    Constipation:  Recently seen by GI, they prescribed Linzess, she has not picked this up or started it yet.  She is wondering if constipation is causing her to feel bloated.      Fatigue:  Has no motivation to anything. Would like labs checked. Not sure if it is related to mood or vitamin deficiency.     Anx/Dep/mood disorder:  She has fhx of bioplar, her mother and brother. Thinks she may have this also. Has tried and failed a few SSRIs and antidepressants in the past, most recently failed wellbutrin. She has felt fatigued for the last year but will have periods of time that last a week or two where she will feel good about herself and will be productive and not sleep much and work all the time.  She will then have times where she feels like the world is against her and she is down on herself, she will go to [employer] and read the bible during those times.  She has problems with gambling, feels like she is a different person during that time. She feels responsible with money sometimes but occasionally  she will try to driscoll away her last nader if she is in the mood. She sometimes will be more sexually active than normal and want to get checked for STDs. She has periods of time where she will not need sleep and feel good and will work 6 days in a row (12 hr shifts), and then will have a crash where she has a hard time going to work and will be irritable and will not get out of bed unless it is to go to work.  Has tried therapy in the past but it has not been helpful.   Mood Disorder Questionnaire  Has there ever been a period of time when you were not your self and:  You felt so good or so hyper that other people thought you were not your normal self or you were so hyper that you got into trouble? YES   You were so irritable that you shouted at people or started fights or arguments? YES   You felt more self-confident than usual? YES   You got much less sleep than usual and found that you didn't really miss it? YES   You were more talkative or spoke much faster than usual? NO   Thoughts raced through your head or you couldn't slow your mind down? YES   You were so easily distracted by things around you that you had trouble concentrating or staying on track? YES   You had more energy than usual? YES   You were much more active than usual? YES   You were much more social or outgoing than usual, for example, you telephoned friends in the middle of the night? YES   You were much more interested in sex than usual? YES  You did things that were unusual for you, or that other people might have thought were excessive, foolish, or risky? YES   Spending money got you or your family in trouble? YES   MDQ Questionnaire Section 1 Total 12  If you checked YES to more than one of the above, have several of these ever happened during the same period of time? yes    How much of a problem did any of these cause you? Minor problem        The following portions of the patient's history were reviewed and updated as appropriate:  allergies, current medications, past family history, past medical history, past social history, past surgical history and problem list.  Allergies   Allergen Reactions    Codeine Nausea And Vomiting, Other (See Comments) and GI Intolerance     UNKNOWN       Current Outpatient Medications:     Calcium Carb-Cholecalciferol (Calcium 600/Vitamin D) 600-10 MG-MCG chewable tablet, Chew 1 tablet Daily., Disp: 90 tablet, Rfl: 1    ferrous gluconate (FERGON) 324 MG tablet, Take 1 tablet by mouth Daily With Breakfast., Disp: 90 tablet, Rfl: 3    linaclotide (LINZESS) 145 MCG capsule capsule, Take 1 capsule by mouth Every Morning Before Breakfast., Disp: 90 capsule, Rfl: 1    Multiple Vitamins-Minerals (MULTIPLE VITAMINS/WOMENS) tablet, Take 1 tablet by mouth. PT NOT CURRENTLY TAKING, Disp: , Rfl:     phentermine (Adipex-P) 37.5 MG tablet, Take 1 tablet by mouth Every Morning Before Breakfast., Disp: 30 tablet, Rfl: 1    phytonadione (MEPHYTON, VITAMIN K) 5 MG tablet, Take 1 tablet by mouth Daily., Disp: , Rfl:     thiamine (VITAMIN B-1) 100 MG tablet, Take 1 tablet by mouth Daily., Disp: 90 tablet, Rfl: 1    vitamin D (ERGOCALCIFEROL) 1.25 MG (89501 UT) capsule capsule, Take 1 capsule by mouth 1 (One) Time Per Week., Disp: 12 capsule, Rfl: 3    Cariprazine HCl (Vraylar) 1.5 MG capsule capsule, Take 1 capsule by mouth Daily., Disp: 30 capsule, Rfl: 1    Cyanocobalamin (B-12 Compliance Injection) 1000 MCG/ML kit, Inject 1 mL as directed Every 30 (Thirty) Days., Disp: 1 kit, Rfl: 11    hydroCHLOROthiazide 12.5 MG tablet, Take 1 tablet by mouth Daily., Disp: 90 tablet, Rfl: 1    topiramate (Topamax) 25 MG tablet, Take 1 tablet by mouth 2 (Two) Times a Day., Disp: 180 tablet, Rfl: 1    Current Facility-Administered Medications:     cyanocobalamin injection 1,000 mcg, 1,000 mcg, Intramuscular, Q30 Days, Alice Fuentes PA-C, 1,000 mcg at 09/20/24 1025  New Medications Ordered This Visit   Medications    hydroCHLOROthiazide 12.5 MG  "tablet     Sig: Take 1 tablet by mouth Daily.     Dispense:  90 tablet     Refill:  1    vitamin D (ERGOCALCIFEROL) 1.25 MG (55949 UT) capsule capsule     Sig: Take 1 capsule by mouth 1 (One) Time Per Week.     Dispense:  12 capsule     Refill:  3    ferrous gluconate (FERGON) 324 MG tablet     Sig: Take 1 tablet by mouth Daily With Breakfast.     Dispense:  90 tablet     Refill:  3    phentermine (Adipex-P) 37.5 MG tablet     Sig: Take 1 tablet by mouth Every Morning Before Breakfast.     Dispense:  30 tablet     Refill:  1    linaclotide (LINZESS) 145 MCG capsule capsule     Sig: Take 1 capsule by mouth Every Morning Before Breakfast.     Dispense:  90 capsule     Refill:  1    topiramate (Topamax) 25 MG tablet     Sig: Take 1 tablet by mouth 2 (Two) Times a Day.     Dispense:  180 tablet     Refill:  1    Cyanocobalamin (B-12 Compliance Injection) 1000 MCG/ML kit     Sig: Inject 1 mL as directed Every 30 (Thirty) Days.     Dispense:  1 kit     Refill:  11    Cariprazine HCl (Vraylar) 1.5 MG capsule capsule     Sig: Take 1 capsule by mouth Daily.     Dispense:  30 capsule     Refill:  1     Social History     Tobacco Use   Smoking Status Never   Smokeless Tobacco Never        Objective   Vital Signs:   Vitals:    09/20/24 0822   BP: 138/92   Pulse: 55   Resp: 20   Temp: 97.8 °F (36.6 °C)   TempSrc: Temporal   SpO2: 98%   Weight: 123 kg (271 lb 12.8 oz)   Height: 177.8 cm (70\")   PainSc: 0-No pain      Body mass index is 39 kg/m².  Physical Exam  Vitals reviewed.   Constitutional:       General: She is not in acute distress.     Appearance: Normal appearance.   HENT:      Head: Normocephalic and atraumatic.   Eyes:      General: No scleral icterus.     Extraocular Movements: Extraocular movements intact.      Conjunctiva/sclera: Conjunctivae normal.   Cardiovascular:      Rate and Rhythm: Normal rate and regular rhythm.      Heart sounds: Normal heart sounds. No murmur heard.  Pulmonary:      Effort: Pulmonary " effort is normal. No respiratory distress.      Breath sounds: Normal breath sounds. No stridor. No wheezing or rhonchi.   Musculoskeletal:      Cervical back: Normal range of motion and neck supple.   Skin:     General: Skin is warm and dry.      Coloration: Skin is not jaundiced.   Neurological:      General: No focal deficit present.      Mental Status: She is alert and oriented to person, place, and time.      Gait: Gait normal.   Psychiatric:         Mood and Affect: Mood normal.         Behavior: Behavior normal.        No LMP recorded. Patient has had an ablation.         Result Review :                   Assessment and Plan    Diagnoses and all orders for this visit:    1. Bipolar 1 disorder, depressed, mild (Primary)  Assessment & Plan:  Chronic, newly dx, will refer to psych for eval. Start Vraylar 1.5  Follow up in 1 mo or sooner if has AE or worsening sx. Stop med and go to ER if has HI/SI. Risks and benefits of medical treatment discussed. Common side effects reviewed.      Orders:  -     Cariprazine HCl (Vraylar) 1.5 MG capsule capsule; Take 1 capsule by mouth Daily.  Dispense: 30 capsule; Refill: 1  -     Ambulatory Referral to Behavioral Health    2. Primary hypertension  Assessment & Plan:  Start hctz which will also help with occ le edema.    Orders:  -     hydroCHLOROthiazide 12.5 MG tablet; Take 1 tablet by mouth Daily.  Dispense: 90 tablet; Refill: 1  -     Comprehensive Metabolic Panel; Future  -     CBC Auto Differential; Future    3. Class 2 obesity due to excess calories without serious comorbidity with body mass index (BMI) of 39.0 to 39.9 in adult  Assessment & Plan:  Patient's (Body mass index is 39 kg/m².) indicates that they are morbidly/severely obese (BMI > 40 or > 35 with obesity - related health condition) with health conditions that include GERD . Weight is improving with treatment. BMI is is above average; BMI management plan is completed. We discussed portion control and  increasing exercise. F/u with Bariatrics as dir.  Advised to take vitamins as directed, discussed ways to inc protein.   Will restart Adipex and Topamax.  Adam 9/20/24  UDS/CSA: 9/20/24    Orders:  -     phentermine (Adipex-P) 37.5 MG tablet; Take 1 tablet by mouth Every Morning Before Breakfast.  Dispense: 30 tablet; Refill: 1  -     topiramate (Topamax) 25 MG tablet; Take 1 tablet by mouth 2 (Two) Times a Day.  Dispense: 180 tablet; Refill: 1  -     Compliance Drug Analysis, Ur - Urine, Clean Catch; Future    4. Vitamin D deficiency  Assessment & Plan:  Restart vit D    Orders:  -     Vitamin D,25-Hydroxy; Future  -     vitamin D (ERGOCALCIFEROL) 1.25 MG (76673 UT) capsule capsule; Take 1 capsule by mouth 1 (One) Time Per Week.  Dispense: 12 capsule; Refill: 3    5. Iron deficiency  Assessment & Plan:  Restart Iron supplement    Orders:  -     Iron Profile; Future  -     Ferritin; Future  -     ferrous gluconate (FERGON) 324 MG tablet; Take 1 tablet by mouth Daily With Breakfast.  Dispense: 90 tablet; Refill: 3    6. Chronic idiopathic constipation  -     linaclotide (LINZESS) 145 MCG capsule capsule; Take 1 capsule by mouth Every Morning Before Breakfast.  Dispense: 90 capsule; Refill: 1    7. Generalized anxiety disorder  Assessment & Plan:  Chronic, stable. Refer to psych for eval    Orders:  -     Ambulatory Referral to Behavioral Health    8. B12 deficiency  -     Vitamin B12 & Folate; Future  -     Cyanocobalamin (B-12 Compliance Injection) 1000 MCG/ML kit; Inject 1 mL as directed Every 30 (Thirty) Days.  Dispense: 1 kit; Refill: 11    9. Screening, lipid  -     Lipid Panel; Future    10. Screening for diabetes mellitus  -     Hemoglobin A1c; Future    11. Screening for endocrine disorder  -     TSH Rfx On Abnormal To Free T4; Future    12. Intractable migraine without status migrainosus, unspecified migraine type  Assessment & Plan:  Chronic, uncontrolled, restart topamax    Orders:  -     topiramate  (Topamax) 25 MG tablet; Take 1 tablet by mouth 2 (Two) Times a Day.  Dispense: 180 tablet; Refill: 1    13. Bipolar 1 disorder  Assessment & Plan:  Chronic, uncontrolled, start Vraylar, refer to psych.  Follow up in 1 mo or sooner if has AE or worsening sx. Stop med and go to ER if has HI/SI. Risks and benefits of medical treatment discussed. Common side effects reviewed.        I spent 43 minutes caring for Geneva on this date of service. This time includes time spent by me in the following activities:preparing for the visit, reviewing tests, performing a medically appropriate examination and/or evaluation , counseling and educating the patient/family/caregiver, ordering medications, tests, or procedures, referring and communicating with other health care professionals , documenting information in the medical record, independently interpreting results and communicating that information with the patient/family/caregiver, and care coordination      Follow Up   Return in about 4 weeks (around 10/18/2024).    Follow up if symptoms worsen or persist or has new or concerning symptoms, go to ER for severe symptoms.   Reviewed common medication effects and side effects and advised to report side effects immediately.  Encouraged medication compliance and the importance of keeping scheduled follow up appointments with me and any other providers.  If a referral was made please contact our office if you have not heard about an appointment in the next 2 weeks.   If labs or images are ordered we will contact you with the results within the next week.  If you have not heard from us after a week please call our office to inquire about the results.   Patient was given instructions and counseling regarding her condition or for health maintenance advice. Please see specific information pulled into the AVS if appropriate.     Alice Fuentes PA-C    * Please note that portions of this note were completed with a voice recognition  program.

## 2024-09-23 ENCOUNTER — TELEPHONE (OUTPATIENT)
Dept: INTERNAL MEDICINE | Facility: CLINIC | Age: 39
End: 2024-09-23
Payer: COMMERCIAL

## 2024-09-23 ENCOUNTER — PRIOR AUTHORIZATION (OUTPATIENT)
Dept: INTERNAL MEDICINE | Facility: CLINIC | Age: 39
End: 2024-09-23
Payer: COMMERCIAL

## 2024-09-26 ENCOUNTER — TELEPHONE (OUTPATIENT)
Dept: INTERNAL MEDICINE | Facility: CLINIC | Age: 39
End: 2024-09-26
Payer: COMMERCIAL

## 2024-09-26 LAB — DRUGS UR: NORMAL

## 2024-10-02 ENCOUNTER — OFFICE VISIT (OUTPATIENT)
Dept: BEHAVIORAL HEALTH | Facility: CLINIC | Age: 39
End: 2024-10-02
Payer: COMMERCIAL

## 2024-10-02 VITALS
SYSTOLIC BLOOD PRESSURE: 118 MMHG | HEART RATE: 66 BPM | DIASTOLIC BLOOD PRESSURE: 84 MMHG | OXYGEN SATURATION: 97 % | HEIGHT: 70 IN | BODY MASS INDEX: 39.48 KG/M2 | WEIGHT: 275.8 LBS

## 2024-10-02 DIAGNOSIS — F41.1 GENERALIZED ANXIETY DISORDER: ICD-10-CM

## 2024-10-02 DIAGNOSIS — F31.9 BIPOLAR 1 DISORDER: Primary | ICD-10-CM

## 2024-10-02 PROBLEM — F33.1 MODERATE EPISODE OF RECURRENT MAJOR DEPRESSIVE DISORDER: Status: RESOLVED | Noted: 2019-01-07 | Resolved: 2024-10-02

## 2024-10-02 PROBLEM — F31.31 BIPOLAR 1 DISORDER, DEPRESSED, MILD: Status: RESOLVED | Noted: 2024-09-20 | Resolved: 2024-10-02
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
## 2024-10-04 PROBLEM — I10 PRIMARY HYPERTENSION: Status: ACTIVE | Noted: 2020-12-02

## 2024-10-05 NOTE — ASSESSMENT & PLAN NOTE
Chronic, uncontrolled, start Vraylar, refer to psych.  Follow up in 1 mo or sooner if has AE or worsening sx. Stop med and go to ER if has HI/SI. Risks and benefits of medical treatment discussed. Common side effects reviewed.

## 2024-11-11 ENCOUNTER — OFFICE VISIT (OUTPATIENT)
Dept: INTERNAL MEDICINE | Facility: CLINIC | Age: 39
End: 2024-11-11
Payer: COMMERCIAL

## 2024-11-11 VITALS
OXYGEN SATURATION: 98 % | DIASTOLIC BLOOD PRESSURE: 84 MMHG | HEART RATE: 94 BPM | SYSTOLIC BLOOD PRESSURE: 120 MMHG | HEIGHT: 70 IN | TEMPERATURE: 98.4 F | RESPIRATION RATE: 14 BRPM | BODY MASS INDEX: 39.57 KG/M2

## 2024-11-11 DIAGNOSIS — E66.09 CLASS 2 OBESITY DUE TO EXCESS CALORIES WITHOUT SERIOUS COMORBIDITY WITH BODY MASS INDEX (BMI) OF 39.0 TO 39.9 IN ADULT: ICD-10-CM

## 2024-11-11 DIAGNOSIS — I10 PRIMARY HYPERTENSION: ICD-10-CM

## 2024-11-11 DIAGNOSIS — K59.04 CHRONIC IDIOPATHIC CONSTIPATION: ICD-10-CM

## 2024-11-11 DIAGNOSIS — R60.0 LOWER EXTREMITY EDEMA: ICD-10-CM

## 2024-11-11 DIAGNOSIS — F31.9 BIPOLAR 1 DISORDER: ICD-10-CM

## 2024-11-11 DIAGNOSIS — G43.919 INTRACTABLE MIGRAINE WITHOUT STATUS MIGRAINOSUS, UNSPECIFIED MIGRAINE TYPE: Primary | ICD-10-CM

## 2024-11-11 DIAGNOSIS — F31.31 BIPOLAR 1 DISORDER, DEPRESSED, MILD: ICD-10-CM

## 2024-11-11 DIAGNOSIS — E66.812 CLASS 2 OBESITY DUE TO EXCESS CALORIES WITHOUT SERIOUS COMORBIDITY WITH BODY MASS INDEX (BMI) OF 39.0 TO 39.9 IN ADULT: ICD-10-CM

## 2024-11-11 PROCEDURE — 1160F RVW MEDS BY RX/DR IN RCRD: CPT | Performed by: PHYSICIAN ASSISTANT

## 2024-11-11 PROCEDURE — 3074F SYST BP LT 130 MM HG: CPT | Performed by: PHYSICIAN ASSISTANT

## 2024-11-11 PROCEDURE — 1159F MED LIST DOCD IN RCRD: CPT | Performed by: PHYSICIAN ASSISTANT

## 2024-11-11 PROCEDURE — 1126F AMNT PAIN NOTED NONE PRSNT: CPT | Performed by: PHYSICIAN ASSISTANT

## 2024-11-11 PROCEDURE — 3079F DIAST BP 80-89 MM HG: CPT | Performed by: PHYSICIAN ASSISTANT

## 2024-11-11 PROCEDURE — 99214 OFFICE O/P EST MOD 30 MIN: CPT | Performed by: PHYSICIAN ASSISTANT

## 2024-11-11 RX ORDER — HYDROCHLOROTHIAZIDE 12.5 MG/1
12.5 TABLET ORAL DAILY
Qty: 90 TABLET | Refills: 1 | Status: SHIPPED | OUTPATIENT
Start: 2024-11-11

## 2024-11-11 RX ORDER — TOPIRAMATE 25 MG/1
25 TABLET, FILM COATED ORAL 2 TIMES DAILY
Qty: 180 TABLET | Refills: 1 | Status: SHIPPED | OUTPATIENT
Start: 2024-11-11

## 2024-11-11 RX ORDER — PHENTERMINE HYDROCHLORIDE 37.5 MG/1
37.5 TABLET ORAL
Qty: 30 TABLET | Refills: 1 | Status: SHIPPED | OUTPATIENT
Start: 2024-11-11

## 2024-11-11 NOTE — ASSESSMENT & PLAN NOTE
Chronic, uncontrolled, f/u with psych as dir (needs to resched visit). Will inc Vraylar to 3mg. F/u 1-2 mo

## 2024-11-11 NOTE — ASSESSMENT & PLAN NOTE
Patient's (Body mass index is 39.57 kg/m².) indicates that they are morbidly/severely obese (BMI > 40 or > 35 with obesity - related health condition) with health conditions that include GERD . Weight is improving with treatment. BMI is is above average; BMI management plan is completed. We discussed portion control and increasing exercise. F/u with Bariatrics as dir.  Advised to take vitamins as directed, discussed ways to inc protein.   Will cont Adipex and start Topamax.  Adam 9/20/24  UDS/CSA: 9/20/24

## 2024-11-11 NOTE — PROGRESS NOTES
Chief Complaint  Manic Behavior (Bipolar recheck-started Vraylar- has noticed side effect of migraine headaches) and Edema (Bilateral feet swelling, noticed it this morning )    Subjective          History of Present Illness  Geneva Cordova presents to [facility] for   History of Present Illness  Overweight:  She wants to loose 30 more lbs to be able to get the excess skin off her arms through [employer]. She had gastric bypass 4/2023 and has been working on weight loss. She is following the diet and working on exercise.   She is on Adipex but didn't get the Topamax that was sent last visit. She is taking hctz when she remembers as she thinks some of her weight is due to edema. She feels bloated and was rxed Linzess but doesn't take regularly.  She is continuing to work on increasing protein.  She does note that she snacks on salty pretzels often so plans on cutting back on that.  She is doing better on taking her daily vitamins through bariatrics.  Psych was considering starting Vyvanse to help with binge eating d/o but she can't be on Adipex with that so she will discuss at f/u with them.     Constipation:  Was eval by GI and rxed Linzess which she forgets to take, she thinks the constipation is causing her to feel bloated.     Anx/Bipolar I:  Since starting Vraylar she has had decreased anxiety but still has some irritability. She thinks it is due to her not getting enough sleep. She sleeps 6 hr a night, works night shift so has to go to bed in the morning, gets off work at 7am.  She has fhx of bioplar, her mother and brother. Thinks she may have this also. Has tried and failed a few SSRIs and antidepressants in the past, most recently failed wellbutrin. She has felt fatigued for the last year but will have periods of time that last a week or two where she will feel good about herself and will be productive and not sleep much and work all the time.  She will then have times where she feels  like the world is against her and she is down on herself, she will go to [employer] and read the bible during those times.  She has problems with gambling, feels like she is a different person during that time. She feels responsible with money sometimes but occasionally she will try to driscoll away her last nader if she is in the mood. She sometimes will be more sexually active than normal and want to get checked for STDs. She has periods of time where she will not need sleep and feel good and will work 6 days in a row (12 hr shifts), and then will have a crash where she has a hard time going to work and will be irritable and will not get out of bed unless it is to go to work.  Has tried therapy in the past but it has not been helpful.     Migraine:  Has a hx of migraine, was rxed Topamax at last visit but didn't pick it up. She does note a h/a sometimes when taking Vraylar.     The following portions of the patient's history were reviewed and updated as appropriate: allergies, current medications, past family history, past medical history, past social history, past surgical history and problem list.  Allergies   Allergen Reactions    Codeine Nausea And Vomiting, Other (See Comments) and GI Intolerance     UNKNOWN       Current Outpatient Medications:     Calcium Carb-Cholecalciferol (Calcium 600/Vitamin D) 600-10 MG-MCG chewable tablet, Chew 1 tablet Daily., Disp: 90 tablet, Rfl: 1    Cariprazine HCl (Vraylar) 3 MG capsule capsule, Take 1 capsule by mouth Daily., Disp: 90 capsule, Rfl: 1    Cyanocobalamin (B-12 Compliance Injection) 1000 MCG/ML kit, Inject 1 mL as directed Every 30 (Thirty) Days., Disp: 1 kit, Rfl: 11    ferrous gluconate (FERGON) 324 MG tablet, Take 1 tablet by mouth Daily With Breakfast., Disp: 90 tablet, Rfl: 3    hydroCHLOROthiazide 12.5 MG tablet, Take 1 tablet by mouth Daily., Disp: 90 tablet, Rfl: 1    linaclotide (LINZESS) 145 MCG capsule capsule, Take 1 capsule by mouth Every Morning Before  "Breakfast., Disp: 90 capsule, Rfl: 1    Multiple Vitamins-Minerals (MULTIPLE VITAMINS/WOMENS) tablet, Take 1 tablet by mouth. PT NOT CURRENTLY TAKING, Disp: , Rfl:     phentermine (Adipex-P) 37.5 MG tablet, Take 1 tablet by mouth Every Morning Before Breakfast., Disp: 30 tablet, Rfl: 1    phytonadione (MEPHYTON, VITAMIN K) 5 MG tablet, Take 1 tablet by mouth Daily., Disp: , Rfl:     thiamine (VITAMIN B-1) 100 MG tablet, Take 1 tablet by mouth Daily., Disp: 90 tablet, Rfl: 1    topiramate (Topamax) 25 MG tablet, Take 1 tablet by mouth 2 (Two) Times a Day., Disp: 180 tablet, Rfl: 1    vitamin D (ERGOCALCIFEROL) 1.25 MG (59070 UT) capsule capsule, Take 1 capsule by mouth 1 (One) Time Per Week., Disp: 12 capsule, Rfl: 3    Current Facility-Administered Medications:     cyanocobalamin injection 1,000 mcg, 1,000 mcg, Intramuscular, Q30 Days, Alice Fuentes PA-C, 1,000 mcg at 09/20/24 1025  New Medications Ordered This Visit   Medications    phentermine (Adipex-P) 37.5 MG tablet     Sig: Take 1 tablet by mouth Every Morning Before Breakfast.     Dispense:  30 tablet     Refill:  1    hydroCHLOROthiazide 12.5 MG tablet     Sig: Take 1 tablet by mouth Daily.     Dispense:  90 tablet     Refill:  1    Cariprazine HCl (Vraylar) 3 MG capsule capsule     Sig: Take 1 capsule by mouth Daily.     Dispense:  90 capsule     Refill:  1    linaclotide (LINZESS) 145 MCG capsule capsule     Sig: Take 1 capsule by mouth Every Morning Before Breakfast.     Dispense:  90 capsule     Refill:  1    topiramate (Topamax) 25 MG tablet     Sig: Take 1 tablet by mouth 2 (Two) Times a Day.     Dispense:  180 tablet     Refill:  1     Social History     Tobacco Use   Smoking Status Never   Smokeless Tobacco Never        Objective   Vital Signs:   Vitals:    11/11/24 1403   BP: 120/84   Pulse: 94   Resp: 14   Temp: 98.4 °F (36.9 °C)   TempSrc: Infrared   SpO2: 98%   Weight: Comment: patient declined   Height: 177.8 cm (70\")      Body mass index is " 39.57 kg/m².  Physical Exam  Vitals reviewed.   Constitutional:       General: She is not in acute distress.     Appearance: Normal appearance. She is obese.   HENT:      Head: Normocephalic and atraumatic.   Eyes:      General: No scleral icterus.     Extraocular Movements: Extraocular movements intact.      Conjunctiva/sclera: Conjunctivae normal.   Cardiovascular:      Rate and Rhythm: Normal rate and regular rhythm.      Heart sounds: Normal heart sounds. No murmur heard.  Pulmonary:      Effort: Pulmonary effort is normal. No respiratory distress.      Breath sounds: Normal breath sounds. No stridor. No wheezing or rhonchi.   Musculoskeletal:      Cervical back: Normal range of motion and neck supple.   Skin:     General: Skin is warm and dry.      Coloration: Skin is not jaundiced.   Neurological:      General: No focal deficit present.      Mental Status: She is alert and oriented to person, place, and time.      Gait: Gait normal.   Psychiatric:         Mood and Affect: Mood normal.         Behavior: Behavior normal.        No LMP recorded. Patient has had an ablation.         Result Review :                   Assessment and Plan    Diagnoses and all orders for this visit:    1. Intractable migraine without status migrainosus, unspecified migraine type (Primary)  Assessment & Plan:  Start Topamax as previously rxed    Orders:  -     topiramate (Topamax) 25 MG tablet; Take 1 tablet by mouth 2 (Two) Times a Day.  Dispense: 180 tablet; Refill: 1    2. Class 2 obesity due to excess calories without serious comorbidity with body mass index (BMI) of 39.0 to 39.9 in adult  Assessment & Plan:  Patient's (Body mass index is 39.57 kg/m².) indicates that they are morbidly/severely obese (BMI > 40 or > 35 with obesity - related health condition) with health conditions that include GERD . Weight is improving with treatment. BMI is is above average; BMI management plan is completed. We discussed portion control and  increasing exercise. F/u with Bariatrics as dir.  Advised to take vitamins as directed, discussed ways to inc protein.   Will cont Adipex and start Topamax.  Adam 9/20/24  UDS/CSA: 9/20/24    Orders:  -     phentermine (Adipex-P) 37.5 MG tablet; Take 1 tablet by mouth Every Morning Before Breakfast.  Dispense: 30 tablet; Refill: 1  -     topiramate (Topamax) 25 MG tablet; Take 1 tablet by mouth 2 (Two) Times a Day.  Dispense: 180 tablet; Refill: 1    3. Primary hypertension  Assessment & Plan:  Cont hctz 12.5    Orders:  -     hydroCHLOROthiazide 12.5 MG tablet; Take 1 tablet by mouth Daily.  Dispense: 90 tablet; Refill: 1    4. Bipolar 1 disorder, depressed, mild  -     Cariprazine HCl (Vraylar) 3 MG capsule capsule; Take 1 capsule by mouth Daily.  Dispense: 90 capsule; Refill: 1    5. Chronic idiopathic constipation  Assessment & Plan:  Take Linzess daily    Orders:  -     linaclotide (LINZESS) 145 MCG capsule capsule; Take 1 capsule by mouth Every Morning Before Breakfast.  Dispense: 90 capsule; Refill: 1    6. Lower extremity edema  Assessment & Plan:  Cont hctz      7. Bipolar 1 disorder  Assessment & Plan:  Chronic, uncontrolled, f/u with psych as dir (needs to resched visit). Will inc Vraylar to 3mg. F/u 1-2 mo           Follow Up   Return in about 6 weeks (around 12/23/2024) for Yearly Physical, Bipolar, Migraine.    Follow up if symptoms worsen or persist or has new or concerning symptoms, go to ER for severe symptoms.   Reviewed common medication effects and side effects and advised to report side effects immediately.  Encouraged medication compliance and the importance of keeping scheduled follow up appointments with me and any other providers.  If a referral was made please contact our office if you have not heard about an appointment in the next 2 weeks.   If labs or images are ordered we will contact you with the results within the next week.  If you have not heard from us after a week please call  our office to inquire about the results.   Patient was given instructions and counseling regarding her condition or for health maintenance advice. Please see specific information pulled into the AVS if appropriate.     Alice Fuentes PA-C    * Please note that portions of this note were completed with a voice recognition program.

## 2024-12-27 ENCOUNTER — LAB (OUTPATIENT)
Dept: INTERNAL MEDICINE | Facility: CLINIC | Age: 39
End: 2024-12-27
Payer: COMMERCIAL

## 2024-12-27 ENCOUNTER — OFFICE VISIT (OUTPATIENT)
Dept: INTERNAL MEDICINE | Facility: CLINIC | Age: 39
End: 2024-12-27
Payer: COMMERCIAL

## 2024-12-27 VITALS
OXYGEN SATURATION: 98 % | SYSTOLIC BLOOD PRESSURE: 132 MMHG | WEIGHT: 273 LBS | HEART RATE: 74 BPM | TEMPERATURE: 98.2 F | HEIGHT: 70 IN | RESPIRATION RATE: 20 BRPM | BODY MASS INDEX: 39.08 KG/M2 | DIASTOLIC BLOOD PRESSURE: 78 MMHG

## 2024-12-27 DIAGNOSIS — K59.04 CHRONIC IDIOPATHIC CONSTIPATION: ICD-10-CM

## 2024-12-27 DIAGNOSIS — Z12.4 CERVICAL CANCER SCREENING: ICD-10-CM

## 2024-12-27 DIAGNOSIS — F31.9 BIPOLAR 1 DISORDER: ICD-10-CM

## 2024-12-27 DIAGNOSIS — E66.812 CLASS 2 OBESITY DUE TO EXCESS CALORIES WITHOUT SERIOUS COMORBIDITY WITH BODY MASS INDEX (BMI) OF 39.0 TO 39.9 IN ADULT: ICD-10-CM

## 2024-12-27 DIAGNOSIS — Z98.84 HISTORY OF BARIATRIC SURGERY: ICD-10-CM

## 2024-12-27 DIAGNOSIS — Z11.3 SCREEN FOR STD (SEXUALLY TRANSMITTED DISEASE): ICD-10-CM

## 2024-12-27 DIAGNOSIS — Z00.00 ROUTINE GENERAL MEDICAL EXAMINATION AT A HEALTH CARE FACILITY: Primary | ICD-10-CM

## 2024-12-27 DIAGNOSIS — R60.0 LOWER EXTREMITY EDEMA: ICD-10-CM

## 2024-12-27 DIAGNOSIS — I10 PRIMARY HYPERTENSION: ICD-10-CM

## 2024-12-27 DIAGNOSIS — E53.8 B12 DEFICIENCY: ICD-10-CM

## 2024-12-27 DIAGNOSIS — E66.09 CLASS 2 OBESITY DUE TO EXCESS CALORIES WITHOUT SERIOUS COMORBIDITY WITH BODY MASS INDEX (BMI) OF 39.0 TO 39.9 IN ADULT: ICD-10-CM

## 2024-12-27 DIAGNOSIS — H66.001 NON-RECURRENT ACUTE SUPPURATIVE OTITIS MEDIA OF RIGHT EAR WITHOUT SPONTANEOUS RUPTURE OF TYMPANIC MEMBRANE: ICD-10-CM

## 2024-12-27 DIAGNOSIS — Z20.828 EXPOSURE TO THE FLU: ICD-10-CM

## 2024-12-27 DIAGNOSIS — G43.919 INTRACTABLE MIGRAINE WITHOUT STATUS MIGRAINOSUS, UNSPECIFIED MIGRAINE TYPE: ICD-10-CM

## 2024-12-27 PROBLEM — N76.0 ACUTE VAGINITIS: Status: RESOLVED | Noted: 2021-05-20 | Resolved: 2024-12-27

## 2024-12-27 PROBLEM — R10.9 FLANK PAIN: Status: RESOLVED | Noted: 2024-01-11 | Resolved: 2024-12-27

## 2024-12-27 PROBLEM — B34.9 VIRAL INFECTION: Status: RESOLVED | Noted: 2023-10-09 | Resolved: 2024-12-27

## 2024-12-27 PROBLEM — R00.1 BRADYCARDIA, SINUS: Status: RESOLVED | Noted: 2023-02-10 | Resolved: 2024-12-27

## 2024-12-27 LAB
EXPIRATION DATE: NORMAL
FLUAV AG UPPER RESP QL IA.RAPID: NOT DETECTED
FLUBV AG UPPER RESP QL IA.RAPID: NOT DETECTED
HAV IGM SERPL QL IA: NORMAL
HBV CORE IGM SERPL QL IA: NORMAL
HBV SURFACE AG SERPL QL IA: NORMAL
HCV AB SER QL: NORMAL
HIV 1+2 AB+HIV1 P24 AG SERPL QL IA: NORMAL
INTERNAL CONTROL: NORMAL
Lab: NORMAL
RPR SER QL: NORMAL
SARS-COV-2 AG UPPER RESP QL IA.RAPID: NOT DETECTED

## 2024-12-27 PROCEDURE — 3078F DIAST BP <80 MM HG: CPT | Performed by: PHYSICIAN ASSISTANT

## 2024-12-27 PROCEDURE — 80074 ACUTE HEPATITIS PANEL: CPT | Performed by: PHYSICIAN ASSISTANT

## 2024-12-27 PROCEDURE — 1160F RVW MEDS BY RX/DR IN RCRD: CPT | Performed by: PHYSICIAN ASSISTANT

## 2024-12-27 PROCEDURE — 86592 SYPHILIS TEST NON-TREP QUAL: CPT | Performed by: PHYSICIAN ASSISTANT

## 2024-12-27 PROCEDURE — 99395 PREV VISIT EST AGE 18-39: CPT | Performed by: PHYSICIAN ASSISTANT

## 2024-12-27 PROCEDURE — G0432 EIA HIV-1/HIV-2 SCREEN: HCPCS | Performed by: PHYSICIAN ASSISTANT

## 2024-12-27 PROCEDURE — 1159F MED LIST DOCD IN RCRD: CPT | Performed by: PHYSICIAN ASSISTANT

## 2024-12-27 PROCEDURE — 36415 COLL VENOUS BLD VENIPUNCTURE: CPT | Performed by: PHYSICIAN ASSISTANT

## 2024-12-27 PROCEDURE — 1126F AMNT PAIN NOTED NONE PRSNT: CPT | Performed by: PHYSICIAN ASSISTANT

## 2024-12-27 PROCEDURE — 87661 TRICHOMONAS VAGINALIS AMPLIF: CPT | Performed by: PHYSICIAN ASSISTANT

## 2024-12-27 PROCEDURE — 87591 N.GONORRHOEAE DNA AMP PROB: CPT | Performed by: PHYSICIAN ASSISTANT

## 2024-12-27 PROCEDURE — 87491 CHLMYD TRACH DNA AMP PROBE: CPT | Performed by: PHYSICIAN ASSISTANT

## 2024-12-27 PROCEDURE — 87428 SARSCOV & INF VIR A&B AG IA: CPT | Performed by: PHYSICIAN ASSISTANT

## 2024-12-27 PROCEDURE — 2014F MENTAL STATUS ASSESS: CPT | Performed by: PHYSICIAN ASSISTANT

## 2024-12-27 PROCEDURE — 3075F SYST BP GE 130 - 139MM HG: CPT | Performed by: PHYSICIAN ASSISTANT

## 2024-12-27 RX ORDER — AMOXICILLIN 875 MG/1
875 TABLET, COATED ORAL 2 TIMES DAILY
Qty: 20 TABLET | Refills: 0 | Status: SHIPPED | OUTPATIENT
Start: 2024-12-27

## 2024-12-27 RX ORDER — TOPIRAMATE 50 MG/1
50 TABLET, FILM COATED ORAL 2 TIMES DAILY
Qty: 180 TABLET | Refills: 1 | Status: SHIPPED | OUTPATIENT
Start: 2024-12-27

## 2024-12-27 RX ORDER — PHENTERMINE HYDROCHLORIDE 37.5 MG/1
37.5 TABLET ORAL
Qty: 30 TABLET | Refills: 1 | Status: SHIPPED | OUTPATIENT
Start: 2024-12-27

## 2024-12-27 NOTE — PROGRESS NOTES
Chief Complaint  Annual Exam and Headache    Subjective          History of Present Illness  Geneva Cordova presents to [facility] for a routine physical.    History of Present Illness      History of Present Illness  Flu exposure:  Was exposed to flu yesterday, and she reports feeling unwell today with headache and a runny nose.  She does have allergies.  She has not had a cough, nausea or vomiting, wheeze/SOA. No fever.    Overweight:  She wants to loose 30 more lbs to be able to get the excess skin off her arms through [employer]. She had gastric bypass 4/2023 and has been working on weight loss. She is following the diet and working on exercise. She is going to the  and will do water aerobics on the weekends.  She is taking Adipex and Topamax and feels like it has helped her weight, wants to cont on those meds.   She is continuing to work on increasing protein.  She does note that she snacks on salty pretzels often so plans on cutting back on that.  She is doing better on taking her daily vitamins through bariatrics.  Psych was considering starting Vyvanse to help with binge eating d/o but she can't be on Adipex with that so she will discuss at f/u with them.     HTN:  Has had swelling off and on and takes hctz prn for that. Hx of elevated BP but it has been good lately.    Constipation:  Taking Linzess daily as dir and has been having nl Bms. Prev eval by GI.    Anx/Bipolar I:  Inc Vraylar to 3mg a month ago and it has helped but thinks it could be increased.  She is still having mood swings, but in general things are not bothering her as much is a worried she is not overthinking things or worrying as much.  Referred to therapist but has not rescheduled yet.  Anxiety AUGUSTINE-7  Feeling nervous, anxious or on edge: Not at all  Not being able to stop or control worrying: Not at all  Worrying too much about different things: Not at all  Trouble Relaxing: Not at all  Being so restless that it is  hard to sit still: Not at all  Becoming easily annoyed or irritable: Not at all  Feeling afraid as if something awful might happen: Not at all  AUGUSTINE 7 Total Score: 0  If you checked any problems, how difficult have these problems made it for you to do your work, take care of things at home, or get along with other people: Not difficult at all   Little interest or pleasure in doing things? Not at all  Feeling down, depressed, or hopeless? Not at all  PHQ-2 Total Score 0    Migraine:  Has a hx of migraine, gets them once a week at least. Will have light/sound sensitivity, lays down in a dark room to make them go away. Sometimes they last more than a day. They got a little worse since gastric bypass sx. No vomiting or vision changes. Started Topamax last visit 25 bid but thinks it could be increased.     She is requesting an STD panel for peace of mind. She does not report any unusual discharge. She believes she may have had a yeast infection, which she treated with Monistat.      Followed by eye dr and dentist as dir.    Little interest or pleasure in doing things? Not at all   Feeling down, depressed, or hopeless? Not at all   PHQ-2 Total Score 0         Mammogram- due age 41  Pap- 8/2023 followed by GYN at [facility] referral to   Colonoscopy- due age 46     reports that she has never smoked. She has never used smokeless tobacco.   No LMP recorded. Patient has had an ablation.   reports that she is not currently sexually active and has had partner(s) who are male.  Cancer-related family history includes Pancreatic cancer in her maternal grandmother. There is no history of Colon cancer, Endometrial cancer, Ovarian cancer, or Breast cancer.    Health Maintenance   Topic Date Due    PAP SMEAR  08/01/2024    ANNUAL PHYSICAL  09/21/2024    INFLUENZA VACCINE  03/31/2025 (Originally 7/1/2024)    COVID-19 Vaccine (3 - 2024-25 season) 09/01/2025 (Originally 9/1/2024)    BMI FOLLOWUP  12/27/2025    TDAP/TD VACCINES (5 - Td  or Tdap) 02/02/2034    HEPATITIS C SCREENING  Completed    Pneumococcal Vaccine 0-64  Aged Out       Immunization History   Administered Date(s) Administered    COVID-19 (PFIZER) Purple Cap Monovalent 08/05/2021, 08/26/2021    Flu Vaccine Quad PF >36MO 12/10/2015, 09/08/2017    Fluzone (or Fluarix & Flulaval for VFC) >6mos 12/10/2015, 09/08/2017, 10/17/2022, 02/02/2024    Hep B, Adolescent or Pediatric 10/04/1999    Hepatitis B Adolescent High Risk Infant 11/23/1998    Hepatitis B Adult/Adolescent IM 11/23/1998    Influenza, Unspecified 11/14/2018, 01/14/2021, 10/17/2022    MMR 11/23/1998, 09/17/2016    Tdap 09/16/2014, 08/10/2015, 01/14/2021, 02/02/2024    flucelvax quad pfs =>4 YRS 11/14/2018       The following portions of the patient's history were reviewed and updated as appropriate: allergies, current medications, past family history, past medical history, past social history, past surgical history and problem list.    Patient Active Problem List   Diagnosis    Obesity affecting pregnancy in second trimester, antepartum    VIRA (obstructive sleep apnea)    Migraine    Anemia    Chronic back pain    PCOS (polycystic ovarian syndrome)    Seasonal allergies    FH: breast cancer    Dyspnea on exertion    Class 2 obesity due to excess calories without serious comorbidity with body mass index (BMI) of 39.0 to 39.9 in adult    Primary hypertension    GERD without esophagitis    Gastric hourglass stricture or stenosis    Dysphagia    Generalized anxiety disorder    Pelvic pressure in female    Vitamin D deficiency    High risk medication use    Abnormal glucose    B12 deficiency    Iron deficiency    Routine general medical examination at a health care facility    S/P laparoscopic sleeve gastrectomy    Abdominal wall hematoma, initial encounter    Lower extremity edema    Acute URI    Fatigue    Hidradenitis suppurativa    Malabsorption    Chronic idiopathic constipation    Bipolar 1 disorder     Allergies   Allergen  Reactions    Codeine Nausea And Vomiting, Other (See Comments) and GI Intolerance     UNKNOWN       Current Outpatient Medications:     Calcium Carb-Cholecalciferol (Calcium 600/Vitamin D) 600-10 MG-MCG chewable tablet, Chew 1 tablet Daily., Disp: 90 tablet, Rfl: 1    ferrous gluconate (FERGON) 324 MG tablet, Take 1 tablet by mouth Daily With Breakfast., Disp: 90 tablet, Rfl: 3    hydroCHLOROthiazide 12.5 MG tablet, Take 1 tablet by mouth Daily., Disp: 90 tablet, Rfl: 1    linaclotide (LINZESS) 145 MCG capsule capsule, Take 1 capsule by mouth Every Morning Before Breakfast., Disp: 90 capsule, Rfl: 1    Multiple Vitamins-Minerals (MULTIPLE VITAMINS/WOMENS) tablet, Take 1 tablet by mouth. PT NOT CURRENTLY TAKING, Disp: , Rfl:     phentermine (Adipex-P) 37.5 MG tablet, Take 1 tablet by mouth Every Morning Before Breakfast., Disp: 30 tablet, Rfl: 1    phytonadione (MEPHYTON, VITAMIN K) 5 MG tablet, Take 1 tablet by mouth Daily., Disp: , Rfl:     thiamine (VITAMIN B-1) 100 MG tablet, Take 1 tablet by mouth Daily., Disp: 90 tablet, Rfl: 1    topiramate (TOPAMAX) 50 MG tablet, Take 1 tablet by mouth 2 (Two) Times a Day., Disp: 180 tablet, Rfl: 1    vitamin D (ERGOCALCIFEROL) 1.25 MG (80754 UT) capsule capsule, Take 1 capsule by mouth 1 (One) Time Per Week., Disp: 12 capsule, Rfl: 3    amoxicillin (AMOXIL) 875 MG tablet, Take 1 tablet by mouth 2 (Two) Times a Day., Disp: 20 tablet, Rfl: 0    Cariprazine HCl (Vraylar) 4.5 MG capsule capsule, Take 1 capsule by mouth Daily., Disp: 90 capsule, Rfl: 1    Cyanocobalamin (B-12 Compliance Injection) 1000 MCG/ML kit, Inject 1 mL as directed Every 30 (Thirty) Days. (Patient not taking: Reported on 12/27/2024), Disp: 1 kit, Rfl: 11    Current Facility-Administered Medications:     cyanocobalamin injection 1,000 mcg, 1,000 mcg, Intramuscular, Q30 Days, Alice Fuentes PA-C, 1,000 mcg at 09/20/24 1025  New Medications Ordered This Visit   Medications    Cariprazine HCl (Vraylar) 4.5 MG  "capsule capsule     Sig: Take 1 capsule by mouth Daily.     Dispense:  90 capsule     Refill:  1    topiramate (TOPAMAX) 50 MG tablet     Sig: Take 1 tablet by mouth 2 (Two) Times a Day.     Dispense:  180 tablet     Refill:  1    amoxicillin (AMOXIL) 875 MG tablet     Sig: Take 1 tablet by mouth 2 (Two) Times a Day.     Dispense:  20 tablet     Refill:  0    phentermine (Adipex-P) 37.5 MG tablet     Sig: Take 1 tablet by mouth Every Morning Before Breakfast.     Dispense:  30 tablet     Refill:  1     Past Medical History:   Diagnosis Date    Anemia     Anxiety     Bilateral lower extremity edema     R>L    Boil, axilla     recurrent    Chronic back pain     no meds or injections, feels related to breasts    Concussion with no loss of consciousness 2022    COVID-19 2020    Depression     Dyspepsia     Dyspnea on exertion     Fatigue     Gastroesophageal reflux disease concurrent with and due to paraesophageal hernia     recurrent after LSG revision, EGD Dr. Hughes 23    Glucose intolerance (impaired glucose tolerance)     Helicobacter pylori (H. pylori) infection     asx and grossly nl EGD. \"abundant\"on path. HBT still + after PrevPak tx. LSG path neg. neg testing 2018    Hypoalbuminemia     Left Ovarian serous cystadenoma -removed at  section 2017    Migraine     resolved with weightloss    Nausea and vomiting 2023    Obesity, morbid     VIRA (obstructive sleep apnea)     improved since WLS    PCOS (polycystic ovarian syndrome)     Right Essure implantation 2017 10/06/2017    Routine general medical examination at a health care facility 02/10/2023    S/P  section;left ovarian cystectomy and partial salpingectomy 2017    Seasonal allergies       Past Surgical History:   Procedure Laterality Date    ABDOMINOPLASTY  2022    Osmany Mclain at [facility]    BILATERAL BREAST REDUCTION      2022     SECTION N/A 2017 "      SECTION PRIMARY;  CYSTECTOMY; Kwadwo Baxter MD; : [facility];  Service:     DILATATION AND CURETTAGE      ENDOMETRIAL ABLATION  2020    Dr Cuevas    ENDOSCOPY N/A 2020    Procedure: ESOPHAGOGASTRODUODENOSCOPY;  Surgeon: Natalia Mariscal MD;  Location: [facility];  Service: Bariatric;  Laterality: N/A;    ENDOSCOPY  2022    Dr. La    ENDOSCOPY N/A 2023    Procedure: ESOPHAGOGASTRODUODENOSCOPY;  Surgeon: Erik Hughes MD;  Location: [facility];  Service: Robotics - DaVinci;  Laterality: N/A;    ESSURE TUBAL LIGATION      GASTRIC BYPASS N/A 2023    Procedure: GASTRIC BYPASS LAPAROSCOPIC WITH DAVINCI ROBOT AND BILIOPANCREATIC DIVERSION WITH DUODENAL SWITCH;  Surgeon: Erik Hughes MD;  Location: [facility];  Service: Robotics - DaVinci;  Laterality: N/A;    GASTRIC SLEEVE LAPAROSCOPIC  2015    With Dr. Hughes    GASTRIC SLEEVE LAPAROSCOPIC N/A 2020    Procedure: GASTRIC SLEEVE LAPAROSCOPIC;  Surgeon: Natalia Mariscal MD;  Location:  [facility];  Service: Bariatric;  Laterality: N/A;    LAPAROSCOPIC CHOLECYSTECTOMY  2018    Dr. Hughes    PARAESOPHAGEAL HERNIA REPAIR N/A 2023    Procedure: LAPAROSCOPIC RECURRENT PARAESOPHAGEAL HERNIA REPAIR WITH DAVINCI ROBOT AND FALCIFORM LIGAMENT SLING ;  Surgeon: Erik Hughes MD;  Location:  [facility];  Service: Robotics - DaVinci;  Laterality: N/A;    SALPINGECTOMY Bilateral 2020    Dr Cuevas    TONSILLECTOMY  2014    WISDOM TOOTH EXTRACTION  2014      Family History   Problem Relation Age of Onset    Sleep apnea Mother     Diabetes Mother     No Known Problems Father     No Known Problems Sister     No Known Problems Brother     Pancreatic cancer Maternal Grandmother     No Known Problems Paternal Grandmother     No Known Problems Daughter     No Known Problems Son     No Known Problems Maternal Aunt     No Known Problems Paternal Aunt     No Known Problems Other     BRCA 1/2 Neg Hx      "Colon cancer Neg Hx     Endometrial cancer Neg Hx     Ovarian cancer Neg Hx     Breast cancer Neg Hx       Social History     Socioeconomic History    Marital status: Single    Number of children: 4    Years of education: College    Tobacco Use    Smoking status: Never    Smokeless tobacco: Never   Vaping Use    Vaping status: Never Used   Substance and Sexual Activity    Alcohol use: Not Currently    Drug use: No    Sexual activity: Not Currently     Partners: Male        Objective   Vital Signs:   Vitals:    12/27/24 0916 12/27/24 1008   BP: 132/78    Pulse: 74    Resp: 20    Temp: 98.2 °F (36.8 °C)    TempSrc: Temporal    SpO2: 98%    Weight: Comment: pt refused 124 kg (273 lb)  Comment: pt reported   Height: 177.8 cm (70\")    PainSc: 0-No pain       Body mass index is 39.17 kg/m².       Physical Exam  Vitals reviewed.   Constitutional:       General: She is not in acute distress.     Appearance: Normal appearance. She is obese. She is not ill-appearing.   HENT:      Head: Normocephalic and atraumatic.      Right Ear: Tympanic membrane, ear canal and external ear normal.      Left Ear: Tympanic membrane, ear canal and external ear normal.      Mouth/Throat:      Mouth: Mucous membranes are moist.      Pharynx: No oropharyngeal exudate or posterior oropharyngeal erythema.   Eyes:      General: No scleral icterus.     Extraocular Movements: Extraocular movements intact.      Conjunctiva/sclera: Conjunctivae normal.      Pupils: Pupils are equal, round, and reactive to light.   Neck:      Thyroid: No thyromegaly.   Cardiovascular:      Rate and Rhythm: Normal rate and regular rhythm.      Pulses: Normal pulses.      Heart sounds: Normal heart sounds. No murmur heard.  Pulmonary:      Effort: Pulmonary effort is normal. No respiratory distress.      Breath sounds: Normal breath sounds. No stridor. No wheezing, rhonchi or rales.   Abdominal:      General: Bowel sounds are normal. There is no distension.      " Palpations: Abdomen is soft. There is no mass.      Tenderness: There is no abdominal tenderness. There is no guarding.   Musculoskeletal:         General: No signs of injury. Normal range of motion.      Cervical back: Normal range of motion and neck supple.      Right lower leg: No edema.      Left lower leg: No edema.   Lymphadenopathy:      Cervical: No cervical adenopathy.   Skin:     General: Skin is warm and dry.      Coloration: Skin is not jaundiced.      Findings: No rash.   Neurological:      General: No focal deficit present.      Mental Status: She is alert and oriented to person, place, and time.      Gait: Gait normal.   Psychiatric:         Mood and Affect: Mood normal.         Behavior: Behavior normal.          Result Review :            Results  Results for orders placed or performed in visit on 12/27/24   POCT SARS-CoV-2 Antigen LIAN + Flu    Collection Time: 12/27/24 10:04 AM    Specimen: Swab   Result Value Ref Range    SARS Antigen Not Detected Not Detected, Presumptive Negative    Influenza A Antigen LIAN Not Detected Not Detected    Influenza B Antigen LIAN Not Detected Not Detected    Internal Control Passed Passed    Lot Number [id number]     Expiration Date 10/11/2025      *Note: Due to a large number of results and/or encounters for the requested time period, some results have not been displayed. A complete set of results can be found in Results Review.                  Assessment and Plan    Assessment & Plan      Problem List Items Addressed This Visit          Cardiac and Vasculature    Primary hypertension    Current Assessment & Plan     Cont hctz 12.5            Endocrine and Metabolic    Class 2 obesity due to excess calories without serious comorbidity with body mass index (BMI) of 39.0 to 39.9 in adult    Current Assessment & Plan     Patient's (Body mass index is 39.57 kg/m².) indicates that they are morbidly/severely obese (BMI > 40 or > 35 with obesity - related health condition)  with health conditions that include GERD . Weight is improving with treatment. BMI is is above average; BMI management plan is completed. We discussed portion control and increasing exercise. F/u with Bariatrics as dir.  Advised to take vitamins as directed, discussed ways to inc protein.   Will cont Adipex and start Topamax.  Adam 12.27.24  UDS/CSA: 9/20/24         Relevant Medications    topiramate (TOPAMAX) 50 MG tablet    phentermine (Adipex-P) 37.5 MG tablet    B12 deficiency    Current Assessment & Plan     Restart B12 shots            Gastrointestinal Abdominal     Chronic idiopathic constipation    Current Assessment & Plan     Take Linzess daily            Health Encounters    Routine general medical examination at a health care facility - Primary       Mental Health    Bipolar 1 disorder    Current Assessment & Plan     Chronic, uncontrolled, f/u with psych as dir (needs to resched visit). Will inc Vraylar to 4.5mg. F/u 1-2 mo          Relevant Medications    Cariprazine HCl (Vraylar) 4.5 MG capsule capsule    phentermine (Adipex-P) 37.5 MG tablet       Neuro    Migraine    Current Assessment & Plan     Chronic, uncontrolled, inc Topamax to 50 bid         Relevant Medications    topiramate (TOPAMAX) 50 MG tablet       Symptoms and Signs    Lower extremity edema    Current Assessment & Plan     Cont hctz          Other Visit Diagnoses       Exposure to the flu        Relevant Orders    POCT SARS-CoV-2 Antigen LIAN + Flu (Completed)    History of bariatric surgery        Screen for STD (sexually transmitted disease)        Relevant Orders    HIV-1 / O / 2 Ag / Antibody    Hepatitis Panel, Acute    RPR Qualitative with Reflex to Quant    Chlamydia trachomatis, Neisseria gonorrhoeae, Trichomonas vaginalis, PCR - Urine, Urine, Clean Catch    Cervical cancer screening        Relevant Orders    Ambulatory Referral to Gynecology    Non-recurrent acute suppurative otitis media of right ear without spontaneous  rupture of tympanic membrane        Relevant Medications    amoxicillin (AMOXIL) 875 MG tablet                  Follow Up   Return in about 4 weeks (around 1/24/2025) for obesity, migraine, bipolar .    Counseled on health maintenance topics and preventative care recommendations. Follow up yearly for routine physical exam. Diet and exercise counseling given. See dentist and eye doctor yearly as directed.    If a referral was made please contact our office if you have not heard about an appointment in the next 2 weeks.   If labs or images are ordered we will contact you with the results within the next week.  If you have not heard from us after a week please call our office to inquire about the results.    Alice Fuentes PA-C     Patient was given instructions and counseling regarding her condition or for health maintenance advice. Please see specific information pulled into the AVS if appropriate.     Patient or patient representative verbalized consent for the use of Ambient Listening during the visit with  Alice Fuentes PA-C for chart documentation. 12/27/2024  10:24 EST

## 2024-12-27 NOTE — ASSESSMENT & PLAN NOTE
Chronic, uncontrolled, f/u with psych as dir (needs to resched visit). Will inc Vraylar to 4.5mg. F/u 1-2 mo   
Chronic, uncontrolled, inc Topamax to 50 bid  
Cont hctz  
Cont hctz 12.5  
Patient's (Body mass index is 39.57 kg/m².) indicates that they are morbidly/severely obese (BMI > 40 or > 35 with obesity - related health condition) with health conditions that include GERD . Weight is improving with treatment. BMI is is above average; BMI management plan is completed. We discussed portion control and increasing exercise. F/u with Bariatrics as dir.  Advised to take vitamins as directed, discussed ways to inc protein.   Will cont Adipex and start Topamax.  Adam 12.27.24  UDS/CSA: 9/20/24  
Restart B12 shots  
Supportive care, stay hydrated, rest.  Follow up if symptoms worsen or persist. Monitor for new symptoms. Go to the ER for respiratory distress, dehydration, or severe symptoms.  Flu/covid neg  
Take Linzess daily  
How Severe Are They?: mild
Is This A New Presentation, Or A Follow-Up?: Skin Lesions

## 2024-12-30 ENCOUNTER — TELEPHONE (OUTPATIENT)
Dept: INTERNAL MEDICINE | Facility: CLINIC | Age: 39
End: 2024-12-30
Payer: COMMERCIAL

## 2024-12-30 NOTE — TELEPHONE ENCOUNTER
Unfortunately, patient will need another appointment for this as symptoms are new/different than previous and there are a few other swabs/tests that would need to be run in order to know what we are treating. Her STD test is negative. We will need to test for bacterial vaginosis, yeast, and possibly do an exam. We can overbook me tomorrow morning if needed if she can make it in then.     Patient has been notified and verbalized understanding.  She declined to make an appointment.

## 2024-12-30 NOTE — TELEPHONE ENCOUNTER
Patient called in for her lab results from 12/27/24. She stated that she is having itching and burning that started on 12/29/24. She is wanting to know if a medication can be sent into her pharmacy for this. Please call patient at [phone].

## 2025-01-02 ENCOUNTER — CLINICAL SUPPORT (OUTPATIENT)
Dept: INTERNAL MEDICINE | Facility: CLINIC | Age: 40
End: 2025-01-02
Payer: COMMERCIAL

## 2025-01-02 DIAGNOSIS — N76.0 ACUTE VAGINITIS: ICD-10-CM

## 2025-01-02 DIAGNOSIS — N76.0 ACUTE VAGINITIS: Primary | ICD-10-CM

## 2025-01-02 LAB
BILIRUB BLD-MCNC: NEGATIVE MG/DL
CLARITY, POC: ABNORMAL
COLOR UR: YELLOW
EXPIRATION DATE: ABNORMAL
GLUCOSE UR STRIP-MCNC: NEGATIVE MG/DL
KETONES UR QL: NEGATIVE
LEUKOCYTE EST, POC: ABNORMAL
Lab: ABNORMAL
NITRITE UR-MCNC: NEGATIVE MG/ML
PH UR: 6 [PH] (ref 5–8)
PROT UR STRIP-MCNC: ABNORMAL MG/DL
RBC # UR STRIP: ABNORMAL /UL
SP GR UR: 1.02 (ref 1–1.03)
UROBILINOGEN UR QL: ABNORMAL

## 2025-01-02 PROCEDURE — 87801 DETECT AGNT MULT DNA AMPLI: CPT | Performed by: PHYSICIAN ASSISTANT

## 2025-01-02 PROCEDURE — 87798 DETECT AGENT NOS DNA AMP: CPT | Performed by: PHYSICIAN ASSISTANT

## 2025-01-02 PROCEDURE — 81003 URINALYSIS AUTO W/O SCOPE: CPT | Performed by: PHYSICIAN ASSISTANT

## 2025-01-02 PROCEDURE — 87491 CHLMYD TRACH DNA AMP PROBE: CPT | Performed by: PHYSICIAN ASSISTANT

## 2025-01-02 PROCEDURE — 87086 URINE CULTURE/COLONY COUNT: CPT | Performed by: PHYSICIAN ASSISTANT

## 2025-01-02 PROCEDURE — 87591 N.GONORRHOEAE DNA AMP PROB: CPT | Performed by: PHYSICIAN ASSISTANT

## 2025-01-02 PROCEDURE — 87661 TRICHOMONAS VAGINALIS AMPLIF: CPT | Performed by: PHYSICIAN ASSISTANT

## 2025-01-02 RX ORDER — FLUCONAZOLE 150 MG/1
150 TABLET ORAL ONCE
Qty: 1 TABLET | Refills: 0 | Status: SHIPPED | OUTPATIENT
Start: 2025-01-02 | End: 2025-01-02

## 2025-01-04 LAB — BACTERIA SPEC AEROBE CULT: NO GROWTH

## 2025-01-07 LAB
A VAGINAE DNA VAG QL NAA+PROBE: ABNORMAL SCORE
BVAB2 DNA VAG QL NAA+PROBE: ABNORMAL SCORE
C ALBICANS DNA VAG QL NAA+PROBE: POSITIVE
C GLABRATA DNA VAG QL NAA+PROBE: POSITIVE
C TRACH DNA SPEC QL NAA+PROBE: POSITIVE
MEGA1 DNA VAG QL NAA+PROBE: ABNORMAL SCORE
N GONORRHOEA DNA VAG QL NAA+PROBE: NEGATIVE
T VAGINALIS DNA VAG QL NAA+PROBE: NEGATIVE

## 2025-01-08 RX ORDER — FLUCONAZOLE 150 MG/1
150 TABLET ORAL ONCE
Qty: 1 TABLET | Refills: 0 | Status: SHIPPED | OUTPATIENT
Start: 2025-01-08 | End: 2025-01-08

## 2025-01-08 RX ORDER — DOXYCYCLINE 100 MG/1
100 TABLET ORAL 2 TIMES DAILY
Qty: 14 TABLET | Refills: 0 | Status: SHIPPED | OUTPATIENT
Start: 2025-01-08 | End: 2025-01-15

## 2025-01-09 ENCOUNTER — TELEPHONE (OUTPATIENT)
Dept: INTERNAL MEDICINE | Facility: CLINIC | Age: 40
End: 2025-01-09
Payer: COMMERCIAL

## 2025-01-09 NOTE — TELEPHONE ENCOUNTER
----- Message from Alice Fuentes sent at 1/8/2025  6:02 PM EST -----  Please report chlamydia dx to health dept (she was asymptomatic)

## 2025-03-13 ENCOUNTER — OFFICE VISIT (OUTPATIENT)
Dept: INTERNAL MEDICINE | Facility: CLINIC | Age: 40
End: 2025-03-13
Payer: COMMERCIAL

## 2025-03-13 VITALS
OXYGEN SATURATION: 98 % | DIASTOLIC BLOOD PRESSURE: 68 MMHG | HEART RATE: 84 BPM | HEIGHT: 70 IN | RESPIRATION RATE: 20 BRPM | WEIGHT: 280 LBS | TEMPERATURE: 97.8 F | SYSTOLIC BLOOD PRESSURE: 118 MMHG | BODY MASS INDEX: 40.09 KG/M2

## 2025-03-13 DIAGNOSIS — E55.9 VITAMIN D DEFICIENCY: ICD-10-CM

## 2025-03-13 DIAGNOSIS — E66.01 CLASS 3 SEVERE OBESITY DUE TO EXCESS CALORIES WITHOUT SERIOUS COMORBIDITY WITH BODY MASS INDEX (BMI) OF 40.0 TO 44.9 IN ADULT: Primary | ICD-10-CM

## 2025-03-13 DIAGNOSIS — I10 PRIMARY HYPERTENSION: ICD-10-CM

## 2025-03-13 DIAGNOSIS — Z98.84 S/P LAPAROSCOPIC SLEEVE GASTRECTOMY: ICD-10-CM

## 2025-03-13 DIAGNOSIS — R35.0 URINARY FREQUENCY: ICD-10-CM

## 2025-03-13 DIAGNOSIS — G43.919 INTRACTABLE MIGRAINE WITHOUT STATUS MIGRAINOSUS, UNSPECIFIED MIGRAINE TYPE: ICD-10-CM

## 2025-03-13 DIAGNOSIS — Z11.3 SCREEN FOR STD (SEXUALLY TRANSMITTED DISEASE): ICD-10-CM

## 2025-03-13 DIAGNOSIS — E61.1 IRON DEFICIENCY: ICD-10-CM

## 2025-03-13 DIAGNOSIS — F31.9 BIPOLAR 1 DISORDER: ICD-10-CM

## 2025-03-13 DIAGNOSIS — K59.04 CHRONIC IDIOPATHIC CONSTIPATION: ICD-10-CM

## 2025-03-13 DIAGNOSIS — E66.813 CLASS 3 SEVERE OBESITY DUE TO EXCESS CALORIES WITHOUT SERIOUS COMORBIDITY WITH BODY MASS INDEX (BMI) OF 40.0 TO 44.9 IN ADULT: Primary | ICD-10-CM

## 2025-03-13 DIAGNOSIS — E53.8 B12 DEFICIENCY: ICD-10-CM

## 2025-03-13 LAB
BILIRUB BLD-MCNC: ABNORMAL MG/DL
CLARITY, POC: ABNORMAL
COLOR UR: ABNORMAL
EXPIRATION DATE: ABNORMAL
GLUCOSE UR STRIP-MCNC: NEGATIVE MG/DL
KETONES UR QL: NEGATIVE
LEUKOCYTE EST, POC: NEGATIVE
Lab: ABNORMAL
NITRITE UR-MCNC: NEGATIVE MG/ML
PH UR: 5.5 [PH] (ref 5–8)
PROT UR STRIP-MCNC: ABNORMAL MG/DL
RBC # UR STRIP: NEGATIVE /UL
SP GR UR: 1.03 (ref 1–1.03)
UROBILINOGEN UR QL: ABNORMAL

## 2025-03-13 PROCEDURE — 87798 DETECT AGENT NOS DNA AMP: CPT | Performed by: PHYSICIAN ASSISTANT

## 2025-03-13 PROCEDURE — 87086 URINE CULTURE/COLONY COUNT: CPT | Performed by: PHYSICIAN ASSISTANT

## 2025-03-13 PROCEDURE — 87661 TRICHOMONAS VAGINALIS AMPLIF: CPT | Performed by: PHYSICIAN ASSISTANT

## 2025-03-13 PROCEDURE — 87491 CHLMYD TRACH DNA AMP PROBE: CPT | Performed by: PHYSICIAN ASSISTANT

## 2025-03-13 PROCEDURE — 87801 DETECT AGNT MULT DNA AMPLI: CPT | Performed by: PHYSICIAN ASSISTANT

## 2025-03-13 PROCEDURE — 87591 N.GONORRHOEAE DNA AMP PROB: CPT | Performed by: PHYSICIAN ASSISTANT

## 2025-03-13 RX ORDER — CYANOCOBALAMIN, ISOPROPYL ALCOHOL 1000MCG/ML
1000 KIT INJECTION
Qty: 1 KIT | Refills: 11 | Status: SHIPPED | OUTPATIENT
Start: 2025-03-13

## 2025-03-13 RX ORDER — ERGOCALCIFEROL 1.25 MG/1
50000 CAPSULE, LIQUID FILLED ORAL WEEKLY
Qty: 12 CAPSULE | Refills: 3 | Status: SHIPPED | OUTPATIENT
Start: 2025-03-13

## 2025-03-13 RX ORDER — TIRZEPATIDE 5 MG/.5ML
5 INJECTION, SOLUTION SUBCUTANEOUS WEEKLY
COMMUNITY

## 2025-03-13 RX ORDER — FERROUS GLUCONATE 324(38)MG
324 TABLET ORAL
Qty: 90 TABLET | Refills: 3 | Status: SHIPPED | OUTPATIENT
Start: 2025-03-13

## 2025-03-13 RX ORDER — THIAMINE MONONITRATE (VIT B1) 100 MG
100 TABLET ORAL DAILY
Qty: 90 TABLET | Refills: 3 | Status: SHIPPED | OUTPATIENT
Start: 2025-03-13

## 2025-03-13 RX ORDER — SPIRONOLACTONE 25 MG
1 TABLET ORAL DAILY
Qty: 90 TABLET | Refills: 1 | Status: SHIPPED | OUTPATIENT
Start: 2025-03-13

## 2025-03-13 RX ORDER — MULTIVIT WITH CALCIUM,IRON,MIN
1 TABLET ORAL DAILY
Qty: 90 EACH | Refills: 3 | Status: SHIPPED | OUTPATIENT
Start: 2025-03-13

## 2025-03-13 RX ORDER — TOPIRAMATE 50 MG/1
50 TABLET, FILM COATED ORAL 2 TIMES DAILY
Qty: 180 TABLET | Refills: 1 | Status: SHIPPED | OUTPATIENT
Start: 2025-03-13

## 2025-03-13 RX ORDER — PHENTERMINE HYDROCHLORIDE 37.5 MG/1
37.5 TABLET ORAL
Qty: 30 TABLET | Refills: 1 | Status: SHIPPED | OUTPATIENT
Start: 2025-03-13

## 2025-03-13 NOTE — PROGRESS NOTES
Chief Complaint  STD retest , Difficulty Urinating, and Vitamin Defiency  (Pt states she needs refills on vitamins. Vitamins previously Rx'd by weight loss  /)    Subjective          History of Present Illness  Geneva Cordova presents to [facility] for   History of Present Illness  Vaginitis:  She was treated for chlamydia 2 mo ago and has mild discharge at times still, no new exposures. Has had some urinary frequency for the last month. She was on water pills so stopped taking them to see if helped and she was still having frequency. No nausea, not burning when she voids. No fever, no nausea, no back pain, no hematuria.     Overweight:  She wants to loose 30 more lbs to be able to get the excess skin off her arms through [place]. She had gastric bypass 4/2023 and has been working on weight loss. She is following the diet and working on exercise. She is going to the  and will do water aerobics on the weekends.  She is taking Adipex and Topamax and feels like it has helped her weight, wants to cont on those meds.   Also started Zepbound 5mg as her friend is selling it to her for 100$ a month.  She is continuing to work on increasing protein.  She does note that she snacks on salty pretzels often so plans on cutting back on that.  She is doing better on taking her daily vitamins through bariatrics.  Psych was considering starting Vyvanse to help with binge eating d/o but she can't be on Adipex with that so she will discuss at f/u with them.     HTN:  Has had swelling off and on and takes hctz prn for that. Hx of elevated BP but it has been good lately. She has not been taking the hctz d/t urinary freq.    Constipation:  Needs refill on Linzess, works well. Has prev been eval by GI    Anx/Bipolar I:  Doing well on Vraylar 4.5. no side effects. No HI/SI.    Migraine:  Doing well on Topamax 50 bid  Has a hx of migraine, gets them up to  once a week, less with Topamax. Will have light/sound  "sensitivity, lays down in a dark room to make them go away. Sometimes they last more than a day. They got a little worse since gastric bypass sx. No vomiting or vision changes.       The following portions of the patient's history were reviewed and updated as appropriate: allergies, current medications, past family history, past medical history, past social history, past surgical history and problem list.  Allergies   Allergen Reactions    Codeine Nausea And Vomiting, Other (See Comments) and GI Intolerance     UNKNOWN       Current Outpatient Medications:     Calcium Carb-Cholecalciferol (Calcium 600/Vitamin D) 600-10 MG-MCG chewable tablet, Chew 1 tablet Daily., Disp: 90 tablet, Rfl: 1    Cariprazine HCl (Vraylar) 4.5 MG capsule capsule, Take 1 capsule by mouth Daily., Disp: 90 capsule, Rfl: 1    Cyanocobalamin (B-12 Compliance Injection) 1000 MCG/ML kit, Inject 1 mL as directed Every 30 (Thirty) Days., Disp: 1 kit, Rfl: 11    ferrous gluconate (FERGON) 324 MG tablet, Take 1 tablet by mouth Daily With Breakfast., Disp: 90 tablet, Rfl: 3    Insulin Syringe-Needle U-100 27G X 5/8\" 1 ML misc, Use for B12 injection monthly, Disp: 12 each, Rfl: 0    linaclotide (LINZESS) 145 MCG capsule capsule, Take 1 capsule by mouth Every Morning Before Breakfast., Disp: 90 capsule, Rfl: 1    multivitamin with minerals (Multiple Vitamins/Womens) tablet tablet, Take 1 tablet by mouth Daily., Disp: 90 each, Rfl: 3    phentermine (Adipex-P) 37.5 MG tablet, Take 1 tablet by mouth Every Morning Before Breakfast., Disp: 30 tablet, Rfl: 1    thiamine (VITAMIN B-1) 100 MG tablet, Take 1 tablet by mouth Daily., Disp: 90 tablet, Rfl: 3    Tirzepatide-Weight Management (Zepbound) 5 MG/0.5ML solution, Inject 0.5 mL under the skin into the appropriate area as directed 1 (One) Time Per Week., Disp: , Rfl:     topiramate (TOPAMAX) 50 MG tablet, Take 1 tablet by mouth 2 (Two) Times a Day., Disp: 180 tablet, Rfl: 1    vitamin D (ERGOCALCIFEROL) 1.25 " MG (45281 UT) capsule capsule, Take 1 capsule by mouth 1 (One) Time Per Week., Disp: 12 capsule, Rfl: 3    hydroCHLOROthiazide 12.5 MG tablet, Take 1 tablet by mouth Daily. (Patient not taking: Reported on 3/13/2025), Disp: 90 tablet, Rfl: 1    phytonadione (MEPHYTON, VITAMIN K) 5 MG tablet, Take 1 tablet by mouth Daily. (Patient not taking: Reported on 3/13/2025), Disp: , Rfl:   New Medications Ordered This Visit   Medications    Calcium Carb-Cholecalciferol (Calcium 600/Vitamin D) 600-10 MG-MCG chewable tablet     Sig: Chew 1 tablet Daily.     Dispense:  90 tablet     Refill:  1    Cyanocobalamin (B-12 Compliance Injection) 1000 MCG/ML kit     Sig: Inject 1 mL as directed Every 30 (Thirty) Days.     Dispense:  1 kit     Refill:  11     Please dispense with syringe    ferrous gluconate (FERGON) 324 MG tablet     Sig: Take 1 tablet by mouth Daily With Breakfast.     Dispense:  90 tablet     Refill:  3    multivitamin with minerals (Multiple Vitamins/Womens) tablet tablet     Sig: Take 1 tablet by mouth Daily.     Dispense:  90 each     Refill:  3    thiamine (VITAMIN B-1) 100 MG tablet     Sig: Take 1 tablet by mouth Daily.     Dispense:  90 tablet     Refill:  3    vitamin D (ERGOCALCIFEROL) 1.25 MG (71391 UT) capsule capsule     Sig: Take 1 capsule by mouth 1 (One) Time Per Week.     Dispense:  12 capsule     Refill:  3    phentermine (Adipex-P) 37.5 MG tablet     Sig: Take 1 tablet by mouth Every Morning Before Breakfast.     Dispense:  30 tablet     Refill:  1    topiramate (TOPAMAX) 50 MG tablet     Sig: Take 1 tablet by mouth 2 (Two) Times a Day.     Dispense:  180 tablet     Refill:  1    linaclotide (LINZESS) 145 MCG capsule capsule     Sig: Take 1 capsule by mouth Every Morning Before Breakfast.     Dispense:  90 capsule     Refill:  1    Cariprazine HCl (Vraylar) 4.5 MG capsule capsule     Sig: Take 1 capsule by mouth Daily.     Dispense:  90 capsule     Refill:  1     Social History     Tobacco Use  "  Smoking Status Never   Smokeless Tobacco Never        Objective   Vital Signs:   Vitals:    03/13/25 0820   BP: 118/68   Pulse: 84   Resp: 20   Temp: 97.8 °F (36.6 °C)   TempSrc: Temporal   SpO2: 98%   Weight: 127 kg (280 lb)   Height: 177.8 cm (70\")   PainSc: 0-No pain      Body mass index is 40.18 kg/m².  Physical Exam  Vitals reviewed.   Constitutional:       General: She is not in acute distress.     Appearance: Normal appearance. She is obese.   HENT:      Head: Normocephalic and atraumatic.   Eyes:      General: No scleral icterus.     Extraocular Movements: Extraocular movements intact.      Conjunctiva/sclera: Conjunctivae normal.   Cardiovascular:      Rate and Rhythm: Normal rate and regular rhythm.      Heart sounds: Normal heart sounds. No murmur heard.  Pulmonary:      Effort: Pulmonary effort is normal. No respiratory distress.      Breath sounds: Normal breath sounds. No stridor. No wheezing or rhonchi.   Musculoskeletal:      Cervical back: Normal range of motion and neck supple.   Skin:     General: Skin is warm and dry.      Coloration: Skin is not jaundiced.   Neurological:      General: No focal deficit present.      Mental Status: She is alert and oriented to person, place, and time.      Gait: Gait normal.   Psychiatric:         Mood and Affect: Mood normal.         Behavior: Behavior normal.        No LMP recorded. Patient has had an ablation.         Result Review :              Results for orders placed or performed in visit on 03/13/25   POC Urinalysis Dipstick, Automated    Collection Time: 03/13/25  8:49 AM    Specimen: Urine   Result Value Ref Range    Color Fatoumata Yellow, Straw, Dark Yellow, Fatoumata    Clarity, UA Cloudy (A) Clear    Specific Gravity  1.030 1.005 - 1.030    pH, Urine 5.5 5.0 - 8.0    Leukocytes Negative Negative    Nitrite, UA Negative Negative    Protein, POC Trace (A) Negative mg/dL    Glucose, UA Negative Negative mg/dL    Ketones, UA Negative Negative    " Urobilinogen, UA 1 E.U./dL (A) Normal, 0.2 E.U./dL    Bilirubin Small (1+) (A) Negative    Blood, UA Negative Negative    Lot Number [id number]     Expiration Date 03/03/2026           Assessment and Plan    Diagnoses and all orders for this visit:    1. Class 3 severe obesity due to excess calories without serious comorbidity with body mass index (BMI) of 40.0 to 44.9 in adult (Primary)  Assessment & Plan:  Patient's (Body mass index is 40.18 kg/m².) indicates that they are morbidly/severely obese (BMI > 40 or > 35 with obesity - related health condition) with health conditions that include GERD . Weight is improving with treatment. BMI is is above average; BMI management plan is completed. We discussed portion control and increasing exercise. F/u with Bariatrics as dir.  Advised to take vitamins as directed, discussed ways to inc protein.   Will cont Adipex and Topamax.  Adam 3.13.25  UDS/CSA: 9/20/24  Can cont zepbound 5mg as well    Orders:  -     phentermine (Adipex-P) 37.5 MG tablet; Take 1 tablet by mouth Every Morning Before Breakfast.  Dispense: 30 tablet; Refill: 1  -     topiramate (TOPAMAX) 50 MG tablet; Take 1 tablet by mouth 2 (Two) Times a Day.  Dispense: 180 tablet; Refill: 1    2. B12 deficiency  -     Cyanocobalamin (B-12 Compliance Injection) 1000 MCG/ML kit; Inject 1 mL as directed Every 30 (Thirty) Days.  Dispense: 1 kit; Refill: 11    3. Iron deficiency  -     ferrous gluconate (FERGON) 324 MG tablet; Take 1 tablet by mouth Daily With Breakfast.  Dispense: 90 tablet; Refill: 3    4. Vitamin D deficiency  -     vitamin D (ERGOCALCIFEROL) 1.25 MG (30909 UT) capsule capsule; Take 1 capsule by mouth 1 (One) Time Per Week.  Dispense: 12 capsule; Refill: 3    5. Screen for STD (sexually transmitted disease)  -     NuSwab VG+ - Swab, Vagina; Future  -     POC Urinalysis Dipstick, Automated  -     NuSwab VG+ - Swab, Vagina    6. Bipolar 1 disorder  Assessment & Plan:  Chronic, stable, cont  Vraylar 4.5mg    Orders:  -     Cariprazine HCl (Vraylar) 4.5 MG capsule capsule; Take 1 capsule by mouth Daily.  Dispense: 90 capsule; Refill: 1    7. Intractable migraine without status migrainosus, unspecified migraine type  Assessment & Plan:  Chronic, stable, cont topamax.    Orders:  -     topiramate (TOPAMAX) 50 MG tablet; Take 1 tablet by mouth 2 (Two) Times a Day.  Dispense: 180 tablet; Refill: 1    8. Chronic idiopathic constipation  -     linaclotide (LINZESS) 145 MCG capsule capsule; Take 1 capsule by mouth Every Morning Before Breakfast.  Dispense: 90 capsule; Refill: 1    9. Primary hypertension  Assessment & Plan:  BP ok today w/o hctz, can hold fluid pills.      10. Urinary frequency  Assessment & Plan:  Sent for culture    Orders:  -     Urine Culture - Urine, Urine, Clean Catch    11. S/P laparoscopic sleeve gastrectomy  Assessment & Plan:  F/u with bariatrics as dir, restart vitamins    Orders:  -     Calcium Carb-Cholecalciferol (Calcium 600/Vitamin D) 600-10 MG-MCG chewable tablet; Chew 1 tablet Daily.  Dispense: 90 tablet; Refill: 1  -     Cyanocobalamin (B-12 Compliance Injection) 1000 MCG/ML kit; Inject 1 mL as directed Every 30 (Thirty) Days.  Dispense: 1 kit; Refill: 11  -     ferrous gluconate (FERGON) 324 MG tablet; Take 1 tablet by mouth Daily With Breakfast.  Dispense: 90 tablet; Refill: 3  -     multivitamin with minerals (Multiple Vitamins/Womens) tablet tablet; Take 1 tablet by mouth Daily.  Dispense: 90 each; Refill: 3  -     thiamine (VITAMIN B-1) 100 MG tablet; Take 1 tablet by mouth Daily.  Dispense: 90 tablet; Refill: 3  -     vitamin D (ERGOCALCIFEROL) 1.25 MG (20998 UT) capsule capsule; Take 1 capsule by mouth 1 (One) Time Per Week.  Dispense: 12 capsule; Refill: 3        Follow Up   Return in about 2 months (around 5/13/2025) for obesity.    Follow up if symptoms worsen or persist or has new or concerning symptoms, go to ER for severe symptoms.   Reviewed common medication  effects and side effects and advised to report side effects immediately.   Encouraged medication compliance and the importance of keeping scheduled follow up appointments with me and any other providers.  If a referral was made please contact our office if you have not heard about an appointment in the next 2 weeks.   If labs or images are ordered we will contact you with the results within the next week.  If you have not heard from us after a week please call our office to inquire about the results.   Patient was given instructions and counseling regarding her condition and for health maintenance advice. Please see specific information pulled into the AVS if appropriate.   All questions were answered, the patient verbalized good understanding.     Alice Fuentes PA-C    * Please note that portions of this note were completed with a voice recognition program.

## 2025-03-13 NOTE — ASSESSMENT & PLAN NOTE
Patient's (Body mass index is 40.18 kg/m².) indicates that they are morbidly/severely obese (BMI > 40 or > 35 with obesity - related health condition) with health conditions that include GERD . Weight is improving with treatment. BMI is is above average; BMI management plan is completed. We discussed portion control and increasing exercise. F/u with Bariatrics as dir.  Advised to take vitamins as directed, discussed ways to inc protein.   Will cont Adipex and Topamax.  Adam 3.13.25  UDS/CSA: 9/20/24  Can cont zepbound 5mg as well

## 2025-03-14 LAB — BACTERIA SPEC AEROBE CULT: NORMAL

## 2025-03-16 LAB
A VAGINAE DNA VAG QL NAA+PROBE: ABNORMAL SCORE
BVAB2 DNA VAG QL NAA+PROBE: ABNORMAL SCORE
C ALBICANS DNA VAG QL NAA+PROBE: NEGATIVE
C GLABRATA DNA VAG QL NAA+PROBE: NEGATIVE
C TRACH DNA SPEC QL NAA+PROBE: NEGATIVE
MEGA1 DNA VAG QL NAA+PROBE: ABNORMAL SCORE
N GONORRHOEA DNA VAG QL NAA+PROBE: NEGATIVE
T VAGINALIS DNA VAG QL NAA+PROBE: NEGATIVE

## 2025-03-17 ENCOUNTER — TELEPHONE (OUTPATIENT)
Dept: INTERNAL MEDICINE | Facility: CLINIC | Age: 40
End: 2025-03-17
Payer: COMMERCIAL

## 2025-03-17 NOTE — TELEPHONE ENCOUNTER
Caller: Geneva Cordova    Relationship: Self    Best call back number: [phone]     What medication are you requesting: SOMETHING FOR FREQUENT URINATION    What are your current symptoms:      How long have you been experiencing symptoms: TODAY    Have you had these symptoms before:    [] Yes  [x] No    Have you been treated for these symptoms before:   [] Yes  [x] No    [facility]     Additional notes:

## 2025-03-18 RX ORDER — METRONIDAZOLE 500 MG/1
500 TABLET ORAL 2 TIMES DAILY
Qty: 14 TABLET | Refills: 0 | Status: SHIPPED | OUTPATIENT
Start: 2025-03-18 | End: 2025-03-25

## 2025-03-18 RX ORDER — PHENAZOPYRIDINE HYDROCHLORIDE 95 MG/1
95 TABLET ORAL 3 TIMES DAILY PRN
Qty: 12 TABLET | Refills: 0 | Status: SHIPPED | OUTPATIENT
Start: 2025-03-18

## 2025-03-18 NOTE — TELEPHONE ENCOUNTER
Vaginal swab showed BV, negative for yeast and the STIs chlamydia, gonorrhea, trichomonas.   Sent in Flagyl for that.   She should now have macrobid and azo for UTI and flagyl for BV

## 2025-03-18 NOTE — TELEPHONE ENCOUNTER
I sent in azo help with urinary sx. It looks like she picked up macrobid which is the antibiotic that should cover for UTI which may be causing her urinary frequency. Let me know if symptoms persist after she finishes the week of macrobid.

## 2025-03-18 NOTE — TELEPHONE ENCOUNTER
Called and spoke with patient, she states that she would like a call back regarding the swab results and if a medication is needed please send to pharmacy.

## 2025-03-28 ENCOUNTER — TELEPHONE (OUTPATIENT)
Dept: INTERNAL MEDICINE | Facility: CLINIC | Age: 40
End: 2025-03-28
Payer: COMMERCIAL

## 2025-03-28 RX ORDER — FLUCONAZOLE 150 MG/1
150 TABLET ORAL ONCE
Qty: 1 TABLET | Refills: 0 | Status: SHIPPED | OUTPATIENT
Start: 2025-03-28 | End: 2025-03-31 | Stop reason: SDUPTHER

## 2025-03-28 NOTE — TELEPHONE ENCOUNTER
Patient called back asking about status of her request for Yeast medication.    Please give her a call @ [phone]    Thank you

## 2025-03-28 NOTE — TELEPHONE ENCOUNTER
Called and spoke with patient, informed her of medication being sent to the pharmacy. She states that it normally takes 2 rounds of diflucan to treat. Can you send in another dose?

## 2025-03-28 NOTE — TELEPHONE ENCOUNTER
Caller: Geneva Cordova    Relationship: Self    Best call back number: [phone]     What medication are you requesting: SOMETHING TO TREAT YEAST INFECTION    What are your current symptoms:  YEAST INFECTION    How long have you been experiencing symptoms: 1 DAY    Have you had these symptoms before:    [x] Yes  [] No    Have you been treated for these symptoms before:   [x] Yes  [] No    If a prescription is needed, what is your preferred pharmacy and phone number: [id number] [place], [place] - [address] [facility] [place] [facility] [address] [facility] [address] [facility] [phone] [facility] [phone] FX     Additional notes:  PATIENT HAS CALLED REQUESTING IF SOMETHING CAN BE CALLED IN TO TREAT YEAST INFECTION. PATIENT STATES SHE HAS DEVELOPED A YEAST INFECTION FROM TAKING ANTIBIOTICS. PATIENT IS REQUESTING A CALL BACK EITHER WAY TO LET HER KNOW IF SOMETHING WILL BE CALLED IN.       DELETE AFTER READING TO PATIENT: “Thank you for sharing this information with me. I will send a message to the clinical team. Please allow 48 hours for the clinical staff to follow up on this request.”

## 2025-03-31 RX ORDER — FLUCONAZOLE 150 MG/1
150 TABLET ORAL ONCE
Qty: 1 TABLET | Refills: 0 | Status: SHIPPED | OUTPATIENT
Start: 2025-03-31 | End: 2025-04-01

## 2025-04-01 ENCOUNTER — OFFICE VISIT (OUTPATIENT)
Dept: BARIATRICS/WEIGHT MGMT | Facility: CLINIC | Age: 40
End: 2025-04-01
Payer: COMMERCIAL

## 2025-04-01 VITALS
RESPIRATION RATE: 18 BRPM | HEIGHT: 70 IN | BODY MASS INDEX: 40.23 KG/M2 | OXYGEN SATURATION: 98 % | HEART RATE: 65 BPM | SYSTOLIC BLOOD PRESSURE: 118 MMHG | DIASTOLIC BLOOD PRESSURE: 64 MMHG | WEIGHT: 281 LBS | TEMPERATURE: 97.8 F

## 2025-04-01 DIAGNOSIS — K90.9 INTESTINAL MALABSORPTION, UNSPECIFIED TYPE: ICD-10-CM

## 2025-04-01 DIAGNOSIS — R10.13 DYSPEPSIA: ICD-10-CM

## 2025-04-01 DIAGNOSIS — R53.83 FATIGUE, UNSPECIFIED TYPE: ICD-10-CM

## 2025-04-01 DIAGNOSIS — R79.0 ABNORMAL BLOOD LEVEL OF IRON: ICD-10-CM

## 2025-04-01 DIAGNOSIS — E53.8 VITAMIN B12 DEFICIENCY: ICD-10-CM

## 2025-04-01 DIAGNOSIS — E55.9 VITAMIN D DEFICIENCY: ICD-10-CM

## 2025-04-01 DIAGNOSIS — E66.01 OBESITY, CLASS III, BMI 40-49.9 (MORBID OBESITY): Primary | ICD-10-CM

## 2025-04-01 NOTE — PROGRESS NOTES
[facility] Bariatric Surgery  [address]  [address]  [facility] [id number] [phone]        Patient Name:  Geneva Cordova  :  1985      Date of Visit: 2025      Reason for Visit:   Annual Eval - 2 years postop    HPI: Geneva Cordova is a 39 y.o. female s/p robotic RNY/BPD/recurrent paraHHR 23 GDW (previous LSG/paraHHR  GDW, followed by LSG revision 2020 PNQ complicated by postop stricture and uncontrolled reflux)      Has gained 30 lbs in the last year, struggling w/ weight loss.  Goal weight:  225 lbs.     Denies dysphagia/reflux/N/V/AP.  Takes Linzess prn for constipation, bowels typically moving q1-2 days.      Diet Recall:   B - 2 boiled eggs, 3 sausage patties  S - pretzels  L - turkey cheese roll (2)  S - premiere shake  D - steak and eggs  S - premiere shake    Exercising - walking x 45 minutes 3x/week - no longer working w/ , wasn't seeing any results.      Due for bariatric labs.      Taking MVI, B1, Mag, Calcium, Vit A (ran out), Vit K, Vit C,  iron, + wkly Vit D and B12 inj.  Admits not able to take iron faithfully d/t constipation issues.      Taking Phentermine prn (RX by PCP).  Denies ASA/NSAIDS/steroids/tobacco.        PreLSG weight: 430 lbs.  PreRNY weight: 283 pounds.  Today's weight is 127 kg (281 lb) pounds, today's  Body mass index is 40.32 kg/m²., and weight loss since most recent surgery is 2 pounds.      Past Medical History:   Diagnosis Date    Anemia     Anxiety     Bilateral lower extremity edema     R>L    Boil, axilla     recurrent    Chronic back pain     no meds or injections, feels related to breasts    Concussion with no loss of consciousness 2022    COVID-19 2020    Depression     Dyspepsia     Dyspnea on exertion     Fatigue     Gastroesophageal reflux disease concurrent with and due to paraesophageal hernia     recurrent after LSG revision, EGD Dr. Hughes 23    Glucose intolerance (impaired glucose  "tolerance)     Helicobacter pylori (H. pylori) infection     asx and grossly nl EGD. \"abundant\"on path. HBT still + after PrevPak tx. LSG path neg. neg testing 2018    Hypoalbuminemia     Migraine     on Topamax    Nausea and vomiting 2023    Obesity, morbid     VIRA (obstructive sleep apnea)     improved since WLS    PCOS (polycystic ovarian syndrome)     Right Essure implantation 2017 10/06/2017    Seasonal allergies      Past Surgical History:   Procedure Laterality Date    ABDOMINOPLASTY  2022    [facility]    BILATERAL BREAST REDUCTION      2022     SECTION N/A 2017      SECTION PRIMARY;  CYSTECTOMY; Kwadwo Baxter MD; :  [facility];  Service:     DILATATION AND CURETTAGE      ENDOMETRIAL ABLATION  2020    Dr Cuevas    ENDOSCOPY N/A 2020    Procedure: ESOPHAGOGASTRODUODENOSCOPY;  Surgeon: Natalia Mariscal MD;  Location:  [facility];  Service: Bariatric;  Laterality: N/A;    ENDOSCOPY  2022    Dr. La    ENDOSCOPY N/A 2023    Procedure: ESOPHAGOGASTRODUODENOSCOPY;  Surgeon: Erik Hughes MD;  Location:  [facility];  Service: Robotics - DaVinci;  Laterality: N/A;    ESSURE TUBAL LIGATION      GASTRIC BYPASS N/A 2023    Procedure: GASTRIC BYPASS LAPAROSCOPIC WITH DAVINCI ROBOT AND BILIOPANCREATIC DIVERSION WITH DUODENAL SWITCH;  Surgeon: Erik Hughes MD;  Location:  [facility];  Service: Robotics - DaVinci;  Laterality: N/A;    GASTRIC SLEEVE LAPAROSCOPIC  2015    With Dr. Hughes    GASTRIC SLEEVE LAPAROSCOPIC N/A 2020    Procedure: GASTRIC SLEEVE LAPAROSCOPIC;  Surgeon: Natalia Mariscal MD;  Location:  [facility];  Service: Bariatric;  Laterality: N/A;    LAPAROSCOPIC CHOLECYSTECTOMY  2018    Dr. Hughes    PARAESOPHAGEAL HERNIA REPAIR N/A 2023    Procedure: LAPAROSCOPIC RECURRENT PARAESOPHAGEAL HERNIA REPAIR WITH DAVINCI ROBOT AND FALCIFORM LIGAMENT SLING ;  Surgeon: " Erik Hughes MD;  Location: [facility];  Service: Robotics - DaVinci;  Laterality: N/A;    SALPINGECTOMY Bilateral 02/2020    Dr Cuevas    TONSILLECTOMY  2014    WISDOM TOOTH EXTRACTION  2014     Outpatient Medications Marked as Taking for the 4/1/25 encounter (Office Visit) with Carmelita Bell PA   Medication Sig Dispense Refill    Calcium Carb-Cholecalciferol (Calcium 600/Vitamin D) 600-10 MG-MCG chewable tablet Chew 1 tablet Daily. 90 tablet 1    Cariprazine HCl (Vraylar) 4.5 MG capsule capsule Take 1 capsule by mouth Daily. 90 capsule 1    Cyanocobalamin (B-12 Compliance Injection) 1000 MCG/ML kit Inject 1 mL as directed Every 30 (Thirty) Days. 1 kit 11    ferrous gluconate (FERGON) 324 MG tablet Take 1 tablet by mouth Daily With Breakfast. 90 tablet 3    linaclotide (LINZESS) 145 MCG capsule capsule Take 1 capsule by mouth Every Morning Before Breakfast. 90 capsule 1    multivitamin with minerals (Multiple Vitamins/Womens) tablet tablet Take 1 tablet by mouth Daily. 90 each 3    phentermine (Adipex-P) 37.5 MG tablet Take 1 tablet by mouth Every Morning Before Breakfast. 30 tablet 1    phytonadione (MEPHYTON, VITAMIN K) 5 MG tablet Take 1 tablet by mouth Daily.      thiamine (VITAMIN B-1) 100 MG tablet Take 1 tablet by mouth Daily. 90 tablet 3    topiramate (TOPAMAX) 50 MG tablet Take 1 tablet by mouth 2 (Two) Times a Day. 180 tablet 1    vitamin D (ERGOCALCIFEROL) 1.25 MG (97301 UT) capsule capsule Take 1 capsule by mouth 1 (One) Time Per Week. 12 capsule 3       Allergies   Allergen Reactions    Codeine Nausea And Vomiting, Other (See Comments) and GI Intolerance     UNKNOWN       Social History     Socioeconomic History    Marital status: Single    Number of children: 4    Years of education: College    Tobacco Use    Smoking status: Never     Passive exposure: Never    Smokeless tobacco: Never   Vaping Use    Vaping status: Never Used   Substance and Sexual Activity    Alcohol use: Not Currently  "   Drug use: No    Sexual activity: Not Currently     Partners: Male     Social History     Social History Narrative    Lives in [facility].  Single w/ 4 children. .        /64   Pulse 65   Temp 97.8 °F (36.6 °C) (Temporal)   Resp 18   Ht 177.8 cm (70\")   Wt 127 kg (281 lb)   SpO2 98%   BMI 40.32 kg/m²     Physical Exam  Constitutional:       Appearance: She is well-developed.   Cardiovascular:      Rate and Rhythm: Normal rate.   Pulmonary:      Effort: Pulmonary effort is normal.   Musculoskeletal:         General: Normal range of motion.   Neurological:      Mental Status: She is alert.   Psychiatric:         Thought Content: Thought content normal.         Judgment: Judgment normal.           Assessment:  2 years s/p robotic RNY/BPD/recurrent paraHHR 4/14/23 GDW (previous LSG/paraHHR 2015 GDW, followed by LSG revision 12/2020 PNQ complicated by postop stricture and uncontrolled reflux)        ICD-10-CM ICD-9-CM   1. Obesity, Class III, BMI 40-49.9 (morbid obesity)  E66.01 278.01   2. Intestinal malabsorption, unspecified type  K90.9 579.9   3. Vitamin D deficiency  E55.9 268.9   4. Vitamin B12 deficiency  E53.8 266.2   5. Abnormal blood level of iron  R79.0 790.6   6. Fatigue, unspecified type  R53.83 780.79   7. Dyspepsia  R10.13 536.8       Class 3 Severe Obesity (BMI >=40). Obesity-related health conditions include the following:  see above . Obesity is worsening. BMI is is above average; BMI management plan is completed. We discussed  see plan .    Plan:  Encouraged to continue w/ good food choices and healthy habits.  Not a candidate for any further surgical revisions (reviewed w/ Dr. Hughes).  Follow up w/ [facility] if desired.  Routine bariatric labs ordered.  Continue current vitamin regimen w/ adjustments pending lab results.  Call w/ problems/concerns.     The patient was instructed to follow up in 6 months, sooner if needed.      I spent 30 minutes caring for Geneva on this date " of service. This time includes time spent by me in the following activities: preparing for the visit, performing a medically appropriate examination and/or evaluation, counseling and educating the patient/family/caregiver, referring and communicating with other health care professionals, documenting information in the medical record, and care coordination

## 2025-04-07 LAB
25(OH)D3+25(OH)D2 SERPL-MCNC: 13.3 NG/ML (ref 30–100)
A-TOCOPHEROL VIT E SERPL-MCNC: 6.9 MG/L (ref 5.9–19.4)
ALBUMIN SERPL-MCNC: 4.2 G/DL (ref 3.9–4.9)
ALP SERPL-CCNC: 124 IU/L (ref 44–121)
ALT SERPL-CCNC: 47 IU/L (ref 0–32)
AST SERPL-CCNC: 51 IU/L (ref 0–40)
BASOPHILS # BLD AUTO: 0 X10E3/UL (ref 0–0.2)
BASOPHILS NFR BLD AUTO: 1 %
BILIRUB SERPL-MCNC: 0.7 MG/DL (ref 0–1.2)
BUN SERPL-MCNC: 7 MG/DL (ref 6–20)
BUN/CREAT SERPL: 9 (ref 9–23)
CALCIUM SERPL-MCNC: 9.3 MG/DL (ref 8.7–10.2)
CHLORIDE SERPL-SCNC: 104 MMOL/L (ref 96–106)
CO2 SERPL-SCNC: 23 MMOL/L (ref 20–29)
COPPER SERPL-MCNC: 98 UG/DL (ref 80–158)
CREAT SERPL-MCNC: 0.81 MG/DL (ref 0.57–1)
EGFRCR SERPLBLD CKD-EPI 2021: 95 ML/MIN/1.73
EOSINOPHIL # BLD AUTO: 0.2 X10E3/UL (ref 0–0.4)
EOSINOPHIL NFR BLD AUTO: 3 %
ERYTHROCYTE [DISTWIDTH] IN BLOOD BY AUTOMATED COUNT: 13.1 % (ref 11.7–15.4)
FERRITIN SERPL-MCNC: 15 NG/ML (ref 15–150)
FOLATE SERPL-MCNC: 8.5 NG/ML
GAMMA TOCOPHEROL SERPL-MCNC: 0.6 MG/L (ref 0.7–4.9)
GLOBULIN SER CALC-MCNC: 3 G/DL (ref 1.5–4.5)
GLUCOSE SERPL-MCNC: 82 MG/DL (ref 70–99)
HCT VFR BLD AUTO: 38 % (ref 34–46.6)
HGB BLD-MCNC: 11.7 G/DL (ref 11.1–15.9)
IMM GRANULOCYTES # BLD AUTO: 0 X10E3/UL (ref 0–0.1)
IMM GRANULOCYTES NFR BLD AUTO: 0 %
INR PPP: 1 (ref 0.9–1.2)
IRON SERPL-MCNC: 57 UG/DL (ref 27–159)
LYMPHOCYTES # BLD AUTO: 1.7 X10E3/UL (ref 0.7–3.1)
LYMPHOCYTES NFR BLD AUTO: 31 %
MAGNESIUM SERPL-MCNC: 2.1 MG/DL (ref 1.6–2.3)
MCH RBC QN AUTO: 27.9 PG (ref 26.6–33)
MCHC RBC AUTO-ENTMCNC: 30.8 G/DL (ref 31.5–35.7)
MCV RBC AUTO: 91 FL (ref 79–97)
METHYLMALONATE SERPL-SCNC: 114 NMOL/L (ref 0–378)
MONOCYTES # BLD AUTO: 0.3 X10E3/UL (ref 0.1–0.9)
MONOCYTES NFR BLD AUTO: 6 %
NEUTROPHILS # BLD AUTO: 3.3 X10E3/UL (ref 1.4–7)
NEUTROPHILS NFR BLD AUTO: 59 %
PHOSPHATE SERPL-MCNC: 3.4 MG/DL (ref 3–4.3)
PHYTONADIONE SERPL-MCNC: 0.32 NG/ML (ref 0.1–2.2)
PLATELET # BLD AUTO: 280 X10E3/UL (ref 150–450)
POTASSIUM SERPL-SCNC: 4.6 MMOL/L (ref 3.5–5.2)
PREALB SERPL-MCNC: 16 MG/DL (ref 14–35)
PROT SERPL-MCNC: 7.2 G/DL (ref 6–8.5)
PROTHROMBIN TIME: 11.5 SEC (ref 9.1–12)
PTH-INTACT SERPL-MCNC: 73 PG/ML (ref 15–65)
RBC # BLD AUTO: 4.19 X10E6/UL (ref 3.77–5.28)
SODIUM SERPL-SCNC: 140 MMOL/L (ref 134–144)
VIT A SERPL-MCNC: 19.4 UG/DL (ref 18.9–57.3)
VIT B1 BLD-SCNC: 120.2 NMOL/L (ref 66.5–200)
WBC # BLD AUTO: 5.5 X10E3/UL (ref 3.4–10.8)
ZINC SERPL-MCNC: 68 UG/DL (ref 44–115)

## 2025-04-25 NOTE — ASSESSMENT & PLAN NOTE
-- DO NOT REPLY / DO NOT REPLY ALL --  -- This inbox is not monitored. If this was sent to the wrong provider or department, reroute message to P [facility] [email or web address] pool. --  -- Message is from [facility] [employer] ([facility]) --    General Patient Message:   Patient is calling stating because there are no providers in neurology their dept cannot fill out the forms for her and she would need her pcp's office to fill the forms out for her.   Patient's appt with a neurologist isn't until 7/16/25.    Please call her back.     Caller Information       Contact Date/Time Type Contact Phone/Fax    04/22/2025 10:38 AM CDT In Person (Incoming) Marni Ibrahim (Self)     04/25/2025 01:48 PM CDT Phone (Incoming) Marni Ibrahim (Self) [phone] (M)            Alternative phone number: na    Can a detailed message be left? Yes - Voicemail   Patient has been advised the message will be addressed within 2-3 business days.                 Urinalysis is not suggestive of UTI, will send for culture d/t hx of UTI 2 mo ago with similar sx.  Treatment based on culture results.

## 2025-08-25 ENCOUNTER — OFFICE VISIT (OUTPATIENT)
Dept: INTERNAL MEDICINE | Facility: CLINIC | Age: 40
End: 2025-08-25
Payer: COMMERCIAL

## 2025-08-25 VITALS
HEIGHT: 70 IN | HEART RATE: 73 BPM | OXYGEN SATURATION: 98 % | BODY MASS INDEX: 40.52 KG/M2 | WEIGHT: 283 LBS | SYSTOLIC BLOOD PRESSURE: 136 MMHG | DIASTOLIC BLOOD PRESSURE: 88 MMHG | TEMPERATURE: 97.8 F

## 2025-08-25 DIAGNOSIS — L29.9 ITCHING: ICD-10-CM

## 2025-08-25 DIAGNOSIS — Z20.822 CLOSE EXPOSURE TO COVID-19 VIRUS: ICD-10-CM

## 2025-08-25 DIAGNOSIS — L73.9 FOLLICULITIS: Primary | ICD-10-CM

## 2025-08-25 PROCEDURE — 3075F SYST BP GE 130 - 139MM HG: CPT | Performed by: FAMILY MEDICINE

## 2025-08-25 PROCEDURE — 99213 OFFICE O/P EST LOW 20 MIN: CPT | Performed by: FAMILY MEDICINE

## 2025-08-25 PROCEDURE — 1126F AMNT PAIN NOTED NONE PRSNT: CPT | Performed by: FAMILY MEDICINE

## 2025-08-25 PROCEDURE — 87428 SARSCOV & INF VIR A&B AG IA: CPT | Performed by: FAMILY MEDICINE

## 2025-08-25 PROCEDURE — 3079F DIAST BP 80-89 MM HG: CPT | Performed by: FAMILY MEDICINE

## 2025-08-25 PROCEDURE — 1160F RVW MEDS BY RX/DR IN RCRD: CPT | Performed by: FAMILY MEDICINE

## 2025-08-25 PROCEDURE — 1159F MED LIST DOCD IN RCRD: CPT | Performed by: FAMILY MEDICINE

## 2025-08-25 RX ORDER — LORATADINE 10 MG/1
10 TABLET ORAL DAILY
Qty: 30 TABLET | Refills: 1 | Status: SHIPPED | OUTPATIENT
Start: 2025-08-25

## 2025-08-25 RX ORDER — MUPIROCIN 2 %
1 OINTMENT (GRAM) TOPICAL 3 TIMES DAILY
Qty: 30 G | Refills: 0 | Status: SHIPPED | OUTPATIENT
Start: 2025-08-25

## 2025-08-25 RX ORDER — TRIAMCINOLONE ACETONIDE 1 MG/G
1 OINTMENT TOPICAL 2 TIMES DAILY
Qty: 30 G | Refills: 1 | Status: SHIPPED | OUTPATIENT
Start: 2025-08-25

## 2025-08-25 RX ORDER — CEPHALEXIN 500 MG/1
500 CAPSULE ORAL 3 TIMES DAILY
Qty: 30 CAPSULE | Refills: 0 | Status: SHIPPED | OUTPATIENT
Start: 2025-08-25

## (undated) DEVICE — PENROSE DRAIN 18 X .5" SILICONE: Brand: MEDLINE

## (undated) DEVICE — JP PERF DRN SIL FLT 10MM FULL: Brand: CARDINAL HEALTH

## (undated) DEVICE — APPL CHLORAPREP TINTED 26ML TEAL

## (undated) DEVICE — ENDOPATH XCEL BLADELESS TROCARS WITH STABILITY SLEEVES: Brand: ENDOPATH XCEL

## (undated) DEVICE — TRY SPINE BLCK WHITACRE 25G 3X5IN

## (undated) DEVICE — TROCAR: Brand: KII FIOS FIRST ENTRY

## (undated) DEVICE — BLADELESS OBTURATOR: Brand: WECK VISTA

## (undated) DEVICE — CVR HNDL LIGHT RIGID

## (undated) DEVICE — TISSUE RETRIEVAL SYSTEM: Brand: INZII RETRIEVAL SYSTEM

## (undated) DEVICE — COLUMN DRAPE

## (undated) DEVICE — GOWN,NON-REINFORCED,SIRUS,SET IN SLV,XXL: Brand: MEDLINE

## (undated) DEVICE — POWER SHELL: Brand: SIGNIA

## (undated) DEVICE — ENDOPATH XCEL UNIVERSAL TROCAR STABLILITY SLEEVES: Brand: ENDOPATH XCEL

## (undated) DEVICE — Device

## (undated) DEVICE — GLV SURG SENSICARE PI MIC PF SZ8.5 LF STRL

## (undated) DEVICE — CLTH CLENS READYCLEANSE PERI CARE PK/5

## (undated) DEVICE — SHT AIR TRANSFR COMFRT GLIDE LAT 40X80IN

## (undated) DEVICE — PK BARIATRIC 10

## (undated) DEVICE — Device: Brand: DEFENDO AIR/WATER/SUCTION AND BIOPSY VALVE

## (undated) DEVICE — MARYLAND JAW LAPAROSCOPIC SEALER/DIVIDER COATED: Brand: LIGASURE

## (undated) DEVICE — SEAL

## (undated) DEVICE — GLV SURG SENSICARE PI MIC PF SZ6.5 LF STRL

## (undated) DEVICE — [HIGH FLOW INSUFFLATOR,  DO NOT USE IF PACKAGE IS DAMAGED,  KEEP DRY,  KEEP AWAY FROM SUNLIGHT,  PROTECT FROM HEAT AND RADIOACTIVE SOURCES.]: Brand: PNEUMOSURE

## (undated) DEVICE — CLMP STD 22CM DISP

## (undated) DEVICE — MINI ENDOCUT SCISSOR TIP, DISPOSABLE: Brand: RENEW

## (undated) DEVICE — ST TBG CONN PNEUMOCLEAR EVAC SMOKE HEAT/HUMID

## (undated) DEVICE — INTENDED USE FOR SURGICAL MARKING ON INTACT SKIN, ALSO PROVIDES A PERMANENT METHOD OF IDENTIFYING OBJECTS IN THE OPERATING ROOM: Brand: WRITESITE® REGULAR TIP SKIN MARKER

## (undated) DEVICE — STAPLER 60: Brand: SUREFORM

## (undated) DEVICE — SUT GUT CHRM 1 CTX 36IN 905H

## (undated) DEVICE — 39" SINGLE PATIENT USE HOVERMATT: Brand: SINGLE PATIENT USE HOVERMATT

## (undated) DEVICE — PROXIMATE RH ROTATING HEAD SKIN STAPLERS (35 WIDE) CONTAINS 35 STAINLESS STEEL STAPLES: Brand: PROXIMATE

## (undated) DEVICE — FLTR PLUMEPORT LAP W/CONN STRL

## (undated) DEVICE — KT WARM LAP LIQUIDSCOPE/HELPOR W/WARMOR/CLTH 2/TROC/SWAB

## (undated) DEVICE — UNDRPD COMFRT GLD DRYPAD 36X57IN

## (undated) DEVICE — GLV SURG DERMASSURE GRN LF PF 7.0

## (undated) DEVICE — GLV SURG BIOGEL LTX PF 7 1/2

## (undated) DEVICE — APL DUPLOSPRAYER MIS 40CM

## (undated) DEVICE — SOL IRR H2O BTL 1000ML STRL

## (undated) DEVICE — ARM DRAPE

## (undated) DEVICE — TIP COVER ACCESSORY

## (undated) DEVICE — PK C/SECT 10

## (undated) DEVICE — JACKSON-PRATT 100CC BULB RESERVOIR: Brand: CARDINAL HEALTH

## (undated) DEVICE — CANNULA SEAL

## (undated) DEVICE — SOL IRR NACL 0.9PCT BT 1000ML

## (undated) DEVICE — ADHS SKIN PREMIERPRO EXOFIN TOPICAL HI/VISC .5ML

## (undated) DEVICE — SOL ANTISTICK CAUTRY ELECTROLUBE LF

## (undated) DEVICE — VESSEL SEALER EXTEND: Brand: ENDOWRIST

## (undated) DEVICE — 3M™ MEDIPORE™ H SOFT CLOTH SURGICAL TAPE, 2863, 3 IN X 10 YD, 12/CASE: Brand: 3M™ MEDIPORE™

## (undated) DEVICE — PAD ARMBRD SURG CONVOL 7.5X20X2IN

## (undated) DEVICE — PATIENT RETURN ELECTRODE, SINGLE-USE, CONTACT QUALITY MONITORING, ADULT, WITH 9FT CORD, FOR PATIENTS WEIGING OVER 33LBS. (15KG): Brand: MEGADYNE

## (undated) DEVICE — HLDR CATH FOL STATLOCK SWVL TRICOT

## (undated) DEVICE — DRAPE,TOP,102X53,STERILE: Brand: MEDLINE

## (undated) DEVICE — REDUCER: Brand: ENDOWRIST

## (undated) DEVICE — BLANKT WARM UPPR/BDY ARM/OUT 57X196CM

## (undated) DEVICE — CONTN GRAD MEAS TRIANG 32OZ BLK

## (undated) DEVICE — SUT ETHLN 2/0 PS 18IN 585H

## (undated) DEVICE — ENDOPATH 5MM ENDOSCOPIC BLUNT TIP DISSECTORS (12 POUCHES CONTAINING 3 DISSECTORS EACH): Brand: ENDOPATH